# Patient Record
Sex: FEMALE | Race: WHITE | Employment: PART TIME | URBAN - METROPOLITAN AREA
[De-identification: names, ages, dates, MRNs, and addresses within clinical notes are randomized per-mention and may not be internally consistent; named-entity substitution may affect disease eponyms.]

---

## 2019-03-20 ENCOUNTER — OFFICE VISIT (OUTPATIENT)
Dept: FAMILY MEDICINE CLINIC | Age: 69
End: 2019-03-20
Payer: COMMERCIAL

## 2019-03-20 VITALS
DIASTOLIC BLOOD PRESSURE: 74 MMHG | RESPIRATION RATE: 16 BRPM | SYSTOLIC BLOOD PRESSURE: 132 MMHG | TEMPERATURE: 98.3 F | BODY MASS INDEX: 41.85 KG/M2 | OXYGEN SATURATION: 95 % | HEIGHT: 65 IN | HEART RATE: 83 BPM | WEIGHT: 251.2 LBS

## 2019-03-20 DIAGNOSIS — R12 HEARTBURN: ICD-10-CM

## 2019-03-20 DIAGNOSIS — J30.2 SEASONAL ALLERGIC RHINITIS, UNSPECIFIED TRIGGER: ICD-10-CM

## 2019-03-20 DIAGNOSIS — J45.40 MODERATE PERSISTENT ASTHMA WITHOUT COMPLICATION: ICD-10-CM

## 2019-03-20 DIAGNOSIS — Z87.891 FORMER MODERATE CIGARETTE SMOKER (10-19 PER DAY): ICD-10-CM

## 2019-03-20 DIAGNOSIS — E66.01 CLASS 3 SEVERE OBESITY DUE TO EXCESS CALORIES WITHOUT SERIOUS COMORBIDITY WITH BODY MASS INDEX (BMI) OF 40.0 TO 44.9 IN ADULT (HCC): ICD-10-CM

## 2019-03-20 DIAGNOSIS — L30.9 DERMATITIS: ICD-10-CM

## 2019-03-20 DIAGNOSIS — M15.9 PRIMARY OSTEOARTHRITIS INVOLVING MULTIPLE JOINTS: ICD-10-CM

## 2019-03-20 DIAGNOSIS — M81.0 AGE RELATED OSTEOPOROSIS, UNSPECIFIED PATHOLOGICAL FRACTURE PRESENCE: Primary | ICD-10-CM

## 2019-03-20 PROCEDURE — 99203 OFFICE O/P NEW LOW 30 MIN: CPT | Performed by: INTERNAL MEDICINE

## 2019-03-20 RX ORDER — DICLOFENAC SODIUM AND MISOPROSTOL 75; 200 MG/1; UG/1
1 TABLET, DELAYED RELEASE ORAL 2 TIMES DAILY
Qty: 60 TABLET | Refills: 3 | Status: SHIPPED | OUTPATIENT
Start: 2019-03-20 | End: 2019-04-25 | Stop reason: SDUPTHER

## 2019-03-20 RX ORDER — DICLOFENAC SODIUM AND MISOPROSTOL 50; 200 MG/1; UG/1
1 TABLET, DELAYED RELEASE ORAL 2 TIMES DAILY
COMMUNITY
End: 2019-03-20

## 2019-03-20 RX ORDER — NICOTINE POLACRILEX 4 MG/1
20 GUM, CHEWING ORAL DAILY
COMMUNITY
End: 2019-03-20 | Stop reason: ALTCHOICE

## 2019-03-20 RX ORDER — ALBUTEROL SULFATE 90 UG/1
1 AEROSOL, METERED RESPIRATORY (INHALATION) PRN
COMMUNITY
Start: 2019-02-15 | End: 2019-04-25 | Stop reason: SDUPTHER

## 2019-03-20 RX ORDER — MONTELUKAST SODIUM 10 MG/1
10 TABLET ORAL DAILY
COMMUNITY
Start: 2019-02-20 | End: 2019-04-25 | Stop reason: SDUPTHER

## 2019-03-20 RX ORDER — RANITIDINE 150 MG/1
150 TABLET ORAL 2 TIMES DAILY PRN
Qty: 60 TABLET | Refills: 5 | Status: SHIPPED | OUTPATIENT
Start: 2019-03-20 | End: 2019-04-25 | Stop reason: SDUPTHER

## 2019-03-20 RX ORDER — TRIAMCINOLONE ACETONIDE 0.25 MG/G
OINTMENT TOPICAL
Qty: 80 G | Refills: 1 | Status: SHIPPED | OUTPATIENT
Start: 2019-03-20 | End: 2019-03-27

## 2019-03-20 RX ORDER — CETIRIZINE HYDROCHLORIDE 10 MG/1
10 TABLET ORAL DAILY
COMMUNITY
Start: 2019-02-20 | End: 2019-04-25 | Stop reason: SDUPTHER

## 2019-03-20 RX ORDER — ALENDRONATE SODIUM 70 MG/1
1 TABLET ORAL WEEKLY
COMMUNITY
Start: 2019-02-15 | End: 2019-04-25 | Stop reason: SDUPTHER

## 2019-03-20 RX ORDER — BUDESONIDE AND FORMOTEROL FUMARATE DIHYDRATE 160; 4.5 UG/1; UG/1
2 AEROSOL RESPIRATORY (INHALATION) DAILY
COMMUNITY
Start: 2019-02-22 | End: 2019-04-25 | Stop reason: SDUPTHER

## 2019-03-20 RX ORDER — TIOTROPIUM BROMIDE 18 UG/1
1 CAPSULE ORAL; RESPIRATORY (INHALATION) DAILY
COMMUNITY
Start: 2019-02-20 | End: 2019-04-25 | Stop reason: SDUPTHER

## 2019-03-20 RX ORDER — TIZANIDINE 4 MG/1
4 TABLET ORAL 3 TIMES DAILY
COMMUNITY
Start: 2019-02-20 | End: 2019-04-25 | Stop reason: SDUPTHER

## 2019-03-20 RX ORDER — FLUTICASONE PROPIONATE 50 MCG
1 SPRAY, SUSPENSION (ML) NASAL DAILY
Qty: 2 BOTTLE | Refills: 1 | Status: SHIPPED | OUTPATIENT
Start: 2019-03-20 | End: 2019-04-25

## 2019-03-20 SDOH — HEALTH STABILITY: MENTAL HEALTH: HOW OFTEN DO YOU HAVE A DRINK CONTAINING ALCOHOL?: NOT ASKED

## 2019-03-20 ASSESSMENT — PATIENT HEALTH QUESTIONNAIRE - PHQ9
1. LITTLE INTEREST OR PLEASURE IN DOING THINGS: 0
SUM OF ALL RESPONSES TO PHQ QUESTIONS 1-9: 0
SUM OF ALL RESPONSES TO PHQ QUESTIONS 1-9: 0
2. FEELING DOWN, DEPRESSED OR HOPELESS: 0
SUM OF ALL RESPONSES TO PHQ9 QUESTIONS 1 & 2: 0

## 2019-04-25 ENCOUNTER — OFFICE VISIT (OUTPATIENT)
Dept: FAMILY MEDICINE CLINIC | Age: 69
End: 2019-04-25
Payer: COMMERCIAL

## 2019-04-25 VITALS
SYSTOLIC BLOOD PRESSURE: 120 MMHG | TEMPERATURE: 97.6 F | DIASTOLIC BLOOD PRESSURE: 82 MMHG | HEART RATE: 78 BPM | BODY MASS INDEX: 41.74 KG/M2 | RESPIRATION RATE: 16 BRPM | OXYGEN SATURATION: 95 % | WEIGHT: 250.8 LBS

## 2019-04-25 DIAGNOSIS — J44.9 CHRONIC OBSTRUCTIVE PULMONARY DISEASE, UNSPECIFIED COPD TYPE (HCC): ICD-10-CM

## 2019-04-25 DIAGNOSIS — M81.0 OSTEOPOROSIS WITHOUT CURRENT PATHOLOGICAL FRACTURE, UNSPECIFIED OSTEOPOROSIS TYPE: ICD-10-CM

## 2019-04-25 DIAGNOSIS — M15.9 PRIMARY OSTEOARTHRITIS INVOLVING MULTIPLE JOINTS: ICD-10-CM

## 2019-04-25 DIAGNOSIS — R12 HEARTBURN: ICD-10-CM

## 2019-04-25 DIAGNOSIS — R73.03 PREDIABETES: Primary | ICD-10-CM

## 2019-04-25 PROCEDURE — 99214 OFFICE O/P EST MOD 30 MIN: CPT | Performed by: INTERNAL MEDICINE

## 2019-04-25 RX ORDER — BUDESONIDE AND FORMOTEROL FUMARATE DIHYDRATE 160; 4.5 UG/1; UG/1
2 AEROSOL RESPIRATORY (INHALATION) DAILY
Qty: 3 INHALER | Refills: 1 | Status: SHIPPED | OUTPATIENT
Start: 2019-04-25 | End: 2019-08-26 | Stop reason: SDUPTHER

## 2019-04-25 RX ORDER — RANITIDINE 150 MG/1
150 TABLET ORAL 2 TIMES DAILY PRN
Qty: 180 TABLET | Refills: 1 | Status: SHIPPED | OUTPATIENT
Start: 2019-04-25 | End: 2019-08-26 | Stop reason: SDUPTHER

## 2019-04-25 RX ORDER — MONTELUKAST SODIUM 10 MG/1
10 TABLET ORAL DAILY
Qty: 90 TABLET | Refills: 1 | Status: SHIPPED | OUTPATIENT
Start: 2019-04-25 | End: 2019-08-26 | Stop reason: SDUPTHER

## 2019-04-25 RX ORDER — TRAMADOL HYDROCHLORIDE 50 MG/1
1-2 TABLET ORAL EVERY 8 HOURS PRN
Refills: 0 | COMMUNITY
Start: 2019-04-17 | End: 2019-07-23 | Stop reason: ALTCHOICE

## 2019-04-25 RX ORDER — DICLOFENAC SODIUM AND MISOPROSTOL 75; 200 MG/1; UG/1
1 TABLET, DELAYED RELEASE ORAL 2 TIMES DAILY
Qty: 180 TABLET | Refills: 1 | Status: SHIPPED | OUTPATIENT
Start: 2019-04-25 | End: 2019-08-26 | Stop reason: SDUPTHER

## 2019-04-25 RX ORDER — TIZANIDINE 4 MG/1
4 TABLET ORAL 3 TIMES DAILY
Qty: 270 TABLET | Refills: 1 | Status: SHIPPED | OUTPATIENT
Start: 2019-04-25 | End: 2019-08-26 | Stop reason: SDUPTHER

## 2019-04-25 RX ORDER — TIOTROPIUM BROMIDE 18 UG/1
18 CAPSULE ORAL; RESPIRATORY (INHALATION) DAILY
Qty: 90 CAPSULE | Refills: 1 | Status: SHIPPED | OUTPATIENT
Start: 2019-04-25 | End: 2019-08-26 | Stop reason: SDUPTHER

## 2019-04-25 RX ORDER — ALENDRONATE SODIUM 70 MG/1
70 TABLET ORAL WEEKLY
Qty: 12 TABLET | Refills: 1 | Status: SHIPPED | OUTPATIENT
Start: 2019-04-25 | End: 2019-08-26 | Stop reason: SDUPTHER

## 2019-04-25 RX ORDER — CETIRIZINE HYDROCHLORIDE 10 MG/1
10 TABLET ORAL DAILY
Qty: 90 TABLET | Refills: 1 | Status: SHIPPED | OUTPATIENT
Start: 2019-04-25 | End: 2019-08-26 | Stop reason: SDUPTHER

## 2019-04-25 RX ORDER — ALBUTEROL SULFATE 90 UG/1
1 AEROSOL, METERED RESPIRATORY (INHALATION) EVERY 6 HOURS PRN
Qty: 4 INHALER | Refills: 1 | Status: SHIPPED | OUTPATIENT
Start: 2019-04-25 | End: 2019-08-26 | Stop reason: SDUPTHER

## 2019-04-25 NOTE — PROGRESS NOTES
Subjective:       Patient ID: Lynn Trujillo is a 80 y.o. female who presents for   Chief Complaint   Patient presents with    Results       HPI:  Nursing note reviewed and discussed with patient. Seeing pain management at [facility] - had an MRI, NSGY v/s injections recommended based on results. She is taking tramadol TID and BID lyrica. She had a really bad period where they refused to refill her pain meds till she had the MRI done and they could review the results, she couldn't sleep or move without pain. She reports she is back on her meds now. She is sad that she was never believed about her pain in the past despite ample evidence of osteoarthritis on xrays done in 2016. She is also considering getting a second opinion before she does anything for her back, restrained by insurance and financial ability   She reports the rash on her legs is finally starting to heal up with Aveeno lotion   She was never told that she is prediabetic when she had labs done in 2018. Denies polyuria, polydipsia, fatigue, vision changes. Patient's medications, allergies, past medical, surgical, social and family histories were reviewed and updated as appropriate. Social History     Tobacco Use    Smoking status: Former Smoker     Packs/day: 0.75     Years: 34.00     Pack years: 25.50     Types: Cigarettes     Last attempt to quit: 3/20/2017     Years since quittin.0    Smokeless tobacco: Never Used   Substance Use Topics    Alcohol use: Not Currently     Comment: occasionally         Review of Systems  Energy level good overall, and weight is stable. No chest pain or shortness of breath.     Bowels have been normal without constipation or diarrhea         Objective:        Physical Exam:  /82 (Site: Left Upper Arm, Position: Sitting, Cuff Size: Large Adult)   Pulse 78   Temp 97.6 °F (36.4 °C) (Oral)   Resp 16   Wt 250 lb 12.8 oz (113.8 kg)   SpO2 95%   BMI 41.74 kg/m²   Wt Readings from Last 3 Encounters:   04/25/19 250 lb 12.8 oz (113.8 kg)   03/20/19 251 lb 3.2 oz (113.9 kg)       General: Alert and oriented, in no distress. Patient ambulating with walker. Central obesity  Chest: clear with no wheezes or rales. No retractions, or use of accessory muscles noted. Cardiovascular: PMI is not displaced, and no thrill noted. Regular rate and rhythm with no rub, murmur or gallop. There is no peripheral edema. Pedal pulses are normal.   Abdomen: Abdomen is soft and nontender. The bowel sounds are normal.   Musculoskeletal: There are no deformities of the the extremities. Patient has all ten fingers intact. The patient has full range of motion on all 4 extremities without pain. Skin: The skin is warm and dry. There are no rashes noted. Prior to Visit Medications    Medication Sig Taking? Authorizing Provider   traMADol (ULTRAM) 50 MG tablet Take 1-2 tablets by mouth every 8 hours as needed. Yes Historical Provider, MD   LYRICA 50 MG capsule Take 1 tablet by mouth 2 times daily.  Yes Historical Provider, MD Maya Sax 18 MCG inhalation capsule Take 1 puff by mouth daily Yes Historical Provider, MD   SYMBICORT 160-4.5 MCG/ACT AERO Inhale 2 puffs into the lungs daily Yes Historical Provider, MD   albuterol sulfate  (90 Base) MCG/ACT inhaler Inhale 1 puff into the lungs as needed Yes Historical Provider, MD   montelukast (SINGULAIR) 10 MG tablet Take 10 mg by mouth daily Yes Historical Provider, MD   cetirizine (ZYRTEC) 10 MG tablet Take 10 mg by mouth daily Yes Historical Provider, MD   tiZANidine (ZANAFLEX) 4 MG tablet Take 4 mg by mouth 3 times daily Yes Historical Provider, MD   alendronate (FOSAMAX) 70 MG tablet Take 1 tablet by mouth once a week Yes Historical Provider, MD   diclofenac-Misoprostol (ARTHROTEC) 75-0.2 MG per tablet Take 1 tablet by mouth 2 times daily Yes Bethanie House MD   ranitidine (ZANTAC) 150 MG tablet Take 1 tablet by mouth 2 times daily as needed for Heartburn Yes Genaro Willis MD       Data Review all data available in careEverywhere reviewed      Assessment/Plan:      1. Primary osteoarthritis involving multiple joints  Will refer to ortho for knees when she is ready - probably next visit, she needs to pay her portion of her MRI and pain consults   - diclofenac-Misoprostol (ARTHROTEC) 75-0.2 MG per tablet; Take 1 tablet by mouth 2 times daily  Dispense: 180 tablet; Refill: 1    2. Heartburn  - ranitidine (ZANTAC) 150 MG tablet; Take 1 tablet by mouth 2 times daily as needed for Heartburn  Dispense: 180 tablet; Refill: 1    3. Prediabetes  Repeat A1c    4. Chronic obstructive pulmonary disease, unspecified COPD type ([facility]  - SYMBICORT 160-4.5 MCG/ACT AERO; Inhale 2 puffs into the lungs daily  Dispense: 3 Inhaler; Refill: 1  - SPIRIVA HANDIHALER 18 MCG inhalation capsule; Inhale 1 capsule into the lungs daily  Dispense: 90 capsule; Refill: 1  - albuterol sulfate  (90 Base) MCG/ACT inhaler; Inhale 1 puff into the lungs every 6 hours as needed for Wheezing or Shortness of Breath  Dispense: 4 Inhaler; Refill: 1    5. Osteoporosis without current pathological fracture, unspecified osteoporosis type  - alendronate (FOSAMAX) 70 MG tablet; Take 1 tablet by mouth once a week  Dispense: 12 tablet;  Refill: 1           Electronically signed by Anahy Kaur MD on 4/25/2019 at 3:27 PM

## 2019-07-23 ENCOUNTER — OFFICE VISIT (OUTPATIENT)
Dept: FAMILY MEDICINE CLINIC | Age: 69
End: 2019-07-23
Payer: COMMERCIAL

## 2019-07-23 VITALS
RESPIRATION RATE: 20 BRPM | HEIGHT: 65 IN | DIASTOLIC BLOOD PRESSURE: 80 MMHG | SYSTOLIC BLOOD PRESSURE: 135 MMHG | WEIGHT: 248 LBS | HEART RATE: 85 BPM | TEMPERATURE: 98 F | BODY MASS INDEX: 41.32 KG/M2

## 2019-07-23 DIAGNOSIS — M54.41 CHRONIC BILATERAL LOW BACK PAIN WITH BILATERAL SCIATICA: ICD-10-CM

## 2019-07-23 DIAGNOSIS — M54.42 CHRONIC BILATERAL LOW BACK PAIN WITH BILATERAL SCIATICA: ICD-10-CM

## 2019-07-23 DIAGNOSIS — G89.29 CHRONIC LEFT HIP PAIN: Primary | ICD-10-CM

## 2019-07-23 DIAGNOSIS — G89.29 CHRONIC BILATERAL LOW BACK PAIN WITH BILATERAL SCIATICA: ICD-10-CM

## 2019-07-23 DIAGNOSIS — R73.03 PREDIABETES: ICD-10-CM

## 2019-07-23 DIAGNOSIS — M15.9 PRIMARY OSTEOARTHRITIS INVOLVING MULTIPLE JOINTS: ICD-10-CM

## 2019-07-23 DIAGNOSIS — M25.552 CHRONIC LEFT HIP PAIN: Primary | ICD-10-CM

## 2019-07-23 PROBLEM — M15.0 PRIMARY OSTEOARTHRITIS INVOLVING MULTIPLE JOINTS: Status: ACTIVE | Noted: 2019-07-23

## 2019-07-23 LAB — HBA1C MFR BLD: 5.3 %

## 2019-07-23 PROCEDURE — 99214 OFFICE O/P EST MOD 30 MIN: CPT | Performed by: INTERNAL MEDICINE

## 2019-07-23 PROCEDURE — 83036 HEMOGLOBIN GLYCOSYLATED A1C: CPT | Performed by: INTERNAL MEDICINE

## 2019-07-23 RX ORDER — PREDNISONE 10 MG/1
TABLET ORAL
Qty: 30 TABLET | Refills: 0 | Status: SHIPPED | OUTPATIENT
Start: 2019-07-23 | End: 2019-08-26 | Stop reason: ALTCHOICE

## 2019-07-23 RX ORDER — PREDNISONE 1 MG/1
5 TABLET ORAL DAILY
Qty: 30 TABLET | Refills: 1 | Status: SHIPPED | OUTPATIENT
Start: 2019-07-23 | End: 2019-09-24 | Stop reason: SDUPTHER

## 2019-07-23 NOTE — PROGRESS NOTES
Chronic Disease Visit Information    BP Readings from Last 3 Encounters:   04/25/19 120/82   03/20/19 132/74          No results found for: LABA1C, LABMICR, LDLCHOLESTEROL, LDLCALC, HDL, BUN, CREATININE, GLUCOSE         Have you changed or started any medications since your last visit including any over-the-counter medicines, vitamins, or herbal medicines? no   Are you having any side effects from any of your medications? -  no  Have you stopped taking any of your medications? Is so, why? -  no    Have you seen any other physician or provider since your last visit? No  Have you had any other diagnostic tests since your last visit? No  Have you been seen in the emergency room and/or had an admission to a hospital since we last saw you? No  Have you had your annual diabetic retinal (eye) exam? No  Have you had your routine dental cleaning in the past 6 months? no    Have you activated your [employer] [facility] [employer] account? If not, what are your barriers?  Yes     Patient Care Team:  Nura Berkowitz MD as PCP - General (Internal Medicine)  Nura Berkowitz MD as PCP - [facility] Provider         Medical History Review  Past Medical, Family, and Social History reviewed and does contribute to the patient presenting condition    Health Maintenance   Topic Date Due    DTaP/Tdap/Td vaccine (1 - Tdap) 11/12/1970    Lipid screen  11/12/1991    Diabetes screen  11/12/1991    Colon cancer screen colonoscopy  11/12/2001    Pneumococcal 65+ years Vaccine (1 of 2 - PCV13) 11/12/2016    Shingles Vaccine (2 of 2) 03/23/2019    Breast cancer screen  09/20/2019 (Originally 11/12/2001)    DEXA (modify frequency per FRAX score)  03/20/2020 (Originally 11/12/2016)    Hepatitis C screen  03/20/2020 (Originally 1950)    Flu vaccine (1) 09/01/2019

## 2019-07-23 NOTE — PROGRESS NOTES
Subjective:       Patient ID: Danilo Torres is a 80 y.o. female who presents for   Chief Complaint   Patient presents with    Follow-up    Wound Check     Left leg behind calf  2 open wounds        HPI:  Nursing note reviewed and discussed with patient. Seeing pain management at [facility] - had an MRI, NSGY v/s injections recommended based on results. She is taking tramadol TID and BID lyrica. She had a really bad period where they refused to refill her pain meds till she had the MRI done and they could review the results, she couldn't sleep or move without pain. She reports she is back on her meds now. She is sad that she was never believed about her pain in the past despite ample evidence of osteoarthritis on xrays done in 2016. --> switching pain management doctors, very unhappy with the way she has been treated. She continues to remain in pain. She is taking all meds as prescribed   She had blisters on her legs after lyrica increased, getting better now that she stopped the medicaton, but has ulcers there now   She is wanting to try something else for the pain   She reports that she had one depo medrol injection to the left hip and it was the best thing that ever happened, she did not even need the walker for about a week, then it wore off and she is back to where she was prior. Patient's medications, allergies, past medical, surgical, social and family histories were reviewed and updated as appropriate. Social History     Tobacco Use    Smoking status: Former Smoker     Packs/day: 0.75     Years: 34.00     Pack years: 25.50     Types: Cigarettes     Last attempt to quit: 3/20/2017     Years since quittin.3    Smokeless tobacco: Never Used   Substance Use Topics    Alcohol use: Not Currently     Comment: occasionally         Review of Systems  Energy level good overall, and weight is stable. No chest pain or shortness of breath.     Bowels have been normal without constipation or

## 2019-07-24 ENCOUNTER — TELEPHONE (OUTPATIENT)
Dept: FAMILY MEDICINE CLINIC | Age: 69
End: 2019-07-24

## 2019-07-24 NOTE — TELEPHONE ENCOUNTER
Patient called in asking if she could get a cream sent into the office to place on the rash she showed you yesterday at appt.

## 2019-08-19 ENCOUNTER — PATIENT MESSAGE (OUTPATIENT)
Dept: FAMILY MEDICINE CLINIC | Age: 69
End: 2019-08-19

## 2019-08-19 DIAGNOSIS — M25.552 CHRONIC PAIN OF BOTH HIPS: Primary | ICD-10-CM

## 2019-08-19 DIAGNOSIS — M25.551 CHRONIC PAIN OF BOTH HIPS: Primary | ICD-10-CM

## 2019-08-19 DIAGNOSIS — G89.29 CHRONIC PAIN OF BOTH HIPS: Primary | ICD-10-CM

## 2019-08-19 RX ORDER — HYDROCODONE BITARTRATE AND ACETAMINOPHEN 5; 325 MG/1; MG/1
1 TABLET ORAL EVERY 8 HOURS PRN
Qty: 75 TABLET | Refills: 0 | Status: SHIPPED | OUTPATIENT
Start: 2019-08-19 | End: 2019-09-18

## 2019-08-19 NOTE — TELEPHONE ENCOUNTER
From: Sue Ghotra  To: Martha Laughlin MD  Sent: 8/19/2019 3:41 PM EDT  Subject: Prescription Question    I have a new appt with Dr Rona Randall Sept 5th @ 230pm @ [facility]. .Dr Pretty Poag she would fill Norco 5mg /325 rx for me if needed to last long enough until i am seen by Dr Rona Randall. .This will be due for refill 7/23/19. .Or what should I do

## 2019-08-26 ENCOUNTER — OFFICE VISIT (OUTPATIENT)
Dept: FAMILY MEDICINE CLINIC | Age: 69
End: 2019-08-26
Payer: COMMERCIAL

## 2019-08-26 VITALS
DIASTOLIC BLOOD PRESSURE: 78 MMHG | BODY MASS INDEX: 41.15 KG/M2 | SYSTOLIC BLOOD PRESSURE: 122 MMHG | HEART RATE: 79 BPM | OXYGEN SATURATION: 96 % | WEIGHT: 247 LBS | HEIGHT: 65 IN

## 2019-08-26 DIAGNOSIS — J30.2 SEASONAL ALLERGIC RHINITIS, UNSPECIFIED TRIGGER: ICD-10-CM

## 2019-08-26 DIAGNOSIS — J44.9 CHRONIC OBSTRUCTIVE PULMONARY DISEASE, UNSPECIFIED COPD TYPE (HCC): ICD-10-CM

## 2019-08-26 DIAGNOSIS — M81.0 OSTEOPOROSIS WITHOUT CURRENT PATHOLOGICAL FRACTURE, UNSPECIFIED OSTEOPOROSIS TYPE: ICD-10-CM

## 2019-08-26 DIAGNOSIS — M15.9 PRIMARY OSTEOARTHRITIS INVOLVING MULTIPLE JOINTS: ICD-10-CM

## 2019-08-26 DIAGNOSIS — R12 HEARTBURN: ICD-10-CM

## 2019-08-26 DIAGNOSIS — Z91.81 AT HIGH RISK FOR FALLS: Primary | ICD-10-CM

## 2019-08-26 PROCEDURE — 99214 OFFICE O/P EST MOD 30 MIN: CPT | Performed by: INTERNAL MEDICINE

## 2019-08-26 RX ORDER — MONTELUKAST SODIUM 10 MG/1
10 TABLET ORAL DAILY
Qty: 90 TABLET | Refills: 1 | Status: SHIPPED | OUTPATIENT
Start: 2019-08-26 | End: 2019-11-19 | Stop reason: SDUPTHER

## 2019-08-26 RX ORDER — ALENDRONATE SODIUM 70 MG/1
70 TABLET ORAL WEEKLY
Qty: 12 TABLET | Refills: 1 | Status: SHIPPED | OUTPATIENT
Start: 2019-08-26 | End: 2019-11-19 | Stop reason: SDUPTHER

## 2019-08-26 RX ORDER — RANITIDINE 150 MG/1
150 TABLET ORAL 2 TIMES DAILY PRN
Qty: 180 TABLET | Refills: 1 | Status: SHIPPED | OUTPATIENT
Start: 2019-08-26 | End: 2019-11-19 | Stop reason: CLARIF

## 2019-08-26 RX ORDER — BUDESONIDE AND FORMOTEROL FUMARATE DIHYDRATE 160; 4.5 UG/1; UG/1
2 AEROSOL RESPIRATORY (INHALATION) DAILY
Qty: 3 INHALER | Refills: 1 | Status: SHIPPED | OUTPATIENT
Start: 2019-08-26 | End: 2019-11-19 | Stop reason: SDUPTHER

## 2019-08-26 RX ORDER — CETIRIZINE HYDROCHLORIDE 10 MG/1
10 TABLET ORAL DAILY
Qty: 90 TABLET | Refills: 1 | Status: SHIPPED | OUTPATIENT
Start: 2019-08-26 | End: 2019-11-19 | Stop reason: SDUPTHER

## 2019-08-26 RX ORDER — TIZANIDINE 4 MG/1
4 TABLET ORAL 3 TIMES DAILY
Qty: 270 TABLET | Refills: 1 | Status: SHIPPED | OUTPATIENT
Start: 2019-08-26 | End: 2020-11-16 | Stop reason: SDUPTHER

## 2019-08-26 RX ORDER — DICLOFENAC SODIUM AND MISOPROSTOL 75; 200 MG/1; UG/1
1 TABLET, DELAYED RELEASE ORAL 2 TIMES DAILY
Qty: 180 TABLET | Refills: 1 | Status: SHIPPED | OUTPATIENT
Start: 2019-08-26 | End: 2019-11-19 | Stop reason: SDUPTHER

## 2019-08-26 RX ORDER — TIOTROPIUM BROMIDE 18 UG/1
18 CAPSULE ORAL; RESPIRATORY (INHALATION) DAILY
Qty: 90 CAPSULE | Refills: 1 | Status: SHIPPED | OUTPATIENT
Start: 2019-08-26 | End: 2019-11-19 | Stop reason: SDUPTHER

## 2019-08-26 RX ORDER — ALBUTEROL SULFATE 90 UG/1
1 AEROSOL, METERED RESPIRATORY (INHALATION) EVERY 6 HOURS PRN
Qty: 4 INHALER | Refills: 1 | Status: SHIPPED | OUTPATIENT
Start: 2019-08-26 | End: 2019-11-19 | Stop reason: SDUPTHER

## 2019-08-26 NOTE — PROGRESS NOTES
Bowels have been normal without constipation or diarrhea         Objective:        Physical Exam:  /78 (Site: Left Upper Arm, Position: Sitting, Cuff Size: Large Adult)   Pulse 79   Ht 5' 5\" (1.651 m)   Wt 247 lb (112 kg)   SpO2 96%   BMI 41.10 kg/m²   Wt Readings from Last 3 Encounters:   08/26/19 247 lb (112 kg)   07/23/19 248 lb (112.5 kg)   04/25/19 250 lb 12.8 oz (113.8 kg)       General: Alert and oriented, in no distress. Patient ambulating with walker. Central obesity  Chest: clear with no wheezes or rales. No retractions, or use of accessory muscles noted. Cardiovascular: PMI is not displaced, and no thrill noted. Regular rate and rhythm with no rub, murmur or gallop. There is no peripheral edema. Pedal pulses are normal.   Abdomen: Abdomen is soft and nontender. The bowel sounds are normal.   Musculoskeletal: There are no deformities of the the extremities. Patient has all ten fingers intact. The patient has full range of motion on all 4 extremities without pain. Skin: The skin is warm and dry. There are no rashes noted. Prior to Visit Medications    Medication Sig Taking? Authorizing Provider   HYDROcodone-acetaminophen (NORCO) 5-325 MG per tablet Take 1 tablet by mouth every 8 hours as needed for Pain for up to 30 days. Intended supply: 30 days Yes Norma Sims MD   predniSONE (DELTASONE) 10 MG tablet 4 tabs by mouth daily for 3 days, then 3 tabs daily for 3 days, then 2 tabs daily for 3 days, then 1 tab daily till gone.  Yes Tran Mantilla MD   predniSONE (DELTASONE) 5 MG tablet Take 1 tablet by mouth daily Yes Norma Sims MD   alendronate (FOSAMAX) 70 MG tablet Take 1 tablet by mouth once a week Yes Norma Sims MD   cetirizine (ZYRTEC) 10 MG tablet Take 1 tablet by mouth daily Yes Tran Mantilla MD   [facility] 160-4.5 MCG/ACT AERO Inhale 2 puffs into the lungs daily Yes Norma Sims MD   montelukast (SINGULAIR) 10 MG tablet Take 1 tablet by mouth daily Yes Norma Whiting MD   Jameel Ahr 18 MCG inhalation capsule Inhale 1 capsule into the lungs daily Yes Norma Whiting MD   albuterol sulfate  (90 Base) MCG/ACT inhaler Inhale 1 puff into the lungs every 6 hours as needed for Wheezing or Shortness of Breath Yes Norma Whiting MD   tiZANidine (ZANAFLEX) 4 MG tablet Take 1 tablet by mouth 3 times daily Yes Walter Landis MD   diclofenac-Misoprostol (ARTHROTEC) 75-0.2 MG per tablet Take 1 tablet by mouth 2 times daily Yes Walter Landis MD   ranitidine (ZANTAC) 150 MG tablet Take 1 tablet by mouth 2 times daily as needed for Heartburn Yes Norma Whiting MD   hydrocortisone 2.5 % ointment Apply topically 2 times daily. Patient not taking: Reported on 8/26/2019  Norma Whiting MD       Data Review all data available in careEverywhere reviewed      Assessment/Plan:     1. At high risk for falls  On the basis of positive falls risk screening, assessment and plan is as follows: patient declines any further evaluation/treatment for increased falls risk. Using walker all the time     2. Osteoporosis without current pathological fracture, unspecified osteoporosis type  - alendronate (FOSAMAX) 70 MG tablet; Take 1 tablet by mouth once a week  Dispense: 12 tablet; Refill: 1    3. Chronic obstructive pulmonary disease, unspecified COPD type ([facility]  - SYMBICORT 160-4.5 MCG/ACT AERO; Inhale 2 puffs into the lungs daily  Dispense: 3 Inhaler; Refill: 1  - SPIRIVA HANDIHALER 18 MCG inhalation capsule; Inhale 1 capsule into the lungs daily  Dispense: 90 capsule; Refill: 1  - albuterol sulfate  (90 Base) MCG/ACT inhaler; Inhale 1 puff into the lungs every 6 hours as needed for Wheezing or Shortness of Breath  Dispense: 4 Inhaler; Refill: 1    4. Primary osteoarthritis involving multiple joints  - tiZANidine (ZANAFLEX) 4 MG tablet; Take 1 tablet by mouth 3 times daily  Dispense: 270 tablet;  Refill: 1  - diclofenac-Misoprostol (ARTHROTEC) 75-0.2 MG per tablet; Take 1 tablet by mouth 2 times daily  Dispense: 180 tablet; Refill: 1  - reviewed with patient that she will need to start planning for joint replacement surgery eventually because medications cannot replace the cartilage loss in her hips. She should start looking at orthopedic offices and see what arrangements she needs to make for her pets while she will not be able to care for them during convalescence from joint replacement   - f/u pain management as scheduled     5. Heartburn  - ranitidine (ZANTAC) 150 MG tablet; Take 1 tablet by mouth 2 times daily as needed for Heartburn  Dispense: 180 tablet; Refill: 1    6. Seasonal allergic rhinitis, unspecified trigger  - cetirizine (ZYRTEC) 10 MG tablet; Take 1 tablet by mouth daily  Dispense: 90 tablet; Refill: 1  - montelukast (SINGULAIR) 10 MG tablet; Take 1 tablet by mouth daily  Dispense: 90 tablet;  Refill: 1            Electronically signed by Lai Han MD on 8/26/2019 at 8:35 AM

## 2019-09-24 ENCOUNTER — TELEPHONE (OUTPATIENT)
Dept: FAMILY MEDICINE CLINIC | Age: 69
End: 2019-09-24

## 2019-09-24 RX ORDER — PREDNISONE 1 MG/1
5 TABLET ORAL DAILY
Qty: 30 TABLET | Refills: 11 | Status: SHIPPED | OUTPATIENT
Start: 2019-09-24 | End: 2019-11-19 | Stop reason: CLARIF

## 2019-09-24 NOTE — TELEPHONE ENCOUNTER
Patient called stating her pain management told her to call our office for refill on her Norco but she wasn't sure about that because she stated she signed a med agreement with them. After reading their note, it stated they were okay taking over pain medication. Advised her to call the office to double check and if any issues to call our office. Patient called back and states their office has a script ready for her.

## 2019-10-04 ENCOUNTER — TELEPHONE (OUTPATIENT)
Dept: FAMILY MEDICINE CLINIC | Age: 69
End: 2019-10-04

## 2019-10-04 DIAGNOSIS — M81.0 OSTEOPOROSIS WITHOUT CURRENT PATHOLOGICAL FRACTURE, UNSPECIFIED OSTEOPOROSIS TYPE: ICD-10-CM

## 2019-10-04 DIAGNOSIS — Z12.31 BREAST CANCER SCREENING BY MAMMOGRAM: Primary | ICD-10-CM

## 2019-11-19 ENCOUNTER — OFFICE VISIT (OUTPATIENT)
Dept: FAMILY MEDICINE CLINIC | Age: 69
End: 2019-11-19
Payer: COMMERCIAL

## 2019-11-19 VITALS
HEART RATE: 70 BPM | DIASTOLIC BLOOD PRESSURE: 70 MMHG | TEMPERATURE: 96.5 F | WEIGHT: 249.2 LBS | HEIGHT: 65 IN | OXYGEN SATURATION: 99 % | BODY MASS INDEX: 41.52 KG/M2 | SYSTOLIC BLOOD PRESSURE: 102 MMHG

## 2019-11-19 DIAGNOSIS — R12 HEARTBURN: ICD-10-CM

## 2019-11-19 DIAGNOSIS — M54.42 CHRONIC BILATERAL LOW BACK PAIN WITH BILATERAL SCIATICA: ICD-10-CM

## 2019-11-19 DIAGNOSIS — J30.2 SEASONAL ALLERGIC RHINITIS, UNSPECIFIED TRIGGER: ICD-10-CM

## 2019-11-19 DIAGNOSIS — G89.29 CHRONIC BILATERAL LOW BACK PAIN WITH BILATERAL SCIATICA: ICD-10-CM

## 2019-11-19 DIAGNOSIS — M15.9 PRIMARY OSTEOARTHRITIS INVOLVING MULTIPLE JOINTS: ICD-10-CM

## 2019-11-19 DIAGNOSIS — M81.0 OSTEOPOROSIS WITHOUT CURRENT PATHOLOGICAL FRACTURE, UNSPECIFIED OSTEOPOROSIS TYPE: ICD-10-CM

## 2019-11-19 DIAGNOSIS — R73.03 PREDIABETES: ICD-10-CM

## 2019-11-19 DIAGNOSIS — M25.552 CHRONIC LEFT HIP PAIN: ICD-10-CM

## 2019-11-19 DIAGNOSIS — Z23 FLU VACCINE NEED: ICD-10-CM

## 2019-11-19 DIAGNOSIS — M54.41 CHRONIC BILATERAL LOW BACK PAIN WITH BILATERAL SCIATICA: ICD-10-CM

## 2019-11-19 DIAGNOSIS — G89.29 CHRONIC LEFT HIP PAIN: ICD-10-CM

## 2019-11-19 DIAGNOSIS — Z13.220 SCREENING FOR HYPERLIPIDEMIA: ICD-10-CM

## 2019-11-19 DIAGNOSIS — J44.9 CHRONIC OBSTRUCTIVE PULMONARY DISEASE, UNSPECIFIED COPD TYPE (HCC): Primary | ICD-10-CM

## 2019-11-19 PROCEDURE — 90471 IMMUNIZATION ADMIN: CPT | Performed by: INTERNAL MEDICINE

## 2019-11-19 PROCEDURE — 99214 OFFICE O/P EST MOD 30 MIN: CPT | Performed by: INTERNAL MEDICINE

## 2019-11-19 PROCEDURE — 90653 IIV ADJUVANT VACCINE IM: CPT | Performed by: INTERNAL MEDICINE

## 2019-11-19 RX ORDER — ALENDRONATE SODIUM 70 MG/1
70 TABLET ORAL WEEKLY
Qty: 12 TABLET | Refills: 1 | Status: SHIPPED | OUTPATIENT
Start: 2019-11-19 | End: 2020-04-13

## 2019-11-19 RX ORDER — BUDESONIDE AND FORMOTEROL FUMARATE DIHYDRATE 160; 4.5 UG/1; UG/1
2 AEROSOL RESPIRATORY (INHALATION) DAILY
Qty: 3 INHALER | Refills: 1 | Status: SHIPPED | OUTPATIENT
Start: 2019-11-19 | End: 2020-04-13 | Stop reason: SDUPTHER

## 2019-11-19 RX ORDER — CETIRIZINE HYDROCHLORIDE 10 MG/1
10 TABLET ORAL DAILY
Qty: 90 TABLET | Refills: 1 | Status: SHIPPED | OUTPATIENT
Start: 2019-11-19 | End: 2020-04-13 | Stop reason: SDUPTHER

## 2019-11-19 RX ORDER — FAMOTIDINE 20 MG/1
20 TABLET, FILM COATED ORAL 2 TIMES DAILY
Qty: 180 TABLET | Refills: 1 | Status: SHIPPED | OUTPATIENT
Start: 2019-11-19 | End: 2020-04-13 | Stop reason: SDUPTHER

## 2019-11-19 RX ORDER — TIOTROPIUM BROMIDE 18 UG/1
18 CAPSULE ORAL; RESPIRATORY (INHALATION) DAILY
Qty: 90 CAPSULE | Refills: 1 | Status: SHIPPED | OUTPATIENT
Start: 2019-11-19 | End: 2020-04-13 | Stop reason: SDUPTHER

## 2019-11-19 RX ORDER — MONTELUKAST SODIUM 10 MG/1
10 TABLET ORAL DAILY
Qty: 90 TABLET | Refills: 1 | Status: SHIPPED | OUTPATIENT
Start: 2019-11-19 | End: 2020-04-13 | Stop reason: SDUPTHER

## 2019-11-19 RX ORDER — ALBUTEROL SULFATE 90 UG/1
1 AEROSOL, METERED RESPIRATORY (INHALATION) EVERY 6 HOURS PRN
Qty: 3 INHALER | Refills: 1 | Status: SHIPPED | OUTPATIENT
Start: 2019-11-19 | End: 2020-04-13 | Stop reason: SDUPTHER

## 2019-11-19 RX ORDER — DICLOFENAC SODIUM AND MISOPROSTOL 75; 200 MG/1; UG/1
1 TABLET, DELAYED RELEASE ORAL 2 TIMES DAILY
Qty: 180 TABLET | Refills: 1 | Status: SHIPPED | OUTPATIENT
Start: 2019-11-19 | End: 2020-04-13

## 2019-12-16 DIAGNOSIS — M81.0 OSTEOPOROSIS WITHOUT CURRENT PATHOLOGICAL FRACTURE, UNSPECIFIED OSTEOPOROSIS TYPE: ICD-10-CM

## 2019-12-17 DIAGNOSIS — Z12.31 BREAST CANCER SCREENING BY MAMMOGRAM: ICD-10-CM

## 2019-12-31 ENCOUNTER — HOSPITAL ENCOUNTER (OUTPATIENT)
Age: 69
Setting detail: SPECIMEN
Discharge: HOME | End: 2019-12-31
Payer: COMMERCIAL

## 2019-12-31 LAB
ALBUMIN SERPL-MCNC: 4 G/DL (ref 3.5–5.2)
ALBUMIN/GLOBULIN RATIO: 1.2 (ref 1–2.5)
ALP BLD-CCNC: 99 U/L (ref 35–104)
ALT SERPL-CCNC: 19 U/L (ref 5–33)
ANION GAP SERPL CALCULATED.3IONS-SCNC: 12 MMOL/L (ref 9–17)
AST SERPL-CCNC: 18 U/L
BILIRUB SERPL-MCNC: 0.45 MG/DL (ref 0.3–1.2)
BUN BLDV-MCNC: 18 MG/DL (ref 8–23)
BUN/CREAT BLD: NORMAL (ref 9–20)
CALCIUM SERPL-MCNC: 9.6 MG/DL (ref 8.6–10.4)
CHLORIDE BLD-SCNC: 104 MMOL/L (ref 98–107)
CHOLESTEROL, FASTING: 149 MG/DL
CHOLESTEROL/HDL RATIO: 3.5
CO2: 24 MMOL/L (ref 20–31)
CREAT SERPL-MCNC: 0.79 MG/DL (ref 0.5–0.9)
GFR AFRICAN AMERICAN: >60 ML/MIN
GFR NON-AFRICAN AMERICAN: >60 ML/MIN
GFR SERPL CREATININE-BSD FRML MDRD: NORMAL ML/MIN/{1.73_M2}
GFR SERPL CREATININE-BSD FRML MDRD: NORMAL ML/MIN/{1.73_M2}
GLUCOSE BLD-MCNC: 82 MG/DL (ref 70–99)
HDLC SERPL-MCNC: 42 MG/DL
LDL CHOLESTEROL: 86 MG/DL (ref 0–130)
POTASSIUM SERPL-SCNC: 4.3 MMOL/L (ref 3.7–5.3)
SODIUM BLD-SCNC: 140 MMOL/L (ref 135–144)
TOTAL PROTEIN: 7.4 G/DL (ref 6.4–8.3)
TRIGLYCERIDE, FASTING: 105 MG/DL
VLDLC SERPL CALC-MCNC: NORMAL MG/DL (ref 1–30)

## 2019-12-31 PROCEDURE — 80061 LIPID PANEL: CPT

## 2019-12-31 PROCEDURE — 36415 COLL VENOUS BLD VENIPUNCTURE: CPT

## 2019-12-31 PROCEDURE — 80053 COMPREHEN METABOLIC PANEL: CPT

## 2019-12-31 PROCEDURE — 83036 HEMOGLOBIN GLYCOSYLATED A1C: CPT

## 2020-01-01 LAB
ESTIMATED AVERAGE GLUCOSE: 134 MG/DL
HBA1C MFR BLD: 6.3 % (ref 4–6)

## 2020-04-02 ENCOUNTER — TELEPHONE (OUTPATIENT)
Dept: FAMILY MEDICINE CLINIC | Age: 70
End: 2020-04-02

## 2020-04-03 NOTE — TELEPHONE ENCOUNTER
Spoke with patient and she stated she doesn't believe it will be for 12 weeks. She stated she is hoping to be back to work by the beginning of May. Patient states she has to use all her PTO first. Patient also stated she does not have the equipment at home to work from there.

## 2020-04-13 ENCOUNTER — VIRTUAL VISIT (OUTPATIENT)
Dept: FAMILY MEDICINE CLINIC | Age: 70
End: 2020-04-13
Payer: COMMERCIAL

## 2020-04-13 PROCEDURE — 99441 PR PHYS/QHP TELEPHONE EVALUATION 5-10 MIN: CPT | Performed by: NURSE PRACTITIONER

## 2020-04-13 RX ORDER — TIOTROPIUM BROMIDE 18 UG/1
18 CAPSULE ORAL; RESPIRATORY (INHALATION) DAILY
Qty: 90 CAPSULE | Refills: 1 | Status: SHIPPED | OUTPATIENT
Start: 2020-04-13 | End: 2020-11-16 | Stop reason: SDUPTHER

## 2020-04-13 RX ORDER — FAMOTIDINE 20 MG/1
20 TABLET, FILM COATED ORAL 2 TIMES DAILY
Qty: 180 TABLET | Refills: 1 | Status: SHIPPED | OUTPATIENT
Start: 2020-04-13 | End: 2020-11-16 | Stop reason: SDUPTHER

## 2020-04-13 RX ORDER — BUDESONIDE AND FORMOTEROL FUMARATE DIHYDRATE 160; 4.5 UG/1; UG/1
2 AEROSOL RESPIRATORY (INHALATION) 2 TIMES DAILY
Qty: 3 INHALER | Refills: 1 | Status: SHIPPED | OUTPATIENT
Start: 2020-04-13 | End: 2020-11-16 | Stop reason: SDUPTHER

## 2020-04-13 RX ORDER — CETIRIZINE HYDROCHLORIDE 10 MG/1
10 TABLET ORAL DAILY
Qty: 90 TABLET | Refills: 1 | Status: SHIPPED | OUTPATIENT
Start: 2020-04-13 | End: 2020-11-16 | Stop reason: SDUPTHER

## 2020-04-13 RX ORDER — MONTELUKAST SODIUM 10 MG/1
10 TABLET ORAL DAILY
Qty: 90 TABLET | Refills: 1 | Status: SHIPPED | OUTPATIENT
Start: 2020-04-13 | End: 2020-11-16 | Stop reason: SDUPTHER

## 2020-04-13 RX ORDER — ALBUTEROL SULFATE 90 UG/1
2 AEROSOL, METERED RESPIRATORY (INHALATION) EVERY 4 HOURS PRN
Qty: 3 INHALER | Refills: 1 | Status: SHIPPED | OUTPATIENT
Start: 2020-04-13 | End: 2020-11-16 | Stop reason: SDUPTHER

## 2020-04-13 RX ORDER — DICLOFENAC SODIUM AND MISOPROSTOL 50; 200 MG/1; UG/1
1 TABLET, DELAYED RELEASE ORAL 3 TIMES DAILY
Qty: 270 TABLET | Refills: 1 | Status: SHIPPED | OUTPATIENT
Start: 2020-04-13 | End: 2020-11-16 | Stop reason: SDUPTHER

## 2020-04-13 RX ORDER — DESVENLAFAXINE 25 MG/1
25 TABLET, EXTENDED RELEASE ORAL DAILY
Qty: 30 TABLET | Refills: 2 | Status: SHIPPED | OUTPATIENT
Start: 2020-04-13 | End: 2020-04-24 | Stop reason: SDUPTHER

## 2020-04-13 ASSESSMENT — ENCOUNTER SYMPTOMS
COUGH: 0
SHORTNESS OF BREATH: 0

## 2020-04-13 NOTE — PROGRESS NOTES
Wilfredo Crowley is a 77 y.o. female evaluated via telephone on 4/13/2020. Consent:  She and/or health care decision maker is aware that that she may receive a bill for this telephone service, depending on her insurance coverage, and has provided verbal consent to proceed: Yes      Documentation:  I communicated with the patient and/or health care decision maker about   Details of this discussion including any medical advice provided:    Patient is present for 4 month f/u    Patient would like to discuss depression  Patient states she has been dealing with this for about 6 months but feels she needs something for this now      I affirm this is a Patient Initiated Episode with an Established Patient who has not had a related appointment within my department in the past 7 days or scheduled within the next 24 hours.     Total Time: minutes: 5-10 minutes    Note: not billable if this call serves to triage the patient into an appointment for the relevant concern      Rodrigo Calvillo

## 2020-04-24 ENCOUNTER — TELEPHONE (OUTPATIENT)
Dept: FAMILY MEDICINE CLINIC | Age: 70
End: 2020-04-24

## 2020-04-24 RX ORDER — DESVENLAFAXINE 50 MG/1
50 TABLET, EXTENDED RELEASE ORAL DAILY
Qty: 30 TABLET | Refills: 2 | Status: SHIPPED | OUTPATIENT
Start: 2020-04-24 | End: 2020-05-01 | Stop reason: SDUPTHER

## 2020-05-01 RX ORDER — DESVENLAFAXINE 50 MG/1
50 TABLET, EXTENDED RELEASE ORAL DAILY
Qty: 30 TABLET | Refills: 2 | Status: SHIPPED | OUTPATIENT
Start: 2020-05-01 | End: 2020-05-22 | Stop reason: SDUPTHER

## 2020-05-01 NOTE — TELEPHONE ENCOUNTER
Last visit: 04/13/2020  Last Med refill: needs [facility]  Does patient have enough medication for 72 hours: No: mail order did not arrive    Next Visit Date:  Future Appointments   Date Time Provider Rafiq Bullock   5/13/2020  8:00 AM MITESH Gallegos [address] Maintenance   Topic Date Due    Hepatitis C screen  1950    DTaP/Tdap/Td vaccine (1 - Tdap) 11/12/1970    Colon cancer screen colonoscopy  11/12/2001    A1C test (Diabetic or Prediabetic)  12/31/2020    Breast cancer screen  12/13/2021    Lipid screen  12/31/2024    DEXA (modify frequency per FRAX score)  Completed    Flu vaccine  Completed    Shingles Vaccine  Completed    Pneumococcal 65+ years Vaccine  Completed    Hepatitis A vaccine  Aged Out    Hepatitis B vaccine  Aged Out    Hib vaccine  Aged Out    Meningococcal (ACWY) vaccine  Aged Out       Hemoglobin A1C (%)   Date Value   12/31/2019 6.3 (H)   07/23/2019 5.3             ( goal A1C is < 7)   No results found for: LABMICR  LDL Cholesterol (mg/dL)   Date Value   12/31/2019 86       (goal LDL is <100)   AST (U/L)   Date Value   12/31/2019 18     ALT (U/L)   Date Value   12/31/2019 19     BUN (mg/dL)   Date Value   12/31/2019 18     BP Readings from Last 3 Encounters:   11/19/19 102/70   08/26/19 122/78   07/23/19 135/80          (goal 120/80)    All Future Testing planned in [facility]              Patient Active Problem List:     Chronic left hip pain     Chronic bilateral low back pain with bilateral sciatica     Prediabetes     Primary osteoarthritis involving multiple joints     Osteoporosis without current pathological fracture

## 2020-05-22 ENCOUNTER — OFFICE VISIT (OUTPATIENT)
Dept: FAMILY MEDICINE CLINIC | Age: 70
End: 2020-05-22
Payer: COMMERCIAL

## 2020-05-22 VITALS
WEIGHT: 252 LBS | BODY MASS INDEX: 41.93 KG/M2 | DIASTOLIC BLOOD PRESSURE: 82 MMHG | TEMPERATURE: 97.9 F | SYSTOLIC BLOOD PRESSURE: 128 MMHG

## 2020-05-22 PROCEDURE — 99213 OFFICE O/P EST LOW 20 MIN: CPT | Performed by: NURSE PRACTITIONER

## 2020-05-22 RX ORDER — TAPENTADOL HYDROCHLORIDE 50 MG/1
1 TABLET, FILM COATED ORAL 3 TIMES DAILY
COMMUNITY
Start: 2020-05-18 | End: 2020-08-24 | Stop reason: ALTCHOICE

## 2020-05-22 RX ORDER — DESVENLAFAXINE 100 MG/1
100 TABLET, EXTENDED RELEASE ORAL DAILY
Qty: 30 TABLET | Refills: 1 | Status: SHIPPED | OUTPATIENT
Start: 2020-05-22 | End: 2020-07-13 | Stop reason: SDUPTHER

## 2020-05-22 ASSESSMENT — ENCOUNTER SYMPTOMS
SHORTNESS OF BREATH: 0
COUGH: 0

## 2020-05-22 NOTE — PROGRESS NOTES
Past Medical History:   Diagnosis Date    COPD (chronic obstructive pulmonary disease) ([facility]     Osteoarthritis       Past Surgical History:   Procedure Laterality Date    BREAST BIOPSY      two    CHOLECYSTECTOMY      HYSTERECTOMY, VAGINAL       Family History   Problem Relation Age of Onset    Stroke Mother     Alzheimer's Disease Father      Social History     Tobacco Use    Smoking status: Former Smoker     Packs/day: 0.75     Years: 34.00     Pack years: 25.50     Types: Cigarettes     Last attempt to quit: 3/20/2017     Years since quitting: 3.1    Smokeless tobacco: Never Used   Substance Use Topics    Alcohol use: Not Currently     Comment: occasionally       Allergies   Allergen Reactions    Lyrica [Pregabalin] Hives    Demerol Hcl [Meperidine]     Oxycodone-Acetaminophen Hives    Paxil [Paroxetine Hcl]        Subjective:      Review of Systems   Constitutional: Negative for chills and fever. Respiratory: Negative for cough and shortness of breath. Cardiovascular: Negative for chest pain, palpitations and leg swelling. Musculoskeletal: Positive for arthralgias. Psychiatric/Behavioral: Positive for dysphoric mood. Other pertinent ROS in HPI  Objective:     /82 (Site: Left Upper Arm, Position: Sitting, Cuff Size: Medium Adult) Comment (Cuff Size): long  Temp 97.9 °F (36.6 °C) (Temporal)   Wt 252 lb (114.3 kg)   BMI 41.93 kg/m²    Physical Exam  Constitutional:       Appearance: She is well-developed. HENT:      Right Ear: External ear normal.      Left Ear: External ear normal.      Nose: Nose normal.   Neck:      Musculoskeletal: Full passive range of motion without pain and normal range of motion. Cardiovascular:      Rate and Rhythm: Normal rate and regular rhythm. Heart sounds: Normal heart sounds, S1 normal and S2 normal.   Pulmonary:      Effort: Pulmonary effort is normal. No respiratory distress. Breath sounds: Normal breath sounds.

## 2020-07-13 ENCOUNTER — OFFICE VISIT (OUTPATIENT)
Dept: FAMILY MEDICINE CLINIC | Age: 70
End: 2020-07-13
Payer: COMMERCIAL

## 2020-07-13 VITALS
OXYGEN SATURATION: 97 % | DIASTOLIC BLOOD PRESSURE: 70 MMHG | TEMPERATURE: 98 F | SYSTOLIC BLOOD PRESSURE: 124 MMHG | HEART RATE: 74 BPM | BODY MASS INDEX: 39.11 KG/M2 | WEIGHT: 235 LBS

## 2020-07-13 PROCEDURE — 99213 OFFICE O/P EST LOW 20 MIN: CPT | Performed by: NURSE PRACTITIONER

## 2020-07-13 RX ORDER — MORPHINE SULFATE 15 MG/1
TABLET, FILM COATED, EXTENDED RELEASE ORAL 3 TIMES DAILY
COMMUNITY
Start: 2020-07-10

## 2020-07-13 RX ORDER — LIDOCAINE AND PRILOCAINE 25; 25 MG/G; MG/G
CREAM TOPICAL
COMMUNITY
Start: 2020-06-11 | End: 2020-11-16 | Stop reason: ALTCHOICE

## 2020-07-13 RX ORDER — DESVENLAFAXINE 100 MG/1
100 TABLET, EXTENDED RELEASE ORAL DAILY
Qty: 90 TABLET | Refills: 1 | Status: SHIPPED | OUTPATIENT
Start: 2020-07-13 | End: 2020-11-16 | Stop reason: SDUPTHER

## 2020-07-13 ASSESSMENT — ENCOUNTER SYMPTOMS
COUGH: 0
SHORTNESS OF BREATH: 0

## 2020-07-13 NOTE — PROGRESS NOTES
mouth 3 times daily. No current facility-administered medications for this visit. Past Medical History:   Diagnosis Date    [facility] obstructive [facility] disease) [facility]     Osteoarthritis       Past Surgical History:   Procedure Laterality Date    BREAST BIOPSY      two    CHOLECYSTECTOMY      HYSTERECTOMY, VAGINAL       Family History   Problem Relation Age of Onset    Stroke Mother     Alzheimer's Disease Father      Social History     Tobacco Use    Smoking status: Former Smoker     Packs/day: 0.75     Years: 34.00     Pack years: 25.50     Types: Cigarettes     Last attempt to quit: 3/20/2017     Years since quitting: 3.3    Smokeless tobacco: Never Used   Substance Use Topics    Alcohol use: Not Currently     Comment: occasionally       Allergies   Allergen Reactions    Lyrica [Pregabalin] Hives    Demerol Hcl [Meperidine]     Oxycodone-Acetaminophen Hives    Paxil [Paroxetine Hcl]        Subjective:      Review of Systems   Constitutional: Negative for chills and fever. Respiratory: Negative for cough and shortness of breath. Cardiovascular: Negative for chest pain, palpitations and leg swelling. Psychiatric/Behavioral: Negative for dysphoric mood. Other pertinent ROS in HPI  Objective:     /70 (Site: Left Upper Arm, Position: Sitting, Cuff Size: Large Adult)   Pulse 74   Temp 98 °F (36.7 °C)   Wt 235 lb (106.6 kg)   SpO2 97%   BMI 39.11 kg/m²    Physical Exam  Constitutional:       Appearance: She is well-developed. HENT:      Right Ear: External ear normal.      Left Ear: External ear normal.      Nose: Nose normal.   Neck:      Musculoskeletal: Full passive range of motion without pain and normal range of motion. Cardiovascular:      Rate and Rhythm: Normal rate and regular rhythm. Heart sounds: Normal heart sounds, S1 normal and S2 normal.   Pulmonary:      Effort: Pulmonary effort is normal. No respiratory distress.       Breath sounds: Normal breath sounds. Musculoskeletal: Normal range of motion. General: No deformity. Skin:     General: Skin is warm and dry. Neurological:      Mental Status: She is alert and oriented to person, place, and time. Assessment/PLAN   1. Situational depression    - desvenlafaxine succinate (PRISTIQ) 100 MG TB24 extended release tablet; Take 1 tablet by mouth daily  Dispense: 90 tablet; Refill: 1      RTO if symptoms worsen or fail to improve  Pt agreeable with plan      Patient given educational materials - see patientinstructions. Discussed use, benefit, and side effects of prescribed medications. All patient questions answered. Pt voiced understanding. Reviewed health maintenance. Instructed to continue current medications, diet andexercise. 1.  Beka Johnson received counseling on the following healthy behaviors: nutrition, exercise and medication adherence  2. Patient given educationalmaterials when available - see patient instructions when applicable  3. Discussed use, benefit, and side effects of prescribed medications. Barriersto medication compliance addressed. All patient questions answered. Pt voiced understanding. 4.  Reviewed prior labs and health maintenance when applicable. 5.  Continue current medications, diet and exercise. CompletedRefills   Requested Prescriptions     Pending Prescriptions Disp Refills    desvenlafaxine succinate (PRISTIQ) 100 MG TB24 extended release tablet 90 tablet 1     Sig: Take 1 tablet by mouth daily       This note was transcribed using dictation with Dragon services. Efforts were made to correct any errors but some words may be misinterpreted.     Electronically signed by Jackson Jackson CNP on 7/13/2020 at 3:16 PM

## 2020-08-20 ENCOUNTER — TELEPHONE (OUTPATIENT)
Dept: FAMILY MEDICINE CLINIC | Age: 70
End: 2020-08-20

## 2020-08-20 NOTE — TELEPHONE ENCOUNTER
Patient is calling due to having nausea and stomach cramping (like some one turning a knife in her stomach). She states has been going on since 8/15/2020. [facility] and was given Zofran. She states she is drinking water and Gatorade. She has not had any medication or food for 4 days. She states NO COVID symptoms. She is asking for labs to be ordered so she can get done today. She has missed 2 days of work and states can not afford to miss any more days. She states BP and pulse OX are both good. She is taking the Zofran but is not sure if she needs something else. She states she must go to work tomorrow so is unable to come into the office. I did mention virtual visit but she was not happy with that. She will not go to the ER.

## 2020-08-20 NOTE — TELEPHONE ENCOUNTER
She can do that. I can do labs, but it sounds like she needs imaging and IV fluids at this point, so the ER is the best place for her to be.

## 2020-08-24 ENCOUNTER — OFFICE VISIT (OUTPATIENT)
Dept: FAMILY MEDICINE CLINIC | Age: 70
End: 2020-08-24
Payer: COMMERCIAL

## 2020-08-24 VITALS
OXYGEN SATURATION: 98 % | BODY MASS INDEX: 37.71 KG/M2 | WEIGHT: 226.6 LBS | HEART RATE: 90 BPM | DIASTOLIC BLOOD PRESSURE: 72 MMHG | SYSTOLIC BLOOD PRESSURE: 116 MMHG | TEMPERATURE: 97.9 F

## 2020-08-24 PROCEDURE — 1111F DSCHRG MED/CURRENT MED MERGE: CPT | Performed by: INTERNAL MEDICINE

## 2020-08-24 PROCEDURE — 99214 OFFICE O/P EST MOD 30 MIN: CPT | Performed by: INTERNAL MEDICINE

## 2020-08-24 RX ORDER — PROMETHAZINE HYDROCHLORIDE 25 MG/1
1 TABLET ORAL EVERY 6 HOURS PRN
COMMUNITY
Start: 2020-08-20 | End: 2021-12-01 | Stop reason: SDUPTHER

## 2020-08-24 RX ORDER — PSYLLIUM SEED (WITH DEXTROSE)
1 POWDER (GRAM) ORAL 2 TIMES DAILY
Qty: 60 PACKET | Refills: 5 | Status: SHIPPED | OUTPATIENT
Start: 2020-08-24 | End: 2020-09-23

## 2020-08-24 RX ORDER — METRONIDAZOLE 500 MG/1
1 TABLET ORAL 2 TIMES DAILY
COMMUNITY
Start: 2020-08-20 | End: 2020-11-16 | Stop reason: ALTCHOICE

## 2020-08-24 RX ORDER — AMOXICILLIN AND CLAVULANATE POTASSIUM 875; 125 MG/1; MG/1
1 TABLET, FILM COATED ORAL EVERY 12 HOURS
COMMUNITY
Start: 2020-08-20 | End: 2020-08-27

## 2020-08-24 RX ORDER — GREEN TEA/HOODIA GORDONII 315-12.5MG
1 CAPSULE ORAL 2 TIMES DAILY
Qty: 60 TABLET | Refills: 0 | Status: SHIPPED | OUTPATIENT
Start: 2020-08-24 | End: 2020-09-23

## 2020-08-24 RX ORDER — ALENDRONATE SODIUM 70 MG/1
1 TABLET ORAL WEEKLY
COMMUNITY
Start: 2020-08-05 | End: 2020-11-16 | Stop reason: SDUPTHER

## 2020-08-24 NOTE — PROGRESS NOTES
Pt is here today for a F/U from going to [facility] due to nausea and vomiting. She did to to UC and was given Zofran but did not take. She states she is feeling better.

## 2020-08-24 NOTE — PROGRESS NOTES
Post-Discharge Transitional Care Management Services or Hospital Follow Up      Christiano Ruiz   YOB: 1950    Date of Office Visit:  8/24/2020  Date of Hospital Admission:    Date of Hospital Discharge:    Readmission Risk Score(high >=14%.  Medium >=10%):No data recorded    Care management risk score Rising risk (score 2-5) and Complex Care (Scores >=6): 1     Non face to face  following discharge, date last encounter closed (first attempt may have been earlier): *No documented post hospital discharge outreach found in the last 14 days *No documented post hospital discharge outreach found in the last 14 days    Call initiated 2 business days of discharge: *No response recorded in the last 14 days     Patient Active Problem List   Diagnosis    Chronic left hip pain    Chronic bilateral low back pain with bilateral sciatica    Prediabetes    Primary osteoarthritis involving multiple joints    Osteoporosis without current pathological fracture       Allergies   Allergen Reactions    Lyrica [Pregabalin] Hives    Demerol Hcl [Meperidine]     Fluoxetine     Oxycodone-Acetaminophen Hives    Paxil [Paroxetine Hcl]        Medications listed as ordered at the time of discharge from [address] Medication Instructions ULISES:    Printed on:08/24/20 0630   Medication Information                      albuterol sulfate  (90 Base) MCG/ACT inhaler  Inhale 2 puffs into the lungs every 4 hours as needed for Wheezing or Shortness of Breath             alendronate (FOSAMAX) 70 MG tablet  Take 1 tablet by mouth once a week             amoxicillin-clavulanate (AUGMENTIN) 875-125 MG per tablet  Take 1 tablet by mouth every 12 hours             cetirizine (ZYRTEC) 10 MG tablet  Take 1 tablet by mouth daily             desvenlafaxine succinate (PRISTIQ) 100 MG TB24 extended release tablet  Take 1 tablet by mouth daily             diclofenac-Misoprostol (ARTHROTEC) 50-0.2 MG per tablet  Take 1 tablet by mouth 3 times daily             famotidine (PEPCID) 20 MG tablet  Take 1 tablet by mouth 2 times daily             lidocaine-prilocaine (EMLA) 2.5-2.5 % cream               metroNIDAZOLE (FLAGYL) 500 MG tablet  Take 1 tablet by mouth 2 times daily             montelukast (SINGULAIR) 10 MG tablet  Take 1 tablet by mouth daily             morphine (MS CONTIN) 15 MG extended release tablet  3 times daily. promethazine (PHENERGAN) 25 MG tablet  Take 1 tablet by mouth every 6 hours as needed             SPIRIVA HANDIHALER 18 MCG inhalation capsule  Inhale 1 capsule into the lungs daily             SYMBICORT 160-4.5 MCG/ACT AERO  Inhale 2 puffs into the lungs 2 times daily             tiZANidine (ZANAFLEX) 4 MG tablet  Take 1 tablet by mouth 3 times daily                   Medications marked \"taking\" at this time  Outpatient Medications Marked as Taking for the 8/24/20 encounter (Office Visit) with Duglas Holland MD   Medication Sig Dispense Refill    amoxicillin-clavulanate (AUGMENTIN) 875-125 MG per tablet Take 1 tablet by mouth every 12 hours      morphine (MS CONTIN) 15 MG extended release tablet 3 times daily.       lidocaine-prilocaine (EMLA) 2.5-2.5 % cream       desvenlafaxine succinate (PRISTIQ) 100 MG TB24 extended release tablet Take 1 tablet by mouth daily 90 tablet 1    SYMBICORT 160-4.5 MCG/ACT AERO Inhale 2 puffs into the lungs 2 times daily 3 Inhaler 1    albuterol sulfate  (90 Base) MCG/ACT inhaler Inhale 2 puffs into the lungs every 4 hours as needed for Wheezing or Shortness of Breath 3 Inhaler 1    montelukast (SINGULAIR) 10 MG tablet Take 1 tablet by mouth daily 90 tablet 1    SPIRIVA HANDIHALER 18 MCG inhalation capsule Inhale 1 capsule into the lungs daily 90 capsule 1    cetirizine (ZYRTEC) 10 MG tablet Take 1 tablet by mouth daily 90 tablet 1    famotidine (PEPCID) 20 MG tablet Take 1 tablet by mouth 2 times daily 180 tablet 1    diclofenac-Misoprostol (ARTHROTEC) 50-0.2 MG per tablet Take 1 tablet by mouth 3 times daily 270 tablet 1    tiZANidine (ZANAFLEX) 4 MG tablet Take 1 tablet by mouth 3 times daily 270 tablet 1        Medications patient taking as of now reconciled against medications ordered at time of hospital discharge: Yes    Chief Complaint   Patient presents with    Follow-Up from [address] [place] [address]    Nausea & Vomiting     is alot better       Was seen at the emergency room on 8/20/20 with severe left-sided abdominal pain, nausea and vomiting, was unable to keep anything down. Testing revealed sided diverticulosis with possible colitis. She was given IV fluids, antinausea medication, antibiotics and discharged home after she was keeping some clear fluids down. Inpatient course: Discharge summary reviewed- see chart. Interval history/Current status: Is doing much better since coming home. She remains on the antibiotics, has a couple days left. She would like to know what happened and whether she really needs to follow-up with gastroenterology at this time. She is taking pain meds per  Pain management, did not realize that they would cause constipation. She admits that she was constipated for about 2 weeks prior to her presentation to the emergency room. Pain mgmt - Dr Merline Gilmore       Review of Systems   Constitutional: Negative for appetite change, fatigue, fever and unexpected weight change. Respiratory: Negative for cough, choking, chest tightness, shortness of breath and wheezing. Cardiovascular: Negative for chest pain, palpitations and leg swelling. Gastrointestinal: Negative for abdominal pain, anal bleeding, blood in stool, constipation, diarrhea, nausea and vomiting. Endocrine: Negative. Genitourinary: Negative for vaginal bleeding, vaginal discharge and vaginal pain. Musculoskeletal: Negative for joint swelling and myalgias. Skin: Negative. Negative for rash. Allergic/Immunologic: Negative for food allergies. Neurological: Negative for dizziness. Psychiatric/Behavioral: Negative for behavioral problems, confusion, dysphoric mood, hallucinations, self-injury, sleep disturbance and suicidal ideas. The patient is not nervous/anxious and is not hyperactive. All other systems reviewed and are negative. Vitals:    08/24/20 1432   BP: 116/72   Site: Left Upper Arm   Position: Sitting   Cuff Size: Large Adult   Pulse: 90   Temp: 97.9 °F (36.6 °C)   TempSrc: Temporal   SpO2: 98%   Weight: 226 lb 9.6 oz (102.8 kg)     Body mass index is 37.71 kg/m². Wt Readings from Last 3 Encounters:   08/24/20 226 lb 9.6 oz (102.8 kg)   07/13/20 235 lb (106.6 kg)   05/22/20 252 lb (114.3 kg)     BP Readings from Last 3 Encounters:   08/24/20 116/72   07/13/20 124/70   05/22/20 128/82       Physical Exam  Vitals signs and nursing note reviewed. Constitutional:       Appearance: Normal appearance. HENT:      Head: Normocephalic and atraumatic. Right Ear: Tympanic membrane and external ear normal. There is no impacted cerumen. Left Ear: Tympanic membrane and external ear normal. There is no impacted cerumen. Nose: Nose normal.      Mouth/Throat:      Mouth: Mucous membranes are moist.   Eyes:      General:         Right eye: No discharge. Left eye: No discharge. Extraocular Movements: Extraocular movements intact. Conjunctiva/sclera: Conjunctivae normal.      Pupils: Pupils are equal, round, and reactive to light. Neck:      Musculoskeletal: Normal range of motion. Cardiovascular:      Rate and Rhythm: Normal rate. Pulses: Normal pulses. Heart sounds: No murmur. Pulmonary:      Effort: Pulmonary effort is normal. No respiratory distress. Breath sounds: Normal breath sounds. No wheezing. Abdominal:      General: Bowel sounds are normal. There is no distension. Palpations: Abdomen is soft. There is no mass. Tenderness: There is no abdominal tenderness. There is no guarding or rebound. Hernia: No hernia is present. Musculoskeletal: Normal range of motion. Skin:     General: Skin is warm and dry. Findings: No rash. Neurological:      General: No focal deficit present. Mental Status: She is alert and oriented to person, place, and time. Psychiatric:         Mood and Affect: Mood normal.         Behavior: Behavior normal.             Assessment/Plan:  1. Diverticulitis  - External Referral To Gastroenterology  - Probiotic Acidophilus (FLORANEX) TABS; Take 1 tablet by mouth 2 times daily  Dispense: 60 tablet; Refill: 0  - [place] DISCHARGE MEDS RECONCILED W/ CURRENT OUTPATIENT MED LIST    2. Abnormal computed tomography of sigmoid colon  - External Referral To Gastroenterology  - [place] DISCHARGE MEDS RECONCILED W/ CURRENT OUTPATIENT MED LIST    3. Therapeutic opioid-induced constipation (OIC)  - psyllium (METAMUCIL) 28 % packet; Take 1 packet by mouth 2 times daily Take with full glass of h20 or juice  Dispense: 60 packet;  Refill: 5        Medical Decision Making: moderate complexity

## 2020-08-30 ASSESSMENT — ENCOUNTER SYMPTOMS
CONSTIPATION: 0
BLOOD IN STOOL: 0
ANAL BLEEDING: 0
SHORTNESS OF BREATH: 0
COUGH: 0
CHEST TIGHTNESS: 0
DIARRHEA: 0
NAUSEA: 0
ABDOMINAL PAIN: 0
CHOKING: 0
WHEEZING: 0
VOMITING: 0

## 2020-11-03 ENCOUNTER — TELEPHONE (OUTPATIENT)
Dept: FAMILY MEDICINE CLINIC | Age: 70
End: 2020-11-03

## 2020-11-03 NOTE — TELEPHONE ENCOUNTER
Patient called asking for mammogram ordered. Fax to [facility] [phone]    *the previous order in chart was attached to wrong order.

## 2020-11-16 ENCOUNTER — OFFICE VISIT (OUTPATIENT)
Dept: FAMILY MEDICINE CLINIC | Age: 70
End: 2020-11-16
Payer: COMMERCIAL

## 2020-11-16 VITALS
HEART RATE: 78 BPM | BODY MASS INDEX: 36.28 KG/M2 | WEIGHT: 218 LBS | OXYGEN SATURATION: 98 % | DIASTOLIC BLOOD PRESSURE: 82 MMHG | SYSTOLIC BLOOD PRESSURE: 132 MMHG | TEMPERATURE: 96.8 F

## 2020-11-16 PROCEDURE — 99214 OFFICE O/P EST MOD 30 MIN: CPT | Performed by: INTERNAL MEDICINE

## 2020-11-16 RX ORDER — BUDESONIDE AND FORMOTEROL FUMARATE DIHYDRATE 160; 4.5 UG/1; UG/1
2 AEROSOL RESPIRATORY (INHALATION) 2 TIMES DAILY
Qty: 3 INHALER | Refills: 1 | Status: SHIPPED | OUTPATIENT
Start: 2020-11-16 | End: 2021-02-10 | Stop reason: SDUPTHER

## 2020-11-16 RX ORDER — ALBUTEROL SULFATE 90 UG/1
2 AEROSOL, METERED RESPIRATORY (INHALATION) EVERY 4 HOURS PRN
Qty: 3 INHALER | Refills: 1 | Status: SHIPPED | OUTPATIENT
Start: 2020-11-16 | End: 2021-02-10 | Stop reason: SDUPTHER

## 2020-11-16 RX ORDER — DESVENLAFAXINE 100 MG/1
100 TABLET, EXTENDED RELEASE ORAL DAILY
Qty: 90 TABLET | Refills: 1 | Status: SHIPPED | OUTPATIENT
Start: 2020-11-16 | End: 2021-05-17 | Stop reason: SDUPTHER

## 2020-11-16 RX ORDER — TIZANIDINE 4 MG/1
4 TABLET ORAL 3 TIMES DAILY
Qty: 270 TABLET | Refills: 1 | Status: SHIPPED | OUTPATIENT
Start: 2020-11-16 | End: 2021-02-10 | Stop reason: SDUPTHER

## 2020-11-16 RX ORDER — FAMOTIDINE 20 MG/1
20 TABLET, FILM COATED ORAL 2 TIMES DAILY
Qty: 180 TABLET | Refills: 1 | Status: SHIPPED | OUTPATIENT
Start: 2020-11-16 | End: 2021-02-10 | Stop reason: SDUPTHER

## 2020-11-16 RX ORDER — DICLOFENAC SODIUM AND MISOPROSTOL 50; 200 MG/1; UG/1
1 TABLET, DELAYED RELEASE ORAL 3 TIMES DAILY
Qty: 270 TABLET | Refills: 1 | Status: SHIPPED | OUTPATIENT
Start: 2020-11-16 | End: 2021-02-10 | Stop reason: SDUPTHER

## 2020-11-16 RX ORDER — MONTELUKAST SODIUM 10 MG/1
10 TABLET ORAL DAILY
Qty: 90 TABLET | Refills: 1 | Status: SHIPPED | OUTPATIENT
Start: 2020-11-16 | End: 2021-02-10 | Stop reason: SDUPTHER

## 2020-11-16 RX ORDER — ALENDRONATE SODIUM 70 MG/1
70 TABLET ORAL WEEKLY
Qty: 12 TABLET | Refills: 1 | Status: SHIPPED | OUTPATIENT
Start: 2020-11-16 | End: 2021-02-10 | Stop reason: SDUPTHER

## 2020-11-16 RX ORDER — TIOTROPIUM BROMIDE 18 UG/1
18 CAPSULE ORAL; RESPIRATORY (INHALATION) DAILY
Qty: 90 CAPSULE | Refills: 1 | Status: SHIPPED | OUTPATIENT
Start: 2020-11-16 | End: 2021-02-10 | Stop reason: SDUPTHER

## 2020-11-16 RX ORDER — CETIRIZINE HYDROCHLORIDE 10 MG/1
10 TABLET ORAL DAILY
Qty: 90 TABLET | Refills: 1 | Status: SHIPPED | OUTPATIENT
Start: 2020-11-16 | End: 2021-02-10 | Stop reason: SDUPTHER

## 2020-11-16 ASSESSMENT — ENCOUNTER SYMPTOMS
CHEST TIGHTNESS: 0
CHOKING: 0
BLOOD IN STOOL: 0
DIARRHEA: 0
CONSTIPATION: 0
WHEEZING: 0
ABDOMINAL PAIN: 0
VOMITING: 0
SHORTNESS OF BREATH: 0
NAUSEA: 0
ANAL BLEEDING: 0
COUGH: 0

## 2020-11-16 ASSESSMENT — VISUAL ACUITY: OU: 1

## 2020-11-16 NOTE — PROGRESS NOTES
Pt is here today for a follow up and medication refills. She wants to discuss taking promethazine 25 mg.   Pt wants here mammogram faxed to [facility], she has an appt for 12/31/2020    Pt is going to call GI in regards to colonoscopy  Pt refused A1c

## 2020-11-16 NOTE — PROGRESS NOTES
Subjective:       Patient ID:     Ulisses Santo is a 72 y.o. female who presents for   Chief Complaint   Patient presents with    Follow-up    Medication Refill       HPI:  Nursing note reviewed and discussed with patient. Here for follow-up today   Depression - remains stable on the pristiq. No SI, HI, auditory or visual hallucinations, sleep difficulty. Following with pain management for her hips   Continuing to work on her weight - she is continuing to lose weight steadily with watching what she eats. She is continuing to move around as much as she can with her walker  Currently working from home - continuing to work full time. Mammogram scheduled 12/31 at [facility] out to dinner for her birthday. Patient's medications, allergies, past medical, surgical, social and family histories were reviewed and updated as appropriate. Past Medical History:   Diagnosis Date    COPD (chronic obstructive pulmonary disease) ([facility] [address]     Osteoarthritis      Past Surgical History:   Procedure Laterality Date    BREAST BIOPSY      two    CHOLECYSTECTOMY      HYSTERECTOMY, VAGINAL         Social History     Tobacco Use    Smoking status: Former Smoker     Packs/day: 0.75     Years: 34.00     Pack years: 25.50     Types: Cigarettes     Last attempt to quit: 3/20/2017     Years since quitting: 3.6    Smokeless tobacco: Never Used   Substance Use Topics    Alcohol use: Not Currently     Comment: occasionally       Patient Active Problem List   Diagnosis    Chronic left hip pain    Chronic bilateral low back pain with bilateral sciatica    Prediabetes    Primary osteoarthritis involving multiple joints    Osteoporosis without current pathological fracture         Prior to Visit Medications    Medication Sig Taking?  Authorizing Provider   alendronate (FOSAMAX) 70 MG tablet Take 1 tablet by mouth once a week Yes Historical Provider, MD   promethazine (PHENERGAN) 25 MG tablet Take 1 tablet by mouth every 132/82 (Site: Left Upper Arm, Position: Sitting, Cuff Size: Large Adult)   Pulse 78   Temp 96.8 °F (36 °C) (Temporal)   Wt 218 lb (98.9 kg)   SpO2 98%   BMI 36.28 kg/m²    Wt Readings from Last 3 Encounters:   11/16/20 218 lb (98.9 kg)   08/24/20 226 lb 9.6 oz (102.8 kg)   07/13/20 235 lb (106.6 kg)       Physical Exam  Vitals signs and nursing note reviewed. Constitutional:       General: She is not in acute distress. Appearance: She is well-developed. She is not ill-appearing. Eyes:      General: Lids are normal. Vision grossly intact. Cardiovascular:      Rate and Rhythm: Normal rate and regular rhythm. Heart sounds: Normal heart sounds, S1 normal and S2 normal. No murmur. No friction rub. No gallop. Pulmonary:      Effort: Pulmonary effort is normal. No respiratory distress. Breath sounds: Normal breath sounds. No wheezing. Abdominal:      General: Bowel sounds are normal.      Palpations: Abdomen is soft. There is no mass. Tenderness: There is no abdominal tenderness. There is no guarding. Musculoskeletal: Normal range of motion. Skin:     General: Skin is warm and dry. Capillary Refill: Capillary refill takes less than 2 seconds. Neurological:      General: No focal deficit present. Mental Status: She is alert and oriented to person, place, and time. Data Review  No visits with results within 2 Month(s) from this visit.    Latest known visit with results is:   Hospital Outpatient Visit on 12/31/2019   Component Date Value    Glucose 12/31/2019 82     BUN 12/31/2019 18     CREATININE 12/31/2019 0.79     Bun/Cre Ratio 12/31/2019 NOT REPORTED     Calcium 12/31/2019 9.6     Sodium 12/31/2019 140     Potassium 12/31/2019 4.3     Chloride 12/31/2019 104     CO2 12/31/2019 24     Anion Gap 12/31/2019 12     Alkaline Phosphatase 12/31/2019 99     ALT 12/31/2019 19     AST 12/31/2019 18     Total Bilirubin 12/31/2019 0.45     Total Protein 12/31/2019 7.4     Alb 12/31/2019 4.0     Albumin/Globulin Ratio 12/31/2019 1.2     GFR Non- 12/31/2019 >60     GFR  12/31/2019 >60     GFR Comment 12/31/2019          GFR Staging 12/31/2019 NOT REPORTED     Hemoglobin A1C 12/31/2019 6.3*    Estimated Avg Glucose 12/31/2019 134     Cholesterol, Fasting 12/31/2019 149     HDL 12/31/2019 42     LDL Cholesterol 12/31/2019 86     Chol/HDL Ratio 12/31/2019 3.5     Triglyceride, Fasting 12/31/2019 105     VLDL 12/31/2019 NOT REPORTED           Assessment/Plan:      1. Situational depression  - desvenlafaxine succinate (PRISTIQ) 100 MG TB24 extended release tablet; Take 1 tablet by mouth daily  Dispense: 90 tablet; Refill: 1    2. Chronic obstructive pulmonary disease, unspecified [facility] ([facility]  - SYMBICORT 160-4.5 MCG/ACT AERO; Inhale 2 puffs into the lungs 2 times daily  Dispense: 3 Inhaler; Refill: 1  - albuterol sulfate  (90 Base) MCG/ACT inhaler; Inhale 2 puffs into the lungs every 4 hours as needed for Wheezing or Shortness of Breath  Dispense: 3 Inhaler; Refill: 1  - SPIRIVA HANDIHALER 18 MCG inhalation capsule; Inhale 1 capsule into the lungs daily  Dispense: 90 capsule; Refill: 1    3. Seasonal allergic rhinitis, unspecified trigger  - montelukast (SINGULAIR) 10 MG tablet; Take 1 tablet by mouth daily  Dispense: 90 tablet; Refill: 1  - cetirizine (ZYRTEC) 10 MG tablet; Take 1 tablet by mouth daily  Dispense: 90 tablet; Refill: 1    4. Heartburn  - famotidine (PEPCID) 20 MG tablet; Take 1 tablet by mouth 2 times daily  Dispense: 180 tablet; Refill: 1    5. Primary osteoarthritis involving multiple joints  - diclofenac-miSOPROStol (ARTHROTEC) 50-0.2 MG per tablet; Take 1 tablet by mouth 3 times daily  Dispense: 270 tablet; Refill: 1  - tiZANidine (ZANAFLEX) 4 MG tablet; Take 1 tablet by mouth 3 times daily  Dispense: 270 tablet;  Refill: 1  - Handicap Placard MISC; by Does not apply route Expires 11/2025 Dispense: 1 each; Refill: 0    6. Age related osteoporosis, unspecified pathological fracture presence  Continue calcium and vit D supplementation, weekly fosamax     7. Prediabetes  - Hemoglobin A1C; Future    8. Screening for hyperlipidemia  - Lipid, Fasting; Future  - Comprehensive Metabolic Panel; Future    9. Osteoporosis without current pathological fracture, unspecified osteoporosis type  - alendronate (FOSAMAX) 70 MG tablet; Take 1 tablet by mouth once a week  Dispense: 12 tablet; Refill: 1    10. Screening, anemia, deficiency, iron  - CBC With Auto Differential; Future    11. Screening for thyroid disorder  - TSH With Reflex Ft4; Future    12. Vitamin D deficiency  - Vitamin D 25 Hydroxy; Future    13. Encounter for hepatitis C screening test for low risk patient  - Hepatitis C Antibody;  Future          Health Maintenance Due   Topic Date Due    Hepatitis C screen  1950    DTaP/Tdap/Td vaccine (1 - Tdap) 11/12/1970    Colon cancer screen colonoscopy  11/12/2001       Electronically signed by Jewels Jameson MD on 11/16/2020 at 9:07 AM

## 2021-01-07 DIAGNOSIS — Z12.31 ENCOUNTER FOR SCREENING MAMMOGRAM FOR BREAST CANCER: ICD-10-CM

## 2021-02-09 DIAGNOSIS — J30.2 SEASONAL ALLERGIC RHINITIS, UNSPECIFIED TRIGGER: ICD-10-CM

## 2021-02-09 DIAGNOSIS — J44.9 CHRONIC OBSTRUCTIVE PULMONARY DISEASE, UNSPECIFIED COPD TYPE (HCC): ICD-10-CM

## 2021-02-09 DIAGNOSIS — M81.0 OSTEOPOROSIS WITHOUT CURRENT PATHOLOGICAL FRACTURE, UNSPECIFIED OSTEOPOROSIS TYPE: ICD-10-CM

## 2021-02-09 DIAGNOSIS — M15.9 PRIMARY OSTEOARTHRITIS INVOLVING MULTIPLE JOINTS: ICD-10-CM

## 2021-02-09 DIAGNOSIS — R12 HEARTBURN: ICD-10-CM

## 2021-02-09 NOTE — TELEPHONE ENCOUNTER
Last visit: 11/16/2020  Last Med refill:   PATIENT HAS CHANGED MAIL ORDERS  Does patient have enough medication for 72 hours: Yes    Next Visit Date:  Future Appointments   Date Time Provider Rafiq Bullock   5/17/2021  8:45 AM Norma Hamilton  [address] Maintenance   Topic Date Due    Hepatitis C screen  1950    COVID-19 Vaccine (1 of 2) 11/12/1967    DTaP/Tdap/Td vaccine (1 - Tdap) 11/12/1970    Colon cancer screen colonoscopy  11/12/2001    A1C test (Diabetic or Prediabetic)  12/31/2020    Breast cancer screen  01/05/2023    Lipid screen  12/31/2024    DEXA (modify frequency per FRAX score)  Completed    Flu vaccine  Completed    Shingles Vaccine  Completed    Pneumococcal 65+ years Vaccine  Completed    Hepatitis A vaccine  Aged Out    Hepatitis B vaccine  Aged Out    Hib vaccine  Aged Out    Meningococcal (ACWY) vaccine  Aged Out       Hemoglobin A1C (%)   Date Value   12/31/2019 6.3 (H)   07/23/2019 5.3             ( goal A1C is < 7)   No results found for: LABMICR  LDL Cholesterol (mg/dL)   Date Value   12/31/2019 86       (goal LDL is <100)   AST (U/L)   Date Value   12/31/2019 18     ALT (U/L)   Date Value   12/31/2019 19     BUN (mg/dL)   Date Value   12/31/2019 18     BP Readings from Last 3 Encounters:   11/16/20 132/82   08/24/20 116/72   07/13/20 124/70          (goal 120/80)    All Future Testing planned in [facility]  Lab Frequency Next Occurrence   Lipid, Fasting Once 02/08/2021   Comprehensive Metabolic Panel Once 54/52/8248   Hemoglobin A1C Once 02/08/2021   CBC With Auto Differential Once 02/08/2021   TSH With Reflex Ft4 Once 02/08/2021   Vitamin D 25 Hydroxy Once 02/08/2021   Hepatitis C Antibody Once 02/08/2021               Patient Active Problem List:     Chronic left hip pain     Chronic bilateral low back pain with bilateral sciatica     Prediabetes     Primary osteoarthritis involving multiple joints     Osteoporosis without current

## 2021-02-10 RX ORDER — TIOTROPIUM BROMIDE 18 UG/1
18 CAPSULE ORAL; RESPIRATORY (INHALATION) DAILY
Qty: 90 CAPSULE | Refills: 1 | Status: SHIPPED | OUTPATIENT
Start: 2021-02-10 | End: 2021-05-17 | Stop reason: SDUPTHER

## 2021-02-10 RX ORDER — TIZANIDINE 4 MG/1
4 TABLET ORAL 3 TIMES DAILY
Qty: 270 TABLET | Refills: 1 | Status: SHIPPED | OUTPATIENT
Start: 2021-02-10 | End: 2021-05-17 | Stop reason: SDUPTHER

## 2021-02-10 RX ORDER — MONTELUKAST SODIUM 10 MG/1
10 TABLET ORAL DAILY
Qty: 90 TABLET | Refills: 1 | Status: SHIPPED | OUTPATIENT
Start: 2021-02-10 | End: 2021-05-17 | Stop reason: SDUPTHER

## 2021-02-10 RX ORDER — ALENDRONATE SODIUM 70 MG/1
70 TABLET ORAL WEEKLY
Qty: 12 TABLET | Refills: 1 | Status: SHIPPED | OUTPATIENT
Start: 2021-02-10 | End: 2021-05-17 | Stop reason: SDUPTHER

## 2021-02-10 RX ORDER — DICLOFENAC SODIUM AND MISOPROSTOL 50; 200 MG/1; UG/1
1 TABLET, DELAYED RELEASE ORAL 3 TIMES DAILY
Qty: 270 TABLET | Refills: 1 | Status: SHIPPED | OUTPATIENT
Start: 2021-02-10 | End: 2021-05-17 | Stop reason: SDUPTHER

## 2021-02-10 RX ORDER — ALBUTEROL SULFATE 90 UG/1
2 AEROSOL, METERED RESPIRATORY (INHALATION) EVERY 4 HOURS PRN
Qty: 3 INHALER | Refills: 1 | Status: SHIPPED | OUTPATIENT
Start: 2021-02-10 | End: 2021-05-17 | Stop reason: SDUPTHER

## 2021-02-10 RX ORDER — BUDESONIDE AND FORMOTEROL FUMARATE DIHYDRATE 160; 4.5 UG/1; UG/1
2 AEROSOL RESPIRATORY (INHALATION) 2 TIMES DAILY
Qty: 3 INHALER | Refills: 1 | Status: SHIPPED | OUTPATIENT
Start: 2021-02-10 | End: 2021-05-17 | Stop reason: SDUPTHER

## 2021-02-10 RX ORDER — FAMOTIDINE 20 MG/1
20 TABLET, FILM COATED ORAL 2 TIMES DAILY
Qty: 180 TABLET | Refills: 1 | Status: SHIPPED | OUTPATIENT
Start: 2021-02-10 | End: 2021-05-17 | Stop reason: ALTCHOICE

## 2021-02-10 RX ORDER — CETIRIZINE HYDROCHLORIDE 10 MG/1
10 TABLET ORAL DAILY
Qty: 90 TABLET | Refills: 1 | Status: SHIPPED | OUTPATIENT
Start: 2021-02-10 | End: 2021-05-17 | Stop reason: SDUPTHER

## 2021-05-17 ENCOUNTER — OFFICE VISIT (OUTPATIENT)
Dept: FAMILY MEDICINE CLINIC | Age: 71
End: 2021-05-17

## 2021-05-17 ENCOUNTER — HOSPITAL ENCOUNTER (OUTPATIENT)
Age: 71
Setting detail: SPECIMEN
Discharge: HOME | End: 2021-05-17
Payer: COMMERCIAL

## 2021-05-17 ENCOUNTER — TELEPHONE (OUTPATIENT)
Dept: FAMILY MEDICINE CLINIC | Age: 71
End: 2021-05-17

## 2021-05-17 VITALS
BODY MASS INDEX: 35.99 KG/M2 | HEART RATE: 69 BPM | HEIGHT: 65 IN | SYSTOLIC BLOOD PRESSURE: 122 MMHG | TEMPERATURE: 97.3 F | WEIGHT: 216 LBS | DIASTOLIC BLOOD PRESSURE: 80 MMHG | OXYGEN SATURATION: 98 %

## 2021-05-17 DIAGNOSIS — R73.03 PREDIABETES: ICD-10-CM

## 2021-05-17 DIAGNOSIS — E55.9 VITAMIN D DEFICIENCY: ICD-10-CM

## 2021-05-17 DIAGNOSIS — Z13.29 SCREENING FOR THYROID DISORDER: ICD-10-CM

## 2021-05-17 DIAGNOSIS — M15.9 PRIMARY OSTEOARTHRITIS INVOLVING MULTIPLE JOINTS: ICD-10-CM

## 2021-05-17 DIAGNOSIS — R73.01 ELEVATED FASTING BLOOD SUGAR: Primary | ICD-10-CM

## 2021-05-17 DIAGNOSIS — Z13.220 SCREENING, LIPID: ICD-10-CM

## 2021-05-17 DIAGNOSIS — J44.9 CHRONIC OBSTRUCTIVE PULMONARY DISEASE, UNSPECIFIED COPD TYPE (HCC): ICD-10-CM

## 2021-05-17 DIAGNOSIS — F43.21 SITUATIONAL DEPRESSION: ICD-10-CM

## 2021-05-17 DIAGNOSIS — Z13.0 SCREENING, ANEMIA, DEFICIENCY, IRON: ICD-10-CM

## 2021-05-17 DIAGNOSIS — M81.0 OSTEOPOROSIS WITHOUT CURRENT PATHOLOGICAL FRACTURE, UNSPECIFIED OSTEOPOROSIS TYPE: ICD-10-CM

## 2021-05-17 DIAGNOSIS — Z11.59 NEED FOR HEPATITIS C SCREENING TEST: Primary | ICD-10-CM

## 2021-05-17 DIAGNOSIS — Z11.59 NEED FOR HEPATITIS C SCREENING TEST: ICD-10-CM

## 2021-05-17 DIAGNOSIS — J30.2 SEASONAL ALLERGIC RHINITIS, UNSPECIFIED TRIGGER: ICD-10-CM

## 2021-05-17 LAB
ALBUMIN SERPL-MCNC: 4.1 G/DL (ref 3.5–5.2)
ALBUMIN/GLOBULIN RATIO: 1.1 (ref 1–2.5)
ALP BLD-CCNC: 108 U/L (ref 35–104)
ALT SERPL-CCNC: 17 U/L (ref 5–33)
ANION GAP SERPL CALCULATED.3IONS-SCNC: 10 MMOL/L (ref 9–17)
AST SERPL-CCNC: 21 U/L
BILIRUB SERPL-MCNC: 0.44 MG/DL (ref 0.3–1.2)
BUN BLDV-MCNC: 16 MG/DL (ref 8–23)
BUN/CREAT BLD: ABNORMAL (ref 9–20)
CALCIUM SERPL-MCNC: 9.8 MG/DL (ref 8.6–10.4)
CHLORIDE BLD-SCNC: 104 MMOL/L (ref 98–107)
CHOLESTEROL/HDL RATIO: 2.8
CHOLESTEROL: 141 MG/DL
CO2: 26 MMOL/L (ref 20–31)
CREAT SERPL-MCNC: 0.66 MG/DL (ref 0.5–0.9)
GFR AFRICAN AMERICAN: >60 ML/MIN
GFR NON-AFRICAN AMERICAN: >60 ML/MIN
GFR SERPL CREATININE-BSD FRML MDRD: ABNORMAL ML/MIN/{1.73_M2}
GFR SERPL CREATININE-BSD FRML MDRD: ABNORMAL ML/MIN/{1.73_M2}
GLUCOSE BLD-MCNC: 69 MG/DL (ref 70–99)
HBA1C MFR BLD: 5.7 %
HCT VFR BLD CALC: 39.3 % (ref 36.3–47.1)
HDLC SERPL-MCNC: 50 MG/DL
HEMOGLOBIN: 11.8 G/DL (ref 11.9–15.1)
LDL CHOLESTEROL: 81 MG/DL (ref 0–130)
MCH RBC QN AUTO: 26.6 PG (ref 25.2–33.5)
MCHC RBC AUTO-ENTMCNC: 30 G/DL (ref 28.4–34.8)
MCV RBC AUTO: 88.5 FL (ref 82.6–102.9)
NRBC AUTOMATED: 0 PER 100 WBC
PDW BLD-RTO: 16.6 % (ref 11.8–14.4)
PLATELET # BLD: 301 K/UL (ref 138–453)
PMV BLD AUTO: 12.1 FL (ref 8.1–13.5)
POTASSIUM SERPL-SCNC: 4.9 MMOL/L (ref 3.7–5.3)
RBC # BLD: 4.44 M/UL (ref 3.95–5.11)
SODIUM BLD-SCNC: 140 MMOL/L (ref 135–144)
TOTAL PROTEIN: 7.7 G/DL (ref 6.4–8.3)
TRIGL SERPL-MCNC: 49 MG/DL
TSH SERPL DL<=0.05 MIU/L-ACNC: 1.79 MIU/L (ref 0.3–5)
VLDLC SERPL CALC-MCNC: NORMAL MG/DL (ref 1–30)
WBC # BLD: 7.6 K/UL (ref 3.5–11.3)

## 2021-05-17 PROCEDURE — 83036 HEMOGLOBIN GLYCOSYLATED A1C: CPT | Performed by: INTERNAL MEDICINE

## 2021-05-17 PROCEDURE — 3023F SPIROM DOC REV: CPT | Performed by: INTERNAL MEDICINE

## 2021-05-17 PROCEDURE — G8399 PT W/DXA RESULTS DOCUMENT: HCPCS | Performed by: INTERNAL MEDICINE

## 2021-05-17 PROCEDURE — 1123F ACP DISCUSS/DSCN MKR DOCD: CPT | Performed by: INTERNAL MEDICINE

## 2021-05-17 PROCEDURE — 99214 OFFICE O/P EST MOD 30 MIN: CPT | Performed by: INTERNAL MEDICINE

## 2021-05-17 PROCEDURE — 1090F PRES/ABSN URINE INCON ASSESS: CPT | Performed by: INTERNAL MEDICINE

## 2021-05-17 PROCEDURE — G8926 SPIRO NO PERF OR DOC: HCPCS | Performed by: INTERNAL MEDICINE

## 2021-05-17 PROCEDURE — 1036F TOBACCO NON-USER: CPT | Performed by: INTERNAL MEDICINE

## 2021-05-17 PROCEDURE — G8427 DOCREV CUR MEDS BY ELIG CLIN: HCPCS | Performed by: INTERNAL MEDICINE

## 2021-05-17 PROCEDURE — 3017F COLORECTAL CA SCREEN DOC REV: CPT | Performed by: INTERNAL MEDICINE

## 2021-05-17 PROCEDURE — G8417 CALC BMI ABV UP PARAM F/U: HCPCS | Performed by: INTERNAL MEDICINE

## 2021-05-17 PROCEDURE — 4040F PNEUMOC VAC/ADMIN/RCVD: CPT | Performed by: INTERNAL MEDICINE

## 2021-05-17 RX ORDER — ALBUTEROL SULFATE 90 UG/1
2 AEROSOL, METERED RESPIRATORY (INHALATION) EVERY 4 HOURS PRN
Qty: 3 INHALER | Refills: 1 | Status: SHIPPED | OUTPATIENT
Start: 2021-05-17 | End: 2021-11-29

## 2021-05-17 RX ORDER — DESVENLAFAXINE 100 MG/1
100 TABLET, EXTENDED RELEASE ORAL DAILY
Qty: 90 TABLET | Refills: 1 | Status: SHIPPED | OUTPATIENT
Start: 2021-05-17 | End: 2021-11-10 | Stop reason: SDUPTHER

## 2021-05-17 RX ORDER — DICLOFENAC SODIUM AND MISOPROSTOL 50; 200 MG/1; UG/1
1 TABLET, DELAYED RELEASE ORAL 3 TIMES DAILY
Qty: 270 TABLET | Refills: 1 | Status: SHIPPED | OUTPATIENT
Start: 2021-05-17 | End: 2021-12-01 | Stop reason: SINTOL

## 2021-05-17 RX ORDER — ALENDRONATE SODIUM 70 MG/1
70 TABLET ORAL WEEKLY
Qty: 12 TABLET | Refills: 1 | Status: SHIPPED | OUTPATIENT
Start: 2021-05-17 | End: 2021-12-01 | Stop reason: SDUPTHER

## 2021-05-17 RX ORDER — CETIRIZINE HYDROCHLORIDE 10 MG/1
10 TABLET ORAL DAILY
Qty: 90 TABLET | Refills: 1 | Status: SHIPPED | OUTPATIENT
Start: 2021-05-17 | End: 2021-12-01 | Stop reason: SDUPTHER

## 2021-05-17 RX ORDER — TIZANIDINE 4 MG/1
4 TABLET ORAL 3 TIMES DAILY
Qty: 270 TABLET | Refills: 1 | Status: SHIPPED | OUTPATIENT
Start: 2021-05-17 | End: 2021-11-29

## 2021-05-17 RX ORDER — TIOTROPIUM BROMIDE 18 UG/1
18 CAPSULE ORAL; RESPIRATORY (INHALATION) DAILY
Qty: 90 CAPSULE | Refills: 1 | Status: SHIPPED | OUTPATIENT
Start: 2021-05-17 | End: 2021-12-01 | Stop reason: ALTCHOICE

## 2021-05-17 RX ORDER — BUDESONIDE AND FORMOTEROL FUMARATE DIHYDRATE 160; 4.5 UG/1; UG/1
2 AEROSOL RESPIRATORY (INHALATION) 2 TIMES DAILY
Qty: 3 INHALER | Refills: 1 | Status: SHIPPED | OUTPATIENT
Start: 2021-05-17 | End: 2021-11-29

## 2021-05-17 RX ORDER — MONTELUKAST SODIUM 10 MG/1
10 TABLET ORAL DAILY
Qty: 90 TABLET | Refills: 1 | Status: SHIPPED | OUTPATIENT
Start: 2021-05-17 | End: 2021-11-29

## 2021-05-17 SDOH — HEALTH STABILITY: MENTAL HEALTH
STRESS IS WHEN SOMEONE FEELS TENSE, NERVOUS, ANXIOUS, OR CAN'T SLEEP AT NIGHT BECAUSE THEIR MIND IS TROUBLED. HOW STRESSED ARE YOU?: PATIENT DECLINED

## 2021-05-17 SDOH — ECONOMIC STABILITY: TRANSPORTATION INSECURITY
IN THE PAST 12 MONTHS, HAS LACK OF TRANSPORTATION KEPT YOU FROM MEETINGS, WORK, OR FROM GETTING THINGS NEEDED FOR DAILY LIVING?: PATIENT DECLINED

## 2021-05-17 SDOH — ECONOMIC STABILITY: INCOME INSECURITY: HOW HARD IS IT FOR YOU TO PAY FOR THE VERY BASICS LIKE FOOD, HOUSING, MEDICAL CARE, AND HEATING?: PATIENT DECLINED

## 2021-05-17 SDOH — ECONOMIC STABILITY: INCOME INSECURITY: IN THE LAST 12 MONTHS, WAS THERE A TIME WHEN YOU WERE NOT ABLE TO PAY THE MORTGAGE OR RENT ON TIME?: NO

## 2021-05-17 SDOH — SOCIAL STABILITY: SOCIAL NETWORK
DO YOU BELONG TO ANY CLUBS OR ORGANIZATIONS SUCH AS CHURCH GROUPS UNIONS, FRATERNAL OR ATHLETIC GROUPS, OR SCHOOL GROUPS?: PATIENT DECLINED

## 2021-05-17 SDOH — ECONOMIC STABILITY: HOUSING INSECURITY
IN THE LAST 12 MONTHS, WAS THERE A TIME WHEN YOU DID NOT HAVE A STEADY PLACE TO SLEEP OR SLEPT IN A SHELTER (INCLUDING NOW)?: PATIENT REFUSED

## 2021-05-17 SDOH — SOCIAL STABILITY: SOCIAL NETWORK: HOW OFTEN DO YOU GET TOGETHER WITH FRIENDS OR RELATIVES?: PATIENT DECLINED

## 2021-05-17 SDOH — HEALTH STABILITY: PHYSICAL HEALTH

## 2021-05-17 SDOH — SOCIAL STABILITY: SOCIAL NETWORK: HOW OFTEN DO YOU ATTEND CHURCH OR RELIGIOUS SERVICES?: PATIENT DECLINED

## 2021-05-17 SDOH — ECONOMIC STABILITY: FOOD INSECURITY: WITHIN THE PAST 12 MONTHS, YOU WORRIED THAT YOUR FOOD WOULD RUN OUT BEFORE YOU GOT MONEY TO BUY MORE.: PATIENT DECLINED

## 2021-05-17 ASSESSMENT — ENCOUNTER SYMPTOMS
CHOKING: 0
DIARRHEA: 0
COUGH: 0
BLOOD IN STOOL: 0
ABDOMINAL PAIN: 0
SHORTNESS OF BREATH: 0
WHEEZING: 0
NAUSEA: 0
VOMITING: 0
CONSTIPATION: 0
ANAL BLEEDING: 0
CHEST TIGHTNESS: 0

## 2021-05-17 ASSESSMENT — PATIENT HEALTH QUESTIONNAIRE - PHQ9
2. FEELING DOWN, DEPRESSED OR HOPELESS: 1
SUM OF ALL RESPONSES TO PHQ QUESTIONS 1-9: 2
SUM OF ALL RESPONSES TO PHQ QUESTIONS 1-9: 2

## 2021-05-17 ASSESSMENT — VISUAL ACUITY: OU: 1

## 2021-05-17 NOTE — PROGRESS NOTES
Subjective:       Patient ID:     Bernadine Edwards is a 72 y.o. female who presents for   Chief Complaint   Patient presents with    COPD       HPI:  Nursing note reviewed and discussed with patient. Here for follow-up   Following with pain clinic, going in every month. She had an injection in February that helped some. Remains on morphine. Using metamucil and probiotic for bowel regimen  She states this time she has noted that pain is worse and injection didn't last as long. Her work hours were cut down and she is hurting financially, so has to pick and choose what she spends on. COPD:  Current treatment includes short-acting beta agonist inhaler, combined beta agonist/steroid inhaler, anticholinergic inhaler. Residual symptoms: chronic dyspnea. She denies any other symptoms. She requires her rescue inhaler 1 time(s) per week. Depression - stable on pristiq. Sleeping well. occasinally has a bad night when stressed. Denies suicidal or homidical ideation, auditory or visual hallucinations. Patient's medications, allergies, past medical, surgical, social and family histories were reviewed and updated as appropriate.     Past Medical History:   Diagnosis Date    COPD (chronic obstructive pulmonary disease) ([facility] [address]     Osteoarthritis      Past Surgical History:   Procedure Laterality Date    BREAST BIOPSY      two    CHOLECYSTECTOMY      HYSTERECTOMY, VAGINAL         Social History     Tobacco Use    Smoking status: Former Smoker     Packs/day: 0.75     Years: 34.00     Pack years: 25.50     Types: Cigarettes     Quit date: 3/20/2017     Years since quittin.1    Smokeless tobacco: Never Used   Substance Use Topics    Alcohol use: Not Currently     Comment: occasionally       Patient Active Problem List   Diagnosis    Chronic left hip pain    Chronic bilateral low back pain with bilateral sciatica    Prediabetes    Primary osteoarthritis involving multiple joints    Osteoporosis without current pathological fracture         Prior to Visit Medications    Medication Sig Taking? Authorizing Provider   SYMBICORT 160-4.5 MCG/ACT AERO Inhale 2 puffs into the lungs 2 times daily Yes Norma Mead MD   albuterol sulfate  (90 Base) MCG/ACT inhaler Inhale 2 puffs into the lungs every 4 hours as needed for Wheezing or Shortness of Breath Yes Norma Mead MD   montelukast (SINGULAIR) 10 MG tablet Take 1 tablet by mouth daily Yes Delisa Vila MD   [facility] 18 MCG inhalation capsule Inhale 1 capsule into the lungs daily Yes Norma Mead MD   cetirizine (ZYRTEC) 10 MG tablet Take 1 tablet by mouth daily Yes Delisa Vila MD   diclofenac-miSOPROStol (ARTHROTEC) 50-0.2 MG per tablet Take 1 tablet by mouth 3 times daily Yes Norma Mead MD   tiZANidine (ZANAFLEX) 4 MG tablet Take 1 tablet by mouth 3 times daily Yes Norma Mead MD   alendronate (FOSAMAX) 70 MG tablet Take 1 tablet by mouth once a week Yes Norma Mead MD   desvenlafaxine succinate (PRISTIQ) 100 MG TB24 extended release tablet Take 1 tablet by mouth daily Yes Delisa Vila MD   Handicap Placard MISC by Does not apply route Expires 11/2025 Yes Norma Mead MD   promethazine (PHENERGAN) 25 MG tablet Take 1 tablet by mouth every 6 hours as needed Yes Historical Provider, MD   morphine (MS CONTIN) 15 MG extended release tablet 3 times daily. Yes Historical Provider, MD     Review of Systems  Review of Systems   Constitutional: Negative for fatigue, fever and unexpected weight change. Respiratory: Negative for cough, choking, chest tightness, shortness of breath and wheezing. Cardiovascular: Negative for chest pain, palpitations and leg swelling. Gastrointestinal: Negative for abdominal pain, anal bleeding, blood in stool, constipation, diarrhea, nausea and vomiting. Endocrine: Negative. Musculoskeletal: Negative for joint swelling and myalgias. Skin: Negative.     Neurological: Chloride 12/31/2019 104     CO2 12/31/2019 24     Anion Gap 12/31/2019 12     Alkaline Phosphatase 12/31/2019 99     ALT 12/31/2019 19     AST 12/31/2019 18     Total Bilirubin 12/31/2019 0.45     Total Protein 12/31/2019 7.4     Albumin 12/31/2019 4.0     Albumin/Globulin Ratio 12/31/2019 1.2     GFR Non- 12/31/2019 >60     GFR  12/31/2019 >60     GFR Comment 12/31/2019          GFR Staging 12/31/2019 NOT REPORTED     Hemoglobin A1C 12/31/2019 6.3*    Estimated Avg Glucose 12/31/2019 134     Cholesterol, Fasting 12/31/2019 149     HDL 12/31/2019 42     LDL Cholesterol 12/31/2019 86     Chol/HDL Ratio 12/31/2019 3.5     Triglyceride, Fasting 12/31/2019 105     VLDL 12/31/2019 NOT REPORTED      A1c today 5.7%     Assessment/Plan:      1. Seasonal allergic rhinitis, unspecified trigger  - montelukast (SINGULAIR) 10 MG tablet; Take 1 tablet by mouth daily  Dispense: 90 tablet; Refill: 1  - cetirizine (ZYRTEC) 10 MG tablet; Take 1 tablet by mouth daily  Dispense: 90 tablet; Refill: 1    2. Chronic obstructive pulmonary disease, unspecified COPD type ([facility]  - [facility] HAND[facility]AL[facility] 18 MCG inhalation capsule; Inhale 1 capsule into the lungs daily  Dispense: 90 capsule; Refill: 1  - SYMBICORT 160-4.5 MCG/ACT AERO; Inhale 2 puffs into the lungs 2 times daily  Dispense: 3 Inhaler; Refill: 1  - albuterol sulfate  (90 Base) MCG/ACT inhaler; Inhale 2 puffs into the lungs every 4 hours as needed for Wheezing or Shortness of Breath  Dispense: 3 Inhaler; Refill: 1    3. Primary osteoarthritis involving multiple joints  - diclofenac-miSOPROStol (ARTHROTEC) 50-0.2 MG per tablet; Take 1 tablet by mouth 3 times daily  Dispense: 270 tablet; Refill: 1  - tiZANidine (ZANAFLEX) 4 MG tablet; Take 1 tablet by mouth 3 times daily  Dispense: 270 tablet; Refill: 1    4. Situational depression  - desvenlafaxine succinate (PRISTIQ) 100 MG TB24 extended release tablet;  Take 1 tablet by mouth daily  Dispense: 90 tablet; Refill: 1    5. Osteoporosis without current pathological fracture, unspecified osteoporosis type  - alendronate (FOSAMAX) 70 MG tablet; Take 1 tablet by mouth once a week  Dispense: 12 tablet; Refill: 1    6.  Elevated fasting blood sugar  - POCT glycosylated hemoglobin (Hb A1C)           Health Maintenance Due   Topic Date Due    Hepatitis C screen  Never done    DTaP/Tdap/Td vaccine (1 - Tdap) Never done    Colon cancer screen colonoscopy  Never done    A1C test (Diabetic or Prediabetic)  12/31/2020       Electronically signed by Jose Ware MD on 5/17/2021 at 8:54 AM

## 2021-05-18 DIAGNOSIS — E55.9 VITAMIN D DEFICIENCY: Primary | ICD-10-CM

## 2021-05-18 LAB
HEPATITIS C ANTIBODY: NONREACTIVE
VITAMIN D 25-HYDROXY: 17.7 NG/ML (ref 30–100)

## 2021-05-18 RX ORDER — ERGOCALCIFEROL 1.25 MG/1
50000 CAPSULE ORAL WEEKLY
Qty: 12 CAPSULE | Refills: 1 | Status: SHIPPED | OUTPATIENT
Start: 2021-05-18 | End: 2021-11-10 | Stop reason: SDUPTHER

## 2021-11-10 DIAGNOSIS — E55.9 VITAMIN D DEFICIENCY: ICD-10-CM

## 2021-11-10 DIAGNOSIS — F43.21 SITUATIONAL DEPRESSION: ICD-10-CM

## 2021-11-10 RX ORDER — ERGOCALCIFEROL 1.25 MG/1
50000 CAPSULE ORAL WEEKLY
Qty: 12 CAPSULE | Refills: 1 | Status: SHIPPED | OUTPATIENT
Start: 2021-11-10 | End: 2021-12-01 | Stop reason: SDUPTHER

## 2021-11-10 RX ORDER — DESVENLAFAXINE 100 MG/1
100 TABLET, EXTENDED RELEASE ORAL DAILY
Qty: 90 TABLET | Refills: 1 | Status: SHIPPED | OUTPATIENT
Start: 2021-11-10 | End: 2021-12-01 | Stop reason: SDUPTHER

## 2021-11-10 NOTE — TELEPHONE ENCOUNTER
Last visit: 5/17/21  Last Med refill: 5/17/21, 5/18/21  Does patient have enough medication for 72 hours: Yes-patient states she will be out before appt    Next Visit Date:  Future Appointments   Date Time Provider Rafiq Bullock   11/17/2021  8:45 AM Norma Knight  [address] Maintenance   Topic Date Due    DTaP/Tdap/Td vaccine (1 - Tdap) Never done    Colon cancer screen colonoscopy  Never done    Low dose CT lung screening  Never done    COVID-19 Vaccine (3 - Booster for Moderna series) 08/03/2021    Flu vaccine (1) 09/01/2021    A1C test (Diabetic or Prediabetic)  05/17/2022    Breast cancer screen  01/05/2023    Lipid screen  05/17/2026    DEXA (modify frequency per FRAX score)  Completed    Shingles Vaccine  Completed    Pneumococcal 65+ years Vaccine  Completed    Hepatitis C screen  Completed    Hepatitis A vaccine  Aged Out    Hepatitis B vaccine  Aged Out    Hib vaccine  Aged Out    Meningococcal (ACWY) vaccine  Aged Out       Hemoglobin A1C (%)   Date Value   05/17/2021 5.7   12/31/2019 6.3 (H)   07/23/2019 5.3             ( goal A1C is < 7)   No results found for: LABMICR  LDL Cholesterol (mg/dL)   Date Value   05/17/2021 81   12/31/2019 86       (goal LDL is <100)   AST (U/L)   Date Value   05/17/2021 21     ALT (U/L)   Date Value   05/17/2021 17     BUN (mg/dL)   Date Value   05/17/2021 16     BP Readings from Last 3 Encounters:   05/17/21 122/80   11/16/20 132/82   08/24/20 116/72          (goal 120/80)    All Future Testing planned in [facility]              Patient Active Problem List:     Chronic left hip pain     Chronic bilateral low back pain with bilateral sciatica     Prediabetes     Primary osteoarthritis involving multiple joints     Osteoporosis without current pathological fracture

## 2021-11-29 DIAGNOSIS — J30.2 SEASONAL ALLERGIC RHINITIS, UNSPECIFIED TRIGGER: ICD-10-CM

## 2021-11-29 DIAGNOSIS — M15.9 PRIMARY OSTEOARTHRITIS INVOLVING MULTIPLE JOINTS: ICD-10-CM

## 2021-11-29 DIAGNOSIS — J44.9 CHRONIC OBSTRUCTIVE PULMONARY DISEASE, UNSPECIFIED COPD TYPE (HCC): ICD-10-CM

## 2021-11-29 RX ORDER — ALBUTEROL SULFATE 90 UG/1
AEROSOL, METERED RESPIRATORY (INHALATION)
Qty: 3 EACH | Refills: 1 | Status: SHIPPED | OUTPATIENT
Start: 2021-11-29

## 2021-11-29 RX ORDER — BUDESONIDE AND FORMOTEROL FUMARATE DIHYDRATE 160; 4.5 UG/1; UG/1
AEROSOL RESPIRATORY (INHALATION)
Qty: 3 EACH | Refills: 1 | Status: SHIPPED | OUTPATIENT
Start: 2021-11-29 | End: 2021-12-01 | Stop reason: ALTCHOICE

## 2021-11-29 RX ORDER — TIZANIDINE 4 MG/1
TABLET ORAL
Qty: 270 TABLET | Refills: 1 | Status: SHIPPED | OUTPATIENT
Start: 2021-11-29

## 2021-11-29 RX ORDER — MONTELUKAST SODIUM 10 MG/1
TABLET ORAL
Qty: 90 TABLET | Refills: 1 | Status: SHIPPED | OUTPATIENT
Start: 2021-11-29

## 2021-11-29 NOTE — TELEPHONE ENCOUNTER
Last visit: 5/17/21  Last Med refill: 5/17/21  Does patient have enough medication for 72 hours: No:     Next Visit Date:  Future Appointments   Date Time Provider Rafiq Bullock   12/1/2021 12:00 PM Norma Starkey  [address] Maintenance   Topic Date Due    DTaP/Tdap/Td vaccine (1 - Tdap) Never done    Colon cancer screen colonoscopy  Never done    Low dose CT lung screening  Never done    COVID-19 Vaccine (3 - Booster for Moderna series) 08/03/2021    Flu vaccine (1) 09/01/2021    A1C test (Diabetic or Prediabetic)  05/17/2022    Breast cancer screen  01/05/2023    Lipid screen  05/17/2026    DEXA (modify frequency per FRAX score)  Completed    Shingles Vaccine  Completed    Pneumococcal 65+ years Vaccine  Completed    Hepatitis C screen  Completed    Hepatitis A vaccine  Aged Out    Hepatitis B vaccine  Aged Out    Hib vaccine  Aged Out    Meningococcal (ACWY) vaccine  Aged Out       Hemoglobin A1C (%)   Date Value   05/17/2021 5.7   12/31/2019 6.3 (H)   07/23/2019 5.3             ( goal A1C is < 7)   No results found for: LABMICR  LDL Cholesterol (mg/dL)   Date Value   05/17/2021 81   12/31/2019 86       (goal LDL is <100)   AST (U/L)   Date Value   05/17/2021 21     ALT (U/L)   Date Value   05/17/2021 17     BUN (mg/dL)   Date Value   05/17/2021 16     BP Readings from Last 3 Encounters:   05/17/21 122/80   11/16/20 132/82   08/24/20 116/72          (goal 120/80)    All Future Testing planned in [facility]              Patient Active Problem List:     Chronic left hip pain     Chronic bilateral low back pain with bilateral sciatica     Prediabetes     Primary osteoarthritis involving multiple joints     Osteoporosis without current pathological fracture

## 2021-12-01 ENCOUNTER — OFFICE VISIT (OUTPATIENT)
Dept: FAMILY MEDICINE CLINIC | Age: 71
End: 2021-12-01
Payer: COMMERCIAL

## 2021-12-01 VITALS
HEART RATE: 93 BPM | SYSTOLIC BLOOD PRESSURE: 122 MMHG | WEIGHT: 220 LBS | BODY MASS INDEX: 36.61 KG/M2 | OXYGEN SATURATION: 92 % | DIASTOLIC BLOOD PRESSURE: 84 MMHG | TEMPERATURE: 97 F

## 2021-12-01 DIAGNOSIS — E55.9 VITAMIN D DEFICIENCY: ICD-10-CM

## 2021-12-01 DIAGNOSIS — J30.2 SEASONAL ALLERGIC RHINITIS, UNSPECIFIED TRIGGER: ICD-10-CM

## 2021-12-01 DIAGNOSIS — Z12.11 COLON CANCER SCREENING: ICD-10-CM

## 2021-12-01 DIAGNOSIS — J44.1 COPD EXACERBATION (HCC): ICD-10-CM

## 2021-12-01 DIAGNOSIS — R11.0 NAUSEA: Primary | ICD-10-CM

## 2021-12-01 DIAGNOSIS — F43.21 SITUATIONAL DEPRESSION: ICD-10-CM

## 2021-12-01 DIAGNOSIS — M15.9 PRIMARY OSTEOARTHRITIS INVOLVING MULTIPLE JOINTS: ICD-10-CM

## 2021-12-01 DIAGNOSIS — J44.9 CHRONIC OBSTRUCTIVE PULMONARY DISEASE, UNSPECIFIED COPD TYPE (HCC): ICD-10-CM

## 2021-12-01 DIAGNOSIS — M81.0 OSTEOPOROSIS WITHOUT CURRENT PATHOLOGICAL FRACTURE, UNSPECIFIED OSTEOPOROSIS TYPE: ICD-10-CM

## 2021-12-01 PROCEDURE — G8926 SPIRO NO PERF OR DOC: HCPCS | Performed by: INTERNAL MEDICINE

## 2021-12-01 PROCEDURE — 99214 OFFICE O/P EST MOD 30 MIN: CPT | Performed by: INTERNAL MEDICINE

## 2021-12-01 PROCEDURE — 4040F PNEUMOC VAC/ADMIN/RCVD: CPT | Performed by: INTERNAL MEDICINE

## 2021-12-01 PROCEDURE — G8399 PT W/DXA RESULTS DOCUMENT: HCPCS | Performed by: INTERNAL MEDICINE

## 2021-12-01 PROCEDURE — 3023F SPIROM DOC REV: CPT | Performed by: INTERNAL MEDICINE

## 2021-12-01 PROCEDURE — G8427 DOCREV CUR MEDS BY ELIG CLIN: HCPCS | Performed by: INTERNAL MEDICINE

## 2021-12-01 PROCEDURE — 3017F COLORECTAL CA SCREEN DOC REV: CPT | Performed by: INTERNAL MEDICINE

## 2021-12-01 PROCEDURE — 1036F TOBACCO NON-USER: CPT | Performed by: INTERNAL MEDICINE

## 2021-12-01 PROCEDURE — 1090F PRES/ABSN URINE INCON ASSESS: CPT | Performed by: INTERNAL MEDICINE

## 2021-12-01 PROCEDURE — G8482 FLU IMMUNIZE ORDER/ADMIN: HCPCS | Performed by: INTERNAL MEDICINE

## 2021-12-01 PROCEDURE — G8417 CALC BMI ABV UP PARAM F/U: HCPCS | Performed by: INTERNAL MEDICINE

## 2021-12-01 PROCEDURE — 1123F ACP DISCUSS/DSCN MKR DOCD: CPT | Performed by: INTERNAL MEDICINE

## 2021-12-01 RX ORDER — PROMETHAZINE HYDROCHLORIDE 25 MG/1
25 TABLET ORAL EVERY 6 HOURS PRN
Qty: 60 TABLET | Refills: 1 | Status: SHIPPED | OUTPATIENT
Start: 2021-12-01 | End: 2022-06-02 | Stop reason: SDUPTHER

## 2021-12-01 RX ORDER — FLUTICASONE FUROATE, UMECLIDINIUM BROMIDE AND VILANTEROL TRIFENATATE 200; 62.5; 25 UG/1; UG/1; UG/1
1 POWDER RESPIRATORY (INHALATION) DAILY
Qty: 1 EACH | Refills: 5 | Status: SHIPPED | OUTPATIENT
Start: 2021-12-01 | End: 2022-01-12 | Stop reason: SDUPTHER

## 2021-12-01 RX ORDER — PREDNISONE 10 MG/1
TABLET ORAL
Qty: 30 TABLET | Refills: 0 | Status: ON HOLD | OUTPATIENT
Start: 2021-12-01 | End: 2022-10-05 | Stop reason: HOSPADM

## 2021-12-01 RX ORDER — CETIRIZINE HYDROCHLORIDE 10 MG/1
10 TABLET ORAL DAILY
Qty: 90 TABLET | Refills: 1 | Status: ON HOLD | OUTPATIENT
Start: 2021-12-01 | End: 2022-10-05 | Stop reason: HOSPADM

## 2021-12-01 RX ORDER — ALENDRONATE SODIUM 70 MG/1
70 TABLET ORAL WEEKLY
Qty: 12 TABLET | Refills: 1 | Status: SHIPPED | OUTPATIENT
Start: 2021-12-01

## 2021-12-01 RX ORDER — DESVENLAFAXINE 100 MG/1
100 TABLET, EXTENDED RELEASE ORAL DAILY
Qty: 90 TABLET | Refills: 1 | Status: SHIPPED | OUTPATIENT
Start: 2021-12-01 | End: 2022-05-24

## 2021-12-01 RX ORDER — TIOTROPIUM BROMIDE 18 UG/1
18 CAPSULE ORAL; RESPIRATORY (INHALATION) DAILY
Qty: 90 CAPSULE | Refills: 1 | Status: CANCELLED | OUTPATIENT
Start: 2021-12-01

## 2021-12-01 RX ORDER — ERGOCALCIFEROL 1.25 MG/1
50000 CAPSULE ORAL WEEKLY
Qty: 12 CAPSULE | Refills: 1 | Status: SHIPPED | OUTPATIENT
Start: 2021-12-01

## 2021-12-01 NOTE — PROGRESS NOTES
Subjective:       Patient ID:     Osmani Moreland is a 80 y.o. female who presents for   Chief Complaint   Patient presents with    Follow-up    Depression    Medication Refill       HPI:  Nursing note reviewed and discussed with patient. HPI  Following with pain management, had injection in September then one is scheduled in January, seen them this morning. Remains on morphine, has bowel regimen - fiber and probiotic. COPD:  Current treatment includes short-acting beta agonist inhaler, combined beta agonist/steroid inhaler, anticholinergic inhaler. Residual symptoms: acute dyspnea. Denies any other symptoms. Requires rescue inhaler 2 time(s) per day. Patient's medications, allergies, past medical, surgical, social and family histories were reviewed and updated as appropriate. Past Medical History:   Diagnosis Date    COPD (chronic obstructive pulmonary disease) ([facility] [address]     Osteoarthritis      Past Surgical History:   Procedure Laterality Date    BREAST BIOPSY      two    CHOLECYSTECTOMY      HYSTERECTOMY, VAGINAL         Social History     Tobacco Use    Smoking status: Former Smoker     Packs/day: 0.75     Years: 34.00     Pack years: 25.50     Types: Cigarettes     Quit date: 3/20/2017     Years since quittin.7    Smokeless tobacco: Never Used   Substance Use Topics    Alcohol use: Not Currently     Comment: occasionally       Patient Active Problem List   Diagnosis    Chronic left hip pain    Chronic bilateral low back pain with bilateral sciatica    Prediabetes    Primary osteoarthritis involving multiple joints    Osteoporosis without current pathological fracture         Prior to Visit Medications    Medication Sig Taking?  Authorizing Provider   albuterol sulfate  (90 Base) MCG/ACT inhaler USE 2 INHALATIONS EVERY 4 HOURS AS NEEDED FOR WHEEZING OR SHORTNESS OF BREATH Yes Say Love MD   SYMBICORT 160-4.5 MCG/ACT AERO USE 2 INHALATIONS TWICE A DAY Yes Norma Maria Luz Nicole MD   tiZANidine (ZANAFLEX) 4 MG tablet TAKE 1 TABLET THREE TIMES A DAY Yes Norma Nicole MD   montelukast (SINGULAIR) 10 MG tablet TAKE 1 TABLET DAILY Yes Norma Nicole MD   vitamin D (ERGOCALCIFEROL) 1.25 MG (50062 UT) CAPS capsule Take 1 capsule by mouth once a week Yes MITESH Mercedes NP   desvenlafaxine succinate (PRISTIQ) 100 MG TB24 extended release tablet Take 1 tablet by mouth daily Yes Ruy HerbMITESH NP   [facility] 18 MCG inhalation capsule Inhale 1 capsule into the lungs daily Yes Norma Nicole MD   alendronate (FOSAMAX) 70 MG tablet Take 1 tablet by mouth once a week Yes Norma Nicole MD   cetirizine (ZYRTEC) 10 MG tablet Take 1 tablet by mouth daily Yes Yash Iniguez MD   Handicap Placard MISC by Does not apply route Expires 11/2025 Yes Norma Nicole MD   morphine (MS CONTIN) 15 MG extended release tablet 3 times daily. Yes Historical Provider, MD   diclofenac-miSOPROStol (ARTHROTEC) 50-0.2 MG per tablet Take 1 tablet by mouth 3 times daily  Patient not taking: Reported on 12/1/2021  Norma Nicole MD   promethazine (PHENERGAN) 25 MG tablet Take 1 tablet by mouth every 6 hours as needed  Patient not taking: Reported on 12/1/2021  Historical Provider, MD     Review of Systems  Review of Systems   Constitutional: Negative for fatigue, fever and unexpected weight change. Respiratory: Negative for cough, choking, chest tightness, shortness of breath and wheezing. Cardiovascular: Negative for chest pain, palpitations and leg swelling. Gastrointestinal: Negative for abdominal pain, anal bleeding, blood in stool, constipation, diarrhea, nausea and vomiting. Endocrine: Negative. Musculoskeletal: Negative for joint swelling and myalgias. Skin: Negative. Neurological: Negative for dizziness. Psychiatric/Behavioral: Negative for sleep disturbance. All other systems reviewed and are negative.          Objective:       Physical Exam:  /84 (Site: Left Upper Arm, Position: Sitting, Cuff Size: Medium Adult)   Pulse 93   Temp 97 °F (36.1 °C)   Wt 220 lb (99.8 kg)   SpO2 92%   BMI 36.61 kg/m²   Physical Exam  Vitals and nursing note reviewed. Constitutional:       General: She is not in acute distress. Appearance: She is well-developed. She is not ill-appearing. Eyes:      General: Lids are normal. Vision grossly intact. Cardiovascular:      Rate and Rhythm: Normal rate and regular rhythm. Heart sounds: Normal heart sounds, S1 normal and S2 normal. No murmur heard. No friction rub. No gallop. Pulmonary:      Effort: Pulmonary effort is normal. No respiratory distress. Breath sounds: Normal breath sounds. No wheezing. Abdominal:      General: Bowel sounds are normal.      Palpations: Abdomen is soft. There is no mass. Tenderness: There is no abdominal tenderness. There is no guarding. Musculoskeletal:         General: Normal range of motion. Skin:     General: Skin is warm and dry. Capillary Refill: Capillary refill takes less than 2 seconds. Neurological:      General: No focal deficit present. Mental Status: She is alert and oriented to person, place, and time. Data Review         Assessment/Plan:      1. Vitamin D deficiency  - vitamin D (ERGOCALCIFEROL) 1.25 MG (20083 UT) CAPS capsule; Take 1 capsule by mouth once a week  Dispense: 12 capsule; Refill: 1    2. Situational depression  - desvenlafaxine succinate (PRISTIQ) 100 MG TB24 extended release tablet; Take 1 tablet by mouth daily  Dispense: 90 tablet; Refill: 1    3. Chronic obstructive pulmonary disease, unspecified COPD type ([facility] [address]  - Handicap Placard MISC; by Does not apply route Expires 11/2025  Dispense: 1 each; Refill: 0  - Fluticasone-Umeclidin-Vilant (Lonita Bears) 583-43.7-87 MCG/INH AEPB; Inhale 1 puff into the lungs daily  Dispense: 1 each; Refill: 5    4.  Osteoporosis without current pathological fracture, unspecified osteoporosis type  - alendronate (FOSAMAX) 70 MG tablet; Take 1 tablet by mouth once a week  Dispense: 12 tablet; Refill: 1    5. Seasonal allergic rhinitis, unspecified trigger  - cetirizine (ZYRTEC) 10 MG tablet; Take 1 tablet by mouth daily  Dispense: 90 tablet; Refill: 1    6. Primary osteoarthritis involving multiple joints  - Handicap Placard MISC; by Does not apply route Expires 11/2025  Dispense: 1 each; Refill: 0  - diclofenac (VOLTAREN) 50 MG EC tablet; Take 1 tablet by mouth 3 times daily (with meals)  Dispense: 60 tablet; Refill: 3    7. Nausea  - promethazine (PHENERGAN) 25 MG tablet; Take 1 tablet by mouth every 6 hours as needed (as needed for nausea)  Dispense: 60 tablet; Refill: 1    8. COPD exacerbation (HCC)  - predniSONE (DELTASONE) 10 MG tablet; 4 tabs by mouth daily for 3 days, then 3 tabs daily for 3 days, then 2 tabs daily for 3 days, then 1 tab daily till gone. Dispense: 30 tablet; Refill: 0    9. Colon cancer screening  - Cologuard (For External Results Only);  Future           Health Maintenance Due   Topic Date Due    DTaP/Tdap/Td vaccine (1 - Tdap) Never done    Colon cancer screen colonoscopy  Never done    Low dose CT lung screening  Never done       Electronically signed by Renetta Vieira MD on 12/1/2021 at 12:16 PM

## 2021-12-05 ASSESSMENT — ENCOUNTER SYMPTOMS
DIARRHEA: 0
SHORTNESS OF BREATH: 0
VOMITING: 0
CHEST TIGHTNESS: 0
ANAL BLEEDING: 0
COUGH: 0
ABDOMINAL PAIN: 0
CHOKING: 0
WHEEZING: 0
BLOOD IN STOOL: 0
NAUSEA: 0
CONSTIPATION: 0

## 2021-12-05 ASSESSMENT — VISUAL ACUITY: OU: 1

## 2022-01-12 DIAGNOSIS — J44.9 CHRONIC OBSTRUCTIVE PULMONARY DISEASE, UNSPECIFIED COPD TYPE (HCC): ICD-10-CM

## 2022-01-12 RX ORDER — FLUTICASONE FUROATE, UMECLIDINIUM BROMIDE AND VILANTEROL TRIFENATATE 200; 62.5; 25 UG/1; UG/1; UG/1
1 POWDER RESPIRATORY (INHALATION) DAILY
Qty: 3 EACH | Refills: 1 | Status: SHIPPED | OUTPATIENT
Start: 2022-01-12

## 2022-01-12 NOTE — TELEPHONE ENCOUNTER
Last visit: 12/01/2021  Last Med refill: 12/01/2021  Does patient have enough medication for 72 hours: Yes    NEEDS TO GO TO MAIL ORDER    Next Visit Date:  Future Appointments   Date Time Provider Rafiq Bullock   6/2/2022  8:30 AM Norma Ascencio  [address] Maintenance   Topic Date Due    DTaP/Tdap/Td vaccine (1 - Tdap) Never done    Colon cancer screen colonoscopy  Never done    COVID-19 Vaccine (3 - Booster for Moderna series) 08/03/2021    A1C test (Diabetic or Prediabetic)  05/17/2022    Depression Monitoring  05/17/2022    Low dose CT lung screening  06/01/2022    Breast cancer screen  01/05/2023    Lipid screen  05/17/2026    DEXA (modify frequency per FRAX score)  Completed    Flu vaccine  Completed    Shingles Vaccine  Completed    Pneumococcal 65+ years Vaccine  Completed    Hepatitis C screen  Completed    Hepatitis A vaccine  Aged Out    Hepatitis B vaccine  Aged Out    Hib vaccine  Aged Out    Meningococcal (ACWY) vaccine  Aged Out       Hemoglobin A1C (%)   Date Value   05/17/2021 5.7   12/31/2019 6.3 (H)   07/23/2019 5.3             ( goal A1C is < 7)   No results found for: LABMICR  LDL Cholesterol (mg/dL)   Date Value   05/17/2021 81   12/31/2019 86       (goal LDL is <100)   AST (U/L)   Date Value   05/17/2021 21     ALT (U/L)   Date Value   05/17/2021 17     BUN (mg/dL)   Date Value   05/17/2021 16     BP Readings from Last 3 Encounters:   12/01/21 122/84   05/17/21 122/80   11/16/20 132/82          (goal 120/80)    All Future Testing planned in [facility]  Lab Frequency Next Occurrence   Cologuard (For External Results Only) Once 01/09/2022               Patient Active Problem List:     Chronic left hip pain     Chronic bilateral low back pain with bilateral sciatica     Prediabetes     Primary osteoarthritis involving multiple joints     Osteoporosis without current pathological fracture

## 2022-05-24 DIAGNOSIS — F43.21 SITUATIONAL DEPRESSION: ICD-10-CM

## 2022-05-24 RX ORDER — DESVENLAFAXINE 100 MG/1
TABLET, EXTENDED RELEASE ORAL
Qty: 90 TABLET | Refills: 1 | Status: SHIPPED | OUTPATIENT
Start: 2022-05-24

## 2022-05-24 NOTE — TELEPHONE ENCOUNTER
Last visit: 12/1/21  Last Med refill: 12/1/21  Does patient have enough medication for 72 hours: Yes    Next Visit Date:  Future Appointments   Date Time Provider Rafiq Ara   6/2/2022  8:30 AM Norma Goodrich  [address] Maintenance   Topic Date Due    DTaP/Tdap/Td vaccine (1 - Tdap) Never done    Colorectal Cancer Screen  Never done    Pneumococcal 65+ years Vaccine (2 - PCV) 11/12/2019    COVID-19 Vaccine (3 - Booster for Moderna series) 07/03/2021    A1C test (Diabetic or Prediabetic)  05/17/2022    Depression Monitoring  05/17/2022    Low dose CT lung screening  06/01/2022    Breast cancer screen  01/05/2023    Lipids  05/17/2026    DEXA (modify frequency per FRAX score)  Completed    Flu vaccine  Completed    Shingles vaccine  Completed    Hepatitis C screen  Completed    Hepatitis A vaccine  Aged Out    Hepatitis B vaccine  Aged Out    Hib vaccine  Aged Out    Meningococcal (ACWY) vaccine  Aged Out       Hemoglobin A1C (%)   Date Value   05/17/2021 5.7   12/31/2019 6.3 (H)   07/23/2019 5.3             ( goal A1C is < 7)   No results found for: LABMICR  LDL Cholesterol (mg/dL)   Date Value   05/17/2021 81   12/31/2019 86       (goal LDL is <100)   AST (U/L)   Date Value   05/17/2021 21     ALT (U/L)   Date Value   05/17/2021 17     BUN (mg/dL)   Date Value   05/17/2021 16     BP Readings from Last 3 Encounters:   12/01/21 122/84   05/17/21 122/80   11/16/20 132/82          (goal 120/80)    All Future Testing planned in [facility]  Lab Frequency Next Occurrence   Cologuard (For External Results Only) Once 04/25/2022               Patient Active Problem List:     Chronic left hip pain     Chronic bilateral low back pain with bilateral sciatica     Prediabetes     Primary osteoarthritis involving multiple joints     Osteoporosis without current pathological fracture

## 2022-06-02 ENCOUNTER — OFFICE VISIT (OUTPATIENT)
Dept: FAMILY MEDICINE CLINIC | Age: 72
End: 2022-06-02
Payer: COMMERCIAL

## 2022-06-02 VITALS
BODY MASS INDEX: 37.72 KG/M2 | OXYGEN SATURATION: 94 % | DIASTOLIC BLOOD PRESSURE: 76 MMHG | SYSTOLIC BLOOD PRESSURE: 110 MMHG | HEART RATE: 73 BPM | TEMPERATURE: 97.2 F | WEIGHT: 226.7 LBS

## 2022-06-02 DIAGNOSIS — J44.9 CHRONIC OBSTRUCTIVE PULMONARY DISEASE, UNSPECIFIED COPD TYPE (HCC): ICD-10-CM

## 2022-06-02 DIAGNOSIS — R73.03 PREDIABETES: Primary | ICD-10-CM

## 2022-06-02 DIAGNOSIS — F41.1 GENERALIZED ANXIETY DISORDER: ICD-10-CM

## 2022-06-02 DIAGNOSIS — F43.21 SITUATIONAL DEPRESSION: ICD-10-CM

## 2022-06-02 DIAGNOSIS — M15.9 PRIMARY OSTEOARTHRITIS INVOLVING MULTIPLE JOINTS: ICD-10-CM

## 2022-06-02 DIAGNOSIS — R11.0 NAUSEA: ICD-10-CM

## 2022-06-02 LAB — HBA1C MFR BLD: 5.7 %

## 2022-06-02 PROCEDURE — 3017F COLORECTAL CA SCREEN DOC REV: CPT | Performed by: INTERNAL MEDICINE

## 2022-06-02 PROCEDURE — G8417 CALC BMI ABV UP PARAM F/U: HCPCS | Performed by: INTERNAL MEDICINE

## 2022-06-02 PROCEDURE — 99214 OFFICE O/P EST MOD 30 MIN: CPT | Performed by: INTERNAL MEDICINE

## 2022-06-02 PROCEDURE — 3023F SPIROM DOC REV: CPT | Performed by: INTERNAL MEDICINE

## 2022-06-02 PROCEDURE — G8427 DOCREV CUR MEDS BY ELIG CLIN: HCPCS | Performed by: INTERNAL MEDICINE

## 2022-06-02 PROCEDURE — 1090F PRES/ABSN URINE INCON ASSESS: CPT | Performed by: INTERNAL MEDICINE

## 2022-06-02 PROCEDURE — 1123F ACP DISCUSS/DSCN MKR DOCD: CPT | Performed by: INTERNAL MEDICINE

## 2022-06-02 PROCEDURE — 83036 HEMOGLOBIN GLYCOSYLATED A1C: CPT | Performed by: INTERNAL MEDICINE

## 2022-06-02 PROCEDURE — 1036F TOBACCO NON-USER: CPT | Performed by: INTERNAL MEDICINE

## 2022-06-02 PROCEDURE — G8399 PT W/DXA RESULTS DOCUMENT: HCPCS | Performed by: INTERNAL MEDICINE

## 2022-06-02 RX ORDER — HYDROXYZINE HYDROCHLORIDE 25 MG/1
25 TABLET, FILM COATED ORAL NIGHTLY
Qty: 30 TABLET | Refills: 1 | Status: ON HOLD | OUTPATIENT
Start: 2022-06-02 | End: 2022-10-05 | Stop reason: HOSPADM

## 2022-06-02 RX ORDER — BUSPIRONE HYDROCHLORIDE 7.5 MG/1
7.5 TABLET ORAL 2 TIMES DAILY
Qty: 60 TABLET | Refills: 0 | Status: SHIPPED | OUTPATIENT
Start: 2022-06-02 | End: 2022-07-02

## 2022-06-02 RX ORDER — PROMETHAZINE HYDROCHLORIDE 25 MG/1
25 TABLET ORAL EVERY 6 HOURS PRN
Qty: 60 TABLET | Refills: 1 | Status: SHIPPED | OUTPATIENT
Start: 2022-06-02

## 2022-06-02 RX ORDER — BUDESONIDE AND FORMOTEROL FUMARATE DIHYDRATE 160; 4.5 UG/1; UG/1
AEROSOL RESPIRATORY (INHALATION)
COMMUNITY
Start: 2022-05-16

## 2022-06-02 SDOH — ECONOMIC STABILITY: FOOD INSECURITY: WITHIN THE PAST 12 MONTHS, THE FOOD YOU BOUGHT JUST DIDN'T LAST AND YOU DIDN'T HAVE MONEY TO GET MORE.: NEVER TRUE

## 2022-06-02 SDOH — ECONOMIC STABILITY: FOOD INSECURITY: WITHIN THE PAST 12 MONTHS, YOU WORRIED THAT YOUR FOOD WOULD RUN OUT BEFORE YOU GOT MONEY TO BUY MORE.: NEVER TRUE

## 2022-06-02 ASSESSMENT — ENCOUNTER SYMPTOMS
COUGH: 0
CONSTIPATION: 0
DIARRHEA: 0
WHEEZING: 0
CHEST TIGHTNESS: 0
ANAL BLEEDING: 0
VOMITING: 0
CHOKING: 0
SHORTNESS OF BREATH: 0
BLOOD IN STOOL: 0
ABDOMINAL PAIN: 0
NAUSEA: 0

## 2022-06-02 ASSESSMENT — PATIENT HEALTH QUESTIONNAIRE - PHQ9
9. THOUGHTS THAT YOU WOULD BE BETTER OFF DEAD, OR OF HURTING YOURSELF: 0
SUM OF ALL RESPONSES TO PHQ QUESTIONS 1-9: 3
7. TROUBLE CONCENTRATING ON THINGS, SUCH AS READING THE NEWSPAPER OR WATCHING TELEVISION: 0
SUM OF ALL RESPONSES TO PHQ QUESTIONS 1-9: 3
2. FEELING DOWN, DEPRESSED OR HOPELESS: 0
5. POOR APPETITE OR OVEREATING: 0
4. FEELING TIRED OR HAVING LITTLE ENERGY: 1
6. FEELING BAD ABOUT YOURSELF - OR THAT YOU ARE A FAILURE OR HAVE LET YOURSELF OR YOUR FAMILY DOWN: 0
SUM OF ALL RESPONSES TO PHQ QUESTIONS 1-9: 3
10. IF YOU CHECKED OFF ANY PROBLEMS, HOW DIFFICULT HAVE THESE PROBLEMS MADE IT FOR YOU TO DO YOUR WORK, TAKE CARE OF THINGS AT HOME, OR GET ALONG WITH OTHER PEOPLE: 0
1. LITTLE INTEREST OR PLEASURE IN DOING THINGS: 1
8. MOVING OR SPEAKING SO SLOWLY THAT OTHER PEOPLE COULD HAVE NOTICED. OR THE OPPOSITE, BEING SO FIGETY OR RESTLESS THAT YOU HAVE BEEN MOVING AROUND A LOT MORE THAN USUAL: 0
SUM OF ALL RESPONSES TO PHQ QUESTIONS 1-9: 3
SUM OF ALL RESPONSES TO PHQ9 QUESTIONS 1 & 2: 1
3. TROUBLE FALLING OR STAYING ASLEEP: 1

## 2022-06-02 ASSESSMENT — VISUAL ACUITY: OU: 1

## 2022-06-02 ASSESSMENT — SOCIAL DETERMINANTS OF HEALTH (SDOH): HOW HARD IS IT FOR YOU TO PAY FOR THE VERY BASICS LIKE FOOD, HOUSING, MEDICAL CARE, AND HEATING?: NOT HARD AT ALL

## 2022-06-02 NOTE — PROGRESS NOTES
Pt is here today for her regular 6 month f/u    Pt would like to discuss increasing her Pristiq   Pt did have a cough and sore throat about 2 weeks, took 2 test both were negative for COVID, but is feeling fine now

## 2022-06-02 NOTE — PROGRESS NOTES
Subjective:       Patient ID:     Adryan Cordon is a 80 y.o. female who presents for   Chief Complaint   Patient presents with    Chronic Pain     f/u       HPI:  Nursing note reviewed and discussed with patient. Continues to follow-up with pain clinic and remain on morphine. Continues to have nausea, relieved by Phenergan. Requesting Phenergan refill. She has been having breakthrough anxious thoughts and has a hard time calming down for the last month - 4/7 days a week at least. Sleeping but not through the night, has been waking up due to hip pain, she has been on [place] before but it made her very groggy. Denies SI, HI, auditory or visual hallucination   COPD:  Current treatment includes short-acting beta agonist inhaler, Trelegy. Residual symptoms: chronic dyspnea. Denies any other symptoms. Requires rescue inhaler 2-3 time(s) per week. Patient's medications, allergies, past medical, surgical, social and family histories were reviewed and updated as appropriate. Past Medical History:   Diagnosis Date    COPD (chronic obstructive pulmonary disease) ([facility] [address]     Osteoarthritis      Past Surgical History:   Procedure Laterality Date    BREAST BIOPSY      two    CHOLECYSTECTOMY      HYSTERECTOMY, VAGINAL         Social History     Tobacco Use    Smoking status: Former Smoker     Packs/day: 0.75     Years: 34.00     Pack years: 25.50     Types: Cigarettes     Quit date: 3/20/2017     Years since quittin.2    Smokeless tobacco: Never Used   Substance Use Topics    Alcohol use: Not Currently     Comment: occasionally       Patient Active Problem List   Diagnosis    Chronic left hip pain    Chronic bilateral low back pain with bilateral sciatica    Prediabetes    Primary osteoarthritis involving multiple joints    Osteoporosis without current pathological fracture         Prior to Visit Medications    Medication Sig Taking?  Authorizing Provider   SYMBICORT 160-4.5 MCG/ACT AERO  Yes Historical Provider, MD   desvenlafaxine succinate (PRISTIQ) 100 MG TB24 extended release tablet TAKE 1 TABLET BY MOUTH EVERY DAY Yes Norma Mark MD   Fluticasone-Umeclidin-Vilant (TRELEGY ELLIPTA) 200-62.5-25 MCG/INH AEPB Inhale 1 puff into the lungs daily Yes Norma Mark MD   vitamin D (ERGOCALCIFEROL) 1.25 MG (50209 UT) CAPS capsule Take 1 capsule by mouth once a week Yes Norma Mark MD   alendronate (FOSAMAX) 70 MG tablet Take 1 tablet by mouth once a week Yes Norma Mark MD   cetirizine (ZYRTEC) 10 MG tablet Take 1 tablet by mouth daily Yes Norma Mark MD   promethazine (PHENERGAN) 25 MG tablet Take 1 tablet by mouth every 6 hours as needed (as needed for nausea) Yes Norma Mark MD   Handicap Placard MISC by Does not apply route Expires 11/2025 Yes Norma Mark MD   diclofenac (VOLTAREN) 50 MG EC tablet Take 1 tablet by mouth 3 times daily (with meals) Yes Norma Mark MD   predniSONE (DELTASONE) 10 MG tablet 4 tabs by mouth daily for 3 days, then 3 tabs daily for 3 days, then 2 tabs daily for 3 days, then 1 tab daily till gone. Yes Norma Mark MD   albuterol sulfate  (90 Base) MCG/ACT inhaler USE 2 INHALATIONS EVERY 4 HOURS AS NEEDED FOR WHEEZING OR SHORTNESS OF BREATH Yes Norma Mark MD   tiZANidine (ZANAFLEX) 4 MG tablet TAKE 1 TABLET THREE TIMES A DAY Yes Norma Mark MD   montelukast (SINGULAIR) 10 MG tablet TAKE 1 TABLET DAILY Yes Norma Mark MD   morphine (MS CONTIN) 15 MG extended release tablet 3 times daily. Yes Historical Provider, MD     Review of Systems  Review of Systems   Constitutional: Negative for fatigue, fever and unexpected weight change. Respiratory: Negative for cough, choking, chest tightness, shortness of breath and wheezing. Cardiovascular: Negative for chest pain, palpitations and leg swelling.    Gastrointestinal: Negative for abdominal pain, anal bleeding, blood in stool, constipation, diarrhea, nausea and vomiting. Endocrine: Negative. Musculoskeletal: Negative for joint swelling and myalgias. Skin: Negative. Neurological: Negative for dizziness. Psychiatric/Behavioral: Positive for agitation, decreased concentration and dysphoric mood. Negative for behavioral problems, confusion, hallucinations, self-injury, sleep disturbance and suicidal ideas. The patient is nervous/anxious. The patient is not hyperactive. All other systems reviewed and are negative. Objective:       Physical Exam:  /76 (Site: Left Upper Arm, Position: Sitting, Cuff Size: Large Adult)   Pulse 73   Temp 97.2 °F (36.2 °C)   Wt 226 lb 11.2 oz (102.8 kg)   LMP  (LMP Unknown)   SpO2 94%   BMI 37.72 kg/m²   Wt Readings from Last 3 Encounters:   06/02/22 226 lb 11.2 oz (102.8 kg)   12/01/21 220 lb (99.8 kg)   05/17/21 216 lb (98 kg)         Physical Exam  Vitals and nursing note reviewed. Constitutional:       General: She is not in acute distress. Appearance: She is well-developed. She is not ill-appearing. Comments: Ambulating with walker    Eyes:      General: Lids are normal. Vision grossly intact. Cardiovascular:      Rate and Rhythm: Normal rate and regular rhythm. Heart sounds: Normal heart sounds, S1 normal and S2 normal. No murmur heard. No friction rub. No gallop. Pulmonary:      Effort: Pulmonary effort is normal. No respiratory distress. Breath sounds: Normal breath sounds. No wheezing. Abdominal:      General: Bowel sounds are normal.      Palpations: Abdomen is soft. There is no mass. Tenderness: There is no abdominal tenderness. There is no guarding. Musculoskeletal:         General: Normal range of motion. Skin:     General: Skin is warm and dry. Capillary Refill: Capillary refill takes less than 2 seconds. Neurological:      General: No focal deficit present. Mental Status: She is alert and oriented to person, place, and time.          Data Review  No visits with results within 2 Month(s) from this visit. Latest known visit with results is:   Hospital Outpatient Visit on 05/17/2021   Component Date Value    TSH 05/17/2021 1.79     Vit D, 25-Hydroxy 05/17/2021 17.7*    Hepatitis C Ab 05/17/2021 NONREACTIVE     Cholesterol 05/17/2021 141     HDL 05/17/2021 50     LDL Cholesterol 05/17/2021 81     Chol/HDL Ratio 05/17/2021 2.8     Triglycerides 05/17/2021 49     VLDL 05/17/2021 NOT REPORTED     Glucose 05/17/2021 69*    BUN 05/17/2021 16     CREATININE 05/17/2021 0.66     Bun/Cre Ratio 05/17/2021 NOT REPORTED     Calcium 05/17/2021 9.8     Sodium 05/17/2021 140     Potassium 05/17/2021 4.9     Chloride 05/17/2021 104     CO2 05/17/2021 26     Anion Gap 05/17/2021 10     Alkaline Phosphatase 05/17/2021 108*    ALT 05/17/2021 17     AST 05/17/2021 21     Total Bilirubin 05/17/2021 0.44     Total Protein 05/17/2021 7.7     Albumin 05/17/2021 4.1     Albumin/Globulin Ratio 05/17/2021 1.1     GFR Non- 05/17/2021 >60     GFR  05/17/2021 >60     GFR Comment 05/17/2021          GFR Staging 05/17/2021 NOT REPORTED     WBC 05/17/2021 7.6     RBC 05/17/2021 4.44     Hemoglobin 05/17/2021 11.8*    Hematocrit 05/17/2021 39.3     MCV 05/17/2021 88.5     MCH 05/17/2021 26.6     MCHC 05/17/2021 30.0     RDW 05/17/2021 16.6*    Platelets 72/49/3596 301     MPV 05/17/2021 12.1     NRBC Automated 05/17/2021 0.0      Lab Results   Component Value Date    CHOL 141 05/17/2021    TRIG 49 05/17/2021    HDL 50 05/17/2021          Assessment/Plan:      1. Nausea  - promethazine (PHENERGAN) 25 MG tablet; Take 1 tablet by mouth every 6 hours as needed (as needed for nausea)  Dispense: 60 tablet; Refill: 1    2. Prediabetes  - POCT glycosylated hemoglobin (Hb A1C)    3. Situational depression  Continue Pristiq    4.  Chronic obstructive pulmonary disease, unspecified [facility] type ([facility]  Continue Trelegy, as needed albuterol    5. Generalized anxiety disorder  Continue Pristiq. Patient to call in 2 weeks to report progress, if improving then will call in 90-day supplies to pharmacy  - hydrOXYzine (ATARAX) 25 MG tablet; Take 1 tablet by mouth nightly  Dispense: 30 tablet; Refill: 1  - busPIRone (BUSPAR) 7.5 MG tablet; Take 1 tablet by mouth 2 times daily  Dispense: 60 tablet; Refill: 0    6.  Primary osteoarthritis involving multiple joints  Continue diclofenac  Follow-up pain management           Health Maintenance Due   Topic Date Due    DTaP/Tdap/Td vaccine (1 - Tdap) Never done    Colorectal Cancer Screen  Never done    Pneumococcal 65+ years Vaccine (2 - PCV) 11/12/2019    Low dose CT lung screening  Never done    A1C test (Diabetic or Prediabetic)  05/17/2022       Electronically signed by Ashley Parr MD on 6/2/2022 at 8:49 AM

## 2022-06-16 ENCOUNTER — TELEPHONE (OUTPATIENT)
Dept: FAMILY MEDICINE CLINIC | Age: 72
End: 2022-06-16

## 2022-09-24 ENCOUNTER — HOSPITAL ENCOUNTER (INPATIENT)
Age: 72
LOS: 12 days | Discharge: NURSING, REHAB OR LONG-TERM CARE | DRG: 871 | End: 2022-10-06
Attending: EMERGENCY MEDICINE | Admitting: INTERNAL MEDICINE
Payer: COMMERCIAL

## 2022-09-24 ENCOUNTER — APPOINTMENT (OUTPATIENT)
Dept: CT IMAGING | Age: 72
DRG: 871 | End: 2022-09-24
Payer: COMMERCIAL

## 2022-09-24 ENCOUNTER — APPOINTMENT (OUTPATIENT)
Dept: GENERAL RADIOLOGY | Age: 72
DRG: 871 | End: 2022-09-24
Payer: COMMERCIAL

## 2022-09-24 DIAGNOSIS — M25.552 CHRONIC LEFT HIP PAIN: ICD-10-CM

## 2022-09-24 DIAGNOSIS — G89.29 CHRONIC LEFT HIP PAIN: ICD-10-CM

## 2022-09-24 DIAGNOSIS — A41.9 SEPTICEMIA (HCC): Primary | ICD-10-CM

## 2022-09-24 LAB
ABSOLUTE EOS #: 0 K/UL (ref 0–0.4)
ABSOLUTE IMMATURE GRANULOCYTE: 6.7 K/UL (ref 0–0.3)
ABSOLUTE LYMPH #: 1.24 K/UL (ref 1–4.8)
ABSOLUTE MONO #: 0.5 K/UL (ref 0.1–0.8)
ALBUMIN SERPL-MCNC: 3 G/DL (ref 3.5–5.2)
ALBUMIN/GLOBULIN RATIO: 0.7 (ref 1–2.5)
ALLEN TEST: POSITIVE
ALLEN TEST: POSITIVE
ALP BLD-CCNC: 113 U/L (ref 35–104)
ALT SERPL-CCNC: 10 U/L (ref 5–33)
ANION GAP SERPL CALCULATED.3IONS-SCNC: 20 MMOL/L (ref 9–17)
ANION GAP: 18 MMOL/L (ref 7–16)
AST SERPL-CCNC: 21 U/L
BACTERIA: ABNORMAL
BASOPHILS # BLD: 0 % (ref 0–2)
BASOPHILS ABSOLUTE: 0 K/UL (ref 0–0.2)
BILIRUB SERPL-MCNC: 1.6 MG/DL (ref 0.3–1.2)
BILIRUBIN URINE: ABNORMAL
BUN BLDV-MCNC: 22 MG/DL (ref 8–23)
CALCIUM SERPL-MCNC: 9.5 MG/DL (ref 8.6–10.4)
CASTS UA: ABNORMAL /LPF (ref 0–8)
CHLORIDE BLD-SCNC: 104 MMOL/L (ref 98–107)
CO2: 15 MMOL/L (ref 20–31)
COLOR: ABNORMAL
CREAT SERPL-MCNC: 1.05 MG/DL (ref 0.5–0.9)
EOSINOPHILS RELATIVE PERCENT: 0 % (ref 1–4)
EPITHELIAL CELLS UA: ABNORMAL /HPF (ref 0–5)
FIO2: 100
FIO2: 60
GFR AFRICAN AMERICAN: >60 ML/MIN
GFR NON-AFRICAN AMERICAN: 51 ML/MIN
GFR NON-AFRICAN AMERICAN: 52 ML/MIN
GFR SERPL CREATININE-BSD FRML MDRD: >60 ML/MIN
GFR SERPL CREATININE-BSD FRML MDRD: ABNORMAL ML/MIN/{1.73_M2}
GFR SERPL CREATININE-BSD FRML MDRD: ABNORMAL ML/MIN/{1.73_M2}
GLUCOSE BLD-MCNC: 103 MG/DL (ref 70–99)
GLUCOSE BLD-MCNC: 103 MG/DL (ref 74–100)
GLUCOSE BLD-MCNC: 74 MG/DL (ref 74–100)
GLUCOSE BLD-MCNC: 77 MG/DL (ref 74–100)
GLUCOSE URINE: NEGATIVE
HCO3 VENOUS: 20.1 MMOL/L (ref 22–29)
HCT VFR BLD CALC: 40.1 % (ref 36.3–47.1)
HEMOGLOBIN: 12.7 G/DL (ref 11.9–15.1)
IMMATURE GRANULOCYTES: 27 %
INR BLD: 1.4
KETONES, URINE: ABNORMAL
LACTIC ACID, SEPSIS WHOLE BLOOD: 7.3 MMOL/L (ref 0.5–1.9)
LACTIC ACID, SEPSIS WHOLE BLOOD: 9 MMOL/L (ref 0.5–1.9)
LACTIC ACID, WHOLE BLOOD: 4.5 MMOL/L (ref 0.7–2.1)
LEUKOCYTE ESTERASE, URINE: ABNORMAL
LYMPHOCYTES # BLD: 5 % (ref 24–44)
MAGNESIUM: 1.5 MG/DL (ref 1.6–2.6)
MCH RBC QN AUTO: 26.6 PG (ref 25.2–33.5)
MCHC RBC AUTO-ENTMCNC: 31.7 G/DL (ref 28.4–34.8)
MCV RBC AUTO: 83.9 FL (ref 82.6–102.9)
MODE: ABNORMAL
MONOCYTES # BLD: 2 % (ref 1–7)
MORPHOLOGY: ABNORMAL
MORPHOLOGY: ABNORMAL
NEGATIVE BASE EXCESS, ART: 5 (ref 0–2)
NEGATIVE BASE EXCESS, ART: 8 (ref 0–2)
NEGATIVE BASE EXCESS, VEN: 5 (ref 0–2)
NITRITE, URINE: NEGATIVE
NRBC AUTOMATED: 0 PER 100 WBC
O2 DEVICE/FLOW/%: ABNORMAL
O2 DEVICE/FLOW/%: ABNORMAL
O2 SAT, VEN: 22 % (ref 60–85)
PATIENT TEMP: 38.2
PCO2, VEN: 37.1 MM HG (ref 41–51)
PDW BLD-RTO: 17.1 % (ref 11.8–14.4)
PH UA: 5.5 (ref 5–8)
PH VENOUS: 7.34 (ref 7.32–7.43)
PLATELET # BLD: 182 K/UL (ref 138–453)
PMV BLD AUTO: 10.5 FL (ref 8.1–13.5)
PO2, VEN: 16.9 MM HG (ref 30–50)
POC BUN: 21 MG/DL (ref 8–26)
POC CHLORIDE: 106 MMOL/L (ref 98–107)
POC CREATININE: 1.06 MG/DL (ref 0.51–1.19)
POC HCO3: 17.6 MMOL/L (ref 21–28)
POC HCO3: 17.7 MMOL/L (ref 21–28)
POC HEMATOCRIT: 43 % (ref 36–46)
POC HEMOGLOBIN: 14.5 G/DL (ref 12–16)
POC IONIZED CALCIUM: 1.33 MMOL/L (ref 1.15–1.33)
POC LACTIC ACID: 3.74 MMOL/L (ref 0.56–1.39)
POC LACTIC ACID: 7.76 MMOL/L (ref 0.56–1.39)
POC O2 SATURATION: 100 % (ref 94–98)
POC O2 SATURATION: 99 % (ref 94–98)
POC PCO2 TEMP: 38 MM HG
POC PCO2: 26.2 MM HG (ref 35–48)
POC PCO2: 35.9 MM HG (ref 35–48)
POC PH TEMP: 7.28
POC PH: 7.3 (ref 7.35–7.45)
POC PH: 7.44 (ref 7.35–7.45)
POC PO2 TEMP: 214 MM HG
POC PO2: 118.8 MM HG (ref 83–108)
POC PO2: 207.7 MM HG (ref 83–108)
POC POTASSIUM: 3.9 MMOL/L (ref 3.5–4.5)
POC SODIUM: 143 MMOL/L (ref 138–146)
POC TCO2: 20 MMOL/L (ref 22–30)
POTASSIUM SERPL-SCNC: 3.9 MMOL/L (ref 3.7–5.3)
PROCALCITONIN: 9.82 NG/ML
PROTEIN UA: ABNORMAL
PROTHROMBIN TIME: 13.9 SEC (ref 9.1–12.3)
RBC # BLD: 4.78 M/UL (ref 3.95–5.11)
RBC UA: ABNORMAL /HPF (ref 0–4)
SAMPLE SITE: ABNORMAL
SAMPLE SITE: ABNORMAL
SARS-COV-2, RAPID: NOT DETECTED
SEG NEUTROPHILS: 66 % (ref 36–66)
SEGMENTED NEUTROPHILS ABSOLUTE COUNT: 16.36 K/UL (ref 1.8–7.7)
SODIUM BLD-SCNC: 139 MMOL/L (ref 135–144)
SPECIFIC GRAVITY UA: 1.02 (ref 1–1.03)
SPECIMEN DESCRIPTION: NORMAL
TOTAL CK: 493 U/L (ref 26–192)
TOTAL PROTEIN: 7.5 G/DL (ref 6.4–8.3)
TROPONIN, HIGH SENSITIVITY: 24 NG/L (ref 0–14)
TROPONIN, HIGH SENSITIVITY: 25 NG/L (ref 0–14)
TROPONIN, HIGH SENSITIVITY: 31 NG/L (ref 0–14)
TURBIDITY: ABNORMAL
URINE HGB: ABNORMAL
UROBILINOGEN, URINE: NORMAL
WBC # BLD: 24.8 K/UL (ref 3.5–11.3)
WBC UA: ABNORMAL /HPF (ref 0–5)

## 2022-09-24 PROCEDURE — 96376 TX/PRO/DX INJ SAME DRUG ADON: CPT

## 2022-09-24 PROCEDURE — 94002 VENT MGMT INPAT INIT DAY: CPT

## 2022-09-24 PROCEDURE — 85610 PROTHROMBIN TIME: CPT

## 2022-09-24 PROCEDURE — 82330 ASSAY OF CALCIUM: CPT

## 2022-09-24 PROCEDURE — 2500000003 HC RX 250 WO HCPCS

## 2022-09-24 PROCEDURE — 84145 PROCALCITONIN (PCT): CPT

## 2022-09-24 PROCEDURE — 83605 ASSAY OF LACTIC ACID: CPT

## 2022-09-24 PROCEDURE — 87205 SMEAR GRAM STAIN: CPT

## 2022-09-24 PROCEDURE — 82947 ASSAY GLUCOSE BLOOD QUANT: CPT

## 2022-09-24 PROCEDURE — 3209999900 CT LUMBAR SPINE TRAUMA RECONSTRUCTION

## 2022-09-24 PROCEDURE — 99291 CRITICAL CARE FIRST HOUR: CPT

## 2022-09-24 PROCEDURE — 6360000002 HC RX W HCPCS: Performed by: STUDENT IN AN ORGANIZED HEALTH CARE EDUCATION/TRAINING PROGRAM

## 2022-09-24 PROCEDURE — 6360000004 HC RX CONTRAST MEDICATION: Performed by: STUDENT IN AN ORGANIZED HEALTH CARE EDUCATION/TRAINING PROGRAM

## 2022-09-24 PROCEDURE — 74018 RADEX ABDOMEN 1 VIEW: CPT

## 2022-09-24 PROCEDURE — 84484 ASSAY OF TROPONIN QUANT: CPT

## 2022-09-24 PROCEDURE — 99291 CRITICAL CARE FIRST HOUR: CPT | Performed by: INTERNAL MEDICINE

## 2022-09-24 PROCEDURE — 87186 SC STD MICRODIL/AGAR DIL: CPT

## 2022-09-24 PROCEDURE — 96375 TX/PRO/DX INJ NEW DRUG ADDON: CPT

## 2022-09-24 PROCEDURE — 71045 X-RAY EXAM CHEST 1 VIEW: CPT

## 2022-09-24 PROCEDURE — 0BH18EZ INSERTION OF ENDOTRACHEAL AIRWAY INTO TRACHEA, VIA NATURAL OR ARTIFICIAL OPENING ENDOSCOPIC: ICD-10-PCS | Performed by: INTERNAL MEDICINE

## 2022-09-24 PROCEDURE — 93005 ELECTROCARDIOGRAM TRACING: CPT | Performed by: STUDENT IN AN ORGANIZED HEALTH CARE EDUCATION/TRAINING PROGRAM

## 2022-09-24 PROCEDURE — 2580000003 HC RX 258

## 2022-09-24 PROCEDURE — 96365 THER/PROPH/DIAG IV INF INIT: CPT

## 2022-09-24 PROCEDURE — 82803 BLOOD GASES ANY COMBINATION: CPT

## 2022-09-24 PROCEDURE — 72125 CT NECK SPINE W/O DYE: CPT

## 2022-09-24 PROCEDURE — 83735 ASSAY OF MAGNESIUM: CPT

## 2022-09-24 PROCEDURE — 80053 COMPREHEN METABOLIC PANEL: CPT

## 2022-09-24 PROCEDURE — 2700000000 HC OXYGEN THERAPY PER DAY

## 2022-09-24 PROCEDURE — 85025 COMPLETE CBC W/AUTO DIFF WBC: CPT

## 2022-09-24 PROCEDURE — 87040 BLOOD CULTURE FOR BACTERIA: CPT

## 2022-09-24 PROCEDURE — 87635 SARS-COV-2 COVID-19 AMP PRB: CPT

## 2022-09-24 PROCEDURE — 3209999900 CT THORACIC SPINE TRAUMA RECONSTRUCTION

## 2022-09-24 PROCEDURE — 2580000003 HC RX 258: Performed by: STUDENT IN AN ORGANIZED HEALTH CARE EDUCATION/TRAINING PROGRAM

## 2022-09-24 PROCEDURE — 5A1945Z RESPIRATORY VENTILATION, 24-96 CONSECUTIVE HOURS: ICD-10-PCS | Performed by: INTERNAL MEDICINE

## 2022-09-24 PROCEDURE — 87641 MR-STAPH DNA AMP PROBE: CPT

## 2022-09-24 PROCEDURE — 85014 HEMATOCRIT: CPT

## 2022-09-24 PROCEDURE — 73502 X-RAY EXAM HIP UNI 2-3 VIEWS: CPT

## 2022-09-24 PROCEDURE — 2000000000 HC ICU R&B

## 2022-09-24 PROCEDURE — 36600 WITHDRAWAL OF ARTERIAL BLOOD: CPT

## 2022-09-24 PROCEDURE — 87086 URINE CULTURE/COLONY COUNT: CPT

## 2022-09-24 PROCEDURE — 82565 ASSAY OF CREATININE: CPT

## 2022-09-24 PROCEDURE — 84520 ASSAY OF UREA NITROGEN: CPT

## 2022-09-24 PROCEDURE — 86403 PARTICLE AGGLUT ANTBDY SCRN: CPT

## 2022-09-24 PROCEDURE — 94660 CPAP INITIATION&MGMT: CPT

## 2022-09-24 PROCEDURE — 94761 N-INVAS EAR/PLS OXIMETRY MLT: CPT

## 2022-09-24 PROCEDURE — 71260 CT THORAX DX C+: CPT

## 2022-09-24 PROCEDURE — 6360000002 HC RX W HCPCS

## 2022-09-24 PROCEDURE — 87077 CULTURE AEROBIC IDENTIFY: CPT

## 2022-09-24 PROCEDURE — 80051 ELECTROLYTE PANEL: CPT

## 2022-09-24 PROCEDURE — 87154 CUL TYP ID BLD PTHGN 6+ TRGT: CPT

## 2022-09-24 PROCEDURE — 81001 URINALYSIS AUTO W/SCOPE: CPT

## 2022-09-24 PROCEDURE — 82550 ASSAY OF CK (CPK): CPT

## 2022-09-24 PROCEDURE — 70450 CT HEAD/BRAIN W/O DYE: CPT

## 2022-09-24 RX ORDER — ENOXAPARIN SODIUM 100 MG/ML
40 INJECTION SUBCUTANEOUS DAILY
Status: DISCONTINUED | OUTPATIENT
Start: 2022-09-24 | End: 2022-09-25

## 2022-09-24 RX ORDER — ACETAMINOPHEN 325 MG/1
650 TABLET ORAL EVERY 6 HOURS PRN
Status: DISCONTINUED | OUTPATIENT
Start: 2022-09-24 | End: 2022-10-06 | Stop reason: HOSPADM

## 2022-09-24 RX ORDER — PROPOFOL 10 MG/ML
INJECTION, EMULSION INTRAVENOUS
Status: DISCONTINUED
Start: 2022-09-24 | End: 2022-09-25

## 2022-09-24 RX ORDER — SODIUM CHLORIDE, SODIUM LACTATE, POTASSIUM CHLORIDE, AND CALCIUM CHLORIDE .6; .31; .03; .02 G/100ML; G/100ML; G/100ML; G/100ML
1000 INJECTION, SOLUTION INTRAVENOUS ONCE
Status: COMPLETED | OUTPATIENT
Start: 2022-09-24 | End: 2022-09-24

## 2022-09-24 RX ORDER — MAGNESIUM SULFATE IN WATER 40 MG/ML
2000 INJECTION, SOLUTION INTRAVENOUS ONCE
Status: COMPLETED | OUTPATIENT
Start: 2022-09-24 | End: 2022-09-24

## 2022-09-24 RX ORDER — PROPOFOL 10 MG/ML
5-50 INJECTION, EMULSION INTRAVENOUS CONTINUOUS
Status: DISCONTINUED | OUTPATIENT
Start: 2022-09-24 | End: 2022-09-28

## 2022-09-24 RX ORDER — MORPHINE SULFATE 4 MG/ML
4 INJECTION, SOLUTION INTRAMUSCULAR; INTRAVENOUS ONCE
Status: COMPLETED | OUTPATIENT
Start: 2022-09-24 | End: 2022-09-24

## 2022-09-24 RX ORDER — SODIUM CHLORIDE 0.9 % (FLUSH) 0.9 %
5-40 SYRINGE (ML) INJECTION EVERY 12 HOURS SCHEDULED
Status: DISCONTINUED | OUTPATIENT
Start: 2022-09-24 | End: 2022-10-06 | Stop reason: HOSPADM

## 2022-09-24 RX ORDER — SODIUM CHLORIDE, SODIUM LACTATE, POTASSIUM CHLORIDE, AND CALCIUM CHLORIDE .6; .31; .03; .02 G/100ML; G/100ML; G/100ML; G/100ML
1000 INJECTION, SOLUTION INTRAVENOUS ONCE
Status: DISCONTINUED | OUTPATIENT
Start: 2022-09-24 | End: 2022-09-25

## 2022-09-24 RX ORDER — ACETAMINOPHEN 650 MG/1
650 SUPPOSITORY RECTAL EVERY 6 HOURS PRN
Status: DISCONTINUED | OUTPATIENT
Start: 2022-09-24 | End: 2022-10-06 | Stop reason: HOSPADM

## 2022-09-24 RX ORDER — BUDESONIDE AND FORMOTEROL FUMARATE DIHYDRATE 160; 4.5 UG/1; UG/1
2 AEROSOL RESPIRATORY (INHALATION) 2 TIMES DAILY
Status: DISCONTINUED | OUTPATIENT
Start: 2022-09-24 | End: 2022-10-06 | Stop reason: HOSPADM

## 2022-09-24 RX ORDER — POLYETHYLENE GLYCOL 3350 17 G/17G
17 POWDER, FOR SOLUTION ORAL DAILY PRN
Status: DISCONTINUED | OUTPATIENT
Start: 2022-09-24 | End: 2022-10-06 | Stop reason: HOSPADM

## 2022-09-24 RX ORDER — ONDANSETRON 4 MG/1
4 TABLET, ORALLY DISINTEGRATING ORAL EVERY 8 HOURS PRN
Status: DISCONTINUED | OUTPATIENT
Start: 2022-09-24 | End: 2022-10-06 | Stop reason: HOSPADM

## 2022-09-24 RX ORDER — FENTANYL CITRATE 50 UG/ML
25 INJECTION, SOLUTION INTRAMUSCULAR; INTRAVENOUS ONCE
Status: COMPLETED | OUTPATIENT
Start: 2022-09-25 | End: 2022-09-25

## 2022-09-24 RX ORDER — DEXMEDETOMIDINE HYDROCHLORIDE 4 UG/ML
.1-1.5 INJECTION, SOLUTION INTRAVENOUS CONTINUOUS
Status: DISCONTINUED | OUTPATIENT
Start: 2022-09-25 | End: 2022-09-28

## 2022-09-24 RX ORDER — MORPHINE SULFATE 4 MG/ML
4 INJECTION, SOLUTION INTRAMUSCULAR; INTRAVENOUS ONCE
Status: DISCONTINUED | OUTPATIENT
Start: 2022-09-25 | End: 2022-09-26

## 2022-09-24 RX ORDER — SODIUM CHLORIDE, SODIUM LACTATE, POTASSIUM CHLORIDE, CALCIUM CHLORIDE 600; 310; 30; 20 MG/100ML; MG/100ML; MG/100ML; MG/100ML
INJECTION, SOLUTION INTRAVENOUS CONTINUOUS
Status: DISCONTINUED | OUTPATIENT
Start: 2022-09-24 | End: 2022-09-25

## 2022-09-24 RX ORDER — SODIUM CHLORIDE 0.9 % (FLUSH) 0.9 %
5-40 SYRINGE (ML) INJECTION PRN
Status: DISCONTINUED | OUTPATIENT
Start: 2022-09-24 | End: 2022-10-06 | Stop reason: HOSPADM

## 2022-09-24 RX ORDER — FENTANYL CITRATE 50 UG/ML
INJECTION, SOLUTION INTRAMUSCULAR; INTRAVENOUS
Status: DISCONTINUED
Start: 2022-09-24 | End: 2022-09-25

## 2022-09-24 RX ORDER — MORPHINE SULFATE 2 MG/ML
2 INJECTION, SOLUTION INTRAMUSCULAR; INTRAVENOUS ONCE
Status: COMPLETED | OUTPATIENT
Start: 2022-09-24 | End: 2022-09-24

## 2022-09-24 RX ORDER — ONDANSETRON 2 MG/ML
4 INJECTION INTRAMUSCULAR; INTRAVENOUS EVERY 6 HOURS PRN
Status: DISCONTINUED | OUTPATIENT
Start: 2022-09-24 | End: 2022-10-06 | Stop reason: HOSPADM

## 2022-09-24 RX ORDER — SODIUM CHLORIDE 9 MG/ML
INJECTION, SOLUTION INTRAVENOUS PRN
Status: DISCONTINUED | OUTPATIENT
Start: 2022-09-24 | End: 2022-10-06 | Stop reason: HOSPADM

## 2022-09-24 RX ADMIN — MORPHINE SULFATE 4 MG: 4 INJECTION INTRAVENOUS at 15:09

## 2022-09-24 RX ADMIN — MORPHINE SULFATE 2 MG: 2 INJECTION, SOLUTION INTRAMUSCULAR; INTRAVENOUS at 21:57

## 2022-09-24 RX ADMIN — IOPAMIDOL 75 ML: 755 INJECTION, SOLUTION INTRAVENOUS at 15:46

## 2022-09-24 RX ADMIN — MORPHINE SULFATE 4 MG: 4 INJECTION INTRAVENOUS at 14:33

## 2022-09-24 RX ADMIN — SODIUM CHLORIDE, POTASSIUM CHLORIDE, SODIUM LACTATE AND CALCIUM CHLORIDE 1000 ML: 600; 310; 30; 20 INJECTION, SOLUTION INTRAVENOUS at 17:01

## 2022-09-24 RX ADMIN — SODIUM CHLORIDE, POTASSIUM CHLORIDE, SODIUM LACTATE AND CALCIUM CHLORIDE 1000 ML: 600; 310; 30; 20 INJECTION, SOLUTION INTRAVENOUS at 14:28

## 2022-09-24 RX ADMIN — PROPOFOL 10 MCG/KG/MIN: 10 INJECTION, EMULSION INTRAVENOUS at 23:08

## 2022-09-24 RX ADMIN — Medication 1500 MG: at 16:24

## 2022-09-24 RX ADMIN — SODIUM CHLORIDE, PRESERVATIVE FREE 10 ML: 5 INJECTION INTRAVENOUS at 22:17

## 2022-09-24 RX ADMIN — PIPERACILLIN AND TAZOBACTAM 4500 MG: 4; .5 INJECTION, POWDER, LYOPHILIZED, FOR SOLUTION INTRAVENOUS at 23:26

## 2022-09-24 RX ADMIN — PIPERACILLIN AND TAZOBACTAM 4500 MG: 4; .5 INJECTION, POWDER, FOR SOLUTION INTRAVENOUS; PARENTERAL at 15:23

## 2022-09-24 RX ADMIN — SODIUM CHLORIDE: 9 INJECTION, SOLUTION INTRAVENOUS at 22:05

## 2022-09-24 RX ADMIN — ENOXAPARIN SODIUM 40 MG: 100 INJECTION SUBCUTANEOUS at 18:13

## 2022-09-24 RX ADMIN — MAGNESIUM SULFATE HEPTAHYDRATE 2000 MG: 40 INJECTION, SOLUTION INTRAVENOUS at 20:08

## 2022-09-24 RX ADMIN — SODIUM CHLORIDE, POTASSIUM CHLORIDE, SODIUM LACTATE AND CALCIUM CHLORIDE: 600; 310; 30; 20 INJECTION, SOLUTION INTRAVENOUS at 18:48

## 2022-09-24 ASSESSMENT — ENCOUNTER SYMPTOMS
COUGH: 0
VOMITING: 0
RHINORRHEA: 0
BACK PAIN: 0
DIARRHEA: 0
NAUSEA: 0
ABDOMINAL PAIN: 0
SHORTNESS OF BREATH: 1

## 2022-09-24 ASSESSMENT — PULMONARY FUNCTION TESTS: PIF_VALUE: 9

## 2022-09-24 NOTE — H&P
Critical Care - History and Physical Examination    Patient's name:  Serge Jordan  Medical Record Number: [id number]  Patient's account/billing number: [de-identified]  Patient's YOB: 1950  Age: 80 y.o. Date of Admission: 9/24/2022  2:07 PM  Date of History and Physical Examination: 9/24/2022      Primary Care Physician: Yordy Serrato MD  Attending Physician: Dr. Power Listen    Code Status: Full Code    Chief complaint: Unable to ambulate    HISTORY OF PRESENT ILLNESS:   History was obtained from the patient. Serge Jordan is a 80 y.o. patient with a past medical history of chronic hip pain, chronic back pain, prediabetes, OA, osteoporosis who presents to the ED after being found by a neighbor stuck in her chair. Patient notes she was stuck in chair for x2 days, but has felt weak x3 days. patient was unable to get up due to weakness and has had significant difficulty ambulating. 911 was called and patient was brought to [facility] ED for further evaluation. She complains of shortness of breath but denies any fever. Patient is tachypneic and tachycardic requiring BiPAP. Patient received 2 L of LR in the emergency department. Lactate on labs 9.0, urinalysis showing UTI, troponin elevated at 25, , WBC 24.8, hemoglobin 12.7, creatinine 1.05. Patient is suspected to have urosepsis at this time and will be admitted to ICU for further management of septic infection and difficulty breathing. Past Medical History:        Diagnosis Date    COPD (chronic obstructive pulmonary disease) ([facility] [address]     Osteoarthritis        Past Surgical History:        Procedure Laterality Date    BREAST BIOPSY      two    CHOLECYSTECTOMY      HYSTERECTOMY, VAGINAL         Allergies: Allergies   Allergen Reactions    Lyrica [Pregabalin] Hives    Demerol Hcl [Meperidine]     Fluoxetine     Paxil [Paroxetine Hcl]          Home Meds:   Prior to Admission medications    Medication Sig Start Date End Date Taking? Authorizing Provider   SYMBICORT 160-4.5 MCG/ACT AERO  5/16/22   Historical Provider, MD   promethazine (PHENERGAN) 25 MG tablet Take 1 tablet by mouth every 6 hours as needed (as needed for nausea) 6/2/22   Norma Kulkarni MD   hydrOXYzine (ATARAX) 25 MG tablet Take 1 tablet by mouth nightly  Patient not taking: Reported on 9/24/2022 6/2/22   Norma Kulkarni MD   desvenlafaxine succinate (PRISTIQ) 100 MG TB24 extended release tablet TAKE 1 TABLET BY MOUTH EVERY DAY 5/24/22   Norma Kulkarni MD   Fluticasone-Umeclidin-Vilant (TRELEGY ELLIPTA) 200-62.5-25 MCG/INH AEPB Inhale 1 puff into the lungs daily 1/12/22   Norma Kulkarni MD   vitamin D (ERGOCALCIFEROL) 1.25 MG (77251 UT) CAPS capsule Take 1 capsule by mouth once a week 12/1/21   Norma Kulkarni MD   alendronate (FOSAMAX) 70 MG tablet Take 1 tablet by mouth once a week 12/1/21   Norma Kulkarni MD   cetirizine (ZYRTEC) 10 MG tablet Take 1 tablet by mouth daily 12/1/21   Norma Kulkarni MD   Handicap Placard MISC by Does not apply route Expires 11/2025 12/1/21   Norma Kulkarni MD   diclofenac (VOLTAREN) 50 MG EC tablet Take 1 tablet by mouth 3 times daily (with meals) 12/1/21   Norma Kulkarni MD   predniSONE (DELTASONE) 10 MG tablet 4 tabs by mouth daily for 3 days, then 3 tabs daily for 3 days, then 2 tabs daily for 3 days, then 1 tab daily till gone. Patient not taking: Reported on 9/24/2022 12/1/21   Norma Kulkarni MD   albuterol sulfate  (90 Base) MCG/ACT inhaler USE 2 INHALATIONS EVERY 4 HOURS AS NEEDED FOR WHEEZING OR SHORTNESS OF BREATH 11/29/21   Norma Kulkarni MD   tiZANidine (ZANAFLEX) 4 MG tablet TAKE 1 TABLET THREE TIMES A DAY 11/29/21   Norma Kulkarni MD   montelukast (SINGULAIR) 10 MG tablet TAKE 1 TABLET DAILY 11/29/21   Norma Kulkarni MD   morphine (MS CONTIN) 15 MG extended release tablet 3 times daily.  7/10/20   Historical Provider, MD       Social History:   TOBACCO:   reports that she quit smoking about 5 years ago. Her smoking use included cigarettes. She has a 25.50 pack-year smoking history. She has never used smokeless tobacco.  ETOH:   reports that she does not currently use alcohol. DRUGS:  reports no history of drug use. Family History:       Problem Relation Age of Onset    Stroke Mother     Alzheimer's Disease Father        REVIEW OF SYSTEMS (ROS):  Review of Systems   General ROS: Positive weakness  Psychological ROS: negative  Ophthalmic ROS: negative  ENT ROS: negative  Allergy and Immunology ROS: negative  Hematological and Lymphatic ROS: negative  Endocrine ROS: negative  Breast ROS: negative  Respiratory ROS: no cough, shortness of breath, or wheezing  Cardiovascular ROS: no chest pain, positive shortness of breath  Gastrointestinal ROS:negative  Genito-Urinary ROS: negative  Musculoskeletal ROS: negative  Neurological ROS: negative  Dermatological ROS: negative      Physical Exam:    Vitals: BP (!) 106/55   Pulse (!) 144   Temp 98.5 °F (36.9 °C) (Oral)   Resp (!) 40   Ht 5' 5\" (1.651 m)   Wt 210 lb (95.3 kg)   LMP  (LMP Unknown)   SpO2 99%   BMI 34.95 kg/m²     Last Body weight:   Wt Readings from Last 3 Encounters:   09/24/22 210 lb (95.3 kg)   06/02/22 226 lb 11.2 oz (102.8 kg)   12/01/21 220 lb (99.8 kg)       Body Mass Index : Body mass index is 34.95 kg/m². Physical Exam  Constitutional:       General: She is in acute distress. Appearance: She is obese. HENT:      Head: Normocephalic and atraumatic. Eyes:      Extraocular Movements: Extraocular movements intact. Pupils: Pupils are equal, round, and reactive to light. Cardiovascular:      Rate and Rhythm: Regular rhythm. Tachycardia present. Heart sounds: Normal heart sounds. No murmur heard. Pulmonary:      Effort: Respiratory distress present. Breath sounds: Wheezing present. Musculoskeletal:      Right lower leg: No edema. Left lower leg: Edema present.    Neurological:      Mental Status: She is alert and oriented to person, place, and time. Sensory: No sensory deficit. Laboratory findings:-    CBC:   Recent Labs     09/24/22  1421   WBC 24.8*   HGB 12.7        BMP:    Recent Labs     09/24/22  1413 09/24/22  1421   NA  --  139   K  --  3.9   CL  --  104   CO2  --  15*   BUN  --  22   CREATININE 1.06 1.05*   GLUCOSE  --  103*     S. Calcium:  Recent Labs     09/24/22  1421   CALCIUM 9.5     S. Ionized Calcium:No results for input(s): IONCA in the last 72 hours. S. Magnesium:  Recent Labs     09/24/22  1421   MG 1.5*     S. Phosphorus:No results for input(s): PHOS in the last 72 hours. S. Glucose:  Recent Labs     09/24/22  1413   POCGLU 103*     Glycosylated hemoglobin A1C: No results for input(s): LABA1C in the last 72 hours. INR:   Recent Labs     09/24/22  1421   INR 1.4     Hepatic functions:   Recent Labs     09/24/22  1421   ALKPHOS 113*   ALT 10   AST 21   PROT 7.5   BILITOT 1.6*   LABALBU 3.0*     Pancreatic functions:No results for input(s): LACTA, AMYLASE in the last 72 hours. S. Lactic Acid: No results for input(s): LACTA in the last 72 hours. Cardiac enzymes:  Recent Labs     09/24/22  1421   CKTOTAL 493*     BNP:No results for input(s): BNP in the last 72 hours. Lipid profile: No results for input(s): CHOL, TRIG, HDL, LDL, LDLCALC in the last 72 hours.   Blood Gases: No results found for: PH, PCO2, PO2, HCO3, O2SAT  Thyroid functions:   Lab Results   Component Value Date/Time    TSH 1.79 05/17/2021 09:10 AM        Urinalysis: Large leukocyte Estrace, 2+ protein, many bacteria    Microbiology: Cultures during this admission:     Blood cultures:                 [] None drawn      [x] Negative             []  Positive (Details:  )  Urine Culture:                   Pending  Sputum Culture:               [x] None drawn       [] Negative             []  Positive (Details:  )   Endotracheal aspirate:     [x] None drawn       [] Negative             []  Positive (Details: )     -----------------------------------------------------------------  Radiological reports: CXR shows no evidence of acute cardiopulmonary processes. CT head showing no acute intracranial abnormality. CT chest, abdomen, pelvis showing no acute traumatic abnormality. Airspace disease of the left lung base atelectasis versus pneumonia versus aspiration. (See actual reports for details)    Electrocardiogram: Interpreted in emergency department. Sinus tachycardia with a short OH interval and ventricular of 141, normal QRS duration, normal axis, nonspecific ST and T wave abnormality, prolonged QT corrected. No old EKG for comparison    Last Echocardiogram findings: Echo ordered and pending  (See actual reports for details)       Assessment and Plan       Patient Active Problem List   Diagnosis    Chronic left hip pain    Chronic bilateral low back pain with bilateral sciatica    Prediabetes    Primary osteoarthritis involving multiple joints    Osteoporosis without current pathological fracture    Sepsis ([facility] [address]       Plan:    Patient will be admitted to medical ICU for management of urosepsis and increased oxygen requirement.   Continue patient on BiPAP with every 4 hours RT checks  Echocardiogram ordered  Continue IV hydration, LR at 125 cc/hour  Continue appropriate IV antibiotics, patient received Vanco 1.5 g and Zosyn 4.5 g IV in emergency department  Urine culture and sensitivities pending  Hemoglobin 10 normal limits, Lovenox 40mg daily started for DVT prophylaxis      Brando Hill MD  Resident  Pager: [phone] Seth Nagel [address]  9/24/2022, 8:04 PM

## 2022-09-24 NOTE — ED NOTES
Pt arrives to ED by EMS for SOB and left hip pain. EMS states they arrived on a wellness check and found pt on the couch for x2days. Pt states x3 days ago she felt weak when getting out the car and then she was unable to get off the couch and had to call into work. Pt states she last took all meds yesterday. Pt has hx of sciatica in left hip. Pt has unblanchable press sore on the back of left leg. Pt A&Ox4.      Christian Gomez, RN  09/24/22 Ruben Guerra 32, RN  09/24/22 [id number]

## 2022-09-24 NOTE — ED NOTES
Pt rolled and linen changed. Pt yelling out that \"shes done\". Dr. Claudene Mustache at bedside.      Tonio Luna RN  09/24/22 [id number]

## 2022-09-24 NOTE — ED NOTES
Patient yelling at medical staff d/t pain. Patient grabbing at Chavez Nasuti, [address] her hand at Chavez Nasuti, attempting to grab hair and face. Patient notified she is unable to grab medical staff. Hand directed to side rail to assist with patient turning and promote safety and no harm to medical staff.       Dom Tellez RN  09/24/22 [id number]

## 2022-09-24 NOTE — ED NOTES
Pt A&Ox4, NAD. Pt laying on cot with BiPAP on. Pt denies needs at this time.      Ila English RN  09/24/22 [id number]

## 2022-09-24 NOTE — VCC REMOTE MONITORING
Spoke with primary RN [facility] regarding 3 and 6 hour Sepsis bundle.       Vanesa ARZOLA, [facility]  [phone]

## 2022-09-24 NOTE — ED PROVIDER NOTES
[facility]  ED  Emergency Department Encounter  Emergency Medicine Resident     Pt Name: Neale Severe  MRN: [id number]  Erickgfino 1950  Date of evaluation: 9/24/22  PCP:  Cindi Rodas MD    [address] [place] [address]       Chief Complaint   Patient presents with    Shortness of Breath    Hip Pain       HISTORY OFPRESENT ILLNESS  (Location/Symptom, Timing/Onset, Context/Setting, Quality, Duration, Modifying Brianda Yates.)      Neale Severe is a 80 y.o. female who presents with shortness of breath, possible altered mental status and after being unable to get up for 2 days. Patient states she was ambulatory with her walker on Thursday, 2 days ago but had severe pain in her hips and back, similar to pain she has had in the past.  She takes morphine at home for this. She eventually could not get herself up from the floor but did crawl to the couch at 1 point. She has been there for 2 days unable to move. She denies incontinence of urine but has not been able to make to the bathroom and has urinated on herself. She has most of her pain in her left hip. She denies chest pain but is short of breath. She has a history of asthma but denies any wheezing. No headache or vision changes. She denies losing consciousness. No fevers or chills. PAST MEDICAL / SURGICAL / SOCIAL / FAMILY HISTORY      has a past medical history of COPD (chronic obstructive pulmonary disease) (HCC) and Osteoarthritis. has a past surgical history that includes Hysterectomy, vaginal; Cholecystectomy; and Breast biopsy.      Social History     Socioeconomic History    Marital status: Single     Spouse name: Not on file    Number of children: Not on file    Years of education: Not on file    Highest education level: Not on file   Occupational History    Not on file   Tobacco Use    Smoking status: Former     Packs/day: 0.75     Years: 34.00     Pack years: 25.50     Types: Cigarettes     Quit date: 3/20/2017 Years since quittin.5    Smokeless tobacco: Never   Vaping Use    Vaping Use: Never used   Substance and Sexual Activity    Alcohol use: Not Currently     Comment: occasionally     Drug use: Never    Sexual activity: Not on file   Other Topics Concern    Not on file   Social History Narrative    Not on file     Social Determinants of Health     Financial Resource Strain: Low Risk     Difficulty of Paying Living Expenses: Not hard at all   Food Insecurity: No Food Insecurity    Worried About Running Out of Food in the Last Year: Never true    Ran Out of Food in the Last Year: Never true   Transportation Needs: Not on file   Physical Activity: Not on file   Stress: Not on file   Social Connections: Not on file   Intimate Partner Violence: Not on file   Housing Stability: Not on file       Family History   Problem Relation Age of Onset    Stroke Mother     Alzheimer's Disease Father         Allergies:  Lyrica [pregabalin], Demerol hcl [meperidine], Fluoxetine, and Paxil [paroxetine hcl]    Home Medications:  Prior to Admission medications    Medication Sig Start Date End Date Taking?  Authorizing Provider   SYMBICORT 160-4.5 MCG/ACT AERO  22   Historical Provider, MD   promethazine (PHENERGAN) 25 MG tablet Take 1 tablet by mouth every 6 hours as needed (as needed for nausea) 22   Norma Trinidad MD   hydrOXYzine (ATARAX) 25 MG tablet Take 1 tablet by mouth nightly  Patient not taking: Reported on 2022   Norma Trinidad MD   desvenlafaxine succinate (PRISTIQ) 100 MG TB24 extended release tablet TAKE 1 TABLET BY MOUTH EVERY DAY 22   Norma Trinidad MD   Fluticasone-Umeclidin-Vilant (TRELEGY ELLIPTA) 200-62.5-25 MCG/INH AEPB Inhale 1 puff into the lungs daily 22   Norma Trinidad MD   vitamin D (ERGOCALCIFEROL) 1.25 MG (63462 UT) CAPS capsule Take 1 capsule by mouth once a week 21   Norma Trinidad MD   alendronate (FOSAMAX) 70 MG tablet Take 1 tablet by mouth once a week 12/1/21   Norma Tomas MD   cetirizine (ZYRTEC) 10 MG tablet Take 1 tablet by mouth daily 12/1/21   Norma Tomas MD   Hussain Smith MISC by Does not apply route Expires 11/2025 12/1/21   Norma Tomas MD   diclofenac (VOLTAREN) 50 MG EC tablet Take 1 tablet by mouth 3 times daily (with meals) 12/1/21   Norma Tomas MD   predniSONE (DELTASONE) 10 MG tablet 4 tabs by mouth daily for 3 days, then 3 tabs daily for 3 days, then 2 tabs daily for 3 days, then 1 tab daily till gone. Patient not taking: Reported on 9/24/2022 12/1/21   Norma Tomas MD   albuterol sulfate  (90 Base) MCG/ACT inhaler USE 2 INHALATIONS EVERY 4 HOURS AS NEEDED FOR WHEEZING OR SHORTNESS OF BREATH 11/29/21   Norma Tomas MD   tiZANidine (ZANAFLEX) 4 MG tablet TAKE 1 TABLET THREE TIMES A DAY 11/29/21   Norma Tomas MD   montelukast (SINGULAIR) 10 MG tablet TAKE 1 TABLET DAILY 11/29/21   Norma Tomas MD   morphine (MS CONTIN) 15 MG extended release tablet 3 times daily. 7/10/20   Historical Provider, MD       REVIEW OFSYSTEMS    (2-9 systems for level 4, 10 or more for level 5)      Review of Systems   Constitutional:  Negative for chills and fever. HENT:  Negative for congestion and rhinorrhea. Eyes:  Negative for visual disturbance. Respiratory:  Positive for shortness of breath. Negative for cough. Cardiovascular:  Negative for chest pain. Gastrointestinal:  Negative for abdominal pain, diarrhea, nausea and vomiting. Genitourinary:  Negative for dysuria. Musculoskeletal:  Positive for arthralgias and gait problem. Negative for back pain and neck pain. Skin:  Negative for rash. Neurological:  Negative for weakness, numbness and headaches.      PHYSICAL EXAM   (up to 7 for level 4, 8 or more forlevel 5)      INITIAL VITALS:   ED Triage Vitals   BP Temp Temp src Heart Rate Resp SpO2 Height Weight   -- -- -- 09/24/22 1412 09/24/22 1413 09/24/22 1413 -- --      (!) 145 30 94 % Vitals:    09/24/22 1941 09/24/22 2020 09/24/22 2032 09/24/22 2043   BP: (!) 106/55  125/74 137/71   Pulse: (!) 144 (!) 137 (!) 135 (!) 134   Resp: (!) 40 (!) 37 (!) 41 (!) 41   Temp:       TempSrc:       SpO2: 99% 99% 99% 98%   Weight:       Height:           Physical Exam  Constitutional:       General: She is not in acute distress. Appearance: Normal appearance. She is normal weight. She is not ill-appearing, toxic-appearing or diaphoretic. HENT:      Head: Normocephalic and atraumatic. Nose: Nose normal.      Mouth/Throat:      Mouth: Mucous membranes are moist.      Pharynx: Oropharynx is clear. No oropharyngeal exudate or posterior oropharyngeal erythema. Eyes:      Extraocular Movements: Extraocular movements intact. Pupils: Pupils are equal, round, and reactive to light. Cardiovascular:      Rate and Rhythm: Normal rate and regular rhythm. Heart sounds: Normal heart sounds. No murmur heard. Pulmonary:      Effort: Tachypnea and respiratory distress present. Breath sounds: Normal breath sounds. No wheezing, rhonchi or rales. Abdominal:      General: There is no distension. Palpations: Abdomen is soft. Tenderness: There is no abdominal tenderness. There is no guarding or rebound. Musculoskeletal:         General: No tenderness. Normal range of motion. Cervical back: Normal range of motion and neck supple. Right lower leg: No edema. Left lower leg: No edema. Skin:     General: Skin is warm and dry. Neurological:      General: No focal deficit present. Mental Status: She is alert and oriented to person, place, and time. Motor: No weakness.    Psychiatric:         Mood and Affect: Mood normal.       DIFFERENTIAL  DIAGNOSIS     PLAN (LABS / IMAGING / EKG):  Orders Placed This Encounter   Procedures    Culture, Blood 1    Culture, Blood 1    Culture, Urine    COVID-19, Rapid    XR CHEST PORTABLE    XR HIP 2-3 VW W PELVIS LEFT    CT HEAD WO CONTRAST    CT CERVICAL SPINE WO CONTRAST    CT CHEST ABDOMEN PELVIS W CONTRAST    CT THORACIC SPINE TRAUMA RECONSTRUCTION    CT LUMBAR SPINE TRAUMA RECONSTRUCTION    ELECTROLYTES PLUS    Hemoglobin and hematocrit, blood    CALCIUM, IONIC (POC)    CBC with Auto Differential    Comprehensive Metabolic Panel    Lactate, Sepsis    Magnesium    Protime-INR    Troponin    Urinalysis with Microscopic    CK    MYOGLOBIN, SERUM    CBC with Auto Differential    Basic Metabolic Panel w/ Reflex to MG    Procalcitonin    Lactic Acid    Troponin    BLOOD GAS, ARTERIAL    ADULT DIET;  Regular    Vital signs per unit routine    Daily weights    Intake and output    Place intermittent pneumatic compression device    Up as tolerated    Full Code    Inpatient consult to Critical Care    Pharmacy to Dose: Vancomycin    Initiate Oxygen Therapy Protocol    Adult NIV/Positive Airway Pressure    Pulse oximetry, continuous    Initiate Oxygen Therapy Protocol    Respiratory care evaluation only    Venous Blood Gas, POC    Creatinine W/GFR Point of Care    POCT urea (BUN)    Lactic Acid, POC    POCT Glucose    Arterial Blood Gas, POC    Lactic Acid, POC    POCT Glucose    EKG 12 Lead    ECHO Complete 2D W Doppler W Color    Insert/Change [place] Catheter    ADMIT TO INPATIENT       MEDICATIONS ORDERED:  Orders Placed This Encounter   Medications    lactated ringers bolus    morphine injection 4 mg    vancomycin (VANCOCIN) 1500 mg in sodium chloride 0.9 % 250 mL IVPB     Order Specific Question:   Antimicrobial Indications     Answer:   Sepsis of Unknown Etiology    piperacillin-tazobactam (ZOSYN) 4,500 mg in dextrose 5 % 100 mL IVPB (mini-bag)     Order Specific Question:   Antimicrobial Indications     Answer:   Sepsis of Unknown Etiology    morphine injection 4 mg    iopamidol (ISOVUE-370) 76 % injection 75 mL    lactated ringers bolus    lactated ringers infusion    lactated ringers bolus    sodium chloride flush 0.9 % injection 5-40 mL sodium chloride flush 0.9 % injection 5-40 mL    0.9 % sodium chloride infusion    enoxaparin (LOVENOX) injection 40 mg     Order Specific Question:   Indication of Use     Answer:   Prophylaxis-DVT/PE    OR Linked Order Group     ondansetron (ZOFRAN-ODT) disintegrating tablet 4 mg     ondansetron (ZOFRAN) injection 4 mg    polyethylene glycol (GLYCOLAX) packet 17 g    OR Linked Order Group     acetaminophen (TYLENOL) tablet 650 mg     acetaminophen (TYLENOL) suppository 650 mg    piperacillin-tazobactam (ZOSYN) 4,500 mg in dextrose 5 % 100 mL IVPB (mini-bag)     Order Specific Question:   Antimicrobial Indications     Answer:   Sepsis of Unknown Etiology     Order Specific Question:   Antimicrobial Indications     Answer:   Urinary Tract Infection     Order Specific Question:   UTI duration of therapy     Answer:   7 days     Order Specific Question:   Sepsis duration of therapy     Answer:   7 days    DISCONTD: Fluticasone-Umeclidin-Vilant 200-62.5-25 MCG/INH AEPB 1 puff    vancomycin (VANCOCIN) 1500 mg in sodium chloride 0.9 % 250 mL IVPB     Order Specific Question:   Antimicrobial Indications     Answer:   Sepsis of Unknown Etiology     Order Specific Question:   Antimicrobial Indications     Answer:   Urinary Tract Infection     Order Specific Question:   UTI duration of therapy     Answer:   7 days     Order Specific Question:   Sepsis duration of therapy     Answer:   7 days    vancomycin (VANCOCIN) intermittent dosing (placeholder)     Order Specific Question:   Antimicrobial Indications     Answer:   Urinary Tract Infection     Order Specific Question:   Antimicrobial Indications     Answer:   Sepsis of Unknown Etiology     Order Specific Question:   UTI duration of therapy     Answer:   7 days     Order Specific Question:   Sepsis duration of therapy     Answer:   7 days    magnesium sulfate 2000 mg in 50 mL IVPB premix    budesonide-formoterol (SYMBICORT) 160-4.5 MCG/ACT inhaler 2 puff    tiotropium ([facility]) 2.5 MCG/ACT inhaler 2 puff    fentaNYL (SUBLIMAZE) 100 MCG/2ML injection     Rashel Ignacio: cabinet override    morphine (PF) injection 2 mg       DDX: Sepsis, pneumonia, UTI, acute injury to the hip or elsewhere, head injury, rhabdomyolysis    Initial MDM/Plan/ED COURSE:    80 y.o. female who presents with shortness of breath, prolonged downtime, and left hip pain. Patient is not in respiratory distress on arrival, saturating low to mid 80s on room air, only improved to low 90s with nasal cannula but still has respiratory rate of 40. Also tachycardic in the 140s to 150s. Appears to be sinus tach. Blood pressure is stable. She is afebrile. No obvious signs of trauma but she does have tenderness to palpation in the left hip area and skin changes on the posterior thigh. Possible pressure sore versus bruising versus infection. Abdomen soft nontender. Lungs are clear despite patient's history of asthma. She is alert and oriented but appears to be in distress. Will initiate broad work-up including sepsis work-up. Will give broad-spectrum antibiotics and pan scan looking for signs of injury or other cause of patient's symptoms. X-rays negative the chest and left hip to start. Patient was transitioned to nonrebreather shortly after arrival, she still was tachypneic and then transition to BiPAP. She had some improvement in work of breathing but remains tachypneic on BiPAP as well. Maintain saturations. ED Course as of 09/24/22 2205   Sat Sep 24, 2022   1459 Troponin, High Sensitivity(!): 24 [JS]   1459 WBC(!): 24.8 [JS]   1459 Lactic Acid, Sepsis, Whole Blood(!): 7.3 [JS]   1550 SARS-CoV-2, Rapid: Not Detected [JS]   1550 Total CK(!): 493 [JS]   1552 Bacteria, UA(!): MANY [JS]   1554 XR HIP 2-3 VW W PELVIS LEFT  FINDINGS:  Dysplasia and increased sclerosis of the left femoral head and acetabulum. .  There is no evidence of dislocation. Severe narrowing of the left hip  joint. .  The remaining joint spaces appear well maintained. The soft tissues  are unremarkable. 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 The cardiomediastinal silhouette  and pulmonary vessels appear normal.     IMPRESSION:  No radiographic evidence of an acute cardiopulmonary process. [JS]   1636 CT HEAD WO CONTRAST  IMPRESSION:  No acute intracranial abnormality. [JS]   5 CT CERVICAL SPINE WO CONTRAST [JS]   3024 CT CHEST ABDOMEN PELVIS W CONTRAST  IMPRESSION:  No acute traumatic abnormality detected within the chest, abdomen, and pelvis. Airspace disease the left lung base, atelectasis versus pneumonia versus  aspiration. [JS]   5837 Troponin, High Sensitivity(!): 25 [JS]      ED Course User Index  [JS] Griselda Ege Sampair, DO      Troponins flat. No acute injuries on imaging. Patient remains tachycardic and tachypneic. Maintaining on BiPAP at this time. Will admit to MICU given her respiratory status. Only sign of infection at this time is urine with possible skin infection as well. Spoke with critical care who will admit the patient. Sepsis Times and Checklist  Vital Signs: BP: 137/71  Heart Rate: (!) 134  Resp: (!) 41  Temp: 98.5 °F (36.9 °C) SpO2: 98 %  SIRS (>2) Non Pregnant       Temp > 38.3C or < 36C   HR > 90   RR > 20   WBC > 12 or < 4 or >10% bands  SIRS (>2) Pregnant 20 weeks until 3 days postpartum   Temp > 38C or <36C   HR >110   RR > 24   WBC >15 or < 4 or >10% bands   SIRS (>2) and confirmed or suspected source of infection = Sepsis  Is Sepsis due to likely bacterial infection?: Yes    Sepsis Identified:  Date: 9/24/22   Time: 1539  Sepsis Orders:   CBC(required): Yes   CMP: Yes   PT/PTT: Yes   Blood Cultures x2(required): Yes   Urinalysis and Urine Culture: Yes   Lactate(required):  Yes   Antibiotics Given (within 3 hours of sepsis identification, after blood cultures):  Yes    (If unable to obtain IV access for IV antibiotics within 3 hours of identification of sepsis, IM antibiotics is acceptable.)              If lactate >2.0 MUST repeat within 6 hours    If elevated, is elevated lactate from a likely infectious source?: Yes. IV Fluid Bolus:  Is lactate > 4.0:  Yes  If lactate >  4.0 OR hypotension (MAP<65 mmHg,BP<90 or SBP<85 pregnancy 20 weeks to 3 days  postpartum) (with 2 BP readings) 30 ml/kg crystalloid MUST be ordered. 2L LR bolus initially ordered based on patient's ideal bodyweigh of 59kg, with 30cc/kg bolus 1700 based on this. If 30 ml/kg crystalloid not ordered the following must be documented in the medical record:       Fluids must be initiated within 3 hours of sepsis identification. These fluids must have a rate > 125 ml/hr. Is the patient Morbidly Obese (BMI > 30): Morbidly obese, ordering 30ml/kg fluid bolus based on IBW of 59 kg  IV Fluids given prior to arrival can be used in this calculation but the following information must be documented:  Start time: 1715, Type of fluid LR, Volume of fluid 125cc/hr, and Rate (Duration or End time) continuous.         DIAGNOSTIC RESULTS / EMERGENCYDEPARTMENT COURSE / MDM     LABS:  Labs Reviewed   ELECTROLYTES PLUS - Abnormal; Notable for the following components:       Result Value    POC TCO2 20 (*)     Anion Gap 18 (*)     All other components within normal limits   CBC WITH AUTO DIFFERENTIAL - Abnormal; Notable for the following components:    WBC 24.8 (*)     RDW 17.1 (*)     Immature Granulocytes 27 (*)     Lymphocytes 5 (*)     Eosinophils % 0 (*)     Absolute Immature Granulocyte 6.70 (*)     Segs Absolute 16.36 (*)     All other components within normal limits   COMPREHENSIVE METABOLIC PANEL - Abnormal; Notable for the following components:    Glucose 103 (*)     Creatinine 1.05 (*)     CO2 15 (*)     Anion Gap 20 (*) Alkaline Phosphatase 113 (*)     Total Bilirubin 1.6 (*)     Albumin 3.0 (*)     Albumin/Globulin Ratio 0.7 (*)     GFR Non- 52 (*)     All other components within normal limits   LACTATE, SEPSIS - Abnormal; Notable for the following components:    Lactic Acid, Sepsis, Whole Blood 7.3 (*)     All other components within normal limits   LACTATE, SEPSIS - Abnormal; Notable for the following components:    Lactic Acid, Sepsis, Whole Blood 9.0 (*)     All other components within normal limits   MAGNESIUM - Abnormal; Notable for the following components:    Magnesium 1.5 (*)     All other components within normal limits   PROTIME-INR - Abnormal; Notable for the following components:    Protime 13.9 (*)     All other components within normal limits   TROPONIN - Abnormal; Notable for the following components:    Troponin, High Sensitivity 24 (*)     All other components within normal limits   TROPONIN - Abnormal; Notable for the following components:    Troponin, High Sensitivity 25 (*)     All other components within normal limits   URINALYSIS WITH MICROSCOPIC - Abnormal; Notable for the following components:    Color, UA Dark Yellow (*)     Turbidity UA Cloudy (*)     Bilirubin Urine NEGATIVE  Verified by ictotest. (*)     Ketones, Urine TRACE (*)     Urine Hgb LARGE (*)     Protein, UA 2+ (*)     Leukocyte Esterase, Urine LARGE (*)     Bacteria, UA MANY (*)     All other components within normal limits   CK - Abnormal; Notable for the following components:     Total  (*)     All other components within normal limits   PROCALCITONIN - Abnormal; Notable for the following components:    Procalcitonin 9.82 (*)     All other components within normal limits   LACTIC ACID - Abnormal; Notable for the following components:    Lactic Acid, Whole Blood 4.5 (*)     All other components within normal limits   TROPONIN - Abnormal; Notable for the following components:    Troponin, High Sensitivity 31 (*)     All other components within normal limits   VENOUS BLOOD GAS, POINT OF CARE - Abnormal; Notable for the following components:    pCO2, [place] 37.1 (*)     pO2, [place] 16.9 (*)     HCO3, Venous 20.1 (*)     Negative Base Excess, [place] 5 (*)     O2 Sat, [place] 22 (*)     All other components within normal limits   CREATININE W/GFR POINT OF CARE - Abnormal; Notable for the following components:    GFR Non- 51 (*)     All other components within normal limits   LACTIC ACID,POINT OF CARE - Abnormal; Notable for the following components:    POC Lactic Acid 7.76 (*)     All other components within normal limits   POCT GLUCOSE - Abnormal; Notable for the following components:    POC Glucose 103 (*)     All other components within normal limits   ARTERIAL BLOOD GAS, POC - Abnormal; Notable for the following components:    POC pCO2 26.2 (*)     POC PO2 118.8 (*)     POC HCO3 17.7 (*)     Negative Base Excess, Art 5 (*)     POC O2 SAT 99 (*)     All other components within normal limits   LACTIC ACID,POINT OF CARE - Abnormal; Notable for the following components:    POC Lactic Acid 3.74 (*)     All other components within normal limits   CULTURE, BLOOD 1   CULTURE, BLOOD 1   COVID-19, RAPID   CULTURE, URINE   HGB/HCT   CALCIUM, IONIC (POC)   MYOGLOBIN, SERUM   CBC WITH AUTO DIFFERENTIAL   BASIC METABOLIC PANEL W/ REFLEX TO MG FOR LOW K   BLOOD GAS, ARTERIAL   UREA N (POC)   POCT GLUCOSE           CT HEAD WO CONTRAST    Result Date: 9/24/2022  EXAMINATION: CT OF THE HEAD WITHOUT CONTRAST  9/24/2022 3:41 pm TECHNIQUE: CT of the head was performed without the administration of intravenous contrast. Automated exposure control, iterative reconstruction, and/or weight based adjustment of the mA/kV was utilized to reduce the radiation dose to as low as reasonably achievable. COMPARISON: None.  HISTORY: ORDERING SYSTEM PROVIDED HISTORY: fall, down for 2 days TECHNOLOGIST PROVIDED HISTORY: fall, down for 2 days Decision Support Exception - unselect if not a suspected or confirmed emergency medical condition->Emergency Medical Condition (MA) Reason for Exam: fall, down for 2 days FINDINGS: BRAIN/VENTRICLES: There is no acute intracranial hemorrhage, mass effect or midline shift. No abnormal extra-axial fluid collection. The gray-white differentiation is maintained without evidence of an acute infarct. There is no evidence of hydrocephalus. ORBITS: The visualized portion of the orbits demonstrate no acute abnormality. SINUSES: The visualized paranasal sinuses and mastoid air cells demonstrate no acute abnormality. SOFT TISSUES/SKULL:  No acute abnormality of the visualized skull or soft tissues. No acute intracranial abnormality. CT CERVICAL SPINE WO CONTRAST    Result Date: 9/24/2022  EXAMINATION: CT OF THE CERVICAL SPINE WITHOUT CONTRAST 9/24/2022 3:41 pm TECHNIQUE: CT of the cervical spine was performed without the administration of intravenous contrast. Multiplanar reformatted images are provided for review. Automated exposure control, iterative reconstruction, and/or weight based adjustment of the mA/kV was utilized to reduce the radiation dose to as low as reasonably achievable. COMPARISON: None. HISTORY: ORDERING SYSTEM PROVIDED HISTORY: down for 2 days, ?fall TECHNOLOGIST PROVIDED HISTORY: down for 2 days, ?fall Decision Support Exception - unselect if not a suspected or confirmed emergency medical condition->Emergency Medical Condition (MA) Reason for Exam: fall, down for 2 days FINDINGS: BONES/ALIGNMENT: There is no acute fracture or suspect osseous lesion. Vertebral body heights are maintained. Alignment is normal. DEGENERATIVE CHANGES: Mild multilevel degenerative changes. No high-grade bony spinal canal stenosis. SOFT TISSUES: No acute paraspinal soft tissue abnormality. No acute abnormality of the cervical spine.      XR CHEST PORTABLE    Result Date: 9/24/2022  EXAMINATION: ONE XRAY VIEW OF THE CHEST 9/24/2022 12:30 pm COMPARISON: None. HISTORY: ORDERING SYSTEM PROVIDED HISTORY: down for 2 days, short of breath TECHNOLOGIST PROVIDED HISTORY: down for 2 days, short of breath FINDINGS: The lungs and costophrenic angles are clear. The cardiomediastinal silhouette and pulmonary vessels appear normal.     No radiographic evidence of an acute cardiopulmonary process. CT CHEST ABDOMEN PELVIS W CONTRAST    Result Date: 9/24/2022  EXAMINATION: CT OF THE CHEST, ABDOMEN, AND PELVIS WITH CONTRAST; CT OF THE LUMBAR SPINE WITHOUT CONTRAST; CT OF THE THORACIC SPINE WITHOUT CONTRAST 9/24/2022 12:42 pm TECHNIQUE: CT of the chest, abdomen and pelvis was performed with the administration of intravenous contrast. Multiplanar reformatted images are provided for review. Automated exposure control, iterative reconstruction, and/or weight based adjustment of the mA/kV was utilized to reduce the radiation dose to as low as reasonably achievable.; CT of the lumbar spine was performed without the administration of intravenous contrast. Multiplanar reformatted images are provided for review. Adjustment of mA and/or kV according to patient size was utilized. Automated exposure control, iterative reconstruction, and/or weight based adjustment of the mA/kV was utilized to reduce the radiation dose to as low as reasonably achievable.; CT of the thoracic spine was performed without the administration of intravenous contrast. Multiplanar reformatted images are provided for review. Automated exposure control, iterative reconstruction, and/or weight based adjustment of the mA/kV was utilized to reduce the radiation dose to as low as reasonably achievable.  COMPARISON: None HISTORY: ORDERING SYSTEM PROVIDED HISTORY: down for 2 days TECHNOLOGIST PROVIDED HISTORY: down for 2 days Decision Support Exception - unselect if not a suspected or confirmed emergency medical condition->Emergency Medical Condition (MA) Reason for Exam: fall, down for 2 days; ORDERING SYSTEM PROVIDED HISTORY: down for two days TECHNOLOGIST PROVIDED HISTORY: down for two days Reason for Exam: fall, down for 2 days FINDINGS: Chest: Mediastinum: Visualized thyroid unremarkable. No lymphadenopathy. Esophagus unremarkable. Cardiac chambers unremarkable. No pericardial effusion. Main pulmonary artery is prominent, raising the possibility of pulmonary hypertension. Thoracic aorta normal in caliber without evidence of dissection. Lungs/pleura: Evaluation is moderately to severely degraded by respiratory motion. Airspace disease in the left costophrenic angle is noted. No other infiltrate is found. No pneumothorax. No pleural effusion. Soft Tissues/Bones: Again, respiratory motion degrades imaging. With that said, no rib fractures are identified. Visualized extra thoracic soft tissues unremarkable. Abdomen/Pelvis: There is streak artifact generated the patient's arms at her side. Respiratory motion artifact also degrades imaging. All in all, there is moderate degradation of the images. Organs: With that said, the liver appears to enhance normally. Spleen unremarkable. Adrenals unremarkable. Pancreas unremarkable. No acute or suspicious renal abnormality identified. GI/Bowel: Stomach unremarkable. Small bowel unremarkable. Moderate amount of stool is noted within the large bowel. Mild diverticulosis of the large bowel is present without CT evidence of diverticulitis. The appendix is not well visualized. No asymmetric pericecal inflammation is seen to suggest acute appendicitis. Pelvis: Uterus is surgically absent. Dover catheter present within a decompressed urinary bladder. No free pelvic fluid. Peritoneum/Retroperitoneum: Abdominal aorta normal in caliber. Superior mesenteric artery is enhancing. No lymphadenopathy. Bones/Soft Tissues: No acute bony abnormality detected within the abdomen and pelvis. Severe arthrosis of the left hip is found, with a small joint effusion. Thoracic spine: Bones/Alignment: No acute fracture or traumatic malalignment within the thoracic spine. Degenerative Changes: Flowing syndesmophytes seen throughout the thoracic spine. Mild multilevel degenerative disc disease is seen. No obvious high-grade central canal stenosis. Soft Tissues: No paravertebral edema identified. The paraspinal muscles are atrophic but symmetric. Lumbar spine: Bones/Alignment: No acute fracture or traumatic malalignment. Degenerative Changes: Multilevel degenerative disc and especially facet disease noted. No obvious high-grade central canal stenosis. Soft Tissues: No paravertebral edema identified. Paraspinal muscles are symmetric. No acute traumatic abnormality detected within the chest, abdomen, and pelvis. Airspace disease the left lung base, atelectasis versus pneumonia versus aspiration. CT LUMBAR SPINE TRAUMA RECONSTRUCTION    Result Date: 9/24/2022  EXAMINATION: CT OF THE CHEST, ABDOMEN, AND PELVIS WITH CONTRAST; CT OF THE LUMBAR SPINE WITHOUT CONTRAST; CT OF THE THORACIC SPINE WITHOUT CONTRAST 9/24/2022 12:42 pm TECHNIQUE: CT of the chest, abdomen and pelvis was performed with the administration of intravenous contrast. Multiplanar reformatted images are provided for review. Automated exposure control, iterative reconstruction, and/or weight based adjustment of the mA/kV was utilized to reduce the radiation dose to as low as reasonably achievable.; CT of the lumbar spine was performed without the administration of intravenous contrast. Multiplanar reformatted images are provided for review. Adjustment of mA and/or kV according to patient size was utilized.   Automated exposure control, iterative reconstruction, and/or weight based adjustment of the mA/kV was utilized to reduce the radiation dose to as low as reasonably achievable.; CT of the thoracic spine was performed without the administration of intravenous contrast. Multiplanar reformatted images are provided for review. Automated exposure control, iterative reconstruction, and/or weight based adjustment of the mA/kV was utilized to reduce the radiation dose to as low as reasonably achievable. COMPARISON: None HISTORY: ORDERING SYSTEM PROVIDED HISTORY: down for 2 days TECHNOLOGIST PROVIDED HISTORY: down for 2 days Decision Support Exception - unselect if not a suspected or confirmed emergency medical condition->Emergency Medical Condition (MA) Reason for Exam: fall, down for 2 days; ORDERING SYSTEM PROVIDED HISTORY: down for two days TECHNOLOGIST PROVIDED HISTORY: down for two days Reason for Exam: fall, down for 2 days FINDINGS: Chest: Mediastinum: Visualized thyroid unremarkable. No lymphadenopathy. Esophagus unremarkable. Cardiac chambers unremarkable. No pericardial effusion. Main pulmonary artery is prominent, raising the possibility of pulmonary hypertension. Thoracic aorta normal in caliber without evidence of dissection. Lungs/pleura: Evaluation is moderately to severely degraded by respiratory motion. Airspace disease in the left costophrenic angle is noted. No other infiltrate is found. No pneumothorax. No pleural effusion. Soft Tissues/Bones: Again, respiratory motion degrades imaging. With that said, no rib fractures are identified. Visualized extra thoracic soft tissues unremarkable. Abdomen/Pelvis: There is streak artifact generated the patient's arms at her side. Respiratory motion artifact also degrades imaging. All in all, there is moderate degradation of the images. Organs: With that said, the liver appears to enhance normally. Spleen unremarkable. Adrenals unremarkable. Pancreas unremarkable. No acute or suspicious renal abnormality identified. GI/Bowel: Stomach unremarkable. Small bowel unremarkable. Moderate amount of stool is noted within the large bowel. Mild diverticulosis of the large bowel is present without CT evidence of diverticulitis.  The appendix is not well visualized. No asymmetric pericecal inflammation is seen to suggest acute appendicitis. Pelvis: Uterus is surgically absent. Dover catheter present within a decompressed urinary bladder. No free pelvic fluid. Peritoneum/Retroperitoneum: Abdominal aorta normal in caliber. Superior mesenteric artery is enhancing. No lymphadenopathy. Bones/Soft Tissues: No acute bony abnormality detected within the abdomen and pelvis. Severe arthrosis of the left hip is found, with a small joint effusion. Thoracic spine: Bones/Alignment: No acute fracture or traumatic malalignment within the thoracic spine. Degenerative Changes: Flowing syndesmophytes seen throughout the thoracic spine. Mild multilevel degenerative disc disease is seen. No obvious high-grade central canal stenosis. Soft Tissues: No paravertebral edema identified. The paraspinal muscles are atrophic but symmetric. Lumbar spine: Bones/Alignment: No acute fracture or traumatic malalignment. Degenerative Changes: Multilevel degenerative disc and especially facet disease noted. No obvious high-grade central canal stenosis. Soft Tissues: No paravertebral edema identified. Paraspinal muscles are symmetric. No acute traumatic abnormality detected within the chest, abdomen, and pelvis. Airspace disease the left lung base, atelectasis versus pneumonia versus aspiration. CT THORACIC SPINE TRAUMA RECONSTRUCTION    Result Date: 9/24/2022  EXAMINATION: CT OF THE CHEST, ABDOMEN, AND PELVIS WITH CONTRAST; CT OF THE LUMBAR SPINE WITHOUT CONTRAST; CT OF THE THORACIC SPINE WITHOUT CONTRAST 9/24/2022 12:42 pm TECHNIQUE: CT of the chest, abdomen and pelvis was performed with the administration of intravenous contrast. Multiplanar reformatted images are provided for review.  Automated exposure control, iterative reconstruction, and/or weight based adjustment of the mA/kV was utilized to reduce the radiation dose to as low as reasonably achievable.; CT of the lumbar spine was performed without the administration of intravenous contrast. Multiplanar reformatted images are provided for review. Adjustment of mA and/or kV according to patient size was utilized. Automated exposure control, iterative reconstruction, and/or weight based adjustment of the mA/kV was utilized to reduce the radiation dose to as low as reasonably achievable.; CT of the thoracic spine was performed without the administration of intravenous contrast. Multiplanar reformatted images are provided for review. Automated exposure control, iterative reconstruction, and/or weight based adjustment of the mA/kV was utilized to reduce the radiation dose to as low as reasonably achievable. COMPARISON: None HISTORY: ORDERING SYSTEM PROVIDED HISTORY: down for 2 days TECHNOLOGIST PROVIDED HISTORY: down for 2 days Decision Support Exception - unselect if not a suspected or confirmed emergency medical condition->Emergency Medical Condition (MA) Reason for Exam: fall, down for 2 days; ORDERING SYSTEM PROVIDED HISTORY: down for two days TECHNOLOGIST PROVIDED HISTORY: down for two days Reason for Exam: fall, down for 2 days FINDINGS: Chest: Mediastinum: Visualized thyroid unremarkable. No lymphadenopathy. Esophagus unremarkable. Cardiac chambers unremarkable. No pericardial effusion. Main pulmonary artery is prominent, raising the possibility of pulmonary hypertension. Thoracic aorta normal in caliber without evidence of dissection. Lungs/pleura: Evaluation is moderately to severely degraded by respiratory motion. Airspace disease in the left costophrenic angle is noted. No other infiltrate is found. No pneumothorax. No pleural effusion. Soft Tissues/Bones: Again, respiratory motion degrades imaging. With that said, no rib fractures are identified. Visualized extra thoracic soft tissues unremarkable. Abdomen/Pelvis: There is streak artifact generated the patient's arms at her side.  Respiratory motion artifact also degrades imaging. All in all, there is moderate degradation of the images. Organs: With that said, the liver appears to enhance normally. Spleen unremarkable. Adrenals unremarkable. Pancreas unremarkable. No acute or suspicious renal abnormality identified. GI/Bowel: Stomach unremarkable. Small bowel unremarkable. Moderate amount of stool is noted within the large bowel. Mild diverticulosis of the large bowel is present without CT evidence of diverticulitis. The appendix is not well visualized. No asymmetric pericecal inflammation is seen to suggest acute appendicitis. Pelvis: Uterus is surgically absent. Dover catheter present within a decompressed urinary bladder. No free pelvic fluid. Peritoneum/Retroperitoneum: Abdominal aorta normal in caliber. Superior mesenteric artery is enhancing. No lymphadenopathy. Bones/Soft Tissues: No acute bony abnormality detected within the abdomen and pelvis. Severe arthrosis of the left hip is found, with a small joint effusion. Thoracic spine: Bones/Alignment: No acute fracture or traumatic malalignment within the thoracic spine. Degenerative Changes: Flowing syndesmophytes seen throughout the thoracic spine. Mild multilevel degenerative disc disease is seen. No obvious high-grade central canal stenosis. Soft Tissues: No paravertebral edema identified. The paraspinal muscles are atrophic but symmetric. Lumbar spine: Bones/Alignment: No acute fracture or traumatic malalignment. Degenerative Changes: Multilevel degenerative disc and especially facet disease noted. No obvious high-grade central canal stenosis. Soft Tissues: No paravertebral edema identified. Paraspinal muscles are symmetric. No acute traumatic abnormality detected within the chest, abdomen, and pelvis. Airspace disease the left lung base, atelectasis versus pneumonia versus aspiration.      XR HIP 2-3 VW W PELVIS LEFT    Result Date: 9/24/2022  EXAMINATION: ONE XRAY VIEW OF THE PELVIS AND TWO XRAY VIEWS LEFT HIP 9/24/2022 12:31 pm COMPARISON: None. HISTORY: ORDERING SYSTEM PROVIDED HISTORY: pain left hip, down for two days TECHNOLOGIST PROVIDED HISTORY: pain left hip, down for two days FINDINGS: Dysplasia and increased sclerosis of the left femoral head and acetabulum. . There is no evidence of dislocation. Severe narrowing of the left hip joint. .  The remaining joint spaces appear well maintained. The soft tissues are unremarkable. 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 Nonspecific EKG with sinus tachycardia. No old EKGs on file. All EKG's are interpreted by the Emergency Department Physicianwho either signs or Co-signs this chart in the absence of a cardiologist.      PROCEDURES:  None    CONSULTS:  IP CONSULT TO CRITICAL CARE  PHARMACY TO DOSE VANCOMYCIN    CRITICAL CARE:  Please see attending note    FINAL IMPRESSION      1. Septicemia ([facility] [address]          DISPOSITION / PLAN     DISPOSITION Admitted 09/24/2022 06:02:31 PM      PATIENT REFERRED TO:  No follow-up provider specified.     DISCHARGE MEDICATIONS:  Current Discharge Medication List          Eloise Schwab, DO  Emergency Medicine Resident    (Please note that portions of this note were completed with a voice recognition program.Efforts were made to edit the dictations but occasionally words are mis-transcribed.)       Eloise Schwab, DO  Resident  09/24/22 [id number]

## 2022-09-24 NOTE — ED NOTES
Pt in bed resting. No distress noted. IV patent to right forearm, dressing clean dry and intact, no inflammation noted, LR infusing as ordered. Dover intact, draining clear antonio urine. Pt requesting to take bipap mask off, writer encouraged pt to leave mask on and educated o purpose of bipap. Pt refuses to turn to side for skin assessment. Call light in reach.           Rika Kennedy RN  09/24/22 [id number]

## 2022-09-24 NOTE — PROGRESS NOTES
[address] Pharmacokinetic Monitoring Service - Vancomycin     Neale Severe is a 80 y.o. female starting on vancomycin therapy for sepsis/UTI. Pharmacy consulted by Dr. Warren Guillen for monitoring and adjustment. Target Concentration: Goal AUC/JOLANTA 400-600 mg*hr/L    Additional Antimicrobials: zosyn    Pertinent Laboratory Values: Wt Readings from Last 1 Encounters:   09/24/22 210 lb (95.3 kg)     Temp Readings from Last 1 Encounters:   09/24/22 98.5 °F (36.9 °C) (Oral)     Estimated Creatinine Clearance: 57 mL/min (A) (based on SCr of 1.05 mg/dL (H)).   Recent Labs     09/24/22  1413 09/24/22  1421   CREATININE 1.06 1.05*   WBC  --  24.8*       Pertinent Cultures:  Culture Date Source Results   9/24 Blood x2 In process   9/24 Urine Sent     Plan:  Dosing recommendations based on Bayesian software  SCr slightly above baseline  Patient received Vancomycin 1500 mg IV x1 9/24 1630   Based on age, weight, renal function and indication will initiate Vancomycin 1500 mg IV q24h  Anticipated AUC of 473 and trough concentration of 13.9 at steady state  Renal labs as indicated   Pharmacy will continue to monitor patient and adjust therapy as indicated    Thank you for the consult,  Nestor Sagastume, PharmD, 9/24/2022 6:52 PM

## 2022-09-24 NOTE — ED PROVIDER NOTES
[facility]     Emergency Department     Faculty Attestation    I performed a history and physical examination of the patient and discussed management with the resident. I have reviewed and agree with the residents findings including all diagnostic interpretations, and treatment plans as written at the time of my review. Any areas of disagreement are noted on the chart. I was personally present for the key portions of any procedures. I have documented in the chart those procedures where I was not present during the key portions. For Physician Assistant/ Nurse Practitioner cases/documentation I have personally evaluated this patient and have completed at least one if not all key elements of the E/M (history, physical exam, and MDM). Additional findings are as noted. Primary Care Physician: Yordy Serrato MD    History: This is a 80 y.o. female who presents to the Emergency Department with complaint of unable to ambulate. Patient states that she came home from work on Thursday and had a significant difficult time getting out of a car with her walker. She then walked to her sofa and has been anally unable to get up off the sofa. 911 was called. They brought the patient in for further evaluation. She does complain of some shortness of breath. She denies any fever. The patient is on morphine secondary to chronic pain. Physical:   pulse is 145 (abnormal). Her respiration is 30 and oxygen saturation is 99%. Patient is diaphoretic she is tachypneic and tachycardic. Patient feels warm to the touch. Rhonchi auscultated bilaterally, abdomen is soft obese, patient has large nonblanchable wound in the left posterior upper thigh.     Impression: Inability ambulate, shortness of breath    Plan: IV fluid, analgesia, CBC, CMP, lactic acid, x-ray, CT scan, blood culture, urinalysis, urine culture, EKG, troponin, myoglobin, CPK, antibiotics        EKG Interpretation    Interpreted by me  Sinus tachycardia with a short ID interval and a ventricular of 141, normal QRS duration, normal axis, nonspecific ST and T wave abnormality, prolonged QT corrected  No old EKG for comparison    Patient placed on BiPAP. CRITICAL CARE: There was a high probability of clinically significant/life threatening deterioration in this patient's condition which required my urgent intervention. Total critical care time was 35 minutes. This excludes any time for separately reportable procedures. (Please note that portions of this note were completed with a voice recognition program.  Efforts were made to edit the dictations but occasionally words are mis-transcribed.)    Parker Sanford.  Efrain Oppenheim, MD, [address] [facility]  Attending Emergency Medicine Physician        Kalia Mohamud MD  09/24/22 [id number]

## 2022-09-25 ENCOUNTER — APPOINTMENT (OUTPATIENT)
Dept: GENERAL RADIOLOGY | Age: 72
DRG: 871 | End: 2022-09-25
Payer: COMMERCIAL

## 2022-09-25 PROBLEM — B95.61 MSSA BACTEREMIA: Status: ACTIVE | Noted: 2022-09-25

## 2022-09-25 PROBLEM — R65.21 SEPTIC SHOCK (HCC): Status: ACTIVE | Noted: 2022-09-24

## 2022-09-25 PROBLEM — R78.81 MSSA BACTEREMIA: Status: ACTIVE | Noted: 2022-09-25

## 2022-09-25 LAB
ABSOLUTE EOS #: 0 K/UL (ref 0–0.4)
ABSOLUTE IMMATURE GRANULOCYTE: 0 K/UL (ref 0–0.3)
ABSOLUTE LYMPH #: 0.31 K/UL (ref 1–4.8)
ABSOLUTE MONO #: 0.31 K/UL (ref 0.1–0.8)
ANION GAP SERPL CALCULATED.3IONS-SCNC: 18 MMOL/L (ref 9–17)
BASOPHILS # BLD: 1 % (ref 0–2)
BASOPHILS ABSOLUTE: 0.31 K/UL (ref 0–0.2)
BUN BLDV-MCNC: 28 MG/DL (ref 8–23)
CALCIUM SERPL-MCNC: 9.1 MG/DL (ref 8.6–10.4)
CHLORIDE BLD-SCNC: 106 MMOL/L (ref 98–107)
CO2: 14 MMOL/L (ref 20–31)
CREAT SERPL-MCNC: 1.32 MG/DL (ref 0.5–0.9)
EOSINOPHILS RELATIVE PERCENT: 0 % (ref 1–4)
FIO2: 50
GFR AFRICAN AMERICAN: 48 ML/MIN
GFR NON-AFRICAN AMERICAN: 40 ML/MIN
GFR SERPL CREATININE-BSD FRML MDRD: ABNORMAL ML/MIN/{1.73_M2}
GLUCOSE BLD-MCNC: 118 MG/DL (ref 65–105)
GLUCOSE BLD-MCNC: 132 MG/DL (ref 65–105)
GLUCOSE BLD-MCNC: 62 MG/DL (ref 65–105)
GLUCOSE BLD-MCNC: 72 MG/DL (ref 70–99)
GLUCOSE BLD-MCNC: 87 MG/DL (ref 65–105)
GLUCOSE BLD-MCNC: 89 MG/DL (ref 65–105)
GLUCOSE BLD-MCNC: 89 MG/DL (ref 74–100)
HCT VFR BLD CALC: 39.8 % (ref 36.3–47.1)
HEMOGLOBIN: 12.8 G/DL (ref 11.9–15.1)
IMMATURE GRANULOCYTES: 0 %
LACTIC ACID, WHOLE BLOOD: 3.4 MMOL/L (ref 0.7–2.1)
LACTIC ACID, WHOLE BLOOD: 3.9 MMOL/L (ref 0.7–2.1)
LYMPHOCYTES # BLD: 1 % (ref 24–44)
MCH RBC QN AUTO: 26.9 PG (ref 25.2–33.5)
MCHC RBC AUTO-ENTMCNC: 32.2 G/DL (ref 28.4–34.8)
MCV RBC AUTO: 83.6 FL (ref 82.6–102.9)
MONOCYTES # BLD: 1 % (ref 1–7)
MORPHOLOGY: ABNORMAL
MRSA, DNA, NASAL: NEGATIVE
NEGATIVE BASE EXCESS, ART: 7 (ref 0–2)
NRBC AUTOMATED: 0 PER 100 WBC
PATIENT TEMP: 38.1
PDW BLD-RTO: 17.5 % (ref 11.8–14.4)
PLATELET # BLD: ABNORMAL K/UL (ref 138–453)
PLATELET, FLUORESCENCE: NORMAL K/UL (ref 138–453)
POC HCO3: 19.6 MMOL/L (ref 21–28)
POC O2 SATURATION: 93 % (ref 94–98)
POC PCO2 TEMP: 44 MM HG
POC PCO2: 41.6 MM HG (ref 35–48)
POC PH TEMP: 7.27
POC PH: 7.28 (ref 7.35–7.45)
POC PO2 TEMP: 80 MM HG
POC PO2: 74.6 MM HG (ref 83–108)
POTASSIUM SERPL-SCNC: 3.7 MMOL/L (ref 3.7–5.3)
RBC # BLD: 4.76 M/UL (ref 3.95–5.11)
SEG NEUTROPHILS: 97 % (ref 36–66)
SEGMENTED NEUTROPHILS ABSOLUTE COUNT: 29.67 K/UL (ref 1.8–7.7)
SODIUM BLD-SCNC: 138 MMOL/L (ref 135–144)
SPECIMEN DESCRIPTION: NORMAL
WBC # BLD: 30.6 K/UL (ref 3.5–11.3)

## 2022-09-25 PROCEDURE — 85025 COMPLETE CBC W/AUTO DIFF WBC: CPT

## 2022-09-25 PROCEDURE — 82803 BLOOD GASES ANY COMBINATION: CPT

## 2022-09-25 PROCEDURE — 6360000002 HC RX W HCPCS: Performed by: STUDENT IN AN ORGANIZED HEALTH CARE EDUCATION/TRAINING PROGRAM

## 2022-09-25 PROCEDURE — 02HV33Z INSERTION OF INFUSION DEVICE INTO SUPERIOR VENA CAVA, PERCUTANEOUS APPROACH: ICD-10-PCS | Performed by: INTERNAL MEDICINE

## 2022-09-25 PROCEDURE — 2580000003 HC RX 258: Performed by: STUDENT IN AN ORGANIZED HEALTH CARE EDUCATION/TRAINING PROGRAM

## 2022-09-25 PROCEDURE — 2700000000 HC OXYGEN THERAPY PER DAY

## 2022-09-25 PROCEDURE — 94761 N-INVAS EAR/PLS OXIMETRY MLT: CPT

## 2022-09-25 PROCEDURE — 2500000003 HC RX 250 WO HCPCS: Performed by: STUDENT IN AN ORGANIZED HEALTH CARE EDUCATION/TRAINING PROGRAM

## 2022-09-25 PROCEDURE — 2580000003 HC RX 258

## 2022-09-25 PROCEDURE — 94003 VENT MGMT INPAT SUBQ DAY: CPT

## 2022-09-25 PROCEDURE — 36600 WITHDRAWAL OF ARTERIAL BLOOD: CPT

## 2022-09-25 PROCEDURE — 71045 X-RAY EXAM CHEST 1 VIEW: CPT

## 2022-09-25 PROCEDURE — 85055 RETICULATED PLATELET ASSAY: CPT

## 2022-09-25 PROCEDURE — 6370000000 HC RX 637 (ALT 250 FOR IP): Performed by: INTERNAL MEDICINE

## 2022-09-25 PROCEDURE — 93005 ELECTROCARDIOGRAM TRACING: CPT | Performed by: STUDENT IN AN ORGANIZED HEALTH CARE EDUCATION/TRAINING PROGRAM

## 2022-09-25 PROCEDURE — 99291 CRITICAL CARE FIRST HOUR: CPT | Performed by: INTERNAL MEDICINE

## 2022-09-25 PROCEDURE — 2000000000 HC ICU R&B

## 2022-09-25 PROCEDURE — 94664 DEMO&/EVAL PT USE INHALER: CPT

## 2022-09-25 PROCEDURE — 94640 AIRWAY INHALATION TREATMENT: CPT

## 2022-09-25 PROCEDURE — 82947 ASSAY GLUCOSE BLOOD QUANT: CPT

## 2022-09-25 PROCEDURE — 80048 BASIC METABOLIC PNL TOTAL CA: CPT

## 2022-09-25 PROCEDURE — 83605 ASSAY OF LACTIC ACID: CPT

## 2022-09-25 PROCEDURE — 6360000002 HC RX W HCPCS

## 2022-09-25 PROCEDURE — C9113 INJ PANTOPRAZOLE SODIUM, VIA: HCPCS | Performed by: STUDENT IN AN ORGANIZED HEALTH CARE EDUCATION/TRAINING PROGRAM

## 2022-09-25 PROCEDURE — 6370000000 HC RX 637 (ALT 250 FOR IP)

## 2022-09-25 PROCEDURE — 36415 COLL VENOUS BLD VENIPUNCTURE: CPT

## 2022-09-25 RX ORDER — MIDAZOLAM HYDROCHLORIDE 2 MG/2ML
2 INJECTION, SOLUTION INTRAMUSCULAR; INTRAVENOUS ONCE
Status: DISCONTINUED | OUTPATIENT
Start: 2022-09-25 | End: 2022-09-25

## 2022-09-25 RX ORDER — PHENYLEPHRINE HCL IN 0.9% NACL 50MG/250ML
10-300 PLASTIC BAG, INJECTION (ML) INTRAVENOUS CONTINUOUS
Status: DISCONTINUED | OUTPATIENT
Start: 2022-09-25 | End: 2022-09-27

## 2022-09-25 RX ORDER — MAGNESIUM SULFATE IN WATER 40 MG/ML
2000 INJECTION, SOLUTION INTRAVENOUS ONCE
Status: COMPLETED | OUTPATIENT
Start: 2022-09-25 | End: 2022-09-25

## 2022-09-25 RX ORDER — DEXTROSE MONOHYDRATE 100 MG/ML
INJECTION, SOLUTION INTRAVENOUS CONTINUOUS PRN
Status: DISCONTINUED | OUTPATIENT
Start: 2022-09-25 | End: 2022-10-06 | Stop reason: HOSPADM

## 2022-09-25 RX ORDER — MIDAZOLAM HYDROCHLORIDE 2 MG/2ML
2 INJECTION, SOLUTION INTRAMUSCULAR; INTRAVENOUS ONCE
Status: COMPLETED | OUTPATIENT
Start: 2022-09-25 | End: 2022-09-25

## 2022-09-25 RX ORDER — ENOXAPARIN SODIUM 100 MG/ML
40 INJECTION SUBCUTANEOUS 2 TIMES DAILY
Status: DISCONTINUED | OUTPATIENT
Start: 2022-09-25 | End: 2022-09-28

## 2022-09-25 RX ORDER — METHYLPREDNISOLONE SODIUM SUCCINATE 40 MG/ML
40 INJECTION, POWDER, LYOPHILIZED, FOR SOLUTION INTRAMUSCULAR; INTRAVENOUS EVERY 12 HOURS
Status: DISCONTINUED | OUTPATIENT
Start: 2022-09-25 | End: 2022-09-26

## 2022-09-25 RX ORDER — PHENYLEPHRINE HCL IN 0.9% NACL 50MG/250ML
PLASTIC BAG, INJECTION (ML) INTRAVENOUS
Status: DISPENSED
Start: 2022-09-25 | End: 2022-09-25

## 2022-09-25 RX ORDER — SODIUM CHLORIDE, SODIUM LACTATE, POTASSIUM CHLORIDE, AND CALCIUM CHLORIDE .6; .31; .03; .02 G/100ML; G/100ML; G/100ML; G/100ML
1000 INJECTION, SOLUTION INTRAVENOUS ONCE
Status: COMPLETED | OUTPATIENT
Start: 2022-09-25 | End: 2022-09-25

## 2022-09-25 RX ORDER — NOREPINEPHRINE BIT/0.9 % NACL 16MG/250ML
1-100 INFUSION BOTTLE (ML) INTRAVENOUS CONTINUOUS
Status: DISCONTINUED | OUTPATIENT
Start: 2022-09-25 | End: 2022-09-28

## 2022-09-25 RX ADMIN — PIPERACILLIN AND TAZOBACTAM 4500 MG: 4; .5 INJECTION, POWDER, LYOPHILIZED, FOR SOLUTION INTRAVENOUS at 16:10

## 2022-09-25 RX ADMIN — MIDAZOLAM HYDROCHLORIDE 2 MG: 1 INJECTION, SOLUTION INTRAMUSCULAR; INTRAVENOUS at 00:53

## 2022-09-25 RX ADMIN — DEXMEDETOMIDINE HYDROCHLORIDE 1 MCG/KG/HR: 4 INJECTION, SOLUTION INTRAVENOUS at 16:20

## 2022-09-25 RX ADMIN — Medication 15 MCG/MIN: at 21:43

## 2022-09-25 RX ADMIN — VANCOMYCIN HYDROCHLORIDE 1500 MG: 10 INJECTION, POWDER, LYOPHILIZED, FOR SOLUTION INTRAVENOUS at 16:23

## 2022-09-25 RX ADMIN — SODIUM CHLORIDE, PRESERVATIVE FREE 10 ML: 5 INJECTION INTRAVENOUS at 08:20

## 2022-09-25 RX ADMIN — DEXTROSE MONOHYDRATE 125 ML: 100 INJECTION, SOLUTION INTRAVENOUS at 11:26

## 2022-09-25 RX ADMIN — PROPOFOL 10 MCG/KG/MIN: 10 INJECTION, EMULSION INTRAVENOUS at 11:15

## 2022-09-25 RX ADMIN — Medication 50 MCG/HR: at 00:42

## 2022-09-25 RX ADMIN — Medication 5 MCG/MIN: at 04:32

## 2022-09-25 RX ADMIN — IPRATROPIUM BROMIDE 0.5 MG: 0.5 SOLUTION RESPIRATORY (INHALATION) at 12:01

## 2022-09-25 RX ADMIN — SODIUM CHLORIDE, POTASSIUM CHLORIDE, SODIUM LACTATE AND CALCIUM CHLORIDE: 600; 310; 30; 20 INJECTION, SOLUTION INTRAVENOUS at 09:32

## 2022-09-25 RX ADMIN — IPRATROPIUM BROMIDE 0.5 MG: 0.5 SOLUTION RESPIRATORY (INHALATION) at 16:30

## 2022-09-25 RX ADMIN — FENTANYL CITRATE 25 MCG: 50 INJECTION, SOLUTION INTRAMUSCULAR; INTRAVENOUS at 00:05

## 2022-09-25 RX ADMIN — Medication 125 MCG/MIN: at 16:19

## 2022-09-25 RX ADMIN — ACETAMINOPHEN 650 MG: 325 TABLET ORAL at 04:26

## 2022-09-25 RX ADMIN — SODIUM CHLORIDE, POTASSIUM CHLORIDE, SODIUM LACTATE AND CALCIUM CHLORIDE 1000 ML: 600; 310; 30; 20 INJECTION, SOLUTION INTRAVENOUS at 09:33

## 2022-09-25 RX ADMIN — METHYLPREDNISOLONE SODIUM SUCCINATE 40 MG: 40 INJECTION, POWDER, FOR SOLUTION INTRAMUSCULAR; INTRAVENOUS at 20:22

## 2022-09-25 RX ADMIN — SODIUM CHLORIDE, PRESERVATIVE FREE 10 ML: 5 INJECTION INTRAVENOUS at 20:23

## 2022-09-25 RX ADMIN — Medication 200 MCG/MIN: at 11:21

## 2022-09-25 RX ADMIN — SODIUM CHLORIDE, PRESERVATIVE FREE 40 MG: 5 INJECTION INTRAVENOUS at 23:16

## 2022-09-25 RX ADMIN — DEXMEDETOMIDINE HYDROCHLORIDE 1 MCG/KG/HR: 4 INJECTION, SOLUTION INTRAVENOUS at 11:15

## 2022-09-25 RX ADMIN — IPRATROPIUM BROMIDE 0.5 MG: 0.5 SOLUTION RESPIRATORY (INHALATION) at 19:54

## 2022-09-25 RX ADMIN — PIPERACILLIN AND TAZOBACTAM 4500 MG: 4; .5 INJECTION, POWDER, LYOPHILIZED, FOR SOLUTION INTRAVENOUS at 09:08

## 2022-09-25 RX ADMIN — BUDESONIDE AND FORMOTEROL FUMARATE DIHYDRATE 2 PUFF: 160; 4.5 AEROSOL RESPIRATORY (INHALATION) at 08:12

## 2022-09-25 RX ADMIN — DEXMEDETOMIDINE HYDROCHLORIDE 0.2 MCG/KG/HR: 4 INJECTION, SOLUTION INTRAVENOUS at 00:11

## 2022-09-25 RX ADMIN — DEXTROSE MONOHYDRATE AND SODIUM CHLORIDE: 5; .9 INJECTION, SOLUTION INTRAVENOUS at 12:13

## 2022-09-25 RX ADMIN — Medication 10 MCG/MIN: at 00:57

## 2022-09-25 RX ADMIN — MAGNESIUM SULFATE HEPTAHYDRATE 2000 MG: 40 INJECTION, SOLUTION INTRAVENOUS at 18:07

## 2022-09-25 RX ADMIN — PIPERACILLIN AND TAZOBACTAM 4500 MG: 4; .5 INJECTION, POWDER, LYOPHILIZED, FOR SOLUTION INTRAVENOUS at 23:23

## 2022-09-25 RX ADMIN — DEXTROSE MONOHYDRATE 125 ML: 100 INJECTION, SOLUTION INTRAVENOUS at 10:29

## 2022-09-25 RX ADMIN — ENOXAPARIN SODIUM 40 MG: 100 INJECTION SUBCUTANEOUS at 20:38

## 2022-09-25 RX ADMIN — METHYLPREDNISOLONE SODIUM SUCCINATE 40 MG: 40 INJECTION, POWDER, FOR SOLUTION INTRAMUSCULAR; INTRAVENOUS at 10:32

## 2022-09-25 RX ADMIN — ENOXAPARIN SODIUM 40 MG: 100 INJECTION SUBCUTANEOUS at 08:20

## 2022-09-25 RX ADMIN — Medication 50 MCG/HR: at 23:13

## 2022-09-25 RX ADMIN — DEXMEDETOMIDINE HYDROCHLORIDE 0.8 MCG/KG/HR: 4 INJECTION, SOLUTION INTRAVENOUS at 06:04

## 2022-09-25 RX ADMIN — SODIUM CHLORIDE, PRESERVATIVE FREE 40 MG: 5 INJECTION INTRAVENOUS at 10:32

## 2022-09-25 RX ADMIN — BUDESONIDE AND FORMOTEROL FUMARATE DIHYDRATE 2 PUFF: 160; 4.5 AEROSOL RESPIRATORY (INHALATION) at 19:54

## 2022-09-25 RX ADMIN — Medication 200 MCG/MIN: at 06:47

## 2022-09-25 ASSESSMENT — PULMONARY FUNCTION TESTS
PIF_VALUE: 17
PIF_VALUE: 9
PIF_VALUE: 6
PIF_VALUE: 9

## 2022-09-25 NOTE — FLOWSHEET NOTE
Pt's Sister in law called for update. Neena Gallardo was not listed in contacts, but pt able to nod yes to give update. Phone number listed for Meena Meléndez in contacts is disconnected. Writer updated Neena Gallardo on pt status and plan of care. All questions and concerns addressed. Received phone number for pt's brother. Pt is  and with no children. Pt is 1 of 7 siblings.

## 2022-09-25 NOTE — ED NOTES
Report given to RN on [facility]. All questions answered.      Crystal Ashton RN  09/24/22 0055

## 2022-09-25 NOTE — PROGRESS NOTES
Date: 9/25/2022  Time: 11:04  Patient identity confirmed:  Yes  Preoxygenation: yes    Laryngoscope size and type Glidescope  ETT size:a 7.5 cuffed  Number of attempts:1   Cords visualized:  [] Clearly  [] Poorly  Breath sounds present bilaterally: Yes   ETCO2   [x] Positive   ETT secured at  23 @ the teeth     ETT secured with lucia  Chest x-ray ordered: Yes     Difficult airway:    No       If yes, was red tape placed around ETT:       Was this a Code Situation:    No             BP: 124/65        Procedure performed by: Dr. Frakn Hubbard RCP  1:34 AM

## 2022-09-25 NOTE — PLAN OF CARE
Problem: Respiratory - Adult  Goal: Achieves optimal ventilation and oxygenation  Outcome: Progressing  Flowsheets (Taken 9/25/2022 0142)  Achieves optimal ventilation and oxygenation:   Assess for changes in respiratory status   Assess the need for suctioning and aspirate as needed   Respiratory therapy support as indicated   Assess for changes in mentation and behavior   Oxygen supplementation based on oxygen saturation or arterial blood gases

## 2022-09-25 NOTE — PLAN OF CARE
Problem: Discharge Planning  Goal: Discharge to home or other facility with appropriate resources  9/25/2022 1901 by Noble Rojas RN  Outcome: Progressing  9/25/2022 0554 by Osbaldo Damon RN  Outcome: Progressing     Problem: Respiratory - Adult  Goal: Achieves optimal ventilation and oxygenation  9/25/2022 1901 by Noble Rojas RN  Outcome: Progressing  9/25/2022 0950 by Esther Joseph RCP  Outcome: Progressing  9/25/2022 0554 by Osbaldo Damon RN  Outcome: Progressing     Problem: Neurosensory - Adult  Goal: Achieves stable or improved neurological status  Outcome: Progressing  Goal: Absence of seizures  Outcome: Progressing  Goal: Remains free of injury related to seizures activity  Outcome: Progressing  Goal: Achieves maximal functionality and self care  Outcome: Progressing     Problem: Cardiovascular - Adult  Goal: Maintains optimal cardiac output and hemodynamic stability  Outcome: Progressing  Goal: Absence of cardiac dysrhythmias or at baseline  Outcome: Progressing     Problem: Skin/Tissue Integrity - Adult  Goal: Skin integrity remains intact  Outcome: Progressing  Flowsheets (Taken 9/25/2022 0900)  Skin Integrity Remains Intact: Monitor for areas of redness and/or skin breakdown  Goal: Incisions, wounds, or drain sites healing without S/S of infection  Outcome: Progressing  Flowsheets (Taken 9/25/2022 0900)  Incisions, Wounds, or Drain Sites Healing Without Sign and Symptoms of Infection:   TWICE DAILY: Assess and document skin integrity   TWICE DAILY: Assess and document dressing/incision, wound bed, drain sites and surrounding tissue  Goal: Oral mucous membranes remain intact  Outcome: Progressing  Flowsheets (Taken 9/25/2022 0900)  Oral Mucous Membranes Remain Intact: Assess oral mucosa and hygiene practices     Problem: Musculoskeletal - Adult  Goal: Return mobility to safest level of function  Outcome: Progressing  Goal: Maintain proper alignment of affected body part  Outcome: Progressing  Goal: Return ADL status to a safe level of function  Outcome: Progressing     Problem: Gastrointestinal - Adult  Goal: Minimal or absence of nausea and vomiting  Outcome: Progressing  Goal: Maintains or returns to baseline bowel function  Outcome: Progressing  Goal: Maintains adequate nutritional intake  Outcome: Progressing  Goal: Establish and maintain optimal ostomy function  Outcome: Progressing     Problem: Genitourinary - Adult  Goal: Absence of urinary retention  Outcome: Progressing  Goal: Urinary catheter remains patent  Outcome: Progressing     Problem: Infection - Adult  Goal: Absence of infection at discharge  Outcome: Progressing  Goal: Absence of infection during hospitalization  Outcome: Progressing  Goal: Absence of fever/infection during anticipated neutropenic period  Outcome: Progressing     Problem: Metabolic/Fluid and Electrolytes - Adult  Goal: Electrolytes maintained within normal limits  Outcome: Progressing  Goal: Hemodynamic stability and optimal renal function maintained  Outcome: Progressing  Goal: Glucose maintained within prescribed range  Outcome: Progressing     Problem: Hematologic - Adult  Goal: Maintains hematologic stability  Outcome: Progressing     Problem: Safety - Medical Restraint  Goal: Remains free of injury from restraints (Restraint for Interference with Medical Device)  Description: INTERVENTIONS:  1. Determine that other, less restrictive measures have been tried or would not be effective before applying the restraint  2. Evaluate the patient's condition at the time of restraint application  3. Inform patient/family regarding the reason for restraint  4.  Q2H: Monitor safety, psychosocial status, comfort, nutrition and hydration  9/25/2022 1901 by Nikole Uriostegui RN  Outcome: Progressing  Flowsheets  Taken 9/25/2022 1800  Remains free of injury from restraints (restraint for interference with medical device): Every 2 hours: Monitor safety, psychosocial status, comfort, nutrition and hydration  Taken 9/25/2022 1600  Remains free of injury from restraints (restraint for interference with medical device): Every 2 hours: Monitor safety, psychosocial status, comfort, nutrition and hydration  Taken 9/25/2022 1400  Remains free of injury from restraints (restraint for interference with medical device): Every 2 hours: Monitor safety, psychosocial status, comfort, nutrition and hydration  Taken 9/25/2022 1200  Remains free of injury from restraints (restraint for interference with medical device): Every 2 hours: Monitor safety, psychosocial status, comfort, nutrition and hydration  Taken 9/25/2022 1000  Remains free of injury from restraints (restraint for interference with medical device): Every 2 hours: Monitor safety, psychosocial status, comfort, nutrition and hydration  Taken 9/25/2022 0800  Remains free of injury from restraints (restraint for interference with medical device): Every 2 hours: Monitor safety, psychosocial status, comfort, nutrition and hydration  9/25/2022 0554 by José Miguel Conrad RN  Outcome: Progressing     Problem: Anxiety  Goal: Will report anxiety at manageable levels  Description: INTERVENTIONS:  1. Administer medication as ordered  2. Teach and rehearse alternative coping skills  3. Provide emotional support with 1:1 interaction with staff  Outcome: Progressing     Problem: Coping  Goal: Pt/Family able to verbalize concerns and demonstrate effective coping strategies  Description: INTERVENTIONS:  1. Assist patient/family to identify coping skills, available support systems and cultural and spiritual values  2. Provide emotional support, including active listening and acknowledgement of concerns of patient and caregivers  3. Reduce environmental stimuli, as able  4. Instruct patient/family in relaxation techniques, as appropriate  5.  Assess for spiritual pain/suffering and initiate Spiritual Care, Psychosocial Clinical Specialist consults as needed  Outcome: Progressing     Problem: Decision Making  Goal: Pt/Family able to effectively weigh alternatives and participate in decision making related to treatment and care  Description: INTERVENTIONS:  1. Determine when there are differences between patient's view, family's view, and healthcare provider's view of condition  2. Facilitate patient and family articulation of goals for care  3. Help patient and family identify pros/cons of alternative solutions  4. Provide information as requested by patient/family  5. Respect patient/family right to receive or not to receive information  6. Serve as a liaison between patient and family and health care team  7. Initiate Consults from Ethics, Palliative Care or initiate [facility] as is appropriate  Outcome: Progressing     Problem: Confusion  Goal: Confusion, delirium, dementia, or psychosis is improved or at baseline  Description: INTERVENTIONS:  1. Assess for possible contributors to thought disturbance, including medications, impaired vision or hearing, underlying metabolic abnormalities, dehydration, psychiatric diagnoses, and notify attending LIP  2. Only high risk fall precautions, as indicated  3. Provide frequent short contacts to provide reality reorientation, refocusing and direction  4. Decrease environmental stimuli, including noise as appropriate  5. Monitor and intervene to maintain adequate nutrition, hydration, elimination, sleep and activity  6. If unable to ensure safety without constant attention obtain sitter and review sitter guidelines with assigned personnel  7. Initiate Psychosocial CNS and Spiritual Care consult, as indicated  Outcome: Progressing     Problem: Behavior  Goal: Pt/Family maintain appropriate behavior and adhere to behavioral management agreement, if implemented  Description: INTERVENTIONS:  1. Assess patient/family's coping skills and  non-compliant behavior (including use of illegal substances)  2.  Notify security of behavior or suspected illegal substances which indicate the need for search of the family and/or belongings  3. Encourage verbalization of thoughts and concerns in a socially appropriate manner  4. Utilize positive, consistent limit setting strategies supporting safety of patient, staff and others  5. Encourage participation in the decision making process about the behavioral management agreement  6. If a visitor's behavior poses a threat to safety call refer to organization policy. 7. Initiate consult with , Psychosocial CNS, Spiritual Care as appropriate  Outcome: Progressing     Problem: Spiritual Care  Goal: Pt/Family able to move forward in process of forgiving self, others, and/or higher power  Description: INTERVENTIONS:  1. Assist patient/family to overcome blocks to healing by use of spiritual practices (prayer, meditation, guided imagery, reiki, breath work, etc). 2. De-myth guilt and help patient/family identify possible irrational spiritual/cultural beliefs and values. 3. Explore possibilities of making amends & reconciliation with self, others, and/or a greater power. 4. Guide patient/family in identifying painful feelings of guilt. 5. Help patient/famiy explore and identify spiritual beliefs, cultural understandings or values that may help or hinder letting go of issue. 6. Help patient/family explore feelings of anger, bitterness, resentment. 7. Help patient/family identify and examine the situation in which these feelings are experienced. 8. Help patient/family identify destructive displacement of feelings onto other individuals. 9. Invite use of sacraments/rituals/ceremonies as appropriate (e.g. - confession, anointing, smudging). 10. Refer patient/family to formal counseling and/or to [facility] for further support work.   Outcome: Progressing

## 2022-09-25 NOTE — PROGRESS NOTES
Pt intubated by Dr. Rosales Lav  2301 Time out performed  2302 30 of Etomidate given  2302 90 of succinylcholine given  2304 positive color change  2304 positive lungs sounds bilat  7.5 ETT, 23 cm

## 2022-09-25 NOTE — CARE COORDINATION
22 1206   Service Assessment   Patient Orientation Sedated  (intubated)   History Provided By Other (see comment)  (friend and first emergency contact, [address])   Primary Caregiver Self   Support Systems Friends/Neighbors   PCP Verified by CM   (unable to verify at this time)   Prior Functional Level Independent in ADLs/IADLs   Can patient return to prior living arrangement Unknown at present   Ability to make needs known: Unable   Social/Functional History   Lives With Alone   Type of [address] Two level;1/2 bath on main level  (sleeps in family room on main floor)   [address] to enter without rails   Entrance Stairs - Number of Steps [address] [facility], [facility]   ADL Assistance Independent   Homemaking Assistance Independent   Ambulation Assistance Independent   Transfer Assistance Independent   Active  Yes   Mode of Transportation SUV   Occupation Full time employment   Type of Occupation medical records at [employer]   Discharge [facility] Prior To Admission Other (Comment)  (pain clinic once a month)   [address] Discharge   Mode of Transport at Discharge Other (see comment)  (family to provide transportation)       Patient Intubated and sedated. Friend and first emergency contact at bedside, [address]. Initial interview done with her. Patient lives alone. She is not  and does not have any children. Her parents are . She has 4 brothers and 2 sisters. Patient goes 1x month to pain management clinic-[facility] is not sure where. Called second emergency contact and her brother Lorenza Landaverde. Explained CM roll. He relates patients's home is a disaster and patient is a hoarder. He does not think she can return there. Anticipate patient will need a SNF.  Lorenza Landaverde relates his wife is calling patient's  to see if she has Healthcare POA paperwork.

## 2022-09-25 NOTE — FLOWSHEET NOTE
Writer at bedside to turn/reposition. Pt is cold, clammy and diaphoretic. Blood sugar checked. 62. D10 125 ml bolus given. Blood sugar recheck is 89. Jared Pearce

## 2022-09-25 NOTE — PROCEDURES
PROCEDURE NOTE - EMERGENCY INTUBATION    PATIENT NAME: Stephanie Calrson [facility]  MEDICAL RECORD NO. [id number]  DATE: 9/24/2022  ATTENDING PHYSICIAN: Dr. Mary Ann Simmons DIAGNOSIS:  Acute Respiratory Failure  POSTOPERATIVE DIAGNOSIS:  Same  PROCEDURE PERFORMED:  Emergency endotracheal intubation  PERFORMING PHYSICIAN: Elizabeth Sutherland MD    MEDICATIONS: etomidate intravenously and succinycholine 90 mg intravenously    DISCUSSION:  Korin Wen is a 80y.o.-year-old female who requires intubation and ventilatory support due to impending respiratory failure and airway protection. The history and physical examination were reviewed and confirmed. CONSENT: The patient provided verbal consent for this procedure. PROCEDURE:  A timeout was initiated by the bedside nurse and was confirmed by those present. The patient was placed in the appropriate position. Cricoid pressure was not required. Intubation was performed by indirect laryngoscopy using a glidoscope and a 7.5 cuffed endotracheal tube. The cuff was then inflated and the tube was secured appropriately at a distance of 23 cm to the dental ridge. Initial confirmation of placement included bilateral breath sounds, tube fogging, and adequate chest rise. A chest x-ray to verify correct placement of the tube has been ordered but is still pending. The patient tolerated the procedure well.      COMPLICATIONS:  None     Elizabeth Sutherland MD  11:17 PM, 9/24/22

## 2022-09-25 NOTE — PROGRESS NOTES
INTENSIVE CARE UNIT  Resident Physician Progress Note    Patient - Khang Dunlap  Date of Admission -  9/24/2022  2:07 PM  Date of Evaluation -  9/25/2022  Room and Bed Number -  3024/3024-01   Hospital Day - 1    SUBJECTIVE:   HISTORY OF PRESENT ILLNESS:    Khang Dunlap is a 80 y.o. female past medical history of COPD, chronic hip pain, chronic back pain, prediabetes, OA, osteoporosis who presents to the ED after being found by a neighbor stuck in her chair. Patient notes she was stuck in chair for x2 days, but has felt weak x3 days. patient was unable to get up due to weakness and has had significant difficulty ambulating. 911 was called and patient was brought to [facility] ED for further evaluation. Patient was tachypneic, failed BiPAP and ultimately was intubated. Urinalysis suggestive of UTI.     OVERNIGHT EVENTS:      Patient was intubated overnight    This morning, patient is intubated and sedated with propofol, Precedex and fentanyl  She is requiring vasopressors; currently on Levophed and Keanu-Synephrine  40% FiO2 on ventilator  Blood gas earlier this morning pH 7.28, PCO2 41, PO2 74 bicarb 19  Chest imaging with concern of left lung base airspace disease, atelectasis versus pneumonia versus aspiration  WBC 24>30  Blood Cx x2: Gram positive cocci in clusters  On IVF at 125 cc/hr   ml since admission  On vanc and zosyn  Tmax 100.9    TODAY:     AWAKE & FOLLOWING COMMANDS: [x]   No  []   Yes                           SECRETIONS                 Amount:  [x]   Small []   Moderate []   Large []   None        Color: []   White []   Colored []   Bloody                         SEDATION:                 RAAS Score: [x]   +1 to -1 []   -2 []   -3 []   -4        Sedation Agent: [x]   Propofol gtt []   Versed gtt  []   Ativan gtt  [x]   Precedex gtt        Paralytic Agent: []   Norcuron []   Nimbex [x]   None                         VASOPRESSORS:  []   No  [x]   Yes            Vasopressor Agent [x] Levophed [] Dopamine [] Vasopressin [] Dobutamine [x] Phenylephrine [] Epinephrine                      OBJECTIVE:   VITAL SIGNS:  Patient Vitals for the past 8 hrs:   BP Temp Temp src Pulse Resp SpO2   09/25/22 0802 -- -- -- (!) 105 26 94 %   09/25/22 0745 (!) 98/55 -- -- (!) 104 27 94 %   09/25/22 0730 (!) 118/57 -- -- (!) 105 27 94 %   09/25/22 0715 98/62 -- -- (!) 106 26 92 %   09/25/22 0700 (!) 101/57 -- -- (!) 106 25 93 %   09/25/22 0645 (!) 88/49 -- -- (!) 108 26 93 %   09/25/22 0630 88/62 -- -- (!) 110 25 92 %   09/25/22 0615 (!) 121/101 -- -- (!) 111 25 (!) 84 %   09/25/22 0600 105/66 100.2 °F (37.9 °C) CORE (!) 108 24 94 %   09/25/22 0545 (!) 108/58 -- -- (!) 108 25 95 %   09/25/22 0530 108/62 -- -- (!) 107 24 95 %   09/25/22 0515 (!) 87/40 -- -- 100 24 98 %   09/25/22 0500 (!) 91/55 -- -- 98 24 97 %   09/25/22 0445 (!) 84/45 -- -- 97 24 96 %   09/25/22 0430 (!) 79/44 -- -- 95 24 96 %   09/25/22 0415 (!) 80/44 -- -- 98 24 95 %   09/25/22 0400 (!) 84/50 100.2 °F (37.9 °C) CORE (!) 101 24 95 %   09/25/22 0345 101/79 -- -- (!) 106 24 94 %   09/25/22 0335 -- -- -- (!) 105 25 94 %   09/25/22 0330 (!) 78/46 99.9 °F (37.7 °C) CORE (!) 105 23 93 %   09/25/22 0315 (!) 95/51 -- -- 100 23 93 %   09/25/22 0300 (!) 82/52 -- -- (!) 102 24 93 %   09/25/22 0245 (!) 79/45 -- -- (!) 107 24 92 %   09/25/22 0240 -- -- -- (!) 108 25 93 %   09/25/22 0239 -- -- -- (!) 109 25 92 %   09/25/22 0238 -- -- -- (!) 109 24 92 %   09/25/22 0230 (!) 78/44 -- -- (!) 112 25 92 %   09/25/22 0215 (!) 75/44 -- -- (!) 117 27 92 %   09/25/22 0200 (!) 86/45 99.3 °F (37.4 °C) CORE (!) 118 28 92 %   09/25/22 0145 (!) 86/44 -- -- (!) 118 28 93 %   09/25/22 0130 (!) 88/43 -- -- (!) 120 30 94 %   09/25/22 0115 (!) 83/46 -- -- (!) 121 (!) 31 94 %       Last Body weight:   Wt Readings from Last 3 Encounters:   09/24/22 210 lb (95.3 kg)   06/02/22 226 lb 11.2 oz (102.8 kg)   12/01/21 220 lb (99.8 kg)       Body Mass Index :  Body mass index is 34.95 kg/m². Tmax over 24 hours: Temp (24hrs), Av.9 °F (37.7 °C), Min:98.5 °F (36.9 °C), Max:100.9 °F (38.3 °C)      Ins/Outs:    In: 1401.9 [I.V.:1257.9]  Out: 750 [Urine:750]    PHYSICAL EXAM:  Constitutional: Intubated and sedated  EENT: PERRLA, EOMI, sclera clear, anicteric, oropharynx clear, no lesions  Neck: Supple, symmetrical, trachea midline  Respiratory: equal ventilatory breath sounds  Cardiovascular: regular rate and rhythm, normal S1, S2, no murmur noted and 2+ distal pulses throughout  Abdomen: soft, nontender, nondistended, no masses or organomegaly  Extremities:  peripheral pulses normal, no pedal edema, no clubbing or cyanosis    MEDICATIONS:  Scheduled Meds:   midazolam  2 mg IntraVENous Once    Phenylephrine        Phenylephrine        lactated ringers bolus  1,000 mL IntraVENous Once    sodium chloride flush  5-40 mL IntraVENous 2 times per day    enoxaparin  40 mg SubCUTAneous Daily    piperacillin-tazobactam  4,500 mg IntraVENous Q8H    vancomycin  1,500 mg IntraVENous Q24H    vancomycin (VANCOCIN) intermittent dosing (placeholder)   Other RX Placeholder    budesonide-formoterol  2 puff Inhalation BID    tiotropium  2 puff Inhalation Daily    fentaNYL        propofol        morphine  4 mg IntraVENous Once       Continuous Infusions:   fentaNYL 50 mcg/mL 75 mcg/hr (22)    midazolam      phenylephrine 200 mcg/min (22)    dextrose      norepinephrine 15 mcg/min (22)    vasopressin (Septic Shock) infusion      lactated ringers 125 mL/hr at 22 1848    sodium chloride 100 mL/hr at 22    propofol 5 mcg/kg/min (22)    dexmedetomidine 0.8 mcg/kg/hr (22)       PRN Meds:   glucose, 4 tablet, PRN  dextrose bolus, 125 mL, PRN   Or  dextrose bolus, 250 mL, PRN  glucagon (rDNA), 1 mg, PRN  dextrose, , Continuous PRN  sodium chloride flush, 5-40 mL, PRN  sodium chloride, , PRN  ondansetron, 4 mg, Q8H PRN   Or  ondansetron, 4 mg, Q6H PRN  polyethylene glycol, 17 g, Daily PRN  acetaminophen, 650 mg, Q6H PRN   Or  acetaminophen, 650 mg, Q6H PRN        SUPPORT DEVICES: [x] Ventilator [] BIPAP  [] Nasal Cannula [] Room Air    DATA:  Complete Blood Count:   Recent Labs     09/24/22  1421 09/25/22  0658   WBC 24.8* 30.6*   RBC 4.78 4.76   HGB 12.7 12.8   HCT 40.1 39.8   MCV 83.9 83.6   MCH 26.6 26.9   MCHC 31.7 32.2   RDW 17.1* 17.5*    See Reflexed IPF Result   MPV 10.5  --         Last 3 Blood Glucose:   Recent Labs     09/24/22  1421   GLUCOSE 103*        PT/INR:    Lab Results   Component Value Date/Time    PROTIME 13.9 09/24/2022 02:21 PM    INR 1.4 09/24/2022 02:21 PM     PTT:  No results found for: APTT, PTT    Basic Metabolic Profile:   Recent Labs     09/24/22  1413 09/24/22  1421   NA  --  139   K  --  3.9   CL  --  104   CO2  --  15*   BUN  --  22   CREATININE 1.06 1.05*   GLUCOSE  --  103*       Liver Function:  Recent Labs     09/24/22  1421   PROT 7.5   LABALBU 3.0*   ALT 10   AST 21   ALKPHOS 113*   BILITOT 1.6*       Magnesium:   Lab Results   Component Value Date/Time    MG 1.5 09/24/2022 02:21 PM     Phosphorus: No results found for: PHOS  Ionized Calcium: No results found for: CAMILO     Urinalysis:   Lab Results   Component Value Date/Time    NITRU NEGATIVE 09/24/2022 03:39 PM    COLORU Dark Yellow 09/24/2022 03:39 PM    PHUR 5.5 09/24/2022 03:39 PM    WBCUA TOO NUMEROUS TO COUNT 09/24/2022 03:39 PM    RBCUA 2 TO 5 09/24/2022 03:39 PM    BACTERIA MANY 09/24/2022 03:39 PM    SPECGRAV 1.022 09/24/2022 03:39 PM    LEUKOCYTESUR LARGE 09/24/2022 03:39 PM    UROBILINOGEN Normal 09/24/2022 03:39 PM    BILIRUBINUR NEGATIVE  Verified by ictotest. 09/24/2022 03:39 PM    GLUCOSEU NEGATIVE 09/24/2022 03:39 PM    KETUA TRACE 09/24/2022 03:39 PM       HgBA1c:    Lab Results   Component Value Date/Time    LABA1C 5.7 06/02/2022 09:05 AM     TSH:    Lab Results   Component Value Date/Time    TSH 1.79 05/17/2021 09:10 AM     Lactic Acid: No results found for: LACTA   Troponin: No results for input(s): TROPONINI in the last 72 hours. Radiology/Imaging:  XR CHEST (SINGLE VIEW FRONTAL)   Final Result   Left internal jugular line tip projected over the mid right atrium. OG and endotracheal tubes appear unchanged and in satisfactory position. Pulmonary vasculature may be slightly congested. XR CHEST PORTABLE   Final Result   Endotracheal tube in satisfactory position. XR ABDOMEN FOR NG/OG/NE TUBE PLACEMENT   Final Result   OG tube in satisfactory position. CT CHEST ABDOMEN PELVIS W CONTRAST   Final Result   No acute traumatic abnormality detected within the chest, abdomen, and pelvis. Airspace disease the left lung base, atelectasis versus pneumonia versus   aspiration. CT THORACIC SPINE TRAUMA RECONSTRUCTION   Final Result   No acute traumatic abnormality detected within the chest, abdomen, and pelvis. Airspace disease the left lung base, atelectasis versus pneumonia versus   aspiration. CT LUMBAR SPINE TRAUMA RECONSTRUCTION   Final Result   No acute traumatic abnormality detected within the chest, abdomen, and pelvis. Airspace disease the left lung base, atelectasis versus pneumonia versus   aspiration. CT HEAD WO CONTRAST   Final Result   No acute intracranial abnormality. CT CERVICAL SPINE WO CONTRAST   Final Result   No acute abnormality of the cervical spine. XR CHEST PORTABLE   Final Result   No radiographic evidence of an acute cardiopulmonary process.          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             ASSESSMENT:     Patient Active Problem List    Diagnosis Date Noted    Sepsis ([address] 09/24/2022    Osteoporosis without current pathological fracture 11/19/2019    Chronic left hip pain 07/23/2019    Chronic bilateral low back pain with bilateral sciatica 2019    Prediabetes 2019    Primary osteoarthritis involving multiple joints 2019        PLAN:     PLAN/MEDICAL DECISION MAKING:  Neurologic:   Neuro checks per protocol  Sedation: Propofol, precedex  Paralytic: None  Pain management: Fentanyl  Cardiovascular:  HR: 100-110  MAPs: 70-80  MAP goal >65  Pulmonary:  H/o COPD  Intubated and sedated  Questionable exacerbation - would treat  Solumedrol 40 mg BID. Symbicort. Atrovent nebs  Maintain oxygen sats >92%  Pulmonary toilet  CXR: Chest imaging with concern of left lung base airspace disease, atelectasis versus pneumonia versus aspiration  Abx: Vanc and Zosyn  GI/Nutrition  Bowel regimen: Glycolax  Ulcer Prophylaxis: Black OG Output - protonix 40 BID  Last BM: unknown  Renal/Fluid/Electrolyte  JAXON  Lactic acidosis  Give 1L bolus  IV Fluids: RL at 125 cc/hr  I/O: In: 1401.9 [I.V.:1257.9]  Out: 750 [Urine:750]  UOP: 750 ml overnight  BUN/Cr: 22/1.05  Monitor electrolytes, replace PRN   ID  Septic shock  UTI  WBC: 24>30  Tmax: Temp (24hrs), Av.9 °F (37.7 °C), Min:98.5 °F (36.9 °C), Max:100.9 °F (38.3 °C)  Blood Cx x2: Gram positive cocci in clusters  Antimicrobials: Vanc and Zosyn day 2  Hematology:  H/H:   Endocrine:   Glucose - stable  DVT Prophylaxis  EPC Cuffs  Lovenox    CODE STATUS: Full Code    DISPOSITION:  [x] To remain ICU: yes  [] OK for out of ICU from 20 Carlos Beltrán MD  Internal Medicine Resident, PGY-3  [facility];  [place], [place]  2022,9:02 AM

## 2022-09-25 NOTE — PLAN OF CARE
Problem: Discharge Planning  Goal: Discharge to home or other facility with appropriate resources  Outcome: Progressing     Problem: Respiratory - Adult  Goal: Achieves optimal ventilation and oxygenation  9/25/2022 0554 by Pradip Iraheta RN  Outcome: Progressing  9/25/2022 0142 by Teresita Hubbard RCP  Outcome: Progressing  Flowsheets (Taken 9/25/2022 0142)  Achieves optimal ventilation and oxygenation:   Assess for changes in respiratory status   Assess the need for suctioning and aspirate as needed   Respiratory therapy support as indicated   Assess for changes in mentation and behavior   Oxygen supplementation based on oxygen saturation or arterial blood gases     Problem: Safety - Medical Restraint  Goal: Remains free of injury from restraints (Restraint for Interference with Medical Device)  Description: INTERVENTIONS:  1. Determine that other, less restrictive measures have been tried or would not be effective before applying the restraint  2. Evaluate the patient's condition at the time of restraint application  3. Inform patient/family regarding the reason for restraint  4.  Q2H: Monitor safety, psychosocial status, comfort, nutrition and hydration  Outcome: Progressing

## 2022-09-25 NOTE — PROCEDURES
PROCEDURE NOTE - CENTRAL VENOUS LINE PLACEMENT    PATIENT NAME: [facility]  MEDICAL RECORD NO. [id number]  DATE: 9/25/2022  ATTENDING PHYSICIAN: Dr. Willis Linda DIAGNOSIS:  Need for IV access  POSTOPERATIVE DIAGNOSIS:  Same  PROCEDURE PERFORMED:  Left Internal Jugular Vein Central Line Insertion  PERFORMING PHYSICIAN: Betty Staples MD  ANESTHESIA:  Local utilizing 1% lidocaine  ESTIMATED BLOOD LOSS:  Less than 25 ml  COMPLICATIONS:  None immediately appreciated. DISCUSSION:  Elicia Bull is a 80y.o.-year-old female who requires central IV access. The history and physical examination were reviewed and confirmed. Informed consent was not obtained due to the emergent nature of procedure. The patient was then prepared for the procedure. PROCEDURE:  A timeout was initiated by the bedside nurse and was confirmed by those present. The patient was placed in a supine position. The skin overlying the Left Internal Jugular Vein was prepped with chlorhexadine and draped in sterile fashion. The skin was infiltrated with local anesthetic. The vessel and surrounding anatomy was visualized using ultrasound. Through the anesthetized region, the introducer needle was inserted into the internal jugular vein returning dark red non pulsatile blood. A guidewire was placed through the center of the needle with no resistance. Ultrasound confirmed presence of wire in the vein. A small incision made in the skin with a #11 scalpel blade. The dilator was inserted into the skin and vein over guidewire using Seldinger technique. The dilator was then removed and the catheter was placed in the vein over the guidewire using Seldinger technique. The guidewire was then removed and all ports aspirated and flushed appropriately. The catheter then secured using silk suture and a temporary sterile dressing was applied. No immediate complication was evident.   All sponge, instrument and needle counts were correct at the completion of the procedure. Postprocedural chest x-ray is pending. The patient tolerated the procedure well with no immediate complication evident. Sukhwinder Bojorquez MD  Internal Medicine Resident, PGY-1  [facility];  [place]  6/09/8896,0:31 AM       .

## 2022-09-26 LAB
ABSOLUTE EOS #: 0 K/UL (ref 0–0.4)
ABSOLUTE IMMATURE GRANULOCYTE: 0 K/UL (ref 0–0.3)
ABSOLUTE LYMPH #: 0.33 K/UL (ref 1–4.8)
ABSOLUTE MONO #: 1 K/UL (ref 0.1–0.8)
ALLEN TEST: POSITIVE
ANION GAP SERPL CALCULATED.3IONS-SCNC: 11 MMOL/L (ref 9–17)
BASOPHILS # BLD: 0 % (ref 0–2)
BASOPHILS ABSOLUTE: 0 K/UL (ref 0–0.2)
BUN BLDV-MCNC: 30 MG/DL (ref 8–23)
CALCIUM SERPL-MCNC: 8.6 MG/DL (ref 8.6–10.4)
CHLORIDE BLD-SCNC: 112 MMOL/L (ref 98–107)
CO2: 16 MMOL/L (ref 20–31)
CREAT SERPL-MCNC: 1.04 MG/DL (ref 0.5–0.9)
CULTURE: ABNORMAL
EKG ATRIAL RATE: 141 BPM
EKG ATRIAL RATE: 90 BPM
EKG P AXIS: 55 DEGREES
EKG P-R INTERVAL: 104 MS
EKG P-R INTERVAL: 164 MS
EKG Q-T INTERVAL: 368 MS
EKG Q-T INTERVAL: 392 MS
EKG QRS DURATION: 70 MS
EKG QRS DURATION: 88 MS
EKG QTC CALCULATION (BAZETT): 479 MS
EKG QTC CALCULATION (BAZETT): 563 MS
EKG R AXIS: 13 DEGREES
EKG R AXIS: 53 DEGREES
EKG T AXIS: 31 DEGREES
EKG T AXIS: 48 DEGREES
EKG VENTRICULAR RATE: 141 BPM
EKG VENTRICULAR RATE: 90 BPM
EOSINOPHILS RELATIVE PERCENT: 0 % (ref 1–4)
FIO2: 40
GFR AFRICAN AMERICAN: >60 ML/MIN
GFR NON-AFRICAN AMERICAN: 52 ML/MIN
GFR SERPL CREATININE-BSD FRML MDRD: ABNORMAL ML/MIN/{1.73_M2}
GLUCOSE BLD-MCNC: 138 MG/DL (ref 65–105)
GLUCOSE BLD-MCNC: 154 MG/DL (ref 65–105)
GLUCOSE BLD-MCNC: 190 MG/DL (ref 65–105)
GLUCOSE BLD-MCNC: 217 MG/DL (ref 74–100)
GLUCOSE BLD-MCNC: 219 MG/DL (ref 70–99)
GLUCOSE BLD-MCNC: 77 MG/DL (ref 65–105)
HCT VFR BLD CALC: 39.2 % (ref 36.3–47.1)
HEMOGLOBIN: 12.5 G/DL (ref 11.9–15.1)
IMMATURE GRANULOCYTES: 0 %
LACTIC ACID, WHOLE BLOOD: 1.2 MMOL/L (ref 0.7–2.1)
LACTIC ACID, WHOLE BLOOD: 1.8 MMOL/L (ref 0.7–2.1)
LYMPHOCYTES # BLD: 1 % (ref 24–44)
Lab: ABNORMAL
Lab: ABNORMAL
MAGNESIUM: 2.9 MG/DL (ref 1.6–2.6)
MCH RBC QN AUTO: 26.8 PG (ref 25.2–33.5)
MCHC RBC AUTO-ENTMCNC: 31.9 G/DL (ref 28.4–34.8)
MCV RBC AUTO: 84.1 FL (ref 82.6–102.9)
MODE: ABNORMAL
MONOCYTES # BLD: 3 % (ref 1–7)
MORPHOLOGY: ABNORMAL
NEGATIVE BASE EXCESS, ART: 6 (ref 0–2)
NRBC AUTOMATED: 0 PER 100 WBC
O2 DEVICE/FLOW/%: ABNORMAL
PDW BLD-RTO: 17.8 % (ref 11.8–14.4)
PLATELET # BLD: 126 K/UL (ref 138–453)
PMV BLD AUTO: 11.1 FL (ref 8.1–13.5)
POC HCO3: 21.8 MMOL/L (ref 21–28)
POC LACTIC ACID: 1.29 MMOL/L (ref 0.56–1.39)
POC O2 SATURATION: 96 % (ref 94–98)
POC PCO2: 52.7 MM HG (ref 35–48)
POC PH: 7.22 (ref 7.35–7.45)
POC PO2: 94.7 MM HG (ref 83–108)
POTASSIUM SERPL-SCNC: 4 MMOL/L (ref 3.7–5.3)
RBC # BLD: 4.66 M/UL (ref 3.95–5.11)
SAMPLE SITE: ABNORMAL
SEG NEUTROPHILS: 96 % (ref 36–66)
SEGMENTED NEUTROPHILS ABSOLUTE COUNT: 31.97 K/UL (ref 1.8–7.7)
SODIUM BLD-SCNC: 139 MMOL/L (ref 135–144)
SPECIMEN DESCRIPTION: ABNORMAL
SPECIMEN DESCRIPTION: ABNORMAL
WBC # BLD: 33.3 K/UL (ref 3.5–11.3)

## 2022-09-26 PROCEDURE — 93010 ELECTROCARDIOGRAM REPORT: CPT | Performed by: INTERNAL MEDICINE

## 2022-09-26 PROCEDURE — 82947 ASSAY GLUCOSE BLOOD QUANT: CPT

## 2022-09-26 PROCEDURE — 6360000002 HC RX W HCPCS: Performed by: STUDENT IN AN ORGANIZED HEALTH CARE EDUCATION/TRAINING PROGRAM

## 2022-09-26 PROCEDURE — 85025 COMPLETE CBC W/AUTO DIFF WBC: CPT

## 2022-09-26 PROCEDURE — C9113 INJ PANTOPRAZOLE SODIUM, VIA: HCPCS | Performed by: STUDENT IN AN ORGANIZED HEALTH CARE EDUCATION/TRAINING PROGRAM

## 2022-09-26 PROCEDURE — 80048 BASIC METABOLIC PNL TOTAL CA: CPT

## 2022-09-26 PROCEDURE — 6370000000 HC RX 637 (ALT 250 FOR IP)

## 2022-09-26 PROCEDURE — 36600 WITHDRAWAL OF ARTERIAL BLOOD: CPT

## 2022-09-26 PROCEDURE — 2580000003 HC RX 258: Performed by: STUDENT IN AN ORGANIZED HEALTH CARE EDUCATION/TRAINING PROGRAM

## 2022-09-26 PROCEDURE — 94761 N-INVAS EAR/PLS OXIMETRY MLT: CPT

## 2022-09-26 PROCEDURE — 82803 BLOOD GASES ANY COMBINATION: CPT

## 2022-09-26 PROCEDURE — 99291 CRITICAL CARE FIRST HOUR: CPT | Performed by: INTERNAL MEDICINE

## 2022-09-26 PROCEDURE — 83735 ASSAY OF MAGNESIUM: CPT

## 2022-09-26 PROCEDURE — 6370000000 HC RX 637 (ALT 250 FOR IP): Performed by: INTERNAL MEDICINE

## 2022-09-26 PROCEDURE — 2700000000 HC OXYGEN THERAPY PER DAY

## 2022-09-26 PROCEDURE — 36415 COLL VENOUS BLD VENIPUNCTURE: CPT

## 2022-09-26 PROCEDURE — 94640 AIRWAY INHALATION TREATMENT: CPT

## 2022-09-26 PROCEDURE — 2500000003 HC RX 250 WO HCPCS: Performed by: STUDENT IN AN ORGANIZED HEALTH CARE EDUCATION/TRAINING PROGRAM

## 2022-09-26 PROCEDURE — 2580000003 HC RX 258

## 2022-09-26 PROCEDURE — 2000000000 HC ICU R&B

## 2022-09-26 PROCEDURE — 83605 ASSAY OF LACTIC ACID: CPT

## 2022-09-26 PROCEDURE — 94003 VENT MGMT INPAT SUBQ DAY: CPT

## 2022-09-26 PROCEDURE — 6370000000 HC RX 637 (ALT 250 FOR IP): Performed by: STUDENT IN AN ORGANIZED HEALTH CARE EDUCATION/TRAINING PROGRAM

## 2022-09-26 RX ORDER — SODIUM CHLORIDE 9 MG/ML
INJECTION, SOLUTION INTRAVENOUS CONTINUOUS
Status: DISCONTINUED | OUTPATIENT
Start: 2022-09-26 | End: 2022-09-26

## 2022-09-26 RX ORDER — PREDNISONE 20 MG/1
40 TABLET ORAL DAILY
Status: DISCONTINUED | OUTPATIENT
Start: 2022-09-26 | End: 2022-09-27

## 2022-09-26 RX ADMIN — METHYLPREDNISOLONE SODIUM SUCCINATE 40 MG: 40 INJECTION, POWDER, FOR SOLUTION INTRAMUSCULAR; INTRAVENOUS at 08:15

## 2022-09-26 RX ADMIN — DEXTROSE MONOHYDRATE AND SODIUM CHLORIDE: 5; .9 INJECTION, SOLUTION INTRAVENOUS at 01:11

## 2022-09-26 RX ADMIN — BUDESONIDE AND FORMOTEROL FUMARATE DIHYDRATE 2 PUFF: 160; 4.5 AEROSOL RESPIRATORY (INHALATION) at 19:41

## 2022-09-26 RX ADMIN — ACETAMINOPHEN 650 MG: 325 TABLET ORAL at 14:25

## 2022-09-26 RX ADMIN — PIPERACILLIN AND TAZOBACTAM 4500 MG: 4; .5 INJECTION, POWDER, LYOPHILIZED, FOR SOLUTION INTRAVENOUS at 15:18

## 2022-09-26 RX ADMIN — DEXMEDETOMIDINE HYDROCHLORIDE 1 MCG/KG/HR: 4 INJECTION, SOLUTION INTRAVENOUS at 05:09

## 2022-09-26 RX ADMIN — PROPOFOL 15 MCG/KG/MIN: 10 INJECTION, EMULSION INTRAVENOUS at 02:08

## 2022-09-26 RX ADMIN — IPRATROPIUM BROMIDE 0.5 MG: 0.5 SOLUTION RESPIRATORY (INHALATION) at 10:55

## 2022-09-26 RX ADMIN — SODIUM CHLORIDE, PRESERVATIVE FREE 40 MG: 5 INJECTION INTRAVENOUS at 10:00

## 2022-09-26 RX ADMIN — SODIUM CHLORIDE, PRESERVATIVE FREE 10 ML: 5 INJECTION INTRAVENOUS at 08:16

## 2022-09-26 RX ADMIN — SODIUM CHLORIDE, PRESERVATIVE FREE 10 ML: 5 INJECTION INTRAVENOUS at 20:10

## 2022-09-26 RX ADMIN — BUDESONIDE AND FORMOTEROL FUMARATE DIHYDRATE 2 PUFF: 160; 4.5 AEROSOL RESPIRATORY (INHALATION) at 07:33

## 2022-09-26 RX ADMIN — SODIUM CHLORIDE, PRESERVATIVE FREE 40 MG: 5 INJECTION INTRAVENOUS at 22:36

## 2022-09-26 RX ADMIN — Medication 7 MCG/MIN: at 17:28

## 2022-09-26 RX ADMIN — IPRATROPIUM BROMIDE 0.5 MG: 0.5 SOLUTION RESPIRATORY (INHALATION) at 19:41

## 2022-09-26 RX ADMIN — PIPERACILLIN AND TAZOBACTAM 4500 MG: 4; .5 INJECTION, POWDER, LYOPHILIZED, FOR SOLUTION INTRAVENOUS at 08:22

## 2022-09-26 RX ADMIN — ENOXAPARIN SODIUM 40 MG: 100 INJECTION SUBCUTANEOUS at 20:10

## 2022-09-26 RX ADMIN — DEXMEDETOMIDINE HYDROCHLORIDE 1 MCG/KG/HR: 4 INJECTION, SOLUTION INTRAVENOUS at 07:17

## 2022-09-26 RX ADMIN — DEXMEDETOMIDINE HYDROCHLORIDE 0.8 MCG/KG/HR: 4 INJECTION, SOLUTION INTRAVENOUS at 11:48

## 2022-09-26 RX ADMIN — PROPOFOL 10 MCG/KG/MIN: 10 INJECTION, EMULSION INTRAVENOUS at 17:20

## 2022-09-26 RX ADMIN — Medication 125 MCG/HR: at 20:01

## 2022-09-26 RX ADMIN — IPRATROPIUM BROMIDE 0.5 MG: 0.5 SOLUTION RESPIRATORY (INHALATION) at 07:33

## 2022-09-26 RX ADMIN — Medication 45 MCG/MIN: at 03:56

## 2022-09-26 RX ADMIN — ENOXAPARIN SODIUM 40 MG: 100 INJECTION SUBCUTANEOUS at 08:15

## 2022-09-26 RX ADMIN — PREDNISONE 40 MG: 20 TABLET ORAL at 12:39

## 2022-09-26 RX ADMIN — PIPERACILLIN AND TAZOBACTAM 4500 MG: 4; .5 INJECTION, POWDER, LYOPHILIZED, FOR SOLUTION INTRAVENOUS at 22:40

## 2022-09-26 RX ADMIN — SODIUM CHLORIDE: 9 INJECTION, SOLUTION INTRAVENOUS at 08:20

## 2022-09-26 RX ADMIN — IPRATROPIUM BROMIDE 0.5 MG: 0.5 SOLUTION RESPIRATORY (INHALATION) at 15:29

## 2022-09-26 RX ADMIN — DEXMEDETOMIDINE HYDROCHLORIDE 1 MCG/KG/HR: 4 INJECTION, SOLUTION INTRAVENOUS at 01:09

## 2022-09-26 ASSESSMENT — PULMONARY FUNCTION TESTS
PIF_VALUE: 9
PIF_VALUE: 14
PIF_VALUE: 8
PIF_VALUE: 9
PIF_VALUE: 14
PIF_VALUE: 11
PIF_VALUE: 10

## 2022-09-26 NOTE — PLAN OF CARE
Problem: Discharge Planning  Goal: Discharge to home or other facility with appropriate resources  Outcome: Progressing     Problem: Respiratory - Adult  Goal: Achieves optimal ventilation and oxygenation  9/26/2022 1709 by Mortimer Kinds, RN  Outcome: Progressing  9/26/2022 0829 by Liliana Amin RCP  Outcome: Progressing     Problem: Neurosensory - Adult  Goal: Achieves stable or improved neurological status  Outcome: Progressing  Goal: Absence of seizures  Outcome: Progressing  Goal: Remains free of injury related to seizures activity  Outcome: Progressing  Goal: Achieves maximal functionality and self care  Outcome: Progressing     Problem: Cardiovascular - Adult  Goal: Maintains optimal cardiac output and hemodynamic stability  Outcome: Progressing  Goal: Absence of cardiac dysrhythmias or at baseline  Outcome: Progressing     Problem: Skin/Tissue Integrity - Adult  Goal: Skin integrity remains intact  Outcome: Progressing  Flowsheets (Taken 9/26/2022 1108)  Skin Integrity Remains Intact: Monitor for areas of redness and/or skin breakdown  Goal: Incisions, wounds, or drain sites healing without S/S of infection  Outcome: Progressing  Flowsheets (Taken 9/26/2022 1108)  Incisions, Wounds, or Drain Sites Healing Without Sign and Symptoms of Infection:   TWICE DAILY: Assess and document skin integrity   TWICE DAILY: Assess and document dressing/incision, wound bed, drain sites and surrounding tissue  Goal: Oral mucous membranes remain intact  Outcome: Progressing  Flowsheets (Taken 9/26/2022 1108)  Oral Mucous Membranes Remain Intact: Assess oral mucosa and hygiene practices     Problem: Musculoskeletal - Adult  Goal: Return mobility to safest level of function  Outcome: Progressing  Goal: Maintain proper alignment of affected body part  Outcome: Progressing  Goal: Return ADL status to a safe level of function  Outcome: Progressing     Problem: Gastrointestinal - Adult  Goal: Minimal or absence of nausea and vomiting  Outcome: Progressing  Goal: Maintains or returns to baseline bowel function  Outcome: Progressing  Goal: Maintains adequate nutritional intake  Outcome: Progressing  Goal: Establish and maintain optimal ostomy function  Outcome: Progressing     Problem: Genitourinary - Adult  Goal: Absence of urinary retention  Outcome: Progressing  Goal: Urinary catheter remains patent  Outcome: Progressing     Problem: Infection - Adult  Goal: Absence of infection at discharge  Outcome: Progressing  Goal: Absence of infection during hospitalization  Outcome: Progressing  Goal: Absence of fever/infection during anticipated neutropenic period  Outcome: Progressing     Problem: Metabolic/Fluid and Electrolytes - Adult  Goal: Electrolytes maintained within normal limits  Outcome: Progressing  Goal: Hemodynamic stability and optimal renal function maintained  Outcome: Progressing  Goal: Glucose maintained within prescribed range  Outcome: Progressing     Problem: Hematologic - Adult  Goal: Maintains hematologic stability  Outcome: Progressing     Problem: Safety - Medical Restraint  Goal: Remains free of injury from restraints (Restraint for Interference with Medical Device)  Description: INTERVENTIONS:  1. Determine that other, less restrictive measures have been tried or would not be effective before applying the restraint  2. Evaluate the patient's condition at the time of restraint application  3. Inform patient/family regarding the reason for restraint  4.  Q2H: Monitor safety, psychosocial status, comfort, nutrition and hydration  Outcome: Progressing  Flowsheets  Taken 9/26/2022 1600 by Kasia Núñez RN  Remains free of injury from restraints (restraint for interference with medical device): Every 2 hours: Monitor safety, psychosocial status, comfort, nutrition and hydration  Taken 9/26/2022 1400 by Kasia Núñez RN  Remains free of injury from restraints (restraint for interference with medical device): Every 2 hours: Monitor safety, psychosocial status, comfort, nutrition and hydration  Taken 9/26/2022 1200 by Chauncey Meza RN  Remains free of injury from restraints (restraint for interference with medical device): Every 2 hours: Monitor safety, psychosocial status, comfort, nutrition and hydration  Taken 9/26/2022 1000 by Chaucney Meza RN  Remains free of injury from restraints (restraint for interference with medical device): Every 2 hours: Monitor safety, psychosocial status, comfort, nutrition and hydration  Taken 9/26/2022 0800 by Chauncey Meza RN  Remains free of injury from restraints (restraint for interference with medical device): Every 2 hours: Monitor safety, psychosocial status, comfort, nutrition and hydration  Taken 9/26/2022 0600 by Lolis Finch RN  Remains free of injury from restraints (restraint for interference with medical device): Every 2 hours: Monitor safety, psychosocial status, comfort, nutrition and hydration  Taken 9/26/2022 0400 by Lolis Finch RN  Remains free of injury from restraints (restraint for interference with medical device): Every 2 hours: Monitor safety, psychosocial status, comfort, nutrition and hydration     Problem: Anxiety  Goal: Will report anxiety at manageable levels  Description: INTERVENTIONS:  1. Administer medication as ordered  2. Teach and rehearse alternative coping skills  3. Provide emotional support with 1:1 interaction with staff  Outcome: Progressing     Problem: Coping  Goal: Pt/Family able to verbalize concerns and demonstrate effective coping strategies  Description: INTERVENTIONS:  1. Assist patient/family to identify coping skills, available support systems and cultural and spiritual values  2. Provide emotional support, including active listening and acknowledgement of concerns of patient and caregivers  3. Reduce environmental stimuli, as able  4.  Instruct patient/family in relaxation techniques, as appropriate  5. Assess for spiritual pain/suffering and initiate Spiritual Care, Psychosocial Clinical Specialist consults as needed  Outcome: Progressing     Problem: Decision Making  Goal: Pt/Family able to effectively weigh alternatives and participate in decision making related to treatment and care  Description: INTERVENTIONS:  1. Determine when there are differences between patient's view, family's view, and healthcare provider's view of condition  2. Facilitate patient and family articulation of goals for care  3. Help patient and family identify pros/cons of alternative solutions  4. Provide information as requested by patient/family  5. Respect patient/family right to receive or not to receive information  6. Serve as a liaison between patient and family and health care team  7. Initiate Consults from Ethics, Palliative Care or initiate [address] [place] [address] as is appropriate  Outcome: Progressing     Problem: Confusion  Goal: Confusion, delirium, dementia, or psychosis is improved or at baseline  Description: INTERVENTIONS:  1. Assess for possible contributors to thought disturbance, including medications, impaired vision or hearing, underlying metabolic abnormalities, dehydration, psychiatric diagnoses, and notify attending LIP  2. [place] high risk fall precautions, as indicated  3. Provide frequent short contacts to provide reality reorientation, refocusing and direction  4. Decrease environmental stimuli, including noise as appropriate  5. Monitor and intervene to maintain adequate nutrition, hydration, elimination, sleep and activity  6. If unable to ensure safety without constant attention obtain sitter and review sitter guidelines with assigned personnel  7.  Initiate Psychosocial CNS and Spiritual Care consult, as indicated  Outcome: Progressing     Problem: Behavior  Goal: Pt/Family maintain appropriate behavior and adhere to behavioral management agreement, if implemented  Description: INTERVENTIONS:  1. Assess patient/family's coping skills and  non-compliant behavior (including use of illegal substances)  2. Notify security of behavior or suspected illegal substances which indicate the need for search of the family and/or belongings  3. Encourage verbalization of thoughts and concerns in a socially appropriate manner  4. Utilize positive, consistent limit setting strategies supporting safety of patient, staff and others  5. Encourage participation in the decision making process about the behavioral management agreement  6. If a visitor's behavior poses a threat to safety call refer to organization policy. 7. Initiate consult with , Psychosocial CNS, Spiritual Care as appropriate  Outcome: Progressing     Problem: Skin/Tissue Integrity  Goal: Absence of new skin breakdown  Description: 1. Monitor for areas of redness and/or skin breakdown  2. Assess vascular access sites hourly  3. Every 4-6 hours minimum:  Change oxygen saturation probe site  4. Every 4-6 hours:  If on nasal continuous positive airway pressure, respiratory therapy assess nares and determine need for appliance change or resting period. Outcome: Progressing     Problem: Spiritual Care  Goal: Pt/Family able to move forward in process of forgiving self, others, and/or higher power  Description: INTERVENTIONS:  1. Assist patient/family to overcome blocks to healing by use of spiritual practices (prayer, meditation, guided imagery, reiki, breath work, etc). 2. De-myth guilt and help patient/family identify possible irrational spiritual/cultural beliefs and values. 3. Explore possibilities of making amends & reconciliation with self, others, and/or a greater power. 4. Guide patient/family in identifying painful feelings of guilt. 5. Help patient/famiy explore and identify spiritual beliefs, cultural understandings or values that may help or hinder letting go of issue.   6. Help patient/family explore feelings of anger, bitterness, resentment. 7. Help patient/family identify and examine the situation in which these feelings are experienced. 8. Help patient/family identify destructive displacement of feelings onto other individuals. 9. Invite use of sacraments/rituals/ceremonies as appropriate (e.g. - confession, anointing, smudging). 10. Refer patient/family to formal counseling and/or to [facility] for further support work.   Outcome: Progressing     Problem: Safety - Adult  Goal: Free from fall injury  Outcome: Progressing     Problem: ABCDS Injury Assessment  Goal: Absence of physical injury  Outcome: Progressing     Problem: Nutrition Deficit:  Goal: Optimize nutritional status  Outcome: Progressing

## 2022-09-26 NOTE — PROGRESS NOTES
INTENSIVE CARE UNIT  Resident Physician Progress Note    Patient - Vibha Hancock  Date of Admission -  9/24/2022  2:07 PM  Date of Evaluation -  9/26/2022  Room and Bed Number -  3024/3024-01   Hospital Day - 2    SUBJECTIVE:   HISTORY OF PRESENT ILLNESS:    Vibha Hancock is a 80 y.o. female past medical history of COPD, chronic hip pain, chronic back pain, prediabetes, OA, osteoporosis who presents to the ED after being found by a neighbor stuck in her chair. Patient notes she was stuck in chair for x2 days, but has felt weak x3 days. patient was unable to get up due to weakness and has had significant difficulty ambulating. 911 was called and patient was brought to [facility] ED for further evaluation. Patient was tachypneic, failed BiPAP and ultimately was intubated. Urinalysis suggestive of UTI. OVERNIGHT EVENTS:      No acute events overnight    Continues to be intubated and sedated this morning  Low vent settings: FiO2 40%, PEEP 5  Last blood gas: pH 7.24 PCO2 52, PO2 94, bicarb 21  Sedation: Propofol, Precedex, fentanyl  Requiring vasopressors: Levophed 15, Keanu-Synephrine 10  Blood cultures growing MSSA  Urine cultures gram-negative rods   Labs reviewed;  JAXON improving, creatinine 1.04  Increasing leukocyte count 24> 30> 33 -this could be related to steroids  Tmax 100F    TODAY:     AWAKE & FOLLOWING COMMANDS: []   No  [x]   Yes                           SECRETIONS                 Amount:  [x]   Small []   Moderate []   Large []   None        Color: []   White []   Colored []   Bloody                         SEDATION:                 RAAS Score: [x]   +1 to -1 []   -2 []   -3 []   -4        Sedation Agent: [x]   Propofol gtt []   Versed gtt  []   Ativan gtt  [x]   Precedex gtt        Paralytic Agent: []   Norcuron []   Nimbex [x]   None                         VASOPRESSORS:  []   No  [x]   Yes            Vasopressor Agent [x] Levophed [] Dopamine [] Vasopressin [] Dobutamine [x] Phenylephrine [] Epinephrine                      OBJECTIVE:   VITAL SIGNS:  Patient Vitals for the past 8 hrs:   BP Temp Temp src Pulse Resp SpO2 Weight   09/26/22 0749 -- -- -- 82 23 98 % --   09/26/22 0715 113/67 -- -- 83 22 98 % --   09/26/22 0700 123/75 -- -- 87 24 98 % --   09/26/22 0653 -- -- -- -- -- -- 240 lb 9.6 oz (109.1 kg)   09/26/22 0645 (!) 141/77 -- -- 85 25 99 % --   09/26/22 0630 130/74 -- -- 87 25 99 % --   09/26/22 0615 127/71 -- -- 88 25 98 % --   09/26/22 0600 131/71 100 °F (37.8 °C) Bladder 88 24 99 % --   09/26/22 0545 126/71 -- -- 90 25 98 % --   09/26/22 0533 -- -- -- 89 24 98 % --   09/26/22 0530 -- -- -- 89 25 98 % --   09/26/22 0515 119/78 -- -- 97 26 94 % --   09/26/22 0500 126/70 -- -- 89 24 97 % --   09/26/22 0452 -- -- -- 91 24 97 % --   09/26/22 0445 126/74 -- -- 89 24 97 % --   09/26/22 0430 126/75 -- -- 91 24 97 % --   09/26/22 0415 129/74 -- -- 91 23 97 % --   09/26/22 0400 119/76 99.9 °F (37.7 °C) Bladder 91 24 96 % --   09/26/22 0345 93/79 -- -- 92 25 98 % --   09/26/22 0330 114/65 -- -- 93 25 98 % --   09/26/22 0315 118/62 -- -- 95 24 98 % --   09/26/22 0300 115/64 -- -- 94 24 97 % --   09/26/22 0245 113/64 -- -- 95 23 98 % --   09/26/22 0230 117/66 -- -- 94 23 97 % --   09/26/22 0215 113/62 -- -- 93 24 96 % --   09/26/22 0200 112/62 99.3 °F (37.4 °C) Bladder 94 23 96 % --   09/26/22 0145 114/60 -- -- 94 22 96 % --   09/26/22 0130 (!) 111/58 -- -- 94 23 96 % --   09/26/22 0115 112/60 -- -- 94 22 96 % --   09/26/22 0100 (!) 110/55 -- -- 99 22 96 % --   09/26/22 0045 107/61 -- -- 95 22 96 % --   09/26/22 0030 (!) 109/59 -- -- 95 23 96 % --   09/26/22 0015 113/60 -- -- 94 23 96 % --   09/26/22 0000 114/62 98.8 °F (37.1 °C) Bladder 93 22 96 % --         Last Body weight:   Wt Readings from Last 3 Encounters:   09/26/22 240 lb 9.6 oz (109.1 kg)   06/02/22 226 lb 11.2 oz (102.8 kg)   12/01/21 220 lb (99.8 kg)       Body Mass Index : Body mass index is 40.04 kg/m².       Tmax over 24 hours: Temp (24hrs), Av.9 °F (37.2 °C), Min:97 °F (36.1 °C), Max:100 °F (37.8 °C)      Ins/Outs:    In: 7952.3 [I.V.:5971.9; NG/GT:80]  Out: 2235 [Urine:1835]    PHYSICAL EXAM:  Constitutional: Intubated and sedated  EENT: PERRLA, EOMI, sclera clear, anicteric, oropharynx clear, no lesions  Neck: Supple, symmetrical, trachea midline  Respiratory: equal ventilatory breath sounds  Cardiovascular: regular rate and rhythm, normal S1, S2, no murmur noted and 2+ distal pulses throughout  Abdomen: soft, nontender, nondistended, no masses or organomegaly  Extremities: Some skin ulceration on lower extremities    MEDICATIONS:  Scheduled Meds:   ipratropium  0.5 mg Nebulization 4x daily    methylPREDNISolone  40 mg IntraVENous Q12H    enoxaparin  40 mg SubCUTAneous BID    pantoprazole (PROTONIX) 40 mg injection  40 mg IntraVENous Q12H    sodium chloride flush  5-40 mL IntraVENous 2 times per day    piperacillin-tazobactam  4,500 mg IntraVENous Q8H    budesonide-formoterol  2 puff Inhalation BID       Continuous Infusions:   fentaNYL 50 mcg/mL 75 mcg/hr (22)    midazolam      phenylephrine 20 mcg/min (22)    dextrose      norepinephrine 15 mcg/min (22)    IV infusion builder 100 mL/hr at 22 011    sodium chloride 100 mL/hr at 22    propofol 10 mcg/kg/min (22)    dexmedetomidine 1 mcg/kg/hr (22)       PRN Meds:   glucose, 4 tablet, PRN  dextrose bolus, 125 mL, PRN   Or  dextrose bolus, 250 mL, PRN  glucagon (rDNA), 1 mg, PRN  dextrose, , Continuous PRN  sodium chloride flush, 5-40 mL, PRN  sodium chloride, , PRN  ondansetron, 4 mg, Q8H PRN   Or  ondansetron, 4 mg, Q6H PRN  polyethylene glycol, 17 g, Daily PRN  acetaminophen, 650 mg, Q6H PRN   Or  acetaminophen, 650 mg, Q6H PRN      SUPPORT DEVICES: [x] Ventilator [] BIPAP  [] Nasal Cannula [] Room Air    DATA:  Complete Blood Count:   Recent Labs     22  1421 22  0658 22  0451   WBC 24.8* 30.6* 33.3*   RBC 4.78 4.76 4.66   HGB 12.7 12.8 12.5   HCT 40.1 39.8 39.2   MCV 83.9 83.6 84.1   MCH 26.6 26.9 26.8   MCHC 31.7 32.2 31.9   RDW 17.1* 17.5* 17.8*    See Reflexed IPF Result 126*   MPV 10.5  --  11.1          Last 3 Blood Glucose:   Recent Labs     09/24/22  1421 09/25/22  0658 09/26/22  0451   GLUCOSE 103* 72 219*          PT/INR:    Lab Results   Component Value Date/Time    PROTIME 13.9 09/24/2022 02:21 PM    INR 1.4 09/24/2022 02:21 PM     PTT:  No results found for: APTT, PTT    Basic Metabolic Profile:   Recent Labs     09/24/22  1421 09/25/22  0658 09/26/22  0451    138 139   K 3.9 3.7 4.0    106 112*   CO2 15* 14* 16*   BUN 22 28* 30*   CREATININE 1.05* 1.32* 1.04*   GLUCOSE 103* 72 219*         Liver Function:  Recent Labs     09/24/22  1421   PROT 7.5   LABALBU 3.0*   ALT 10   AST 21   ALKPHOS 113*   BILITOT 1.6*         Magnesium:   Lab Results   Component Value Date/Time    MG 1.5 09/24/2022 02:21 PM     Phosphorus: No results found for: PHOS  Ionized Calcium: No results found for: CAION     Urinalysis:   Lab Results   Component Value Date/Time    NITRU NEGATIVE 09/24/2022 03:39 PM    COLORU Dark Yellow 09/24/2022 03:39 PM    PHUR 5.5 09/24/2022 03:39 PM    WBCUA TOO NUMEROUS TO COUNT 09/24/2022 03:39 PM    RBCUA 2 TO 5 09/24/2022 03:39 PM    BACTERIA MANY 09/24/2022 03:39 PM    SPECGRAV 1.022 09/24/2022 03:39 PM    LEUKOCYTESUR LARGE 09/24/2022 03:39 PM    UROBILINOGEN Normal 09/24/2022 03:39 PM    BILIRUBINUR NEGATIVE  Verified by ictotest. 09/24/2022 03:39 PM    GLUCOSEU NEGATIVE 09/24/2022 03:39 PM    KETUA TRACE 09/24/2022 03:39 PM       HgBA1c:    Lab Results   Component Value Date/Time    LABA1C 5.7 06/02/2022 09:05 AM     TSH:    Lab Results   Component Value Date/Time    TSH 1.79 05/17/2021 09:10 AM     Lactic Acid: No results found for: LACTA   Troponin: No results for input(s): TROPONINI in the last 72 hours.     Radiology/Imaging:  XR CHEST (SINGLE VIEW FRONTAL) Final Result   Left internal jugular line tip projected over the mid right atrium. OG and endotracheal tubes appear unchanged and in satisfactory position. Pulmonary vasculature may be slightly congested. XR CHEST PORTABLE   Final Result   Endotracheal tube in satisfactory position. XR ABDOMEN FOR NG/OG/NE TUBE PLACEMENT   Final Result   OG tube in satisfactory position. CT CHEST ABDOMEN PELVIS W CONTRAST   Final Result   No acute traumatic abnormality detected within the chest, abdomen, and pelvis. Airspace disease the left lung base, atelectasis versus pneumonia versus   aspiration. CT THORACIC SPINE TRAUMA RECONSTRUCTION   Final Result   No acute traumatic abnormality detected within the chest, abdomen, and pelvis. Airspace disease the left lung base, atelectasis versus pneumonia versus   aspiration. CT LUMBAR SPINE TRAUMA RECONSTRUCTION   Final Result   No acute traumatic abnormality detected within the chest, abdomen, and pelvis. Airspace disease the left lung base, atelectasis versus pneumonia versus   aspiration. CT HEAD WO CONTRAST   Final Result   No acute intracranial abnormality. CT CERVICAL SPINE WO CONTRAST   Final Result   No acute abnormality of the cervical spine. XR CHEST PORTABLE   Final Result   No radiographic evidence of an acute cardiopulmonary process.          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             ASSESSMENT:     Patient Active Problem List    Diagnosis Date Noted    MSSA bacteremia 09/25/2022    Septicemia ([facility] [address] 09/24/2022    Osteoporosis without current pathological fracture 11/19/2019    Chronic left hip pain 07/23/2019    Chronic bilateral low back pain with bilateral sciatica 07/23/2019    Prediabetes 07/23/2019    Primary osteoarthritis involving multiple joints 07/23/2019        PLAN: PLAN/MEDICAL DECISION MAKING:  Neurologic:   Neuro checks per protocol  Sedation: Propofol, precedex  Paralytic: None  Pain management: Fentanyl  Cardiovascular:  HR: 100-110  MAPs: 70-80  MAP goal >65  Pulmonary:  H/o COPD  Intubated and sedated  Questionable exacerbation - would treat  Change Solumedrol to prednisone. Symbicort. Atrovent nebs  Maintain oxygen sats >92%  Pulmonary toilet  CXR: Chest imaging with concern of left lung base airspace disease, atelectasis versus pneumonia versus aspiration  Daily sedation holiday  Spontaneous breathing trial daily  GI/Nutrition  Bowel regimen: Glycolax  Ulcer Prophylaxis: Protonix  No more black NG output  Start tube feed  Last BM: unknown  Renal/Fluid/Electrolyte  JAXON  Lactic acidosis  IV Fluids: dc IVF  I/O: In: 7952.3 [I.V.:5971.9; NG/GT:80]  Out: 2235 [Urine:1835]  UOP: 1.8 L last 24 hours  BUN/Cr: 30/1.04  Monitor electrolytes, replace PRN   ID  Septic shock  UTI  WBC: 24>30>33  Tmax: Temp (24hrs), Av.9 °F (37.2 °C), Min:97 °F (36.1 °C), Max:100 °F (37.8 °C)  Blood Cx x2: MSSA  Urine Cx: Gram-negative rods  Antimicrobials: Zosyn to cover for both MSSA bacteremia and urine gram-negative rods - day 3  Hematology:  H/H:   Endocrine:   Glucose - hypoglycemia resolved - change fluids to NS  DVT Prophylaxis  EPC Cuffs  Lovenox    CODE STATUS: Full Code    DISPOSITION:  [x] To remain ICU: yes  [] OK for out of ICU from Critical Care standpoint    Amrit Magana MD  Internal Medicine Resident, PGY-3  [facility];  [place], [place]  2022,7:57 AM

## 2022-09-26 NOTE — PLAN OF CARE
Problem: Discharge Planning  Goal: Discharge to home or other facility with appropriate resources  9/26/2022 0126 by Julius Tripathi RN  Outcome: Progressing  9/25/2022 1901 by Dane Tafoya RN  Outcome: Progressing     Problem: Respiratory - Adult  Goal: Achieves optimal ventilation and oxygenation  9/26/2022 0126 by Julius Tripathi RN  Outcome: Progressing  9/25/2022 2300 by Moris Nunez RCP  Outcome: Progressing  Flowsheets (Taken 9/25/2022 2300)  Achieves optimal ventilation and oxygenation:   Assess for changes in respiratory status   Assess for changes in mentation and behavior   Position to facilitate oxygenation and minimize respiratory effort   Oxygen supplementation based on oxygen saturation or arterial blood gases   Initiate smoking cessation protocol as indicated   Encourage broncho-pulmonary hygiene including cough, deep breathe, incentive spirometry   Assess the need for suctioning and aspirate as needed   Respiratory therapy support as indicated   Assess and instruct to report shortness of breath or any respiratory difficulty  9/25/2022 1901 by Dane Tafoya RN  Outcome: Progressing     Problem: Safety - Medical Restraint  Goal: Remains free of injury from restraints (Restraint for Interference with Medical Device)  Description: INTERVENTIONS:  1. Determine that other, less restrictive measures have been tried or would not be effective before applying the restraint  2. Evaluate the patient's condition at the time of restraint application  3. Inform patient/family regarding the reason for restraint  4.  Q2H: Monitor safety, psychosocial status, comfort, nutrition and hydration  9/26/2022 0126 by Julius Tripathi RN  Outcome: Progressing  Flowsheets  Taken 9/26/2022 0000  Remains free of injury from restraints (restraint for interference with medical device): Every 2 hours: Monitor safety, psychosocial status, comfort, nutrition and hydration  Taken 9/25/2022 2200  Remains free of injury from restraints (restraint for interference with medical device): Every 2 hours: Monitor safety, psychosocial status, comfort, nutrition and hydration  Taken 9/25/2022 2000  Remains free of injury from restraints (restraint for interference with medical device): Every 2 hours: Monitor safety, psychosocial status, comfort, nutrition and hydration  9/25/2022 1901 by Jane Restrepo RN  Outcome: Progressing  Flowsheets  Taken 9/25/2022 1800  Remains free of injury from restraints (restraint for interference with medical device): Every 2 hours: Monitor safety, psychosocial status, comfort, nutrition and hydration  Taken 9/25/2022 1600  Remains free of injury from restraints (restraint for interference with medical device): Every 2 hours: Monitor safety, psychosocial status, comfort, nutrition and hydration  Taken 9/25/2022 1400  Remains free of injury from restraints (restraint for interference with medical device): Every 2 hours: Monitor safety, psychosocial status, comfort, nutrition and hydration  Taken 9/25/2022 1200  Remains free of injury from restraints (restraint for interference with medical device): Every 2 hours: Monitor safety, psychosocial status, comfort, nutrition and hydration  Taken 9/25/2022 1000  Remains free of injury from restraints (restraint for interference with medical device): Every 2 hours: Monitor safety, psychosocial status, comfort, nutrition and hydration  Taken 9/25/2022 0800  Remains free of injury from restraints (restraint for interference with medical device): Every 2 hours: Monitor safety, psychosocial status, comfort, nutrition and hydration     Problem: Neurosensory - Adult  Goal: Achieves stable or improved neurological status  9/26/2022 0126 by Holly Dutton RN  Outcome: Progressing  9/25/2022 1901 by Jane Restrepo RN  Outcome: Progressing  Goal: Absence of seizures  9/26/2022 0126 by Holly Dutton RN  Outcome: Progressing  9/25/2022 1901 by Jane Restrepo RN  Outcome: Progressing  Goal: Remains free of injury related to seizures activity  9/26/2022 0126 by Chris Amaya RN  Outcome: Progressing  9/25/2022 1901 by Kasia Núñez RN  Outcome: Progressing  Goal: Achieves maximal functionality and self care  9/26/2022 0126 by Chris Amaya RN  Outcome: Progressing  9/25/2022 1901 by Kasia Núñez RN  Outcome: Progressing     Problem: Cardiovascular - Adult  Goal: Maintains optimal cardiac output and hemodynamic stability  9/26/2022 0126 by Chris Amaya RN  Outcome: Progressing  9/25/2022 1901 by Kasia Núñez RN  Outcome: Progressing  Goal: Absence of cardiac dysrhythmias or at baseline  9/26/2022 0126 by Chris Amaya RN  Outcome: Progressing  9/25/2022 1901 by Kasia Núñez RN  Outcome: Progressing     Problem: Skin/Tissue Integrity - Adult  Goal: Skin integrity remains intact  9/26/2022 0126 by Chris Amaya RN  Outcome: Progressing  9/25/2022 1901 by Kasia Núñez RN  Outcome: Progressing  Flowsheets (Taken 9/25/2022 0900)  Skin Integrity Remains Intact: Monitor for areas of redness and/or skin breakdown  Goal: Incisions, wounds, or drain sites healing without S/S of infection  9/26/2022 0126 by Chris Amaya RN  Outcome: Progressing  9/25/2022 1901 by Kasia Núñez RN  Outcome: Progressing  Flowsheets (Taken 9/25/2022 0900)  Incisions, Wounds, or Drain Sites Healing Without Sign and Symptoms of Infection:   TWICE DAILY: Assess and document skin integrity   TWICE DAILY: Assess and document dressing/incision, wound bed, drain sites and surrounding tissue  Goal: Oral mucous membranes remain intact  9/26/2022 0126 by Chris Amaya RN  Outcome: Progressing  9/25/2022 1901 by Kasia Núñez RN  Outcome: Progressing  Flowsheets (Taken 9/25/2022 0900)  Oral Mucous Membranes Remain Intact: Assess oral mucosa and hygiene practices     Problem: Musculoskeletal - Adult  Goal: Return mobility to safest level of RN  Outcome: Progressing  Goal: Absence of fever/infection during anticipated neutropenic period  9/26/2022 0126 by Elizabeth Borjas RN  Outcome: Progressing  9/25/2022 1901 by James Joy RN  Outcome: Progressing     Problem: Metabolic/Fluid and Electrolytes - Adult  Goal: Electrolytes maintained within normal limits  9/26/2022 0126 by Elizabeth Borjas RN  Outcome: Progressing  9/25/2022 1901 by James Joy RN  Outcome: Progressing  Goal: Hemodynamic stability and optimal renal function maintained  9/26/2022 0126 by Elizabeth Borjas RN  Outcome: Progressing  9/25/2022 1901 by James Joy RN  Outcome: Progressing  Goal: Glucose maintained within prescribed range  9/26/2022 0126 by Elizabeth Borjas RN  Outcome: Progressing  9/25/2022 1901 by James Joy RN  Outcome: Progressing     Problem: Hematologic - Adult  Goal: Maintains hematologic stability  9/26/2022 0126 by Elizabeth Borjas RN  Outcome: Progressing  9/25/2022 1901 by James Joy RN  Outcome: Progressing     Problem: Anxiety  Goal: Will report anxiety at manageable levels  Description: INTERVENTIONS:  1. Administer medication as ordered  2. Teach and rehearse alternative coping skills  3. Provide emotional support with 1:1 interaction with staff  9/26/2022 0126 by Elizabeth Borjas RN  Outcome: Progressing  9/25/2022 1901 by James Joy RN  Outcome: Progressing     Problem: Coping  Goal: Pt/Family able to verbalize concerns and demonstrate effective coping strategies  Description: INTERVENTIONS:  1. Assist patient/family to identify coping skills, available support systems and cultural and spiritual values  2. Provide emotional support, including active listening and acknowledgement of concerns of patient and caregivers  3. Reduce environmental stimuli, as able  4. Instruct patient/family in relaxation techniques, as appropriate  5.  Assess for spiritual pain/suffering and initiate Spiritual Care, Psychosocial Clinical Specialist consults as needed  9/26/2022 0126 by Lakeisha Montalvo RN  Outcome: Progressing  9/25/2022 1901 by Shaw Villalobos RN  Outcome: Progressing     Problem: Decision Making  Goal: Pt/Family able to effectively weigh alternatives and participate in decision making related to treatment and care  Description: INTERVENTIONS:  1. Determine when there are differences between patient's view, family's view, and healthcare provider's view of condition  2. Facilitate patient and family articulation of goals for care  3. Help patient and family identify pros/cons of alternative solutions  4. Provide information as requested by patient/family  5. Respect patient/family right to receive or not to receive information  6. Serve as a liaison between patient and family and health care team  7. Initiate Consults from Ethics, Palliative Care or initiate [address] [place] [address] as is appropriate  9/26/2022 0126 by Lakeisha Montalvo RN  Outcome: Progressing  9/25/2022 1901 by Shaw Villalobos RN  Outcome: Progressing     Problem: Confusion  Goal: Confusion, delirium, dementia, or psychosis is improved or at baseline  Description: INTERVENTIONS:  1. Assess for possible contributors to thought disturbance, including medications, impaired vision or hearing, underlying metabolic abnormalities, dehydration, psychiatric diagnoses, and notify attending LIP  2. Tarzan high risk fall precautions, as indicated  3. Provide frequent short contacts to provide reality reorientation, refocusing and direction  4. Decrease environmental stimuli, including noise as appropriate  5. Monitor and intervene to maintain adequate nutrition, hydration, elimination, sleep and activity  6. If unable to ensure safety without constant attention obtain sitter and review sitter guidelines with assigned personnel  7.  Initiate Psychosocial CNS and Spiritual Care consult, as indicated  9/26/2022 0126 by Lakeisha Montalvo RN  Outcome: Progressing  9/25/2022 1901 by Dwayne Morrison RN  Outcome: Progressing     Problem: Behavior  Goal: Pt/Family maintain appropriate behavior and adhere to behavioral management agreement, if implemented  Description: INTERVENTIONS:  1. Assess patient/family's coping skills and  non-compliant behavior (including use of illegal substances)  2. Notify security of behavior or suspected illegal substances which indicate the need for search of the family and/or belongings  3. Encourage verbalization of thoughts and concerns in a socially appropriate manner  4. Utilize positive, consistent limit setting strategies supporting safety of patient, staff and others  5. Encourage participation in the decision making process about the behavioral management agreement  6. If a visitor's behavior poses a threat to safety call refer to organization policy. 7. Initiate consult with , Psychosocial CNS, Spiritual Care as appropriate  9/26/2022 0126 by Kira Webster RN  Outcome: Progressing  9/25/2022 1901 by Dwayne Morrison RN  Outcome: Progressing     Problem: Spiritual Care  Goal: Pt/Family able to move forward in process of forgiving self, others, and/or higher power  Description: INTERVENTIONS:  1. Assist patient/family to overcome blocks to healing by use of spiritual practices (prayer, meditation, guided imagery, reiki, breath work, etc). 2. De-myth guilt and help patient/family identify possible irrational spiritual/cultural beliefs and values. 3. Explore possibilities of making amends & reconciliation with self, others, and/or a greater power. 4. Guide patient/family in identifying painful feelings of guilt. 5. Help patient/famiy explore and identify spiritual beliefs, cultural understandings or values that may help or hinder letting go of issue. 6. Help patient/family explore feelings of anger, bitterness, resentment.   7. Help patient/family identify and examine the situation in which these feelings are experienced. 8. Help patient/family identify destructive displacement of feelings onto other individuals. 9. Invite use of sacraments/rituals/ceremonies as appropriate (e.g. - confession, anointing, smudging). 10. Refer patient/family to formal counseling and/or to [facility] for further support work.   9/26/2022 0126 by Camryn Hardy RN  Outcome: Progressing  9/25/2022 1901 by Karen Sauer RN  Outcome: Progressing

## 2022-09-26 NOTE — PLAN OF CARE
Problem: Respiratory - Adult  Goal: Achieves optimal ventilation and oxygenation  9/26/2022 1950 by Martina Barber RCP  Outcome: Progressing  Flowsheets (Taken 9/26/2022 1950)  Achieves optimal ventilation and oxygenation:   Assess for changes in respiratory status   Assess for changes in mentation and behavior   Position to facilitate oxygenation and minimize respiratory effort   Oxygen supplementation based on oxygen saturation or arterial blood gases   Initiate smoking cessation protocol as indicated   Encourage broncho-pulmonary hygiene including cough, deep breathe, incentive spirometry   Assess the need for suctioning and aspirate as needed   Assess and instruct to report shortness of breath or any respiratory difficulty   Respiratory therapy support as indicated

## 2022-09-26 NOTE — PLAN OF CARE
Problem: Respiratory - Adult  Goal: Achieves optimal ventilation and oxygenation  9/26/2022 0829 by Melba Peacock RCP  Outcome: Progressing   BRONCHOSPASM/BRONCHOCONSTRICTION     [x]         IMPROVE AERATION/BREATH SOUNDS  [x]   ADMINISTER BRONCHODILATOR THERAPY AS APPROPRIATE  [x]   ASSESS BREATH SOUNDS  []   IMPLEMENT AEROSOL/MDI PROTOCOL  [x]   PATIENT EDUCATION AS NEEDED     Problem: OXYGENATION/RESPIRATORY FUNCTION  Goal: Patient will maintain patent airway  Outcome: Ongoing  Goal: Patient will achieve/maintain normal respiratory rate/effort  Respiratory rate and effort will be within normal limits for the patient  Outcome: Ongoing    Problem: MECHANICAL VENTILATION  Goal: Patient will maintain patent airway  Outcome: Ongoing  Goal: Oral health is maintained or improved  Outcome: Ongoing  Goal: ET tube will be managed safely  Outcome: Ongoing  Goal: Ability to express needs and understand communication  Outcome: Ongoing  Goal: Mobility/activity is maintained at optimum level for patient  Outcome: Ongoing    Problem: ASPIRATION PRECAUTIONS  Goal: Patients risk of aspiration is minimized  Outcome: Ongoing    Problem: SKIN INTEGRITY  Goal: Skin integrity is maintained or improved  Outcome: Ongoing

## 2022-09-26 NOTE — PLAN OF CARE
Problem: Respiratory - Adult  Goal: Achieves optimal ventilation and oxygenation  9/25/2022 2300 by Yesenia Campuzano RCP  Outcome: Progressing  Flowsheets (Taken 9/25/2022 2300)  Achieves optimal ventilation and oxygenation:   Assess for changes in respiratory status   Assess for changes in mentation and behavior   Position to facilitate oxygenation and minimize respiratory effort   Oxygen supplementation based on oxygen saturation or arterial blood gases   Initiate smoking cessation protocol as indicated   Encourage broncho-pulmonary hygiene including cough, deep breathe, incentive spirometry   Assess the need for suctioning and aspirate as needed   Respiratory therapy support as indicated   Assess and instruct to report shortness of breath or any respiratory difficulty

## 2022-09-26 NOTE — PROGRESS NOTES
Comprehensive Nutrition Assessment    Type and Reason for Visit:  Initial, Consult (TF ordering/management)    Nutrition Recommendations/Plan:   Start Tube Feeding; Diabetic Formula goal 40 mL/hr to provide 1440 kcal and 79 g pro/day. Will monitor TF tolerance/adequacy, labs, meds, and care plans -modify feeds as needed. Malnutrition Assessment:  Malnutrition Status:  Insufficient data (09/26/22 1149)      Nutrition Assessment:    Pt admitted with weakness, difficulty ambulating, shortness of breath, UTI. PMH includes: COPD, chronic hip/back pain, OA, osteoporosis, prediabetes. Pt is currently intubated. No nutrition at present, but plan to start TF today. RD consulted for TF ordering/management. Meds/labs reviewed. Nutrition Related Findings:    meds/labs reviewed Wound Type: None       Current Nutrition Intake & Therapies:    No diet orders on file  Additional Calorie Sources:  Propofol at 5.7 mL/hr =150 kcal/day    Anthropometric Measures:  Height: 5' 5\" (165.1 cm)  Ideal Body Weight (IBW): 125 lbs (57 kg)    Admission Body Weight: 240 lb 9.6 oz (109.1 kg)  Current Body Weight: 240 lb 9.6 oz (109.1 kg), 192.5 % IBW.  Weight Source: Bed Scale  Current BMI (kg/m2): 40                          BMI Categories: Obese Class 3 (BMI 40.0 or greater)    Estimated Daily Nutrient Needs:  Energy Requirements Based On: Kcal/kg  Weight Used for Energy Requirements: Admission  Energy (kcal/day): 2041-9628 kcal/day  Weight Used for Protein Requirements: Ideal  Protein (g/day):  g pro/day  Method Used for Fluid Requirements: Other (Comment)  Fluid (ml/day): per MD    Nutrition Diagnosis:   Inadequate oral intake related to impaired respiratory function as evidenced by NPO or clear liquid status due to medical condition,need for enteral nutrition support    Nutrition Interventions:   Food and/or Nutrient Delivery: Start Tube Feeding  Nutrition Education/Counseling: No recommendation at this time  Coordination of Nutrition Care: Continue to monitor while inpatient       Goals:     Goals: Meet at least 75% of estimated needs, within 7 days       Nutrition Monitoring and Evaluation:   Behavioral-Environmental Outcomes: None Identified  Food/Nutrient Intake Outcomes: Enteral Nutrition Intake/Tolerance  Physical Signs/Symptoms Outcomes: Nutrition Focused Physical Findings, Biochemical Data, Weight, Skin, Fluid Status or Edema    Discharge Planning:     Too soon to determine     [address], [place]  Contact: [phone]

## 2022-09-27 ENCOUNTER — APPOINTMENT (OUTPATIENT)
Dept: GENERAL RADIOLOGY | Age: 72
DRG: 871 | End: 2022-09-27
Payer: COMMERCIAL

## 2022-09-27 LAB
ABSOLUTE EOS #: 0 K/UL (ref 0–0.4)
ABSOLUTE IMMATURE GRANULOCYTE: 0 K/UL (ref 0–0.3)
ABSOLUTE LYMPH #: 0.2 K/UL (ref 1–4.8)
ABSOLUTE MONO #: 0.2 K/UL (ref 0.1–0.8)
ALLEN TEST: POSITIVE
ALLEN TEST: POSITIVE
ANION GAP SERPL CALCULATED.3IONS-SCNC: 9 MMOL/L (ref 9–17)
BASOPHILS # BLD: 0 % (ref 0–2)
BASOPHILS ABSOLUTE: 0 K/UL (ref 0–0.2)
BUN BLDV-MCNC: 42 MG/DL (ref 8–23)
CALCIUM SERPL-MCNC: 8.5 MG/DL (ref 8.6–10.4)
CHLORIDE BLD-SCNC: 112 MMOL/L (ref 98–107)
CO2: 22 MMOL/L (ref 20–31)
CREAT SERPL-MCNC: 0.98 MG/DL (ref 0.5–0.9)
CULTURE: ABNORMAL
EOSINOPHILS RELATIVE PERCENT: 0 % (ref 1–4)
FIO2: 30
FIO2: 40
GFR AFRICAN AMERICAN: >60 ML/MIN
GFR NON-AFRICAN AMERICAN: 56 ML/MIN
GFR SERPL CREATININE-BSD FRML MDRD: ABNORMAL ML/MIN/{1.73_M2}
GLUCOSE BLD-MCNC: 175 MG/DL (ref 74–100)
GLUCOSE BLD-MCNC: 180 MG/DL (ref 70–99)
GLUCOSE BLD-MCNC: 182 MG/DL (ref 74–100)
HCT VFR BLD CALC: 32.5 % (ref 36.3–47.1)
HEMOGLOBIN: 10.4 G/DL (ref 11.9–15.1)
IMMATURE GRANULOCYTES: 0 %
LV EF: 55 %
LVEF MODALITY: NORMAL
LYMPHOCYTES # BLD: 1 % (ref 24–44)
MAGNESIUM: 2.6 MG/DL (ref 1.6–2.6)
MCH RBC QN AUTO: 26.9 PG (ref 25.2–33.5)
MCHC RBC AUTO-ENTMCNC: 32 G/DL (ref 28.4–34.8)
MCV RBC AUTO: 84.2 FL (ref 82.6–102.9)
MODE: ABNORMAL
MODE: ABNORMAL
MONOCYTES # BLD: 1 % (ref 1–7)
MORPHOLOGY: ABNORMAL
MORPHOLOGY: ABNORMAL
NEGATIVE BASE EXCESS, ART: 2 (ref 0–2)
NEGATIVE BASE EXCESS, ART: 3 (ref 0–2)
NRBC AUTOMATED: 0 PER 100 WBC
O2 DEVICE/FLOW/%: ABNORMAL
O2 DEVICE/FLOW/%: ABNORMAL
PATIENT TEMP: 37
PDW BLD-RTO: 18 % (ref 11.8–14.4)
PLATELET # BLD: 88 K/UL (ref 138–453)
PMV BLD AUTO: 11.6 FL (ref 8.1–13.5)
POC HCO3: 24.1 MMOL/L (ref 21–28)
POC HCO3: 24.7 MMOL/L (ref 21–28)
POC O2 SATURATION: 95 % (ref 94–98)
POC O2 SATURATION: 95 % (ref 94–98)
POC PCO2: 44.8 MM HG (ref 35–48)
POC PCO2: 53.9 MM HG (ref 35–48)
POC PH: 7.27 (ref 7.35–7.45)
POC PH: 7.34 (ref 7.35–7.45)
POC PO2: 78.8 MM HG (ref 83–108)
POC PO2: 88.9 MM HG (ref 83–108)
POTASSIUM SERPL-SCNC: 3.5 MMOL/L (ref 3.7–5.3)
RBC # BLD: 3.86 M/UL (ref 3.95–5.11)
SAMPLE SITE: ABNORMAL
SAMPLE SITE: ABNORMAL
SEG NEUTROPHILS: 98 % (ref 36–66)
SEGMENTED NEUTROPHILS ABSOLUTE COUNT: 19.3 K/UL (ref 1.8–7.7)
SODIUM BLD-SCNC: 143 MMOL/L (ref 135–144)
SPECIMEN DESCRIPTION: ABNORMAL
WBC # BLD: 19.7 K/UL (ref 3.5–11.3)

## 2022-09-27 PROCEDURE — 2580000003 HC RX 258: Performed by: STUDENT IN AN ORGANIZED HEALTH CARE EDUCATION/TRAINING PROGRAM

## 2022-09-27 PROCEDURE — 36600 WITHDRAWAL OF ARTERIAL BLOOD: CPT

## 2022-09-27 PROCEDURE — 36415 COLL VENOUS BLD VENIPUNCTURE: CPT

## 2022-09-27 PROCEDURE — 82803 BLOOD GASES ANY COMBINATION: CPT

## 2022-09-27 PROCEDURE — 83735 ASSAY OF MAGNESIUM: CPT

## 2022-09-27 PROCEDURE — C9113 INJ PANTOPRAZOLE SODIUM, VIA: HCPCS | Performed by: STUDENT IN AN ORGANIZED HEALTH CARE EDUCATION/TRAINING PROGRAM

## 2022-09-27 PROCEDURE — 71045 X-RAY EXAM CHEST 1 VIEW: CPT

## 2022-09-27 PROCEDURE — 2000000000 HC ICU R&B

## 2022-09-27 PROCEDURE — 82947 ASSAY GLUCOSE BLOOD QUANT: CPT

## 2022-09-27 PROCEDURE — 80048 BASIC METABOLIC PNL TOTAL CA: CPT

## 2022-09-27 PROCEDURE — 6360000002 HC RX W HCPCS: Performed by: STUDENT IN AN ORGANIZED HEALTH CARE EDUCATION/TRAINING PROGRAM

## 2022-09-27 PROCEDURE — 6370000000 HC RX 637 (ALT 250 FOR IP): Performed by: STUDENT IN AN ORGANIZED HEALTH CARE EDUCATION/TRAINING PROGRAM

## 2022-09-27 PROCEDURE — 85025 COMPLETE CBC W/AUTO DIFF WBC: CPT

## 2022-09-27 PROCEDURE — 2700000000 HC OXYGEN THERAPY PER DAY

## 2022-09-27 PROCEDURE — 94003 VENT MGMT INPAT SUBQ DAY: CPT

## 2022-09-27 PROCEDURE — 94640 AIRWAY INHALATION TREATMENT: CPT

## 2022-09-27 PROCEDURE — 2500000003 HC RX 250 WO HCPCS: Performed by: STUDENT IN AN ORGANIZED HEALTH CARE EDUCATION/TRAINING PROGRAM

## 2022-09-27 PROCEDURE — 93306 TTE W/DOPPLER COMPLETE: CPT

## 2022-09-27 PROCEDURE — 6370000000 HC RX 637 (ALT 250 FOR IP): Performed by: INTERNAL MEDICINE

## 2022-09-27 PROCEDURE — 94761 N-INVAS EAR/PLS OXIMETRY MLT: CPT

## 2022-09-27 PROCEDURE — 99291 CRITICAL CARE FIRST HOUR: CPT | Performed by: INTERNAL MEDICINE

## 2022-09-27 PROCEDURE — 2580000003 HC RX 258

## 2022-09-27 RX ORDER — POTASSIUM CHLORIDE 29.8 MG/ML
20 INJECTION INTRAVENOUS
Status: COMPLETED | OUTPATIENT
Start: 2022-09-27 | End: 2022-09-27

## 2022-09-27 RX ORDER — PREDNISONE 10 MG/1
10 TABLET ORAL DAILY
Status: DISCONTINUED | OUTPATIENT
Start: 2022-10-05 | End: 2022-09-29

## 2022-09-27 RX ORDER — PREDNISONE 20 MG/1
40 TABLET ORAL DAILY
Status: COMPLETED | OUTPATIENT
Start: 2022-09-27 | End: 2022-09-28

## 2022-09-27 RX ORDER — OXYCODONE HYDROCHLORIDE 5 MG/1
10 TABLET ORAL EVERY 8 HOURS
Status: CANCELLED | OUTPATIENT
Start: 2022-09-27

## 2022-09-27 RX ORDER — PREDNISONE 20 MG/1
20 TABLET ORAL DAILY
Status: DISCONTINUED | OUTPATIENT
Start: 2022-10-02 | End: 2022-09-29

## 2022-09-27 RX ORDER — OXYCODONE HYDROCHLORIDE 5 MG/1
10 TABLET ORAL EVERY 8 HOURS
Status: DISCONTINUED | OUTPATIENT
Start: 2022-09-27 | End: 2022-09-30

## 2022-09-27 RX ADMIN — PROPOFOL 20 MCG/KG/MIN: 10 INJECTION, EMULSION INTRAVENOUS at 04:32

## 2022-09-27 RX ADMIN — Medication 200 MCG/HR: at 10:01

## 2022-09-27 RX ADMIN — PROPOFOL 30 MCG/KG/MIN: 10 INJECTION, EMULSION INTRAVENOUS at 18:32

## 2022-09-27 RX ADMIN — SODIUM CHLORIDE, PRESERVATIVE FREE 10 ML: 5 INJECTION INTRAVENOUS at 09:23

## 2022-09-27 RX ADMIN — IPRATROPIUM BROMIDE 0.5 MG: 0.5 SOLUTION RESPIRATORY (INHALATION) at 07:31

## 2022-09-27 RX ADMIN — IPRATROPIUM BROMIDE 0.5 MG: 0.5 SOLUTION RESPIRATORY (INHALATION) at 20:11

## 2022-09-27 RX ADMIN — Medication 2000 MG: at 17:03

## 2022-09-27 RX ADMIN — POTASSIUM CHLORIDE 20 MEQ: 400 INJECTION, SOLUTION INTRAVENOUS at 09:22

## 2022-09-27 RX ADMIN — IPRATROPIUM BROMIDE 0.5 MG: 0.5 SOLUTION RESPIRATORY (INHALATION) at 11:07

## 2022-09-27 RX ADMIN — SODIUM CHLORIDE, PRESERVATIVE FREE 40 MG: 5 INJECTION INTRAVENOUS at 09:26

## 2022-09-27 RX ADMIN — SODIUM CHLORIDE, PRESERVATIVE FREE 10 ML: 5 INJECTION INTRAVENOUS at 20:13

## 2022-09-27 RX ADMIN — IPRATROPIUM BROMIDE 0.5 MG: 0.5 SOLUTION RESPIRATORY (INHALATION) at 15:06

## 2022-09-27 RX ADMIN — BUDESONIDE AND FORMOTEROL FUMARATE DIHYDRATE 2 PUFF: 160; 4.5 AEROSOL RESPIRATORY (INHALATION) at 07:33

## 2022-09-27 RX ADMIN — Medication 2000 MG: at 09:30

## 2022-09-27 RX ADMIN — PROPOFOL 20 MCG/KG/MIN: 10 INJECTION, EMULSION INTRAVENOUS at 12:52

## 2022-09-27 RX ADMIN — OXYCODONE 10 MG: 5 TABLET ORAL at 16:55

## 2022-09-27 RX ADMIN — OXYCODONE 10 MG: 5 TABLET ORAL at 11:39

## 2022-09-27 RX ADMIN — ENOXAPARIN SODIUM 40 MG: 100 INJECTION SUBCUTANEOUS at 09:44

## 2022-09-27 RX ADMIN — PROPOFOL 30 MCG/KG/MIN: 10 INJECTION, EMULSION INTRAVENOUS at 23:56

## 2022-09-27 RX ADMIN — POTASSIUM CHLORIDE 20 MEQ: 400 INJECTION, SOLUTION INTRAVENOUS at 10:26

## 2022-09-27 RX ADMIN — PREDNISONE 40 MG: 20 TABLET ORAL at 09:46

## 2022-09-27 RX ADMIN — BUDESONIDE AND FORMOTEROL FUMARATE DIHYDRATE 2 PUFF: 160; 4.5 AEROSOL RESPIRATORY (INHALATION) at 20:11

## 2022-09-27 RX ADMIN — ENOXAPARIN SODIUM 40 MG: 100 INJECTION SUBCUTANEOUS at 20:13

## 2022-09-27 ASSESSMENT — PULMONARY FUNCTION TESTS
PIF_VALUE: 8
PIF_VALUE: 17
PIF_VALUE: 16
PIF_VALUE: 13
PIF_VALUE: 14
PIF_VALUE: 14

## 2022-09-27 NOTE — PROGRESS NOTES
INTENSIVE CARE UNIT  Resident Physician Progress Note    Patient - Jackie Perez  Date of Admission -  9/24/2022  2:07 PM  Date of Evaluation -  9/27/2022  Room and Bed Number -  3024/3024-01   Hospital Day - 3    SUBJECTIVE:   HISTORY OF PRESENT ILLNESS:    Jackie Perez is a 80 y.o. female past medical history of COPD, chronic hip pain, chronic back pain, prediabetes, OA, osteoporosis who presents to the ED after being found by a neighbor stuck in her chair. Patient notes she was stuck in chair for x2 days, but has felt weak x3 days. patient was unable to get up due to weakness and has had significant difficulty ambulating. 911 was called and patient was brought to [facility] ED for further evaluation. Patient was tachypneic, failed BiPAP and ultimately was intubated. Blood cultures grew MSSA. Urine cultures grew Klebsiella.     OVERNIGHT EVENTS:      No acute events overnight  Intubated and sedated this morning  Waking up and following commands  On pressure control, 40% FiO2, PEEP 5, PIP 17  Last blood gas: pH 7.33 PCO2 44, PO2 78, bicarb 21  Sedation: Propofol, fentanyl  Coming down on vasopressors requirement, currently on Levophed at 5  Leukocytosis improving  Urine output 1028 mL in last 24 hours    TODAY:     AWAKE & FOLLOWING COMMANDS: []   No  [x]   Yes                           SECRETIONS                 Amount:  [x]   Small []   Moderate []   Large []   None        Color: []   White []   Colored []   Bloody                         SEDATION:                 RAAS Score: [x]   +1 to -1 []   -2 []   -3 []   -4        Sedation Agent: [x]   Propofol gtt []   Versed gtt  []   Ativan gtt  [x]   Precedex gtt        Paralytic Agent: []   Norcuron []   Nimbex [x]   None                         VASOPRESSORS:  []   No  [x]   Yes            Vasopressor Agent [x] Levophed [] Dopamine [] Vasopressin [] Dobutamine [] Phenylephrine [] Epinephrine                      OBJECTIVE:   VITAL SIGNS:  Patient Vitals for the past 8 hrs:   BP Temp Temp src Pulse Resp SpO2 Weight   22 0743 -- -- -- (!) 101 27 98 % --   22 0742 -- -- -- 92 20 98 % --   22 0741 -- -- -- 90 27 98 % --   22 0700 117/70 -- -- 92 23 98 % --   22 0645 (!) 150/125 -- -- 91 20 99 % --   22 0641 -- -- -- 88 16 96 % --   22 0630 97/61 -- -- 78 24 94 % --   22 0615 (!) 93/59 -- -- 78 24 94 % --   22 0600 98/62 -- -- 81 24 94 % --   22 0545 (!) 92/58 -- -- 79 22 94 % --   22 0532 -- -- -- 82 12 96 % --   22 0530 (!) 96/58 -- -- 81 11 97 % --   22 0526 -- -- -- -- -- -- 242 lb 1.6 oz (109.8 kg)   22 0515 (!) 96/54 -- -- 79 21 96 % --   22 0500 (!) 94/53 -- -- 80 12 96 % --   22 0445 (!) 93/54 -- -- 77 18 96 % --   22 0430 (!) 103/56 -- -- 83 14 96 % --   22 0418 -- -- -- 82 28 96 % --   22 0415 (!) 96/57 -- -- 80 13 96 % --   22 0400 (!) 98/56 98.4 °F (36.9 °C) Bladder 82 12 96 % --   22 0345 (!) 100/58 -- -- 83 16 96 % --   22 0330 (!) 96/55 -- -- 81 20 95 % --   22 0315 (!) 109/59 -- -- 89 13 95 % --   22 0300 115/63 -- -- 87 13 95 % --   22 0245 104/66 -- -- 87 13 96 % --   22 0230 118/63 -- -- 97 18 97 % --   22 0215 (!) 100/57 -- -- 81 15 97 % --   22 0200 (!) 96/57 -- -- 78 16 97 % --   22 0145 (!) 99/59 -- -- 79 15 97 % --   22 0130 (!) 95/58 -- -- 78 16 97 % --   22 0115 (!) 94/57 -- -- 77 16 97 % --   22 0100 (!) 94/58 -- -- 78 16 97 % --   22 0045 (!) 91/57 -- -- 76 17 97 % --         Last Body weight:   Wt Readings from Last 3 Encounters:   22 242 lb 1.6 oz (109.8 kg)   22 226 lb 11.2 oz (102.8 kg)   21 220 lb (99.8 kg)       Body Mass Index : Body mass index is 40.29 kg/m². Tmax over 24 hours: Temp (24hrs), Av.8 °F (37.7 °C), Min:98.4 °F (36.9 °C), Max:100.9 °F (38.3 °C)      Ins/Outs:   In: 2981.6 [I.V.:2242. 3; NG/GT:446]  Out: 1028 [Urine:1028]    PHYSICAL EXAM:  Constitutional: Intubated and sedated  EENT: PERRLA, EOMI, sclera clear, anicteric, oropharynx clear, no lesions  Neck: Supple, symmetrical, trachea midline  Respiratory: equal ventilatory breath sounds  Cardiovascular: regular rate and rhythm, normal S1, S2, no murmur noted and 2+ distal pulses throughout  Abdomen: soft, nontender, nondistended, no masses or organomegaly  Extremities: Some skin ulceration on lower extremities    MEDICATIONS:  Scheduled Meds:   pantoprazole (PROTONIX) 40 mg injection  40 mg IntraVENous Daily    potassium chloride  20 mEq IntraVENous Q1H    predniSONE  40 mg Oral Daily    ipratropium  0.5 mg Nebulization 4x daily    enoxaparin  40 mg SubCUTAneous BID    sodium chloride flush  5-40 mL IntraVENous 2 times per day    piperacillin-tazobactam  4,500 mg IntraVENous Q8H    budesonide-formoterol  2 puff Inhalation BID       Continuous Infusions:   fentaNYL 50 mcg/mL 200 mcg/hr (09/27/22 0714)    dextrose      norepinephrine 5 mcg/min (09/27/22 0714)    sodium chloride 10 mL/hr at 09/27/22 0714    propofol 15 mcg/kg/min (09/27/22 0714)    dexmedetomidine Stopped (09/26/22 1437)       PRN Meds:   glucose, 4 tablet, PRN  dextrose bolus, 125 mL, PRN   Or  dextrose bolus, 250 mL, PRN  glucagon (rDNA), 1 mg, PRN  dextrose, , Continuous PRN  sodium chloride flush, 5-40 mL, PRN  sodium chloride, , PRN  ondansetron, 4 mg, Q8H PRN   Or  ondansetron, 4 mg, Q6H PRN  polyethylene glycol, 17 g, Daily PRN  acetaminophen, 650 mg, Q6H PRN   Or  acetaminophen, 650 mg, Q6H PRN      SUPPORT DEVICES: [x] Ventilator [] BIPAP  [] Nasal Cannula [] Room Air    DATA:  Complete Blood Count:   Recent Labs     09/24/22  1421 09/25/22  0658 09/26/22  0451 09/27/22  0557   WBC 24.8* 30.6* 33.3* 19.7*   RBC 4.78 4.76 4.66 3.86*   HGB 12.7 12.8 12.5 10.4*   HCT 40.1 39.8 39.2 32.5*   MCV 83.9 83.6 84.1 84.2   MCH 26.6 26.9 26.8 26.9   MCHC 31.7 32.2 31.9 32.0   RDW 17.1* 17.5* 17.8* 18.0*    See Reflexed IPF Result 126* 88*   MPV 10.5  --  11.1 11.6          Last 3 Blood Glucose:   Recent Labs     09/24/22  1421 09/25/22  0658 09/26/22  0451 09/27/22  0557   GLUCOSE 103* 72 219* 180*          PT/INR:    Lab Results   Component Value Date/Time    PROTIME 13.9 09/24/2022 02:21 PM    INR 1.4 09/24/2022 02:21 PM     PTT:  No results found for: APTT, PTT    Basic Metabolic Profile:   Recent Labs     09/25/22  0658 09/26/22  0451 09/27/22  0557    139 143   K 3.7 4.0 3.5*    112* 112*   CO2 14* 16* 22   BUN 28* 30* 42*   CREATININE 1.32* 1.04* 0.98*   GLUCOSE 72 219* 180*         Liver Function:  Recent Labs     09/24/22  1421   PROT 7.5   LABALBU 3.0*   ALT 10   AST 21   ALKPHOS 113*   BILITOT 1.6*         Magnesium:   Lab Results   Component Value Date/Time    MG 2.6 09/27/2022 05:57 AM    MG 2.9 09/26/2022 04:51 AM    MG 1.5 09/24/2022 02:21 PM     Phosphorus: No results found for: PHOS  Ionized Calcium: No results found for: CAION     Urinalysis:   Lab Results   Component Value Date/Time    NITRU NEGATIVE 09/24/2022 03:39 PM    COLORU Dark Yellow 09/24/2022 03:39 PM    PHUR 5.5 09/24/2022 03:39 PM    WBCUA TOO NUMEROUS TO COUNT 09/24/2022 03:39 PM    RBCUA 2 TO 5 09/24/2022 03:39 PM    BACTERIA MANY 09/24/2022 03:39 PM    SPECGRAV 1.022 09/24/2022 03:39 PM    LEUKOCYTESUR LARGE 09/24/2022 03:39 PM    UROBILINOGEN Normal 09/24/2022 03:39 PM    BILIRUBINUR NEGATIVE  Verified by ictotest. 09/24/2022 03:39 PM    GLUCOSEU NEGATIVE 09/24/2022 03:39 PM    KETUA TRACE 09/24/2022 03:39 PM       HgBA1c:    Lab Results   Component Value Date/Time    LABA1C 5.7 06/02/2022 09:05 AM     TSH:    Lab Results   Component Value Date/Time    TSH 1.79 05/17/2021 09:10 AM     Lactic Acid: No results found for: LACTA   Troponin: No results for input(s): TROPONINI in the last 72 hours.     Radiology/Imaging:  XR CHEST (SINGLE VIEW FRONTAL)   Final Result   Left internal jugular line tip projected over the mid right atrium. OG and endotracheal tubes appear unchanged and in satisfactory position. Pulmonary vasculature may be slightly congested. XR CHEST PORTABLE   Final Result   Endotracheal tube in satisfactory position. XR ABDOMEN FOR NG/OG/NE TUBE PLACEMENT   Final Result   OG tube in satisfactory position. CT CHEST ABDOMEN PELVIS W CONTRAST   Final Result   No acute traumatic abnormality detected within the chest, abdomen, and pelvis. Airspace disease the left lung base, atelectasis versus pneumonia versus   aspiration. CT THORACIC SPINE TRAUMA RECONSTRUCTION   Final Result   No acute traumatic abnormality detected within the chest, abdomen, and pelvis. Airspace disease the left lung base, atelectasis versus pneumonia versus   aspiration. CT LUMBAR SPINE TRAUMA RECONSTRUCTION   Final Result   No acute traumatic abnormality detected within the chest, abdomen, and pelvis. Airspace disease the left lung base, atelectasis versus pneumonia versus   aspiration. CT HEAD WO CONTRAST   Final Result   No acute intracranial abnormality. CT CERVICAL SPINE WO CONTRAST   Final Result   No acute abnormality of the cervical spine. XR CHEST PORTABLE   Final Result   No radiographic evidence of an acute cardiopulmonary process.          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         XR CHEST PORTABLE    (Results Pending)       ASSESSMENT:     Patient Active Problem List    Diagnosis Date Noted    MSSA bacteremia 09/25/2022    Septic shock ([address] 09/24/2022    Osteoporosis without current pathological fracture 11/19/2019    Chronic left hip pain 07/23/2019    Chronic bilateral low back pain with bilateral sciatica 07/23/2019    Prediabetes 07/23/2019    Primary osteoarthritis involving multiple joints 07/23/2019        PLAN:     PLAN/MEDICAL DECISION MAKING:  Neurologic:   Neuro checks per protocol  Sedation: Propofol  Paralytic: None  Pain management: Fentanyl. Add oxycodon 10 mg TID to help wean off of fentanyl (Takes MS contin at home)  Cardiovascular:  Septic shock-improving  HR: 100-110  MAPs: 70-80  MAP goal >65  Pulmonary:  H/o COPD  Intubated and sedated  COPD exacerbation  Prednisone taper. Symbicort. Atrovent nebs. Maintain oxygen sats >92%  Pulmonary toilet  CXR: Chest imaging with concern of left lung base airspace disease, atelectasis versus pneumonia versus aspiration  Daily sedation holiday  Spontaneous breathing trial daily  Possible extubation today if tolerates breathing trial  GI/Nutrition  Bowel regimen: Glycolax  Ulcer Prophylaxis: Protonix  No more black NG output  Start tube feed  Last BM: unknown  Renal/Fluid/Electrolyte  JAXON  Lactic acidosis  IV Fluids: off IVF  I/O: In: 2981.6 [I.V.:2242. 3; NG/GT:446]  Out: 1028 [Urine:1028]  UOP: 1028 ml in last 24 hours  BUN/Cr: 42/0.98  Monitor electrolytes, replace PRN   40 mEq potassium given  ID  Septic shock  UTI  WBC: 24>30>33>19  Tmax: Temp (24hrs), Av.8 °F (37.7 °C), Min:98.4 °F (36.9 °C), Max:100.9 °F (38.3 °C)  Blood Cx x2: MSSA  Urine Cx: Klebsiella  Antimicrobials: Change Zosyn to Cefazolin - day 5 (received Zosyn for 4 days)  Hematology:  H/H:   Endocrine:   Glucose - stable  DVT Prophylaxis  EPC Cuffs  Lovenox    CODE STATUS: Full Code    DISPOSITION:  [x] To remain ICU: yes  [] OK for out of ICU from [address] [place] [address], MD  Internal Medicine Resident, PGY-3  [address] [facility];  [employer], [place]  ,2:01 AM

## 2022-09-27 NOTE — PLAN OF CARE
Problem: Discharge Planning  Goal: Discharge to home or other facility with appropriate resources  Outcome: Not Progressing  Flowsheets (Taken 9/27/2022 0845)  Discharge to home or other facility with appropriate resources: Arrange for needed discharge resources and transportation as appropriate     Problem: Musculoskeletal - Adult  Goal: Return mobility to safest level of function  Outcome: Not Progressing  Flowsheets (Taken 9/27/2022 0845)  Return Mobility to Safest Level of Function: Ensure adequate protection for wounds/incisions during mobilization     Problem: Respiratory - Adult  Goal: Achieves optimal ventilation and oxygenation  Outcome: Progressing  Flowsheets (Taken 9/27/2022 0845)  Achieves optimal ventilation and oxygenation:   Assess for changes in respiratory status   Assess for changes in mentation and behavior   Assess the need for suctioning and aspirate as needed     Problem: Neurosensory - Adult  Goal: Absence of seizures  Outcome: Progressing  Flowsheets (Taken 9/27/2022 0845)  Absence of seizures: Support airway/breathing, administer oxygen as needed     Problem: Neurosensory - Adult  Goal: Remains free of injury related to seizures activity  Outcome: Progressing  Flowsheets (Taken 9/27/2022 0845)  Remains free of injury related to seizure activity:   Maintain airway, patient safety  and administer oxygen as ordered   Monitor patient for seizure activity, document and report duration and description of seizure to Licensed Independent Practitioner     Problem: Cardiovascular - Adult  Goal: Absence of cardiac dysrhythmias or at baseline  Outcome: Progressing  Flowsheets (Taken 9/27/2022 0845)  Absence of cardiac dysrhythmias or at baseline:   Monitor cardiac rate and rhythm   Assess for signs of decreased cardiac output     Problem: Skin/Tissue Integrity - Adult  Goal: Skin integrity remains intact  Outcome: Progressing  Flowsheets (Taken 9/27/2022 0845)  Skin Integrity Remains Intact: Monitor for areas of redness and/or skin breakdown     Problem: Gastrointestinal - Adult  Goal: Minimal or absence of nausea and vomiting  Outcome: Progressing  Flowsheets (Taken 9/27/2022 0845)  Minimal or absence of nausea and vomiting: Administer IV fluids as ordered to ensure adequate hydration     Problem: Gastrointestinal - Adult  Goal: Maintains adequate nutritional intake  Outcome: Progressing  Flowsheets (Taken 9/27/2022 0845)  Maintains adequate nutritional intake: Monitor intake and output, weight and lab values     Problem: Safety - Medical Restraint  Goal: Remains free of injury from restraints (Restraint for Interference with Medical Device)  Description: INTERVENTIONS:  1. Determine that other, less restrictive measures have been tried or would not be effective before applying the restraint  2. Evaluate the patient's condition at the time of restraint application  3. Inform patient/family regarding the reason for restraint  4.  Q2H: Monitor safety, psychosocial status, comfort, nutrition and hydration  Recent Flowsheet Documentation  Taken 9/27/2022 1000 by Hermann Nair RN  Remains free of injury from restraints (restraint for interference with medical device): Every 2 hours: Monitor safety, psychosocial status, comfort, nutrition and hydration  Taken 9/27/2022 0845 by Hermann Nair RN  Remains free of injury from restraints (restraint for interference with medical device): Every 2 hours: Monitor safety, psychosocial status, comfort, nutrition and hydration     Problem: Neurosensory - Adult  Goal: Achieves stable or improved neurological status  Recent Flowsheet Documentation  Taken 9/27/2022 0845 by Hermann Nair RN  Achieves stable or improved neurological status:   Assess for and report changes in neurological status   Maintain blood pressure and fluid volume within ordered parameters to optimize cerebral perfusion and minimize risk of hemorrhage     Problem: Neurosensory - Adult  Goal: Achieves maximal functionality and self care  Recent Flowsheet Documentation  Taken 9/27/2022 0845 by Hinda Dandy, RN  Achieves maximal functionality and self care: Monitor swallowing and airway patency with patient fatigue and changes in neurological status     Problem: Cardiovascular - Adult  Goal: Maintains optimal cardiac output and hemodynamic stability  Recent Flowsheet Documentation  Taken 9/27/2022 0845 by Hinda Dandy, RN  Maintains optimal cardiac output and hemodynamic stability:   Monitor blood pressure and heart rate   Monitor urine output and notify Licensed Independent Practitioner for values outside of normal range   Administer fluid and/or volume expanders as ordered     Problem: Skin/Tissue Integrity - Adult  Goal: Incisions, wounds, or drain sites healing without S/S of infection  Recent Flowsheet Documentation  Taken 9/27/2022 0845 by Hinda Dandy, RN  Incisions, Wounds, or Drain Sites Healing Without Sign and Symptoms of Infection: TWICE DAILY: Assess and document skin integrity     Problem: Skin/Tissue Integrity - Adult  Goal: Oral mucous membranes remain intact  Recent Flowsheet Documentation  Taken 9/27/2022 0845 by Hinda Dandy, RN  Oral Mucous Membranes Remain Intact:   Implement oral medicated treatments as ordered   Assess oral mucosa and hygiene practices     Problem: Musculoskeletal - Adult  Goal: Return ADL status to a safe level of function  Recent Flowsheet Documentation  Taken 9/27/2022 0845 by Hinda Dandy, RN  Return ADL Status to a Safe Level of Function: Administer medication as ordered     Problem: Gastrointestinal - Adult  Goal: Maintains or returns to baseline bowel function  Recent Flowsheet Documentation  Taken 9/27/2022 0845 by Hinda Dandy, RN  Maintains or returns to baseline bowel function: Administer IV fluids as ordered to ensure adequate hydration     Problem: Gastrointestinal - Adult  Goal: Establish and maintain optimal ostomy function  Recent Flowsheet Documentation  Taken 9/27/2022 0845 by Poli Temple RN  Establish and maintain optimal ostomy function: Introduce and advance enteral feedings as ordered     Problem: Genitourinary - Adult  Goal: Absence of urinary retention  Recent Flowsheet Documentation  Taken 9/27/2022 0845 by Poli Temple RN  Absence of urinary retention: Monitor intake/output and perform bladder scan as needed     Problem: Genitourinary - Adult  Goal: Urinary catheter remains patent  Recent Flowsheet Documentation  Taken 9/27/2022 0845 by Poli Temple RN  Urinary catheter remains patent: Assess patency of urinary catheter     Problem: Infection - Adult  Goal: Absence of infection at discharge  Recent Flowsheet Documentation  Taken 9/27/2022 0845 by Poli Temple RN  Absence of infection at discharge:   Assess and monitor for signs and symptoms of infection   Monitor lab/diagnostic results   Monitor all insertion sites i.e., indwelling lines, tubes and drains   Monitor endotracheal (as able) and nasal secretions for changes in amount and color   [place] appropriate cooling/warming therapies per order   Administer medications as ordered     Problem: Infection - Adult  Goal: Absence of infection during hospitalization  Recent Flowsheet Documentation  Taken 9/27/2022 0845 by Poli Temple RN  Absence of infection during hospitalization:   Assess and monitor for signs and symptoms of infection   Monitor lab/diagnostic results   Monitor all insertion sites i.e., indwelling lines, tubes and drains   Monitor endotracheal (as able) and nasal secretions for changes in amount and color   [place] appropriate cooling/warming therapies per order   Administer medications as ordered     Problem: Infection - Adult  Goal: Absence of fever/infection during anticipated neutropenic period  Recent Flowsheet Documentation  Taken 9/27/2022 0845 by Poli Temple RN  Absence of fever/infection during anticipated neutropenic period: Monitor white blood cell count     Problem: Metabolic/Fluid and Electrolytes - Adult  Goal: Electrolytes maintained within normal limits  Recent Flowsheet Documentation  Taken 9/27/2022 0845 by Brian Bowman RN  Electrolytes maintained within normal limits:   Monitor labs and assess patient for signs and symptoms of electrolyte imbalances   Administer electrolyte replacement as ordered   Monitor response to electrolyte replacements, including repeat lab results as appropriate     Problem: Metabolic/Fluid and Electrolytes - Adult  Goal: Hemodynamic stability and optimal renal function maintained  Recent Flowsheet Documentation  Taken 9/27/2022 0845 by Brian Bowman RN  Hemodynamic stability and optimal renal function maintained:   Monitor labs and assess for signs and symptoms of volume excess or deficit   Monitor intake, output and patient weight     Problem: Metabolic/Fluid and Electrolytes - Adult  Goal: Glucose maintained within prescribed range  Recent Flowsheet Documentation  Taken 9/27/2022 0845 by Brian Bowman RN  Glucose maintained within prescribed range: Assess for signs and symptoms of hyperglycemia and hypoglycemia     Problem: Hematologic - Adult  Goal: Maintains hematologic stability  Recent Flowsheet Documentation  Taken 9/27/2022 0845 by Brian Bowman RN  Maintains hematologic stability: Monitor labs for bleeding or clotting disorders     Problem: Anxiety  Goal: Will report anxiety at manageable levels  Description: INTERVENTIONS:  1. Administer medication as ordered  2. Teach and rehearse alternative coping skills  3. Provide emotional support with 1:1 interaction with staff  Recent Flowsheet Documentation  Taken 9/27/2022 0845 by Brian Bowman RN  Will report anxiety at manageable levels: Administer medication as ordered     Problem: Coping  Goal: Pt/Family able to verbalize concerns and demonstrate effective coping strategies  Description: INTERVENTIONS:  1.  Assist patient/family to diagnoses, and notify attending LIP  2. [place] high risk fall precautions, as indicated  3. Provide frequent short contacts to provide reality reorientation, refocusing and direction  4. Decrease environmental stimuli, including noise as appropriate  5. Monitor and intervene to maintain adequate nutrition, hydration, elimination, sleep and activity  6. If unable to ensure safety without constant attention obtain sitter and review sitter guidelines with assigned personnel  7. Initiate Psychosocial CNS and Spiritual Care consult, as indicated  Recent Flowsheet Documentation  Taken 9/27/2022 0845 by Hermann Nair RN  Effect of thought disturbance (confusion, delirium, dementia, or psychosis) are managed with adequate functional status:   Assess for contributors to thought disturbance, including medications, impaired vision or hearing, underlying metabolic abnormalities, dehydration, psychiatric diagnoses, notify [facility] high risk fall precautions, as indicated   Decrease environmental stimuli, including noise as appropriate   Monitor and intervene to maintain adequate nutrition, hydration, elimination, sleep and activity     Problem: Behavior  Goal: Pt/Family maintain appropriate behavior and adhere to behavioral management agreement, if implemented  Description: INTERVENTIONS:  1. Assess patient/family's coping skills and  non-compliant behavior (including use of illegal substances)  2. Notify security of behavior or suspected illegal substances which indicate the need for search of the family and/or belongings  3. Encourage verbalization of thoughts and concerns in a socially appropriate manner  4. Utilize positive, consistent limit setting strategies supporting safety of patient, staff and others  5. Encourage participation in the decision making process about the behavioral management agreement  6. If a visitor's behavior poses a threat to safety call refer to organization policy.   7. Initiate consult with , Psychosocial CNS, Spiritual Care as appropriate  Recent Flowsheet Documentation  Taken 9/27/2022 0845 by Isaias Jefferson RN  Patient/family maintains appropriate behavior and adheres to behavioral management agreement, if implemented: Assess patient/familys coping skills and  non-compliant behavior (including use of illegal substances)     Problem: Spiritual Care  Goal: Pt/Family able to move forward in process of forgiving self, others, and/or higher power  Description: INTERVENTIONS:  1. Assist patient/family to overcome blocks to healing by use of spiritual practices (prayer, meditation, guided imagery, reiki, breath work, etc). 2. De-myth guilt and help patient/family identify possible irrational spiritual/cultural beliefs and values. 3. Explore possibilities of making amends & reconciliation with self, others, and/or a greater power. 4. Guide patient/family in identifying painful feelings of guilt. 5. Help patient/famiy explore and identify spiritual beliefs, cultural understandings or values that may help or hinder letting go of issue. 6. Help patient/family explore feelings of anger, bitterness, resentment. 7. Help patient/family identify and examine the situation in which these feelings are experienced. 8. Help patient/family identify destructive displacement of feelings onto other individuals. 9. Invite use of sacraments/rituals/ceremonies as appropriate (e.g. - confession, anointing, smudging). 10. Refer patient/family to formal counseling and/or to [place] [facility] for further support work.   Recent Flowsheet Documentation  Taken 9/27/2022 0845 by Isaias Jefferson RN  Patient/family able to move forward in process of forgiving self, others, and/or higher power: Assist patient to overcome blocks to healing by use of spiritual practices (prayer, meditation, guided imagery, reiki, breath work, etc)     Problem: Discharge Planning  Goal: Discharge to home or other facility with appropriate resources  Outcome: Not Progressing  Flowsheets (Taken 9/27/2022 0845)  Discharge to home or other facility with appropriate resources: Arrange for needed discharge resources and transportation as appropriate     Problem: Musculoskeletal - Adult  Goal: Return mobility to safest level of function  Outcome: Not Progressing  Flowsheets (Taken 9/27/2022 0845)  Return Mobility to Safest Level of Function: Ensure adequate protection for wounds/incisions during mobilization

## 2022-09-27 NOTE — PROGRESS NOTES
09/27/22 1309   NICU Vent Information   Vent Mode (S)  AC/PC  (Patient Minute volumeand resp rate low)

## 2022-09-27 NOTE — CARE COORDINATION
Transitional planning. Pt is Intubated/ Sedated FiO2 40%, PEEP of 5- weaning-- may extubate today. ECHO ordered, OG for TF. Need SNF choices: LM for pt's Brother Jessie Van [phone] and Dixie Sheehan [phone] to call CM back. PT/OT ordered    1 Pt's brother Merlin Vences returned call, spoke to him about SNF choices, he and his wife would like referral sent to [facility], he also asked that SNF list be emailed to: [email or web address] "[employer]  SNF list emailed as requested and referral sent to [address] [place] [address] as requested.

## 2022-09-27 NOTE — PLAN OF CARE
Problem: Discharge Planning  Goal: Discharge to home or other facility with appropriate resources  9/26/2022 2037 by Aime Dickens RN  Outcome: Progressing  9/26/2022 1709 by Fawn Castellanos RN  Outcome: Progressing     Problem: Respiratory - Adult  Goal: Achieves optimal ventilation and oxygenation  9/26/2022 2037 by Aime Dickens RN  Outcome: Progressing  9/26/2022 1950 by Cele Marinelli RCP  Outcome: Progressing  Flowsheets (Taken 9/26/2022 1950)  Achieves optimal ventilation and oxygenation:   Assess for changes in respiratory status   Assess for changes in mentation and behavior   Position to facilitate oxygenation and minimize respiratory effort   Oxygen supplementation based on oxygen saturation or arterial blood gases   Initiate smoking cessation protocol as indicated   Encourage broncho-pulmonary hygiene including cough, deep breathe, incentive spirometry   Assess the need for suctioning and aspirate as needed   Assess and instruct to report shortness of breath or any respiratory difficulty   Respiratory therapy support as indicated  9/26/2022 1709 by Fawn Castellanos RN  Outcome: Progressing  9/26/2022 0829 by Raphael Neri RCP  Outcome: Progressing     Problem: Safety - Medical Restraint  Goal: Remains free of injury from restraints (Restraint for Interference with Medical Device)  Description: INTERVENTIONS:  1. Determine that other, less restrictive measures have been tried or would not be effective before applying the restraint  2. Evaluate the patient's condition at the time of restraint application  3. Inform patient/family regarding the reason for restraint  4.  Q2H: Monitor safety, psychosocial status, comfort, nutrition and hydration  9/26/2022 2037 by iAme Dickens RN  Outcome: Progressing  Flowsheets (Taken 9/26/2022 1800 by Fawn Castellanos RN)  Remains free of injury from restraints (restraint for interference with medical device): Every 2 hours: Monitor safety, psychosocial status, comfort, nutrition and hydration  9/26/2022 1709 by Eleno Myers RN  Outcome: Progressing  Flowsheets  Taken 9/26/2022 1600 by Eleno Myers RN  Remains free of injury from restraints (restraint for interference with medical device): Every 2 hours: Monitor safety, psychosocial status, comfort, nutrition and hydration  Taken 9/26/2022 1400 by Eleno Myers RN  Remains free of injury from restraints (restraint for interference with medical device): Every 2 hours: Monitor safety, psychosocial status, comfort, nutrition and hydration  Taken 9/26/2022 1200 by Eleno Myers RN  Remains free of injury from restraints (restraint for interference with medical device): Every 2 hours: Monitor safety, psychosocial status, comfort, nutrition and hydration  Taken 9/26/2022 1000 by Eleno Myers RN  Remains free of injury from restraints (restraint for interference with medical device): Every 2 hours: Monitor safety, psychosocial status, comfort, nutrition and hydration  Taken 9/26/2022 0800 by Eleno Myers RN  Remains free of injury from restraints (restraint for interference with medical device): Every 2 hours: Monitor safety, psychosocial status, comfort, nutrition and hydration  Taken 9/26/2022 0600 by Mariya Scales RN  Remains free of injury from restraints (restraint for interference with medical device): Every 2 hours: Monitor safety, psychosocial status, comfort, nutrition and hydration  Taken 9/26/2022 0400 by Mariya Scales RN  Remains free of injury from restraints (restraint for interference with medical device): Every 2 hours: Monitor safety, psychosocial status, comfort, nutrition and hydration     Problem: Neurosensory - Adult  Goal: Achieves stable or improved neurological status  9/26/2022 2037 by Evaristo Torres RN  Outcome: Progressing  9/26/2022 1709 by Eleno Myers RN  Outcome: Progressing  Goal: Absence of seizures  9/26/2022 2037 by Lolly Tom, RN  Outcome: Progressing  9/26/2022 1709 by Demetrius Huang RN  Outcome: Progressing  Goal: Remains free of injury related to seizures activity  9/26/2022 2037 by Babita Santos RN  Outcome: Progressing  9/26/2022 1709 by Demetrius Huang RN  Outcome: Progressing  Goal: Achieves maximal functionality and self care  9/26/2022 2037 by Babita Santos RN  Outcome: Progressing  9/26/2022 1709 by Demetrius Huang RN  Outcome: Progressing     Problem: Cardiovascular - Adult  Goal: Maintains optimal cardiac output and hemodynamic stability  9/26/2022 2037 by Babita Santos RN  Outcome: Progressing  9/26/2022 1709 by Demetrius Huang RN  Outcome: Progressing  Goal: Absence of cardiac dysrhythmias or at baseline  9/26/2022 2037 by Babita Santos RN  Outcome: Progressing  9/26/2022 1709 by Demetrius Huang RN  Outcome: Progressing     Problem: Skin/Tissue Integrity - Adult  Goal: Skin integrity remains intact  9/26/2022 2037 by Babita Santos RN  Outcome: Progressing  9/26/2022 1709 by Demetrius Huang RN  Outcome: Progressing  Flowsheets (Taken 9/26/2022 1108)  Skin Integrity Remains Intact: Monitor for areas of redness and/or skin breakdown  Goal: Incisions, wounds, or drain sites healing without S/S of infection  9/26/2022 2037 by Babita Santos RN  Outcome: Progressing  9/26/2022 1709 by Demetrius Huang RN  Outcome: Progressing  Flowsheets (Taken 9/26/2022 1108)  Incisions, Wounds, or Drain Sites Healing Without Sign and Symptoms of Infection:   TWICE DAILY: Assess and document skin integrity   TWICE DAILY: Assess and document dressing/incision, wound bed, drain sites and surrounding tissue  Goal: Oral mucous membranes remain intact  9/26/2022 2037 by Babita Santos RN  Outcome: Progressing  9/26/2022 1709 by Demetrius Huang RN  Outcome: Progressing  Flowsheets (Taken 9/26/2022 1108)  Oral Mucous Membranes Remain Intact: Assess oral mucosa and hygiene practices Problem: Musculoskeletal - Adult  Goal: Return mobility to safest level of function  9/26/2022 2037 by Kam Smith RN  Outcome: Progressing  9/26/2022 1709 by Debbie Whitman RN  Outcome: Progressing  Goal: Maintain proper alignment of affected body part  9/26/2022 2037 by Kam Smith RN  Outcome: Progressing  9/26/2022 1709 by Debbie Whitman RN  Outcome: Progressing  Goal: Return ADL status to a safe level of function  9/26/2022 2037 by Kam Smith RN  Outcome: Progressing  9/26/2022 1709 by Debbie Whitman RN  Outcome: Progressing     Problem: Gastrointestinal - Adult  Goal: Minimal or absence of nausea and vomiting  9/26/2022 2037 by Kam Smith RN  Outcome: Progressing  9/26/2022 1709 by Debbie Whitman RN  Outcome: Progressing  Goal: Maintains or returns to baseline bowel function  9/26/2022 2037 by Kam Smith RN  Outcome: Progressing  9/26/2022 1709 by Debbie Whitman RN  Outcome: Progressing  Goal: Maintains adequate nutritional intake  9/26/2022 2037 by Kam Smith RN  Outcome: Progressing  9/26/2022 1709 by Debbie Whitman RN  Outcome: Progressing  Goal: Establish and maintain optimal ostomy function  9/26/2022 2037 by Kam Smith RN  Outcome: Progressing  9/26/2022 1709 by Debbie Whitman RN  Outcome: Progressing     Problem: Genitourinary - Adult  Goal: Absence of urinary retention  9/26/2022 2037 by Kam Smith RN  Outcome: Progressing  9/26/2022 1709 by Debbie Whitman RN  Outcome: Progressing  Goal: Urinary catheter remains patent  9/26/2022 2037 by Kam Smith RN  Outcome: Progressing  9/26/2022 1709 by Debbie Whitman RN  Outcome: Progressing     Problem: Infection - Adult  Goal: Absence of infection at discharge  9/26/2022 2037 by Kam Smith RN  Outcome: Progressing  9/26/2022 1709 by Debbie Whitman RN  Outcome: Progressing  Goal: Absence of infection during hospitalization  9/26/2022 2037 by Floresita Longoria Taylor Powell RN  Outcome: Progressing  9/26/2022 1709 by Adonis Abebe RN  Outcome: Progressing  Goal: Absence of fever/infection during anticipated neutropenic period  9/26/2022 2037 by Maribel Reynoso RN  Outcome: Progressing  9/26/2022 1709 by Adonis Abebe RN  Outcome: Progressing     Problem: Metabolic/Fluid and Electrolytes - Adult  Goal: Electrolytes maintained within normal limits  9/26/2022 2037 by Maribel Reynoso RN  Outcome: Progressing  9/26/2022 1709 by Adonis Abebe RN  Outcome: Progressing  Goal: Hemodynamic stability and optimal renal function maintained  9/26/2022 2037 by Maribel Reynoso RN  Outcome: Progressing  9/26/2022 1709 by Adonis Abebe RN  Outcome: Progressing  Goal: Glucose maintained within prescribed range  9/26/2022 2037 by Maribel Reynoso RN  Outcome: Progressing  9/26/2022 1709 by Adonis Abebe RN  Outcome: Progressing     Problem: Hematologic - Adult  Goal: Maintains hematologic stability  9/26/2022 2037 by Maribel Reynoso RN  Outcome: Progressing  9/26/2022 1709 by Adonis Abebe RN  Outcome: Progressing     Problem: Anxiety  Goal: Will report anxiety at manageable levels  Description: INTERVENTIONS:  1. Administer medication as ordered  2. Teach and rehearse alternative coping skills  3. Provide emotional support with 1:1 interaction with staff  9/26/2022 2037 by Maribel Reynoso RN  Outcome: Progressing  9/26/2022 1709 by Adonis Abebe RN  Outcome: Progressing     Problem: Coping  Goal: Pt/Family able to verbalize concerns and demonstrate effective coping strategies  Description: INTERVENTIONS:  1. Assist patient/family to identify coping skills, available support systems and cultural and spiritual values  2. Provide emotional support, including active listening and acknowledgement of concerns of patient and caregivers  3. Reduce environmental stimuli, as able  4. Instruct patient/family in relaxation techniques, as appropriate  5.  Assess for spiritual pain/suffering and initiate Spiritual Care, Psychosocial Clinical Specialist consults as needed  9/26/2022 2037 by Mauricio Hernandez RN  Outcome: Progressing  9/26/2022 1709 by Rosalinda Hammans, RN  Outcome: Progressing     Problem: Decision Making  Goal: Pt/Family able to effectively weigh alternatives and participate in decision making related to treatment and care  Description: INTERVENTIONS:  1. Determine when there are differences between patient's view, family's view, and healthcare provider's view of condition  2. Facilitate patient and family articulation of goals for care  3. Help patient and family identify pros/cons of alternative solutions  4. Provide information as requested by patient/family  5. Respect patient/family right to receive or not to receive information  6. Serve as a liaison between patient and family and health care team  7. Initiate Consults from Ethics, Palliative Care or initiate [address] [place] [address] as is appropriate  9/26/2022 2037 by Mauricio Hernandez RN  Outcome: Progressing  9/26/2022 1709 by Rosalinda Hammans, RN  Outcome: Progressing     Problem: Confusion  Goal: Confusion, delirium, dementia, or psychosis is improved or at baseline  Description: INTERVENTIONS:  1. Assess for possible contributors to thought disturbance, including medications, impaired vision or hearing, underlying metabolic abnormalities, dehydration, psychiatric diagnoses, and notify attending LIP  2. Foss high risk fall precautions, as indicated  3. Provide frequent short contacts to provide reality reorientation, refocusing and direction  4. Decrease environmental stimuli, including noise as appropriate  5. Monitor and intervene to maintain adequate nutrition, hydration, elimination, sleep and activity  6. If unable to ensure safety without constant attention obtain sitter and review sitter guidelines with assigned personnel  7.  Initiate Psychosocial CNS and Spiritual Care consult, as indicated  9/26/2022 2037 by Lalit Herrera RN  Outcome: Progressing  9/26/2022 1709 by Ousmane Gallo RN  Outcome: Progressing     Problem: Behavior  Goal: Pt/Family maintain appropriate behavior and adhere to behavioral management agreement, if implemented  Description: INTERVENTIONS:  1. Assess patient/family's coping skills and  non-compliant behavior (including use of illegal substances)  2. Notify security of behavior or suspected illegal substances which indicate the need for search of the family and/or belongings  3. Encourage verbalization of thoughts and concerns in a socially appropriate manner  4. Utilize positive, consistent limit setting strategies supporting safety of patient, staff and others  5. Encourage participation in the decision making process about the behavioral management agreement  6. If a visitor's behavior poses a threat to safety call refer to organization policy. 7. Initiate consult with , Psychosocial CNS, Spiritual Care as appropriate  9/26/2022 2037 by Lalit Herrera RN  Outcome: Progressing  9/26/2022 1709 by Ousmane Gallo RN  Outcome: Progressing     Problem: Spiritual Care  Goal: Pt/Family able to move forward in process of forgiving self, others, and/or higher power  Description: INTERVENTIONS:  1. Assist patient/family to overcome blocks to healing by use of spiritual practices (prayer, meditation, guided imagery, reiki, breath work, etc). 2. De-myth guilt and help patient/family identify possible irrational spiritual/cultural beliefs and values. 3. Explore possibilities of making amends & reconciliation with self, others, and/or a greater power. 4. Guide patient/family in identifying painful feelings of guilt. 5. Help patient/famiy explore and identify spiritual beliefs, cultural understandings or values that may help or hinder letting go of issue. 6. Help patient/family explore feelings of anger, bitterness, resentment.   7. Help patient/family identify and examine the situation in which these feelings are experienced. 8. Help patient/family identify destructive displacement of feelings onto other individuals. 9. Invite use of sacraments/rituals/ceremonies as appropriate (e.g. - confession, anointing, smudging). 10. Refer patient/family to formal counseling and/or to [facility] for further support work. 9/26/2022 2037 by Mauricio Hernandez RN  Outcome: Progressing  9/26/2022 1709 by Rosalinda Hammans, RN  Outcome: Progressing     Problem: Skin/Tissue Integrity  Goal: Absence of new skin breakdown  Description: 1. Monitor for areas of redness and/or skin breakdown  2. Assess vascular access sites hourly  3. Every 4-6 hours minimum:  Change oxygen saturation probe site  4. Every 4-6 hours:  If on nasal continuous positive airway pressure, respiratory therapy assess nares and determine need for appliance change or resting period.   9/26/2022 2037 by Mauricio Hernandez RN  Outcome: Progressing  9/26/2022 1709 by Rosalinda Hammans, RN  Outcome: Progressing     Problem: Safety - Adult  Goal: Free from fall injury  9/26/2022 2037 by Mauricio Hernandez RN  Outcome: Progressing  9/26/2022 1709 by Rosalinda Hammans, RN  Outcome: Progressing     Problem: ABCDS Injury Assessment  Goal: Absence of physical injury  9/26/2022 2037 by Mauricio Hernandez RN  Outcome: Progressing  9/26/2022 1709 by Rosalinda Hammans, RN  Outcome: Progressing     Problem: Nutrition Deficit:  Goal: Optimize nutritional status  9/26/2022 2037 by Mauricio Hernandez RN  Outcome: Progressing  9/26/2022 1709 by Rosalinda Hammans, RN  Outcome: Progressing

## 2022-09-28 LAB
ABSOLUTE EOS #: 0.03 K/UL (ref 0–0.44)
ABSOLUTE IMMATURE GRANULOCYTE: 0.16 K/UL (ref 0–0.3)
ABSOLUTE LYMPH #: 1.15 K/UL (ref 1.1–3.7)
ABSOLUTE MONO #: 1.33 K/UL (ref 0.1–1.2)
ALLEN TEST: POSITIVE
ANION GAP SERPL CALCULATED.3IONS-SCNC: 11 MMOL/L (ref 9–17)
BASOPHILS # BLD: 0 % (ref 0–2)
BASOPHILS ABSOLUTE: 0.05 K/UL (ref 0–0.2)
BUN BLDV-MCNC: 41 MG/DL (ref 8–23)
CALCIUM SERPL-MCNC: 8.9 MG/DL (ref 8.6–10.4)
CHLORIDE BLD-SCNC: 116 MMOL/L (ref 98–107)
CO2: 22 MMOL/L (ref 20–31)
CREAT SERPL-MCNC: 0.82 MG/DL (ref 0.5–0.9)
EOSINOPHILS RELATIVE PERCENT: 0 % (ref 1–4)
FIO2: 40
GFR AFRICAN AMERICAN: >60 ML/MIN
GFR NON-AFRICAN AMERICAN: >60 ML/MIN
GFR SERPL CREATININE-BSD FRML MDRD: ABNORMAL ML/MIN/{1.73_M2}
GLUCOSE BLD-MCNC: 130 MG/DL (ref 65–105)
GLUCOSE BLD-MCNC: 130 MG/DL (ref 65–105)
GLUCOSE BLD-MCNC: 141 MG/DL (ref 70–99)
GLUCOSE BLD-MCNC: 143 MG/DL (ref 74–100)
HCT VFR BLD CALC: 33.7 % (ref 36.3–47.1)
HEMOGLOBIN: 10.7 G/DL (ref 11.9–15.1)
IMMATURE GRANULOCYTES: 1 %
LYMPHOCYTES # BLD: 7 % (ref 24–43)
MAGNESIUM: 2.8 MG/DL (ref 1.6–2.6)
MCH RBC QN AUTO: 26.8 PG (ref 25.2–33.5)
MCHC RBC AUTO-ENTMCNC: 31.8 G/DL (ref 28.4–34.8)
MCV RBC AUTO: 84.3 FL (ref 82.6–102.9)
MONOCYTES # BLD: 8 % (ref 3–12)
NRBC AUTOMATED: 0 PER 100 WBC
PATIENT TEMP: 37
PDW BLD-RTO: 18 % (ref 11.8–14.4)
PLATELET # BLD: 87 K/UL (ref 138–453)
PMV BLD AUTO: 11.9 FL (ref 8.1–13.5)
POC HCO3: 26.8 MMOL/L (ref 21–28)
POC O2 SATURATION: 98 % (ref 94–98)
POC PCO2: 46.3 MM HG (ref 35–48)
POC PH: 7.37 (ref 7.35–7.45)
POC PO2: 107 MM HG (ref 83–108)
POSITIVE BASE EXCESS, ART: 1 (ref 0–3)
POTASSIUM SERPL-SCNC: 3.5 MMOL/L (ref 3.7–5.3)
RBC # BLD: 4 M/UL (ref 3.95–5.11)
RBC # BLD: ABNORMAL 10*6/UL
SAMPLE SITE: NORMAL
SEG NEUTROPHILS: 84 % (ref 36–65)
SEGMENTED NEUTROPHILS ABSOLUTE COUNT: 13.94 K/UL (ref 1.5–8.1)
SODIUM BLD-SCNC: 149 MMOL/L (ref 135–144)
TRIGL SERPL-MCNC: 213 MG/DL
WBC # BLD: 16.7 K/UL (ref 3.5–11.3)

## 2022-09-28 PROCEDURE — 6370000000 HC RX 637 (ALT 250 FOR IP): Performed by: STUDENT IN AN ORGANIZED HEALTH CARE EDUCATION/TRAINING PROGRAM

## 2022-09-28 PROCEDURE — 85025 COMPLETE CBC W/AUTO DIFF WBC: CPT

## 2022-09-28 PROCEDURE — 6360000002 HC RX W HCPCS: Performed by: STUDENT IN AN ORGANIZED HEALTH CARE EDUCATION/TRAINING PROGRAM

## 2022-09-28 PROCEDURE — 82803 BLOOD GASES ANY COMBINATION: CPT

## 2022-09-28 PROCEDURE — 36415 COLL VENOUS BLD VENIPUNCTURE: CPT

## 2022-09-28 PROCEDURE — 2500000003 HC RX 250 WO HCPCS: Performed by: STUDENT IN AN ORGANIZED HEALTH CARE EDUCATION/TRAINING PROGRAM

## 2022-09-28 PROCEDURE — 2580000003 HC RX 258

## 2022-09-28 PROCEDURE — 82947 ASSAY GLUCOSE BLOOD QUANT: CPT

## 2022-09-28 PROCEDURE — 2000000000 HC ICU R&B

## 2022-09-28 PROCEDURE — 94003 VENT MGMT INPAT SUBQ DAY: CPT

## 2022-09-28 PROCEDURE — 94640 AIRWAY INHALATION TREATMENT: CPT

## 2022-09-28 PROCEDURE — 2580000003 HC RX 258: Performed by: STUDENT IN AN ORGANIZED HEALTH CARE EDUCATION/TRAINING PROGRAM

## 2022-09-28 PROCEDURE — 83735 ASSAY OF MAGNESIUM: CPT

## 2022-09-28 PROCEDURE — C9113 INJ PANTOPRAZOLE SODIUM, VIA: HCPCS | Performed by: STUDENT IN AN ORGANIZED HEALTH CARE EDUCATION/TRAINING PROGRAM

## 2022-09-28 PROCEDURE — 6370000000 HC RX 637 (ALT 250 FOR IP): Performed by: INTERNAL MEDICINE

## 2022-09-28 PROCEDURE — 94761 N-INVAS EAR/PLS OXIMETRY MLT: CPT

## 2022-09-28 PROCEDURE — 6360000002 HC RX W HCPCS

## 2022-09-28 PROCEDURE — 36600 WITHDRAWAL OF ARTERIAL BLOOD: CPT

## 2022-09-28 PROCEDURE — 99213 OFFICE O/P EST LOW 20 MIN: CPT

## 2022-09-28 PROCEDURE — 99291 CRITICAL CARE FIRST HOUR: CPT | Performed by: INTERNAL MEDICINE

## 2022-09-28 PROCEDURE — 2700000000 HC OXYGEN THERAPY PER DAY

## 2022-09-28 PROCEDURE — 84478 ASSAY OF TRIGLYCERIDES: CPT

## 2022-09-28 PROCEDURE — 80048 BASIC METABOLIC PNL TOTAL CA: CPT

## 2022-09-28 RX ORDER — POTASSIUM CHLORIDE 29.8 MG/ML
20 INJECTION INTRAVENOUS
Status: COMPLETED | OUTPATIENT
Start: 2022-09-28 | End: 2022-09-28

## 2022-09-28 RX ORDER — ENOXAPARIN SODIUM 100 MG/ML
30 INJECTION SUBCUTANEOUS 2 TIMES DAILY
Status: DISCONTINUED | OUTPATIENT
Start: 2022-09-28 | End: 2022-09-30

## 2022-09-28 RX ORDER — PANTOPRAZOLE SODIUM 40 MG/1
40 TABLET, DELAYED RELEASE ORAL
Status: DISCONTINUED | OUTPATIENT
Start: 2022-09-29 | End: 2022-09-30

## 2022-09-28 RX ADMIN — PREDNISONE 40 MG: 20 TABLET ORAL at 08:13

## 2022-09-28 RX ADMIN — PROPOFOL 35 MCG/KG/MIN: 10 INJECTION, EMULSION INTRAVENOUS at 03:33

## 2022-09-28 RX ADMIN — ONDANSETRON 4 MG: 2 INJECTION INTRAMUSCULAR; INTRAVENOUS at 07:41

## 2022-09-28 RX ADMIN — IPRATROPIUM BROMIDE 0.5 MG: 0.5 SOLUTION RESPIRATORY (INHALATION) at 19:39

## 2022-09-28 RX ADMIN — POTASSIUM CHLORIDE 20 MEQ: 29.8 INJECTION, SOLUTION INTRAVENOUS at 08:27

## 2022-09-28 RX ADMIN — IPRATROPIUM BROMIDE 0.5 MG: 0.5 SOLUTION RESPIRATORY (INHALATION) at 11:06

## 2022-09-28 RX ADMIN — BUDESONIDE AND FORMOTEROL FUMARATE DIHYDRATE 2 PUFF: 160; 4.5 AEROSOL RESPIRATORY (INHALATION) at 07:36

## 2022-09-28 RX ADMIN — BUDESONIDE AND FORMOTEROL FUMARATE DIHYDRATE 2 PUFF: 160; 4.5 AEROSOL RESPIRATORY (INHALATION) at 19:39

## 2022-09-28 RX ADMIN — SODIUM CHLORIDE, PRESERVATIVE FREE 10 ML: 5 INJECTION INTRAVENOUS at 19:16

## 2022-09-28 RX ADMIN — OXYCODONE 10 MG: 5 TABLET ORAL at 02:11

## 2022-09-28 RX ADMIN — ENOXAPARIN SODIUM 40 MG: 100 INJECTION SUBCUTANEOUS at 08:06

## 2022-09-28 RX ADMIN — Medication 2000 MG: at 16:24

## 2022-09-28 RX ADMIN — POTASSIUM CHLORIDE 20 MEQ: 29.8 INJECTION, SOLUTION INTRAVENOUS at 09:30

## 2022-09-28 RX ADMIN — SODIUM CHLORIDE, PRESERVATIVE FREE 40 MG: 5 INJECTION INTRAVENOUS at 08:06

## 2022-09-28 RX ADMIN — SODIUM CHLORIDE: 9 INJECTION, SOLUTION INTRAVENOUS at 08:17

## 2022-09-28 RX ADMIN — IPRATROPIUM BROMIDE 0.5 MG: 0.5 SOLUTION RESPIRATORY (INHALATION) at 07:36

## 2022-09-28 RX ADMIN — ENOXAPARIN SODIUM 30 MG: 100 INJECTION SUBCUTANEOUS at 19:16

## 2022-09-28 RX ADMIN — Medication 2000 MG: at 00:58

## 2022-09-28 RX ADMIN — Medication 100 MCG/HR: at 03:36

## 2022-09-28 RX ADMIN — Medication 2000 MG: at 08:08

## 2022-09-28 RX ADMIN — IPRATROPIUM BROMIDE 0.5 MG: 0.5 SOLUTION RESPIRATORY (INHALATION) at 15:55

## 2022-09-28 ASSESSMENT — PULMONARY FUNCTION TESTS
PIF_VALUE: 14
PIF_VALUE: 20
PIF_VALUE: 17
PIF_VALUE: 18

## 2022-09-28 NOTE — PLAN OF CARE
Problem: Discharge Planning  Goal: Discharge to home or other facility with appropriate resources  9/27/2022 2035 by Stacey Mcgowan RN  Outcome: Progressing  9/27/2022 1154 by Raquel Salter RN  Outcome: Not Progressing  Flowsheets (Taken 9/27/2022 0845)  Discharge to home or other facility with appropriate resources: Arrange for needed discharge resources and transportation as appropriate     Problem: Respiratory - Adult  Goal: Achieves optimal ventilation and oxygenation  9/27/2022 2035 by Stacey Mcgowan RN  Outcome: Progressing  Flowsheets (Taken 9/27/2022 1230 by Raquel Salter RN)  Achieves optimal ventilation and oxygenation:   Assess for changes in respiratory status   Assess for changes in mentation and behavior   Position to facilitate oxygenation and minimize respiratory effort   Oxygen supplementation based on oxygen saturation or arterial blood gases   Assess the need for suctioning and aspirate as needed  9/27/2022 1154 by Raquel Salter RN  Outcome: Progressing  Flowsheets (Taken 9/27/2022 0845)  Achieves optimal ventilation and oxygenation:   Assess for changes in respiratory status   Assess for changes in mentation and behavior   Assess the need for suctioning and aspirate as needed     Problem: Safety - Medical Restraint  Goal: Remains free of injury from restraints (Restraint for Interference with Medical Device)  Description: INTERVENTIONS:  1. Determine that other, less restrictive measures have been tried or would not be effective before applying the restraint  2. Evaluate the patient's condition at the time of restraint application  3. Inform patient/family regarding the reason for restraint  4.  Q2H: Monitor safety, psychosocial status, comfort, nutrition and hydration  Outcome: Progressing  Flowsheets  Taken 9/27/2022 1800 by Raquel Salter RN  Remains free of injury from restraints (restraint for interference with medical device): Every 2 hours: Monitor safety, psychosocial status, comfort, nutrition and hydration  Taken 9/27/2022 1600 by Temi Waters RN  Remains free of injury from restraints (restraint for interference with medical device): Every 2 hours: Monitor safety, psychosocial status, comfort, nutrition and hydration  Taken 9/27/2022 1400 by Temi Waters RN  Remains free of injury from restraints (restraint for interference with medical device): Every 2 hours: Monitor safety, psychosocial status, comfort, nutrition and hydration  Taken 9/27/2022 1200 by Temi Waters RN  Remains free of injury from restraints (restraint for interference with medical device): Every 2 hours: Monitor safety, psychosocial status, comfort, nutrition and hydration  Taken 9/27/2022 1000 by Temi Waters RN  Remains free of injury from restraints (restraint for interference with medical device): Every 2 hours: Monitor safety, psychosocial status, comfort, nutrition and hydration  Taken 9/27/2022 0800 by Shauna Lanier RN  Remains free of injury from restraints (restraint for interference with medical device): Every 2 hours: Monitor safety, psychosocial status, comfort, nutrition and hydration     Problem: Neurosensory - Adult  Goal: Achieves stable or improved neurological status  Outcome: Progressing  Flowsheets  Taken 9/27/2022 1230 by Temi Waters RN  Achieves stable or improved neurological status: Assess for and report changes in neurological status  Taken 9/27/2022 0845 by Temi Waters RN  Achieves stable or improved neurological status:   Assess for and report changes in neurological status   Maintain blood pressure and fluid volume within ordered parameters to optimize cerebral perfusion and minimize risk of hemorrhage  Goal: Absence of seizures  9/27/2022 2035 by Dallin Arshad RN  Outcome: Progressing  Flowsheets (Taken 9/27/2022 1230 by Temi Waters RN)  Absence of seizures: Monitor for seizure activity.   If seizure occurs, document type and location of movements and any associated apnea  9/27/2022 1154 by Latonia Andrea RN  Outcome: Progressing  Flowsheets (Taken 9/27/2022 0845)  Absence of seizures: Support airway/breathing, administer oxygen as needed  Goal: Remains free of injury related to seizures activity  9/27/2022 2035 by Evita Loredo RN  Outcome: Progressing  Flowsheets (Taken 9/27/2022 1230 by Latonia Andrea RN)  Remains free of injury related to seizure activity: Maintain airway, patient safety  and administer oxygen as ordered  9/27/2022 1154 by Latonia Andrea RN  Outcome: Progressing  Flowsheets (Taken 9/27/2022 0845)  Remains free of injury related to seizure activity:   Maintain airway, patient safety  and administer oxygen as ordered   Monitor patient for seizure activity, document and report duration and description of seizure to Licensed Independent Practitioner  Goal: Achieves maximal functionality and self care  Outcome: Progressing  Flowsheets (Taken 9/27/2022 0845 by Latonia Andrea RN)  Achieves maximal functionality and self care: Monitor swallowing and airway patency with patient fatigue and changes in neurological status     Problem: Cardiovascular - Adult  Goal: Maintains optimal cardiac output and hemodynamic stability  Outcome: Progressing  Flowsheets  Taken 9/27/2022 1230 by Latonia Andrea RN  Maintains optimal cardiac output and hemodynamic stability:   Monitor blood pressure and heart rate   Monitor urine output and notify Licensed Independent Practitioner for values outside of normal range   Assess for signs of decreased cardiac output   Administer fluid and/or volume expanders as ordered  Taken 9/27/2022 0845 by Latonia Andrea RN  Maintains optimal cardiac output and hemodynamic stability:   Monitor blood pressure and heart rate   Monitor urine output and notify Licensed Independent Practitioner for values outside of normal range   Administer fluid and/or volume expanders as ordered  Goal: Absence of cardiac dysrhythmias or at baseline  9/27/2022 2035 by Verna Montez RN  Outcome: Progressing  Flowsheets (Taken 9/27/2022 1230 by Kayden Dunham RN)  Absence of cardiac dysrhythmias or at baseline:   Monitor cardiac rate and rhythm   Assess for signs of decreased cardiac output  9/27/2022 1154 by Kayden Dunham RN  Outcome: Progressing  Flowsheets (Taken 9/27/2022 0845)  Absence of cardiac dysrhythmias or at baseline:   Monitor cardiac rate and rhythm   Assess for signs of decreased cardiac output     Problem: Skin/Tissue Integrity - Adult  Goal: Skin integrity remains intact  9/27/2022 2035 by Verna Montez RN  Outcome: Progressing  Flowsheets (Taken 9/27/2022 1230 by Kayden Dunham RN)  Skin Integrity Remains Intact: Monitor for areas of redness and/or skin breakdown  9/27/2022 1154 by Kayden Dunham RN  Outcome: Progressing  Flowsheets (Taken 9/27/2022 0845)  Skin Integrity Remains Intact: Monitor for areas of redness and/or skin breakdown  Goal: Incisions, wounds, or drain sites healing without S/S of infection  Outcome: Progressing  Flowsheets (Taken 9/27/2022 0845 by Kayden Dunham RN)  Incisions, Wounds, or Drain Sites Healing Without Sign and Symptoms of Infection: TWICE DAILY: Assess and document skin integrity  Goal: Oral mucous membranes remain intact  Outcome: Progressing  Flowsheets (Taken 9/27/2022 0845 by Kayden Dunham RN)  Oral Mucous Membranes Remain Intact:   Implement oral medicated treatments as ordered   Assess oral mucosa and hygiene practices     Problem: Musculoskeletal - Adult  Goal: Return mobility to safest level of function  9/27/2022 2035 by Verna Montez RN  Outcome: Progressing  9/27/2022 1154 by Kayden Dunham RN  Outcome: Not Progressing  Flowsheets (Taken 9/27/2022 0845)  Return Mobility to Safest Level of Function: Ensure adequate protection for wounds/incisions during mobilization  Goal: Maintain proper alignment of affected body part  Outcome: Progressing  Flowsheets (Taken 9/27/2022 3828 by Estephania Levine RN)  Maintain proper alignment of affected body part: Support and protect limb and body alignment per provider's orders  Goal: Return ADL status to a safe level of function  Outcome: Progressing  Flowsheets (Taken 9/27/2022 0845 by Estephania Levine RN)  Return ADL Status to a Safe Level of Function: Administer medication as ordered     Problem: Gastrointestinal - Adult  Goal: Minimal or absence of nausea and vomiting  9/27/2022 2035 by Tika Gilbert RN  Outcome: Progressing  9/27/2022 1154 by Estephania Levine RN  Outcome: Progressing  Flowsheets (Taken 9/27/2022 0845)  Minimal or absence of nausea and vomiting: Administer IV fluids as ordered to ensure adequate hydration  Goal: Maintains or returns to baseline bowel function  Outcome: Progressing  Flowsheets (Taken 9/27/2022 0845 by Estephania Levine RN)  Maintains or returns to baseline bowel function: Administer IV fluids as ordered to ensure adequate hydration  Goal: Maintains adequate nutritional intake  9/27/2022 2035 by Tika Gilbert RN  Outcome: Progressing  9/27/2022 1154 by Estephania Levine RN  Outcome: Progressing  Flowsheets (Taken 9/27/2022 0845)  Maintains adequate nutritional intake: Monitor intake and output, weight and lab values  Goal: Establish and maintain optimal ostomy function  Outcome: Progressing  Flowsheets (Taken 9/27/2022 0845 by Estephania Levine RN)  Establish and maintain optimal ostomy function: Introduce and advance enteral feedings as ordered     Problem: Genitourinary - Adult  Goal: Absence of urinary retention  Outcome: Progressing  Flowsheets  Taken 9/27/2022 1230 by Estephania Levine RN  Absence of urinary retention: Monitor intake/output and perform bladder scan as needed  Taken 9/27/2022 0845 by Estephania Levine RN  Absence of urinary retention: Monitor intake/output and perform bladder scan as needed  Goal: Urinary catheter remains patent  Outcome: Progressing  Flowsheets  Taken 9/27/2022 1230 by Madina Hodges Antonio Tee RN  Urinary catheter remains patent: Assess patency of urinary catheter  Taken 9/27/2022 0845 by Shelley Morse RN  Urinary catheter remains patent: Assess patency of urinary catheter     Problem: Infection - Adult  Goal: Absence of infection at discharge  Outcome: Progressing  Flowsheets (Taken 9/27/2022 0845 by Shelley Morse RN)  Absence of infection at discharge:   Assess and monitor for signs and symptoms of infection   Monitor lab/diagnostic results   Monitor all insertion sites i.e., indwelling lines, tubes and drains   Monitor endotracheal (as able) and nasal secretions for changes in amount and color   [place] appropriate cooling/warming therapies per order   Administer medications as ordered  Goal: Absence of infection during hospitalization  Outcome: Progressing  Flowsheets (Taken 9/27/2022 0845 by Shelley Morse RN)  Absence of infection during hospitalization:   Assess and monitor for signs and symptoms of infection   Monitor lab/diagnostic results   Monitor all insertion sites i.e., indwelling lines, tubes and drains   Monitor endotracheal (as able) and nasal secretions for changes in amount and color   [place] appropriate cooling/warming therapies per order   Administer medications as ordered  Goal: Absence of fever/infection during anticipated neutropenic period  Outcome: Progressing  Flowsheets (Taken 9/27/2022 0845 by Shelley Morse RN)  Absence of fever/infection during anticipated neutropenic period: Monitor white blood cell count     Problem: Metabolic/Fluid and Electrolytes - Adult  Goal: Electrolytes maintained within normal limits  Outcome: Progressing  Flowsheets (Taken 9/27/2022 0845 by Shelley Morse RN)  Electrolytes maintained within normal limits:   Monitor labs and assess patient for signs and symptoms of electrolyte imbalances   Administer electrolyte replacement as ordered   Monitor response to electrolyte replacements, including repeat lab results as and concerns, and demonstrate effective coping: Reduce environmental stimuli, as able     Problem: Decision Making  Goal: Pt/Family able to effectively weigh alternatives and participate in decision making related to treatment and care  Description: INTERVENTIONS:  1. Determine when there are differences between patient's view, family's view, and healthcare provider's view of condition  2. Facilitate patient and family articulation of goals for care  3. Help patient and family identify pros/cons of alternative solutions  4. Provide information as requested by patient/family  5. Respect patient/family right to receive or not to receive information  6. Serve as a liaison between patient and family and health care team  7. Initiate Consults from Ethics, Palliative Care or initiate [facility] as is appropriate  Outcome: Progressing  Flowsheets (Taken 9/27/2022 1645 by Nura Badillo RN)  Patient/family able to effectively weigh alternatives and participate in decision making related to treatment and care: Provide information as requested by patient/family     Problem: Confusion  Goal: Confusion, delirium, dementia, or psychosis is improved or at baseline  Description: INTERVENTIONS:  1. Assess for possible contributors to thought disturbance, including medications, impaired vision or hearing, underlying metabolic abnormalities, dehydration, psychiatric diagnoses, and notify attending LIP  2. [place] high risk fall precautions, as indicated  3. Provide frequent short contacts to provide reality reorientation, refocusing and direction  4. Decrease environmental stimuli, including noise as appropriate  5. Monitor and intervene to maintain adequate nutrition, hydration, elimination, sleep and activity  6. If unable to ensure safety without constant attention obtain sitter and review sitter guidelines with assigned personnel  7.  Initiate Psychosocial CNS and Spiritual Care consult, as indicated  Outcome: Progressing  Flowsheets (Taken 9/27/2022 0845 by Cindi Reynoso, RN)  Effect of thought disturbance (confusion, delirium, dementia, or psychosis) are managed with adequate functional status:   Assess for contributors to thought disturbance, including medications, impaired vision or hearing, underlying metabolic abnormalities, dehydration, psychiatric diagnoses, notify [facility] high risk fall precautions, as indicated   Decrease environmental stimuli, including noise as appropriate   Monitor and intervene to maintain adequate nutrition, hydration, elimination, sleep and activity     Problem: Behavior  Goal: Pt/Family maintain appropriate behavior and adhere to behavioral management agreement, if implemented  Description: INTERVENTIONS:  1. Assess patient/family's coping skills and  non-compliant behavior (including use of illegal substances)  2. Notify security of behavior or suspected illegal substances which indicate the need for search of the family and/or belongings  3. Encourage verbalization of thoughts and concerns in a socially appropriate manner  4. Utilize positive, consistent limit setting strategies supporting safety of patient, staff and others  5. Encourage participation in the decision making process about the behavioral management agreement  6. If a visitor's behavior poses a threat to safety call refer to organization policy. 7. Initiate consult with , Psychosocial CNS, Spiritual Care as appropriate  Outcome: Progressing  Flowsheets (Taken 9/27/2022 0845 by Cindi Reynoso, RN)  Patient/family maintains appropriate behavior and adheres to behavioral management agreement, if implemented: Assess patient/familys coping skills and  non-compliant behavior (including use of illegal substances)     Problem: Spiritual Care  Goal: Pt/Family able to move forward in process of forgiving self, others, and/or higher power  Description: INTERVENTIONS:  1.  Assist patient/family to overcome blocks to healing by use of spiritual practices (prayer, meditation, guided imagery, reiki, breath work, etc). 2. De-myth guilt and help patient/family identify possible irrational spiritual/cultural beliefs and values. 3. Explore possibilities of making amends & reconciliation with self, others, and/or a greater power. 4. Guide patient/family in identifying painful feelings of guilt. 5. Help patient/famiy explore and identify spiritual beliefs, cultural understandings or values that may help or hinder letting go of issue. 6. Help patient/family explore feelings of anger, bitterness, resentment. 7. Help patient/family identify and examine the situation in which these feelings are experienced. 8. Help patient/family identify destructive displacement of feelings onto other individuals. 9. Invite use of sacraments/rituals/ceremonies as appropriate (e.g. - confession, anointing, smudging). 10. Refer patient/family to formal counseling and/or to [place] [facility] for further support work. Outcome: Progressing  Flowsheets (Taken 9/27/2022 0845 by Pratik Casillsa RN)  Patient/family able to move forward in process of forgiving self, others, and/or higher power: Assist patient to overcome blocks to healing by use of spiritual practices (prayer, meditation, guided imagery, reiki, breath work, etc)     Problem: Skin/Tissue Integrity  Goal: Absence of new skin breakdown  Description: 1. Monitor for areas of redness and/or skin breakdown  2. Assess vascular access sites hourly  3. Every 4-6 hours minimum:  Change oxygen saturation probe site  4. Every 4-6 hours:  If on nasal continuous positive airway pressure, respiratory therapy assess nares and determine need for appliance change or resting period.   Outcome: Progressing     Problem: Safety - Adult  Goal: Free from fall injury  Outcome: Progressing     Problem: ABCDS Injury Assessment  Goal: Absence of physical injury  Outcome: Progressing     Problem: Nutrition Deficit:  Goal: Optimize nutritional status  Outcome: Progressing     Problem: Discharge Planning  Goal: Discharge to home or other facility with appropriate resources  9/27/2022 2035 by Evita Loredo RN  Outcome: Progressing  9/27/2022 1154 by Latonia Andrea RN  Outcome: Not Progressing  Flowsheets (Taken 9/27/2022 0845)  Discharge to home or other facility with appropriate resources: Arrange for needed discharge resources and transportation as appropriate     Problem: Musculoskeletal - Adult  Goal: Return mobility to safest level of function  9/27/2022 2035 by Evita Loredo RN  Outcome: Progressing  9/27/2022 1154 by Latonia Andrea RN  Outcome: Not Progressing  Flowsheets (Taken 9/27/2022 0845)  Return Mobility to Safest Level of Function: Ensure adequate protection for wounds/incisions during mobilization

## 2022-09-28 NOTE — PLAN OF CARE
Problem: Respiratory - Adult  Goal: Achieves optimal ventilation and oxygenation  9/28/2022 0858 by Toney Santoro RCP  Outcome: Progressing   BRONCHOSPASM/BRONCHOCONSTRICTION     [x]         IMPROVE AERATION/BREATH SOUNDS  [x]   ADMINISTER BRONCHODILATOR THERAPY AS APPROPRIATE  [x]   ASSESS BREATH SOUNDS  []   IMPLEMENT AEROSOL/MDI PROTOCOL  [x]   PATIENT EDUCATION AS NEEDED     Problem: OXYGENATION/RESPIRATORY FUNCTION  Goal: Patient will maintain patent airway  Outcome: Ongoing  Goal: Patient will achieve/maintain normal respiratory rate/effort  Respiratory rate and effort will be within normal limits for the patient  Outcome: Ongoing    Problem: MECHANICAL VENTILATION  Goal: Patient will maintain patent airway  Outcome: Ongoing  Goal: Oral health is maintained or improved  Outcome: Ongoing  Goal: ET tube will be managed safely  Outcome: Ongoing  Goal: Ability to express needs and understand communication  Outcome: Ongoing  Goal: Mobility/activity is maintained at optimum level for patient  Outcome: Ongoing    Problem: ASPIRATION PRECAUTIONS  Goal: Patients risk of aspiration is minimized  Outcome: Ongoing    Problem: SKIN INTEGRITY  Goal: Skin integrity is maintained or improved  Outcome: Ongoing

## 2022-09-28 NOTE — PROGRESS NOTES
INTENSIVE CARE UNIT  Resident Physician Progress Note    Patient - Jackie Perez  Date of Admission -  9/24/2022  2:07 PM  Date of Evaluation -  9/28/2022  Room and Bed Number -  3024/3024-01   Hospital Day - 4    SUBJECTIVE:   HISTORY OF PRESENT ILLNESS:    Jackie Perez is a 80 y.o. female past medical history of COPD, chronic hip pain, chronic back pain, prediabetes, OA, osteoporosis who presents to the ED after being found by a neighbor stuck in her chair. Patient notes she was stuck in chair for x2 days, but has felt weak x3 days. patient was unable to get up due to weakness and has had significant difficulty ambulating. 911 was called and patient was brought to [facility] ED for further evaluation. Patient was tachypneic, failed BiPAP and ultimately was intubated. Blood cultures grew MSSA. Urine cultures grew Klebsiella. OVERNIGHT EVENTS:      No acute events overnight  Was seen while intubated and on mechanical ventilation  Sedation was turned off and weaning trial was started.   She did well on weaning trial.  Subsequently patient was extubated  Was doing well after extubation  Labs reviewed; sodium 149, chloride 116, potassium 3.5 which was replaced  Leukocytosis improving 33> 19> 16  Urine output 1900 mL in last 24 hours    OBJECTIVE:   VITAL SIGNS:  Patient Vitals for the past 8 hrs:   BP Temp Pulse Resp SpO2   09/28/22 1300 (!) 143/72 -- (!) 102 16 97 %   09/28/22 1200 131/79 100.2 °F (37.9 °C) (!) 106 19 95 %   09/28/22 1116 -- -- (!) 116 27 95 %   09/28/22 1106 -- -- (!) 110 18 98 %   09/28/22 1100 (!) 103/90 -- (!) 112 18 97 %   09/28/22 1000 (!) 151/85 -- (!) 101 16 98 %   09/28/22 0945 (!) 153/82 -- (!) 104 14 99 %   09/28/22 0930 (!) 151/87 -- 100 16 99 %   09/28/22 0915 (!) 163/84 -- 100 17 99 %   09/28/22 0900 (!) 143/87 -- 100 21 100 %   09/28/22 0851 -- -- (!) 105 18 99 %   09/28/22 0845 (!) 148/95 -- 97 19 98 %   09/28/22 0830 (!) 147/79 -- (!) 102 21 99 %   09/28/22 0815 (!) 136/90 -- 97 23 96 %   22 0800 124/79 99.7 °F (37.6 °C) 96 21 98 %   22 0745 134/71 -- 92 20 97 %   22 0736 -- -- 79 22 94 %   22 0730 123/68 -- 77 20 95 %   22 0715 116/69 -- 83 23 95 %   22 0700 124/72 -- 83 23 95 %         Last Body weight:   Wt Readings from Last 3 Encounters:   22 239 lb 8 oz (108.6 kg)   22 226 lb 11.2 oz (102.8 kg)   21 220 lb (99.8 kg)       Body Mass Index : Body mass index is 39.85 kg/m².       Tmax over 24 hours: Temp (24hrs), Av °F (37.2 °C), Min:98.2 °F (36.8 °C), Max:100.2 °F (37.9 °C)      Ins/Outs:   In: 2005.1 [I.V.:874; NG/GT:931]  Out: 3406 [Urine:2906]    PHYSICAL EXAM:  Constitutional: Intubated and sedated  EENT: PERRLA, EOMI, sclera clear, anicteric, oropharynx clear, no lesions  Neck: Supple, symmetrical, trachea midline  Respiratory: equal ventilatory breath sounds  Cardiovascular: regular rate and rhythm, normal S1, S2, no murmur noted and 2+ distal pulses throughout  Abdomen: soft, nontender, nondistended, no masses or organomegaly  Extremities: Some skin ulceration on lower extremities    MEDICATIONS:  Scheduled Meds:   enoxaparin  30 mg SubCUTAneous BID    [START ON 2022] pantoprazole  40 mg Oral QAM AC    [START ON 2022] predniSONE  30 mg Oral Daily    Followed by    Irma Blanco ON 10/2/2022] predniSONE  20 mg Oral Daily    Followed by    Irma Blanco ON 10/5/2022] predniSONE  10 mg Oral Daily    ceFAZolin  2,000 mg IntraVENous Q8H    oxyCODONE  10 mg Oral q8h    ipratropium  0.5 mg Nebulization 4x daily    sodium chloride flush  5-40 mL IntraVENous 2 times per day    budesonide-formoterol  2 puff Inhalation BID       Continuous Infusions:   dextrose      sodium chloride 10 mL/hr at 22 1126       PRN Meds:   glucose, 4 tablet, PRN  dextrose bolus, 125 mL, PRN   Or  dextrose bolus, 250 mL, PRN  glucagon (rDNA), 1 mg, PRN  dextrose, , Continuous PRN  sodium chloride flush, 5-40 mL, PRN  sodium chloride, , PRN  ondansetron, 4 mg, Q8H PRN   Or  ondansetron, 4 mg, Q6H PRN  polyethylene glycol, 17 g, Daily PRN  acetaminophen, 650 mg, Q6H PRN   Or  acetaminophen, 650 mg, Q6H PRN      SUPPORT DEVICES: [x] Ventilator [] BIPAP  [] Nasal Cannula [] Room Air    DATA:  Complete Blood Count:   Recent Labs     09/26/22  0451 09/27/22  0557 09/28/22  0548   WBC 33.3* 19.7* 16.7*   RBC 4.66 3.86* 4.00   HGB 12.5 10.4* 10.7*   HCT 39.2 32.5* 33.7*   MCV 84.1 84.2 84.3   MCH 26.8 26.9 26.8   MCHC 31.9 32.0 31.8   RDW 17.8* 18.0* 18.0*   * 88* 87*   MPV 11.1 11.6 11.9          Last 3 Blood Glucose:   Recent Labs     09/26/22  0451 09/27/22  0557 09/28/22  0548   GLUCOSE 219* 180* 141*          PT/INR:    Lab Results   Component Value Date/Time    PROTIME 13.9 09/24/2022 02:21 PM    INR 1.4 09/24/2022 02:21 PM     PTT:  No results found for: APTT, PTT    Basic Metabolic Profile:   Recent Labs     09/26/22  0451 09/27/22  0557 09/28/22  0548    143 149*   K 4.0 3.5* 3.5*   * 112* 116*   CO2 16* 22 22   BUN 30* 42* 41*   CREATININE 1.04* 0.98* 0.82   GLUCOSE 219* 180* 141*         Liver Function:  No results for input(s): PROT, LABALBU, ALT, AST, GGT, ALKPHOS, BILITOT in the last 72 hours.       Magnesium:   Lab Results   Component Value Date/Time    MG 2.8 09/28/2022 05:48 AM    MG 2.6 09/27/2022 05:57 AM    MG 2.9 09/26/2022 04:51 AM     Phosphorus: No results found for: PHOS  Ionized Calcium: No results found for: CAION     Urinalysis:   Lab Results   Component Value Date/Time    NITRU NEGATIVE 09/24/2022 03:39 PM    COLORU Dark Yellow 09/24/2022 03:39 PM    PHUR 5.5 09/24/2022 03:39 PM    WBCUA TOO NUMEROUS TO COUNT 09/24/2022 03:39 PM    RBCUA 2 TO 5 09/24/2022 03:39 PM    BACTERIA MANY 09/24/2022 03:39 PM    SPECGRAV 1.022 09/24/2022 03:39 PM    LEUKOCYTESUR LARGE 09/24/2022 03:39 PM    UROBILINOGEN Normal 09/24/2022 03:39 PM    BILIRUBINUR NEGATIVE  Verified by ictotest. 09/24/2022 03:39 PM    GLUCOSEU NEGATIVE 09/24/2022 03:39 PM    KETUA TRACE 09/24/2022 03:39 PM       HgBA1c:    Lab Results   Component Value Date/Time    LABA1C 5.7 06/02/2022 09:05 AM     TSH:    Lab Results   Component Value Date/Time    TSH 1.79 05/17/2021 09:10 AM     Lactic Acid: No results found for: LACTA   Troponin: No results for input(s): TROPONINI in the last 72 hours. Radiology/Imaging:  XR CHEST PORTABLE   Final Result   Central pulmonary congestion with obscuration of the left hemidiaphragm   likely representing effusion as can be seen in CHF. XR CHEST (SINGLE VIEW FRONTAL)   Final Result   Left internal jugular line tip projected over the mid right atrium. OG and endotracheal tubes appear unchanged and in satisfactory position. Pulmonary vasculature may be slightly congested. XR CHEST PORTABLE   Final Result   Endotracheal tube in satisfactory position. XR ABDOMEN FOR NG/OG/NE TUBE PLACEMENT   Final Result   OG tube in satisfactory position. CT CHEST ABDOMEN PELVIS W CONTRAST   Final Result   No acute traumatic abnormality detected within the chest, abdomen, and pelvis. Airspace disease the left lung base, atelectasis versus pneumonia versus   aspiration. CT THORACIC SPINE TRAUMA RECONSTRUCTION   Final Result   No acute traumatic abnormality detected within the chest, abdomen, and pelvis. Airspace disease the left lung base, atelectasis versus pneumonia versus   aspiration. CT LUMBAR SPINE TRAUMA RECONSTRUCTION   Final Result   No acute traumatic abnormality detected within the chest, abdomen, and pelvis. Airspace disease the left lung base, atelectasis versus pneumonia versus   aspiration. CT HEAD WO CONTRAST   Final Result   No acute intracranial abnormality. CT CERVICAL SPINE WO CONTRAST   Final Result   No acute abnormality of the cervical spine.          XR CHEST PORTABLE   Final Result   No radiographic evidence of an acute cardiopulmonary process. 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         VL DUP LOWER EXTREMITY VENOUS BILATERAL    (Results Pending)       ASSESSMENT:     Patient Active Problem List    Diagnosis Date Noted    MSSA bacteremia 2022    Septicemia ([facility] [address] 2022    Osteoporosis without current pathological fracture 2019    Chronic left hip pain 2019    Chronic bilateral low back pain with bilateral sciatica 2019    Prediabetes 2019    Primary osteoarthritis involving multiple joints 2019        PLAN:     PLAN/MEDICAL DECISION MAKING:  Neurologic:   Extubated   Continue neuro checks per protocol  Oxycodon 10 mg TID (Takes MS contin at home)  Cardiovascular:  Septic shock-resolving  HR: 100-120  MAPs: 70-80  MAP goal >65  Wean off of Keanu-Synephrine  Pulmonary:  H/o COPD  CXR: Chest imaging with concern of left lung base airspace disease, atelectasis versus pneumonia versus aspiration  Extubated in the morning during  Continue prednisone taper  Symbicort inhaler. Atrovent nebs  Maintain oxygen sats >92%  Pulmonary toilet  GI/Nutrition  Bowel regimen: Glycolax  Ulcer Prophylaxis: Protonix  Resume clear liquid diet after bedside swallow study and subsequently advance as tolerated  Last BM: unknown  Renal/Fluid/Electrolyte  JAXON and Lactic acidosis resolved  IV Fluids: off IVF  I/O: In: 2005.1 [I.V.:874; NG/GT:931]  Out: 3406 [Urine:2906]  UOP: 1900 ml in last 24 hours  Monitor electrolytes, replace PRN   Keep potassium greater than 4 and magnesium greater than 2  ID  Septic shock - resolving  UTI  WBC: 24>30>33>19>16  Tmax: Temp (24hrs), Av °F (37.2 °C), Min:98.2 °F (36.8 °C), Max:100.2 °F (37.9 °C)  Blood Cx x2: MSSA  Urine Cx: Klebsiella  Antimicrobials: Cefazolin - day 2 (received Zosyn for 4 days) for total of 2 weeks of Abx  Repeat blood cultures in the a.m.   Hematology:  H/H: 10/33  Endocrine:   Glucose - stable  DVT Prophylaxis  EPC Cuffs  Lovenox    CODE STATUS: Full Code    DISPOSITION:  [x] To remain ICU: yes  [] OK for out of ICU from 20 Carlos Beltrán MD  Internal Medicine Resident, PGY-3  [address]   Lib[address] Standing  2/85/6973,0:80 PM

## 2022-09-28 NOTE — PLAN OF CARE
Problem: Respiratory - Adult  Goal: Achieves optimal ventilation and oxygenation  9/27/2022 2249 by Adina Stevens RCP  Outcome: Progressing     Problem: Skin/Tissue Integrity  Goal: Absence of new skin breakdown  9/27/2022 2249 by Adina Stevnes RCP  Outcome: Progressing

## 2022-09-28 NOTE — PLAN OF CARE
Cellulitis of left leg. Has some wounds on leg. Need to  rule out DVT    Plan: wound care. Doppler US venous bilateral ERIN Merlos MD  Internal Medicine Resident, PGY- [facility];  [place], [place]  9/28/2022, 1:42 AM

## 2022-09-28 NOTE — PROCEDURES
Patient unable to tolerate exam and compressions. RN and I tried to convince her to have test done but she kept refusing.

## 2022-09-28 NOTE — PROGRESS NOTES
Order obtained for extubation. SpO2 of 98 on 40% FiO2. Patient extubated and placed on 4 liters/min via nasal cannula. Post extubation SpO2 is 95% with HR  114 bpm and RR 20 breaths/min. Patient had strong cough that was productive of tan sputum. Extubation Well tolerated by patient. .   Breath Sounds: diminished rhonchi    BARBARA AYERS RCP   11:22 AM

## 2022-09-28 NOTE — PLAN OF CARE
Problem: Discharge Planning  Goal: Discharge to home or other facility with appropriate resources  9/28/2022 0937 by Jairo Maxwell RN  Outcome: Progressing     Problem: Respiratory - Adult  Goal: Achieves optimal ventilation and oxygenation  9/28/2022 0937 by Jairo Mxawell RN  Outcome: Progressing     Problem: Neurosensory - Adult  Goal: Achieves stable or improved neurological status  9/28/2022 0937 by Jairo Maxwell RN  Outcome: Progressing     Problem: Neurosensory - Adult  Goal: Absence of seizures  9/28/2022 0937 by Jairo Maxwell RN  Outcome: Progressing     Problem: Neurosensory - Adult  Goal: Remains free of injury related to seizures activity  9/28/2022 0937 by Jairo Maxwell RN  Outcome: Progressing     Problem: Cardiovascular - Adult  Goal: Maintains optimal cardiac output and hemodynamic stability  9/28/2022 0937 by Jairo Maxwell RN  Outcome: Progressing     Problem: Skin/Tissue Integrity - Adult  Goal: Skin integrity remains intact  9/28/2022 0937 by Jairo Maxwell RN  Outcome: Progressing     Problem: Skin/Tissue Integrity - Adult  Goal: Incisions, wounds, or drain sites healing without S/S of infection  9/28/2022 0937 by Jairo Maxwell RN  Outcome: Progressing     Problem: Skin/Tissue Integrity - Adult  Goal: Oral mucous membranes remain intact  9/28/2022 0937 by Jairo Maxwell RN  Outcome: Progressing     Problem: Musculoskeletal - Adult  Goal: Return mobility to safest level of function  9/28/2022 0937 by Jairo Maxwell RN  Outcome: Progressing     Problem: Gastrointestinal - Adult  Goal: Minimal or absence of nausea and vomiting  9/28/2022 0937 by Jairo Maxwell RN  Outcome: Progressing     Problem: Gastrointestinal - Adult  Goal: Maintains adequate nutritional intake  9/28/2022 0937 by Jairo Maxwell RN  Outcome: Progressing     Problem: Genitourinary - Adult  Goal: Absence of urinary retention  9/28/2022 5648 by Ellen Wen RN  Outcome: Progressing     Problem: Genitourinary - Adult  Goal: Urinary catheter remains patent  9/28/2022 6089 by Ellen Wen RN  Outcome: Progressing     Problem: Metabolic/Fluid and Electrolytes - Adult  Goal: Electrolytes maintained within normal limits  9/28/2022 0937 by Ellen Wen RN  Outcome: Progressing     Problem: Safety - Medical Restraint  Goal: Remains free of injury from restraints (Restraint for Interference with Medical Device)  Description: INTERVENTIONS:  1. Determine that other, less restrictive measures have been tried or would not be effective before applying the restraint  2. Evaluate the patient's condition at the time of restraint application  3. Inform patient/family regarding the reason for restraint  4. Q2H: Monitor safety, psychosocial status, comfort, nutrition and hydration  9/28/2022 0937 by Ellen Wen RN  Outcome: Progressing     Problem: Behavior  Goal: Pt/Family maintain appropriate behavior and adhere to behavioral management agreement, if implemented  Description: INTERVENTIONS:  1. Assess patient/family's coping skills and  non-compliant behavior (including use of illegal substances)  2. Notify security of behavior or suspected illegal substances which indicate the need for search of the family and/or belongings  3. Encourage verbalization of thoughts and concerns in a socially appropriate manner  4. Utilize positive, consistent limit setting strategies supporting safety of patient, staff and others  5. Encourage participation in the decision making process about the behavioral management agreement  6. If a visitor's behavior poses a threat to safety call refer to organization policy.   7. Initiate consult with , Psychosocial CNS, Spiritual Care as appropriate  9/28/2022 1027 by Ellen Wen RN  Outcome: Progressing     Problem: Safety - Adult  Goal: Free from fall injury  9/28/2022 0937 by Bren Daily RN  Outcome: Progressing     Problem: Nutrition Deficit:  Goal: Optimize nutritional status  9/28/2022 0937 by Bren Daily RN  Outcome: Progressing

## 2022-09-28 NOTE — PROGRESS NOTES
[facility] Wound Ostomy Continence Nurse  Consult Note       NAME:  Vibha Hancock  MEDICAL RECORD NUMBER:  [id number]  AGE: 80 y.o. GENDER: female  : 1950  TODAY'S DATE:  2022    Subjective:     Reason for WOCN Evaluation and Assessment: \"leg wounds\"      Vibha Hancock is a 80 y.o. female referred by:   [x] Physician  [] Nursing  [] Other:     Wound Identification:  Wound Type: pressure  Contributing Factors: chronic pressure, decreased mobility, shear force, obesity, incontinence of stool, and incontinence of urine        Left posterior trochanteric deep tissue injury present upon admission. Scattered, superficial abrasions noted to the left posterior lower leg. Deep tissue injuries present as purple or maroon localized areas of discolored, intact skin or blood filled blisters due to damage of the capillaries at the bone muscle interface resulting in ischemia of the soft tissue due pressure and/or shear. The area may be preceded by tissue that is painful, firm, mushy, boggy, or warmer or cooler than adjacent tissue. Evolution may include a thin blister over a dark wound bed. The wound may further evolve and become covered by thin eschar. Evolution may be rapid exposing additional layers of tissue even with optimal treatment. These areas may \"suddenly\" appear as the visible skin color change can occur days after the initial insult and reperfusion. Careful skin assessment of bony prominences is critical to the early identification of these injuries.       Objective:      /71   Pulse 80   Temp 100.4 °F (38 °C)   Resp 18   Ht 5' 5\" (1.651 m)   Wt 239 lb 8 oz (108.6 kg)   LMP  (LMP Unknown)   SpO2 95%   BMI 39.85 kg/m²   Melvin Risk Score: Melvin Scale Score: 13    LABS    CBC:   Lab Results   Component Value Date/Time    WBC 16.7 2022 05:48 AM    RBC 4.00 2022 05:48 AM    HGB 10.7 2022 05:48 AM     CMP:  Albumin:    Lab Results   Component Value Date/Time    LABALBU 3.0 09/24/2022 02:21 PM     PT/INR:    Lab Results   Component Value Date/Time    PROTIME 13.9 09/24/2022 02:21 PM    INR 1.4 09/24/2022 02:21 PM     HgBA1c:    Lab Results   Component Value Date/Time    LABA1C 5.7 06/02/2022 09:05 AM     PTT: No components found for: LABPTT      Assessment:       Measurements:     09/28/22 1613   Wound 09/28/22 Thigh Left;Posterior   Date First Assessed: 09/28/22   Present on Hospital Admission: Yes  Primary Wound Type: Pressure Injury  Location: Thigh  Wound Location Orientation: Left;Posterior   Wound Image    Wound Etiology Deep tissue/Injury   Dressing Status New dressing applied   Dressing/Treatment Foam   Dressing Change Due 09/29/22   Wound Length (cm) 12 cm   Wound Width (cm) 6 cm   Wound Depth (cm) 0 cm   Wound Surface Area (cm^2) 72 cm^2   Wound Volume (cm^3) 0 cm^3   Wound Assessment Purple/maroon;Dry  (indurated)   Drainage Amount None   Odor None   Jess-wound Assessment Intact; Blanchable erythema            Response to treatment:  Well tolerated by patient. Plan:     Plan of Care: Turn every 2 hours  Float heels off of bed with pillows under calves    Use lift sling to reposition patient to minimize potential for shear injury. Left posterior thigh: Mepilex sacrum foam. Peel back to inspect, apply SurePrep Rapid Dry, replace the foam each shift. Monitor the wound for evolution of the ischemic tissue. Routine incontinence care with incontinence barrier cloths and zinc oxide cream. Apply zinc oxide cream BID and prn incontinence.    Moisture wicking under pads vs cloth backed briefs       Specialty Bed Required : Yes   [] Low Air Loss   [x] Pressure Redistribution  [] Fluid Immersion  [] Bariatric  [] Total Pressure Relief  [] Other:     Discharge Plan:  TBD    Patient/Caregiver Teaching:    [] Indicates understanding       [] Needs reinforcement  [] Unsuccessful      [] Verbal Understanding  [] Demonstrated understanding       [x] No evidence of learning  [] Refused teaching         [] N/A    Contact the Wound Ostomy RN on-call Monday-Friday [phone] via [employer] by searching \"wound\" under \"groups\" and selecting the on-call clinician.         Electronically signed by Darek Mantilla RN, [address] [facility] on 9/28/2022 at 5:17 PM

## 2022-09-29 ENCOUNTER — APPOINTMENT (OUTPATIENT)
Dept: GENERAL RADIOLOGY | Age: 72
DRG: 871 | End: 2022-09-29
Payer: COMMERCIAL

## 2022-09-29 PROBLEM — N39.0 UTI DUE TO KLEBSIELLA SPECIES: Status: ACTIVE | Noted: 2022-09-29

## 2022-09-29 PROBLEM — B96.89 UTI DUE TO KLEBSIELLA SPECIES: Status: ACTIVE | Noted: 2022-09-29

## 2022-09-29 LAB
ABSOLUTE EOS #: 0.12 K/UL (ref 0–0.44)
ABSOLUTE IMMATURE GRANULOCYTE: 0.28 K/UL (ref 0–0.3)
ABSOLUTE LYMPH #: 1.14 K/UL (ref 1.1–3.7)
ABSOLUTE MONO #: 1.02 K/UL (ref 0.1–1.2)
ANION GAP SERPL CALCULATED.3IONS-SCNC: 8 MMOL/L (ref 9–17)
BASOPHILS # BLD: 0 % (ref 0–2)
BASOPHILS ABSOLUTE: 0.04 K/UL (ref 0–0.2)
BUN BLDV-MCNC: 30 MG/DL (ref 8–23)
CALCIUM SERPL-MCNC: 9.3 MG/DL (ref 8.6–10.4)
CARBOXYHEMOGLOBIN: 1 % (ref 0–5)
CHLORIDE BLD-SCNC: 111 MMOL/L (ref 98–107)
CO2: 27 MMOL/L (ref 20–31)
CREAT SERPL-MCNC: 0.64 MG/DL (ref 0.5–0.9)
EOSINOPHILS RELATIVE PERCENT: 1 % (ref 1–4)
FIO2: ABNORMAL
GFR AFRICAN AMERICAN: >60 ML/MIN
GFR NON-AFRICAN AMERICAN: >60 ML/MIN
GFR SERPL CREATININE-BSD FRML MDRD: ABNORMAL ML/MIN/{1.73_M2}
GLUCOSE BLD-MCNC: 114 MG/DL (ref 65–105)
GLUCOSE BLD-MCNC: 116 MG/DL (ref 65–105)
GLUCOSE BLD-MCNC: 88 MG/DL (ref 65–105)
GLUCOSE BLD-MCNC: 91 MG/DL (ref 70–99)
HCO3 VENOUS: 26.1 MMOL/L (ref 24–30)
HCT VFR BLD CALC: 34.8 % (ref 36.3–47.1)
HEMOGLOBIN: 10.9 G/DL (ref 11.9–15.1)
IMMATURE GRANULOCYTES: 3 %
LACTIC ACID, WHOLE BLOOD: 1.6 MMOL/L (ref 0.7–2.1)
LYMPHOCYTES # BLD: 10 % (ref 24–43)
MAGNESIUM: 1.9 MG/DL (ref 1.6–2.6)
MCH RBC QN AUTO: 25.7 PG (ref 25.2–33.5)
MCHC RBC AUTO-ENTMCNC: 31.3 G/DL (ref 28.4–34.8)
MCV RBC AUTO: 82.1 FL (ref 82.6–102.9)
MONOCYTES # BLD: 9 % (ref 3–12)
NRBC AUTOMATED: 0 PER 100 WBC
O2 SAT, VEN: 99 % (ref 60–85)
PATIENT TEMP: 37
PCO2, VEN: 29.6 MM HG (ref 39–55)
PDW BLD-RTO: 17.3 % (ref 11.8–14.4)
PH VENOUS: 7.55 (ref 7.32–7.42)
PLATELET # BLD: ABNORMAL K/UL (ref 138–453)
PLATELET, FLUORESCENCE: NORMAL K/UL (ref 138–453)
PO2, VEN: 118 MM HG (ref 30–50)
POSITIVE BASE EXCESS, VEN: 4.3 MMOL/L (ref 0–2)
POTASSIUM SERPL-SCNC: 3.5 MMOL/L (ref 3.7–5.3)
RBC # BLD: 4.24 M/UL (ref 3.95–5.11)
RBC # BLD: ABNORMAL 10*6/UL
SEG NEUTROPHILS: 77 % (ref 36–65)
SEGMENTED NEUTROPHILS ABSOLUTE COUNT: 8.65 K/UL (ref 1.5–8.1)
SODIUM BLD-SCNC: 146 MMOL/L (ref 135–144)
WBC # BLD: 11.3 K/UL (ref 3.5–11.3)

## 2022-09-29 PROCEDURE — 85055 RETICULATED PLATELET ASSAY: CPT

## 2022-09-29 PROCEDURE — 80048 BASIC METABOLIC PNL TOTAL CA: CPT

## 2022-09-29 PROCEDURE — 6360000002 HC RX W HCPCS: Performed by: STUDENT IN AN ORGANIZED HEALTH CARE EDUCATION/TRAINING PROGRAM

## 2022-09-29 PROCEDURE — 2580000003 HC RX 258: Performed by: STUDENT IN AN ORGANIZED HEALTH CARE EDUCATION/TRAINING PROGRAM

## 2022-09-29 PROCEDURE — 83605 ASSAY OF LACTIC ACID: CPT

## 2022-09-29 PROCEDURE — 94640 AIRWAY INHALATION TREATMENT: CPT

## 2022-09-29 PROCEDURE — 83735 ASSAY OF MAGNESIUM: CPT

## 2022-09-29 PROCEDURE — 85025 COMPLETE CBC W/AUTO DIFF WBC: CPT

## 2022-09-29 PROCEDURE — 99291 CRITICAL CARE FIRST HOUR: CPT | Performed by: INTERNAL MEDICINE

## 2022-09-29 PROCEDURE — 82947 ASSAY GLUCOSE BLOOD QUANT: CPT

## 2022-09-29 PROCEDURE — 2580000003 HC RX 258

## 2022-09-29 PROCEDURE — 1200000000 HC SEMI PRIVATE

## 2022-09-29 PROCEDURE — 82805 BLOOD GASES W/O2 SATURATION: CPT

## 2022-09-29 PROCEDURE — 93971 EXTREMITY STUDY: CPT

## 2022-09-29 PROCEDURE — 74018 RADEX ABDOMEN 1 VIEW: CPT

## 2022-09-29 PROCEDURE — 71045 X-RAY EXAM CHEST 1 VIEW: CPT

## 2022-09-29 PROCEDURE — 6360000002 HC RX W HCPCS

## 2022-09-29 PROCEDURE — 6370000000 HC RX 637 (ALT 250 FOR IP): Performed by: INTERNAL MEDICINE

## 2022-09-29 PROCEDURE — 36415 COLL VENOUS BLD VENIPUNCTURE: CPT

## 2022-09-29 PROCEDURE — 87040 BLOOD CULTURE FOR BACTERIA: CPT

## 2022-09-29 PROCEDURE — 2700000000 HC OXYGEN THERAPY PER DAY

## 2022-09-29 RX ORDER — MORPHINE SULFATE 4 MG/ML
4 INJECTION, SOLUTION INTRAMUSCULAR; INTRAVENOUS EVERY 8 HOURS
Status: DISCONTINUED | OUTPATIENT
Start: 2022-09-29 | End: 2022-09-30

## 2022-09-29 RX ORDER — POTASSIUM CHLORIDE 29.8 MG/ML
20 INJECTION INTRAVENOUS ONCE
Status: COMPLETED | OUTPATIENT
Start: 2022-09-29 | End: 2022-09-29

## 2022-09-29 RX ORDER — MORPHINE SULFATE 4 MG/ML
4 INJECTION, SOLUTION INTRAMUSCULAR; INTRAVENOUS EVERY 8 HOURS
Status: DISCONTINUED | OUTPATIENT
Start: 2022-09-29 | End: 2022-09-29

## 2022-09-29 RX ORDER — METOCLOPRAMIDE HYDROCHLORIDE 5 MG/ML
10 INJECTION INTRAMUSCULAR; INTRAVENOUS EVERY 6 HOURS PRN
Status: DISCONTINUED | OUTPATIENT
Start: 2022-09-29 | End: 2022-10-02

## 2022-09-29 RX ORDER — MAGNESIUM SULFATE 1 G/100ML
1000 INJECTION INTRAVENOUS ONCE
Status: COMPLETED | OUTPATIENT
Start: 2022-09-29 | End: 2022-09-29

## 2022-09-29 RX ORDER — QUETIAPINE FUMARATE 25 MG/1
25 TABLET, FILM COATED ORAL NIGHTLY
Status: DISCONTINUED | OUTPATIENT
Start: 2022-09-29 | End: 2022-09-29

## 2022-09-29 RX ORDER — DEXTROSE AND SODIUM CHLORIDE 5; .45 G/100ML; G/100ML
INJECTION, SOLUTION INTRAVENOUS CONTINUOUS
Status: DISCONTINUED | OUTPATIENT
Start: 2022-09-29 | End: 2022-09-30

## 2022-09-29 RX ADMIN — Medication 2000 MG: at 00:59

## 2022-09-29 RX ADMIN — BUDESONIDE AND FORMOTEROL FUMARATE DIHYDRATE 2 PUFF: 160; 4.5 AEROSOL RESPIRATORY (INHALATION) at 08:06

## 2022-09-29 RX ADMIN — IPRATROPIUM BROMIDE 0.5 MG: 0.5 SOLUTION RESPIRATORY (INHALATION) at 08:06

## 2022-09-29 RX ADMIN — IPRATROPIUM BROMIDE 0.5 MG: 0.5 SOLUTION RESPIRATORY (INHALATION) at 11:29

## 2022-09-29 RX ADMIN — MAGNESIUM SULFATE HEPTAHYDRATE 1000 MG: 1 INJECTION, SOLUTION INTRAVENOUS at 10:05

## 2022-09-29 RX ADMIN — Medication 2000 MG: at 09:00

## 2022-09-29 RX ADMIN — IPRATROPIUM BROMIDE 0.5 MG: 0.5 SOLUTION RESPIRATORY (INHALATION) at 15:23

## 2022-09-29 RX ADMIN — Medication 2000 MG: at 21:11

## 2022-09-29 RX ADMIN — ONDANSETRON 4 MG: 2 INJECTION INTRAMUSCULAR; INTRAVENOUS at 02:33

## 2022-09-29 RX ADMIN — SODIUM CHLORIDE, PRESERVATIVE FREE 20 ML: 5 INJECTION INTRAVENOUS at 08:55

## 2022-09-29 RX ADMIN — POTASSIUM CHLORIDE 20 MEQ: 29.8 INJECTION, SOLUTION INTRAVENOUS at 06:35

## 2022-09-29 RX ADMIN — DEXTROSE AND SODIUM CHLORIDE: 5; 450 INJECTION, SOLUTION INTRAVENOUS at 20:11

## 2022-09-29 RX ADMIN — POTASSIUM CHLORIDE 20 MEQ: 29.8 INJECTION, SOLUTION INTRAVENOUS at 08:54

## 2022-09-29 RX ADMIN — METOCLOPRAMIDE 10 MG: 5 INJECTION, SOLUTION INTRAMUSCULAR; INTRAVENOUS at 03:45

## 2022-09-29 RX ADMIN — ENOXAPARIN SODIUM 30 MG: 100 INJECTION SUBCUTANEOUS at 08:55

## 2022-09-29 RX ADMIN — DEXTROSE AND SODIUM CHLORIDE: 5; 450 INJECTION, SOLUTION INTRAVENOUS at 13:02

## 2022-09-29 RX ADMIN — MORPHINE SULFATE 4 MG: 4 INJECTION, SOLUTION INTRAMUSCULAR; INTRAVENOUS at 12:30

## 2022-09-29 RX ADMIN — SODIUM CHLORIDE: 9 INJECTION, SOLUTION INTRAVENOUS at 06:19

## 2022-09-29 RX ADMIN — BUDESONIDE AND FORMOTEROL FUMARATE DIHYDRATE 2 PUFF: 160; 4.5 AEROSOL RESPIRATORY (INHALATION) at 23:58

## 2022-09-29 ASSESSMENT — PAIN SCALES - GENERAL
PAINLEVEL_OUTOF10: 0
PAINLEVEL_OUTOF10: 0

## 2022-09-29 NOTE — PROGRESS NOTES
Comprehensive Nutrition Assessment    Type and Reason for Visit:  Reassess    Nutrition Recommendations/Plan:   Recommend starting clear liquid ONS  Advance diet as able  Monitor weight, labs and intake. Malnutrition Assessment:  Malnutrition Status:  Insufficient data (09/26/22 1149)    Context:  Acute Illness     Findings of the 6 clinical characteristics of malnutrition:  Energy Intake:  Unable to assess  Weight Loss:  Unable to assess     Body Fat Loss:  Unable to assess     Muscle Mass Loss:  Unable to assess    Fluid Accumulation:  Unable to assess     Strength:  Not Performed    Nutrition Assessment:    Patient extubated yesterday. Per RN, patient is not taking PO meds well. Clear liquid diet started. Recommend starting clear liquid ONS. Meds/labs reviewed    Nutrition Related Findings:    meds/labs reviewed Wound Type: None       Current Nutrition Intake & Therapies:    Average Meal Intake: Unable to assess (just started)  Average Supplements Intake: None Ordered  ADULT DIET; Clear Liquid  ADULT ORAL NUTRITION SUPPLEMENT; Breakfast, Lunch, Dinner; Clear Liquid Oral Supplement    Anthropometric Measures:  Height: 5' 5\" (165.1 cm)  Ideal Body Weight (IBW): 125 lbs (57 kg)    Admission Body Weight: 240 lb 9.6 oz (109.1 kg)  Current Body Weight: 240 lb 9.6 oz (109.1 kg), 192.5 % IBW.  Weight Source: Bed Scale  Current BMI (kg/m2): 40                          BMI Categories: Obese Class 3 (BMI 40.0 or greater)    Estimated Daily Nutrient Needs:  Energy Requirements Based On: Kcal/kg  Weight Used for Energy Requirements: Admission  Energy (kcal/day): 8933-0145 kcal/day  Weight Used for Protein Requirements: Ideal  Protein (g/day):  g pro/day  Method Used for Fluid Requirements: Other (Comment)  Fluid (ml/day): per MD    Nutrition Diagnosis:   Inadequate oral intake related to impaired respiratory function as evidenced by NPO or clear liquid status due to medical condition    Nutrition Interventions: Food and/or Nutrient Delivery: Continue Current Diet, Start Oral Nutrition Supplement  Nutrition Education/Counseling: No recommendation at this time  Coordination of Nutrition Care: Continue to monitor while inpatient       Goals:     Goals: Meet at least 75% of estimated needs, within 7 days       Nutrition Monitoring and Evaluation:   Behavioral-Environmental Outcomes: None Identified  Food/Nutrient Intake Outcomes: Diet Advancement/Tolerance, Food and Nutrient Intake, Supplement Intake  Physical Signs/Symptoms Outcomes: Nutrition Focused Physical Findings, Biochemical Data, Weight, Skin, Fluid Status or Edema    Discharge Planning:     Too soon to determine     Janny Mcallister, [address] [facility] [address]  Contact: [phone]

## 2022-09-29 NOTE — PLAN OF CARE
Problem: Discharge Planning  Goal: Discharge to home or other facility with appropriate resources  Outcome: Progressing     Problem: Respiratory - Adult  Goal: Achieves optimal ventilation and oxygenation  Outcome: Progressing     Problem: Neurosensory - Adult  Goal: Achieves stable or improved neurological status  Outcome: Progressing  Goal: Absence of seizures  Outcome: Progressing  Goal: Remains free of injury related to seizures activity  Outcome: Progressing  Goal: Achieves maximal functionality and self care  Outcome: Progressing     Problem: Cardiovascular - Adult  Goal: Maintains optimal cardiac output and hemodynamic stability  Outcome: Progressing  Goal: Absence of cardiac dysrhythmias or at baseline  Outcome: Progressing     Problem: Skin/Tissue Integrity - Adult  Goal: Skin integrity remains intact  Outcome: Progressing  Flowsheets  Taken 9/29/2022 1344  Skin Integrity Remains Intact: Monitor for areas of redness and/or skin breakdown  Taken 9/29/2022 0800  Skin Integrity Remains Intact: Monitor for areas of redness and/or skin breakdown  Goal: Incisions, wounds, or drain sites healing without S/S of infection  Outcome: Progressing  Flowsheets (Taken 9/29/2022 0800)  Incisions, Wounds, or Drain Sites Healing Without Sign and Symptoms of Infection:   ADMISSION and DAILY: Assess and document risk factors for pressure ulcer development   TWICE DAILY: Assess and document skin integrity   TWICE DAILY: Assess and document dressing/incision, wound bed, drain sites and surrounding tissue   Implement wound care per orders   Initiate pressure ulcer prevention bundle as indicated  Goal: Oral mucous membranes remain intact  Outcome: Progressing     Problem: Musculoskeletal - Adult  Goal: Return mobility to safest level of function  Outcome: Progressing  Goal: Maintain proper alignment of affected body part  Outcome: Progressing  Goal: Return ADL status to a safe level of function  Outcome: Progressing     Problem: Gastrointestinal - Adult  Goal: Minimal or absence of nausea and vomiting  Outcome: Progressing  Goal: Maintains or returns to baseline bowel function  Outcome: Progressing  Goal: Maintains adequate nutritional intake  Outcome: Progressing  Goal: Establish and maintain optimal ostomy function  Outcome: Progressing     Problem: Genitourinary - Adult  Goal: Absence of urinary retention  Outcome: Progressing  Goal: Urinary catheter remains patent  Outcome: Progressing     Problem: Infection - Adult  Goal: Absence of infection at discharge  Outcome: Progressing  Goal: Absence of infection during hospitalization  Outcome: Progressing  Goal: Absence of fever/infection during anticipated neutropenic period  Outcome: Progressing     Problem: Metabolic/Fluid and Electrolytes - Adult  Goal: Electrolytes maintained within normal limits  Outcome: Progressing  Goal: Hemodynamic stability and optimal renal function maintained  Outcome: Progressing  Goal: Glucose maintained within prescribed range  Outcome: Progressing     Problem: Hematologic - Adult  Goal: Maintains hematologic stability  Outcome: Progressing     Problem: Anxiety  Goal: Will report anxiety at manageable levels  Description: INTERVENTIONS:  1. Administer medication as ordered  2. Teach and rehearse alternative coping skills  3. Provide emotional support with 1:1 interaction with staff  Outcome: Progressing     Problem: Coping  Goal: Pt/Family able to verbalize concerns and demonstrate effective coping strategies  Description: INTERVENTIONS:  1. Assist patient/family to identify coping skills, available support systems and cultural and spiritual values  2. Provide emotional support, including active listening and acknowledgement of concerns of patient and caregivers  3. Reduce environmental stimuli, as able  4. Instruct patient/family in relaxation techniques, as appropriate  5.  Assess for spiritual pain/suffering and initiate Spiritual Care, Psychosocial Clinical Specialist consults as needed  Outcome: Progressing     Problem: Decision Making  Goal: Pt/Family able to effectively weigh alternatives and participate in decision making related to treatment and care  Description: INTERVENTIONS:  1. Determine when there are differences between patient's view, family's view, and healthcare provider's view of condition  2. Facilitate patient and family articulation of goals for care  3. Help patient and family identify pros/cons of alternative solutions  4. Provide information as requested by patient/family  5. Respect patient/family right to receive or not to receive information  6. Serve as a liaison between patient and family and health care team  7. Initiate Consults from Ethics, Palliative Care or initiate [facility] as is appropriate  Outcome: Progressing     Problem: Confusion  Goal: Confusion, delirium, dementia, or psychosis is improved or at baseline  Description: INTERVENTIONS:  1. Assess for possible contributors to thought disturbance, including medications, impaired vision or hearing, underlying metabolic abnormalities, dehydration, psychiatric diagnoses, and notify attending LIP  2. [place] high risk fall precautions, as indicated  3. Provide frequent short contacts to provide reality reorientation, refocusing and direction  4. Decrease environmental stimuli, including noise as appropriate  5. Monitor and intervene to maintain adequate nutrition, hydration, elimination, sleep and activity  6. If unable to ensure safety without constant attention obtain sitter and review sitter guidelines with assigned personnel  7. Initiate Psychosocial CNS and Spiritual Care consult, as indicated  Outcome: Progressing     Problem: Behavior  Goal: Pt/Family maintain appropriate behavior and adhere to behavioral management agreement, if implemented  Description: INTERVENTIONS:  1.  Assess patient/family's coping skills and  non-compliant behavior (including use of illegal substances)  2. Notify security of behavior or suspected illegal substances which indicate the need for search of the family and/or belongings  3. Encourage verbalization of thoughts and concerns in a socially appropriate manner  4. Utilize positive, consistent limit setting strategies supporting safety of patient, staff and others  5. Encourage participation in the decision making process about the behavioral management agreement  6. If a visitor's behavior poses a threat to safety call refer to organization policy. 7. Initiate consult with , Psychosocial CNS, Spiritual Care as appropriate  Outcome: Progressing     Problem: Spiritual Care  Goal: Pt/Family able to move forward in process of forgiving self, others, and/or higher power  Description: INTERVENTIONS:  1. Assist patient/family to overcome blocks to healing by use of spiritual practices (prayer, meditation, guided imagery, reiki, breath work, etc). 2. De-myth guilt and help patient/family identify possible irrational spiritual/cultural beliefs and values. 3. Explore possibilities of making amends & reconciliation with self, others, and/or a greater power. 4. Guide patient/family in identifying painful feelings of guilt. 5. Help patient/famiy explore and identify spiritual beliefs, cultural understandings or values that may help or hinder letting go of issue. 6. Help patient/family explore feelings of anger, bitterness, resentment. 7. Help patient/family identify and examine the situation in which these feelings are experienced. 8. Help patient/family identify destructive displacement of feelings onto other individuals. 9. Invite use of sacraments/rituals/ceremonies as appropriate (e.g. - confession, anointing, smudging). 10. Refer patient/family to formal counseling and/or to [facility] for further support work. Outcome: Progressing     Problem: Skin/Tissue Integrity  Goal: Absence of new skin breakdown  Description: 1. Monitor for areas of redness and/or skin breakdown  2. Assess vascular access sites hourly  3. Every 4-6 hours minimum:  Change oxygen saturation probe site  4. Every 4-6 hours:  If on nasal continuous positive airway pressure, respiratory therapy assess nares and determine need for appliance change or resting period.   Outcome: Progressing     Problem: Safety - Adult  Goal: Free from fall injury  Outcome: Progressing     Problem: ABCDS Injury Assessment  Goal: Absence of physical injury  Outcome: Progressing     Problem: Nutrition Deficit:  Goal: Optimize nutritional status  Outcome: Progressing

## 2022-09-29 NOTE — PROGRESS NOTES
SPIRITUAL CARE DEPARTMENT - [facility] 84  PROGRESS NOTE    Shift date: 09/29/2022  Shift day: Monday   Shift # 1    Room # 3024/3024-01   Name: Wojciech Johnsotn                Temple: unknown   Place of Christianity: non-Restorationist    Referral: Routine Visit    Admit Date & Time: 9/24/2022  2:07 PM    Assessment: Intervention:   Outcome:        Wojciech Johnston is a 80 y.o. female in the hospital because of Septicemia   Upon entering the room writer observes  finds patient in bed with numerous staff outside preparing to attend. Patient seems somewhat unfocused in demeanor, breathing on nose canola with some effort. Writer introduced self and title as spiritual care provider and . Patient turned toward  and gave some attention. Writer offered to pray for patient, who nodded her head in assent.  prayed for patient to receive the Spirit's presence and help.  asked the Spirit to walk with patient in this time. Patient responded with a strong \"Amen. \"       Plan:     Continue to support patient with short visit and with prayer. Chaplains will remain available to offer spiritual and emotional support as needed. Electronically signed by Caryn Lopes on 9/29/2022 at 11:15 AM.  [facility]  [phone]                 09/29/22 1112   Encounter Summary   Service Provided For: Patient   Referral/Consult From: [facility] Family members;Friends/neighbors   Last Encounter  09/29/22   Complexity of Encounter Moderate   Begin Time 1108   End Time  1112   Total Time Calculated 4 min   Encounter    Type Initial Screen/Assessment   Spiritual/Emotional needs   Type Spiritual Support;Spiritual Distress; Emotional Distress   Assessment/Intervention/Outcome   Assessment Compromised coping; Anxious; Impaired resilience   Intervention Empowerment;Nurtured Hope;Prayer (assurance of)/[place];Sustaining Presence/Ministry of presence   Outcome Acceptance;Expressed Gratitude

## 2022-09-29 NOTE — CARE COORDINATION
Transitional planning. Attempted to call Elliot at [facility] to ask about receipt of referral, voice mail not set up. Attempted to call Blaise Del Rio at [employer] she is out of town for a conference. Attempted to call [facility]- no Answer. [address] pt's brother to ask for other choices, informed him that [address] as not  responded to referral or answered phone. He asked that referral be placed to [facility] in [facility]. Referral faxed as requested. He will review list that was emailed to him and provide CM with a couple of other choices. Pt extubated 9/28, Clear liquids. PT/OT ordered.

## 2022-09-29 NOTE — PLAN OF CARE
Problem: Discharge Planning  Goal: Discharge to home or other facility with appropriate resources  9/28/2022 2124 by Coni Kawasaki, RN  Outcome: Progressing  9/28/2022 0937 by Cole Yung RN  Outcome: Progressing     Problem: Respiratory - Adult  Goal: Achieves optimal ventilation and oxygenation  9/28/2022 2124 by Coni Kawasaki, RN  Outcome: Progressing  9/28/2022 0937 by Amairani Oneal RN  Outcome: Progressing  9/28/2022 0858 by Shadia Fox RCP  Outcome: Progressing     Problem: Neurosensory - Adult  Goal: Achieves stable or improved neurological status  9/28/2022 2124 by Coni Kawasaki, RN  Outcome: Progressing  9/28/2022 0937 by Amairani Oneal RN  Outcome: Progressing  Goal: Absence of seizures  9/28/2022 2124 by Coni Kawasaki, RN  Outcome: Progressing  9/28/2022 0937 by Cole Yung RN  Outcome: Progressing  Goal: Remains free of injury related to seizures activity  9/28/2022 2124 by Coni Kawasaki, RN  Outcome: Progressing  9/28/2022 0937 by Cole Yung RN  Outcome: Progressing  Goal: Achieves maximal functionality and self care  9/28/2022 2124 by Coni Kawasaki, RN  Outcome: Progressing  9/28/2022 0937 by Amairani Oneal RN  Outcome: Progressing     Problem: Cardiovascular - Adult  Goal: Maintains optimal cardiac output and hemodynamic stability  9/28/2022 2124 by Coni Kawasaki, RN  Outcome: Progressing  9/28/2022 0937 by Cole Yung RN  Outcome: Progressing  Goal: Absence of cardiac dysrhythmias or at baseline  9/28/2022 2124 by Coni Kawasaki, RN  Outcome: Progressing  9/28/2022 0937 by Amairani Oneal RN  Outcome: Progressing     Problem: Skin/Tissue Integrity - Adult  Goal: Skin integrity remains intact  9/28/2022 2124 by Coni Kawasaki, RN  Outcome: Progressing  9/28/2022 0937 by Cole Yung RN  Outcome: Progressing  Goal: Incisions, wounds, or drain sites healing without S/S of infection  9/28/2022 2124 by Cody Clark RN  Outcome: Progressing  9/28/2022 0937 by Antohny Dimas RN  Outcome: Progressing  Goal: Oral mucous membranes remain intact  9/28/2022 2124 by Cody Clark RN  Outcome: Progressing  9/28/2022 0937 by Hannah Yung RN  Outcome: Progressing     Problem: Musculoskeletal - Adult  Goal: Return mobility to safest level of function  9/28/2022 2124 by Cody Clark RN  Outcome: Progressing  9/28/2022 0937 by Hannah Yung RN  Outcome: Progressing  Goal: Maintain proper alignment of affected body part  9/28/2022 2124 by Cody Clark RN  Outcome: Progressing  9/28/2022 0937 by Hannah Yung RN  Outcome: Progressing  Goal: Return ADL status to a safe level of function  9/28/2022 2124 by Cody Clark RN  Outcome: Progressing  9/28/2022 0937 by Hannah Yung RN  Outcome: Progressing     Problem: Gastrointestinal - Adult  Goal: Minimal or absence of nausea and vomiting  9/28/2022 2124 by Cody Clark RN  Outcome: Progressing  9/28/2022 0937 by Hannah Yung RN  Outcome: Progressing  Goal: Maintains or returns to baseline bowel function  9/28/2022 2124 by Cody Clark RN  Outcome: Progressing  9/28/2022 0937 by Anthony Dimas RN  Outcome: Progressing  Goal: Maintains adequate nutritional intake  9/28/2022 2124 by Cody Clark RN  Outcome: Progressing  9/28/2022 0937 by Anthony Dimas RN  Outcome: Progressing  Goal: Establish and maintain optimal ostomy function  9/28/2022 2124 by Cody Clark RN  Outcome: Progressing  9/28/2022 0937 by Anthony Dimas RN  Outcome: Progressing     Problem: Genitourinary - Adult  Goal: Absence of urinary retention  9/28/2022 2124 by Cody Clark RN  Outcome: Progressing  9/28/2022 0937 by Anthony Dimas RN  Outcome: Progressing  Goal: Urinary catheter remains patent  9/28/2022 2124 by Cody Clark RN  Outcome: Progressing  9/28/2022 0937 by Pedro Sousa Agustin Yung RN  Outcome: Progressing     Problem: Infection - Adult  Goal: Absence of infection at discharge  9/28/2022 2124 by Taye Rooney RN  Outcome: Progressing  9/28/2022 0937 by Liu Yung RN  Outcome: Progressing  Goal: Absence of infection during hospitalization  9/28/2022 2124 by Taye Rooney RN  Outcome: Progressing  9/28/2022 0937 by Liu Yung RN  Outcome: Progressing  Goal: Absence of fever/infection during anticipated neutropenic period  9/28/2022 2124 by Taye Rooney RN  Outcome: Progressing  9/28/2022 0937 by Liu Yung RN  Outcome: Progressing     Problem: Metabolic/Fluid and Electrolytes - Adult  Goal: Electrolytes maintained within normal limits  9/28/2022 2124 by Taye Rooney RN  Outcome: Progressing  9/28/2022 0937 by Garry Stapleton RN  Outcome: Progressing  Goal: Hemodynamic stability and optimal renal function maintained  9/28/2022 2124 by Taye Rooney RN  Outcome: Progressing  9/28/2022 0937 by Garry Stapleton RN  Outcome: Progressing  Goal: Glucose maintained within prescribed range  9/28/2022 2124 by Taye Rooney RN  Outcome: Progressing  9/28/2022 0937 by Garry Stapleton RN  Outcome: Progressing     Problem: Hematologic - Adult  Goal: Maintains hematologic stability  9/28/2022 2124 by Taye Rooney RN  Outcome: Progressing  9/28/2022 0937 by Liu Yung RN  Outcome: Progressing     Problem: Anxiety  Goal: Will report anxiety at manageable levels  Description: INTERVENTIONS:  1. Administer medication as ordered  2. Teach and rehearse alternative coping skills  3. Provide emotional support with 1:1 interaction with staff  9/28/2022 2124 by Taye Rooney RN  Outcome: Progressing  9/28/2022 0937 by Garry Stapleton RN  Outcome: Progressing     Problem: Coping  Goal: Pt/Family able to verbalize concerns and demonstrate effective coping strategies  Description: INTERVENTIONS:  1. Assist patient/family to identify coping skills, available support systems and cultural and spiritual values  2. Provide emotional support, including active listening and acknowledgement of concerns of patient and caregivers  3. Reduce environmental stimuli, as able  4. Instruct patient/family in relaxation techniques, as appropriate  5. Assess for spiritual pain/suffering and initiate Spiritual Care, Psychosocial Clinical Specialist consults as needed  9/28/2022 2124 by Herrera Merlos RN  Outcome: Progressing  9/28/2022 0937 by Iman Morocho RN  Outcome: Progressing     Problem: Decision Making  Goal: Pt/Family able to effectively weigh alternatives and participate in decision making related to treatment and care  Description: INTERVENTIONS:  1. Determine when there are differences between patient's view, family's view, and healthcare provider's view of condition  2. Facilitate patient and family articulation of goals for care  3. Help patient and family identify pros/cons of alternative solutions  4. Provide information as requested by patient/family  5. Respect patient/family right to receive or not to receive information  6. Serve as a liaison between patient and family and health care team  7. Initiate Consults from Ethics, Palliative Care or initiate [address] [place] [address] as is appropriate  9/28/2022 2124 by Herrera Merlos RN  Outcome: Progressing  9/28/2022 0937 by Colin Yung RN  Outcome: Progressing     Problem: Confusion  Goal: Confusion, delirium, dementia, or psychosis is improved or at baseline  Description: INTERVENTIONS:  1. Assess for possible contributors to thought disturbance, including medications, impaired vision or hearing, underlying metabolic abnormalities, dehydration, psychiatric diagnoses, and notify attending LIP  2. [place] high risk fall precautions, as indicated  3. Provide frequent short contacts to provide reality reorientation, refocusing and direction  4. Decrease environmental stimuli, including noise as appropriate  5. Monitor and intervene to maintain adequate nutrition, hydration, elimination, sleep and activity  6. If unable to ensure safety without constant attention obtain sitter and review sitter guidelines with assigned personnel  7. Initiate Psychosocial CNS and Spiritual Care consult, as indicated  9/28/2022 2124 by Kam Smith RN  Outcome: Progressing  9/28/2022 0937 by Yolanda Castillo RN  Outcome: Progressing     Problem: Behavior  Goal: Pt/Family maintain appropriate behavior and adhere to behavioral management agreement, if implemented  Description: INTERVENTIONS:  1. Assess patient/family's coping skills and  non-compliant behavior (including use of illegal substances)  2. Notify security of behavior or suspected illegal substances which indicate the need for search of the family and/or belongings  3. Encourage verbalization of thoughts and concerns in a socially appropriate manner  4. Utilize positive, consistent limit setting strategies supporting safety of patient, staff and others  5. Encourage participation in the decision making process about the behavioral management agreement  6. If a visitor's behavior poses a threat to safety call refer to organization policy. 7. Initiate consult with , Psychosocial CNS, Spiritual Care as appropriate  9/28/2022 2124 by Kam Smith RN  Outcome: Progressing  9/28/2022 0937 by Elba Yung RN  Outcome: Progressing     Problem: Spiritual Care  Goal: Pt/Family able to move forward in process of forgiving self, others, and/or higher power  Description: INTERVENTIONS:  1. Assist patient/family to overcome blocks to healing by use of spiritual practices (prayer, meditation, guided imagery, reiki, breath work, etc). 2. De-myth guilt and help patient/family identify possible irrational spiritual/cultural beliefs and values.   3. Explore possibilities of making amends & reconciliation with self, others, and/or a greater power. 4. Guide patient/family in identifying painful feelings of guilt. 5. Help patient/famiy explore and identify spiritual beliefs, cultural understandings or values that may help or hinder letting go of issue. 6. Help patient/family explore feelings of anger, bitterness, resentment. 7. Help patient/family identify and examine the situation in which these feelings are experienced. 8. Help patient/family identify destructive displacement of feelings onto other individuals. 9. Invite use of sacraments/rituals/ceremonies as appropriate (e.g. - confession, anointing, smudging). 10. Refer patient/family to formal counseling and/or to [facility] for further support work. 9/28/2022 2124 by Taye Rooney RN  Outcome: Progressing  9/28/2022 0937 by Garry Stapleton RN  Outcome: Progressing     Problem: Skin/Tissue Integrity  Goal: Absence of new skin breakdown  Description: 1. Monitor for areas of redness and/or skin breakdown  2. Assess vascular access sites hourly  3. Every 4-6 hours minimum:  Change oxygen saturation probe site  4. Every 4-6 hours:  If on nasal continuous positive airway pressure, respiratory therapy assess nares and determine need for appliance change or resting period.   9/28/2022 2124 by Taye Rooney RN  Outcome: Progressing  9/28/2022 8327 by Garry Stapleton RN  Outcome: Progressing     Problem: Safety - Adult  Goal: Free from fall injury  9/28/2022 2124 by Taye Rooney RN  Outcome: Progressing  9/28/2022 0937 by Garry Stapleton RN  Outcome: Progressing     Problem: ABCDS Injury Assessment  Goal: Absence of physical injury  9/28/2022 2124 by Taye Rooney RN  Outcome: Progressing  9/28/2022 0937 by Garry Stapleton RN  Outcome: Progressing     Problem: Nutrition Deficit:  Goal: Optimize nutritional status  9/28/2022 2124 by Taye Rooney RN  Outcome: Progressing  9/28/2022 0937 by Kaleigh Perez Agustin Yung RN  Outcome: Progressing

## 2022-09-29 NOTE — PROGRESS NOTES
INTENSIVE CARE UNIT  Resident Physician Progress Note    Patient - Jairo Reagan  Date of Admission -  9/24/2022  2:07 PM  Date of Evaluation -  9/29/2022  Room and Bed Number -  3024/3024-01   Hospital Day - 5    SUBJECTIVE:   HISTORY OF PRESENT ILLNESS:    Jairo Reagan is a 80 y.o. female past medical history of COPD, chronic hip pain, chronic back pain, prediabetes, OA, osteoporosis who presents to the ED after being found by a neighbor stuck in her chair. Patient notes she was stuck in chair for x2 days, but has felt weak x3 days. patient was unable to get up due to weakness and has had significant difficulty ambulating. 911 was called and patient was brought to [facility] ED for further evaluation. Patient was tachypneic, failed BiPAP and ultimately was intubated. Blood cultures grew MSSA. Urine cultures grew Klebsiella.     OVERNIGHT EVENTS:      No acute events overnight  Patient was extubated yesterday and tolerated it well    Patient is very quiet at this point, not making any conversation  Seems to be hallucinating as well intermittently  Hemodynamics are stable  On 3 L nasal cannula oxygen  Labs reviewed; sodium 146, chloride 111, potassium 3.5 which was replaced along with magnesium  Leukocytosis improving 33> 19> 16>11  Urine output 1700 mL in last 24 hours    OBJECTIVE:   VITAL SIGNS:  Patient Vitals for the past 8 hrs:   BP Temp Temp src Pulse Resp SpO2   09/29/22 0715 -- -- -- (!) 105 30 94 %   09/29/22 0700 (!) 153/78 -- -- (!) 103 27 93 %   09/29/22 0645 -- -- -- (!) 101 29 93 %   09/29/22 0630 (!) 158/85 -- -- 98 26 94 %   09/29/22 0615 -- -- -- 99 23 94 %   09/29/22 0600 133/83 -- -- 97 24 92 %   09/29/22 0545 -- -- -- 95 25 94 %   09/29/22 0530 (!) 134/91 -- -- 97 21 95 %   09/29/22 0515 -- -- -- 98 26 95 %   09/29/22 0500 139/69 -- -- 97 24 96 %   09/29/22 0445 -- -- -- 94 26 95 %   09/29/22 8920 133/69 -- -- 87 23 96 %   09/29/22 8725 -- -- -- 93 25 96 %   09/29/22 4370 (!) 150/79 98.4 °F (36.9 °C) Oral 91 27 95 %   22 0345 -- -- -- 89 21 96 %   22 0330 134/78 -- -- 81 18 97 %   22 0315 -- -- -- 84 21 96 %   22 0300 (!) 142/78 -- -- 95 21 (!) 87 %   22 0245 -- -- -- 89 21 95 %   22 0230 136/79 -- -- 98 25 (!) 88 %   22 0215 -- -- -- 87 21 95 %   22 0200 139/74 -- -- 88 18 93 %   22 0145 -- -- -- 86 22 94 %   22 0130 134/70 -- -- 87 20 95 %   22 0115 -- -- -- 94 19 96 %   22 0100 (!) 141/77 -- -- 91 25 94 %   22 0045 -- -- -- 93 20 90 %         Last Body weight:   Wt Readings from Last 3 Encounters:   22 239 lb 8 oz (108.6 kg)   22 226 lb 11.2 oz (102.8 kg)   21 220 lb (99.8 kg)       Body Mass Index : Body mass index is 39.85 kg/m². Tmax over 24 hours: Temp (24hrs), Av.8 °F (37.7 °C), Min:98.4 °F (36.9 °C), Max:100.4 °F (38 °C)      Ins/Outs:    In: 407.3 [P.O.:35; I.V.:262.3; NG/GT:10]  Out: 2270 [Urine:1770]    PHYSICAL EXAM:  Constitutional: Quite, Non-conversive  EENT: PERRLA, EOMI, sclera clear, anicteric, oropharynx clear, no lesions  Neck: Supple, symmetrical, trachea midline  Respiratory: shallow breath sounds, some rhonchi  Cardiovascular: regular rate and rhythm, normal S1, S2, no murmur noted and 2+ distal pulses throughout  Abdomen: soft, nontender, nondistended, no masses or organomegaly  Extremities: Some skin ulceration on lower extremities  Psych: Quite, Non-conversive  Neuro: No FND    MEDICATIONS:  Scheduled Meds:   potassium chloride  20 mEq IntraVENous Once    magnesium sulfate  1,000 mg IntraVENous Once    QUEtiapine  25 mg Oral Nightly    enoxaparin  30 mg SubCUTAneous BID    pantoprazole  40 mg Oral QAM AC    predniSONE  30 mg Oral Daily    Followed by    Maty Woodward ON 10/2/2022] predniSONE  20 mg Oral Daily    Followed by    Maty Woodward ON 10/5/2022] predniSONE  10 mg Oral Daily    ceFAZolin  2,000 mg IntraVENous Q8H    oxyCODONE  10 mg Oral q8h    ipratropium  0.5 mg Nebulization 4x daily    sodium chloride flush  5-40 mL IntraVENous 2 times per day    budesonide-formoterol  2 puff Inhalation BID       Continuous Infusions:   dextrose      sodium chloride 10 mL/hr at 09/29/22 0619       PRN Meds:   metoclopramide, 10 mg, Q6H PRN  glucose, 4 tablet, PRN  dextrose bolus, 125 mL, PRN   Or  dextrose bolus, 250 mL, PRN  glucagon (rDNA), 1 mg, PRN  dextrose, , Continuous PRN  sodium chloride flush, 5-40 mL, PRN  sodium chloride, , PRN  ondansetron, 4 mg, Q8H PRN   Or  ondansetron, 4 mg, Q6H PRN  polyethylene glycol, 17 g, Daily PRN  acetaminophen, 650 mg, Q6H PRN   Or  acetaminophen, 650 mg, Q6H PRN      SUPPORT DEVICES: [] Ventilator [] BIPAP  [x] Nasal Cannula [] Room Air    DATA:  Complete Blood Count:   Recent Labs     09/27/22  0557 09/28/22  0548 09/29/22  0402   WBC 19.7* 16.7* 11.3   RBC 3.86* 4.00 4.24   HGB 10.4* 10.7* 10.9*   HCT 32.5* 33.7* 34.8*   MCV 84.2 84.3 82.1*   MCH 26.9 26.8 25.7   MCHC 32.0 31.8 31.3   RDW 18.0* 18.0* 17.3*   PLT 88* 87* See Reflexed IPF Result   MPV 11.6 11.9  --           Last 3 Blood Glucose:   Recent Labs     09/27/22  0557 09/28/22  0548 09/29/22  0402   GLUCOSE 180* 141* 91          PT/INR:    Lab Results   Component Value Date/Time    PROTIME 13.9 09/24/2022 02:21 PM    INR 1.4 09/24/2022 02:21 PM     PTT:  No results found for: APTT, PTT    Basic Metabolic Profile:   Recent Labs     09/27/22  0557 09/28/22  0548 09/29/22  0402    149* 146*   K 3.5* 3.5* 3.5*   * 116* 111*   CO2 22 22 27   BUN 42* 41* 30*   CREATININE 0.98* 0.82 0.64   GLUCOSE 180* 141* 91         Liver Function:  No results for input(s): PROT, LABALBU, ALT, AST, GGT, ALKPHOS, BILITOT in the last 72 hours.       Magnesium:   Lab Results   Component Value Date/Time    MG 1.9 09/29/2022 04:02 AM    MG 2.8 09/28/2022 05:48 AM    MG 2.6 09/27/2022 05:57 AM     Phosphorus: No results found for: PHOS  Ionized Calcium: No results found for: CAION     Urinalysis:   Lab Results   Component Value Date/Time    NITRU NEGATIVE 09/24/2022 03:39 PM    COLORU Dark Yellow 09/24/2022 03:39 PM    PHUR 5.5 09/24/2022 03:39 PM    WBCUA TOO NUMEROUS TO COUNT 09/24/2022 03:39 PM    RBCUA 2 TO 5 09/24/2022 03:39 PM    BACTERIA MANY 09/24/2022 03:39 PM    SPECGRAV 1.022 09/24/2022 03:39 PM    LEUKOCYTESUR LARGE 09/24/2022 03:39 PM    UROBILINOGEN Normal 09/24/2022 03:39 PM    BILIRUBINUR NEGATIVE  Verified by ictotest. 09/24/2022 03:39 PM    GLUCOSEU NEGATIVE 09/24/2022 03:39 PM    KETUA TRACE 09/24/2022 03:39 PM       HgBA1c:    Lab Results   Component Value Date/Time    LABA1C 5.7 06/02/2022 09:05 AM     TSH:    Lab Results   Component Value Date/Time    TSH 1.79 05/17/2021 09:10 AM     Lactic Acid: No results found for: LACTA   Troponin: No results for input(s): TROPONINI in the last 72 hours. Radiology/Imaging:  XR ABDOMEN (KUB) (SINGLE AP VIEW)   Final Result   1. No ileus or obstruction is identified. Mild gaseous distention of the   stomach. 2. Cardiomegaly with vascular congestion. 3. Small bore central venous catheter terminates at the cavoatrial   junction/right atrium. XR CHEST PORTABLE   Final Result   1. No ileus or obstruction is identified. Mild gaseous distention of the   stomach. 2. Cardiomegaly with vascular congestion. 3. Small bore central venous catheter terminates at the cavoatrial   junction/right atrium. XR CHEST PORTABLE   Final Result   Central pulmonary congestion with obscuration of the left hemidiaphragm   likely representing effusion as can be seen in CHF. XR CHEST (SINGLE VIEW FRONTAL)   Final Result   Left internal jugular line tip projected over the mid right atrium. OG and endotracheal tubes appear unchanged and in satisfactory position. Pulmonary vasculature may be slightly congested. XR CHEST PORTABLE   Final Result   Endotracheal tube in satisfactory position.          XR ABDOMEN FOR NG/OG/NE TUBE PLACEMENT Final Result   OG tube in satisfactory position. CT CHEST ABDOMEN PELVIS W CONTRAST   Final Result   No acute traumatic abnormality detected within the chest, abdomen, and pelvis. Airspace disease the left lung base, atelectasis versus pneumonia versus   aspiration. CT THORACIC SPINE TRAUMA RECONSTRUCTION   Final Result   No acute traumatic abnormality detected within the chest, abdomen, and pelvis. Airspace disease the left lung base, atelectasis versus pneumonia versus   aspiration. CT LUMBAR SPINE TRAUMA RECONSTRUCTION   Final Result   No acute traumatic abnormality detected within the chest, abdomen, and pelvis. Airspace disease the left lung base, atelectasis versus pneumonia versus   aspiration. CT HEAD WO CONTRAST   Final Result   No acute intracranial abnormality. CT CERVICAL SPINE WO CONTRAST   Final Result   No acute abnormality of the cervical spine. XR CHEST PORTABLE   Final Result   No radiographic evidence of an acute cardiopulmonary process.          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             ASSESSMENT:     Patient Active Problem List    Diagnosis Date Noted    MSSA bacteremia 09/25/2022    Septicemia ([facility] [address] 09/24/2022    Osteoporosis without current pathological fracture 11/19/2019    Chronic left hip pain 07/23/2019    Chronic bilateral low back pain with bilateral sciatica 07/23/2019    Prediabetes 07/23/2019    Primary osteoarthritis involving multiple joints 07/23/2019        PLAN:     PLAN/MEDICAL DECISION MAKING:  Neurologic:   Extubated 9/28  Continue neuro checks per protocol  Oxycodon 10 mg TID (Takes MS contin at home)  Delirium   Continue non-pharmacological measures  Cardiovascular:  Septic shock - resolved  HR:   MAPs:   MAP goal >65  Pulmonary:  H/o COPD  CXR: Chest imaging with concern of left lung base airspace disease, atelectasis versus pneumonia versus aspiration  Extubated   Stop steroids  Symbicort inhaler. Atrovent nebs  Maintain oxygen sats >92%  Pulmonary toilet  GI/Nutrition  Bowel regimen: Glycolax  Ulcer Prophylaxis: Protonix  Diet: NPO due to vomiting with risk of aspiration  Last BM: unknown  Renal/Fluid/Electrolyte  JAXON and Lactic acidosis resolved  IV Fluids: off IVF  I/O: In: 407.3 [P.O.:35; I.V.:262.3; NG/GT:10]  Out: 2270 [Urine:1770]  Monitor electrolytes, replace PRN   Keep potassium greater than 4 and magnesium greater than 2  ID  Septic shock - resolved  UTI  WBC: 24>30>33>19>16>11  Tmax: Temp (24hrs), Av.8 °F (37.7 °C), Min:98.4 °F (36.9 °C), Max:100.4 °F (38 °C)  Blood Cx x2: MSSA  Urine Cx: Klebsiella  Antimicrobials: Cefazolin - day 3 (received Zosyn for 4 days before that) - continue for total of 2 weeks of Abx  Repeat blood cultures NGTD  Hematology:  H/H: stable  Endocrine:   Glucose - stable  DVT Prophylaxis  EPC Cuffs  Lovenox    CODE STATUS: Full Code    DISPOSITION:  [x] To remain ICU: potential transfer out later today  [] OK for out of ICU from 20 Carlos Beltrán MD  Internal Medicine Resident, PGY-3  Critical care service  [id number]  2022,8:37 AM

## 2022-09-29 NOTE — PROGRESS NOTES
Unknown)   SpO2 94%   BMI 39.85 kg/m²   24 hour intake/output:  Intake/Output Summary (Last 24 hours) at 9/29/2022 1822  Last data filed at 9/29/2022 1300  Gross per 24 hour   Intake 469.89 ml   Output 950 ml   Net -480.11 ml     Last 3 weights: Wt Readings from Last 3 Encounters:   09/28/22 239 lb 8 oz (108.6 kg)   06/02/22 226 lb 11.2 oz (102.8 kg)   12/01/21 220 lb (99.8 kg)     General appearance: awake, alert, cooperative  HEENT: Head: Normocephalic, no lesions, without obvious abnormality. Lungs: clear to auscultation bilaterally  Heart: regular rate and rhythm, S1, S2 normal, no murmur  Abdomen: soft, non-tender; bowel sounds normal; no masses,  no organomegaly  Extremities: extremities normal, atraumatic, no cyanosis or edema  Neurological:  Awake, alert, oriented to name, place and time. Cranial nerves II-XII are grossly intact. Reflexes normal and symmetric.  Sensation grossly normal    Medications:Current Inpatient  Scheduled Meds:   morphine  4 mg IntraVENous Q8H    enoxaparin  30 mg SubCUTAneous BID    pantoprazole  40 mg Oral QAM AC    ceFAZolin  2,000 mg IntraVENous Q8H    [Held by provider] oxyCODONE  10 mg Oral q8h    ipratropium  0.5 mg Nebulization 4x daily    sodium chloride flush  5-40 mL IntraVENous 2 times per day    budesonide-formoterol  2 puff Inhalation BID     Continuous Infusions:   dextrose 5 % and 0.45 % NaCl 75 mL/hr at 09/29/22 1302    dextrose      sodium chloride 10 mL/hr at 09/29/22 1135     PRN Meds:metoclopramide, glucose, dextrose bolus **OR** dextrose bolus, glucagon (rDNA), dextrose, sodium chloride flush, sodium chloride, ondansetron **OR** ondansetron, polyethylene glycol, acetaminophen **OR** acetaminophen    Objective:    CBC:   Recent Labs     09/27/22  0557 09/28/22  0548 09/29/22  0402   WBC 19.7* 16.7* 11.3   HGB 10.4* 10.7* 10.9*   PLT 88* 87* See Reflexed IPF Result     BMP:    Recent Labs     09/27/22  0557 09/28/22  0548 09/29/22  0402    149* 146*   K 3. 5* 3.5* 3.5*   * 116* 111*   CO2 22 22 27   BUN 42* 41* 30*   CREATININE 0.98* 0.82 0.64   GLUCOSE 180* 141* 91     Calcium:  Recent Labs     09/29/22  0402   CALCIUM 9.3     Ionized Calcium:No results for input(s): IONCA in the last 72 hours. Magnesium:  Recent Labs     09/29/22  0402   MG 1.9     Phosphorus:No results for input(s): PHOS in the last 72 hours. BNP:No results for input(s): BNP in the last 72 hours. Glucose:  Recent Labs     09/29/22  0741 09/29/22  1147 09/29/22  1554   POCGLU 88 116* 114*     HgbA1C: No results for input(s): LABA1C in the last 72 hours. INR: No results for input(s): INR in the last 72 hours. Hepatic: No results for input(s): ALKPHOS, ALT, AST, PROT, BILITOT, BILIDIR, LABALBU in the last 72 hours. Amylase and Lipase:No results for input(s): LACTA, AMYLASE in the last 72 hours. Lactic Acid: No results for input(s): LACTA in the last 72 hours. CARDIAC ENZYMES:No results for input(s): CKTOTAL, CKMB, CKMBINDEX, TROPONINI in the last 72 hours. BNP: No results for input(s): BNP in the last 72 hours. Lipids:   Recent Labs     09/28/22  0548   TRIG 213*     ABGs: No results found for: PH, PCO2, PO2, HCO3, O2SAT  Thyroid:   Lab Results   Component Value Date/Time    TSH 1.79 05/17/2021 09:10 AM        Urinalysis: No results for input(s): BACTERIA, BLOODU, CLARITYU, COLORU, PHUR, PROTEINU, RBCUA, SPECGRAV, BILIRUBINUR, NITRU, WBCUA, LEUKOCYTESUR, GLUCOSEU in the last 72 hours. CULTURES:   9/24/2022 blood culture positive for MSSA  9/24/2022 urine culture positive for Klebsiella      Assessment:  Principal Problem:    Septicemia ([address]  Active Problems:    MSSA bacteremia    UTI due to Klebsiella species    COPD (chronic obstructive pulmonary disease) ([facility])    Class 2 obesity due to excess calories with body mass index (BMI) of 39.0 to 39.9 in adult  Resolved Problems:    * No resolved hospital problems.  *          Plan:  Currently on cefazolin (day 3), patient received 4 days of Zosyn prior, will continue cefazolin for 1 more week for a total of 2 weeks of antibiotics  Will continue OxyContin 10 mg 3 times daily, patient on MS Contin at home, patient currently on morphine injection due to n.p.o. status  Will continue Symbicort inhaler and Atrovent nebulization  Patient currently on clear liquid diet with nutritional supplement, will advance as tolerated  Patient on Lovenox for DVT prophylaxis      Amador Hartman MD   Internal medicine resident, PGY-1  [facility], [place], [place]  09/29/22 6:22 PM

## 2022-09-29 NOTE — PROGRESS NOTES
Critical care team - Resident sign-out to medicine service      Date and time: 9/29/2022 3:45 PM  Patient's name:  Samaria Marie Record Number: [id number]  Patient's account/billing number: [de-identified]  Patient's YOB: 1950  Age: 80 y.o. Date of Admission: 9/24/2022  2:07 PM  Length of stay during current admission: 5    Primary Care Physician: Fortino Weems MD    Code Status: Full Code    Mode of physician to physician communication:        [x] Via telephone   [] In person     Date and time of sign-out: 9/29/2022 3:45 PM    Accepting Internal Medicine resident: Dr. James Rios    Accepting Medicine team: IM Team - Med 2    Accepting team's attending: Dr. Ramirez Diaz    Patient's current ICU Bed:  9845    Patient's assigned bed on floor:  318        [x] Med-Surg Monitored [] Step-down       [] Psychiatry ICU       [] Psych floor     Reason for ICU admission:     Required pressor support, intubated sedated    ICU course summary:     Evaristo Morel is a 80 y.o. female past medical history of COPD, chronic hip pain, chronic back pain, prediabetes, OA, osteoporosis who presents to the ED after being found by a neighbor stuck in her chair. Patient notes she was stuck in chair for x2 days, but has felt weak x3 days. patient was unable to get up due to weakness and has had significant difficulty ambulating. 911 was called and patient was brought to [address] ED for further evaluation. Patient was tachypneic, failed BiPAP and ultimately was intubated. Blood cultures grew MSSA. Urine cultures grew Klebsiella. Patient was extubated in 09/28/2022. Patient is still slightly altered, seems to be hallucinating intermittently.'s saturating well on 3 L nasal cannula. Leukocytosis have improved, urine output is better, electrolytes stable. Patient is being stable to transfer out of the ICU.     Procedures during patient's ICU stay:     Intubation  Central line placement    Current Vitals:     BP (!) 159/88   Pulse (!) 102   Temp 98.1 °F (36.7 °C) (Axillary)   Resp 25   Ht 5' 5\" (1.651 m)   Wt 239 lb 8 oz (108.6 kg)   LMP  (LMP Unknown)   SpO2 94%   BMI 39.85 kg/m²       Cultures:     Blood cultures:                 [] None drawn      [] Negative             [x]  Positive (Details:  )  Urine Culture:                   [] None drawn      [] Negative             []  Positive (Details:  )  Sputum Culture:               [] None drawn       [] Negative             [x]  Positive (Details:  )   Endotracheal aspirate:     [] None drawn       [] Negative             []  Positive (Details:  )       Consults:     1. Inpatient consult to wound care    Assessment:     Patient Active Problem List    Diagnosis Date Noted    MSSA bacteremia 09/25/2022    Septicemia ([facility] [address] 09/24/2022    Osteoporosis without current pathological fracture 11/19/2019    Chronic left hip pain 07/23/2019    Chronic bilateral low back pain with bilateral sciatica 07/23/2019    Prediabetes 07/23/2019    Primary osteoarthritis involving multiple joints 07/23/2019       Recommended Follow-up:     Cefazolin - day 3 (received Zosyn for 4 days before that) - continue for total of 2 weeks of Abx  Continue OxyContin 10 mg 3 times daily (Takes MS contin at home)  Continue Symbicort inhaler, Atrovent nebulization  Currently n.p.o. due to vomiting with risk of aspiration. Advance as tolerated  Continue Protonix          Arsenio Wu MD  Internal Medicine Resident, PGY-3  [facility]; [place]  9/29/2022,3:48 PM      Above mentioned assessment and plan was discussed by me with the admitting medicine resident. The medicine team assigned to the patient by medicine admitting resident will be following up the patient from now onwards on the floor.

## 2022-09-30 ENCOUNTER — APPOINTMENT (OUTPATIENT)
Dept: GENERAL RADIOLOGY | Age: 72
DRG: 871 | End: 2022-09-30
Payer: COMMERCIAL

## 2022-09-30 ENCOUNTER — APPOINTMENT (OUTPATIENT)
Dept: NUCLEAR MEDICINE | Age: 72
DRG: 871 | End: 2022-09-30
Payer: COMMERCIAL

## 2022-09-30 PROBLEM — J69.0 ASPIRATION PNEUMONIA (HCC): Status: ACTIVE | Noted: 2022-09-30

## 2022-09-30 PROBLEM — E87.6 HYPOKALEMIA: Status: ACTIVE | Noted: 2022-09-30

## 2022-09-30 PROBLEM — E87.5 HYPERKALEMIA: Status: ACTIVE | Noted: 2022-09-30

## 2022-09-30 PROBLEM — M48.062 SPINAL STENOSIS OF LUMBAR REGION WITH NEUROGENIC CLAUDICATION: Status: ACTIVE | Noted: 2022-09-30

## 2022-09-30 PROBLEM — R65.10 SIRS (SYSTEMIC INFLAMMATORY RESPONSE SYNDROME) (HCC): Status: ACTIVE | Noted: 2022-09-30

## 2022-09-30 PROBLEM — A41.01 MSSA (METHICILLIN SUSCEPTIBLE STAPHYLOCOCCUS AUREUS) SEPTICEMIA (HCC): Status: ACTIVE | Noted: 2022-09-30

## 2022-09-30 PROBLEM — J96.01 ACUTE RESPIRATORY FAILURE WITH HYPOXIA (HCC): Status: ACTIVE | Noted: 2022-09-30

## 2022-09-30 PROBLEM — R29.898 BILATERAL LEG WEAKNESS: Status: ACTIVE | Noted: 2022-09-30

## 2022-09-30 PROBLEM — G93.40 ACUTE ENCEPHALOPATHY: Status: ACTIVE | Noted: 2022-09-30

## 2022-09-30 PROBLEM — D72.825 BANDEMIA: Status: ACTIVE | Noted: 2022-09-30

## 2022-09-30 PROBLEM — E87.3 RESPIRATORY ALKALOSIS: Status: ACTIVE | Noted: 2022-09-30

## 2022-09-30 LAB
ABSOLUTE EOS #: 0.2 K/UL (ref 0–0.4)
ABSOLUTE IMMATURE GRANULOCYTE: 0.61 K/UL (ref 0–0.3)
ABSOLUTE LYMPH #: 1.82 K/UL (ref 1–4.8)
ABSOLUTE MONO #: 1.41 K/UL (ref 0.1–0.8)
ALLEN TEST: POSITIVE
ANION GAP SERPL CALCULATED.3IONS-SCNC: 11 MMOL/L (ref 9–17)
BASOPHILS # BLD: 0 % (ref 0–2)
BASOPHILS ABSOLUTE: 0 K/UL (ref 0–0.2)
BUN BLDV-MCNC: 23 MG/DL (ref 8–23)
CALCIUM SERPL-MCNC: 9.1 MG/DL (ref 8.6–10.4)
CARBOXYHEMOGLOBIN: 1.1 % (ref 0–5)
CHLORIDE BLD-SCNC: 106 MMOL/L (ref 98–107)
CO2: 28 MMOL/L (ref 20–31)
CREAT SERPL-MCNC: 0.6 MG/DL (ref 0.5–0.9)
EKG ATRIAL RATE: 103 BPM
EKG ATRIAL RATE: 141 BPM
EKG P AXIS: 37 DEGREES
EKG P-R INTERVAL: 162 MS
EKG Q-T INTERVAL: 354 MS
EKG Q-T INTERVAL: 386 MS
EKG QRS DURATION: 66 MS
EKG QRS DURATION: 82 MS
EKG QTC CALCULATION (BAZETT): 463 MS
EKG QTC CALCULATION (BAZETT): 597 MS
EKG R AXIS: 17 DEGREES
EKG R AXIS: 20 DEGREES
EKG T AXIS: 155 DEGREES
EKG T AXIS: 30 DEGREES
EKG VENTRICULAR RATE: 103 BPM
EKG VENTRICULAR RATE: 144 BPM
EOSINOPHILS RELATIVE PERCENT: 1 % (ref 1–4)
FIO2: 28
FIO2: ABNORMAL
FOLATE: 2.5 NG/ML
GFR AFRICAN AMERICAN: >60 ML/MIN
GFR NON-AFRICAN AMERICAN: >60 ML/MIN
GFR SERPL CREATININE-BSD FRML MDRD: ABNORMAL ML/MIN/{1.73_M2}
GLUCOSE BLD-MCNC: 116 MG/DL (ref 65–105)
GLUCOSE BLD-MCNC: 126 MG/DL (ref 70–99)
GLUCOSE BLD-MCNC: 158 MG/DL (ref 74–100)
HCO3 VENOUS: 30.2 MMOL/L (ref 24–30)
HCT VFR BLD CALC: 36.2 % (ref 36.3–47.1)
HEMOGLOBIN: 11.7 G/DL (ref 11.9–15.1)
IMMATURE GRANULOCYTES: 3 %
LYMPHOCYTES # BLD: 9 % (ref 24–44)
MAGNESIUM: 2.1 MG/DL (ref 1.6–2.6)
MCH RBC QN AUTO: 26.8 PG (ref 25.2–33.5)
MCHC RBC AUTO-ENTMCNC: 32.3 G/DL (ref 28.4–34.8)
MCV RBC AUTO: 82.8 FL (ref 82.6–102.9)
MONOCYTES # BLD: 7 % (ref 1–7)
MORPHOLOGY: ABNORMAL
NRBC AUTOMATED: 0 PER 100 WBC
O2 SAT, VEN: 70.4 % (ref 60–85)
PATIENT TEMP: 37
PCO2, VEN: 49.1 MM HG (ref 39–55)
PDW BLD-RTO: 17 % (ref 11.8–14.4)
PH VENOUS: 7.41 (ref 7.32–7.42)
PLATELET # BLD: 154 K/UL (ref 138–453)
PMV BLD AUTO: 11.3 FL (ref 8.1–13.5)
PO2, VEN: 37.1 MM HG (ref 30–50)
POC HCO3: 28.8 MMOL/L (ref 21–28)
POC LACTIC ACID: 1.05 MMOL/L (ref 0.56–1.39)
POC O2 SATURATION: 95 % (ref 94–98)
POC PCO2: 30.8 MM HG (ref 35–48)
POC PH: 7.58 (ref 7.35–7.45)
POC PO2: 64.2 MM HG (ref 83–108)
POSITIVE BASE EXCESS, ART: 7 (ref 0–3)
POSITIVE BASE EXCESS, VEN: 5.1 MMOL/L (ref 0–2)
POTASSIUM SERPL-SCNC: 3 MMOL/L (ref 3.7–5.3)
RBC # BLD: 4.37 M/UL (ref 3.95–5.11)
SAMPLE SITE: ABNORMAL
SEG NEUTROPHILS: 80 % (ref 36–66)
SEGMENTED NEUTROPHILS ABSOLUTE COUNT: 16.16 K/UL (ref 1.8–7.7)
SODIUM BLD-SCNC: 137 MMOL/L (ref 135–144)
SODIUM BLD-SCNC: 145 MMOL/L (ref 135–144)
TOTAL CK: 33 U/L (ref 26–192)
TSH SERPL DL<=0.05 MIU/L-ACNC: 2.01 UIU/ML (ref 0.3–5)
VITAMIN B-12: 1066 PG/ML (ref 232–1245)
WBC # BLD: 20.2 K/UL (ref 3.5–11.3)

## 2022-09-30 PROCEDURE — 6360000002 HC RX W HCPCS: Performed by: INTERNAL MEDICINE

## 2022-09-30 PROCEDURE — 99233 SBSQ HOSP IP/OBS HIGH 50: CPT | Performed by: INTERNAL MEDICINE

## 2022-09-30 PROCEDURE — A9503 TC99M MEDRONATE: HCPCS | Performed by: INTERNAL MEDICINE

## 2022-09-30 PROCEDURE — 82746 ASSAY OF FOLIC ACID SERUM: CPT

## 2022-09-30 PROCEDURE — 93005 ELECTROCARDIOGRAM TRACING: CPT

## 2022-09-30 PROCEDURE — 6370000000 HC RX 637 (ALT 250 FOR IP)

## 2022-09-30 PROCEDURE — 6360000002 HC RX W HCPCS: Performed by: STUDENT IN AN ORGANIZED HEALTH CARE EDUCATION/TRAINING PROGRAM

## 2022-09-30 PROCEDURE — 82550 ASSAY OF CK (CPK): CPT

## 2022-09-30 PROCEDURE — 6370000000 HC RX 637 (ALT 250 FOR IP): Performed by: INTERNAL MEDICINE

## 2022-09-30 PROCEDURE — 83735 ASSAY OF MAGNESIUM: CPT

## 2022-09-30 PROCEDURE — 93010 ELECTROCARDIOGRAM REPORT: CPT | Performed by: INTERNAL MEDICINE

## 2022-09-30 PROCEDURE — 94640 AIRWAY INHALATION TREATMENT: CPT

## 2022-09-30 PROCEDURE — 2700000000 HC OXYGEN THERAPY PER DAY

## 2022-09-30 PROCEDURE — 99254 IP/OBS CNSLTJ NEW/EST MOD 60: CPT | Performed by: PSYCHIATRY & NEUROLOGY

## 2022-09-30 PROCEDURE — 99223 1ST HOSP IP/OBS HIGH 75: CPT | Performed by: INTERNAL MEDICINE

## 2022-09-30 PROCEDURE — 2580000003 HC RX 258

## 2022-09-30 PROCEDURE — 83605 ASSAY OF LACTIC ACID: CPT

## 2022-09-30 PROCEDURE — 6370000000 HC RX 637 (ALT 250 FOR IP): Performed by: STUDENT IN AN ORGANIZED HEALTH CARE EDUCATION/TRAINING PROGRAM

## 2022-09-30 PROCEDURE — 36600 WITHDRAWAL OF ARTERIAL BLOOD: CPT

## 2022-09-30 PROCEDURE — 84295 ASSAY OF SERUM SODIUM: CPT

## 2022-09-30 PROCEDURE — 82803 BLOOD GASES ANY COMBINATION: CPT

## 2022-09-30 PROCEDURE — 85025 COMPLETE CBC W/AUTO DIFF WBC: CPT

## 2022-09-30 PROCEDURE — 80048 BASIC METABOLIC PNL TOTAL CA: CPT

## 2022-09-30 PROCEDURE — 78315 BONE IMAGING 3 PHASE: CPT

## 2022-09-30 PROCEDURE — 71045 X-RAY EXAM CHEST 1 VIEW: CPT

## 2022-09-30 PROCEDURE — 94761 N-INVAS EAR/PLS OXIMETRY MLT: CPT

## 2022-09-30 PROCEDURE — 82607 VITAMIN B-12: CPT

## 2022-09-30 PROCEDURE — 82947 ASSAY GLUCOSE BLOOD QUANT: CPT

## 2022-09-30 PROCEDURE — 2580000003 HC RX 258: Performed by: INTERNAL MEDICINE

## 2022-09-30 PROCEDURE — 36415 COLL VENOUS BLD VENIPUNCTURE: CPT

## 2022-09-30 PROCEDURE — 3430000000 HC RX DIAGNOSTIC RADIOPHARMACEUTICAL: Performed by: INTERNAL MEDICINE

## 2022-09-30 PROCEDURE — 84443 ASSAY THYROID STIM HORMONE: CPT

## 2022-09-30 PROCEDURE — 2060000000 HC ICU INTERMEDIATE R&B

## 2022-09-30 PROCEDURE — 82805 BLOOD GASES W/O2 SATURATION: CPT

## 2022-09-30 RX ORDER — LANOLIN ALCOHOL/MO/W.PET/CERES
100 CREAM (GRAM) TOPICAL DAILY
Status: DISCONTINUED | OUTPATIENT
Start: 2022-09-30 | End: 2022-10-06 | Stop reason: HOSPADM

## 2022-09-30 RX ORDER — OXYCODONE HYDROCHLORIDE 5 MG/1
5 TABLET ORAL EVERY 6 HOURS PRN
Status: DISCONTINUED | OUTPATIENT
Start: 2022-09-30 | End: 2022-10-01

## 2022-09-30 RX ORDER — FOLIC ACID 1 MG/1
1 TABLET ORAL DAILY
Status: DISCONTINUED | OUTPATIENT
Start: 2022-09-30 | End: 2022-10-06 | Stop reason: HOSPADM

## 2022-09-30 RX ORDER — POTASSIUM CHLORIDE 20 MEQ/1
40 TABLET, EXTENDED RELEASE ORAL PRN
Status: DISCONTINUED | OUTPATIENT
Start: 2022-09-30 | End: 2022-10-01

## 2022-09-30 RX ORDER — SODIUM CHLORIDE, SODIUM LACTATE, POTASSIUM CHLORIDE, CALCIUM CHLORIDE 600; 310; 30; 20 MG/100ML; MG/100ML; MG/100ML; MG/100ML
INJECTION, SOLUTION INTRAVENOUS CONTINUOUS
Status: DISCONTINUED | OUTPATIENT
Start: 2022-09-30 | End: 2022-10-01

## 2022-09-30 RX ORDER — METOPROLOL TARTRATE 5 MG/5ML
5 INJECTION INTRAVENOUS ONCE
Status: DISCONTINUED | OUTPATIENT
Start: 2022-09-30 | End: 2022-09-30

## 2022-09-30 RX ORDER — SODIUM CHLORIDE 450 MG/100ML
INJECTION, SOLUTION INTRAVENOUS CONTINUOUS
Status: DISCONTINUED | OUTPATIENT
Start: 2022-09-30 | End: 2022-09-30

## 2022-09-30 RX ORDER — OXYCODONE HYDROCHLORIDE 5 MG/1
5 TABLET ORAL EVERY 8 HOURS
Status: DISCONTINUED | OUTPATIENT
Start: 2022-09-30 | End: 2022-09-30

## 2022-09-30 RX ORDER — POTASSIUM CHLORIDE 7.45 MG/ML
10 INJECTION INTRAVENOUS PRN
Status: DISCONTINUED | OUTPATIENT
Start: 2022-09-30 | End: 2022-10-01

## 2022-09-30 RX ORDER — ENOXAPARIN SODIUM 150 MG/ML
1 INJECTION SUBCUTANEOUS 2 TIMES DAILY
Status: DISCONTINUED | OUTPATIENT
Start: 2022-09-30 | End: 2022-10-06

## 2022-09-30 RX ORDER — TC 99M MEDRONATE 20 MG/10ML
25 INJECTION, POWDER, LYOPHILIZED, FOR SOLUTION INTRAVENOUS
Status: COMPLETED | OUTPATIENT
Start: 2022-09-30 | End: 2022-09-30

## 2022-09-30 RX ADMIN — METOPROLOL TARTRATE 25 MG: 25 TABLET ORAL at 11:56

## 2022-09-30 RX ADMIN — IPRATROPIUM BROMIDE 0.5 MG: 0.5 SOLUTION RESPIRATORY (INHALATION) at 09:11

## 2022-09-30 RX ADMIN — ENOXAPARIN SODIUM 30 MG: 100 INJECTION SUBCUTANEOUS at 09:54

## 2022-09-30 RX ADMIN — IPRATROPIUM BROMIDE 0.5 MG: 0.5 SOLUTION RESPIRATORY (INHALATION) at 21:02

## 2022-09-30 RX ADMIN — TC 99M MEDRONATE 25 MILLICURIE: 20 INJECTION, POWDER, LYOPHILIZED, FOR SOLUTION INTRAVENOUS at 12:35

## 2022-09-30 RX ADMIN — POTASSIUM BICARBONATE 40 MEQ: 782 TABLET, EFFERVESCENT ORAL at 09:53

## 2022-09-30 RX ADMIN — SODIUM CHLORIDE, PRESERVATIVE FREE 10 ML: 5 INJECTION INTRAVENOUS at 21:53

## 2022-09-30 RX ADMIN — SODIUM CHLORIDE, POTASSIUM CHLORIDE, SODIUM LACTATE AND CALCIUM CHLORIDE: 600; 310; 30; 20 INJECTION, SOLUTION INTRAVENOUS at 15:29

## 2022-09-30 RX ADMIN — BUDESONIDE AND FORMOTEROL FUMARATE DIHYDRATE 2 PUFF: 160; 4.5 AEROSOL RESPIRATORY (INHALATION) at 09:11

## 2022-09-30 RX ADMIN — ENOXAPARIN SODIUM 105 MG: 150 INJECTION SUBCUTANEOUS at 21:40

## 2022-09-30 RX ADMIN — METOPROLOL TARTRATE 25 MG: 25 TABLET ORAL at 21:36

## 2022-09-30 RX ADMIN — IPRATROPIUM BROMIDE 0.5 MG: 0.5 SOLUTION RESPIRATORY (INHALATION) at 15:49

## 2022-09-30 RX ADMIN — AMPICILLIN SODIUM AND SULBACTAM SODIUM 3000 MG: 2; 1 INJECTION, POWDER, FOR SOLUTION INTRAMUSCULAR; INTRAVENOUS at 13:05

## 2022-09-30 RX ADMIN — SODIUM CHLORIDE: 4.5 INJECTION, SOLUTION INTRAVENOUS at 10:01

## 2022-09-30 RX ADMIN — AMPICILLIN SODIUM AND SULBACTAM SODIUM 3000 MG: 2; 1 INJECTION, POWDER, FOR SOLUTION INTRAMUSCULAR; INTRAVENOUS at 18:34

## 2022-09-30 RX ADMIN — Medication 2000 MG: at 05:36

## 2022-09-30 RX ADMIN — MORPHINE SULFATE 4 MG: 4 INJECTION, SOLUTION INTRAMUSCULAR; INTRAVENOUS at 00:45

## 2022-09-30 RX ADMIN — AMPICILLIN SODIUM AND SULBACTAM SODIUM 3000 MG: 2; 1 INJECTION, POWDER, FOR SOLUTION INTRAMUSCULAR; INTRAVENOUS at 23:45

## 2022-09-30 RX ADMIN — POTASSIUM BICARBONATE 40 MEQ: 782 TABLET, EFFERVESCENT ORAL at 21:36

## 2022-09-30 ASSESSMENT — ENCOUNTER SYMPTOMS
SINUS PRESSURE: 0
SORE THROAT: 0
RHINORRHEA: 0
ABDOMINAL PAIN: 0
COUGH: 0
STRIDOR: 0
SHORTNESS OF BREATH: 1
WHEEZING: 0
ABDOMINAL DISTENTION: 0
BACK PAIN: 0

## 2022-09-30 ASSESSMENT — PAIN SCALES - GENERAL: PAINLEVEL_OUTOF10: 0

## 2022-09-30 ASSESSMENT — PAIN SCALES - WONG BAKER
WONGBAKER_NUMERICALRESPONSE: 0
WONGBAKER_NUMERICALRESPONSE: 0

## 2022-09-30 NOTE — PROGRESS NOTES
[facility]  Internal Medicine Teaching Residency Program  Inpatient Daily Progress Note  ______________________________________________________________________________    Patient: Radha Up  YOB: 1950   JLF:[id number]    Acct: [de-identified]     Room: [address] [place] [address]  Admit date: 2022  Today's date: 22  Number of days in the hospital: 6    SUBJECTIVE   Admitting Diagnosis: Septic shock ([address]  CC: Weakness, \"stuck in chair\"    Pt examined at bedside. Chart & results reviewed. No acute events overnight, patient only answer some questions otherwise does not reply. Does say \"ow\" after her left calf is squeezed. Afebrile, tachycardic this morning otherwise hemodynamically stable, saturating at 93% on 4 L O2    ROS:  Review of Systems   Unable to perform ROS: Other    Patient minimally responsive  BRIEF HISTORY     Patient presented to the ED via EMS after being found by a neighbor stuck in her chair. Patient was complaining of weakness was unable to get up from the chair. Patient was tachypneic failed BiPAP and was ultimately intubated. Blood cultures positive for MSSA and urine cultures were positive for Klebsiella. Patient was extubated on 2022. Patient has remained slightly altered, with comments of possible intermittent hallucinations. She was able to maintain her saturations on 3 L NC. Her leukocytosis and urine output improved and patient was transferred in stable condition to the medical floor.     OBJECTIVE     Vital Signs:  /76   Pulse 88   Temp 98.6 °F (37 °C) (Oral)   Resp 22   Ht 5' 5\" (1.651 m)   Wt 239 lb 8 oz (108.6 kg)   LMP  (LMP Unknown)   SpO2 93%   BMI 39.85 kg/m²     Temp (24hrs), Av.9 °F (37.2 °C), Min:97.6 °F (36.4 °C), Max:100.3 °F (37.9 °C)    In: 1415.3   Out: 600 [Urine:600]    Physical Exam:  Constitutional: This is a well developed, well nourished, 35-39.9 - Obesity Grade II 80y.o. year old female who is alert, oriented, cooperative and in no apparent distress. Head: normocephalic and atraumatic. EENT:  PERRLA. No conjunctival injections. Septum was midline, mucosa was without erythema, exudates or cobblestoning. No thrush was noted. Neck: Supple without thyromegaly. No elevated JVP. Trachea was midline. Respiratory: Chest was symmetrical without dullness to percussion. Breath sounds bilaterally were clear to auscultation. There were no wheezes, rhonchi or rales. There is no intercostal retraction or use of accessory muscles. Cardiovascular: Regular without murmur, clicks, gallops or rubs. Abdomen: Slightly rounded and soft without organomegaly. No rebound, rigidity or guarding was appreciated. Musculoskeletal: Normal curvature of the spine. No gross muscle weakness. Extremities:  No lower extremity edema, ulcerations,  varicosities or erythema. Muscle size, tone and strength are normal.  No involuntary movements are noted. Patient screamed ow on palpitation of her left calf  Skin:  Warm and dry. Good color, turgor and pigmentation. No lesions or scars.   No cyanosis or clubbing      Medications:  Scheduled Medications:    potassium bicarb-citric acid  40 mEq Oral BID    metoprolol tartrate  25 mg Oral BID    enoxaparin  1 mg/kg SubCUTAneous BID    pantoprazole  40 mg Oral QAM AC    ceFAZolin  2,000 mg IntraVENous Q8H    oxyCODONE  10 mg Oral q8h    ipratropium  0.5 mg Nebulization 4x daily    sodium chloride flush  5-40 mL IntraVENous 2 times per day    budesonide-formoterol  2 puff Inhalation BID     Continuous Infusions:    sodium chloride 100 mL/hr at 09/30/22 1001    dextrose      sodium chloride Stopped (09/29/22 1257)     PRN Medicationspotassium chloride, 40 mEq, PRN   Or  potassium alternative oral replacement, 40 mEq, PRN   Or  potassium chloride, 10 mEq, PRN  metoclopramide, 10 mg, Q6H PRN  glucose, 4 tablet, PRN  dextrose bolus, 125 mL, PRN   Or  dextrose bolus, 250 mL, PRN  glucagon (rDNA), 1 mg, PRN  dextrose, , Continuous PRN  sodium chloride flush, 5-40 mL, PRN  sodium chloride, , PRN  ondansetron, 4 mg, Q8H PRN   Or  ondansetron, 4 mg, Q6H PRN  polyethylene glycol, 17 g, Daily PRN  acetaminophen, 650 mg, Q6H PRN   Or  acetaminophen, 650 mg, Q6H PRN      Diagnostic Labs:  CBC:   Recent Labs     09/28/22 0548 09/29/22 0402 09/30/22  0345   WBC 16.7* 11.3 20.2*   RBC 4.00 4.24 4.37   HGB 10.7* 10.9* 11.7*   HCT 33.7* 34.8* 36.2*   MCV 84.3 82.1* 82.8   RDW 18.0* 17.3* 17.0*   PLT 87* See Reflexed IPF Result 154     BMP:   Recent Labs     09/28/22 0548 09/29/22 0402 09/30/22  0345   * 146* 145*   K 3.5* 3.5* 3.0*   * 111* 106   CO2 22 27 28   BUN 41* 30* 23   CREATININE 0.82 0.64 0.60     BNP: No results for input(s): BNP in the last 72 hours. PT/INR: No results for input(s): PROTIME, INR in the last 72 hours. APTT: No results for input(s): APTT in the last 72 hours. CARDIAC ENZYMES: No results for input(s): CKMB, CKMBINDEX, TROPONINI in the last 72 hours. Invalid input(s): CKTOTAL;3  FASTING LIPID PANEL:  Lab Results   Component Value Date    CHOL 141 05/17/2021    HDL 50 05/17/2021    TRIG 213 (H) 09/28/2022     LIVER PROFILE: No results for input(s): AST, ALT, ALB, BILIDIR, BILITOT, ALKPHOS in the last 72 hours. MICROBIOLOGY:   Lab Results   Component Value Date/Time    CULTURE NO GROWTH 12 HOURS 09/29/2022 01:27 PM       Imaging:    XR CHEST (SINGLE VIEW FRONTAL)    Result Date: 9/25/2022  Left internal jugular line tip projected over the mid right atrium. OG and endotracheal tubes appear unchanged and in satisfactory position. Pulmonary vasculature may be slightly congested. XR ABDOMEN (KUB) (SINGLE AP VIEW)    Result Date: 9/29/2022  1. No ileus or obstruction is identified. Mild gaseous distention of the stomach. 2. Cardiomegaly with vascular congestion.  3. Small bore central venous catheter terminates at the cavoatrial junction/right atrium. CT HEAD WO CONTRAST    Result Date: 9/24/2022  No acute intracranial abnormality. CT CERVICAL SPINE WO CONTRAST    Result Date: 9/24/2022  No acute abnormality of the cervical spine. XR CHEST PORTABLE    Result Date: 9/29/2022  1. No ileus or obstruction is identified. Mild gaseous distention of the stomach. 2. Cardiomegaly with vascular congestion. 3. Small bore central venous catheter terminates at the cavoatrial junction/right atrium. XR CHEST PORTABLE    Result Date: 9/27/2022  Central pulmonary congestion with obscuration of the left hemidiaphragm likely representing effusion as can be seen in CHF. XR CHEST PORTABLE    Result Date: 9/25/2022  Endotracheal tube in satisfactory position. XR CHEST PORTABLE    Result Date: 9/24/2022  No radiographic evidence of an acute cardiopulmonary process. XR ABDOMEN FOR NG/OG/NE TUBE PLACEMENT    Result Date: 9/25/2022  OG tube in satisfactory position. CT CHEST ABDOMEN PELVIS W CONTRAST    Result Date: 9/24/2022  No acute traumatic abnormality detected within the chest, abdomen, and pelvis. Airspace disease the left lung base, atelectasis versus pneumonia versus aspiration. CT LUMBAR SPINE TRAUMA RECONSTRUCTION    Result Date: 9/24/2022  No acute traumatic abnormality detected within the chest, abdomen, and pelvis. Airspace disease the left lung base, atelectasis versus pneumonia versus aspiration. CT THORACIC SPINE TRAUMA RECONSTRUCTION    Result Date: 9/24/2022  No acute traumatic abnormality detected within the chest, abdomen, and pelvis. Airspace disease the left lung base, atelectasis versus pneumonia versus aspiration.      XR HIP 2-3 VW W PELVIS LEFT    Result Date: 9/24/2022  No acute bony abnormalities are noted Advanced avascular necrosis of the left hip       ASSESSMENT & PLAN     ASSESSMENT / PLAN:   Principal Problem:    Septic shock (HCC)  Active Problems:    MSSA bacteremia    UTI due to Klebsiella species COPD (chronic obstructive pulmonary disease) (HCC)    Class 2 obesity due to excess calories with body mass index (BMI) of 39.0 to 39.9 in adult    Acute respiratory failure with hypoxia (HCC)    Respiratory alkalosis    Hypokalemia  Resolved Problems:    * No resolved hospital problems. *     MSSA bacteremia  - Currently on cefazolin, day 4, continue for 6 more days for a total of 2 weeks of antibiotics  -Patient received 4 days of Zosyn prior to cefazolin  - ID consulted for antibiotic recommendations    Klebsiella UTI  - On antibiotics as above    New onset A. Fib  -Patient tachycardic this a.m., EKG ordered, concerning for A. fib  - Lopressor 5 mg ordered x1, Lopressor 25 mg twice daily  - Cardiology consulted  -CK, TSH, B12 and folate ordered    Acute on chronic respiratory failure  -On supplemental O2 via nasal cannula, continue, wean as tolerated    DVT ppx : Lovenox  GI ppx: Protonix  PT/OT: Consulted  Discharge Planning / SW:  consulted to assist with discharge planning    January Mckoy MD  Internal Medicine Resident, PGY-1  [facility];  [place], [place]  9/30/2022, 11:07 AM

## 2022-09-30 NOTE — CONSULTS
[facility] Neurology   IN-PATIENT SERVICE      NEUROLOGY CONSULT  NOTE            Date:   9/30/2022  Patient name:  Elicia Bull  Date of admission:  9/24/2022  YOB: 1950      Chief Complaint:     Chief Complaint   Patient presents with    Shortness of Breath    Hip Pain       Reason for Consult:      Bilateral lower extremity weakness    History of Present Illness: The patient is a 80 y.o. female who presents with Shortness of Breath and Hip Pain   and she is admitted to the hospital for the management of shortness of breath. The patient was seen and examined and the chart was reviewed. PMH of paroxysmal atrial fibrillation, COPD, CAREY, prediabetes, chronic hip pain, chronic back pain, prediabetes, osteoarthritis, osteoporosis, obesity. Patient was brought to the ED after being found by the neighbor that patient was unable to get out of the chair for 2 days. Patient reported she was unable to get up due to weakness. On arrival patient complaining of shortness of breath, was tachypneic and tachycardic, requiring BiPAP initially. Patient was admitted to MICU for sepsis due to urinary tract infection. Patient was ultimately intubated. Got extubated on 9/28/2022. ID following for Klebsiella UTI treated with Ancef and MSSA bacteremia. Plan for DANE    Neurology consulted for bilateral lower extremity weakness. Past Medical History:     Past Medical History:   Diagnosis Date    Chronic back pain     Chronic hip pain     COPD (chronic obstructive pulmonary disease) (HCC)     Major depression     Osteoarthritis     Osteoporosis         Past Surgical History:     Past Surgical History:   Procedure Laterality Date    BREAST BIOPSY      two    CHOLECYSTECTOMY      HYSTERECTOMY, VAGINAL          Medications Prior to Admission:     Prior to Admission medications    Medication Sig Start Date End Date Taking?  Authorizing Provider   SYMBICORT 160-4.5 MCG/ACT AERO  5/16/22 Jay Jay Cole MD   promethazine (PHENERGAN) 25 MG tablet Take 1 tablet by mouth every 6 hours as needed (as needed for nausea) 6/2/22   Norma Carmen MD   hydrOXYzine (ATARAX) 25 MG tablet Take 1 tablet by mouth nightly  Patient not taking: Reported on 9/24/2022 6/2/22   Norma Carmen MD   desvenlafaxine succinate (PRISTIQ) 100 MG TB24 extended release tablet TAKE 1 TABLET BY MOUTH EVERY DAY 5/24/22   Norma Carmen MD   Fluticasone-Umeclidin-Vilant (TRELEGY ELLIPTA) 200-62.5-25 MCG/INH AEPB Inhale 1 puff into the lungs daily 1/12/22   Norma Carmen MD   vitamin D (ERGOCALCIFEROL) 1.25 MG (14448 UT) CAPS capsule Take 1 capsule by mouth once a week 12/1/21   Norma Carmen MD   alendronate (FOSAMAX) 70 MG tablet Take 1 tablet by mouth once a week 12/1/21   Norma Carmen MD   cetirizine (ZYRTEC) 10 MG tablet Take 1 tablet by mouth daily 12/1/21   Norma Carmen MD   Handicap Placard MISC by Does not apply route Expires 11/2025 12/1/21   Norma Carmen MD   diclofenac (VOLTAREN) 50 MG EC tablet Take 1 tablet by mouth 3 times daily (with meals) 12/1/21   Norma Carmen MD   predniSONE (DELTASONE) 10 MG tablet 4 tabs by mouth daily for 3 days, then 3 tabs daily for 3 days, then 2 tabs daily for 3 days, then 1 tab daily till gone. Patient not taking: Reported on 9/24/2022 12/1/21   Norma Carmen MD   albuterol sulfate  (90 Base) MCG/ACT inhaler USE 2 INHALATIONS EVERY 4 HOURS AS NEEDED FOR WHEEZING OR SHORTNESS OF BREATH 11/29/21   Norma Carmen MD   tiZANidine (ZANAFLEX) 4 MG tablet TAKE 1 TABLET THREE TIMES A DAY 11/29/21   Norma Carmen MD   montelukast (SINGULAIR) 10 MG tablet TAKE 1 TABLET DAILY 11/29/21   Norma Carmen MD   morphine (MS CONTIN) 15 MG extended release tablet 3 times daily.  7/10/20   Historical Provider, MD        Allergies:     Lyrica [pregabalin], Demerol hcl [meperidine], Fluoxetine, and Paxil [paroxetine hcl]    Social History: Tobacco:    reports that she quit smoking about 5 years ago. Her smoking use included cigarettes. She has a 25.50 pack-year smoking history. She has never used smokeless tobacco.  Alcohol:      reports that she does not currently use alcohol. Drug Use:  reports no history of drug use.     Family History:     Family History   Problem Relation Age of Onset    Stroke Mother     Alzheimer's Disease Father        Review of Systems:       Constitutional Negative for fever and chills   HEENT Negative for ear discharge, ear pain, nosebleed   Eyes Negative for photophobia, pain and discharge   Respiratory Negative for hemoptysis and sputum   Cardiovascular Negative for orthopnea, claudication and PND   Gastrointestinal Negative for abdominal pain, diarrhea, blood in stool   Musculoskeletal Negative for joint pain, negative for myalgia   Skin Negative for rash or itching   hematology Negative for ecchymosis, anemia   Psychiatric Negative for suicidal ideation, anxiety, depression, hallucinations       Physical Exam:   BP (!) 143/82   Pulse 97   Temp 98.2 °F (36.8 °C) (Oral)   Resp 24   Ht 5' 5\" (1.651 m)   Wt 239 lb 8 oz (108.6 kg)   LMP  (LMP Unknown)   SpO2 93%   BMI 39.85 kg/m²   Temp (24hrs), Av.9 °F (37.2 °C), Min:97.6 °F (36.4 °C), Max:100.3 °F (37.9 °C)        General examination:      General Appearance:  alert, well appearing, and in no acute distress  HEENT: Normocephalic, atraumatic, moist mucus membranes  Neck: supple, no carotid bruits, (-) nuchal rigidity  Lungs:  Respirations unlabored, chest wall no deformity, BS normal  Cardiovascular: normal rate, regular rhythm  Abdomen: Soft, nontender, nondistended, normal bowel sounds  Skin: Bilateral lower extremity bruises and ecchymosis  Extremities:  peripheral pulses palpable, clubbing or edema  Psych: normal affect    NEUROLOGIC EXAMINATION    Mental status   Alert and oriented x 2; following all commands;   speech is fluent, no dysarthria, aphasia. Cranial nerves   II - visual fields intact to confrontation; pupils reactive  III, IV, VI - extraocular muscles intact; no IRAIS; no nystagmus; no ptosis   V - normal facial sensation                                                               VII - normal facial symmetry                                                             VIII - intact hearing                                                                             IX, X - symmetrical palate elevation                                               XI - symmetrical shoulder shrug                                                       XII - midline tongue without atrophy or fasciculation     Motor function  Strength:   5/5 RUE, 3/5 RLE  5/5 LUE, 3/5  LLE  Normal bulk and tone. Sensory function Intact to touch, pin, vibration, proprioception throughout     Cerebellar . No dysdiadochokinesia present. No tremors                        Reflex function 2/4 symmetric throughout . Downgoing plantar response bilaterally.       Gait                  Not performed             Diagnostics:      Laboratory Testing:  CBC:   Recent Labs     09/28/22  0548 09/29/22  0402 09/30/22  0345   WBC 16.7* 11.3 20.2*   HGB 10.7* 10.9* 11.7*   PLT 87* See Reflexed IPF Result 154     BMP:    Recent Labs     09/28/22  0548 09/29/22  0402 09/30/22  0345   * 146* 145*   K 3.5* 3.5* 3.0*   * 111* 106   CO2 22 27 28   BUN 41* 30* 23   CREATININE 0.82 0.64 0.60   GLUCOSE 141* 91 126*         Lab Results   Component Value Date    CHOL 141 05/17/2021    LDLCHOLESTEROL 81 05/17/2021    HDL 50 05/17/2021    TRIG 213 (H) 09/28/2022    ALT 10 09/24/2022    AST 21 09/24/2022    TSH 2.01 09/30/2022    INR 1.4 09/24/2022    LABA1C 5.7 06/02/2022    IQISNTMY01 1066 09/30/2022       No results found for: PHENYTOIN, PHENYTOIN, VALPROATE, CBMZ      Imaging/Diagnostics:      CT head without contrast:  No acute abnormality    CT cervical spine without contrast:  No acute abnormality of cervical spine    CT thoracic spine:  No acute traumatic abnormality    CT lumbar spine:  No acute traumatic abnormality      2D ECHO 9/24  Normal LV size and wall thickness. No obvious wall motion abnormality seen. Normal LV systolic function with LVEF >55%. Dilated RV with preserved function. RV systolic pressure 44 mmHg  RA appears dilated. No obvious significant structural valvular abnormality noted. No significant valvular stenosis or regurgitation noted. Normal aortic root dimension. No significant pericardial effusion noted. IVC is dilated, difficult to assess inspiratory variation , Pt on ventilator    Assessment and plan:     67-year-old female found by the neighbor sitting in a chair and unable to get out because of weakness for 2 days. Treated for Klebsiella UTI. MSSA septicemia. Neurology consulted for bilateral lower extremity weakness. CT brain/spine: Unremarkable    Bilateral lower extremity weakness  Klebsiella UTI  Concern for aspiration pneumonia  MSSA septicemia  A. fib with RVR  Metabolic encephalopathy  Chronic left hip pain/lower back pain with bilateral sciatica  Obstructive sleep apnea    Plan:   Will order MRI cervical thoracic and lumbar spine  Continue on medical management per primary team  Continue on antibiotics  Further plan to follow          Nicole Weaver MD.  Resident Internal Medicine,  [facility]  9/30/2022, 1:07 PM

## 2022-09-30 NOTE — CONSULTS
[facility]    Consult / H&P               Today's Date: 9/30/2022  Patient Name: Korin Wen  Date of admission: 9/24/2022  2:07 PM  Patient's age: 80 y. o., 1950  Admission Dx: Septicemia ([facility] [address] [A41.9]  Sepsis ([facility] [address] [A41.9]    Reason for Consult:  Cardiac evaluation    Requesting Physician: Dory Celaya MD    CHIEF COMPLAINT:  weakness    History Obtained From:  electronic medical record    HISTORY OF PRESENT ILLNESS:      The patient is a 80 y.o.  female who is admitted to the hospital for weakness.    -10year-old female past medical history of COPD, chronic Pain in hips and back, prediabetes, OA, osteoporosis with admitted for weakness-found stuck in chair by a neighbor unable to get out of chair x2d-felt weak 3d prior to this-bystanders called 9 mile brought to ED for further evaluation  -In ED was tachypneic feeling noninvasive ventilation and intubated-transferred to ICU for further management  -Work-up positive for blood cultures growing MSSA urine cultures grew Klebsiella   -Patient extubated 9/28/2022 transfer to floor  -Cardiology consult for apparently new A. fib.  -On current evaluation patient remains somewhat drowsy and frequently drifts off and sleep is able to respond to questions appropriately  -Denies history of prior A. fib or seeing cardiologist as outpatient  -On telemetry she is in A. fib with rate of 100-101-blood pressure is normotensive and satting 93% on 4 L nasal cannula  -TTE performed 9/24/22-ef>55%- dilated RA/RV  -denies cp/sob  -k 3.0, na 145, mg 2.1, wbc 20    Past Medical History:   has a past medical history of Chronic back pain, Chronic hip pain, COPD (chronic obstructive pulmonary disease) ([facility] [address] Major depression, Osteoarthritis, and Osteoporosis. Past Surgical History:   has a past surgical history that includes Hysterectomy, vaginal; Cholecystectomy; and Breast biopsy.      Home Medications:    Prior to Admission medications    Medication Sig Start Date End Date Taking? Authorizing Provider   SYMBICORT 160-4.5 MCG/ACT AERO  5/16/22   Historical Provider, MD   promethazine (PHENERGAN) 25 MG tablet Take 1 tablet by mouth every 6 hours as needed (as needed for nausea) 6/2/22   Norma Morocho MD   hydrOXYzine (ATARAX) 25 MG tablet Take 1 tablet by mouth nightly  Patient not taking: Reported on 9/24/2022 6/2/22   Norma Morocho MD   desvenlafaxine succinate (PRISTIQ) 100 MG TB24 extended release tablet TAKE 1 TABLET BY MOUTH EVERY DAY 5/24/22   Norma Morocho MD   Fluticasone-Umeclidin-Vilant (TRELEGY ELLIPTA) 200-62.5-25 MCG/INH AEPB Inhale 1 puff into the lungs daily 1/12/22   Norma Morocho MD   vitamin D (ERGOCALCIFEROL) 1.25 MG (01070 UT) CAPS capsule Take 1 capsule by mouth once a week 12/1/21   Norma Morocho MD   alendronate (FOSAMAX) 70 MG tablet Take 1 tablet by mouth once a week 12/1/21   Norma Morocho MD   cetirizine (ZYRTEC) 10 MG tablet Take 1 tablet by mouth daily 12/1/21   MD Hussain Darnell MISC by Does not apply route Expires 11/2025 12/1/21   Norma Morocho MD   diclofenac (VOLTAREN) 50 MG EC tablet Take 1 tablet by mouth 3 times daily (with meals) 12/1/21   Norma Morocho MD   predniSONE (DELTASONE) 10 MG tablet 4 tabs by mouth daily for 3 days, then 3 tabs daily for 3 days, then 2 tabs daily for 3 days, then 1 tab daily till gone. Patient not taking: Reported on 9/24/2022 12/1/21   Norma Morocho MD   albuterol sulfate  (90 Base) MCG/ACT inhaler USE 2 INHALATIONS EVERY 4 HOURS AS NEEDED FOR WHEEZING OR SHORTNESS OF BREATH 11/29/21   Norma Morocho MD   tiZANidine (ZANAFLEX) 4 MG tablet TAKE 1 TABLET THREE TIMES A DAY 11/29/21   Norma Morocho MD   montelukast (SINGULAIR) 10 MG tablet TAKE 1 TABLET DAILY 11/29/21   Norma Morocho MD   morphine (MS CONTIN) 15 MG extended release tablet 3 times daily.  7/10/20   Historical Provider, MD      Current Facility-Administered Auscultation: Good respiratory effort. No for increased work of breathing. On auscultation: clear to auscultation bilaterally  Cardiovascular: The apical impulse is not displaced  Heart tones are crisp and normal. Irregularly irregular S1 and S2.  Jugular venous pulsation Normal  The carotid upstroke is normal in amplitude and contour without delay or bruit  Peripheral pulses are symmetrical and full   Abdomen:   No masses or tenderness  Bowel sounds present  Extremities:   No Cyanosis or Clubbing   Lower extremity edema: No   Skin: Warm and dry  Neurological:  Alert and oriented. Moves all extremities well  No abnormalities of mood, affect, memory, mentation, or behavior are noted    DATA:    Diagnostics:    EKG: ordered-pending  ECHO: 9/24/22: \"Summary  Normal LV size and wall thickness. No obvious wall motion abnormality seen. Normal LV systolic function with LVEF >55%. Dilated RV with preserved function. RV systolic pressure 44 mmHg  RA appears dilated. No obvious significant structural valvular abnormality noted. No significant valvular stenosis or regurgitation noted. Normal aortic root dimension. No significant pericardial effusion noted. IVC is dilated, difficult to assess inspiratory variation , Pt on ventilator\". Ejection fraction: >55%  Stress Test: not obtained. Cardiac Angiography: not obtained.     Labs:     CBC:   Recent Labs     09/29/22  0402 09/30/22  0345   WBC 11.3 20.2*   HGB 10.9* 11.7*   HCT 34.8* 36.2*   PLT See Reflexed IPF Result 154     BMP:   Recent Labs     09/29/22  0402 09/30/22  0345   * 145*   K 3.5* 3.0*   CO2 27 28   BUN 30* 23   CREATININE 0.64 0.60   LABGLOM >60 >60   GLUCOSE 91 126*     CARDIAC ENZYMES:  Recent Labs     09/30/22  0345   CKTOTAL 33     FASTING LIPID PANEL:  Lab Results   Component Value Date/Time    HDL 50 05/17/2021 09:10 AM    TRIG 213 09/28/2022 05:48 AM       IMPRESSION:    Patient Active Problem List   Diagnosis    Chronic left hip pain Chronic bilateral low back pain with bilateral sciatica    Prediabetes    Primary osteoarthritis involving multiple joints    Osteoporosis without current pathological fracture    Septic shock ([facility])    MSSA bacteremia    UTI due to Klebsiella species    COPD (chronic obstructive pulmonary disease) ([facility])    Class 2 obesity due to excess calories with body mass index (BMI) of 39.0 to 39.9 in adult    Acute respiratory failure with hypoxia ([facility])    Respiratory alkalosis    Hyperkalemia     -New onset A.fib with RVR now rate is controlled. -Preserved LV systolic function on Echo. RECOMMENDATIONS:  Start lopressor 25 bid po as bp tolerates  [id number] score 2 but age 74-69 so recommend AC-will start 1mg/kg lovenox bid  TTE wnl  If still IP Monday will plan DANE/cardioversion then  Will need long term anticoagulation with NOACs on discharge. Electronically signed by Karan Fry MD on 9/30/2022 at 10:06 AM        Attending Physician Statement  I have discussed the case of Krista Bear including pertinent history and exam findings with the resident. I have seen and examined the patient and the key elements of the encounter have been performed by me. I agree with the assessment, plan and orders as documented by the resident With changes made to the note.      Electronically signed by Pura Ace MD on 9/30/2022 at 2:02 PM.    [facility]      [phone]

## 2022-09-30 NOTE — PROGRESS NOTES
PULMONARY & CRITICAL CARE MEDICINE PROGRESS NOTE     Patient:  Grant Givens  MRN: [id number]  Admit date: 9/24/2022  Primary Care Physician: Ara Peabody, MD  Consulting Physician: Truett Burkitt, MD  CODE Status: Full Code  LOS: 6     SUBJECTIVE     Chief Complaint/ Reason for consult:  acute hypoxic respiratory failure due to MSSA septicemia    Hospital Course: The patient is a 80 y.o. female with past medical history of COPD, chronic hip pain, chronic back pain, prediabetes, OA, osteoporosis who presented to the ED with profound weakness, found to be tachypnic, tachycardic, blood cultures grew MSSA. Urine cx grew Klebsiella. During her admission, she required increasing ventilatory support, initially BIPAP and then alter intubation with mechanical ventilation. She was extubated on 9/28/22, was transferred out of the ICU to the floor. In this interim period, patient has had hypoactive delirium, was febrile on 9/29/22 Tmax 101.1, with worsening leukocytosis and increased oxygen requirement from 3 L to 4 L NC. She underwent CXR which showed pulmonary vascular congestion, increased bibasilar opacities, CXR does not appear much changed from the CXR 3 days ago. She has been started on Unasyn for concern of aspiration pneumonia. This am, patient was found to be in A fib with RVR HR upto 160s, Cardiology was consulted, patient started on lopressor 25 mg BID    Interval History:  09/30/22  Pt was seen and examined at bedside. Resting comfortably in the bed. Saturating in low 90s on 4 L NC  Vitals stable. Now in NSR on telemetry  Labs reviewed. Leukocytosis WBC 20  Started on lopressor 25 mg BID for paroxysmal A fib with RVR      Review Of Systems:  Review of Systems   Constitutional:  Negative for activity change, appetite change, chills, fatigue and fever. HENT:  Negative for congestion, postnasal drip, rhinorrhea, sinus pressure, sneezing and sore throat. Respiratory:  Positive for shortness of breath. Negative for cough, wheezing and stridor. Cardiovascular:  Negative for chest pain and leg swelling. Gastrointestinal:  Negative for abdominal distention and abdominal pain. Genitourinary:  Negative for dysuria and hematuria. Musculoskeletal:  Negative for arthralgias and back pain. Neurological:  Negative for weakness, light-headedness and headaches. Psychiatric/Behavioral:  Positive for behavioral problems. OBJECTIVE     PaO2/FiO2 RATIO:  Recent Labs     22  0442   POCPO2 107.0      FiO2 : 40 %     VITAL SIGNS:   LAST:  BP (!) 140/79   Pulse 79   Temp 97.9 °F (36.6 °C) (Oral)   Resp 27   Ht 5' 5\" (1.651 m)   Wt 239 lb 8 oz (108.6 kg)   LMP  (LMP Unknown)   SpO2 96%   BMI 39.85 kg/m²   8-24 HR RANGE:  TEMP Temp  Av.6 °F (37 °C)  Min: 97.6 °F (36.4 °C)  Max: 100.3 °F (66.1 °C)   BP Systolic (16FDQ), OFM:868 , Min:129 , PRW:314      Diastolic (15QHT), JYQ:67, Min:76, Max:86     PULSE Pulse  Av.4  Min: 76  Max: 117   RR Resp  Av.8  Min: 20  Max: 27   O2 SAT SpO2  Av.8 %  Min: 92 %  Max: 96 %   OXYGEN DELIVERY O2 Flow Rate (L/min)  Av L/min  Min: 4 L/min  Max: 4 L/min        Systemic Examination:   Physical Exam -  Constitutional:  Alert, cooperative and no distress. Mental Status:  Oriented to person, place and time and normal affect. Lungs:  Bilateral air entry present, lung fields coarse. On nasal cannula  Heart:  Regular rate and rhythm, no murmur. Abdomen:  Soft, nontender, nondistended, normal bowel sounds. Extremities:  No edema, redness, tenderness in the calves. Skin:  Warm, dry, no gross lesions or rashes.     DATA REVIEW     Medications: Current Inpatient  Scheduled Meds:   potassium bicarb-citric acid  40 mEq Oral BID    metoprolol tartrate  25 mg Oral BID    enoxaparin  1 mg/kg SubCUTAneous BID    ampicillin-sulbactam  3,000 mg IntraVENous Q6H    thiamine  100 mg Oral Daily    folic acid  1 mg Oral Daily    oxyCODONE  5 mg Oral q8h    ipratropium  0.5 mg Nebulization 4x daily    sodium chloride flush  5-40 mL IntraVENous 2 times per day    budesonide-formoterol  2 puff Inhalation BID     Continuous Infusions:   lactated ringers 75 mL/hr at 09/30/22 1529    dextrose      sodium chloride Stopped (09/29/22 1257)       INPUT/OUTPUT:  In: 1415.3 [P.O.:125; I.V.:1190.3]  Out: 1400 [Urine:1400]  Date 09/30/22 0000 - 09/30/22 2359   Shift 4053-3195 8027-9636 3226-2770 24 Hour Total   INTAKE   I.V.(mL/kg) 1067.8(9.8)   1067.8(9.8)   Shift Total(mL/kg) 1067.8(9.8)   1067.8(9.8)   OUTPUT   Urine(mL/kg/hr)  800(0.9)  800   Shift Total(mL/kg)  800(7.4)  800(7.4)   Weight (kg) 108.6 108.6 108.6 108.6        LABS:  ABGs:   Recent Labs     09/28/22 0442   POCPH 7.370   POCPCO2 46.3   POCPO2 107.0   POCHCO3 26.8   YQMN6LQU 98     CBC:   Recent Labs     09/28/22  0548 09/29/22  0402 09/30/22  0345   WBC 16.7* 11.3 20.2*   HGB 10.7* 10.9* 11.7*   HCT 33.7* 34.8* 36.2*   MCV 84.3 82.1* 82.8   PLT 87* See Reflexed IPF Result 154   LYMPHOPCT 7* 10* 9*   RBC 4.00 4.24 4.37   MCH 26.8 25.7 26.8   MCHC 31.8 31.3 32.3   RDW 18.0* 17.3* 17.0*     CRP:   No results for input(s): CRP in the last 72 hours. LDH:   No results for input(s): LDH in the last 72 hours. BMP:   Recent Labs     09/28/22  0548 09/29/22  0402 09/30/22  0345   * 146* 145*   K 3.5* 3.5* 3.0*   * 111* 106   CO2 22 27 28   BUN 41* 30* 23   CREATININE 0.82 0.64 0.60   GLUCOSE 141* 91 126*     Liver Function Test:   No results for input(s): PROT, LABALBU, ALT, AST, GGT, ALKPHOS, BILITOT in the last 72 hours. Coagulation Profile:   No results for input(s): INR, PROTIME, APTT in the last 72 hours. D-Dimer:  No results for input(s): DDIMER in the last 72 hours. Lactic Acid:  No results for input(s): LACTA in the last 72 hours. Cardiac Enzymes:  Recent Labs     09/30/22  0345   CKTOTAL 33     BNP/ProBNP:   No results for input(s): BNP, PROBNP in the last 72 hours.   Triglycerides:  Recent Labs     09/28/22  0548   TRIG 213*        Microbiology:  Urine Culture:  No components found for: CURINE  Blood Culture:  No components found for: CBLOOD, CFUNGUSBL  Sputum Culture:  No components found for: CSPUTUM  Recent Labs     09/29/22  1327   SPECDESC . BLOOD   SPECIAL R WRIST 1.5 ML   CULTURE NO GROWTH 1 DAY     Recent Labs     09/29/22  0655 09/29/22  1327   SPECDESC . BLOOD . BLOOD   SPECIAL L HAND 6ML R WRIST 1.5 ML   CULTURE NO GROWTH 1 DAY NO GROWTH 1 DAY        Pathology:    Radiology Reports:  NM BONE SCAN 3 PHASE   Preliminary Result   Nondiagnostic study. Patient could not cooperate. No asymmetry on flow   images to suggest hyperemia. XR CHEST PORTABLE   Final Result   1. Pulmonary vascular congestion may be increased, and there may be new   perihilar edema. Consider congestive heart failure given at least borderline   cardiomegaly and suspicion for trace bilateral pleural effusions. Pneumonia   could appear similar. 2. Increased bibasilar airspace opacities likely due in part to atelectasis   with potential for superimposed pneumonia or aspiration especially on the   left. XR ABDOMEN (KUB) (SINGLE AP VIEW)   Final Result   1. No ileus or obstruction is identified. Mild gaseous distention of the   stomach. 2. Cardiomegaly with vascular congestion. 3. Small bore central venous catheter terminates at the cavoatrial   junction/right atrium. XR CHEST PORTABLE   Final Result   1. No ileus or obstruction is identified. Mild gaseous distention of the   stomach. 2. Cardiomegaly with vascular congestion. 3. Small bore central venous catheter terminates at the cavoatrial   junction/right atrium. VL DUP LOWER EXTREMITY VENOUS BILATERAL   Final Result      XR CHEST PORTABLE   Final Result   Central pulmonary congestion with obscuration of the left hemidiaphragm   likely representing effusion as can be seen in CHF.          XR CHEST (SINGLE VIEW FRONTAL)   Final Result   Left internal jugular line tip projected over the mid right atrium. OG and endotracheal tubes appear unchanged and in satisfactory position. Pulmonary vasculature may be slightly congested. XR CHEST PORTABLE   Final Result   Endotracheal tube in satisfactory position. XR ABDOMEN FOR NG/OG/NE TUBE PLACEMENT   Final Result   OG tube in satisfactory position. CT CHEST ABDOMEN PELVIS W CONTRAST   Final Result   No acute traumatic abnormality detected within the chest, abdomen, and pelvis. Airspace disease the left lung base, atelectasis versus pneumonia versus   aspiration. CT THORACIC SPINE TRAUMA RECONSTRUCTION   Final Result   No acute traumatic abnormality detected within the chest, abdomen, and pelvis. Airspace disease the left lung base, atelectasis versus pneumonia versus   aspiration. CT LUMBAR SPINE TRAUMA RECONSTRUCTION   Final Result   No acute traumatic abnormality detected within the chest, abdomen, and pelvis. Airspace disease the left lung base, atelectasis versus pneumonia versus   aspiration. CT HEAD WO CONTRAST   Final Result   No acute intracranial abnormality. CT CERVICAL SPINE WO CONTRAST   Final Result   No acute abnormality of the cervical spine. XR CHEST PORTABLE   Final Result   No radiographic evidence of an acute cardiopulmonary process.          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95 kg/m^2  #    MR #         [id number]     Sonographer             Pam NOHELIA Srivastava,  [facility]    Accession #  [id number]  Interpreting Physician  Mendy Gardner    Fellow                   Referring Nurse  Practitioner    Interpreting             Referring Physician  Fellow    Type of Study    TTE procedure:2D Echocardiogram, M-Mode, Doppler, Color Doppler. Procedure Date  Date: 09/27/2022 Start: 04:13 PM    Study Location: [facility]  Technical Quality: Adequate visualization    Indications:Cardiac Evaluation. Comments:Referring Physician: Jimenez Malik MD    History / Tech. Comments:  Echo done at patient bedside. Echo done at patient bedside. Patient supine on ventilator. Patient Status: Inpatient    Height: 65 inches Weight: 210 pounds BSA: 2.02 m^2 BMI: 34.95 kg/m^2    CONCLUSIONS    Summary  Normal LV size and wall thickness. No obvious wall motion abnormality seen. Normal LV systolic function with LVEF >55%. Dilated RV with preserved function. RV systolic pressure 44 mmHg  RA appears dilated. No obvious significant structural valvular abnormality noted. No significant valvular stenosis or regurgitation noted. Normal aortic root dimension. No significant pericardial effusion noted.   IVC is dilated, difficult to assess inspiratory variation , Pt on ventilator    Signature  ----------------------------------------------------------------------------  Electronically signed by Mendy Gardner(Interpreting physician) on  09/28/2022 11:12 AM  ----------------------------------------------------------------------------    ----------------------------------------------------------------------------  Electronically signed by Pam Srivastava RVT, LILLYCS(Sonographer) on  09/27/2022 04:48 PM  ----------------------------------------------------------------------------  FINDINGS  Left Atrium  Left atrium is normal in size. Left Ventricle  Left ventricle is normal in size. Global left ventricular systolic function  is normal. Estimated ejection fraction is 60 %. Right Atrium  Right atrial dilatation. Right Ventricle  Right ventricular dilatation with normal systolic function. Mitral Valve  Normal mitral valve structure. Trivial mitral regurgitation. No mitral stenosis. Aortic Valve  Normal aortic valve structure. Aortic valve is trileaflet. No aortic insufficiency. No aortic stenosis. Tricuspid Valve  Normal tricuspid valve leaflets. Mild tricuspid regurgitation. Estimated right ventricular systolic pressure is 44 mmHg. No tricuspid stenosis. Pulmonic Valve  Normal pulmonic valve structure. No significant pulmonic insufficiency. No evidence of pulmonic stenosis. Pericardial Effusion  No pericardial effusion seen. Miscellaneous  Normal aortic root dimension. IVC dilated but unable to assess respiratory collapse due to patient on  ventilator.   E/E' average = 10.65    M-mode / 2D Measurements & Calculations:    LVIDd:5.2 cm(3.7 - 5.6 cm)       Diastolic GEXINW:55.3 ml  PIPQY:5.8 cm(2.2 - 4.0 cm)       Systolic YWXRXH:95.1 ml  FTHN:4.0 cm(0.6 - 1.1 cm)        Aortic Root:2.9 cm(2.0 - 3.7 cm)  LVPWd:0.6 cm(0.6 - 1.1 cm)       LA Dimension: 4.2 cm(1.9 - 4.0 cm)  Fractional Shortenin.69 %    LA volume/Index: 48.7 ml /24m^2  Calculated LVEF (%): 61.3 %      LVOT:1.8 cm  RVDd:4.7 cm    Mitral:                                 Aortic    Valve Area (P1/2-Time): 2.75 cm^2       Peak Velocity: 1.76 m/s  Peak E-Wave: 0.96 m/s                   Mean Velocity: 1.12 m/s  Peak A-Wave: 0.99 m/s                   Peak Gradient: 12.39 mmHg  E/A Ratio: 0.97                         Mean Gradient: 6 mmHg  Peak Gradient: 3.69 mmHg  Mean Gradient: 3 mmHg  Deceleration Time: 221 msec             Area (continuity): 2.17 cm^2  P1/2t: 80 msec                          AV VTI: 33.3 cm    Area (continuity): 2.58 cm^2  Mean Velocity: 0.82 m/s    Tricuspid:                              Pulmonic:    Estimated RVSP: 44 mmHg                 Peak Velocity: 1.07 m/s  Peak TR Velocity: 2.93 m/s              Peak Gradient: 4.58 mmHg  Peak TR Gradient: 34.3396 mmHg  Estimated RA Pressure: 10 mmHg    Estimated PASP: 44.34 mmHg    Diastology / Tissue Doppler  Septal Wall E' velocity:0.07 m/s  Septal Wall E/E':13  Lateral Wall E' velocity:0.12 m/s  Lateral Wall E/E':8.3       ASSESSMENT AND PLAN     Assessment:    // Acute hypoxic respiratory failure  // Aspiration pneumonia  // Paroxysmal A fib with RVR  // MSSA septicemia  // Klebsiella UTI  // COPD    Plan:  Continue ipratropium and symbicort daily. Avoid albuterol due to risk of A fib with RVR  Continue with non invasive ventilatory support  Agree with treatment for aspiration pneumonia with Unasyn  We will continue to follow    Lupe Salas  PGY-3  Internal Medicine  [address]   9/30/2022 4:14 PM        Please note that this chart was generated using voice recognition Dragon dictation software. Although every effort was made to ensure the accuracy of this automated transcription, some errors in transcription may have occurred.

## 2022-09-30 NOTE — CONSULTS
[facility] -   Infectious diseases evaluation  admission date 9/24/2022    reason for consultation:   Ebbie Hof and sepsis    Impression :   Current:  MSSA septicemia 9/24  Kleb UTI 9/24  Left lower lobe infiltrate p likely atelectasis   Bandemia  Altered mentation, acute metabolic encephalopathy   septic shock resolved   SIRS from infection  Chronic left hip pain  Chronic lower back pain with bilateral sciatica  Prediabetes  COPD  Obstructive sleep apnea  A. fib paroxysmal  9/29 increasing oxygen requirement and increasing leukocytosis-possible aspiration event    Other:    Discussion / summary of stay / plan of care     Recommendations   Kleb UTI from 9/24 - treated w ancef   On Ancef for MSSA septicemia coming from home, and kleb UTI   There is a concern of aspiration and the patient has increased WBC today and increased oxygen requirement  Switch to unasyn 9/30  Get CXR 9/30  Asking for DANE to look for possible vegetation to explain the MSSA bacteremia from home  She will also need an imaging of her spine or hips evaluating for any infection hence and due to her altered mentation, the best will be to get a bone scan looking for any signs of infection in the hips or the spine. Infection Control Recommendations   [place] Precautions    Antimicrobial Stewardship Recommendations   Simplification of therapy  Targeted therapy      History of Present Illness:   Initial history:  Wojciech Johnston is a 80y.o.-year-old female was found by a neighbor sitting on a chair unable to get out because of weakness, she had missed work, it seems she was really feeling weak for about 3 days before that. She was brought by 911 to the hospital.  Was found to be septic with MSSA in the blood UTI Klebsiella septic shock, patient needed intubation at the time. Extubated 9/28 and ultimately transferred out to the floor.   Because of A. fib cardiology has been on the case, planning possibly cardioversion on Monday. A 2D echo was done showing no vegetation. Her CT of the spine was negative for fracture, her CT of the abdomen and pelvis on 9/24 her admission, was negative for pneumonia. She had a white count that was increased upon admission and ultimately improved, 9/30 her white count increased again, there was a suspicion of aspiration over the last 24 hours. She is also requiring increased oxygen. Repeat blood cultures from 9/29 are still pending and negative no repeat blood cultures were done before that. The patient has been on Ancef. Seems she has had a history of chronic back pain with sciatica. On my exam she is alert on 4 L of nasal cannula looks very ill, she is very encephalopathic, not reliable in her answers. For instance she says she has no back pain and no hip pain but when he lifted both legs she screamed from pain. According to the nurse she also screams to little things and is difficult to tell if this is true pain or chest discomfort      Interval changes  9/30/2022   Patient Vitals for the past 8 hrs:   BP Temp Temp src Pulse Resp SpO2   09/30/22 0815 129/76 98.6 °F (37 °C) Oral 88 22 93 %   09/30/22 0515 136/85 99.7 °F (37.6 °C) Oral (!) 117 30 93 %       Summary of relevant labs:  Labs:  WBC 80-59-59-11-20  Platelet count 798-  Hematocrit 40-33-36  Micro:  Urine culture 9/24 Klebsiella pneumoniae, sensitive to Cipro and Bactrim, intermediate to ceftriaxone and resistant to penicillin  MRSA DNA -9/24  Blood culture 9/24 MSSA X2  Repeat blood culture 9/29 pending    Imaging:  Chest x-ray 9/27 no pneumonia    CT head -9/24  CT of the cervical spine 9/24 negative  CT abdomen pelvis lumbar spine and thoracic spine 9/24 airspace disease of the left lung atelectasis versus pneumonia, no traumatic abnormality within the chest abdomen and pelvis      2D echo 9/27  Normal LV size and wall thickness. No obvious wall motion abnormality seen.   Normal LV systolic function with LVEF >55%. Dilated RV with preserved function. RV systolic pressure 44 mmHg  RA appears dilated. No obvious significant structural valvular abnormality noted. No significant valvular stenosis or regurgitation noted. Normal aortic root dimension. No significant pericardial effusion noted. IVC is dilated, difficult to assess inspiratory variation , Pt on ventilator    Right lower extremity negative DVT    I have personally reviewed the past medical history, past surgical history, medications, social history, and family history, and I haveupdated the database accordingly. Allergies:   Lyrica [pregabalin], Demerol hcl [meperidine], Fluoxetine, and Paxil [paroxetine hcl]     Review of Systems:     Review of Systems   Unable to perform ROS: Mental status change     Physical Examination :       Physical Exam  Constitutional:       Appearance: She is obese. She is ill-appearing. HENT:      Head: Normocephalic and atraumatic. Nose: Nose normal.      Mouth/Throat:      Mouth: Mucous membranes are moist.   Eyes:      General: No scleral icterus. Conjunctiva/sclera: Conjunctivae normal.   Cardiovascular:      Rate and Rhythm: Rhythm irregular. Heart sounds: Normal heart sounds. Pulmonary:      Effort: No respiratory distress. Breath sounds: No wheezing. Abdominal:      General: There is no distension. Tenderness: There is no abdominal tenderness. Genitourinary:     Comments: Urine antonio  Musculoskeletal:      Cervical back: Neck supple. No rigidity. Right lower leg: Edema present. Left lower leg: Edema present. Skin:     Coloration: Skin is not jaundiced or pale.       Comments:  skin rash, she does have small ulcers over the lower extremities and they are healing with scabs and no signs of infection   Neurological:      Comments: Seems to be very tired, not moving too much in bed,    Psychiatric:      Comments: Encephalopathic, moaning, not very appropriate, answers not reliable       Past Medical History:     Past Medical History:   Diagnosis Date    Chronic back pain     Chronic hip pain     COPD (chronic obstructive pulmonary disease) ([facility])     Major depression     Osteoarthritis     Osteoporosis        Past Surgical  History:     Past Surgical History:   Procedure Laterality Date    BREAST BIOPSY      two    CHOLECYSTECTOMY      HYSTERECTOMY, VAGINAL         Medications:      potassium bicarb-citric acid  40 mEq Oral BID    metoprolol tartrate  25 mg Oral BID    enoxaparin  1 mg/kg SubCUTAneous BID    pantoprazole  40 mg Oral QAM AC    ceFAZolin  2,000 mg IntraVENous Q8H    oxyCODONE  10 mg Oral q8h    ipratropium  0.5 mg Nebulization 4x daily    sodium chloride flush  5-40 mL IntraVENous 2 times per day    budesonide-formoterol  2 puff Inhalation BID       Social History:     Social History     Socioeconomic History    Marital status: Single     Spouse name: Not on file    Number of children: Not on file    Years of education: Not on file    Highest education level: Not on file   Occupational History    Not on file   Tobacco Use    Smoking status: Former     Packs/day: 0.75     Years: 34.00     Pack years: 25.50     Types: Cigarettes     Quit date: 3/20/2017     Years since quittin.5    Smokeless tobacco: Never   Vaping Use    Vaping Use: Never used   Substance and Sexual Activity    Alcohol use: Not Currently     Comment: occasionally     Drug use: Never    Sexual activity: Not on file   Other Topics Concern    Not on file   Social History Narrative    Not on file     Social Determinants of Health     Financial Resource Strain: Low Risk     Difficulty of Paying Living Expenses: Not hard at all   Food Insecurity: No Food Insecurity    Worried About Running Out of Food in the Last Year: Never true    Ran Out of Food in the Last Year: Never true   Transportation Needs: Not on file   Physical Activity: Not on file   Stress: Not on file   Social Connections: Not on file   Intimate Partner Violence: Not on file   Housing Stability: Not on file       Family History:     Family History   Problem Relation Age of Onset    Stroke Mother     Alzheimer's Disease Father       Medical Decision Making:   I have independently reviewed/ordered the following labs:    CBC with Differential:   Recent Labs     09/29/22  0402 09/30/22  0345   WBC 11.3 20.2*   HGB 10.9* 11.7*   HCT 34.8* 36.2*   PLT See Reflexed IPF Result 154   LYMPHOPCT 10* 9*   MONOPCT 9 7     BMP:  Recent Labs     09/29/22  0402 09/30/22  0345   * 145*   K 3.5* 3.0*   * 106   CO2 27 28   BUN 30* 23   CREATININE 0.64 0.60   MG 1.9 2.1     Hepatic Function Panel: No results for input(s): PROT, LABALBU, BILIDIR, IBILI, BILITOT, ALKPHOS, ALT, AST in the last 72 hours. No results for input(s): RPR in the last 72 hours. No results for input(s): HIV in the last 72 hours. No results for input(s): BC in the last 72 hours. Lab Results   Component Value Date/Time    CREATININE 0.60 09/30/2022 03:45 AM    GLUCOSE 126 09/30/2022 03:45 AM       Detailed results: Thank you for allowing us to participate in the care of this patient. Please call with questions. This note is created with the assistance of a speech recognition program.  While intending to generate adocument that actually reflects the content of the visit, the document can still have some errors including those of syntax and sound a like substitutions which may escape proof reading. It such instances, actual meaningcan be extrapolated by contextual diversion.     Anjali Lucero MD  Office: [phone]  Perfect serve / office [phone]

## 2022-09-30 NOTE — PROGRESS NOTES
6602- Patient put on continuous pulse ox per order; heartrate reading as 160 despite heartrate at 0815 being 88. Telemetry placed, also showing elevated heartrate. IM resident messaged, EKG ordered. EKG done and showing afib with RVR. Dr. Ailyn Owen and residents at bedside, labs and IV fluids ordered. Heartrate fluctuating anywhere from 90 to 160.     Electronically signed by Julianna Borja RN on 9/30/2022 at 9:19 AM

## 2022-09-30 NOTE — PROGRESS NOTES
Occupational [address]  Occupational Therapy Not Seen Note    DATE: 2022    NAME: Tammy Cedillo  MRN: [id number]   : 1950      Patient not seen this date for Occupational Therapy due to:    Imaging / Testing:  Pt was attempted for OT in afternoon, but was off the floor at Nuclear Medicine. Per RN, the Pt will also be transferring to a different floor later today. OT will re-attempt for OT Evaluation at later date, as appropriate.       Electronically signed by KEVIN Valencia OTR/L on 2022 at 4:46 PM

## 2022-09-30 NOTE — PLAN OF CARE
Please fill out the MRI Screening form and fax to dept @ [phone]. Any questions. .please call [facility] MRI @ [phone]. MRI exam will be scheduled after receiving the completed screening form.  Thank you!!

## 2022-09-30 NOTE — CARE COORDINATION
Message left for [facility], requesting call back to verify referral was received.   2:10 Spoke with Arnav Hernandeztruong sister in law of patient, [facility]  [place] [facility] list given, family will discuss and call writer by 5:00

## 2022-10-01 PROBLEM — I48.0 PAROXYSMAL ATRIAL FIBRILLATION (HCC): Status: ACTIVE | Noted: 2022-10-01

## 2022-10-01 PROBLEM — E87.0 HYPERNATREMIA: Status: ACTIVE | Noted: 2022-10-01

## 2022-10-01 PROBLEM — D72.829 LEUKOCYTOSIS: Status: ACTIVE | Noted: 2022-10-01

## 2022-10-01 PROBLEM — E53.8 FOLIC ACID DEFICIENCY: Status: ACTIVE | Noted: 2022-10-01

## 2022-10-01 PROBLEM — J18.9 PNEUMONIA OF LEFT LOWER LOBE DUE TO INFECTIOUS ORGANISM: Status: ACTIVE | Noted: 2022-10-01

## 2022-10-01 LAB
ABSOLUTE EOS #: 0.63 K/UL (ref 0–0.4)
ABSOLUTE IMMATURE GRANULOCYTE: 0 K/UL (ref 0–0.3)
ABSOLUTE LYMPH #: 2.3 K/UL (ref 1–4.8)
ABSOLUTE MONO #: 0.63 K/UL (ref 0.1–0.8)
ANION GAP SERPL CALCULATED.3IONS-SCNC: 11 MMOL/L (ref 9–17)
BASOPHILS # BLD: 0 % (ref 0–2)
BASOPHILS ABSOLUTE: 0 K/UL (ref 0–0.2)
BUN BLDV-MCNC: 17 MG/DL (ref 8–23)
CALCIUM SERPL-MCNC: 8.2 MG/DL (ref 8.6–10.4)
CHLORIDE BLD-SCNC: 99 MMOL/L (ref 98–107)
CO2: 29 MMOL/L (ref 20–31)
CREAT SERPL-MCNC: 0.53 MG/DL (ref 0.5–0.9)
EOSINOPHILS RELATIVE PERCENT: 3 % (ref 1–4)
GFR AFRICAN AMERICAN: >60 ML/MIN
GFR NON-AFRICAN AMERICAN: >60 ML/MIN
GFR SERPL CREATININE-BSD FRML MDRD: ABNORMAL ML/MIN/{1.73_M2}
GLUCOSE BLD-MCNC: 110 MG/DL (ref 65–105)
GLUCOSE BLD-MCNC: 113 MG/DL (ref 70–99)
GLUCOSE BLD-MCNC: 128 MG/DL (ref 65–105)
GLUCOSE BLD-MCNC: 142 MG/DL (ref 65–105)
HCT VFR BLD CALC: 36.4 % (ref 36.3–47.1)
HEMOGLOBIN: 11.7 G/DL (ref 11.9–15.1)
IMMATURE GRANULOCYTES: 0 %
LYMPHOCYTES # BLD: 11 % (ref 24–44)
MAGNESIUM: 1.8 MG/DL (ref 1.6–2.6)
MCH RBC QN AUTO: 26.2 PG (ref 25.2–33.5)
MCHC RBC AUTO-ENTMCNC: 32.1 G/DL (ref 28.4–34.8)
MCV RBC AUTO: 81.4 FL (ref 82.6–102.9)
MONOCYTES # BLD: 3 % (ref 1–7)
MORPHOLOGY: ABNORMAL
MORPHOLOGY: ABNORMAL
NRBC AUTOMATED: 0 PER 100 WBC
PDW BLD-RTO: 17.1 % (ref 11.8–14.4)
PLATELET # BLD: 214 K/UL (ref 138–453)
PMV BLD AUTO: 12.3 FL (ref 8.1–13.5)
POTASSIUM SERPL-SCNC: 3.7 MMOL/L (ref 3.7–5.3)
RBC # BLD: 4.47 M/UL (ref 3.95–5.11)
SEG NEUTROPHILS: 83 % (ref 36–66)
SEGMENTED NEUTROPHILS ABSOLUTE COUNT: 17.34 K/UL (ref 1.8–7.7)
SODIUM BLD-SCNC: 139 MMOL/L (ref 135–144)
WBC # BLD: 20.9 K/UL (ref 3.5–11.3)

## 2022-10-01 PROCEDURE — 6370000000 HC RX 637 (ALT 250 FOR IP): Performed by: INTERNAL MEDICINE

## 2022-10-01 PROCEDURE — 94640 AIRWAY INHALATION TREATMENT: CPT

## 2022-10-01 PROCEDURE — 99233 SBSQ HOSP IP/OBS HIGH 50: CPT | Performed by: INTERNAL MEDICINE

## 2022-10-01 PROCEDURE — 80048 BASIC METABOLIC PNL TOTAL CA: CPT

## 2022-10-01 PROCEDURE — 6370000000 HC RX 637 (ALT 250 FOR IP): Performed by: STUDENT IN AN ORGANIZED HEALTH CARE EDUCATION/TRAINING PROGRAM

## 2022-10-01 PROCEDURE — 2580000003 HC RX 258: Performed by: INTERNAL MEDICINE

## 2022-10-01 PROCEDURE — 6360000002 HC RX W HCPCS: Performed by: INTERNAL MEDICINE

## 2022-10-01 PROCEDURE — 94761 N-INVAS EAR/PLS OXIMETRY MLT: CPT

## 2022-10-01 PROCEDURE — 97162 PT EVAL MOD COMPLEX 30 MIN: CPT

## 2022-10-01 PROCEDURE — 6360000002 HC RX W HCPCS: Performed by: STUDENT IN AN ORGANIZED HEALTH CARE EDUCATION/TRAINING PROGRAM

## 2022-10-01 PROCEDURE — 94660 CPAP INITIATION&MGMT: CPT

## 2022-10-01 PROCEDURE — 83735 ASSAY OF MAGNESIUM: CPT

## 2022-10-01 PROCEDURE — 2580000003 HC RX 258

## 2022-10-01 PROCEDURE — 6370000000 HC RX 637 (ALT 250 FOR IP)

## 2022-10-01 PROCEDURE — 2700000000 HC OXYGEN THERAPY PER DAY

## 2022-10-01 PROCEDURE — 82803 BLOOD GASES ANY COMBINATION: CPT

## 2022-10-01 PROCEDURE — 99232 SBSQ HOSP IP/OBS MODERATE 35: CPT | Performed by: INTERNAL MEDICINE

## 2022-10-01 PROCEDURE — 99232 SBSQ HOSP IP/OBS MODERATE 35: CPT | Performed by: PSYCHIATRY & NEUROLOGY

## 2022-10-01 PROCEDURE — 97530 THERAPEUTIC ACTIVITIES: CPT

## 2022-10-01 PROCEDURE — 6360000002 HC RX W HCPCS

## 2022-10-01 PROCEDURE — 36415 COLL VENOUS BLD VENIPUNCTURE: CPT

## 2022-10-01 PROCEDURE — 82947 ASSAY GLUCOSE BLOOD QUANT: CPT

## 2022-10-01 PROCEDURE — 2060000000 HC ICU INTERMEDIATE R&B

## 2022-10-01 PROCEDURE — 85025 COMPLETE CBC W/AUTO DIFF WBC: CPT

## 2022-10-01 RX ORDER — POTASSIUM CHLORIDE 20 MEQ/1
40 TABLET, EXTENDED RELEASE ORAL ONCE
Status: DISCONTINUED | OUTPATIENT
Start: 2022-10-01 | End: 2022-10-01

## 2022-10-01 RX ORDER — OXYCODONE HYDROCHLORIDE 5 MG/1
5 TABLET ORAL EVERY 8 HOURS PRN
Status: DISCONTINUED | OUTPATIENT
Start: 2022-10-01 | End: 2022-10-06 | Stop reason: HOSPADM

## 2022-10-01 RX ORDER — POTASSIUM CHLORIDE 20 MEQ/1
20 TABLET, EXTENDED RELEASE ORAL 2 TIMES DAILY WITH MEALS
Status: DISCONTINUED | OUTPATIENT
Start: 2022-10-01 | End: 2022-10-05

## 2022-10-01 RX ORDER — MAGNESIUM SULFATE IN WATER 40 MG/ML
2000 INJECTION, SOLUTION INTRAVENOUS ONCE
Status: COMPLETED | OUTPATIENT
Start: 2022-10-01 | End: 2022-10-01

## 2022-10-01 RX ADMIN — IPRATROPIUM BROMIDE 0.5 MG: 0.5 SOLUTION RESPIRATORY (INHALATION) at 20:11

## 2022-10-01 RX ADMIN — ENOXAPARIN SODIUM 105 MG: 150 INJECTION SUBCUTANEOUS at 08:08

## 2022-10-01 RX ADMIN — OXYCODONE HYDROCHLORIDE 5 MG: 5 TABLET ORAL at 03:44

## 2022-10-01 RX ADMIN — AMPICILLIN SODIUM AND SULBACTAM SODIUM 3000 MG: 2; 1 INJECTION, POWDER, FOR SOLUTION INTRAMUSCULAR; INTRAVENOUS at 05:14

## 2022-10-01 RX ADMIN — IPRATROPIUM BROMIDE 0.5 MG: 0.5 SOLUTION RESPIRATORY (INHALATION) at 08:33

## 2022-10-01 RX ADMIN — METOPROLOL TARTRATE 25 MG: 25 TABLET ORAL at 08:06

## 2022-10-01 RX ADMIN — SODIUM CHLORIDE, POTASSIUM CHLORIDE, SODIUM LACTATE AND CALCIUM CHLORIDE: 600; 310; 30; 20 INJECTION, SOLUTION INTRAVENOUS at 04:00

## 2022-10-01 RX ADMIN — BUDESONIDE AND FORMOTEROL FUMARATE DIHYDRATE 2 PUFF: 160; 4.5 AEROSOL RESPIRATORY (INHALATION) at 20:12

## 2022-10-01 RX ADMIN — METOPROLOL TARTRATE 25 MG: 25 TABLET ORAL at 20:20

## 2022-10-01 RX ADMIN — POTASSIUM CHLORIDE 20 MEQ: 1500 TABLET, EXTENDED RELEASE ORAL at 17:10

## 2022-10-01 RX ADMIN — Medication 100 MG: at 08:06

## 2022-10-01 RX ADMIN — BUDESONIDE AND FORMOTEROL FUMARATE DIHYDRATE 2 PUFF: 160; 4.5 AEROSOL RESPIRATORY (INHALATION) at 08:33

## 2022-10-01 RX ADMIN — AMPICILLIN SODIUM AND SULBACTAM SODIUM 3000 MG: 2; 1 INJECTION, POWDER, FOR SOLUTION INTRAMUSCULAR; INTRAVENOUS at 17:53

## 2022-10-01 RX ADMIN — ENOXAPARIN SODIUM 105 MG: 150 INJECTION SUBCUTANEOUS at 20:20

## 2022-10-01 RX ADMIN — FOLIC ACID 1 MG: 1 TABLET ORAL at 08:06

## 2022-10-01 RX ADMIN — AMPICILLIN SODIUM AND SULBACTAM SODIUM 3000 MG: 2; 1 INJECTION, POWDER, FOR SOLUTION INTRAMUSCULAR; INTRAVENOUS at 12:38

## 2022-10-01 RX ADMIN — POTASSIUM CHLORIDE 20 MEQ: 1500 TABLET, EXTENDED RELEASE ORAL at 09:30

## 2022-10-01 RX ADMIN — IPRATROPIUM BROMIDE 0.5 MG: 0.5 SOLUTION RESPIRATORY (INHALATION) at 11:51

## 2022-10-01 RX ADMIN — BUDESONIDE AND FORMOTEROL FUMARATE DIHYDRATE 2 PUFF: 160; 4.5 AEROSOL RESPIRATORY (INHALATION) at 00:11

## 2022-10-01 RX ADMIN — MAGNESIUM SULFATE HEPTAHYDRATE 2000 MG: 40 INJECTION, SOLUTION INTRAVENOUS at 09:32

## 2022-10-01 ASSESSMENT — PAIN DESCRIPTION - LOCATION
LOCATION: BACK

## 2022-10-01 ASSESSMENT — PAIN SCALES - GENERAL
PAINLEVEL_OUTOF10: 9
PAINLEVEL_OUTOF10: 10
PAINLEVEL_OUTOF10: 8

## 2022-10-01 ASSESSMENT — PAIN DESCRIPTION - PAIN TYPE: TYPE: ACUTE PAIN

## 2022-10-01 ASSESSMENT — PAIN - FUNCTIONAL ASSESSMENT: PAIN_FUNCTIONAL_ASSESSMENT: PREVENTS OR INTERFERES SOME ACTIVE ACTIVITIES AND ADLS

## 2022-10-01 ASSESSMENT — ENCOUNTER SYMPTOMS
ABDOMINAL PAIN: 0
SORE THROAT: 0
STRIDOR: 0
SHORTNESS OF BREATH: 1
COUGH: 0
RHINORRHEA: 0
BACK PAIN: 0
ABDOMINAL DISTENTION: 0
SINUS PRESSURE: 0
WHEEZING: 0

## 2022-10-01 ASSESSMENT — PAIN DESCRIPTION - DESCRIPTORS: DESCRIPTORS: ACHING

## 2022-10-01 ASSESSMENT — PAIN DESCRIPTION - ORIENTATION
ORIENTATION: LOWER
ORIENTATION: MID
ORIENTATION: LOWER

## 2022-10-01 NOTE — PROGRESS NOTES
Physical Therapy  Facility/Department: [address] STEPDOWN  Physical Therapy Initial Assessment    Name: Sofie Jose  : 1950  MRN: [id number]  Date of Service: 10/1/2022  Chief Complaint   Patient presents with    Shortness of Breath    Hip Pain      Discharge Recommendations:  Patient would benefit from continued therapy after discharge      Patient Diagnosis(es): The encounter diagnosis was Septicemia [facility]). Past Medical History:  has a past medical history of Chronic back pain, Chronic hip pain, COPD (chronic obstructive pulmonary disease) ([address] Major depression, Osteoarthritis, and Osteoporosis. Past Surgical History:  has a past surgical history that includes Hysterectomy, vaginal; Cholecystectomy; and Breast biopsy. Assessment   Body Structures, Functions, Activity Limitations Requiring Skilled Therapeutic Intervention: Decreased functional mobility ; Decreased strength;Decreased endurance;Decreased balance  Assessment: The pt transferred sit to stand with a RW x max assist. She wsa with significant fatigue following the transfers.  She could benefit from a continuation of PT for gait, transfers, and strengthening following her DC  Therapy Prognosis: Good  Decision Making: Medium Complexity  Requires PT Follow-Up: Yes  Activity Tolerance  Activity Tolerance: Patient limited by fatigue;Patient limited by endurance     Plan   Physcial Therapy Plan  General Plan:  (5-6x wk)  Current Treatment Recommendations: Strengthening, Balance training, Functional mobility training, Transfer training, Endurance training, Gait training, Safety education & training  Safety Devices  Type of Devices: Nurse notified, Left in bed, Call light within reach     Restrictions  Restrictions/Precautions  Restrictions/Precautions: Up as Tolerated     Subjective   General  Patient assessed for rehabilitation services?: Yes  Response To Previous Treatment: Not applicable  Family / Caregiver Present: No  Follows Commands: Within Functional Limits  Subjective  Subjective: Pt reports a 6/10 back pain     Social/Functional History  Social/Functional History  Lives With: Alone  Type of Home: House  Home Layout: Two level, 1/2 bath on main level (sleeps in family room on main floor)  Home Access: Stairs to enter without rails  Entrance Stairs - Number of Steps: 1  Bathroom Shower/Tub: Tub/Shower unit  Bathroom Toilet: Standard  Home Equipment: Honora Sables, rolling  ADL Assistance: Independent  Homemaking Assistance: Independent  Ambulation Assistance: Independent  Transfer Assistance: Independent  Active : Yes  Mode of Transportation: SUV  Occupation: Full time employment  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  Hearing Exceptions: Hard of hearing/hearing concerns    Cognition   Orientation  Overall Orientation Status: Within Functional Limits  Cognition  Overall Cognitive Status: Exceptions  Following Commands:  Follows one step commands with increased time  Attention Span: Attends with cues to redirect  Memory: Appears intact  Safety Judgement: Decreased awareness of need for assistance  Problem Solving: Assistance required to implement solutions  Insights: Decreased awareness of deficits  Initiation: Requires cues for some  Sequencing: Requires cues for some     Objective   Heart Rate: 76  Heart Rate Source: Monitor  BP: 129/62  BP Location: Right lower arm  BP Method: Automatic  Patient Position: High [facility]  MAP (Calculated): 84.33  Resp: 26  SpO2: 94 %  O2 Device: Nasal cannula     AROM RLE (degrees)  RLE AROM: WFL  AROM LLE (degrees)  LLE AROM : WFL  AROM RUE (degrees)  RUE AROM : WFL  AROM LUE (degrees)  LUE AROM : WFL  Strength RLE  Strength RLE: WFL  Strength LLE  Strength LLE: WFL  Strength RUE  Strength RUE: WFL  Strength LUE  Strength LUE: WFL        Bed mobility  Supine to Sit: Maximum assistance  Sit to Supine: Maximum assistance  Scooting: Maximal assistance  Transfers  Sit to Stand: Maximum Assistance  Stand to Sit: Maximum Assistance  Ambulation  Surface: Level tile  Device: Rolling Walker  Assistance: Maximum assistance  Distance: Sit to stand x 2  with a RW x max assist    Pt up at bedside x 18 mins x CGA     Balance  Posture: Good  Sitting - Static: Fair  Sitting - Dynamic: Poor  Standing - Static: Poor     OutComes Score      AM-PAC Score  AM-PAC Inpatient Mobility Raw Score : 9 (10/01/22 1238)  AM-PAC Inpatient T-Scale Score : 30.55 (10/01/22 1238)  Mobility Inpatient CMS 0-100% Score: 81.38 (10/01/22 1238)  Mobility Inpatient CMS G-Code Modifier : CM (10/01/22 1238)     Tinneti Score     Goals  Short Term Goals  Time Frame for Short Term Goals: 10 visits  Short Term Goal 1: transfers with min assist  Short Term Goal 2: amb 50 ft with a W x min assist  Short Term Goal 3: min assist with bed mobility  Short Term Goal 4: 20 min exercise program x min assist       Education  Patient Education  Education Given To: Patient  Education Provided: Role of Therapy;Plan of Care  Education Method: Verbal  Barriers to Learning: None  Education Outcome: Verbalized understanding      Therapy Time   Individual Concurrent Group Co-treatment   Time In 0840         Time Out 4366         Minutes 45             1 of [address], PT

## 2022-10-01 NOTE — PROGRESS NOTES
[facility]  Progress Note                   Date:   10/1/2022  Patient name: Radha Up  Date of admission:  9/24/2022  2:07 PM  MRN:   [id number]  YOB: 1950  PCP: Priscilla Witt MD    Reason for Admission: Septicemia ([facility] [A41.9]  Sepsis ([facility] [A41.9]    Subjective:       Clinical Changes /Abnormalities:Patient seen and examined. Denies chest pain or shortness of breath. Tele/vitals/labs reviewed . Discussed case with RN. Review of Systems    Medications:   Scheduled Meds:   potassium chloride  20 mEq Oral BID WC    metoprolol tartrate  25 mg Oral BID    enoxaparin  1 mg/kg SubCUTAneous BID    ampicillin-sulbactam  3,000 mg IntraVENous Q6H    thiamine  100 mg Oral Daily    folic acid  1 mg Oral Daily    ipratropium  0.5 mg Nebulization 4x daily    sodium chloride flush  5-40 mL IntraVENous 2 times per day    budesonide-formoterol  2 puff Inhalation BID     Continuous Infusions:   dextrose      sodium chloride Stopped (09/29/22 1257)     CBC:   Recent Labs     09/29/22  0402 09/30/22  0345 10/01/22  0259   WBC 11.3 20.2* 20.9*   HGB 10.9* 11.7* 11.7*   PLT See Reflexed IPF Result 154 214     BMP:    Recent Labs     09/29/22  0402 09/30/22  0345 09/30/22  1813 10/01/22  0259   * 145* 137 139   K 3.5* 3.0*  --  3.7   * 106  --  99   CO2 27 28  --  29   BUN 30* 23  --  17   CREATININE 0.64 0.60  --  0.53   GLUCOSE 91 126*  --  113*     Hepatic:No results for input(s): AST, ALT, ALB, BILITOT, ALKPHOS in the last 72 hours. Troponin: No results for input(s): TROPHS in the last 72 hours. BNP: No results for input(s): BNP in the last 72 hours. Lipids: No results for input(s): CHOL, HDL in the last 72 hours. Invalid input(s): LDLCALCU  INR: No results for input(s): INR in the last 72 hours. DATA:    Diagnostics:    EKG: ordered-pending  ECHO: 9/24/22: \"Summary  Normal LV size and wall thickness. No obvious wall motion abnormality seen.   Normal LV systolic stenosis of lumbar region with neurogenic claudication     Bilateral leg weakness     Paroxysmal atrial fibrillation (HCC)     Hypernatremia     Folic acid deficiency     Leukocytosis      Plan of Treatment:   New DX A. dane RVR-currently in sinus rhythm with heart rate controlled-continue beta-blocker and full dose Lovenox and switch to NOAC on discharge  Echo reviewed as above  Cardiology signing off please call us if needed ,  follow-up with cardiology in 2 weeks    Electronically signed by MITESH Pham NP on 10/1/2022 at 2:38 PM  [address]  [phone]

## 2022-10-01 NOTE — PROGRESS NOTES
PULMONARY & CRITICAL CARE MEDICINE PROGRESS NOTE     Patient:  Serge Jordan  MRN: [id number]  Admit date: 9/24/2022  Primary Care Physician: Yordy Serrato MD  Consulting Physician: Rayray Vieira MD  CODE Status: Full Code  LOS: 7     SUBJECTIVE     Chief Complaint/ Reason for consult:    Acute hypoxic respiratory failure due to MSSA septicemia    Hospital Course: The patient is a 80 y.o. female with past medical history of COPD, chronic hip pain, chronic back pain, prediabetes, OA, osteoporosis who presented to the ED with profound weakness, found to be tachypnic, tachycardic, blood cultures grew MSSA. Urine cx grew Klebsiella. During her admission, she required increasing ventilatory support, initially BIPAP and then alter intubation with mechanical ventilation. She was extubated on 9/28/22, was transferred out of the ICU to the floor. In this interim period, patient has had hypoactive delirium, was febrile on 9/29/22 Tmax 101.1, with worsening leukocytosis and increased oxygen requirement from 3 L to 4 L NC. She underwent CXR which showed pulmonary vascular congestion, increased bibasilar opacities, CXR does not appear much changed from the CXR 3 days ago. She has been started on Unasyn for concern of aspiration pneumonia. This am, patient was found to be in A fib with RVR HR upto 160s, Cardiology was consulted, patient started on lopressor 25 mg BID    Interval History:  10/01/22  Pt was seen and examined at bedside. Events noted chart seen last imaging studies seen. Patient is currently on nasal cannula 4 L she is saturating 96%. She is hemodynamically stable afebrile overnight T-max was 98.4 respiratory rate is in low to mid 20s. She is on BiPAP 10/5/1940 percent and she had used BiPAP for 4 hours last night.   According patient she is feeling better she has mild cough denies a sputum production shortness of breath on activity denies shortness of breath at rest.  Labs shows WBC count is stable at 20 hemoglobin is stable 11.7 creatinine 0.53 bicarbonate 29. Patient is currently on Lopressor 25 mg twice daily for paroxysmal atrial fibrillation. Chest x-ray on 2022 shows bilateral areas of his infiltrate low lung volume left basilar atelectasis/effusion not much change in chest x-ray from  and 2022    Review Of Systems:  Review of Systems   Constitutional:  Negative for activity change, appetite change, chills, fatigue and fever. HENT:  Negative for congestion, postnasal drip, rhinorrhea, sinus pressure, sneezing and sore throat. Respiratory:  Positive for shortness of breath. Negative for cough, wheezing and stridor. Cardiovascular:  Negative for chest pain and leg swelling. Gastrointestinal:  Negative for abdominal distention and abdominal pain. Genitourinary:  Negative for dysuria and hematuria. Musculoskeletal:  Negative for arthralgias and back pain. Neurological:  Negative for weakness, light-headedness and headaches. Psychiatric/Behavioral:  Positive for behavioral problems. OBJECTIVE     PaO2/FiO2 RATIO:  Recent Labs     221   POCPO2 64.2*        FiO2 : 40 %     VITAL SIGNS:   LAST:  BP (!) 145/80   Pulse 75   Temp 98.4 °F (36.9 °C) (Oral)   Resp 30   Ht 5' 5\" (1.651 m)   Wt 234 lb 12.6 oz (106.5 kg)   LMP  (LMP Unknown)   SpO2 96%   BMI 39.07 kg/m²   8-24 HR RANGE:  TEMP Temp  Av.1 °F (36.7 °C)  Min: 97.8 °F (36.6 °C)  Max: 98.7 °F (87.0 °C)   BP Systolic (92RMU), IZH:043 , Min:131 , HYX:913      Diastolic (74CHQ), BCL:22, Min:71, Max:95     PULSE Pulse  Av.9  Min: 71  Max: 97   RR Resp  Av.2  Min: 25  Max: 31   O2 SAT SpO2  Av.4 %  Min: 92 %  Max: 98 %   OXYGEN DELIVERY O2 Flow Rate (L/min)  Av L/min  Min: 4 L/min  Max: 4 L/min        Systemic Examination:   Physical Exam -  Constitutional:  Alert, cooperative and no distress. Mental Status:  Oriented to person, place and time and normal affect.   Lungs:  Bilateral air entry present, lung fields coarse. Bilateral breath sounds present with prolonged expiration. Heart:  Regular rate and rhythm, no murmur. Abdomen:  Soft, nontender, nondistended, normal bowel sounds. Extremities: Mild bilateral pedal edema, redness, tenderness in the calves. Skin:  Warm, dry, no gross lesions or rashes. DATA REVIEW     Medications: Current Inpatient  Scheduled Meds:   potassium chloride  20 mEq Oral BID WC    magnesium sulfate  2,000 mg IntraVENous Once    metoprolol tartrate  25 mg Oral BID    enoxaparin  1 mg/kg SubCUTAneous BID    ampicillin-sulbactam  3,000 mg IntraVENous Q6H    thiamine  100 mg Oral Daily    folic acid  1 mg Oral Daily    ipratropium  0.5 mg Nebulization 4x daily    sodium chloride flush  5-40 mL IntraVENous 2 times per day    budesonide-formoterol  2 puff Inhalation BID     Continuous Infusions:   dextrose      sodium chloride Stopped (09/29/22 1257)       INPUT/OUTPUT:  In: 6854.6 [P.O.:1050; I.V.:1514.6]  Out: 1400 [Urine:1400]  Date 10/01/22 0000 - 10/01/22 2359   Shift 0964-0730 3932-6150 4272-7117 24 Hour Total   INTAKE   P.O.(mL/kg/hr) 500(0.6) 300  800   I. V.(mL/kg) 1514. 6(14.2)   1514. 6(14.2)   IV Piggyback(mL/kg) 396.6(3.7)   396.6(3.7)   Shift Total(mL/kg) 0128.2(89.3) 300(2.8)  2711.2(25.5)   OUTPUT   Urine(mL/kg/hr) 400(0.5)   400   Shift Total(mL/kg) 400(3.8)   400(3.8)   Weight (kg) 106.5 106.5 106.5 106.5          LABS:  ABGs:   Recent Labs     09/30/22  2101   POCPH 7.580*   POCPCO2 30.8*   POCPO2 64.2*   POCHCO3 28.8*   EOUR7KDA 95       CBC:   Recent Labs     09/29/22  0402 09/30/22  0345 10/01/22  0259   WBC 11.3 20.2* 20.9*   HGB 10.9* 11.7* 11.7*   HCT 34.8* 36.2* 36.4   MCV 82.1* 82.8 81.4*   PLT See Reflexed IPF Result 154 214   LYMPHOPCT 10* 9* 11*   RBC 4.24 4.37 4.47   MCH 25.7 26.8 26.2   MCHC 31.3 32.3 32.1   RDW 17.3* 17.0* 17.1*       CRP:   No results for input(s): CRP in the last 72 hours.   LDH:   No results for input(s): LDH in the last 72 hours. BMP:   Recent Labs     09/29/22  0402 09/30/22  0345 09/30/22  1813 10/01/22  0259   * 145* 137 139   K 3.5* 3.0*  --  3.7   * 106  --  99   CO2 27 28  --  29   BUN 30* 23  --  17   CREATININE 0.64 0.60  --  0.53   GLUCOSE 91 126*  --  113*       Liver Function Test:   No results for input(s): PROT, LABALBU, ALT, AST, GGT, ALKPHOS, BILITOT in the last 72 hours. Coagulation Profile:   No results for input(s): INR, PROTIME, APTT in the last 72 hours. D-Dimer:  No results for input(s): DDIMER in the last 72 hours. Lactic Acid:  No results for input(s): LACTA in the last 72 hours. Cardiac Enzymes:  Recent Labs     09/30/22  0345   CKTOTAL 33       BNP/ProBNP:   No results for input(s): BNP, PROBNP in the last 72 hours. Triglycerides:  No results for input(s): TRIG in the last 72 hours. Microbiology:  Urine Culture:  No components found for: CURINE  Blood Culture:  No components found for: CBLOOD, CFUNGUSBL  Sputum Culture:  No components found for: CSPUTUM  Recent Labs     09/29/22  1327   [facility] . BLOOD   SPECIAL R WRIST 1.5 ML   CULTURE NO GROWTH 2 DAYS       Recent Labs     09/29/22  0655 09/29/22  1327   [facility] . BLOOD . BLOOD   SPECIAL L HAND 6ML R WRIST 1.5 ML   CULTURE NO GROWTH 2 DAYS NO GROWTH 2 DAYS          Pathology:    Radiology Reports:  NM BONE SCAN 3 PHASE   Final Result   Nondiagnostic study. Patient could not cooperate. No asymmetry on flow   images to suggest hyperemia. XR CHEST PORTABLE   Final Result   1. Pulmonary vascular congestion may be increased, and there may be new   perihilar edema. Consider congestive heart failure given at least borderline   cardiomegaly and suspicion for trace bilateral pleural effusions. Pneumonia   could appear similar. 2. Increased bibasilar airspace opacities likely due in part to atelectasis   with potential for superimposed pneumonia or aspiration especially on the   left.          XR ABDOMEN (KUB) (SINGLE AP VIEW)   Final Result   1. No ileus or obstruction is identified. Mild gaseous distention of the   stomach. 2. Cardiomegaly with vascular congestion. 3. Small bore central venous catheter terminates at the cavoatrial   junction/right atrium. XR CHEST PORTABLE   Final Result   1. No ileus or obstruction is identified. Mild gaseous distention of the   stomach. 2. Cardiomegaly with vascular congestion. 3. Small bore central venous catheter terminates at the cavoatrial   junction/right atrium. VL DUP LOWER EXTREMITY VENOUS BILATERAL   Final Result      XR CHEST PORTABLE   Final Result   Central pulmonary congestion with obscuration of the left hemidiaphragm   likely representing effusion as can be seen in CHF. XR CHEST (SINGLE VIEW FRONTAL)   Final Result   Left internal jugular line tip projected over the mid right atrium. OG and endotracheal tubes appear unchanged and in satisfactory position. Pulmonary vasculature may be slightly congested. XR CHEST PORTABLE   Final Result   Endotracheal tube in satisfactory position. XR ABDOMEN FOR NG/OG/NE TUBE PLACEMENT   Final Result   OG tube in satisfactory position. CT CHEST ABDOMEN PELVIS W CONTRAST   Final Result   No acute traumatic abnormality detected within the chest, abdomen, and pelvis. Airspace disease the left lung base, atelectasis versus pneumonia versus   aspiration. CT THORACIC SPINE TRAUMA RECONSTRUCTION   Final Result   No acute traumatic abnormality detected within the chest, abdomen, and pelvis. Airspace disease the left lung base, atelectasis versus pneumonia versus   aspiration. CT LUMBAR SPINE TRAUMA RECONSTRUCTION   Final Result   No acute traumatic abnormality detected within the chest, abdomen, and pelvis. Airspace disease the left lung base, atelectasis versus pneumonia versus   aspiration.          CT HEAD WO CONTRAST   Final Result   No acute intracranial abnormality. CT CERVICAL SPINE WO CONTRAST   Final Result   No acute abnormality of the cervical spine. XR CHEST PORTABLE   Final Result   No radiographic evidence of an acute cardiopulmonary process. 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         MRI CERVICAL SPINE WO CONTRAST    (Results Pending)   MRI THORACIC SPINE WO CONTRAST    (Results Pending)   MRI LUMBAR SPINE WO CONTRAST    (Results Pending)        Echocardiogram:   Results for orders placed during the hospital encounter of 09/24/22    ECHO Complete 2D W Doppler W Color    Narrative  Transthoracic Echocardiography Report (TTE)    Patient Name Shahrzad Stevens     Date of Study           09/27/2022  JOSIAH MOMIN    Date of      1950  Gender                  Female  Birth    Age          80 year(s)  Race                        Room Number  3024        Height:                 65 inch, 165.1 cm    Corporate ID [id number]    Weight:                 210 pounds, 95.3 kg  #    Patient Acct [de-identified]   BSA:        2.02 m^2    BMI:        34.95 kg/m^2  #    MR #         [id number]     Sonographer             Cherylene Slate, RVT,  [facility]    Accession #  [id number]  Interpreting Physician  Mendy Gardner    Fellow                   Referring Nurse  Practitioner    Interpreting             Referring Physician  Fellow    Type of Study    TTE procedure:2D Echocardiogram, M-Mode, Doppler, Color Doppler. Procedure Date  Date: 09/27/2022 Start: 04:13 PM    Study Location: [facility]  Technical Quality: Adequate visualization    Indications:Cardiac Evaluation. Comments:Referring Physician: Dmoenico Perez MD    History / Tech. Comments:  Echo done at patient bedside. Echo done at patient bedside. Patient supine on ventilator.     Patient Status: Inpatient    Height: 65 inches Weight: 210 pounds BSA: 2.02 m^2 BMI: 34.95 dimension. IVC dilated but unable to assess respiratory collapse due to patient on  ventilator. E/E' average = 10.65    M-mode / 2D Measurements & Calculations:    LVIDd:5.2 cm(3.7 - 5.6 cm)       Diastolic EUHKAX:10.4 ml  FLQXU:4.7 cm(2.2 - 4.0 cm)       Systolic JHCNYF:18.9 ml  CIHC:8.5 cm(0.6 - 1.1 cm)        Aortic Root:2.9 cm(2.0 - 3.7 cm)  LVPWd:0.6 cm(0.6 - 1.1 cm)       LA Dimension: 4.2 cm(1.9 - 4.0 cm)  Fractional Shortenin.69 %    LA volume/Index: 48.7 ml /24m^2  Calculated LVEF (%): 61.3 %      LVOT:1.8 cm  RVDd:4.7 cm    Mitral:                                 Aortic    Valve Area (P1/2-Time): 2.75 cm^2       Peak Velocity: 1.76 m/s  Peak E-Wave: 0.96 m/s                   Mean Velocity: 1.12 m/s  Peak A-Wave: 0.99 m/s                   Peak Gradient: 12.39 mmHg  E/A Ratio: 0.97                         Mean Gradient: 6 mmHg  Peak Gradient: 3.69 mmHg  Mean Gradient: 3 mmHg  Deceleration Time: 221 msec             Area (continuity): 2.17 cm^2  P1/2t: 80 msec                          AV VTI: 33.3 cm    Area (continuity): 2.58 cm^2  Mean Velocity: 0.82 m/s    Tricuspid:                              Pulmonic:    Estimated RVSP: 44 mmHg                 Peak Velocity: 1.07 m/s  Peak TR Velocity: 2.93 m/s              Peak Gradient: 4.58 mmHg  Peak TR Gradient: 34.3396 mmHg  Estimated RA Pressure: 10 mmHg    Estimated PASP: 44.34 mmHg    Diastology / Tissue Doppler  Septal Wall E' velocity:0.07 m/s  Septal Wall E/E':13  Lateral Wall E' velocity:0.12 m/s  Lateral Wall E/E':8.3       ASSESSMENT AND PLAN     Assessment:    // Acute hypoxic respiratory failure  // Aspiration pneumonia/pneumonitis  // Altered mental status/encephalopathy improving  // Paroxysmal A fib with RVR  // MSSA septicemia  // Klebsiella UTI  // COPD by history  // Obesity likely obstructive sleep apnea    Plan:    Patient is currently on 4 L nasal cannula wean O2 to keep saturation above 90%.   Continue to use BiPAP at night at current

## 2022-10-01 NOTE — PROGRESS NOTES
[facility] Neurology   IN-PATIENT SERVICE      NEUROLOGY PROGRESS  NOTE            Date:   10/1/2022  Patient name:  Mark Hung  Date of admission:  9/24/2022  YOB: 1950      Interval History:   Mark Hung is a  80 y.o. female admitted on 9/24/2022 with Septicemia ([facility] [address] [A41.9]  Sepsis ([facility] [address] [A41.9]. This is a follow-up neurology progress note. The patient was seen and examined and the chart was reviewed. No acute events overnight  Afebrile, on O2 4L Spo2 92-96%  BP: (145-155)/(80-95)    AAO x2  Patient refusing MRI cervical, thoracic and lumbar spine     History of Present Illness: The patient is a 80 y.o. female who presents with Shortness of Breath and Hip Pain   and she is admitted to the hospital for the management of shortness of breath. The patient was seen and examined and the chart was reviewed. PMH of paroxysmal atrial fibrillation, COPD, CAREY, prediabetes, chronic hip pain, chronic back pain, prediabetes, osteoarthritis, osteoporosis, obesity. Patient was brought to the ED after being found by the neighbor that patient was unable to get out of the chair for 2 days. Patient reported she was unable to get up due to weakness. On arrival patient complaining of shortness of breath, was tachypneic and tachycardic, requiring BiPAP initially. Patient was admitted to MICU for sepsis due to urinary tract infection. Patient was ultimately intubated. Got extubated on 9/28/2022. ID following for Klebsiella UTI treated with Ancef and MSSA bacteremia. Plan for DANE     Neurology consulted for bilateral lower extremity weakness.         Past Medical History:     Past Medical History:   Diagnosis Date    Chronic back pain     Chronic hip pain     COPD (chronic obstructive pulmonary disease) (HCC)     Major depression     Osteoarthritis     Osteoporosis         Past Surgical History:     Past Surgical History:   Procedure Laterality Date    BREAST BIOPSY two    CHOLECYSTECTOMY      HYSTERECTOMY, VAGINAL          Medications during admission:      metoprolol tartrate  25 mg Oral BID    enoxaparin  1 mg/kg SubCUTAneous BID    ampicillin-sulbactam  3,000 mg IntraVENous Q6H    thiamine  100 mg Oral Daily    folic acid  1 mg Oral Daily    ipratropium  0.5 mg Nebulization 4x daily    sodium chloride flush  5-40 mL IntraVENous 2 times per day    budesonide-formoterol  2 puff Inhalation BID         Physical Exam:   BP (!) 145/80   Pulse 93   Temp 98.4 °F (36.9 °C) (Oral)   Resp 29   Ht 5' 5\" (1.651 m)   Wt 234 lb 12.6 oz (106.5 kg)   LMP  (LMP Unknown)   SpO2 95%   BMI 39.07 kg/m²   Temp (24hrs), Av.2 °F (36.8 °C), Min:97.8 °F (36.6 °C), Max:98.7 °F (37.1 °C)        General examination:      General Appearance:  alert, well appearing, and in no acute distress  HEENT: Normocephalic, atraumatic, moist mucus membranes  Neck: supple, no carotid bruits, (-) nuchal rigidity  Lungs:  Respirations unlabored, chest wall no deformity, BS normal  Cardiovascular: normal rate, regular rhythm  Abdomen: Soft, nontender, nondistended, normal bowel sounds  Skin: No gross lesions, rashes, bruising or bleeding on exposed skin area  Extremities:  peripheral pulses palpable, clubbing or edema  Psych: normal affect      Neurological examination:      Mental status   Alert and oriented x 2; following all commands;   speech is fluent, no dysarthria, aphasia.       Cranial nerves   II - visual fields intact to confrontation; pupils reactive  III, IV, VI - extraocular muscles intact; no IRAIS; no nystagmus; no ptosis   V - normal facial sensation                                                               VII - normal facial symmetry                                                             VIII - intact hearing                                                                             IX, X - symmetrical palate elevation                                               XI - symmetrical shoulder shrug                                                       XII - midline tongue without atrophy or fasciculation     Motor function  Strength:   5/5 RUE, 3/5 RLE  5/5 LUE, 3/5  LLE  Normal bulk and tone. Sensory function Intact to touch, pin, vibration, proprioception throughout     Cerebellar No tremors                        Reflex function 1/4 symmetric throughout . Downgoing plantar response bilaterally. Gait                  Not performed             Diagnostics:      Laboratory Testing:  CBC:   Recent Labs     09/29/22  0402 09/30/22  0345 10/01/22  0259   WBC 11.3 20.2* 20.9*   HGB 10.9* 11.7* 11.7*   PLT See Reflexed IPF Result 154 214       BMP:    Recent Labs     09/29/22  0402 09/30/22  0345 09/30/22  1813 10/01/22  0259   * 145* 137 139   K 3.5* 3.0*  --  3.7   * 106  --  99   CO2 27 28  --  29   BUN 30* 23  --  17   CREATININE 0.64 0.60  --  0.53   GLUCOSE 91 126*  --  113*         Lab Results   Component Value Date    CHOL 141 05/17/2021    LDLCHOLESTEROL 81 05/17/2021    HDL 50 05/17/2021    TRIG 213 (H) 09/28/2022    ALT 10 09/24/2022    AST 21 09/24/2022    TSH 2.01 09/30/2022    INR 1.4 09/24/2022    LABA1C 5.7 06/02/2022    NCUYGVZK22 1066 09/30/2022         No results found for: PHENYTOIN, PHENYTOIN, VALPROATE, CBMZ        Imaging/Diagnostics:     CT head without contrast:  No acute abnormality     CT cervical spine without contrast:  No acute abnormality of cervical spine     CT thoracic spine:  No acute traumatic abnormality     CT lumbar spine:  No acute traumatic abnormality        2D ECHO 9/24  Normal LV size and wall thickness. No obvious wall motion abnormality seen. Normal LV systolic function with LVEF >55%. Dilated RV with preserved function. RV systolic pressure 44 mmHg  RA appears dilated. No obvious significant structural valvular abnormality noted. No significant valvular stenosis or regurgitation noted.   Normal aortic root dimension. No significant pericardial effusion noted. IVC is dilated, difficult to assess inspiratory variation , Pt on ventilator    Assessment and plan      71-year-old female found by the neighbor sitting in a chair and unable to get out because of weakness for 2 days. Treated for Klebsiella UTI. MSSA septicemia. Neurology consulted for bilateral lower extremity weakness.   CT brain/spine: Unremarkable     Bilateral lower extremity weakness  Klebsiella UTI  Concern for aspiration pneumonia  MSSA septicemia  A. fib with RVR  Metabolic encephalopathy  Chronic left hip pain/lower back pain with bilateral sciatica  Obstructive sleep apnea     Plan:  Patient is refusing MRI cervical, thoracic and lumbar spine  CT cervical, thoracic and lumbar spine ordered, will follow up on results  Continue on medical management per primary team  Continue on antibiotics         Xin Ledezma MD.  Resident Internal Medicine,  [address],   [facility]  10/1/2022, 8:12 AM

## 2022-10-01 NOTE — PROGRESS NOTES
[facility]  Internal Medicine Teaching Residency Program  Inpatient Daily Progress Note  ______________________________________________________________________________    Patient: Rebeka Patiño  YOB: 1950   ETM:[id number]    Acct: [de-identified]     [facility]  Admit date: 9/24/2022  Today's date: 10/01/22  Number of days in the hospital: 7    SUBJECTIVE   Admitting Diagnosis: [facility]  CC: Weakness, \"stuck in chair\"    Pt examined at bedside. Chart & results reviewed. No acute events overnight, afebrile, hemodynamically stable  On 4 L nasal cannula for supplemental oxygen  Confused and slow to respond  More responsive than yesterday as per nursing        ROS:  Constitutional:  negative for chills, fevers, sweats  Respiratory:  negative for cough, dyspnea on exertion, hemoptysis, shortness of breath, wheezing  Cardiovascular:  negative for chest pain, chest pressure/discomfort, lower extremity edema, palpitations  Gastrointestinal:  negative for abdominal pain, constipation, diarrhea, nausea, vomiting  Neurological:  confuses, slow to respond  BRIEF HISTORY     Patient presented to the ED via EMS after being found by a neighbor stuck in her chair. Patient was complaining of weakness was unable to get up from the chair. Patient was tachypneic failed BiPAP and was ultimately intubated. Blood cultures positive for MSSA and urine cultures were positive for Klebsiella. Patient was extubated on 9/28/2022. Patient has remained slightly altered, with comments of possible intermittent hallucinations. She was able to maintain her saturations on 3 L NC. Her leukocytosis and urine output improved and patient was transferred in stable condition to the medical floor.     OBJECTIVE     Vital Signs:  BP (!) 155/95 Comment: Notified Physician  Pulse 86   Temp 97.8 °F (36.6 °C) (Oral)   Resp 27   Ht 5' 5\" (1.651 m)   Wt 234 lb 12.6 oz (106.5 kg) LMP  (LMP Unknown)   SpO2 96%   BMI 39.07 kg/m²     Temp (24hrs), Av.2 °F (36.8 °C), Min:97.8 °F (36.6 °C), Max:98.7 °F (37.1 °C)    In: 2661.2   Out: 1400 [Urine:1400]    Physical Exam:  Constitutional: This is a well developed, well nourished, 35-39.9 - Obesity Grade II 80y.o. year old female who is in no apparent distress. Head:normocephalic and atraumatic. Respiratory: Chest was symmetrical without dullness to percussion. Breath sounds bilaterally were clear to auscultation. There were no wheezes, rhonchi or rales. There is no intercostal retraction or use of accessory muscles. Cardiovascular: Regular without murmur, clicks, gallops or rubs. Abdomen: Slightly rounded and soft without organomegaly. No rebound, rigidity or guarding was appreciated. Musculoskeletal: No gross muscle weakness. Extremities:  Wounds present bilaterally on lower extremities. Muscle size, tone and strength are normal.  No involuntary movements are noted. Skin:  Warm and dry. Good color, turgor and pigmentation. No lesions or scars.   No cyanosis or clubbing  Neurological/Psychiatric: Patient is confused  and slow to respond    Medications:  Scheduled Medications:    metoprolol tartrate  25 mg Oral BID    enoxaparin  1 mg/kg SubCUTAneous BID    ampicillin-sulbactam  3,000 mg IntraVENous Q6H    thiamine  100 mg Oral Daily    folic acid  1 mg Oral Daily    ipratropium  0.5 mg Nebulization 4x daily    sodium chloride flush  5-40 mL IntraVENous 2 times per day    budesonide-formoterol  2 puff Inhalation BID     Continuous Infusions:    lactated ringers 75 mL/hr at 10/01/22 0600    dextrose      sodium chloride Stopped (22 1257)     PRN Medicationspotassium chloride, 40 mEq, PRN   Or  potassium alternative oral replacement, 40 mEq, PRN   Or  potassium chloride, 10 mEq, PRN  oxyCODONE, 5 mg, Q6H PRN  metoclopramide, 10 mg, Q6H PRN  glucose, 4 tablet, PRN  dextrose bolus, 125 mL, PRN   Or  dextrose bolus, 250 mL, PRN  glucagon (rDNA), 1 mg, PRN  dextrose, , Continuous PRN  sodium chloride flush, 5-40 mL, PRN  sodium chloride, , PRN  ondansetron, 4 mg, Q8H PRN   Or  ondansetron, 4 mg, Q6H PRN  polyethylene glycol, 17 g, Daily PRN  acetaminophen, 650 mg, Q6H PRN   Or  acetaminophen, 650 mg, Q6H PRN        Diagnostic Labs:  CBC:   Recent Labs     09/29/22  0402 09/30/22  0345 10/01/22  0259   WBC 11.3 20.2* 20.9*   RBC 4.24 4.37 4.47   HGB 10.9* 11.7* 11.7*   HCT 34.8* 36.2* 36.4   MCV 82.1* 82.8 81.4*   RDW 17.3* 17.0* 17.1*   PLT See Reflexed IPF Result 154 214     BMP:   Recent Labs     09/29/22 0402 09/30/22 0345 09/30/22  1813 10/01/22  0259   * 145* 137 139   K 3.5* 3.0*  --  3.7   * 106  --  99   CO2 27 28  --  29   BUN 30* 23  --  17   CREATININE 0.64 0.60  --  0.53     BNP: No results for input(s): BNP in the last 72 hours. PT/INR: No results for input(s): PROTIME, INR in the last 72 hours. APTT: No results for input(s): APTT in the last 72 hours. CARDIAC ENZYMES: No results for input(s): CKMB, CKMBINDEX, TROPONINI in the last 72 hours. Invalid input(s): CKTOTAL;3  FASTING LIPID PANEL:  Lab Results   Component Value Date    CHOL 141 05/17/2021    HDL 50 05/17/2021    TRIG 213 (H) 09/28/2022     LIVER PROFILE: No results for input(s): AST, ALT, ALB, BILIDIR, BILITOT, ALKPHOS in the last 72 hours. MICROBIOLOGY:   Lab Results   Component Value Date/Time    CULTURE NO GROWTH 2 DAYS 09/29/2022 01:27 PM       Imaging:    XR CHEST (SINGLE VIEW FRONTAL)    Result Date: 9/25/2022  Left internal jugular line tip projected over the mid right atrium. OG and endotracheal tubes appear unchanged and in satisfactory position. Pulmonary vasculature may be slightly congested. XR ABDOMEN (KUB) (SINGLE AP VIEW)    Result Date: 9/29/2022  1. No ileus or obstruction is identified. Mild gaseous distention of the stomach. 2. Cardiomegaly with vascular congestion.  3. Small bore central venous catheter terminates at the cavoatrial junction/right atrium. CT HEAD WO CONTRAST    Result Date: 9/24/2022  No acute intracranial abnormality. CT CERVICAL SPINE WO CONTRAST    Result Date: 9/24/2022  No acute abnormality of the cervical spine. NM BONE SCAN 3 PHASE    Result Date: 9/30/2022  Nondiagnostic study. Patient could not cooperate. No asymmetry on flow images to suggest hyperemia. XR CHEST PORTABLE    Result Date: 9/30/2022  1. Pulmonary vascular congestion may be increased, and there may be new perihilar edema. Consider congestive heart failure given at least borderline cardiomegaly and suspicion for trace bilateral pleural effusions. Pneumonia could appear similar. 2. Increased bibasilar airspace opacities likely due in part to atelectasis with potential for superimposed pneumonia or aspiration especially on the left. XR CHEST PORTABLE    Result Date: 9/29/2022  1. No ileus or obstruction is identified. Mild gaseous distention of the stomach. 2. Cardiomegaly with vascular congestion. 3. Small bore central venous catheter terminates at the cavoatrial junction/right atrium. XR CHEST PORTABLE    Result Date: 9/27/2022  Central pulmonary congestion with obscuration of the left hemidiaphragm likely representing effusion as can be seen in CHF. XR CHEST PORTABLE    Result Date: 9/25/2022  Endotracheal tube in satisfactory position. XR CHEST PORTABLE    Result Date: 9/24/2022  No radiographic evidence of an acute cardiopulmonary process. XR ABDOMEN FOR NG/OG/NE TUBE PLACEMENT    Result Date: 9/25/2022  OG tube in satisfactory position. CT CHEST ABDOMEN PELVIS W CONTRAST    Result Date: 9/24/2022  No acute traumatic abnormality detected within the chest, abdomen, and pelvis. Airspace disease the left lung base, atelectasis versus pneumonia versus aspiration.      CT LUMBAR SPINE TRAUMA RECONSTRUCTION    Result Date: 9/24/2022  No acute traumatic abnormality detected within the chest, abdomen, and pelvis. Airspace disease the left lung base, atelectasis versus pneumonia versus aspiration. CT THORACIC SPINE TRAUMA RECONSTRUCTION    Result Date: 9/24/2022  No acute traumatic abnormality detected within the chest, abdomen, and pelvis. Airspace disease the left lung base, atelectasis versus pneumonia versus aspiration. XR HIP 2-3 VW W PELVIS LEFT    Result Date: 9/24/2022  No acute bony abnormalities are noted Advanced avascular necrosis of the left hip       ASSESSMENT & PLAN     ASSESSMENT / PLAN:     Principal Problem:    Septicemia ([address]  Active Problems:    MSSA bacteremia    UTI due to Klebsiella species    COPD (chronic obstructive pulmonary disease) ([facility])    Class 2 obesity due to excess calories with body mass index (BMI) of 39.0 to 39.9 in adult    Acute respiratory failure with hypoxia ([facility])    Respiratory alkalosis    Hypokalemia    MSSA (methicillin susceptible Staphylococcus aureus) septicemia ([facility])    SIRS (systemic inflammatory response syndrome) ([facility])    Bandemia    Acute encephalopathy    Aspiration pneumonia ([address]    Spinal stenosis of lumbar region with neurogenic claudication    Bilateral leg weakness  Resolved Problems:    * No resolved hospital problems. *       MSSA bacteremia  -Switched to Unasyn from Ancef due to concerns for aspiration pneumonia  -Patient received 4 days of Zosyn prior to cefazolin  - ID consulted for antibiotic recommendations  -CXR shows increased bibasilar opacities concerning for superimposed pneumonia or aspiration.        Klebsiella UTI  - On antibiotics as above     New onset A. Fib  - Lopressor 25 mg twice daily  - Cardiology consulted  -CK, TSH, B12 normal, folate 2.5 reduced  -Plan for DANE/cardioversion as per cardio  -Will need long term anticoagulation with NOACs on discharge     Acute on chronic respiratory failure  -On supplemental O2 via nasal cannula, continue, wean as tolerated  -ipratropium and symbicort daily    Bilateral lower extremity

## 2022-10-01 NOTE — PLAN OF CARE
Problem: Respiratory - Adult  Goal: Achieves optimal ventilation and oxygenation  10/1/2022 1346 by Lisa Smith RN  Outcome: Progressing  10/1/2022 0030 by Kateryna Funes RN  Outcome: Progressing     Problem: Musculoskeletal - Adult  Goal: Return mobility to safest level of function  10/1/2022 1346 by Lisa Smith RN  Outcome: Progressing  10/1/2022 0030 by Kateryna Funes RN  Outcome: Progressing     Problem: Musculoskeletal - Adult  Goal: Maintain proper alignment of affected body part  10/1/2022 1346 by Lisa Smtih RN  Outcome: Progressing  10/1/2022 0030 by Kateryna Funes RN  Outcome: Progressing     Problem: Musculoskeletal - Adult  Goal: Return ADL status to a safe level of function  10/1/2022 1346 by Lisa Smith RN  Outcome: Progressing  10/1/2022 0030 by Kateryna Funes RN  Outcome: Progressing

## 2022-10-01 NOTE — PROGRESS NOTES
MRI screening form completed via phone by patients sister in law Radu Roldan at [phone]. Patient not able to answer questions appropriately due to altered mental status. Screening form faxed to MRI at [phone].     Mauricio Conner RN

## 2022-10-02 LAB
ABSOLUTE EOS #: 0.23 K/UL (ref 0–0.44)
ABSOLUTE IMMATURE GRANULOCYTE: 0.26 K/UL (ref 0–0.3)
ABSOLUTE LYMPH #: 1.66 K/UL (ref 1.1–3.7)
ABSOLUTE MONO #: 0.94 K/UL (ref 0.1–1.2)
ANION GAP SERPL CALCULATED.3IONS-SCNC: 12 MMOL/L (ref 9–17)
BASOPHILS # BLD: 0 % (ref 0–2)
BASOPHILS ABSOLUTE: 0.04 K/UL (ref 0–0.2)
BUN BLDV-MCNC: 14 MG/DL (ref 8–23)
CALCIUM SERPL-MCNC: 7.9 MG/DL (ref 8.6–10.4)
CHLORIDE BLD-SCNC: 99 MMOL/L (ref 98–107)
CO2: 23 MMOL/L (ref 20–31)
CREAT SERPL-MCNC: 0.51 MG/DL (ref 0.5–0.9)
EOSINOPHILS RELATIVE PERCENT: 1 % (ref 1–4)
GFR AFRICAN AMERICAN: >60 ML/MIN
GFR NON-AFRICAN AMERICAN: >60 ML/MIN
GFR SERPL CREATININE-BSD FRML MDRD: ABNORMAL ML/MIN/{1.73_M2}
GLUCOSE BLD-MCNC: 92 MG/DL (ref 70–99)
GLUCOSE BLD-MCNC: 93 MG/DL (ref 65–105)
HCT VFR BLD CALC: 30 % (ref 36.3–47.1)
HEMOGLOBIN: 10.2 G/DL (ref 11.9–15.1)
IMMATURE GRANULOCYTES: 2 %
LYMPHOCYTES # BLD: 10 % (ref 24–43)
MAGNESIUM: 2.1 MG/DL (ref 1.6–2.6)
MCH RBC QN AUTO: 27.3 PG (ref 25.2–33.5)
MCHC RBC AUTO-ENTMCNC: 34 G/DL (ref 28.4–34.8)
MCV RBC AUTO: 80.2 FL (ref 82.6–102.9)
MONOCYTES # BLD: 6 % (ref 3–12)
NRBC AUTOMATED: 0 PER 100 WBC
PDW BLD-RTO: 16.9 % (ref 11.8–14.4)
PLATELET # BLD: 239 K/UL (ref 138–453)
PMV BLD AUTO: 11.6 FL (ref 8.1–13.5)
POTASSIUM SERPL-SCNC: 3.2 MMOL/L (ref 3.7–5.3)
RBC # BLD: 3.74 M/UL (ref 3.95–5.11)
RBC # BLD: ABNORMAL 10*6/UL
SEG NEUTROPHILS: 81 % (ref 36–65)
SEGMENTED NEUTROPHILS ABSOLUTE COUNT: 13.65 K/UL (ref 1.5–8.1)
SODIUM BLD-SCNC: 134 MMOL/L (ref 135–144)
WBC # BLD: 16.8 K/UL (ref 3.5–11.3)

## 2022-10-02 PROCEDURE — 36415 COLL VENOUS BLD VENIPUNCTURE: CPT

## 2022-10-02 PROCEDURE — 6360000002 HC RX W HCPCS: Performed by: INTERNAL MEDICINE

## 2022-10-02 PROCEDURE — 2580000003 HC RX 258

## 2022-10-02 PROCEDURE — 6370000000 HC RX 637 (ALT 250 FOR IP): Performed by: STUDENT IN AN ORGANIZED HEALTH CARE EDUCATION/TRAINING PROGRAM

## 2022-10-02 PROCEDURE — 94640 AIRWAY INHALATION TREATMENT: CPT

## 2022-10-02 PROCEDURE — 6360000002 HC RX W HCPCS: Performed by: STUDENT IN AN ORGANIZED HEALTH CARE EDUCATION/TRAINING PROGRAM

## 2022-10-02 PROCEDURE — 2060000000 HC ICU INTERMEDIATE R&B

## 2022-10-02 PROCEDURE — 6370000000 HC RX 637 (ALT 250 FOR IP): Performed by: INTERNAL MEDICINE

## 2022-10-02 PROCEDURE — 6370000000 HC RX 637 (ALT 250 FOR IP)

## 2022-10-02 PROCEDURE — 2700000000 HC OXYGEN THERAPY PER DAY

## 2022-10-02 PROCEDURE — 99232 SBSQ HOSP IP/OBS MODERATE 35: CPT | Performed by: INTERNAL MEDICINE

## 2022-10-02 PROCEDURE — 2580000003 HC RX 258: Performed by: INTERNAL MEDICINE

## 2022-10-02 PROCEDURE — 83735 ASSAY OF MAGNESIUM: CPT

## 2022-10-02 PROCEDURE — 85025 COMPLETE CBC W/AUTO DIFF WBC: CPT

## 2022-10-02 PROCEDURE — 80048 BASIC METABOLIC PNL TOTAL CA: CPT

## 2022-10-02 PROCEDURE — 94761 N-INVAS EAR/PLS OXIMETRY MLT: CPT

## 2022-10-02 PROCEDURE — 82947 ASSAY GLUCOSE BLOOD QUANT: CPT

## 2022-10-02 PROCEDURE — 99232 SBSQ HOSP IP/OBS MODERATE 35: CPT | Performed by: PSYCHIATRY & NEUROLOGY

## 2022-10-02 RX ORDER — METOCLOPRAMIDE HYDROCHLORIDE 5 MG/ML
5 INJECTION INTRAMUSCULAR; INTRAVENOUS EVERY 6 HOURS PRN
Status: DISCONTINUED | OUTPATIENT
Start: 2022-10-02 | End: 2022-10-06 | Stop reason: HOSPADM

## 2022-10-02 RX ADMIN — Medication 100 MG: at 08:06

## 2022-10-02 RX ADMIN — AMPICILLIN SODIUM AND SULBACTAM SODIUM 3000 MG: 2; 1 INJECTION, POWDER, FOR SOLUTION INTRAMUSCULAR; INTRAVENOUS at 12:27

## 2022-10-02 RX ADMIN — IPRATROPIUM BROMIDE 0.5 MG: 0.5 SOLUTION RESPIRATORY (INHALATION) at 11:31

## 2022-10-02 RX ADMIN — POTASSIUM BICARBONATE 40 MEQ: 782 TABLET, EFFERVESCENT ORAL at 08:07

## 2022-10-02 RX ADMIN — ENOXAPARIN SODIUM 105 MG: 150 INJECTION SUBCUTANEOUS at 20:11

## 2022-10-02 RX ADMIN — IPRATROPIUM BROMIDE 0.5 MG: 0.5 SOLUTION RESPIRATORY (INHALATION) at 08:30

## 2022-10-02 RX ADMIN — AMPICILLIN SODIUM AND SULBACTAM SODIUM 3000 MG: 2; 1 INJECTION, POWDER, FOR SOLUTION INTRAMUSCULAR; INTRAVENOUS at 05:56

## 2022-10-02 RX ADMIN — METOPROLOL TARTRATE 25 MG: 25 TABLET ORAL at 20:11

## 2022-10-02 RX ADMIN — POTASSIUM CHLORIDE 20 MEQ: 1500 TABLET, EXTENDED RELEASE ORAL at 17:37

## 2022-10-02 RX ADMIN — ENOXAPARIN SODIUM 105 MG: 150 INJECTION SUBCUTANEOUS at 08:06

## 2022-10-02 RX ADMIN — IPRATROPIUM BROMIDE 0.5 MG: 0.5 SOLUTION RESPIRATORY (INHALATION) at 15:53

## 2022-10-02 RX ADMIN — ACETAMINOPHEN 650 MG: 325 TABLET ORAL at 04:03

## 2022-10-02 RX ADMIN — AMPICILLIN SODIUM AND SULBACTAM SODIUM 3000 MG: 2; 1 INJECTION, POWDER, FOR SOLUTION INTRAMUSCULAR; INTRAVENOUS at 00:21

## 2022-10-02 RX ADMIN — BUDESONIDE AND FORMOTEROL FUMARATE DIHYDRATE 2 PUFF: 160; 4.5 AEROSOL RESPIRATORY (INHALATION) at 20:00

## 2022-10-02 RX ADMIN — FOLIC ACID 1 MG: 1 TABLET ORAL at 08:06

## 2022-10-02 RX ADMIN — SODIUM CHLORIDE, PRESERVATIVE FREE 10 ML: 5 INJECTION INTRAVENOUS at 20:18

## 2022-10-02 RX ADMIN — AMPICILLIN SODIUM AND SULBACTAM SODIUM 3000 MG: 2; 1 INJECTION, POWDER, FOR SOLUTION INTRAMUSCULAR; INTRAVENOUS at 17:46

## 2022-10-02 RX ADMIN — POTASSIUM CHLORIDE 20 MEQ: 1500 TABLET, EXTENDED RELEASE ORAL at 08:06

## 2022-10-02 RX ADMIN — IPRATROPIUM BROMIDE 0.5 MG: 0.5 SOLUTION RESPIRATORY (INHALATION) at 19:58

## 2022-10-02 RX ADMIN — METOPROLOL TARTRATE 25 MG: 25 TABLET ORAL at 08:06

## 2022-10-02 RX ADMIN — OXYCODONE 5 MG: 5 TABLET ORAL at 13:47

## 2022-10-02 ASSESSMENT — ENCOUNTER SYMPTOMS
SHORTNESS OF BREATH: 1
BACK PAIN: 0
ABDOMINAL DISTENTION: 0
RHINORRHEA: 0
SORE THROAT: 0
WHEEZING: 0
SINUS PRESSURE: 0
STRIDOR: 0
COUGH: 1
ABDOMINAL PAIN: 0

## 2022-10-02 ASSESSMENT — PAIN DESCRIPTION - LOCATION
LOCATION: BACK
LOCATION: HIP;BACK
LOCATION: BACK

## 2022-10-02 ASSESSMENT — PAIN SCALES - GENERAL
PAINLEVEL_OUTOF10: 10
PAINLEVEL_OUTOF10: 8
PAINLEVEL_OUTOF10: 8

## 2022-10-02 ASSESSMENT — PAIN DESCRIPTION - DESCRIPTORS: DESCRIPTORS: ACHING

## 2022-10-02 ASSESSMENT — PAIN DESCRIPTION - ORIENTATION
ORIENTATION: LOWER
ORIENTATION: LOWER
ORIENTATION: LEFT;LOWER

## 2022-10-02 ASSESSMENT — PAIN - FUNCTIONAL ASSESSMENT: PAIN_FUNCTIONAL_ASSESSMENT: PREVENTS OR INTERFERES SOME ACTIVE ACTIVITIES AND ADLS

## 2022-10-02 NOTE — PLAN OF CARE
Problem: Respiratory - Adult  Goal: Achieves optimal ventilation and oxygenation  10/2/2022 0956 by Chad Dias RCP  Outcome: Progressing  Flowsheets (Taken 10/2/2022 0956)  Achieves optimal ventilation and oxygenation:   Assess for changes in respiratory status   Position to facilitate oxygenation and minimize respiratory effort   Assess the need for suctioning and aspirate as needed   Assess for changes in mentation and behavior   Oxygen supplementation based on oxygen saturation or arterial blood gases   Assess and instruct to report shortness of breath or any respiratory difficulty

## 2022-10-02 NOTE — PROGRESS NOTES
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 fib paroxysmal  9/29 increasing oxygen requirement and increasing leukocytosis-possible aspiration event    Discussion / summary of stay / plan of care     Recommendations   On Unasyn 3 gm IV q 6 hr. Stop date 10-14-22 for MSSA septicemia and Klebsiella UTI. [facility] Asking for DANE to look for possible vegetation to explain the MSSA bacteremia from home      Infection Control Recommendations   [place] Precautions    Antimicrobial Stewardship Recommendations   Simplification of therapy  Targeted therapy      History of Present Illness:     INITIAL HISTORY:    Shelbi Hoffmann is a 80y.o.-year-old female was found by a neighbor sitting on a chair unable to get out because of weakness, she had missed work, it seems she was really feeling weak for about 3 days before that. She was brought by 911 to the hospital.  Was found to be septic with MSSA in the blood UTI Klebsiella septic shock, patient needed intubation at the time. Extubated 9/28 and ultimately transferred out to the floor. Because of A. dane cardiology has been on the case, planning possibly cardioversion on Monday. A 2D echo was done showing no vegetation. Her CT of the spine was negative for fracture, her CT of the abdomen and pelvis on 9/24 her admission, was negative for pneumonia.     She had a white count that was increased upon admission and ultimately improved, 9/30 her white count increased again, there was a suspicion of aspiration over the last 24 hours. She is also requiring increased oxygen. Repeat blood cultures from 9/29 are still pending and negative no repeat blood cultures were done before that. The patient has been on Ancef. Seems she has had a history of chronic back pain with sciatica. On my exam she is alert on 4 L of nasal cannula looks very ill, she is very encephalopathic, not reliable in her answers. For instance she says she has no back pain and no hip pain but when he lifted both legs she screamed from pain. According to the nurse she also screams to little things and is difficult to tell if this is true pain or chest discomfort      Interval changes  10/2/2022   Patient Vitals for the past 8 hrs:   BP Temp Temp src Pulse Resp SpO2   10/02/22 1347 -- -- -- -- 28 --   10/02/22 1248 -- -- -- 85 (!) 33 --   10/02/22 1230 104/62 -- -- 88 29 --   10/02/22 1200 -- -- -- 89 -- --   10/02/22 1145 (!) 142/114 98.7 °F (37.1 °C) Oral 93 16 94 %   10/02/22 1136 -- -- -- 89 -- --   10/02/22 0834 -- -- -- 85 29 96 %   10/02/22 0800 -- -- -- 83 -- --   10/02/22 0745 118/64 98 °F (36.7 °C) Oral 84 28 92 %       CURRENT EVALUATION : 10/2/2022    Afebrile  VS stable  Paroxysmal A fib, controlled with Lopressor    Patient feels better  Mild cough. No sputum   No complaints  No new issues per RN    CXR   9-30-22: Bilateral infiltrates with basilar atelectasis and effusions.  Similar to CXR of 9-27 and 9-29-22    Summary of relevant labs: 10/2/2022    Labs:  WBC 83-05-73-11-20  Platelet count 640-  Hematocrit 40-33-36  Micro:  Urine culture 9/24 Klebsiella pneumoniae, sensitive to Cipro and Bactrim, intermediate to ceftriaxone and resistant to penicillin  MRSA DNA -9/24  Blood culture 9/24 MSSA X2  Repeat blood culture 9/29 pending    Imaging:  Chest x-ray 9/27 no pneumonia    CT head -9/24: Normal   CT of the cervical spine 9/24 negative  CT abdomen pelvis lumbar spine and thoracic spine 9/24 airspace disease of the left lung atelectasis versus pneumonia, no traumatic abnormality within the chest abdomen and pelvis. Degenerative spine changes      2D echo 9/27  Normal LV size and wall thickness. No obvious wall motion abnormality seen. Normal LV systolic function with LVEF >55%. Dilated RV with preserved function. RV systolic pressure 44 mmHg  RA appears dilated. No obvious significant structural valvular abnormality noted. No significant valvular stenosis or regurgitation noted. Normal aortic root dimension. No significant pericardial effusion noted. IVC is dilated, difficult to assess inspiratory variation , Pt on ventilator    Right lower extremity negative DVT    I have personally reviewed the past medical history, past surgical history, medications, social history, and family history, and I haveupdated the database accordingly. Allergies:   Lyrica [pregabalin], Demerol hcl [meperidine], Fluoxetine, and Paxil [paroxetine hcl]     Review of Systems:     Review of Systems   Unable to perform ROS: Mental status change     Physical Examination :       Physical Exam  Constitutional:       Appearance: She is obese. She is ill-appearing. HENT:      Head: Normocephalic and atraumatic. Nose: Nose normal.      Mouth/Throat:      Mouth: Mucous membranes are moist.   Eyes:      General: No scleral icterus. Conjunctiva/sclera: Conjunctivae normal.   Cardiovascular:      Rate and Rhythm: Rhythm irregular. Heart sounds: Normal heart sounds. Pulmonary:      Effort: No respiratory distress. Breath sounds: No wheezing. Abdominal:      General: There is no distension. Tenderness: There is no abdominal tenderness. Genitourinary:     Comments: Urine antonio  Musculoskeletal:      Cervical back: Neck supple. No rigidity. Right lower leg: Edema present. Left lower leg: Edema present. Skin:     Coloration: Skin is not jaundiced or pale.       Comments:  skin rash, she does have small ulcers over the lower extremities and they are healing with scabs and no signs of infection   Neurological:      Comments: Seems to be very tired, not moving too much in bed,    Psychiatric:      Comments: Encephalopathic, moaning, not very appropriate, answers not reliable       Past Medical History:     Past Medical History:   Diagnosis Date    Chronic back pain     Chronic hip pain     COPD (chronic obstructive pulmonary disease) (HCC)     Major depression     Osteoarthritis     Osteoporosis        Past Surgical  History:     Past Surgical History:   Procedure Laterality Date    BREAST BIOPSY      two    CHOLECYSTECTOMY      HYSTERECTOMY, VAGINAL         Medications:      potassium chloride  20 mEq Oral BID WC    metoprolol tartrate  25 mg Oral BID    enoxaparin  1 mg/kg SubCUTAneous BID    ampicillin-sulbactam  3,000 mg IntraVENous Q6H    thiamine  100 mg Oral Daily    folic acid  1 mg Oral Daily    ipratropium  0.5 mg Nebulization 4x daily    sodium chloride flush  5-40 mL IntraVENous 2 times per day    budesonide-formoterol  2 puff Inhalation BID       Social History:     Social History     Socioeconomic History    Marital status: Single     Spouse name: Not on file    Number of children: Not on file    Years of education: Not on file    Highest education level: Not on file   Occupational History    Not on file   Tobacco Use    Smoking status: Former     Packs/day: 0.75     Years: 34.00     Pack years: 25.50     Types: Cigarettes     Quit date: 3/20/2017     Years since quittin.5    Smokeless tobacco: Never   Vaping Use    Vaping Use: Never used   Substance and Sexual Activity    Alcohol use: Not Currently     Comment: occasionally     Drug use: Never    Sexual activity: Not on file   Other Topics Concern    Not on file   Social History Narrative    Not on file     Social Determinants of Health     Financial Resource Strain: Low Risk     Difficulty of Paying Living Expenses: Not hard at all   Food Insecurity: No Food Insecurity    Worried About Running Out of Food in the Last Year: Never true    Ran Out of Food in the Last Year: Never true   Transportation Needs: Not on file   Physical Activity: Not on file   Stress: Not on file   Social Connections: Not on file   Intimate Partner Violence: Not on file   Housing Stability: Not on file       Family History:     Family History   Problem Relation Age of Onset    Stroke Mother     Alzheimer's Disease Father       Medical Decision Making:   I have independently reviewed/ordered the following labs:    CBC with Differential:   Recent Labs     10/01/22  0259 10/02/22  0401   WBC 20.9* 16.8*   HGB 11.7* 10.2*   HCT 36.4 30.0*    239   LYMPHOPCT 11* 10*   MONOPCT 3 6       BMP:  Recent Labs     10/01/22  0259 10/02/22  0401    134*   K 3.7 3.2*   CL 99 99   CO2 29 23   BUN 17 14   CREATININE 0.53 0.51   MG 1.8 2.1       Hepatic Function Panel: No results for input(s): PROT, LABALBU, BILIDIR, IBILI, BILITOT, ALKPHOS, ALT, AST in the last 72 hours. No results for input(s): RPR in the last 72 hours. No results for input(s): HIV in the last 72 hours. No results for input(s): BC in the last 72 hours. Lab Results   Component Value Date/Time    CREATININE 0.51 10/02/2022 04:01 AM    GLUCOSE 92 10/02/2022 04:01 AM       Detailed results: Thank you for allowing us to participate in the care of this patient. Please call with questions. This note is created with the assistance of a speech recognition program.  While intending to generate adocument that actually reflects the content of the visit, the document can still have some errors including those of syntax and sound a like substitutions which may escape proof reading. It such instances, actual meaningcan be extrapolated by contextual diversion.     Ade Fuentes MD  Office: [phone]  Perfect serve / office [phone]

## 2022-10-02 NOTE — PLAN OF CARE
Problem: Respiratory - Adult  Goal: Achieves optimal ventilation and oxygenation  10/2/2022 1711 by Lennie Koroma RN  Outcome: Progressing  10/2/2022 0956 by Zen Gonzalez RCP  Outcome: Progressing  Flowsheets (Taken 10/2/2022 0956)  Achieves optimal ventilation and oxygenation:   Assess for changes in respiratory status   Position to facilitate oxygenation and minimize respiratory effort   Assess the need for suctioning and aspirate as needed   Assess for changes in mentation and behavior   Oxygen supplementation based on oxygen saturation or arterial blood gases   Assess and instruct to report shortness of breath or any respiratory difficulty     Problem: Confusion  Goal: Confusion, delirium, dementia, or psychosis is improved or at baseline  Description: INTERVENTIONS:  1. Assess for possible contributors to thought disturbance, including medications, impaired vision or hearing, underlying metabolic abnormalities, dehydration, psychiatric diagnoses, and notify attending LIP  2. [place] high risk fall precautions, as indicated  3. Provide frequent short contacts to provide reality reorientation, refocusing and direction  4. Decrease environmental stimuli, including noise as appropriate  5. Monitor and intervene to maintain adequate nutrition, hydration, elimination, sleep and activity  6. If unable to ensure safety without constant attention obtain sitter and review sitter guidelines with assigned personnel  7. Initiate Psychosocial CNS and Spiritual Care consult, as indicated  Outcome: Progressing     Problem: Skin/Tissue Integrity  Goal: Absence of new skin breakdown  Description: 1. Monitor for areas of redness and/or skin breakdown  2. Assess vascular access sites hourly  3. Every 4-6 hours minimum:  Change oxygen saturation probe site  4.   Every 4-6 hours:  If on nasal continuous positive airway pressure, respiratory therapy assess nares and determine need for appliance change or resting period.   Outcome: Progressing

## 2022-10-02 NOTE — PLAN OF CARE
Problem: Discharge Planning  Goal: Discharge to home or other facility with appropriate resources  Outcome: Progressing     Problem: Respiratory - Adult  Goal: Achieves optimal ventilation and oxygenation  10/1/2022 2203 by Dali Philippe RN  Outcome: Progressing  10/1/2022 1346 by Aleksandr Kang RN  Outcome: Progressing     Problem: Neurosensory - Adult  Goal: Achieves stable or improved neurological status  Outcome: Progressing  Goal: Absence of seizures  Outcome: Progressing  Goal: Remains free of injury related to seizures activity  Outcome: Progressing  Goal: Achieves maximal functionality and self care  Outcome: Progressing     Problem: Cardiovascular - Adult  Goal: Maintains optimal cardiac output and hemodynamic stability  Outcome: Progressing  Goal: Absence of cardiac dysrhythmias or at baseline  Outcome: Progressing     Problem: Musculoskeletal - Adult  Goal: Return mobility to safest level of function  10/1/2022 1346 by Aleksandr Kang RN  Outcome: Progressing  Goal: Maintain proper alignment of affected body part  10/1/2022 1346 by Aleksandr Kang RN  Outcome: Progressing  Goal: Return ADL status to a safe level of function  10/1/2022 1346 by Aleksandr Kang RN  Outcome: Progressing     Problem: Nutrition Deficit:  Goal: Optimize nutritional status  10/1/2022 1346 by Aleksandr Kang RN  Outcome: Progressing

## 2022-10-02 NOTE — PROGRESS NOTES
PULMONARY & CRITICAL CARE MEDICINE PROGRESS NOTE     Patient:  Rbeeka Patiño  MRN: [id number]  Admit date: 9/24/2022  Primary Care Physician: Howard Restrepo MD  Consulting Physician: Dunia Lezama MD  CODE Status: Full Code  LOS: 8     SUBJECTIVE     Chief Complaint/ Reason for consult:    Acute hypoxic respiratory failure due to MSSA septicemia    Hospital Course: The patient is a 80 y.o. female with past medical history of COPD, chronic hip pain, chronic back pain, prediabetes, OA, osteoporosis who presented to the ED with profound weakness, found to be tachypnic, tachycardic, blood cultures grew MSSA. Urine cx grew Klebsiella. During her admission, she required increasing ventilatory support, initially BIPAP and then alter intubation with mechanical ventilation. She was extubated on 9/28/22, was transferred out of the ICU to the floor. In this interim period, patient has had hypoactive delirium, was febrile on 9/29/22 Tmax 101.1, with worsening leukocytosis and increased oxygen requirement from 3 L to 4 L NC. She underwent CXR which showed pulmonary vascular congestion, increased bibasilar opacities, CXR does not appear much changed from the CXR 3 days ago. She has been started on Unasyn for concern of aspiration pneumonia. This am, patient was found to be in A fib with RVR HR upto 160s, Cardiology was consulted, patient started on lopressor 25 mg BID    Interval History:  10/02/22  Pt was seen and examined at bedside. Events noted chart seen, medications reviewed. Patient in last 24 had a T-max of 100.8 she is hemodynamically stable heart rate is in 80s she remained mildly tachypneic with respiratory rate in the low 20s sometimes to mid 20s. She is on nasal cannula weaned down from 4L to 2 L and maintaining saturation above 92%. No acute hypoxic respiratory distress was reported overnight. Patient did use BiPAP 10/5/40 percent overnight denies difficulty using BiPAP.   According to patient she is feeling better she does have mild cough unable to expectorate according to patient she does have shortness of breath yesterday better today. She is currently on metoprolol 25 twice daily for atrial fibrillation. Labs reviewed WBC 16.8 down from 21 hemoglobin is stable at 10.2 and platelet count 776. Chest x-ray on 2022 shows bilateral areas of his infiltrate low lung volume left basilar atelectasis/effusion not much change in chest x-ray from  and 2022    Review Of Systems:  Review of Systems   Constitutional:  Negative for activity change, appetite change, chills, fatigue and fever. HENT:  Negative for congestion, postnasal drip, rhinorrhea, sinus pressure, sneezing and sore throat. Respiratory:  Positive for cough and shortness of breath. Negative for wheezing and stridor. Cardiovascular:  Negative for chest pain and leg swelling. Gastrointestinal:  Negative for abdominal distention and abdominal pain. Genitourinary:  Negative for dysuria and hematuria. Musculoskeletal:  Negative for arthralgias and back pain. Neurological:  Negative for weakness, light-headedness and headaches. Psychiatric/Behavioral:  Positive for behavioral problems.         OBJECTIVE     PaO2/FiO2 RATIO:  Recent Labs     22  2101   POCPO2 64.2*        FiO2 : 28 %     VITAL SIGNS:   LAST:  /64   Pulse 85   Temp 98 °F (36.7 °C) (Oral)   Resp 29   Ht 5' 5\" (1.651 m)   Wt 247 lb 9.2 oz (112.3 kg)   LMP  (LMP Unknown)   SpO2 96%   BMI 41.20 kg/m²   8-24 HR RANGE:  TEMP Temp  Av.8 °F (37.1 °C)  Min: 97.7 °F (36.5 °C)  Max: 100.8 °F (51.9 °C)   BP Systolic (28HIS), BPM:226 , Min:112 , EBT:336      Diastolic (98TYR), XXZ:75, Min:62, Max:93     PULSE Pulse  Av.5  Min: 74  Max: 95   RR Resp  Av  Min: 25  Max: 31   O2 SAT SpO2  Av.8 %  Min: 92 %  Max: 96 %   OXYGEN DELIVERY O2 Flow Rate (L/min)  Av L/min  Min: 2 L/min  Max: 2 L/min        Systemic Examination:   Physical Exam -  Constitutional:  Alert, cooperative and no distress. Mental Status:  Oriented to person, place and time and normal affect. Lungs:  Bilateral air entry present, lung fields coarse. Bilateral breath sounds present with prolonged expiration. Heart:  Regular rate and rhythm, no murmur. Abdomen:  Soft, nontender, nondistended, normal bowel sounds. Extremities: Mild bilateral pedal edema, redness, tenderness in the calves. Skin:  Warm, dry, no gross lesions or rashes. DATA REVIEW     Medications: Current Inpatient  Scheduled Meds:   potassium chloride  20 mEq Oral BID WC    metoprolol tartrate  25 mg Oral BID    enoxaparin  1 mg/kg SubCUTAneous BID    ampicillin-sulbactam  3,000 mg IntraVENous Q6H    thiamine  100 mg Oral Daily    folic acid  1 mg Oral Daily    ipratropium  0.5 mg Nebulization 4x daily    sodium chloride flush  5-40 mL IntraVENous 2 times per day    budesonide-formoterol  2 puff Inhalation BID     Continuous Infusions:   dextrose      sodium chloride Stopped (09/29/22 1257)       INPUT/OUTPUT:  In: 1323.7 [P.O.:780; I.V.:306.2]  Out: 750 [Urine:750]  Date 10/02/22 0000 - 10/02/22 2359   Shift 8506-7116 6168-1079 5951-5128 24 Hour Total   INTAKE   Shift Total(mL/kg)       OUTPUT   Urine(mL/kg/hr) 350(0.4)   350   Shift Total(mL/kg) 350(3.1)   350(3.1)   Weight (kg) 112.3 112.3 112.3 112.3          LABS:  ABGs:   Recent Labs     09/30/22  2101   POCPH 7.580*   POCPCO2 30.8*   POCPO2 64.2*   POCHCO3 28.8*   PVYP1AMK 95       CBC:   Recent Labs     09/30/22  0345 10/01/22  0259 10/02/22  0401   WBC 20.2* 20.9* 16.8*   HGB 11.7* 11.7* 10.2*   HCT 36.2* 36.4 30.0*   MCV 82.8 81.4* 80.2*    214 239   LYMPHOPCT 9* 11* 10*   RBC 4.37 4.47 3.74*   MCH 26.8 26.2 27.3   MCHC 32.3 32.1 34.0   RDW 17.0* 17.1* 16.9*       CRP:   No results for input(s): CRP in the last 72 hours. LDH:   No results for input(s): LDH in the last 72 hours.   BMP:   Recent Labs     09/30/22  0345 09/30/22  1816 10/01/22  0259 10/02/22  0401   * 137 139 134*   K 3.0*  --  3.7 3.2*     --  99 99   CO2 28  --  29 23   BUN 23  --  17 14   CREATININE 0.60  --  0.53 0.51   GLUCOSE 126*  --  113* 92       Liver Function Test:   No results for input(s): PROT, LABALBU, ALT, AST, GGT, ALKPHOS, BILITOT in the last 72 hours. Coagulation Profile:   No results for input(s): INR, PROTIME, APTT in the last 72 hours. D-Dimer:  No results for input(s): DDIMER in the last 72 hours. Lactic Acid:  No results for input(s): LACTA in the last 72 hours. Cardiac Enzymes:  Recent Labs     09/30/22  0345   CKTOTAL 33       BNP/ProBNP:   No results for input(s): BNP, PROBNP in the last 72 hours. Triglycerides:  No results for input(s): TRIG in the last 72 hours. Microbiology:  Urine Culture:  No components found for: CURINE  Blood Culture:  No components found for: CBLOOD, CFUNGUSBL  Sputum Culture:  No components found for: CSPUTUM  Recent Labs     09/29/22  1327   [facility] . BLOOD   SPECIAL R WRIST 1.5 ML   CULTURE NO GROWTH 3 DAYS       Recent Labs     09/29/22  1327   [facility] . BLOOD   SPECIAL R WRIST 1.5 ML   CULTURE NO GROWTH 3 DAYS          Pathology:    Radiology Reports:  NM BONE SCAN 3 PHASE   Final Result   Nondiagnostic study. Patient could not cooperate. No asymmetry on flow   images to suggest hyperemia. XR CHEST PORTABLE   Final Result   1. Pulmonary vascular congestion may be increased, and there may be new   perihilar edema. Consider congestive heart failure given at least borderline   cardiomegaly and suspicion for trace bilateral pleural effusions. Pneumonia   could appear similar. 2. Increased bibasilar airspace opacities likely due in part to atelectasis   with potential for superimposed pneumonia or aspiration especially on the   left. XR ABDOMEN (KUB) (SINGLE AP VIEW)   Final Result   1. No ileus or obstruction is identified. Mild gaseous distention of the   stomach.    2. Cardiomegaly with vascular congestion. 3. Small bore central venous catheter terminates at the cavoatrial   junction/right atrium. XR CHEST PORTABLE   Final Result   1. No ileus or obstruction is identified. Mild gaseous distention of the   stomach. 2. Cardiomegaly with vascular congestion. 3. Small bore central venous catheter terminates at the cavoatrial   junction/right atrium. VL DUP LOWER EXTREMITY VENOUS BILATERAL   Final Result      XR CHEST PORTABLE   Final Result   Central pulmonary congestion with obscuration of the left hemidiaphragm   likely representing effusion as can be seen in CHF. XR CHEST (SINGLE VIEW FRONTAL)   Final Result   Left internal jugular line tip projected over the mid right atrium. OG and endotracheal tubes appear unchanged and in satisfactory position. Pulmonary vasculature may be slightly congested. XR CHEST PORTABLE   Final Result   Endotracheal tube in satisfactory position. XR ABDOMEN FOR NG/OG/NE TUBE PLACEMENT   Final Result   OG tube in satisfactory position. CT CHEST ABDOMEN PELVIS W CONTRAST   Final Result   No acute traumatic abnormality detected within the chest, abdomen, and pelvis. Airspace disease the left lung base, atelectasis versus pneumonia versus   aspiration. CT THORACIC SPINE TRAUMA RECONSTRUCTION   Final Result   No acute traumatic abnormality detected within the chest, abdomen, and pelvis. Airspace disease the left lung base, atelectasis versus pneumonia versus   aspiration. CT LUMBAR SPINE TRAUMA RECONSTRUCTION   Final Result   No acute traumatic abnormality detected within the chest, abdomen, and pelvis. Airspace disease the left lung base, atelectasis versus pneumonia versus   aspiration. CT HEAD WO CONTRAST   Final Result   No acute intracranial abnormality. CT CERVICAL SPINE WO CONTRAST   Final Result   No acute abnormality of the cervical spine.          XR CHEST PORTABLE   Final Result   No radiographic evidence of an acute cardiopulmonary process. 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              Echocardiogram:   Results for orders placed during the hospital encounter of 09/24/22    ECHO Complete 2D W Doppler W Color    Narrative  Transthoracic Echocardiography Report (TTE)    Patient Name Kyle Monzon     Date of Study           09/27/2022  JOSIAH MOMIN    Date of      1950  Gender                  Female  Birth    Age          80 year(s)  Race                        Room Number  3024        Height:                 65 inch, 165.1 cm    Corporate ID [id number]    Weight:                 210 pounds, 95.3 kg  #    Patient Acct [de-identified]   BSA:        2.02 m^2    BMI:        34.95 kg/m^2  #    MR #         [id number]     Sonographer             Colletta Browns, RVT,  [facility]    Accession #  [id number]  Interpreting Physician  Mendy Gardner    Fellow                   Referring Nurse  Practitioner    Interpreting             Referring Physician  Fellow    Type of Study    TTE procedure:2D Echocardiogram, M-Mode, Doppler, Color Doppler. Procedure Date  Date: 09/27/2022 Start: 04:13 PM    Study Location: [facility]  Technical Quality: Adequate visualization    Indications:Cardiac Evaluation. Comments:Referring Physician: Lorne Scheuermann, MD    History / Tech. Comments:  Echo done at patient bedside. Echo done at patient bedside. Patient supine on ventilator. Patient Status: Inpatient    Height: 65 inches Weight: 210 pounds BSA: 2.02 m^2 BMI: 34.95 kg/m^2    CONCLUSIONS    Summary  Normal LV size and wall thickness. No obvious wall motion abnormality seen. Normal LV systolic function with LVEF >55%. Dilated RV with preserved function. RV systolic pressure 44 mmHg  RA appears dilated. No obvious significant structural valvular abnormality noted.   No significant valvular stenosis or regurgitation noted. Normal aortic root dimension. No significant pericardial effusion noted. IVC is dilated, difficult to assess inspiratory variation , Pt on ventilator    Signature  ----------------------------------------------------------------------------  Electronically signed by Mendy Gardner(Interpreting physician) on  09/28/2022 11:12 AM  ----------------------------------------------------------------------------    ----------------------------------------------------------------------------  Electronically signed by John Stewart RVT, RDCS(Sonographer) on  09/27/2022 04:48 PM  ----------------------------------------------------------------------------  FINDINGS  Left Atrium  Left atrium is normal in size. Left Ventricle  Left ventricle is normal in size. Global left ventricular systolic function  is normal. Estimated ejection fraction is 60 %. Right Atrium  Right atrial dilatation. Right Ventricle  Right ventricular dilatation with normal systolic function. Mitral Valve  Normal mitral valve structure. Trivial mitral regurgitation. No mitral stenosis. Aortic Valve  Normal aortic valve structure. Aortic valve is trileaflet. No aortic insufficiency. No aortic stenosis. Tricuspid Valve  Normal tricuspid valve leaflets. Mild tricuspid regurgitation. Estimated right ventricular systolic pressure is 44 mmHg. No tricuspid stenosis. Pulmonic Valve  Normal pulmonic valve structure. No significant pulmonic insufficiency. No evidence of pulmonic stenosis. Pericardial Effusion  No pericardial effusion seen. Miscellaneous  Normal aortic root dimension. IVC dilated but unable to assess respiratory collapse due to patient on  ventilator.   E/E' average = 10.65    M-mode / 2D Measurements & Calculations:    LVIDd:5.2 cm(3.7 - 5.6 cm)       Diastolic YFYXTW:09.2 ml  CAZKC:8.7 cm(2.2 - 4.0 cm)       Systolic HMUIKN:01.5 ml  MDIA:7.1 cm(0.6 - 1.1 cm)        Aortic Root:2.9 cm(2.0 - 3.7 cm)  LVPWd:0.6 cm(0.6 - 1.1 cm)       LA Dimension: 4.2 cm(1.9 - 4.0 cm)  Fractional Shortenin.69 %    LA volume/Index: 48.7 ml /24m^2  Calculated LVEF (%): 61.3 %      LVOT:1.8 cm  RVDd:4.7 cm    Mitral:                                 Aortic    Valve Area (P1/2-Time): 2.75 cm^2       Peak Velocity: 1.76 m/s  Peak E-Wave: 0.96 m/s                   Mean Velocity: 1.12 m/s  Peak A-Wave: 0.99 m/s                   Peak Gradient: 12.39 mmHg  E/A Ratio: 0.97                         Mean Gradient: 6 mmHg  Peak Gradient: 3.69 mmHg  Mean Gradient: 3 mmHg  Deceleration Time: 221 msec             Area (continuity): 2.17 cm^2  P1/2t: 80 msec                          AV VTI: 33.3 cm    Area (continuity): 2.58 cm^2  Mean Velocity: 0.82 m/s    Tricuspid:                              Pulmonic:    Estimated RVSP: 44 mmHg                 Peak Velocity: 1.07 m/s  Peak TR Velocity: 2.93 m/s              Peak Gradient: 4.58 mmHg  Peak TR Gradient: 34.3396 mmHg  Estimated RA Pressure: 10 mmHg    Estimated PASP: 44.34 mmHg    Diastology / Tissue Doppler  Septal Wall E' velocity:0.07 m/s  Septal Wall E/E':13  Lateral Wall E' velocity:0.12 m/s  Lateral Wall E/E':8.3       ASSESSMENT AND PLAN     Assessment:    // Acute hypoxic respiratory failure  // Aspiration pneumonia/pneumonitis  // Altered mental status/encephalopathy improving  // Paroxysmal A fib with RVR  // MSSA septicemia  // Klebsiella UTI  // COPD by history  // Obesity likely obstructive sleep apnea    Plan:    Patient is currently on 2 L nasal cannula wean O2 to keep saturation above 90%. Continue to use BiPAP at night at current setting and as needed and during naps during the daytime. Continue with ipratropium and she is also on Symbicort twice daily. Albuterol to be used as needed  She is currently in sinus rhythm all metoprolol.   On beta-blocker for atrial fibrillation currently in sinus rhythm   She is currently on Unasyn follow-up with infectious disease. Encourage incentive spirometry, ambulation PT deep breathing and cough. Strict aspiration precaution. Avoidance of benzodiazepine and narcotics. Patient is currently on full dose of Lovenox for atrial fibrillation. DVT prophylaxis: On therapeutic Lovenox. Discussed with nursing staff, treatment and plan discussed with    Geovanna Delong MD.  Pulmonary critical care medicine  [id number]     10/2/2022 10:30 AM        Please note that this chart was generated using voice recognition Dragon dictation software. Although every effort was made to ensure the accuracy of this automated transcription, some errors in transcription may have occurred.

## 2022-10-02 NOTE — PROGRESS NOTES
[facility] Neurology   IN-PATIENT SERVICE      NEUROLOGY PROGRESS  NOTE            Date:   10/2/2022  Patient name:  Greg Mcdonnell  Date of admission:  9/24/2022  YOB: 1950      Interval History:   Greg Mcdonnell is a  80 y.o. female admitted on 9/24/2022 with Septicemia ([facility] [address] [A41.9]  Sepsis ([facility] [address] [A41.9]. This is a follow-up neurology progress note. The patient was seen and examined and the chart was reviewed. Febrile overnight to 38.2  BP: (126-134)/(66-72)    AAO x3  Patient refusing MRI cervical, thoracic and lumbar spine     History of Present Illness: The patient is a 80 y.o. female who presents with Shortness of Breath and Hip Pain   and she is admitted to the hospital for the management of shortness of breath. The patient was seen and examined and the chart was reviewed. PMH of paroxysmal atrial fibrillation, COPD, CAREY, prediabetes, chronic hip pain, chronic back pain, prediabetes, osteoarthritis, osteoporosis, obesity. Patient was brought to the ED after being found by the neighbor that patient was unable to get out of the chair for 2 days. Patient reported she was unable to get up due to weakness. On arrival patient complaining of shortness of breath, was tachypneic and tachycardic, requiring BiPAP initially. Patient was admitted to MICU for sepsis due to urinary tract infection. Patient was ultimately intubated. Got extubated on 9/28/2022. ID following for Klebsiella UTI treated with Ancef and MSSA bacteremia. Plan for DANE     Neurology consulted for bilateral lower extremity weakness.         Past Medical History:     Past Medical History:   Diagnosis Date    Chronic back pain     Chronic hip pain     COPD (chronic obstructive pulmonary disease) (HCC)     Major depression     Osteoarthritis     Osteoporosis         Past Surgical History:     Past Surgical History:   Procedure Laterality Date    BREAST BIOPSY      two    CHOLECYSTECTOMY      HYSTERECTOMY, VAGINAL          Medications during admission:      potassium bicarb-citric acid  40 mEq Oral Once    potassium chloride  20 mEq Oral BID     metoprolol tartrate  25 mg Oral BID    enoxaparin  1 mg/kg SubCUTAneous BID    ampicillin-sulbactam  3,000 mg IntraVENous Q6H    thiamine  100 mg Oral Daily    folic acid  1 mg Oral Daily    ipratropium  0.5 mg Nebulization 4x daily    sodium chloride flush  5-40 mL IntraVENous 2 times per day    budesonide-formoterol  2 puff Inhalation BID         Physical Exam:   /66   Pulse 83   Temp 98.7 °F (37.1 °C) (Axillary)   Resp 25   Ht 5' 5\" (1.651 m)   Wt 247 lb 9.2 oz (112.3 kg)   LMP  (LMP Unknown)   SpO2 96%   BMI 41.20 kg/m²   Temp (24hrs), Av.8 °F (37.1 °C), Min:97.7 °F (36.5 °C), Max:100.8 °F (38.2 °C)        General examination:      General Appearance:  alert, well appearing, and in no acute distress  HEENT: Normocephalic, atraumatic, moist mucus membranes  Neck: supple, no carotid bruits, (-) nuchal rigidity  Lungs:  Respirations unlabored, chest wall no deformity, BS normal  Cardiovascular: normal rate, regular rhythm  Abdomen: Soft, nontender, nondistended, normal bowel sounds  Skin: No gross lesions, rashes, bruising or bleeding on exposed skin area  Extremities:  peripheral pulses palpable, clubbing or edema  Psych: normal affect      Neurological examination:      Mental status   Alert and oriented x 2; following all commands;   speech is fluent, no dysarthria, aphasia.       Cranial nerves   II - visual fields intact to confrontation; pupils reactive  III, IV, VI - extraocular muscles intact; no IRAIS; no nystagmus; no ptosis   V - normal facial sensation                                                               VII - normal facial symmetry                                                             VIII - intact hearing                                                                             IX, X - symmetrical palate elevation                                               XI - symmetrical shoulder shrug                                                       XII - midline tongue without atrophy or fasciculation     Motor function  Strength:   4/5 RUE, 3/5 RLE  5/5 LUE, 3/5  LLE  Normal bulk and tone. Sensory function Intact to touch, pin, vibration, proprioception throughout     Cerebellar No tremors                        Reflex function 1/4 symmetric throughout . Downgoing plantar response bilaterally. Gait                  Not performed             Diagnostics:      Laboratory Testing:  CBC:   Recent Labs     09/30/22  0345 10/01/22  0259 10/02/22  0401   WBC 20.2* 20.9* 16.8*   HGB 11.7* 11.7* 10.2*    214 239         BMP:    Recent Labs     09/30/22  0345 09/30/22  1813 10/01/22  0259 10/02/22  0401   * 137 139 134*   K 3.0*  --  3.7 3.2*     --  99 99   CO2 28  --  29 23   BUN 23  --  17 14   CREATININE 0.60  --  0.53 0.51   GLUCOSE 126*  --  113* 92           Lab Results   Component Value Date    CHOL 141 05/17/2021    LDLCHOLESTEROL 81 05/17/2021    HDL 50 05/17/2021    TRIG 213 (H) 09/28/2022    ALT 10 09/24/2022    AST 21 09/24/2022    TSH 2.01 09/30/2022    INR 1.4 09/24/2022    LABA1C 5.7 06/02/2022    BUHAQGJJ96 1066 09/30/2022         No results found for: PHENYTOIN, PHENYTOIN, VALPROATE, CBMZ        Imaging/Diagnostics:     CT head without contrast:  No acute abnormality     CT cervical spine without contrast:  No acute abnormality of cervical spine     CT thoracic spine:  No acute traumatic abnormality     CT lumbar spine:  No acute traumatic abnormality        2D ECHO 9/24  Normal LV size and wall thickness. No obvious wall motion abnormality seen. Normal LV systolic function with LVEF >55%. Dilated RV with preserved function. RV systolic pressure 44 mmHg  RA appears dilated. No obvious significant structural valvular abnormality noted.   No significant valvular stenosis or regurgitation noted. Normal aortic root dimension. No significant pericardial effusion noted. IVC is dilated, difficult to assess inspiratory variation , Pt on ventilator    Assessment and plan      16-year-old female found by the neighbor sitting in a chair and unable to get out because of weakness for 2 days. Treated for Klebsiella UTI. MSSA septicemia. Neurology consulted for bilateral lower extremity weakness.   CT brain/spine: Unremarkable     Bilateral lower extremity weakness  Klebsiella UTI  Concern for aspiration pneumonia  MSSA septicemia  A. fib with RVR  Metabolic encephalopathy  Chronic left hip pain/lower back pain with bilateral sciatica  Obstructive sleep apnea     Plan:  Patient is refusing MRI cervical, thoracic and lumbar spine  CT brain, cervical, thoracic and lumber spine completed on arrival are unremarkable for acute changes  Medical management per primary team  Neuro will sign off           Aramis Campbell MD.  Resident Internal Medicine,  [facility]  10/2/2022, 7:34 AM

## 2022-10-02 NOTE — PROGRESS NOTES
[facility] -   Infectious diseases evaluation    Progress Note    admission date 9/24/2022    reason for consultation:   Marcos Early and sepsis    Impression :   Current:  MSSA septicemia 9/24  Kleb UTI 9/24  Left lower lobe infiltrate p likely atelectasis   Bandemia  Altered mentation, acute metabolic encephalopathy   septic shock resolved   SIRS from infection  Chronic left hip pain  Chronic lower back pain with bilateral sciatica  Prediabetes  COPD  Obstructive sleep apnea  A. fib paroxysmal  9/29 increasing oxygen requirement and increasing leukocytosis-possible aspiration event    Discussion / summary of stay / plan of care     Recommendations   Kleb UTI from 9/24 - treated w ancef   On Ancef for MSSA septicemia coming from home, and kleb UTI   There is a concern of aspiration and the patient has increased WBC today and increased oxygen requirement  Switch to unasyn 9/30  Get CXR 9/30  Asking for DANE to look for possible vegetation to explain the MSSA bacteremia from home  She will also need an imaging of her spine or hips to exclude infection because of her altered mentation. The best will be to get a bone scan looking for any signs of infection in the hips or the spine. Infection Control Recommendations   [place] Precautions    Antimicrobial Stewardship Recommendations   Simplification of therapy  Targeted therapy      History of Present Illness:     INITIAL HISTORY:    Greg Mcdonnell is a 80y.o.-year-old female was found by a neighbor sitting on a chair unable to get out because of weakness, she had missed work, it seems she was really feeling weak for about 3 days before that. She was brought by 911 to the hospital.  Was found to be septic with MSSA in the blood UTI Klebsiella septic shock, patient needed intubation at the time. Extubated 9/28 and ultimately transferred out to the floor.   Because of A. fib cardiology has been on the case, planning possibly cardioversion on Monday. A 2D echo was done showing no vegetation. Her CT of the spine was negative for fracture, her CT of the abdomen and pelvis on 9/24 her admission, was negative for pneumonia. She had a white count that was increased upon admission and ultimately improved, 9/30 her white count increased again, there was a suspicion of aspiration over the last 24 hours. She is also requiring increased oxygen. Repeat blood cultures from 9/29 are still pending and negative no repeat blood cultures were done before that. The patient has been on Ancef. Seems she has had a history of chronic back pain with sciatica. On my exam she is alert on 4 L of nasal cannula looks very ill, she is very encephalopathic, not reliable in her answers. For instance she says she has no back pain and no hip pain but when he lifted both legs she screamed from pain. According to the nurse she also screams to little things and is difficult to tell if this is true pain or chest discomfort      Interval changes  10/1/2022   Patient Vitals for the past 8 hrs:   BP Temp Temp src Pulse Resp SpO2   10/01/22 2020 135/66 98 °F (36.7 °C) Temporal 89 (!) 32 95 %   10/01/22 1900 -- -- -- 89 29 93 %   10/01/22 1708 (!) 112/93 97.7 °F (36.5 °C) Oral 88 26 93 %   10/01/22 1600 -- -- -- 95 -- --       Afebrile  VS stable  Paroxysmal A fib, controlled with Lopressor    Patient feels better  Mild cough. No sputum   No complaints  No new issues per RN    CXR   9-30-22: Bilateral infiltrates with basilar atelectasis and effusions.  Similar to CXR of 9-27 and 9-29-22    Summary of relevant labs: 10/1/2022    Labs:  WBC 66-50-20-11-20  Platelet count 125-  Hematocrit 40-33-36  Micro:  Urine culture 9/24 Klebsiella pneumoniae, sensitive to Cipro and Bactrim, intermediate to ceftriaxone and resistant to penicillin  MRSA DNA -9/24  Blood culture 9/24 MSSA X2  Repeat blood culture 9/29 pending    Imaging:  Chest x-ray 9/27 no pneumonia    CT head -9/24  CT of the cervical spine 9/24 negative  CT abdomen pelvis lumbar spine and thoracic spine 9/24 airspace disease of the left lung atelectasis versus pneumonia, no traumatic abnormality within the chest abdomen and pelvis      2D echo 9/27  Normal LV size and wall thickness. No obvious wall motion abnormality seen. Normal LV systolic function with LVEF >55%. Dilated RV with preserved function. RV systolic pressure 44 mmHg  RA appears dilated. No obvious significant structural valvular abnormality noted. No significant valvular stenosis or regurgitation noted. Normal aortic root dimension. No significant pericardial effusion noted. IVC is dilated, difficult to assess inspiratory variation , Pt on ventilator    Right lower extremity negative DVT    I have personally reviewed the past medical history, past surgical history, medications, social history, and family history, and I haveupdated the database accordingly. Allergies:   Lyrica [pregabalin], Demerol hcl [meperidine], Fluoxetine, and Paxil [paroxetine hcl]     Review of Systems:     Review of Systems   Unable to perform ROS: Mental status change     Physical Examination :       Physical Exam  Constitutional:       Appearance: She is obese. She is ill-appearing. HENT:      Head: Normocephalic and atraumatic. Nose: Nose normal.      Mouth/Throat:      Mouth: Mucous membranes are moist.   Eyes:      General: No scleral icterus. Conjunctiva/sclera: Conjunctivae normal.   Cardiovascular:      Rate and Rhythm: Rhythm irregular. Heart sounds: Normal heart sounds. Pulmonary:      Effort: No respiratory distress. Breath sounds: No wheezing. Abdominal:      General: There is no distension. Tenderness: There is no abdominal tenderness. Genitourinary:     Comments: Urine antonio  Musculoskeletal:      Cervical back: Neck supple. No rigidity. Right lower leg: Edema present.       Left lower leg: Edema present. Skin:     Coloration: Skin is not jaundiced or pale.       Comments:  skin rash, she does have small ulcers over the lower extremities and they are healing with scabs and no signs of infection   Neurological:      Comments: Seems to be very tired, not moving too much in bed,    Psychiatric:      Comments: Encephalopathic, moaning, not very appropriate, answers not reliable       Past Medical History:     Past Medical History:   Diagnosis Date    Chronic back pain     Chronic hip pain     COPD (chronic obstructive pulmonary disease) (HCC)     Major depression     Osteoarthritis     Osteoporosis        Past Surgical  History:     Past Surgical History:   Procedure Laterality Date    BREAST BIOPSY      two    CHOLECYSTECTOMY      HYSTERECTOMY, VAGINAL         Medications:      potassium chloride  20 mEq Oral BID WC    metoprolol tartrate  25 mg Oral BID    enoxaparin  1 mg/kg SubCUTAneous BID    ampicillin-sulbactam  3,000 mg IntraVENous Q6H    thiamine  100 mg Oral Daily    folic acid  1 mg Oral Daily    ipratropium  0.5 mg Nebulization 4x daily    sodium chloride flush  5-40 mL IntraVENous 2 times per day    budesonide-formoterol  2 puff Inhalation BID       Social History:     Social History     Socioeconomic History    Marital status: Single     Spouse name: Not on file    Number of children: Not on file    Years of education: Not on file    Highest education level: Not on file   Occupational History    Not on file   Tobacco Use    Smoking status: Former     Packs/day: 0.75     Years: 34.00     Pack years: 25.50     Types: Cigarettes     Quit date: 3/20/2017     Years since quittin.5    Smokeless tobacco: Never   Vaping Use    Vaping Use: Never used   Substance and Sexual Activity    Alcohol use: Not Currently     Comment: occasionally     Drug use: Never    Sexual activity: Not on file   Other Topics Concern    Not on file   Social History Narrative    Not on file     Social Determinants of Health Financial Resource Strain: Low Risk     Difficulty of Paying Living Expenses: Not hard at all   Food Insecurity: No Food Insecurity    Worried About Running Out of Food in the Last Year: Never true    Ran Out of Food in the Last Year: Never true   Transportation Needs: Not on file   Physical Activity: Not on file   Stress: Not on file   Social Connections: Not on file   Intimate Partner Violence: Not on file   Housing Stability: Not on file       Family History:     Family History   Problem Relation Age of Onset    Stroke Mother     Alzheimer's Disease Father       Medical Decision Making:   I have independently reviewed/ordered the following labs:    CBC with Differential:   Recent Labs     09/30/22  0345 10/01/22  0259   WBC 20.2* 20.9*   HGB 11.7* 11.7*   HCT 36.2* 36.4    214   LYMPHOPCT 9* 11*   MONOPCT 7 3       BMP:  Recent Labs     09/30/22 0345 09/30/22  1813 10/01/22  0259   * 137 139   K 3.0*  --  3.7     --  99   CO2 28  --  29   BUN 23  --  17   CREATININE 0.60  --  0.53   MG 2.1  --  1.8       Hepatic Function Panel: No results for input(s): PROT, LABALBU, BILIDIR, IBILI, BILITOT, ALKPHOS, ALT, AST in the last 72 hours. No results for input(s): RPR in the last 72 hours. No results for input(s): HIV in the last 72 hours. No results for input(s): BC in the last 72 hours. Lab Results   Component Value Date/Time    CREATININE 0.53 10/01/2022 02:59 AM    GLUCOSE 113 10/01/2022 02:59 AM       Detailed results: Thank you for allowing us to participate in the care of this patient. Please call with questions. This note is created with the assistance of a speech recognition program.  While intending to generate adocument that actually reflects the content of the visit, the document can still have some errors including those of syntax and sound a like substitutions which may escape proof reading.   It such instances, actual meaningcan be extrapolated by contextual diversion.     Danyell Elizabeth MD  Office: [phone]  Perfect serve / office [phone]

## 2022-10-02 NOTE — PROGRESS NOTES
[facility]  Internal Medicine Teaching Residency Program  Inpatient Daily Progress Note  ______________________________________________________________________________    Patient: Ed Pineda  YOB: 1950   YMO:[id number]    Acct: [de-identified]     Room: [facility]9087-60  Admit date: 9/24/2022  Today's date: 10/02/22  Number of days in the hospital: 8    SUBJECTIVE   Admitting Diagnosis: Septicemia ([facility] [address]  CC: Weakness, \"stuck in chair\"    Pt examined at bedside. Chart & results reviewed. No acute events overnight, afebrile, hemodynamically stable  On 4 L nasal cannula for supplemental oxygen  Confused and slow to respond  Refused MRI spine. CT scan spine shows no acute abnormality        ROS:  Constitutional:  negative for chills, fevers, sweats  Respiratory:  negative for cough, dyspnea on exertion, hemoptysis, shortness of breath, wheezing  Cardiovascular:  negative for chest pain, chest pressure/discomfort, lower extremity edema, palpitations  Gastrointestinal:  negative for abdominal pain, constipation, diarrhea, nausea, vomiting  Neurological:  negative for dizziness, headache  BRIEF HISTORY     Patient presented to the ED via EMS after being found by a neighbor stuck in her chair. Patient was complaining of weakness was unable to get up from the chair. Patient was tachypneic failed BiPAP and was ultimately intubated. Blood cultures positive for MSSA and urine cultures were positive for Klebsiella. Patient was extubated on 9/28/2022. Patient has remained slightly altered, with comments of possible intermittent hallucinations. She was able to maintain her saturations on 3 L NC. Her leukocytosis and urine output improved and patient was transferred in stable condition to the medical floor.     OBJECTIVE     Vital Signs:  /66   Pulse 83   Temp 98.7 °F (37.1 °C) (Axillary)   Resp 25   Ht 5' 5\" (1.651 m)   Wt 247 lb 9.2 oz (112.3 kg)   LMP (LMP Unknown)   SpO2 96%   BMI 41.20 kg/m²     Temp (24hrs), Av.8 °F (37.1 °C), Min:97.7 °F (36.5 °C), Max:100.8 °F (38.2 °C)    In: 1323.7   Out: 750 [Urine:750]    Physical Exam:  Constitutional: This is a well developed, well nourished, Greater than 40 - Morbid Obesity / Extreme Obesity / Grade III 80y.o. year old female who is alert, oriented, cooperative and in no apparent distress. Head:normocephalic and atraumatic. Respiratory: Breath sounds bilaterally were clear to auscultation. There were no wheezes, rhonchi or rales. There is no intercostal retraction or use of accessory muscles. No egophony noted. Cardiovascular: Regular without murmur, clicks, gallops or rubs. Abdomen: Slightly rounded and soft without organomegaly. No rebound, rigidity or guarding was appreciated. Musculoskeletal:  No gross muscle weakness. Extremities:  No lower extremity edema, ulcerations, tenderness, varicosities or erythema. Muscle size, tone and strength are normal.  No involuntary movements are noted. Skin:  Warm and dry. Good color, turgor and pigmentation. Small ulcers over the lower extremities and they are healing, no signs of infection.   No cyanosis or clubbing  Neurological/Psychiatric: The  patient's confused and slow to respond      Medications:  Scheduled Medications:    potassium chloride  20 mEq Oral BID WC    metoprolol tartrate  25 mg Oral BID    enoxaparin  1 mg/kg SubCUTAneous BID    ampicillin-sulbactam  3,000 mg IntraVENous Q6H    thiamine  100 mg Oral Daily    folic acid  1 mg Oral Daily    ipratropium  0.5 mg Nebulization 4x daily    sodium chloride flush  5-40 mL IntraVENous 2 times per day    budesonide-formoterol  2 puff Inhalation BID     Continuous Infusions:    dextrose      sodium chloride Stopped (22 1257)     PRN MedicationsoxyCODONE, 5 mg, Q8H PRN  metoclopramide, 10 mg, Q6H PRN  glucose, 4 tablet, PRN  dextrose bolus, 125 mL, PRN   Or  dextrose bolus, 250 mL, PRN  glucagon (rDNA), 1 mg, PRN  dextrose, , Continuous PRN  sodium chloride flush, 5-40 mL, PRN  sodium chloride, , PRN  ondansetron, 4 mg, Q8H PRN   Or  ondansetron, 4 mg, Q6H PRN  polyethylene glycol, 17 g, Daily PRN  acetaminophen, 650 mg, Q6H PRN   Or  acetaminophen, 650 mg, Q6H PRN        Diagnostic Labs:  CBC:   Recent Labs     09/30/22  0345 10/01/22  0259 10/02/22  0401   WBC 20.2* 20.9* 16.8*   RBC 4.37 4.47 3.74*   HGB 11.7* 11.7* 10.2*   HCT 36.2* 36.4 30.0*   MCV 82.8 81.4* 80.2*   RDW 17.0* 17.1* 16.9*    214 239     BMP:   Recent Labs     09/30/22 0345 09/30/22  1813 10/01/22  0259 10/02/22  0401   * 137 139 134*   K 3.0*  --  3.7 3.2*     --  99 99   CO2 28  --  29 23   BUN 23  --  17 14   CREATININE 0.60  --  0.53 0.51     BNP: No results for input(s): BNP in the last 72 hours. PT/INR: No results for input(s): PROTIME, INR in the last 72 hours. APTT: No results for input(s): APTT in the last 72 hours. CARDIAC ENZYMES: No results for input(s): CKMB, CKMBINDEX, TROPONINI in the last 72 hours. Invalid input(s): CKTOTAL;3  FASTING LIPID PANEL:  Lab Results   Component Value Date    CHOL 141 05/17/2021    HDL 50 05/17/2021    TRIG 213 (H) 09/28/2022     LIVER PROFILE: No results for input(s): AST, ALT, ALB, BILIDIR, BILITOT, ALKPHOS in the last 72 hours. MICROBIOLOGY:   Lab Results   Component Value Date/Time    CULTURE NO GROWTH 2 DAYS 09/29/2022 01:27 PM       Imaging:    XR ABDOMEN (KUB) (SINGLE AP VIEW)    Result Date: 9/29/2022  1. No ileus or obstruction is identified. Mild gaseous distention of the stomach. 2. Cardiomegaly with vascular congestion. 3. Small bore central venous catheter terminates at the cavoatrial junction/right atrium. NM BONE SCAN 3 PHASE    Result Date: 9/30/2022  Nondiagnostic study. Patient could not cooperate. No asymmetry on flow images to suggest hyperemia. XR CHEST PORTABLE    Result Date: 9/30/2022  1.  Pulmonary vascular congestion concerning for superimposed pneumonia or aspiration. -DANE to look for possible vegetation to explain MSSA bacteremia        Klebsiella UTI  - On antibiotics as above     New onset A. Fib  - Lopressor 25 mg twice daily  - Cardiology consulted  -CK, TSH, B12 normal, folate 2.5 reduced  -Plan for DANE/cardioversion as per cardio  -Will need long term anticoagulation with NOACs on discharge     Acute on chronic respiratory failure  -On supplemental O2 via nasal cannula, continue, wean as tolerated  -ipratropium and symbicort daily     Bilateral lower extremity weakness  - refused MRI cervical, thoracic, and lumbar spine   -CT scan spine on 9/24 unremarkable for acute changes  -Neurology following      DVT ppx : Lovenox  GI ppx: Protonix    PT/OT: Consulted   Discharge Planning / SW:  consulted    Magdalene Mcclain MD  Internal Medicine Resident, PGY-1  [facility];  [place], [place]  10/2/2022, 7:16 AM

## 2022-10-03 PROBLEM — R94.31 PROLONGED Q-T INTERVAL ON ECG: Status: ACTIVE | Noted: 2022-10-03

## 2022-10-03 LAB
ABSOLUTE EOS #: 0.19 K/UL (ref 0–0.44)
ABSOLUTE IMMATURE GRANULOCYTE: 0.18 K/UL (ref 0–0.3)
ABSOLUTE LYMPH #: 1.61 K/UL (ref 1.1–3.7)
ABSOLUTE MONO #: 0.99 K/UL (ref 0.1–1.2)
ANION GAP SERPL CALCULATED.3IONS-SCNC: 11 MMOL/L (ref 9–17)
BASOPHILS # BLD: 0 % (ref 0–2)
BASOPHILS ABSOLUTE: 0.06 K/UL (ref 0–0.2)
BUN BLDV-MCNC: 11 MG/DL (ref 8–23)
CALCIUM SERPL-MCNC: 7.7 MG/DL (ref 8.6–10.4)
CHLORIDE BLD-SCNC: 101 MMOL/L (ref 98–107)
CO2: 23 MMOL/L (ref 20–31)
CREAT SERPL-MCNC: 0.53 MG/DL (ref 0.5–0.9)
EOSINOPHILS RELATIVE PERCENT: 1 % (ref 1–4)
GFR AFRICAN AMERICAN: >60 ML/MIN
GFR NON-AFRICAN AMERICAN: >60 ML/MIN
GFR SERPL CREATININE-BSD FRML MDRD: ABNORMAL ML/MIN/{1.73_M2}
GLUCOSE BLD-MCNC: 128 MG/DL (ref 70–99)
HCT VFR BLD CALC: 30.1 % (ref 36.3–47.1)
HEMOGLOBIN: 10 G/DL (ref 11.9–15.1)
IMMATURE GRANULOCYTES: 1 %
LYMPHOCYTES # BLD: 11 % (ref 24–43)
MAGNESIUM: 2.1 MG/DL (ref 1.6–2.6)
MCH RBC QN AUTO: 26.9 PG (ref 25.2–33.5)
MCHC RBC AUTO-ENTMCNC: 33.2 G/DL (ref 28.4–34.8)
MCV RBC AUTO: 80.9 FL (ref 82.6–102.9)
MONOCYTES # BLD: 7 % (ref 3–12)
NRBC AUTOMATED: 0 PER 100 WBC
PDW BLD-RTO: 17 % (ref 11.8–14.4)
PLATELET # BLD: 241 K/UL (ref 138–453)
PMV BLD AUTO: 12.7 FL (ref 8.1–13.5)
POTASSIUM SERPL-SCNC: 3.5 MMOL/L (ref 3.7–5.3)
RBC # BLD: 3.72 M/UL (ref 3.95–5.11)
RBC # BLD: ABNORMAL 10*6/UL
SEG NEUTROPHILS: 80 % (ref 36–65)
SEGMENTED NEUTROPHILS ABSOLUTE COUNT: 11.56 K/UL (ref 1.5–8.1)
SODIUM BLD-SCNC: 135 MMOL/L (ref 135–144)
WBC # BLD: 14.6 K/UL (ref 3.5–11.3)

## 2022-10-03 PROCEDURE — 97166 OT EVAL MOD COMPLEX 45 MIN: CPT

## 2022-10-03 PROCEDURE — 97530 THERAPEUTIC ACTIVITIES: CPT

## 2022-10-03 PROCEDURE — 94640 AIRWAY INHALATION TREATMENT: CPT

## 2022-10-03 PROCEDURE — 6360000002 HC RX W HCPCS: Performed by: STUDENT IN AN ORGANIZED HEALTH CARE EDUCATION/TRAINING PROGRAM

## 2022-10-03 PROCEDURE — 6360000002 HC RX W HCPCS

## 2022-10-03 PROCEDURE — 99232 SBSQ HOSP IP/OBS MODERATE 35: CPT | Performed by: INTERNAL MEDICINE

## 2022-10-03 PROCEDURE — 36415 COLL VENOUS BLD VENIPUNCTURE: CPT

## 2022-10-03 PROCEDURE — 2580000003 HC RX 258: Performed by: INTERNAL MEDICINE

## 2022-10-03 PROCEDURE — 97110 THERAPEUTIC EXERCISES: CPT

## 2022-10-03 PROCEDURE — 97535 SELF CARE MNGMENT TRAINING: CPT

## 2022-10-03 PROCEDURE — 99233 SBSQ HOSP IP/OBS HIGH 50: CPT | Performed by: INTERNAL MEDICINE

## 2022-10-03 PROCEDURE — 94761 N-INVAS EAR/PLS OXIMETRY MLT: CPT

## 2022-10-03 PROCEDURE — 6370000000 HC RX 637 (ALT 250 FOR IP): Performed by: INTERNAL MEDICINE

## 2022-10-03 PROCEDURE — 6370000000 HC RX 637 (ALT 250 FOR IP)

## 2022-10-03 PROCEDURE — 2580000003 HC RX 258

## 2022-10-03 PROCEDURE — 2700000000 HC OXYGEN THERAPY PER DAY

## 2022-10-03 PROCEDURE — 80048 BASIC METABOLIC PNL TOTAL CA: CPT

## 2022-10-03 PROCEDURE — 85025 COMPLETE CBC W/AUTO DIFF WBC: CPT

## 2022-10-03 PROCEDURE — 2060000000 HC ICU INTERMEDIATE R&B

## 2022-10-03 PROCEDURE — 83735 ASSAY OF MAGNESIUM: CPT

## 2022-10-03 PROCEDURE — 6370000000 HC RX 637 (ALT 250 FOR IP): Performed by: STUDENT IN AN ORGANIZED HEALTH CARE EDUCATION/TRAINING PROGRAM

## 2022-10-03 PROCEDURE — 6360000002 HC RX W HCPCS: Performed by: INTERNAL MEDICINE

## 2022-10-03 RX ORDER — POTASSIUM CHLORIDE 20 MEQ/1
40 TABLET, EXTENDED RELEASE ORAL ONCE
Status: COMPLETED | OUTPATIENT
Start: 2022-10-03 | End: 2022-10-03

## 2022-10-03 RX ORDER — CALCIUM CARBONATE 200(500)MG
500 TABLET,CHEWABLE ORAL 3 TIMES DAILY PRN
Status: DISCONTINUED | OUTPATIENT
Start: 2022-10-03 | End: 2022-10-06 | Stop reason: HOSPADM

## 2022-10-03 RX ORDER — MORPHINE SULFATE 4 MG/ML
4 INJECTION, SOLUTION INTRAMUSCULAR; INTRAVENOUS ONCE
Status: COMPLETED | OUTPATIENT
Start: 2022-10-03 | End: 2022-10-03

## 2022-10-03 RX ORDER — POTASSIUM CHLORIDE 20 MEQ/1
20 TABLET, EXTENDED RELEASE ORAL 2 TIMES DAILY WITH MEALS
Status: CANCELLED | OUTPATIENT
Start: 2022-10-03

## 2022-10-03 RX ADMIN — OXYCODONE 5 MG: 5 TABLET ORAL at 09:47

## 2022-10-03 RX ADMIN — AMPICILLIN SODIUM AND SULBACTAM SODIUM 3000 MG: 2; 1 INJECTION, POWDER, FOR SOLUTION INTRAMUSCULAR; INTRAVENOUS at 23:57

## 2022-10-03 RX ADMIN — ANTACID TABLETS 500 MG: 500 TABLET, CHEWABLE ORAL at 20:54

## 2022-10-03 RX ADMIN — ENOXAPARIN SODIUM 105 MG: 150 INJECTION SUBCUTANEOUS at 09:50

## 2022-10-03 RX ADMIN — SODIUM CHLORIDE, PRESERVATIVE FREE 10 ML: 5 INJECTION INTRAVENOUS at 20:28

## 2022-10-03 RX ADMIN — POTASSIUM CHLORIDE 20 MEQ: 1500 TABLET, EXTENDED RELEASE ORAL at 09:48

## 2022-10-03 RX ADMIN — AMPICILLIN SODIUM AND SULBACTAM SODIUM 3000 MG: 2; 1 INJECTION, POWDER, FOR SOLUTION INTRAMUSCULAR; INTRAVENOUS at 05:58

## 2022-10-03 RX ADMIN — METOPROLOL TARTRATE 25 MG: 25 TABLET ORAL at 20:28

## 2022-10-03 RX ADMIN — FOLIC ACID 1 MG: 1 TABLET ORAL at 09:49

## 2022-10-03 RX ADMIN — AMPICILLIN SODIUM AND SULBACTAM SODIUM 3000 MG: 2; 1 INJECTION, POWDER, FOR SOLUTION INTRAMUSCULAR; INTRAVENOUS at 12:45

## 2022-10-03 RX ADMIN — Medication 100 MG: at 09:49

## 2022-10-03 RX ADMIN — IPRATROPIUM BROMIDE 0.5 MG: 0.5 SOLUTION RESPIRATORY (INHALATION) at 20:16

## 2022-10-03 RX ADMIN — SODIUM CHLORIDE, PRESERVATIVE FREE 5 ML: 5 INJECTION INTRAVENOUS at 09:49

## 2022-10-03 RX ADMIN — MORPHINE SULFATE 4 MG: 4 INJECTION INTRAVENOUS at 06:03

## 2022-10-03 RX ADMIN — AMPICILLIN SODIUM AND SULBACTAM SODIUM 3000 MG: 2; 1 INJECTION, POWDER, FOR SOLUTION INTRAMUSCULAR; INTRAVENOUS at 17:21

## 2022-10-03 RX ADMIN — POTASSIUM CHLORIDE 20 MEQ: 1500 TABLET, EXTENDED RELEASE ORAL at 17:22

## 2022-10-03 RX ADMIN — BUDESONIDE AND FORMOTEROL FUMARATE DIHYDRATE 2 PUFF: 160; 4.5 AEROSOL RESPIRATORY (INHALATION) at 20:16

## 2022-10-03 RX ADMIN — AMPICILLIN SODIUM AND SULBACTAM SODIUM 3000 MG: 2; 1 INJECTION, POWDER, FOR SOLUTION INTRAMUSCULAR; INTRAVENOUS at 00:37

## 2022-10-03 RX ADMIN — METOPROLOL TARTRATE 25 MG: 25 TABLET ORAL at 09:48

## 2022-10-03 RX ADMIN — POTASSIUM CHLORIDE 40 MEQ: 1500 TABLET, EXTENDED RELEASE ORAL at 12:44

## 2022-10-03 RX ADMIN — OXYCODONE 5 MG: 5 TABLET ORAL at 01:40

## 2022-10-03 RX ADMIN — ENOXAPARIN SODIUM 105 MG: 150 INJECTION SUBCUTANEOUS at 20:28

## 2022-10-03 ASSESSMENT — ENCOUNTER SYMPTOMS
SORE THROAT: 0
SHORTNESS OF BREATH: 1
TACHYPNEA: 1
ABDOMINAL DISTENTION: 0
STRIDOR: 0
ABDOMINAL PAIN: 0
RHINORRHEA: 0
SINUS PAIN: 0
SINUS PRESSURE: 0
EYE REDNESS: 0
BACK PAIN: 0
COLOR CHANGE: 0
WHEEZING: 0
COUGH: 1

## 2022-10-03 ASSESSMENT — PAIN DESCRIPTION - ORIENTATION
ORIENTATION: MID
ORIENTATION: LEFT

## 2022-10-03 ASSESSMENT — PAIN SCALES - GENERAL
PAINLEVEL_OUTOF10: 7
PAINLEVEL_OUTOF10: 0
PAINLEVEL_OUTOF10: 9

## 2022-10-03 ASSESSMENT — PAIN DESCRIPTION - DESCRIPTORS
DESCRIPTORS: SHARP
DESCRIPTORS: ACHING;BURNING

## 2022-10-03 ASSESSMENT — PAIN DESCRIPTION - LOCATION
LOCATION: BACK
LOCATION: GROIN;HIP

## 2022-10-03 NOTE — PROGRESS NOTES
Occupational Therapy  Facility/Department: [address] STEPDOWN  Occupational Therapy Initial Assessment    Name: Magdalena Lubin  : 1950  MRN: [id number]  Date of Service: 10/3/2022    Discharge Recommendations:  Patient would benefit from continued therapy after discharge  OT Equipment Recommendations  Equipment Needed: Yes  Mobility Devices: ADL Assistive Devices  ADL Assistive Devices: Long-handled Sponge;Reacher       Patient Diagnosis(es): The encounter diagnosis was Septicemia ([facility] [address] Past Medical History:  has a past medical history of Chronic back pain, Chronic hip pain, COPD (chronic obstructive pulmonary disease) ([facility] [address] Major depression, Osteoarthritis, and Osteoporosis. Past Surgical History:  has a past surgical history that includes Hysterectomy, vaginal; Cholecystectomy; and Breast biopsy. This is a 80 y.o. female who presents to the Emergency Department with complaint of unable to ambulate. Patient states that she came home from work on Thursday and had a significant difficult time getting out of a car with her walker. She then walked to her sofa and has been anally unable to get up off the sofa. 911 was called. They brought the patient in for further evaluation. She does complain of some shortness of breath. She denies any fever. The patient is on morphine secondary to chronic pain. Assessment   Performance deficits / Impairments: Decreased functional mobility ; Decreased ADL status; Decreased ROM; Decreased strength;Decreased safe awareness;Decreased cognition;Decreased endurance;Decreased balance;Decreased fine motor control;Decreased high-level IADLs;Decreased coordination  Assessment: Impaired self care status  Prognosis: Good  Decision Making: Medium Complexity  REQUIRES OT FOLLOW-UP: Yes  Activity Tolerance  Activity Tolerance: Patient Tolerated treatment well;Treatment limited secondary to decreased cognition;Patient limited by pain        Plan   Occupational Therapy Plan  Times Per Week: 3-4x/week  Current Treatment Recommendations: Strengthening, ROM, Balance training, Functional mobility training, Endurance training, Cognitive reorientation, Pain management, Safety education & training, Patient/Caregiver education & training, Equipment evaluation, education, & procurement, Self-Care / ADL, Home management training, Cognitive/Perceptual training, Coordination training     Restrictions  Restrictions/Precautions  Restrictions/Precautions: Up as Tolerated, General Precautions  Required Braces or Orthoses?: No    Subjective   General  Patient assessed for rehabilitation services?: Yes  Family / Caregiver Present: No  Subjective  Subjective: \"I have siatica all the time\" Pt reports regarding chronic pain in L hip  General Comment  Comments: RN okayed for therapy. Pt agreeable to therapy. Pt reports 9/10 pain in L hip. Able to progress with treatment. Resident in room at time of pain screening.      Social/Functional History  Social/Functional History  Lives With: Alone  Type of Home: House  Home Layout: Two level, 1/2 bath on main level (sleeps on the couch in living room)  Home Access: Stairs to enter with rails (B handrails)  Entrance Stairs - Number of Steps: 1  Entrance Stairs - Rails: Both  Bathroom Shower/Tub: Tub/Shower unit  Bathroom Toilet: Standard  Bathroom Equipment: Shower chair, Hand-held shower, Grab bars in shower  Home Equipment: Ladan Amor, [place], Ladan Amor, 4 wheeled, Hoang Saundra, quad, Sock aid  ADL Assistance: [address]: Independent  Ambulation Assistance: Independent  Transfer Assistance: Independent  Active : Yes  Mode of Transportation: [facility]  Occupation: Full time employment  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  Additional Comments: Pt reports sister in law or friends Boo Mcmanus or Agatha can assist PRN       Objective                Safety Devices  Type of Devices: Bed alarm in place;Call light within reach;Gait belt;Patient at risk for falls; Left in bed  Restraints  Restraints Initially in Place: No  Bed Mobility Training  Bed Mobility Training: Yes  Overall Level of Assistance: Maximum assistance;Assist X2  Interventions: Tactile cues; Verbal cues; Visual cues  Supine to Sit:  (pt seated in chair initally and returned to bed at end of session)  Sit to Supine: Maximum assistance;Assist X2  Balance  Sitting: Without support (~2 minutes EOB, SBA)  Standing: With support (~1 minute in Rubbie Collar)  Transfer Training  Transfer Training: Yes  Overall Level of Assistance: Total assistance;Assist X2 (max x2 in Rubbie Collar)  Sit to Stand: Maximum assistance;Assist X2 (with Rubbie Collar)  Stand to Sit: Moderate assistance;Assist X2  Bed to Chair: Total assistance Rubbie Collar)     AROM: Grossly decreased, non-functional  Strength: Grossly decreased, non-functional (2+/5 B shoulder; 3+/5 B elbow/hand)  Coordination: Generally decreased, functional  Tone: Normal  Sensation: Intact  ADL  Feeding: Verbal cueing; Increased time to complete;Setup;Minimal assistance (assist for initiation and [facility])  Grooming: Minimal assistance;Verbal cueing;Setup; Increased time to complete (Poor initiation)  UE Bathing: Minimal assistance;Setup; Increased time to complete;Verbal cueing (Max x2 sit<> Rubbie Collar; Max A in Rubbie Collar to remain standing ~30 seconds; Poor initiation)  LE Bathing: Maximum assistance; Increased time to complete (Max x2 sit<> Rubbie Collar; Max A in Rubbie Collar to remain standing ~30 seconds; Poor initiation)  UE Dressing: Moderate assistance; Increased time to complete;Verbal cueing (Limited BUE AROM; Poor initiation)  LE Dressing: Maximum assistance; Increased time to complete (Max x2 sit<> Rubbie Collar; Max A in Rubbie Collar to remain standing ~30 seconds)  Toileting: Dependent/Total (Max x2 sit<> Rubbie Collar;  Max A in Rubbie Collar to remain standing ~30 seconds)  Additional Comments: Pt seated in recliner at start of session. Pt required Max A x2 in [place] sit<>stand and transfer chair<>bed. Pt displayed Poor initiaion during UB bathing/oral hygiene, requiring tactile and visual cues to initiate ADL tasks. Pt displays decreased [address] while attempting to open packaging in session and perseverated on FM testing , couting and bringing each finger to thumb multiple times. Pt BUE shoulder flexion is decreased as is BUE strength, grossly. Activity Tolerance  Activity Tolerance: Patient limited by fatigue;Patient limited by endurance; Patient limited by pain        Vision  Vision: Impaired  Vision Exceptions: Wears glasses for reading  Hearing  Hearing: Exceptions to [facility]  Hearing Exceptions: Hard of hearing/hearing concerns  Cognition  Overall Cognitive Status: Exceptions  Following Commands: Follows one step commands with increased time; Follows one step commands with repetition  Attention Span: Attends with cues to redirect  Memory: Appears intact  Safety Judgement: Decreased awareness of need for assistance  Problem Solving: Assistance required to implement solutions;Assistance required to identify errors made;Assistance required to generate solutions  Insights: Decreased awareness of deficits  Initiation: Requires cues for some (assist to initiate UB bathing/oral hygiene)  Sequencing: Requires cues for some  Orientation  Overall Orientation Status: Within Functional Limits  Perception  Overall Perceptual Status: Impaired  Initiation: Cues to initiate tasks (Tactile/Visual cuing needed during ADL)  Motor Planning: Cues to use objects appropriately  Perseveration: Other (comment) (During [address] testing)               Education Given To: Patient  Education Provided: Role of Therapy;Plan of Care  Education Method: Verbal  Barriers to Learning: Cognition  Education Outcome: Continued education needed;Demonstrated understanding  LUE AROM (degrees)  LUE AROM : Exceptions  LUE General AROM: ~40 degrees L shoulder flexion; WFL otherwise  Left Hand AROM (degrees)  Left Hand AROM: WFL  RUE AROM (degrees)  RUE AROM : Exceptions  RUE General AROM: ~20 degrees R shoulder flexion; WFL otherwise  Right Hand AROM (degrees)  Right Hand AROM: [facility]                                                               AM-PAC Score        AM-PAC Inpatient Daily Activity Raw Score: 14 (10/03/22 1207)  AM-PAC Inpatient ADL T-Scale Score : 33.39 (10/03/22 1207)  ADL Inpatient CMS 0-100% Score: 59.67 (10/03/22 1207)  ADL Inpatient CMS G-Code Modifier : CK (10/03/22 1207)    Tinneti Score       Goals  Short Term Goals  Time Frame for Short Term Goals: By discharge  Short Term Goal 1: Pt will complete LB bathing/dressing/toileting Mod A with AE/DME/Modified techniques as needed  Short Term Goal 2: Pt will complete UB bathing/dressing/oral hygiene SBA with Min cues to initiate task  Short Term Goal 3: Pt will complete functional transfers/mobility Mod A with RW  Short Term Goal 4: Pt will complete functional activity with Min A and Min VC's  Patient Goals   Patient goals :  To get better       Therapy Time   Individual Concurrent Group Co-treatment   Time In 1010         Time Out 1107         Minutes 57         Timed Code Treatment Minutes: 3 CHERYL Whitaker/VY

## 2022-10-03 NOTE — PROGRESS NOTES
[facility]  Internal Medicine Teaching Residency Program  Inpatient Daily Progress Note  ______________________________________________________________________________    Patient: Sofie Jose  YOB: 1950   NMV:[id number]    Acct: [de-identified]     Room: 322/9140-73  Admit date: 9/24/2022  Today's date: 10/03/22  Number of days in the hospital: 9    SUBJECTIVE   Admitting Diagnosis: Septicemia ([facility] [address]  CC: Weakness, \"stuck in chair\"      Pt examined at bedside. Chart & results reviewed. No acute events overnight  Having low grade fevers overnight  Hemodynamically stable, saturating well on room air  Appetite still low, taking mostly fluids  No other acute complaints    ROS:  Constitutional:  negative for chills, sweats  Respiratory:  negative for cough, dyspnea on exertion, hemoptysis, shortness of breath, wheezing  Cardiovascular:  negative for chest pain, chest pressure/discomfort, lower extremity edema, palpitations  Gastrointestinal:  negative for abdominal pain, constipation, diarrhea, nausea, vomiting  Neurological:  negative for dizziness, headache  BRIEF HISTORY     Patient presented to the ED via EMS after being found by a neighbor stuck in her chair. Patient was complaining of weakness was unable to get up from the chair. Patient was tachypneic failed BiPAP and was ultimately intubated. Blood cultures positive for MSSA and urine cultures were positive for Klebsiella. Patient was extubated on 9/28/2022. Patient has remained slightly altered, with comments of possible intermittent hallucinations. She was able to maintain her saturations on 3 L NC. Her leukocytosis and urine output improved and patient was transferred in stable condition to the medical floor.     OBJECTIVE     Vital Signs:  /66   Pulse 88   Temp (!) 100.9 °F (38.3 °C) (Temporal)   Resp 29   Ht 5' 5\" (1.651 m)   Wt 238 lb 1.6 oz (108 kg)   LMP  (LMP Unknown)   SpO2 96%   BMI 39.62 kg/m²     Temp (24hrs), Av °F (37.2 °C), Min:98 °F (36.7 °C), Max:100.9 °F (38.3 °C)    In: 298.7   Out: 800 [Urine:800]    Physical Exam:  Constitutional: This is a well developed, well nourished, 35-39.9 - Obesity Grade II 80y.o. year old female who is alert, oriented, cooperative and in no apparent distress. Head:normocephalic and atraumatic. Respiratory:  Breath sounds bilaterally were clear to auscultation. There were no wheezes, rhonchi or rales. There is no intercostal retraction or use of accessory muscles. Cardiovascular: Regular without murmur, clicks, gallops or rubs. Abdomen: Slightly rounded and soft without organomegaly. No rebound, rigidity or guarding was appreciated. Musculoskeletal: No gross muscle weakness. Extremities:  No lower extremity edema, ulcerations, tenderness, varicosities or erythema. Muscle size, tone and strength are normal.  No involuntary movements are noted. Skin:  Warm and dry. Good color, turgor and pigmentation. No lesions or scars.   No cyanosis or clubbing  Neurological/Psychiatric: The patient's general behavior, level of consciousness, thought content and emotional status is normal.        Medications:  Scheduled Medications:    potassium chloride  20 mEq Oral BID WC    metoprolol tartrate  25 mg Oral BID    enoxaparin  1 mg/kg SubCUTAneous BID    ampicillin-sulbactam  3,000 mg IntraVENous Q6H    thiamine  100 mg Oral Daily    folic acid  1 mg Oral Daily    ipratropium  0.5 mg Nebulization 4x daily    sodium chloride flush  5-40 mL IntraVENous 2 times per day    budesonide-formoterol  2 puff Inhalation BID     Continuous Infusions:    dextrose      sodium chloride Stopped (22 1257)     PRN Medicationsmetoclopramide, 5 mg, Q6H PRN  oxyCODONE, 5 mg, Q8H PRN  glucose, 4 tablet, PRN  dextrose bolus, 125 mL, PRN   Or  dextrose bolus, 250 mL, PRN  glucagon (rDNA), 1 mg, PRN  dextrose, , Continuous PRN  sodium chloride flush, 5-40 mL, PRN  sodium chloride, , PRN  ondansetron, 4 mg, Q8H PRN   Or  ondansetron, 4 mg, Q6H PRN  polyethylene glycol, 17 g, Daily PRN  acetaminophen, 650 mg, Q6H PRN   Or  acetaminophen, 650 mg, Q6H PRN        Diagnostic Labs:  CBC:   Recent Labs     10/01/22  0259 10/02/22  0401 10/03/22  0530   WBC 20.9* 16.8* 14.6*   RBC 4.47 3.74* 3.72*   HGB 11.7* 10.2* 10.0*   HCT 36.4 30.0* 30.1*   MCV 81.4* 80.2* 80.9*   RDW 17.1* 16.9* 17.0*    239 241     BMP:   Recent Labs     10/01/22  0259 10/02/22  0401 10/03/22  0530    134* 135   K 3.7 3.2* 3.5*   CL 99 99 101   CO2 29 23 23   BUN 17 14 11   CREATININE 0.53 0.51 0.53     BNP: No results for input(s): BNP in the last 72 hours. PT/INR: No results for input(s): PROTIME, INR in the last 72 hours. APTT: No results for input(s): APTT in the last 72 hours. CARDIAC ENZYMES: No results for input(s): CKMB, CKMBINDEX, TROPONINI in the last 72 hours. Invalid input(s): CKTOTAL;3  FASTING LIPID PANEL:  Lab Results   Component Value Date    CHOL 141 05/17/2021    HDL 50 05/17/2021    TRIG 213 (H) 09/28/2022     LIVER PROFILE: No results for input(s): AST, ALT, ALB, BILIDIR, BILITOT, ALKPHOS in the last 72 hours. MICROBIOLOGY:   Lab Results   Component Value Date/Time    CULTURE NO GROWTH 3 DAYS 09/29/2022 01:27 PM       Imaging:    XR ABDOMEN (KUB) (SINGLE AP VIEW)    Result Date: 9/29/2022  1. No ileus or obstruction is identified. Mild gaseous distention of the stomach. 2. Cardiomegaly with vascular congestion. 3. Small bore central venous catheter terminates at the cavoatrial junction/right atrium. NM BONE SCAN 3 PHASE    Result Date: 9/30/2022  Nondiagnostic study. Patient could not cooperate. No asymmetry on flow images to suggest hyperemia. XR CHEST PORTABLE    Result Date: 9/30/2022  1. Pulmonary vascular congestion may be increased, and there may be new perihilar edema.   Consider congestive heart failure given at least borderline cardiomegaly and suspicion for trace bilateral pleural effusions. Pneumonia could appear similar. 2. Increased bibasilar airspace opacities likely due in part to atelectasis with potential for superimposed pneumonia or aspiration especially on the left. XR CHEST PORTABLE    Result Date: 9/29/2022  1. No ileus or obstruction is identified. Mild gaseous distention of the stomach. 2. Cardiomegaly with vascular congestion. 3. Small bore central venous catheter terminates at the cavoatrial junction/right atrium. XR CHEST PORTABLE    Result Date: 9/27/2022  Central pulmonary congestion with obscuration of the left hemidiaphragm likely representing effusion as can be seen in CHF. ASSESSMENT & PLAN     ASSESSMENT / PLAN:     Principal Problem:    Septicemia ([address]  Active Problems:    MSSA bacteremia    UTI due to Klebsiella species    COPD (chronic obstructive pulmonary disease) ([facility])    Class 2 obesity due to excess calories with body mass index (BMI) of 39.0 to 39.9 in adult    Acute respiratory failure with hypoxia ([facility])    Respiratory alkalosis    Hypokalemia    MSSA (methicillin susceptible Staphylococcus aureus) septicemia ([facility])    SIRS (systemic inflammatory response syndrome) ([facility])    Bandemia    Acute encephalopathy    Aspiration pneumonia ([facility])    Spinal stenosis of lumbar region with neurogenic claudication    Bilateral leg weakness    Paroxysmal atrial fibrillation ([facility])    Hypernatremia    Folic acid deficiency    Leukocytosis    Pneumonia of left lower lobe due to infectious organism  Resolved Problems:    * No resolved hospital problems. *     MSSA bacteremia  -Switched to Unasyn from Ancef due to concerns for aspiration pneumonia  -Patient received 4 days of Zosyn prior to cefazolin  - ID consulted for antibiotic recommendations  -CXR shows increased bibasilar opacities concerning for superimposed pneumonia or aspiration.   -DANE to look for possible vegetation to explain MSSA bacteremia        Klebsiella UTI  - On antibiotics as above     New onset A. Fib  - Lopressor 25 mg twice daily  - Cardiology consulted  -CK, TSH, B12 normal, folate 2.5 reduced  -Plan for DANE/cardioversion as per cardio  -Will need long term anticoagulation with NOACs on discharge     Acute on chronic respiratory failure  - Supplemental oxygen as needed, Keep SpO2 >92%  -ipratropium and symbicort daily     Bilateral lower extremity weakness  - refused MRI cervical, thoracic, and lumbar spine   -CT scan spine on 9/24 unremarkable for acute changes  -Neurology signed off      DVT ppx : Lovenox      PT/OT: Consulted  Discharge Planning / SW:  consulted    Kvng Jay MD  Internal Medicine Resident, PGY-1  [facility];  [place], [place]  10/3/2022, 7:44 AM

## 2022-10-03 NOTE — PLAN OF CARE
Problem: Discharge Planning  Goal: Discharge to home or other facility with appropriate resources  Outcome: Progressing     Problem: Respiratory - Adult  Goal: Achieves optimal ventilation and oxygenation  10/2/2022 2157 by Jaja Hernandez RN  Outcome: Progressing  10/2/2022 1711 by Warden Cornel RN  Outcome: Progressing  10/2/2022 0956 by Golden Gaytan RCP  Outcome: Progressing  Flowsheets (Taken 10/2/2022 0956)  Achieves optimal ventilation and oxygenation:   Assess for changes in respiratory status   Position to facilitate oxygenation and minimize respiratory effort   Assess the need for suctioning and aspirate as needed   Assess for changes in mentation and behavior   Oxygen supplementation based on oxygen saturation or arterial blood gases   Assess and instruct to report shortness of breath or any respiratory difficulty     Problem: Neurosensory - Adult  Goal: Achieves stable or improved neurological status  Outcome: Progressing     Problem: Cardiovascular - Adult  Goal: Maintains optimal cardiac output and hemodynamic stability  Outcome: Progressing     Problem: Musculoskeletal - Adult  Goal: Return mobility to safest level of function  Outcome: Progressing     Problem: Confusion  Goal: Confusion, delirium, dementia, or psychosis is improved or at baseline  Description: INTERVENTIONS:  1. Assess for possible contributors to thought disturbance, including medications, impaired vision or hearing, underlying metabolic abnormalities, dehydration, psychiatric diagnoses, and notify attending LIP  2. [place] high risk fall precautions, as indicated  3. Provide frequent short contacts to provide reality reorientation, refocusing and direction  4. Decrease environmental stimuli, including noise as appropriate  5. Monitor and intervene to maintain adequate nutrition, hydration, elimination, sleep and activity  6.  If unable to ensure safety without constant attention obtain sitter and review sitter guidelines with assigned personnel  7. Initiate Psychosocial CNS and Spiritual Care consult, as indicated  10/2/2022 1711 by Reed Mantilla RN  Outcome: Progressing     Problem: Skin/Tissue Integrity  Goal: Absence of new skin breakdown  Description: 1. Monitor for areas of redness and/or skin breakdown  2. Assess vascular access sites hourly  3. Every 4-6 hours minimum:  Change oxygen saturation probe site  4. Every 4-6 hours:  If on nasal continuous positive airway pressure, respiratory therapy assess nares and determine need for appliance change or resting period.   10/2/2022 1711 by Reed Mantilla RN  Outcome: Progressing

## 2022-10-03 NOTE — PROGRESS NOTES
Comprehensive Nutrition Assessment    Type and Reason for Visit:  Reassess    Nutrition Recommendations/Plan:   Restart oral diet as able. Start high protein/high calorie supplement   Continue to monitor weight, labs and intake     Malnutrition Assessment:  Malnutrition Status:  Insufficient data (09/26/22 1149)    Context:  Acute Illness     Findings of the 6 clinical characteristics of malnutrition:  Energy Intake:  Unable to assess  Weight Loss:  Unable to assess     Body Fat Loss:  Unable to assess     Muscle Mass Loss:  Unable to assess    Fluid Accumulation:  Unable to assess     Strength:  Not Performed    Nutrition Assessment:    Patient NPO for possible DANE. Previously, patient was upgraded to regular diet. Patient with poor appetite, good intake of fluids. Last BM 10/1    Nutrition Related Findings:    meds/labs reviewed Wound Type: None       Current Nutrition Intake & Therapies:    Average Meal Intake: NPO  Average Supplements Intake: NPO  Diet NPO Exceptions are: Sips of Water with Meds, Ice Chips    Anthropometric Measures:  Height: 5' 5\" (165.1 cm)  Ideal Body Weight (IBW): 125 lbs (57 kg)    Admission Body Weight: 240 lb 9.6 oz (109.1 kg)  Current Body Weight: 240 lb 9.6 oz (109.1 kg), 192.5 % IBW.  Weight Source: Bed Scale  Current BMI (kg/m2): 40                          BMI Categories: Obese Class 3 (BMI 40.0 or greater)    Estimated Daily Nutrient Needs:  Energy Requirements Based On: Kcal/kg  Weight Used for Energy Requirements: Admission  Energy (kcal/day): 3395-2084 kcal/day  Weight Used for Protein Requirements: Ideal  Protein (g/day):  g pro/day  Method Used for Fluid Requirements: Other (Comment)  Fluid (ml/day): per MD    Nutrition Diagnosis:   Inadequate oral intake related to impaired respiratory function as evidenced by NPO or clear liquid status due to medical condition    Nutrition Interventions:   Food and/or Nutrient Delivery: Continue NPO (resume oral diet and supplement)  Nutrition Education/Counseling: No recommendation at this time  Coordination of Nutrition Care: Continue to monitor while inpatient       Goals:     Goals: Meet at least 75% of estimated needs, within 7 days       Nutrition Monitoring and Evaluation:   Behavioral-Environmental Outcomes: None Identified  Food/Nutrient Intake Outcomes: Diet Advancement/Tolerance, Food and Nutrient Intake, Supplement Intake  Physical Signs/Symptoms Outcomes: Nutrition Focused Physical Findings, Biochemical Data, Weight, Skin, Fluid Status or Edema    Discharge Planning:     Too soon to determine, Continue Oral Nutrition Supplement     [place], [address]  Contact: [phone]

## 2022-10-03 NOTE — PROGRESS NOTES
PULMONARY & CRITICAL CARE MEDICINE PROGRESS NOTE     Patient:  Darrian Ellis  MRN: [id number]  Admit date: 9/24/2022  Primary Care Physician: Allison Beltran MD  Consulting Physician: Abner Jackson MD  CODE Status: Full Code  LOS: 9     SUBJECTIVE     Chief Complaint/ Reason for consult:    Acute hypoxic respiratory failure due to MSSA septicemia    Hospital Course: The patient is a 80 y.o. female with past medical history of COPD, chronic hip pain, chronic back pain, prediabetes, OA, osteoporosis who presented to the ED with profound weakness, found to be tachypnic, tachycardic, blood cultures grew MSSA. Urine cx grew Klebsiella. During her admission, she required increasing ventilatory support, initially BIPAP and then alter intubation with mechanical ventilation. She was extubated on 9/28/22, was transferred out of the ICU to the floor. In this interim period, patient has had hypoactive delirium, was febrile on 9/29/22 Tmax 101.1, with worsening leukocytosis and increased oxygen requirement from 3 L to 4 L NC. She underwent CXR which showed pulmonary vascular congestion, increased bibasilar opacities, CXR does not appear much changed from the CXR 3 days ago. She has been started on Unasyn for concern of aspiration pneumonia. This am, patient was found to be in A fib with RVR HR upto 160s, Cardiology was consulted, patient started on lopressor 25 mg BID    Interval History:  10/03/22  Pt was seen and examined at bedside. Events noted chart seen, medications reviewed. In last 24-hour she is afebrile T-max is 99.4. No respiratory distress was reported respiratory rate is usually in mid 20s. She is hemodynamically stable and heart rate is in 70s to 80s. She is maintaining saturation on 2 L and saturation has been between 96% to 98%. According to patient she is feeling better she still have weak cough difficulty expectoration.   According to patient she get coughing spells intermittently without wheezing no hemoptysis or chest pain denies purulent sputum unable to bring the sputum. She did use BiPAP overnight 10/5/40 percent denies difficulty using BiPAP. WBC continues to improve 14.6 hemoglobin stable at 10    Chest x-ray on 2022 shows bilateral areas of his infiltrate low lung volume left basilar atelectasis/effusion not much change in chest x-ray from  and 2022    Review Of Systems:  Review of Systems   Constitutional:  Negative for activity change, appetite change, chills, fatigue and fever. HENT:  Negative for congestion, postnasal drip, rhinorrhea, sinus pressure, sneezing and sore throat. Respiratory:  Positive for cough and shortness of breath. Negative for wheezing and stridor. Cardiovascular:  Positive for leg swelling. Negative for chest pain. Gastrointestinal:  Negative for abdominal distention and abdominal pain. Genitourinary:  Negative for dysuria and hematuria. Musculoskeletal:  Negative for arthralgias and back pain. Neurological:  Negative for weakness, light-headedness and headaches. Psychiatric/Behavioral:  Positive for behavioral problems. OBJECTIVE     PaO2/FiO2 RATIO:  Recent Labs     22   POCPO2 64.2*        FiO2 : 28 %     VITAL SIGNS:   LAST:  /65   Pulse 75   Temp 98.5 °F (36.9 °C) (Temporal)   Resp 20   Ht 5' 5\" (1.651 m)   Wt 238 lb 1.6 oz (108 kg)   LMP  (LMP Unknown)   SpO2 94%   BMI 39.62 kg/m²   8-24 HR RANGE:  TEMP Temp  Av.2 °F (37.3 °C)  Min: 98.3 °F (36.8 °C)  Max: 100.9 °F (27.8 °C)   BP Systolic (37SXZ), AMN:237 , Min:101 , LZU:073      Diastolic (98CBK), ZRD:19, Min:44, Max:72     PULSE Pulse  Av.7  Min: 75  Max: 102   RR Resp  Av.8  Min: 11  Max: 29   O2 SAT SpO2  Av.5 %  Min: 94 %  Max: 98 %   OXYGEN DELIVERY O2 Flow Rate (L/min)  Av.5 L/min  Min: 0 L/min  Max: 2 L/min        Systemic Examination:   Physical Exam -  Constitutional:  Alert, cooperative and no distress.   Mental Status: Oriented to person, place and time and normal affect. Lungs:  Bilateral air entry present, lung fields coarse. Bilateral breath sounds present with prolonged expiration no wheezing. Heart:  Regular rate and rhythm, no murmur. Abdomen:  Soft, nontender, nondistended, normal bowel sounds. Extremities: Mild bilateral pedal edema, redness, tenderness in the calves. Skin:  Warm, dry, no gross lesions or rashes. DATA REVIEW     Medications: Current Inpatient  Scheduled Meds:   potassium chloride  40 mEq Oral Once    potassium chloride  20 mEq Oral BID WC    metoprolol tartrate  25 mg Oral BID    enoxaparin  1 mg/kg SubCUTAneous BID    ampicillin-sulbactam  3,000 mg IntraVENous Q6H    thiamine  100 mg Oral Daily    folic acid  1 mg Oral Daily    ipratropium  0.5 mg Nebulization 4x daily    sodium chloride flush  5-40 mL IntraVENous 2 times per day    budesonide-formoterol  2 puff Inhalation BID     Continuous Infusions:   dextrose      sodium chloride Stopped (09/29/22 1257)       INPUT/OUTPUT:  In: 298.7   Out: 800 [Urine:800]         LABS:  ABGs:   Recent Labs     09/30/22 2101   POCPH 7.580*   POCPCO2 30.8*   POCPO2 64.2*   POCHCO3 28.8*   WATK0NHU 95       CBC:   Recent Labs     10/01/22  0259 10/02/22  0401 10/03/22  0530   WBC 20.9* 16.8* 14.6*   HGB 11.7* 10.2* 10.0*   HCT 36.4 30.0* 30.1*   MCV 81.4* 80.2* 80.9*    239 241   LYMPHOPCT 11* 10* 11*   RBC 4.47 3.74* 3.72*   MCH 26.2 27.3 26.9   MCHC 32.1 34.0 33.2   RDW 17.1* 16.9* 17.0*       CRP:   No results for input(s): CRP in the last 72 hours. LDH:   No results for input(s): LDH in the last 72 hours.   BMP:   Recent Labs     09/30/22  1813 10/01/22  0259 10/02/22  0401 10/03/22  0530    139 134* 135   K  --  3.7 3.2* 3.5*   CL  --  99 99 101   CO2  --  29 23 23   BUN  --  17 14 11   CREATININE  --  0.53 0.51 0.53   GLUCOSE  --  113* 92 128*       Liver Function Test:   No results for input(s): PROT, LABALBU, ALT, AST, GGT, ALKPHOS, BILITOT in the last 72 hours. Coagulation Profile:   No results for input(s): INR, PROTIME, APTT in the last 72 hours. D-Dimer:  No results for input(s): DDIMER in the last 72 hours. Lactic Acid:  No results for input(s): LACTA in the last 72 hours. Cardiac Enzymes:  No results for input(s): CKTOTAL, CKMB, CKMBINDEX, TROPONINI in the last 72 hours. Invalid input(s): TROPONIN, HSTROP    BNP/ProBNP:   No results for input(s): BNP, PROBNP in the last 72 hours. Triglycerides:  No results for input(s): TRIG in the last 72 hours. Microbiology:  Urine Culture:  No components found for: CURINE  Blood Culture:  No components found for: CBLOOD, CFUNGUSBL  Sputum Culture:  No components found for: CSPUTUM  No results for input(s): SPECDESC, SPECIAL, CULTURE, STATUS, ORG, CDIFFTOXPCR, CAMPYLOBPCR, SALMONELLAPC, SHIGAPCR, SHIGELLAPCR, MPNEUG, MPNEUM, LACTOQL in the last 72 hours. No results for input(s): SPUTUM, SPECDESC, SPECIAL, CULTURE, STATUS, ORG, CDIFFTOXPCR, MPNEUM, MPNEUG in the last 72 hours. Invalid input(s): CURINE, CBLOOD, CFUNGUSBL       Pathology:    Radiology Reports:  NM BONE SCAN 3 PHASE   Final Result   Nondiagnostic study. Patient could not cooperate. No asymmetry on flow   images to suggest hyperemia. XR CHEST PORTABLE   Final Result   1. Pulmonary vascular congestion may be increased, and there may be new   perihilar edema. Consider congestive heart failure given at least borderline   cardiomegaly and suspicion for trace bilateral pleural effusions. Pneumonia   could appear similar. 2. Increased bibasilar airspace opacities likely due in part to atelectasis   with potential for superimposed pneumonia or aspiration especially on the   left. XR ABDOMEN (KUB) (SINGLE AP VIEW)   Final Result   1. No ileus or obstruction is identified. Mild gaseous distention of the   stomach. 2. Cardiomegaly with vascular congestion.    3. Small bore central venous catheter terminates at the cavoatrial   junction/right atrium. XR CHEST PORTABLE   Final Result   1. No ileus or obstruction is identified. Mild gaseous distention of the   stomach. 2. Cardiomegaly with vascular congestion. 3. Small bore central venous catheter terminates at the cavoatrial   junction/right atrium. VL DUP LOWER EXTREMITY VENOUS BILATERAL   Final Result      XR CHEST PORTABLE   Final Result   Central pulmonary congestion with obscuration of the left hemidiaphragm   likely representing effusion as can be seen in CHF. XR CHEST (SINGLE VIEW FRONTAL)   Final Result   Left internal jugular line tip projected over the mid right atrium. OG and endotracheal tubes appear unchanged and in satisfactory position. Pulmonary vasculature may be slightly congested. XR CHEST PORTABLE   Final Result   Endotracheal tube in satisfactory position. XR ABDOMEN FOR NG/OG/NE TUBE PLACEMENT   Final Result   OG tube in satisfactory position. CT CHEST ABDOMEN PELVIS W CONTRAST   Final Result   No acute traumatic abnormality detected within the chest, abdomen, and pelvis. Airspace disease the left lung base, atelectasis versus pneumonia versus   aspiration. CT THORACIC SPINE TRAUMA RECONSTRUCTION   Final Result   No acute traumatic abnormality detected within the chest, abdomen, and pelvis. Airspace disease the left lung base, atelectasis versus pneumonia versus   aspiration. CT LUMBAR SPINE TRAUMA RECONSTRUCTION   Final Result   No acute traumatic abnormality detected within the chest, abdomen, and pelvis. Airspace disease the left lung base, atelectasis versus pneumonia versus   aspiration. CT HEAD WO CONTRAST   Final Result   No acute intracranial abnormality. CT CERVICAL SPINE WO CONTRAST   Final Result   No acute abnormality of the cervical spine.          XR CHEST PORTABLE   Final Result   No radiographic evidence of an acute cardiopulmonary process. 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              Echocardiogram:   Results for orders placed during the hospital encounter of 09/24/22    ECHO Complete 2D W Doppler W Color    Narrative  Transthoracic Echocardiography Report (TTE)    Patient Name Ez Carlos     Date of Study           09/27/2022  JOSIAH MOMIN    Date of      1950  Gender                  Female  Birth    Age          80 year(s)  Race                        Room Number  3024        Height:                 65 inch, 165.1 cm    Corporate ID [id number]    Weight:                 210 pounds, 95.3 kg  #    Patient Acct [de-identified]   BSA:        2.02 m^2    BMI:        34.95 kg/m^2  #    MR #         [id number]     Sonographer             Azam Sin RVT,  [facility]    Accession #  [id number]  Interpreting Physician  Mendy Gardner    Fellow                   Referring Nurse  Practitioner    Interpreting             Referring Physician  Fellow    Type of Study    TTE procedure:2D Echocardiogram, M-Mode, Doppler, Color Doppler. Procedure Date  Date: 09/27/2022 Start: 04:13 PM    Study Location: [facility]  Technical Quality: Adequate visualization    Indications:Cardiac Evaluation. Comments:Referring Physician: Marta Alexander MD    History / Tech. Comments:  Echo done at patient bedside. Echo done at patient bedside. Patient supine on ventilator. Patient Status: Inpatient    Height: 65 inches Weight: 210 pounds BSA: 2.02 m^2 BMI: 34.95 kg/m^2    CONCLUSIONS    Summary  Normal LV size and wall thickness. No obvious wall motion abnormality seen. Normal LV systolic function with LVEF >55%. Dilated RV with preserved function. RV systolic pressure 44 mmHg  RA appears dilated. No obvious significant structural valvular abnormality noted. No significant valvular stenosis or regurgitation noted.   Normal aortic root dimension. No significant pericardial effusion noted. IVC is dilated, difficult to assess inspiratory variation , Pt on ventilator    Signature  ----------------------------------------------------------------------------  Electronically signed by Mendy Gardner(Interpreting physician) on  09/28/2022 11:12 AM  ----------------------------------------------------------------------------    ----------------------------------------------------------------------------  Electronically signed by Ponce Welch RVT, RDCS(Sonographer) on  09/27/2022 04:48 PM  ----------------------------------------------------------------------------  FINDINGS  Left Atrium  Left atrium is normal in size. Left Ventricle  Left ventricle is normal in size. Global left ventricular systolic function  is normal. Estimated ejection fraction is 60 %. Right Atrium  Right atrial dilatation. Right Ventricle  Right ventricular dilatation with normal systolic function. Mitral Valve  Normal mitral valve structure. Trivial mitral regurgitation. No mitral stenosis. Aortic Valve  Normal aortic valve structure. Aortic valve is trileaflet. No aortic insufficiency. No aortic stenosis. Tricuspid Valve  Normal tricuspid valve leaflets. Mild tricuspid regurgitation. Estimated right ventricular systolic pressure is 44 mmHg. No tricuspid stenosis. Pulmonic Valve  Normal pulmonic valve structure. No significant pulmonic insufficiency. No evidence of pulmonic stenosis. Pericardial Effusion  No pericardial effusion seen. Miscellaneous  Normal aortic root dimension. IVC dilated but unable to assess respiratory collapse due to patient on  ventilator.   E/E' average = 10.65    M-mode / 2D Measurements & Calculations:    LVIDd:5.2 cm(3.7 - 5.6 cm)       Diastolic GRPVYF:69.0 ml  AEWEI:5.1 cm(2.2 - 4.0 cm)       Systolic WAHZOL:15.5 ml  CARRINGTON:2.1 cm(0.6 - 1.1 cm)        Aortic Root:2.9 cm(2.0 - 3.7 cm)  LVPWd:0.6 cm(0.6 - 1.1 cm)       LA Dimension: 4.2 cm(1.9 - 4.0 cm)  Fractional Shortenin.69 %    LA volume/Index: 48.7 ml /24m^2  Calculated LVEF (%): 61.3 %      LVOT:1.8 cm  RVDd:4.7 cm    Mitral:                                 Aortic    Valve Area (P1/2-Time): 2.75 cm^2       Peak Velocity: 1.76 m/s  Peak E-Wave: 0.96 m/s                   Mean Velocity: 1.12 m/s  Peak A-Wave: 0.99 m/s                   Peak Gradient: 12.39 mmHg  E/A Ratio: 0.97                         Mean Gradient: 6 mmHg  Peak Gradient: 3.69 mmHg  Mean Gradient: 3 mmHg  Deceleration Time: 221 msec             Area (continuity): 2.17 cm^2  P1/2t: 80 msec                          AV VTI: 33.3 cm    Area (continuity): 2.58 cm^2  Mean Velocity: 0.82 m/s    Tricuspid:                              Pulmonic:    Estimated RVSP: 44 mmHg                 Peak Velocity: 1.07 m/s  Peak TR Velocity: 2.93 m/s              Peak Gradient: 4.58 mmHg  Peak TR Gradient: 34.3396 mmHg  Estimated RA Pressure: 10 mmHg    Estimated PASP: 44.34 mmHg    Diastology / Tissue Doppler  Septal Wall E' velocity:0.07 m/s  Septal Wall E/E':13  Lateral Wall E' velocity:0.12 m/s  Lateral Wall E/E':8.3       ASSESSMENT AND PLAN     Assessment:    // Acute hypoxic respiratory failure  // Aspiration pneumonia/pneumonitis  // Altered mental status/encephalopathy improving  // Paroxysmal A fib with RVR  // MSSA septicemia  // Klebsiella UTI  // COPD by history  // Obesity likely obstructive sleep apnea    Plan:    Patient is currently on 2 L nasal cannula wean O2 to keep saturation above 90%. Continue to use BiPAP at night at current setting and as needed and during naps during the daytime. Continue with ipratropium and she is also on Symbicort twice daily. Albuterol to be used as needed  She is currently in sinus rhythm on metoprolol. On beta-blocker for atrial fibrillation currently in sinus rhythm   She is currently on Unasyn follow-up with infectious disease.   Encourage incentive spirometry, ambulation PT deep breathing and cough. Strict aspiration precaution. Avoidance of benzodiazepine and narcotics. Patient is currently on full dose of Lovenox for atrial fibrillation. DVT prophylaxis: On therapeutic Lovenox. Discussed with nursing staff, treatment and plan discussed with    Isela Babin MD.  Pulmonary critical care medicine  Yee Patterson [id number]    10/3/2022 12:37 PM        Please note that this chart was generated using voice recognition Dragon dictation software. Although every effort was made to ensure the accuracy of this automated transcription, some errors in transcription may have occurred.

## 2022-10-03 NOTE — PROGRESS NOTES
Physical Therapy  Facility/Department: [facility] 4B STEPDOWN  Daily Treatment Note    Name: Jackie Perez  : 1950  MRN: [id number]  Date of Service: 10/3/2022    Discharge Recommendations:  Patient would benefit from continued therapy after discharge   PT Equipment Recommendations  Equipment Needed:  (CTA)      Patient Diagnosis(es): The encounter diagnosis was Septicemia [facility]). Past Medical History:  has a past medical history of Chronic back pain, Chronic hip pain, COPD (chronic obstructive pulmonary disease) ([address] Major depression, Osteoarthritis, and Osteoporosis. Past Surgical History:  has a past surgical history that includes Hysterectomy, vaginal; Cholecystectomy; and Breast biopsy. Assessment   Body Structures, Functions, Activity Limitations Requiring Skilled Therapeutic Intervention: Decreased functional mobility ; Decreased strength;Decreased endurance;Decreased balance  Assessment: The pt transferred sit to stand with a RW x maxAx2 with heavy lean on RW. Pt limited by pain in L hip, L groin and L sciatic; RN notified of pain. She was with significant fatigue following the transfers. She could benefit from a continuation of PT for gait, transfers, and strengthening following her DC  Therapy Prognosis: Good  Barriers to Learning: self limiting; particular  Activity Tolerance  Activity Tolerance: Patient limited by fatigue;Patient limited by endurance; Patient limited by pain     Plan   Physcial Therapy Plan  General Plan:  (5-6x/wk)  Current Treatment Recommendations: Strengthening, Balance training, Functional mobility training, Transfer training, Endurance training, Gait training, Safety education & training  Safety Devices  Type of Devices: Nurse notified, Call light within reach, Chair alarm in place, Left in chair, Gait belt, All fall risk precautions in place  Restraints  Restraints Initially in Place: No     Restrictions  Restrictions/Precautions  Restrictions/Precautions: Up as Tolerated Subjective   General  Chart Reviewed: Yes  Response To Previous Treatment: Patient with no complaints from previous session. Family / Caregiver Present: No  General Comment  Comments: Pt retired to recliner with call light and chair alarm. RN present upon exiting  Subjective  Subjective: RN and pt  is agreeable to PT, but pt needed encouragement to participate. Pt sitting in recliner upon arrival.  Pt c/o 10/10 L hip/groin/sciatic pain; RN notified. Cognition   Orientation  Overall Orientation Status: Within Functional Limits  Cognition  Overall Cognitive Status: Exceptions  Following Commands: Follows one step commands with increased time  Attention Span: Attends with cues to redirect  Memory: Appears intact  Safety Judgement: Decreased awareness of need for assistance  Problem Solving: Assistance required to implement solutions  Insights: Decreased awareness of deficits  Initiation: Requires cues for some  Sequencing: Requires cues for some     Objective   Bed mobility  Bed Mobility Comments: MARCELL; pt in recliner t/o  Transfers  Sit to Stand: Maximum Assistance;2 Person Assistance  Stand to Sit: 2 Person Assistance;Maximum Assistance  Comment: assessed to RW; verbal cues given t/o session for hand placement and posture with poor return; STS x 2 trials with rocking motion, pt leaning heavily on RW upon brief stand <20 seconds; unable to correct. Ambulation  Comments: unsafe to attempt     Balance  Posture: Good  Sitting - Static: Fair  Sitting - Dynamic: Fair  Comments: pt sitting on EOC  A/AROM Exercises: AROM to B ankle pumps (writer holding B LEs away from floor), B LAQ with AROM to R LAQ and AAROM to L LAQ and B hip adduction R>L x 10 reps.   Pt limited by pain in L LE        OutComes Score     AM-PAC Score  AM-PAC Inpatient Mobility Raw Score : 9 (10/03/22 0952)  AM-PAC Inpatient T-Scale Score : 30.55 (10/03/22 0952)  Mobility Inpatient CMS 0-100% Score: 81.38 (10/03/22 8147)  Mobility Inpatient CMS G-Code Modifier : CM (10/03/22 9719)    Goals  Short Term Goals  Time Frame for Short Term Goals: 10 visits  Short Term Goal 1: transfers with min assist  Short Term Goal 2: amb 50 ft with a W x min assist  Short Term Goal 3: min assist with bed mobility  Short Term Goal 4: 20 min exercise program x min assist       Education  Patient Education  Education Given To: Patient  Education Provided: Role of Therapy;Plan of Care  Education Provided Comments: educated provided in beginning of session for importance of PT; pt needed extended time to understand importance of PT. Pt given verbal cues during standing for posture and technique with B hand placement.   Education Method: Verbal  Barriers to Learning:  (self limiting; very particular)  Education Outcome: Verbalized understanding;Continued education needed      Therapy Time   Individual Concurrent Group Co-treatment   Time In 0910         Time Out 0949         Minutes 39         Timed Code Treatment Minutes: Rafiq Quiroz, ANGEL

## 2022-10-03 NOTE — CARE COORDINATION
Transitional planning    Writer received call from [facility] at [address] to do onsite today, patient updated.           [address] from [facility] at bedside , will start precert

## 2022-10-04 ENCOUNTER — APPOINTMENT (OUTPATIENT)
Dept: CARDIAC CATH/INVASIVE PROCEDURES | Age: 72
DRG: 871 | End: 2022-10-04
Payer: COMMERCIAL

## 2022-10-04 LAB
ABSOLUTE EOS #: 0.23 K/UL (ref 0–0.4)
ABSOLUTE IMMATURE GRANULOCYTE: 0 K/UL (ref 0–0.3)
ABSOLUTE LYMPH #: 1.48 K/UL (ref 1–4.8)
ABSOLUTE MONO #: 0.8 K/UL (ref 0.1–0.8)
ANION GAP SERPL CALCULATED.3IONS-SCNC: 9 MMOL/L (ref 9–17)
BASOPHILS # BLD: 1 % (ref 0–2)
BASOPHILS ABSOLUTE: 0.11 K/UL (ref 0–0.2)
BUN BLDV-MCNC: 8 MG/DL (ref 8–23)
CALCIUM SERPL-MCNC: 7.8 MG/DL (ref 8.6–10.4)
CHLORIDE BLD-SCNC: 101 MMOL/L (ref 98–107)
CO2: 26 MMOL/L (ref 20–31)
CREAT SERPL-MCNC: 0.55 MG/DL (ref 0.5–0.9)
CULTURE: NORMAL
CULTURE: NORMAL
EOSINOPHILS RELATIVE PERCENT: 2 % (ref 1–4)
GFR SERPL CREATININE-BSD FRML MDRD: >60 ML/MIN/1.73M2
GLUCOSE BLD-MCNC: 107 MG/DL (ref 65–105)
GLUCOSE BLD-MCNC: 91 MG/DL (ref 70–99)
HCT VFR BLD CALC: 29.2 % (ref 36.3–47.1)
HEMOGLOBIN: 9.4 G/DL (ref 11.9–15.1)
IMMATURE GRANULOCYTES: 0 %
LYMPHOCYTES # BLD: 13 % (ref 24–44)
Lab: NORMAL
Lab: NORMAL
MAGNESIUM: 2 MG/DL (ref 1.6–2.6)
MCH RBC QN AUTO: 26.3 PG (ref 25.2–33.5)
MCHC RBC AUTO-ENTMCNC: 32.2 G/DL (ref 28.4–34.8)
MCV RBC AUTO: 81.8 FL (ref 82.6–102.9)
MONOCYTES # BLD: 7 % (ref 1–7)
MORPHOLOGY: ABNORMAL
MORPHOLOGY: ABNORMAL
NRBC AUTOMATED: 0 PER 100 WBC
PDW BLD-RTO: 16.7 % (ref 11.8–14.4)
PLATELET # BLD: 305 K/UL (ref 138–453)
PMV BLD AUTO: 11.5 FL (ref 8.1–13.5)
POTASSIUM SERPL-SCNC: 3.1 MMOL/L (ref 3.7–5.3)
RBC # BLD: 3.57 M/UL (ref 3.95–5.11)
SEG NEUTROPHILS: 77 % (ref 36–66)
SEGMENTED NEUTROPHILS ABSOLUTE COUNT: 8.78 K/UL (ref 1.8–7.7)
SODIUM BLD-SCNC: 136 MMOL/L (ref 135–144)
SPECIMEN DESCRIPTION: NORMAL
SPECIMEN DESCRIPTION: NORMAL
WBC # BLD: 11.4 K/UL (ref 3.5–11.3)

## 2022-10-04 PROCEDURE — 6370000000 HC RX 637 (ALT 250 FOR IP)

## 2022-10-04 PROCEDURE — 6360000002 HC RX W HCPCS: Performed by: STUDENT IN AN ORGANIZED HEALTH CARE EDUCATION/TRAINING PROGRAM

## 2022-10-04 PROCEDURE — 85025 COMPLETE CBC W/AUTO DIFF WBC: CPT

## 2022-10-04 PROCEDURE — 99232 SBSQ HOSP IP/OBS MODERATE 35: CPT | Performed by: INTERNAL MEDICINE

## 2022-10-04 PROCEDURE — 82947 ASSAY GLUCOSE BLOOD QUANT: CPT

## 2022-10-04 PROCEDURE — 6360000002 HC RX W HCPCS: Performed by: INTERNAL MEDICINE

## 2022-10-04 PROCEDURE — 6370000000 HC RX 637 (ALT 250 FOR IP): Performed by: INTERNAL MEDICINE

## 2022-10-04 PROCEDURE — 6370000000 HC RX 637 (ALT 250 FOR IP): Performed by: STUDENT IN AN ORGANIZED HEALTH CARE EDUCATION/TRAINING PROGRAM

## 2022-10-04 PROCEDURE — 94664 DEMO&/EVAL PT USE INHALER: CPT

## 2022-10-04 PROCEDURE — 83735 ASSAY OF MAGNESIUM: CPT

## 2022-10-04 PROCEDURE — 2700000000 HC OXYGEN THERAPY PER DAY

## 2022-10-04 PROCEDURE — 94640 AIRWAY INHALATION TREATMENT: CPT

## 2022-10-04 PROCEDURE — 36415 COLL VENOUS BLD VENIPUNCTURE: CPT

## 2022-10-04 PROCEDURE — 80048 BASIC METABOLIC PNL TOTAL CA: CPT

## 2022-10-04 PROCEDURE — 94669 MECHANICAL CHEST WALL OSCILL: CPT

## 2022-10-04 PROCEDURE — 6360000002 HC RX W HCPCS

## 2022-10-04 PROCEDURE — 99233 SBSQ HOSP IP/OBS HIGH 50: CPT | Performed by: INTERNAL MEDICINE

## 2022-10-04 PROCEDURE — 2580000003 HC RX 258

## 2022-10-04 PROCEDURE — 2060000000 HC ICU INTERMEDIATE R&B

## 2022-10-04 PROCEDURE — 94761 N-INVAS EAR/PLS OXIMETRY MLT: CPT

## 2022-10-04 PROCEDURE — 2580000003 HC RX 258: Performed by: INTERNAL MEDICINE

## 2022-10-04 RX ORDER — SODIUM CHLORIDE 0.9 % (FLUSH) 0.9 %
5-40 SYRINGE (ML) INJECTION EVERY 12 HOURS SCHEDULED
Status: DISCONTINUED | OUTPATIENT
Start: 2022-10-04 | End: 2022-10-06 | Stop reason: HOSPADM

## 2022-10-04 RX ORDER — LIDOCAINE HYDROCHLORIDE 10 MG/ML
5 INJECTION, SOLUTION EPIDURAL; INFILTRATION; INTRACAUDAL; PERINEURAL ONCE
Status: DISCONTINUED | OUTPATIENT
Start: 2022-10-04 | End: 2022-10-06 | Stop reason: HOSPADM

## 2022-10-04 RX ORDER — SODIUM CHLORIDE 0.9 % (FLUSH) 0.9 %
5-40 SYRINGE (ML) INJECTION PRN
Status: DISCONTINUED | OUTPATIENT
Start: 2022-10-04 | End: 2022-10-06 | Stop reason: HOSPADM

## 2022-10-04 RX ORDER — POTASSIUM CHLORIDE 7.45 MG/ML
10 INJECTION INTRAVENOUS
Status: DISPENSED | OUTPATIENT
Start: 2022-10-04 | End: 2022-10-04

## 2022-10-04 RX ORDER — SODIUM CHLORIDE 9 MG/ML
25 INJECTION, SOLUTION INTRAVENOUS PRN
Status: DISCONTINUED | OUTPATIENT
Start: 2022-10-04 | End: 2022-10-06 | Stop reason: HOSPADM

## 2022-10-04 RX ADMIN — SODIUM CHLORIDE, PRESERVATIVE FREE 10 ML: 5 INJECTION INTRAVENOUS at 21:30

## 2022-10-04 RX ADMIN — SODIUM CHLORIDE, PRESERVATIVE FREE 10 ML: 5 INJECTION INTRAVENOUS at 09:44

## 2022-10-04 RX ADMIN — OXYCODONE 5 MG: 5 TABLET ORAL at 21:39

## 2022-10-04 RX ADMIN — IPRATROPIUM BROMIDE 0.5 MG: 0.5 SOLUTION RESPIRATORY (INHALATION) at 08:59

## 2022-10-04 RX ADMIN — AMPICILLIN SODIUM AND SULBACTAM SODIUM 3000 MG: 2; 1 INJECTION, POWDER, FOR SOLUTION INTRAMUSCULAR; INTRAVENOUS at 12:25

## 2022-10-04 RX ADMIN — POTASSIUM CHLORIDE 10 MEQ: 7.46 INJECTION, SOLUTION INTRAVENOUS at 10:09

## 2022-10-04 RX ADMIN — OXYCODONE 5 MG: 5 TABLET ORAL at 10:11

## 2022-10-04 RX ADMIN — BUDESONIDE AND FORMOTEROL FUMARATE DIHYDRATE 2 PUFF: 160; 4.5 AEROSOL RESPIRATORY (INHALATION) at 08:59

## 2022-10-04 RX ADMIN — Medication 100 MG: at 09:44

## 2022-10-04 RX ADMIN — AMPICILLIN SODIUM AND SULBACTAM SODIUM 3000 MG: 2; 1 INJECTION, POWDER, FOR SOLUTION INTRAMUSCULAR; INTRAVENOUS at 18:35

## 2022-10-04 RX ADMIN — FOLIC ACID 1 MG: 1 TABLET ORAL at 09:44

## 2022-10-04 RX ADMIN — METOPROLOL TARTRATE 25 MG: 25 TABLET ORAL at 09:44

## 2022-10-04 RX ADMIN — POTASSIUM CHLORIDE 20 MEQ: 1500 TABLET, EXTENDED RELEASE ORAL at 16:23

## 2022-10-04 RX ADMIN — METOPROLOL TARTRATE 25 MG: 25 TABLET ORAL at 21:29

## 2022-10-04 RX ADMIN — OXYCODONE 5 MG: 5 TABLET ORAL at 01:39

## 2022-10-04 RX ADMIN — IPRATROPIUM BROMIDE 0.5 MG: 0.5 SOLUTION RESPIRATORY (INHALATION) at 12:51

## 2022-10-04 RX ADMIN — AMPICILLIN SODIUM AND SULBACTAM SODIUM 3000 MG: 2; 1 INJECTION, POWDER, FOR SOLUTION INTRAMUSCULAR; INTRAVENOUS at 05:40

## 2022-10-04 RX ADMIN — IPRATROPIUM BROMIDE 0.5 MG: 0.5 SOLUTION RESPIRATORY (INHALATION) at 15:54

## 2022-10-04 RX ADMIN — ANTACID TABLETS 500 MG: 500 TABLET, CHEWABLE ORAL at 21:39

## 2022-10-04 ASSESSMENT — PAIN SCALES - GENERAL
PAINLEVEL_OUTOF10: 0
PAINLEVEL_OUTOF10: 9
PAINLEVEL_OUTOF10: 9
PAINLEVEL_OUTOF10: 0
PAINLEVEL_OUTOF10: 8
PAINLEVEL_OUTOF10: 8

## 2022-10-04 ASSESSMENT — ENCOUNTER SYMPTOMS
CHEST TIGHTNESS: 0
SORE THROAT: 0
COUGH: 1
SINUS PRESSURE: 0
SHORTNESS OF BREATH: 1
STRIDOR: 0
RHINORRHEA: 0
ABDOMINAL DISTENTION: 0
BACK PAIN: 0
ABDOMINAL PAIN: 0
COLOR CHANGE: 0
WHEEZING: 0

## 2022-10-04 ASSESSMENT — PAIN DESCRIPTION - LOCATION
LOCATION: HIP
LOCATION: LEG;HIP

## 2022-10-04 ASSESSMENT — PAIN SCALES - WONG BAKER
WONGBAKER_NUMERICALRESPONSE: 0
WONGBAKER_NUMERICALRESPONSE: 0

## 2022-10-04 ASSESSMENT — PAIN DESCRIPTION - PAIN TYPE: TYPE: ACUTE PAIN

## 2022-10-04 ASSESSMENT — PAIN - FUNCTIONAL ASSESSMENT: PAIN_FUNCTIONAL_ASSESSMENT: PREVENTS OR INTERFERES SOME ACTIVE ACTIVITIES AND ADLS

## 2022-10-04 ASSESSMENT — PAIN DESCRIPTION - ORIENTATION: ORIENTATION: MID;LOWER;LEFT

## 2022-10-04 ASSESSMENT — PAIN DESCRIPTION - DESCRIPTORS: DESCRIPTORS: SHARP

## 2022-10-04 NOTE — PROGRESS NOTES
[facility] -   Infectious diseases evaluation  admission date 9/24/2022    reason for consultation:   Alyce Kumari and sepsis    Impression :   Current:  MSSA septicemia 9/24  Kleb UTI 9/24  Left lower lobe infiltrate likely atelectasis   Bandemia  Altered mentation, acute metabolic encephalopathy   septic shock resolved   SIRS from infection  Chronic left hip pain  Chronic lower back pain with bilateral sciatica  Prediabetes  COPD  Obstructive sleep apnea  A. fib paroxysmal  9/29 increasing oxygen requirement and increasing leukocytosis-possible aspiration event    Other:    Discussion / summary of stay / plan of care     Recommendations   Kleb UTI from 9/24 - treated w ancef   On Ancef for MSSA septicemia coming from home, and kleb UTI   There is a concern of aspiration and the patient has increased WBC and increased oxygen requirement  Switch to unasyn 9/30  till 10/7 then back to ancef till 10/24  CXR shows possibly new infiltrates bilat LL  suggesting aspiration pneumonia   Asking for DANE to look for possible vegetation to explain the MSSA bacteremia from home - could tolerate laying down  ould not tolerate bone scan or MR Is due to back pain-  Looking at her spine or hips evaluating for any infection hence and due to her altered mentation  Patient could not tolerate DANE so we plan to treat with abx for 6 weeks in hopes that it will resolve pain and allow her to be able to tolerate MRI in future. Plan to treat patient with Unasyn until 10/7.    After 10/7, patient will start high dose Ancef 2g Q8H until 11/9  Picc ordered - chart reconsiled      Infection Control Recommendations   [place] Precautions    Antimicrobial Stewardship Recommendations   Simplification of therapy  Targeted therapy      History of Present Illness:   Initial history:  Suleman Ricardo is a 80y.o.-year-old female was found by a neighbor sitting on a chair unable to get out because of weakness, she had missed work, it seems she was really feeling weak for about 3 days before that. She was brought by 911 to the hospital.  Was found to be septic with MSSA in the blood UTI Klebsiella septic shock, patient needed intubation at the time. Extubated 9/28 and ultimately transferred out to the floor. Because of DARIA vela cardiology has been on the case, planning possibly cardioversion on Monday. A 2D echo was done showing no vegetation. Her CT of the spine was negative for fracture, her CT of the abdomen and pelvis on 9/24 her admission, was negative for pneumonia. She had a white count that was increased upon admission and ultimately improved, 9/30 her white count increased again, there was a suspicion of aspiration over the last 24 hours. She is also requiring increased oxygen. Repeat blood cultures from 9/29 are still pending and negative no repeat blood cultures were done before that. The patient has been on Ancef. Seems she has had a history of chronic back pain with sciatica. On my exam she is alert on 4 L of nasal cannula looks very ill, she is very encephalopathic, not reliable in her answers. For instance she says she has no back pain and no hip pain but when he lifted both legs she screamed from pain.   According to the nurse she also screams to little things and is difficult to tell if this is true pain or chest discomfort      Interval changes  10/4/2022   Patient Vitals for the past 8 hrs:   BP Temp Temp src Pulse Resp SpO2   10/04/22 1618 133/61 -- -- -- -- 95 %   10/04/22 1352 106/63 98 °F (36.7 °C) Oral 76 17 --   10/04/22 1221 (!) 124/100 98.1 °F (36.7 °C) Oral 78 24 --       10/3  Patients fever spiked but this AM but has resolved  Leukocytosis is resolving  Patient vocalizes concern that Unasyn is not working but feels better than when she first got here  Patient reports leg and hip pain secondary to sciatica  On nasal cannula    Uri awaited cardiol    10/4   URI not performed - patient too uncomfortable for procedure  Afebrile last 24 hours  +back and hip pain      Summary of relevant labs:  Labs:  WBC 42-27-82-11-20- 16.8- 14.6 - 11.4  Platelet count 001- -[phone]- 305  Hematocrit 40-33-36  Micro:  Urine culture 9/24 Klebsiella pneumoniae, sensitive to Cipro and Bactrim, intermediate to ceftriaxone and resistant to penicillin  MRSA DNA neg-9/24  Blood culture 9/24 MSSA X2  Repeat blood culture 9/29 - negative    Imaging:  NM Bone Scan 9/30 - patient could not cooperate     CXR 9/30   1. Pulmonary vascular congestion may be increased, and there may be new perihilar edema. Consider congestive heart failure given at least borderline cardiomegaly and suspicion for trace bilateral pleural effusions. Pneumonia could appear similar. 2. Increased bibasilar airspace opacities likely due in part to atelectasis with potential for superimposed pneumonia or aspiration especially on the left      Chest x-ray 9/27 no pneumonia    CT head -9/24  CT of the cervical spine 9/24 negative  CT abdomen pelvis lumbar spine and thoracic spine 9/24 airspace disease of the left lung atelectasis versus pneumonia, no traumatic abnormality within the chest abdomen and pelvis      2D echo 9/27  Normal LV size and wall thickness. No obvious wall motion abnormality seen. Normal LV systolic function with LVEF >55%. Dilated RV with preserved function. RV systolic pressure 44 mmHg  RA appears dilated. No obvious significant structural valvular abnormality noted. No significant valvular stenosis or regurgitation noted. Normal aortic root dimension. No significant pericardial effusion noted. IVC is dilated, difficult to assess inspiratory variation , Pt on ventilator    Right lower extremity negative DVT    I have personally reviewed the past medical history, past surgical history, medications, social history, and family history, and I haveupdated the database accordingly.       Allergies: Lyrica [pregabalin], Demerol hcl [meperidine], Fluoxetine, and Paxil [paroxetine hcl]     Review of Systems:     Review of Systems   Constitutional:  Negative for activity change and appetite change. HENT:  Negative for congestion. Eyes:  Negative for visual disturbance. Respiratory:  Negative for chest tightness. Gastrointestinal:  Negative for abdominal distention. Endocrine: Negative for cold intolerance and heat intolerance. Genitourinary:  Negative for difficulty urinating and dysuria. Musculoskeletal:  Positive for arthralgias (hip and back pain). Skin:  Negative for color change. Allergic/Immunologic: Negative for immunocompromised state. Neurological:  Negative for dizziness. Hematological:  Negative for adenopathy. Psychiatric/Behavioral:  Negative for agitation. The patient is nervous/anxious. Physical Examination :       Physical Exam  Constitutional:       General: She is not in acute distress. Appearance: She is obese. She is not toxic-appearing. HENT:      Head: Normocephalic and atraumatic. Right Ear: External ear normal.      Left Ear: External ear normal.      Nose: Nose normal. No congestion. Mouth/Throat:      Mouth: Mucous membranes are moist.      Pharynx: Oropharynx is clear. Eyes:      General: No scleral icterus. Extraocular Movements: Extraocular movements intact. Pupils: Pupils are equal, round, and reactive to light. Cardiovascular:      Rate and Rhythm: Normal rate and regular rhythm. Pulses: Normal pulses. Heart sounds: Normal heart sounds. Pulmonary:      Effort: Pulmonary effort is normal. No respiratory distress. Breath sounds: No wheezing. Abdominal:      General: Abdomen is flat. Bowel sounds are normal. There is no distension. Tenderness: There is no abdominal tenderness. Musculoskeletal:         General: No swelling or deformity. Normal range of motion.       Cervical back: Normal range of motion and neck supple. Lymphadenopathy:      Cervical: No cervical adenopathy. Skin:     General: Skin is warm. Capillary Refill: Capillary refill takes less than 2 seconds. Coloration: Skin is not jaundiced. Findings: No bruising. Neurological:      General: No focal deficit present. Mental Status: She is alert and oriented to person, place, and time.    Psychiatric:         Mood and Affect: Mood normal.         Behavior: Behavior normal.       Past Medical History:     Past Medical History:   Diagnosis Date    Chronic back pain     Chronic hip pain     COPD (chronic obstructive pulmonary disease) ([facility])     Major depression     Osteoarthritis     Osteoporosis        Past Surgical  History:     Past Surgical History:   Procedure Laterality Date    BREAST BIOPSY      two    CHOLECYSTECTOMY      HYSTERECTOMY, VAGINAL         Medications:      lidocaine 1 % injection  5 mL IntraDERmal Once    sodium chloride flush  5-40 mL IntraVENous 2 times per day    potassium chloride  20 mEq Oral BID WC    metoprolol tartrate  25 mg Oral BID    [Held by provider] enoxaparin  1 mg/kg SubCUTAneous BID    ampicillin-sulbactam  3,000 mg IntraVENous Q6H    thiamine  100 mg Oral Daily    folic acid  1 mg Oral Daily    ipratropium  0.5 mg Nebulization 4x daily    sodium chloride flush  5-40 mL IntraVENous 2 times per day    budesonide-formoterol  2 puff Inhalation BID       Social History:     Social History     Socioeconomic History    Marital status: Single     Spouse name: Not on file    Number of children: Not on file    Years of education: Not on file    Highest education level: Not on file   Occupational History    Not on file   Tobacco Use    Smoking status: Former     Packs/day: 0.75     Years: 34.00     Pack years: 25.50     Types: Cigarettes     Quit date: 3/20/2017     Years since quittin.5    Smokeless tobacco: Never   Vaping Use    Vaping Use: Never used   Substance and Sexual Activity Alcohol use: Not Currently     Comment: occasionally     Drug use: Never    Sexual activity: Not on file   Other Topics Concern    Not on file   Social History Narrative    Not on file     Social Determinants of Health     Financial Resource Strain: Low Risk     Difficulty of Paying Living Expenses: Not hard at all   Food Insecurity: No Food Insecurity    Worried About Running Out of Food in the Last Year: Never true    Ran Out of Food in the Last Year: Never true   Transportation Needs: Not on file   Physical Activity: Not on file   Stress: Not on file   Social Connections: Not on file   Intimate Partner Violence: Not on file   Housing Stability: Not on file       Family History:     Family History   Problem Relation Age of Onset    Stroke Mother     Alzheimer's Disease Father       Medical Decision Making:   I have independently reviewed/ordered the following labs:    CBC with Differential:   Recent Labs     10/03/22  0530 10/04/22  0419   WBC 14.6* 11.4*   HGB 10.0* 9.4*   HCT 30.1* 29.2*    305   LYMPHOPCT 11* 13*   MONOPCT 7 7       BMP:  Recent Labs     10/03/22  0530 10/04/22  0419    136   K 3.5* 3.1*    101   CO2 23 26   BUN 11 8   CREATININE 0.53 0.55   MG 2.1 2.0       Hepatic Function Panel: No results for input(s): PROT, LABALBU, BILIDIR, IBILI, BILITOT, ALKPHOS, ALT, AST in the last 72 hours. No results for input(s): RPR in the last 72 hours. No results for input(s): HIV in the last 72 hours. No results for input(s): BC in the last 72 hours. Lab Results   Component Value Date/Time    CREATININE 0.55 10/04/2022 04:19 AM    GLUCOSE 91 10/04/2022 04:19 AM       Detailed results: Thank you for allowing us to participate in the care of this patient. Please call with questions.     This note is created with the assistance of a speech recognition program.  While intending to generate adocument that actually reflects the content of the visit, the document can still have some errors

## 2022-10-04 NOTE — CONSULTS
[facility]   Consult Note                 Date:   10/4/2022  Patient name: Wojciech Johnston  Date of admission:  9/24/2022  2:07 PM  MRN:   [id number]  YOB: 1950    Reason for Consult:      CHIEF COMPLAINT:      History Obtained From:  Patient     HISTORY OF PRESENT ILLNESS:    The patient is a 80 y.o. female with significant medical history of COPD, osteoporosis, osteoarthritis, chronic hip pain and chronic back pain presented to ED via EMS with complaints of shortness of breath, severe hip pain and being unable to get out of the chair for 2 days due to leg weakness. Upon admission patient had respiratory failure and was intubated on 09/24, eventually extubated on 09/28. She was fond to have septicemia and UTI,  urine culture positive for Klebsiella pneumoniae, blood culture positive for Staph aureus. She has no previous cardiological medical history. ECHO (9/24):LVEF >55%. Dilated RA and RV with preserved function. No obvious significant structural valvular abnormality noted. Past Medical History:   has a past medical history of Chronic back pain, Chronic hip pain, COPD (chronic obstructive pulmonary disease) ([address] Major depression, Osteoarthritis, and Osteoporosis. Past Surgical History:   has a past surgical history that includes Hysterectomy, vaginal; Cholecystectomy; and Breast biopsy. Home Medications:    Prior to Admission medications    Medication Sig Start Date End Date Taking?  Authorizing Provider   SYMBICORT 160-4.5 MCG/ACT AERO  5/16/22   Historical Provider, MD   promethazine (PHENERGAN) 25 MG tablet Take 1 tablet by mouth every 6 hours as needed (as needed for nausea) 6/2/22   Norma Jimenez MD   hydrOXYzine (ATARAX) 25 MG tablet Take 1 tablet by mouth nightly  Patient not taking: Reported on 9/24/2022 6/2/22   Norma Jimenez MD   desvenlafaxine succinate (PRISTIQ) 100 MG TB24 extended release tablet TAKE 1 TABLET BY MOUTH EVERY DAY 5/24/22   Norma Babatunde Gomez MD   Fluticasone-Umeclidin-Vilant (TRELEGY ELLIPTA) 200-62.5-25 MCG/INH AEPB Inhale 1 puff into the lungs daily 1/12/22   Norma Gomez MD   vitamin D (ERGOCALCIFEROL) 1.25 MG (74692 UT) CAPS capsule Take 1 capsule by mouth once a week 12/1/21   Norma Gomez MD   alendronate (FOSAMAX) 70 MG tablet Take 1 tablet by mouth once a week 12/1/21   Norma Gomez MD   cetirizine (ZYRTEC) 10 MG tablet Take 1 tablet by mouth daily 12/1/21   Norma Gomez MD   Handicap Placard MISC by Does not apply route Expires 11/2025 12/1/21   Norma Gomez MD   diclofenac (VOLTAREN) 50 MG EC tablet Take 1 tablet by mouth 3 times daily (with meals) 12/1/21   Norma Gomez MD   predniSONE (DELTASONE) 10 MG tablet 4 tabs by mouth daily for 3 days, then 3 tabs daily for 3 days, then 2 tabs daily for 3 days, then 1 tab daily till gone. Patient not taking: Reported on 9/24/2022 12/1/21   Norma Gomez MD   albuterol sulfate  (90 Base) MCG/ACT inhaler USE 2 INHALATIONS EVERY 4 HOURS AS NEEDED FOR WHEEZING OR SHORTNESS OF BREATH 11/29/21   Norma Gomez MD   tiZANidine (ZANAFLEX) 4 MG tablet TAKE 1 TABLET THREE TIMES A DAY 11/29/21   Norma Gomez MD   montelukast (SINGULAIR) 10 MG tablet TAKE 1 TABLET DAILY 11/29/21   Norma Gomez MD   morphine (MS CONTIN) 15 MG extended release tablet 3 times daily. 7/10/20   Historical Provider, MD       Allergies:  Lyrica [pregabalin], Demerol hcl [meperidine], Fluoxetine, and Paxil [paroxetine hcl]    Social History:   reports that she quit smoking about 5 years ago. Her smoking use included cigarettes. She has a 25.50 pack-year smoking history. She has never used smokeless tobacco. She reports that she does not currently use alcohol. She reports that she does not use drugs. Family History: family history includes Alzheimer's Disease in her father; Stroke in her mother. No for premature CAD.  No for h/o sudden cardiac death    REVIEW OF SYSTEMS:    Constitutional: there has been no unanticipated weight loss. There's been No change in activity level. Eyes: No visual changes or diplopia. No scleral icterus. ENT: No Headaches, hearing loss or vertigo. Cardiovascular: Yes chest pain, No dyspnea on exertion, No palpitations or No loss of consciousness. Respiratory: No cough or wheezing, no sputum production. No hematemesis. No pleuritic chest pain   Gastrointestinal: No abdominal pain, blood in stools. Genitourinary: No dysuria, trouble voiding, or hematuria. Musculoskeletal:  No gait disturbance, No weakness or joint complaints. Integumentary: No rash or pruritis. Neurological: No headache, diplopia, change in muscle strength, numbness or tingling. Psychiatric: No anxiety, or depression. Endocrine: No temperature intolerance. No excessive thirst, fluid intake, or urination. Hematologic/Lymphatic: No abnormal bruising or bleeding, blood clots or swollen lymph nodes. Allergic/Immunologic: No nasal congestion or hives. PHYSICAL EXAM:    Physical Examination:    BP (!) 130/58   Pulse 68   Temp 98.8 °F (37.1 °C) (Temporal)   Resp 24   Ht 5' 5\" (1.651 m)   Wt 238 lb 15.7 oz (108.4 kg)   LMP  (LMP Unknown)   SpO2 96%   BMI 39.77 kg/m²    Constitutional and General Appearance: alert, cooperative, in no apparent resp distress HEENT: PERRL, no cervical lymphadenopathy  Respiratory:  Good respiratory effort. No for increased work of breathing. On auscultation: clear to auscultation bilaterally, no basilar rales, no wheezing   Cardiovascular:  regular S1 and S2. No murmur audible   No Jugular venous distention, no hepatojugular reflex  Peripheral pulses are symmetrical and full   Abdomen:  No masses or tenderness  Bowel sounds present  Extremities:   No Cyanosis or Clubbing   Lower extremity edema: No   Skin: Warm and dry  Neurological:  Not done       DATA:    Diagnostics:      ECHO (9/24):LVEF >55%.  Dilated RA and RV with preserved function. No obvious significant structural valvular abnormality noted. Urine culture (09/24): positive for Klebsiella pneumoniae  Blood culture (drawn 9/24/22): Staph aureus  Blood culture (drawn 9/29/22): no growth 4 days    Labs:     CBC:   Recent Labs     10/03/22  0530 10/04/22  0419   WBC 14.6* 11.4*   HGB 10.0* 9.4*   HCT 30.1* 29.2*    305     BMP:   Recent Labs     10/03/22  0530 10/04/22  0419    136   K 3.5* 3.1*   CO2 23 26   BUN 11 8   CREATININE 0.53 0.55   LABGLOM >60 >60   GLUCOSE 128* 91     BNP: No results for input(s): BNP in the last 72 hours. PT/INR: No results for input(s): PROTIME, INR in the last 72 hours. APTT:No results for input(s): APTT in the last 72 hours. CARDIAC ENZYMES:No results for input(s): CKTOTAL, CKMB, CKMBINDEX, TROPONINI in the last 72 hours. FASTING LIPID PANEL:  Lab Results   Component Value Date/Time    HDL 50 05/17/2021 09:10 AM    TRIG 213 09/28/2022 05:48 AM     LIVER PROFILE:No results for input(s): AST, ALT, LABALBU in the last 72 hours. IMPRESSION:    New onset paroxysmal A.fib with RVR - currently in sinus rhythm  Concern for infective endocarditis, septicemia with Staph aureus    RECOMMENDATIONS:  Continue rate control woth metoprolol  Long turn anticoagulation therapy, currently on Lovenox, transition to NOAC on discharge   DANE to rule out endocarditis and vegetation  Antibiotic therapy per ID and medical management per primary team       Discussed with patient and nursing.     Lynwood Primrose, MD     [facility]   Pager - [phone]

## 2022-10-04 NOTE — PROGRESS NOTES
Occupational [address]  Occupational Therapy Not Seen Note    DATE: 10/4/2022    NAME: Shelbi Hoffmann  MRN: [id number]   : 1950      Patient not seen this date for Occupational Therapy due to:     Other: Pt off floor at cardiac cath for DANE    Next Scheduled Treatment: Attempt in PM or 10/5    Electronically signed by ANTONY James on 10/4/2022 at 10:39 AM

## 2022-10-04 NOTE — PROGRESS NOTES
PICC team consulted by Infectious Disease for PICC placement. Pt anxious and tearful when writer spoke with patient about PICC. Pt educated on PICC placement and what to expect. Pt did sign consent for the PICC, however, she declined to have it placed today. Pt explained that she had to talk to her family about it and think about it. Writer told pt that PICC team would return 10/5 for placement. Patient agreed with plan. Primary RN notified.   Cece Lindsay RN

## 2022-10-04 NOTE — PROGRESS NOTES
[facility]  Internal Medicine Teaching Residency Program  Inpatient Daily Progress Note  ______________________________________________________________________________    Patient: Elicia Bull  YOB: 1950   QRP:[id number]    Acct: [de-identified]     Room: [address] [place] [address]  Admit date: 9/24/2022  Today's date: 10/04/22  Number of days in the hospital: 10    SUBJECTIVE   Admitting Diagnosis: Septicemia ([facility] [address]  CC: Weakness, \"stuck in chair\"    Pt examined at bedside. Chart & results reviewed. No acute events overnight. Hemodynamically stable, afebrile and maintaining saturation on 2 L of oxygen via nasal cannula. Urine output is 800 over the last 24 hours. Potassium persistently low 3.1 this morning continues to be on oral supplementation daily. Continues to be on IV antibiotics for MSSA bacteremia. ID recommends continuing Unasyn till 10/07 then switching to Ancef till 10/24. WBC trending down 14.6 > 11.4. Follow-up echo ordered for vegetations. ROS:  Constitutional:  negative for chills, sweats  Respiratory:  negative for cough, dyspnea on exertion, hemoptysis, shortness of breath, wheezing  Cardiovascular:  negative for chest pain, chest pressure/discomfort, lower extremity edema, palpitations  Gastrointestinal:  negative for abdominal pain, constipation, diarrhea, nausea, vomiting  Neurological:  negative for dizziness, headache  BRIEF HISTORY     Patient presented to the ED via EMS after being found by a neighbor stuck in her chair. Patient was complaining of weakness was unable to get up from the chair. Patient was tachypneic failed BiPAP and was ultimately intubated. Blood cultures positive for MSSA and urine cultures were positive for Klebsiella. Patient was extubated on 9/28/2022. Patient has remained slightly altered, with comments of possible intermittent hallucinations. She was able to maintain her saturations on 3 L NC.   Her leukocytosis and urine output improved and patient was transferred in stable condition to the medical floor. OBJECTIVE     Vital Signs:  BP (!) 145/82   Pulse 83   Temp 98.1 °F (36.7 °C) (Oral)   Resp 20   Ht 5' 5\" (1.651 m)   Wt 238 lb 15.7 oz (108.4 kg)   LMP  (LMP Unknown)   SpO2 94%   BMI 39.77 kg/m²     Temp (24hrs), Av.8 °F (37.1 °C), Min:98.1 °F (36.7 °C), Max:99.9 °F (37.7 °C)    In: -   Out: 700 [Urine:700]    Physical Exam:  Constitutional: This is a well developed, well nourished, 35-39.9 - Obesity Grade II 80y.o. year old female who is alert, oriented, cooperative and in no apparent distress. Head:normocephalic and atraumatic. Respiratory:  Breath sounds bilaterally were clear to auscultation. There were no wheezes, rhonchi or rales. There is no intercostal retraction or use of accessory muscles. Cardiovascular: Regular without murmur, clicks, gallops or rubs. Abdomen: Slightly rounded and soft without organomegaly. No rebound, rigidity or guarding was appreciated. Musculoskeletal: No gross muscle weakness. Extremities:  No lower extremity edema, ulcerations, tenderness, varicosities or erythema. Muscle size, tone and strength are normal.  No involuntary movements are noted. Skin:  Warm and dry. Good color, turgor and pigmentation. No lesions or scars.   No cyanosis or clubbing  Neurological/Psychiatric: The patient's general behavior, level of consciousness, thought content and emotional status is normal.        Medications:  Scheduled Medications:    potassium chloride  10 mEq IntraVENous Q1H    potassium chloride  20 mEq Oral BID WC    metoprolol tartrate  25 mg Oral BID    [Held by provider] enoxaparin  1 mg/kg SubCUTAneous BID    ampicillin-sulbactam  3,000 mg IntraVENous Q6H    thiamine  100 mg Oral Daily    folic acid  1 mg Oral Daily    ipratropium  0.5 mg Nebulization 4x daily    sodium chloride flush  5-40 mL IntraVENous 2 times per day    budesonide-formoterol 2 puff Inhalation BID     Continuous Infusions:    dextrose      sodium chloride Stopped (09/29/22 1257)     PRN Medicationscalcium carbonate, 500 mg, TID PRN  metoclopramide, 5 mg, Q6H PRN  oxyCODONE, 5 mg, Q8H PRN  glucose, 4 tablet, PRN  dextrose bolus, 125 mL, PRN   Or  dextrose bolus, 250 mL, PRN  glucagon (rDNA), 1 mg, PRN  dextrose, , Continuous PRN  sodium chloride flush, 5-40 mL, PRN  sodium chloride, , PRN  ondansetron, 4 mg, Q8H PRN   Or  ondansetron, 4 mg, Q6H PRN  polyethylene glycol, 17 g, Daily PRN  acetaminophen, 650 mg, Q6H PRN   Or  acetaminophen, 650 mg, Q6H PRN      Diagnostic Labs:  CBC:   Recent Labs     10/02/22  0401 10/03/22  0530 10/04/22  0419   WBC 16.8* 14.6* 11.4*   RBC 3.74* 3.72* 3.57*   HGB 10.2* 10.0* 9.4*   HCT 30.0* 30.1* 29.2*   MCV 80.2* 80.9* 81.8*   RDW 16.9* 17.0* 16.7*    241 305       BMP:   Recent Labs     10/02/22  0401 10/03/22  0530 10/04/22  0419   * 135 136   K 3.2* 3.5* 3.1*   CL 99 101 101   CO2 23 23 26   BUN 14 11 8   CREATININE 0.51 0.53 0.55       BNP: No results for input(s): BNP in the last 72 hours. PT/INR: No results for input(s): PROTIME, INR in the last 72 hours. APTT: No results for input(s): APTT in the last 72 hours. CARDIAC ENZYMES: No results for input(s): CKMB, CKMBINDEX, TROPONINI in the last 72 hours. Invalid input(s): CKTOTAL;3  FASTING LIPID PANEL:  Lab Results   Component Value Date    CHOL 141 05/17/2021    HDL 50 05/17/2021    TRIG 213 (H) 09/28/2022     LIVER PROFILE: No results for input(s): AST, ALT, ALB, BILIDIR, BILITOT, ALKPHOS in the last 72 hours. MICROBIOLOGY:   Lab Results   Component Value Date/Time    CULTURE NO GROWTH 4 DAYS 09/29/2022 01:27 PM       Imaging:    XR ABDOMEN (KUB) (SINGLE AP VIEW)    Result Date: 9/29/2022  1. No ileus or obstruction is identified. Mild gaseous distention of the stomach. 2. Cardiomegaly with vascular congestion.  3. Small bore central venous catheter terminates at the cavoatrial junction/right atrium. NM BONE SCAN 3 PHASE    Result Date: 9/30/2022  Nondiagnostic study. Patient could not cooperate. No asymmetry on flow images to suggest hyperemia. XR CHEST PORTABLE    Result Date: 9/30/2022  1. Pulmonary vascular congestion may be increased, and there may be new perihilar edema. Consider congestive heart failure given at least borderline cardiomegaly and suspicion for trace bilateral pleural effusions. Pneumonia could appear similar. 2. Increased bibasilar airspace opacities likely due in part to atelectasis with potential for superimposed pneumonia or aspiration especially on the left. XR CHEST PORTABLE    Result Date: 9/29/2022  1. No ileus or obstruction is identified. Mild gaseous distention of the stomach. 2. Cardiomegaly with vascular congestion. 3. Small bore central venous catheter terminates at the cavoatrial junction/right atrium. XR CHEST PORTABLE    Result Date: 9/27/2022  Central pulmonary congestion with obscuration of the left hemidiaphragm likely representing effusion as can be seen in CHF.        ASSESSMENT & PLAN     ASSESSMENT / PLAN:     Principal Problem:    Septicemia ([address]  Active Problems:    MSSA bacteremia    UTI due to Klebsiella species    COPD (chronic obstructive pulmonary disease) ([facility])    Class 2 obesity due to excess calories with body mass index (BMI) of 39.0 to 39.9 in adult    Acute respiratory failure with hypoxia ([facility])    Respiratory alkalosis    Hypokalemia    MSSA (methicillin susceptible Staphylococcus aureus) septicemia ([facility])    SIRS (systemic inflammatory response syndrome) ([facility])    Bandemia    Acute encephalopathy    Aspiration pneumonia ([facility])    Spinal stenosis of lumbar region with neurogenic claudication    Bilateral leg weakness    Paroxysmal atrial fibrillation ([facility])    Hypernatremia    Folic acid deficiency    Leukocytosis    Pneumonia of left lower lobe due to infectious organism    Prolonged Q-T interval on ECG  Resolved Problems:    * No resolved hospital problems. *     MSSA bacteremia  - Switched to Unasyn from Ancef due to concerns for aspiration pneumonia  - ID following, recommends continuing Unasyn till 10/207 then switched to Ancef till 10/24. -DANE ordered to look for possible vegetation , follow-up on echo        Klebsiella UTI  - On antibiotics as above     New onset A. Fib  - Lopressor 25 mg twice daily  - Cardiology consulted, recommends continuing rate control with metoprolol and long-term anticoagulation therapy with Lovenox while inpatient and transitioning to NOAC on discharge. Acute on chronic respiratory failure  - Supplemental oxygen as needed, Keep SpO2 >92%  -ipratropium and symbicort daily     Bilateral lower extremity weakness  - refused MRI cervical, thoracic, and lumbar spine   -CT scan spine on 9/24 unremarkable for acute changes  -Neurology signed off      DVT ppx : Lovenox      PT/OT: Consulted  Discharge Planning / SW:  consulted    Bradford Gómez MD  Internal Medicine Resident, PGY-1  [address], [place]  10/4/2022, 10:14 AM

## 2022-10-04 NOTE — PLAN OF CARE
Problem: Respiratory - Adult  Goal: Achieves optimal ventilation and oxygenation  Outcome: Progressing     Problem: Skin/Tissue Integrity - Adult  Goal: Skin integrity remains intact  Outcome: Progressing  Goal: Incisions, wounds, or drain sites healing without S/S of infection  Outcome: Progressing  Goal: Oral mucous membranes remain intact  Outcome: Progressing     Problem: Skin/Tissue Integrity  Goal: Absence of new skin breakdown  Description: 1. Monitor for areas of redness and/or skin breakdown  2. Assess vascular access sites hourly  3. Every 4-6 hours minimum:  Change oxygen saturation probe site  4. Every 4-6 hours:  If on nasal continuous positive airway pressure, respiratory therapy assess nares and determine need for appliance change or resting period.   Outcome: Progressing

## 2022-10-04 NOTE — PROGRESS NOTES
Patient is seen in [facility] area. Patient was here for DANE. Discussed with patient that DANE will be done in around 30 minutes. She is complaining of severe pain in lower extremity due to her sciatica. She wants to cancel the procedure. Discussed with patient that we can give her pain medication for her sciatica. She is still refusing procedure. Will cancel DANE.      Lucero Guerrero   CV fellow

## 2022-10-04 NOTE — PROGRESS NOTES
PULMONARY & CRITICAL CARE MEDICINE PROGRESS NOTE     Patient:  Jackie Perez  MRN: [id number]  Admit date: 9/24/2022  Primary Care Physician: Helena Pritchett MD  Consulting Physician: Poppy Vogel MD  CODE Status: Full Code  LOS: 10     SUBJECTIVE     Chief Complaint/ Reason for consult:    Acute hypoxic respiratory failure due to MSSA septicemia    Hospital Course: The patient is a 80 y.o. female with past medical history of COPD, chronic hip pain, chronic back pain, prediabetes, OA, osteoporosis who presented to the ED with profound weakness, found to be tachypnic, tachycardic, blood cultures grew MSSA. Urine cx grew Klebsiella. During her admission, she required increasing ventilatory support, initially BIPAP and then alter intubation with mechanical ventilation. She was extubated on 9/28/22, was transferred out of the ICU to the floor. In this interim period, patient has had hypoactive delirium, was febrile on 9/29/22 Tmax 101.1, with worsening leukocytosis and increased oxygen requirement from 3 L to 4 L NC. She underwent CXR which showed pulmonary vascular congestion, increased bibasilar opacities, CXR does not appear much changed from the CXR 3 days ago. She has been started on Unasyn for concern of aspiration pneumonia. This am, patient was found to be in A fib with RVR HR upto 160s, Cardiology was consulted, patient started on lopressor 25 mg BID    Interval History:  10/04/22  Pt was seen and examined at bedside. Events noted chart seen, medications reviewed. She is afebrile currently but did not 24 hours she had a low-grade temperature of T-max of 1.9. She is hemodynamically stable no hypotension was reported respiratory rate is usually low 20s. No acute hypoxic events or respiratory distress was reported she is on 2 L nasal cannula maintaining saturation 94% to 96%. She has been doing better her coughing spells is better she denies much cough now denies a sputum production.   Her breathing is better she gets short of breath on moving out of bed to chair but at rest no shortness of breath. She does not complain of chest pain denies purulent sputum or hemoptysis. She did use BiPAP 10/5/40 percent last night. Chest x-ray on 2022 shows bilateral areas of his infiltrate low lung volume left basilar atelectasis/effusion not much change in chest x-ray from  and 2022    Review Of Systems:  Review of Systems   Constitutional:  Negative for activity change, appetite change, chills, fatigue and fever. HENT:  Negative for congestion, postnasal drip, rhinorrhea, sinus pressure, sneezing and sore throat. Respiratory:  Positive for cough and shortness of breath. Negative for wheezing and stridor. Cardiovascular:  Positive for leg swelling. Negative for chest pain. Gastrointestinal:  Negative for abdominal distention and abdominal pain. Genitourinary:  Negative for dysuria and hematuria. Musculoskeletal:  Negative for arthralgias and back pain. Neurological:  Negative for weakness, light-headedness and headaches. Psychiatric/Behavioral:  Positive for behavioral problems. OBJECTIVE     PaO2/FiO2 RATIO:  No results for input(s): POCPO2 in the last 72 hours.      FiO2 : 28 %     VITAL SIGNS:   LAST:  BP (!) 124/100   Pulse 78   Temp 98.1 °F (36.7 °C) (Oral)   Resp 24   Ht 5' 5\" (1.651 m)   Wt 238 lb 15.7 oz (108.4 kg)   LMP  (LMP Unknown)   SpO2 94%   BMI 39.77 kg/m²   8-24 HR RANGE:  TEMP Temp  Av.7 °F (37.1 °C)  Min: 98.1 °F (36.7 °C)  Max: 99.9 °F (08.5 °C)   BP Systolic (89MYC), TMP:719 , Min:124 , GVS:792      Diastolic (19CNT), NELIDA:30, Min:57, Max:100     PULSE Pulse  Av.5  Min: 68  Max: 87   RR Resp  Av.3  Min: 20  Max: 25   O2 SAT SpO2  Av.7 %  Min: 94 %  Max: 96 %   OXYGEN DELIVERY O2 Flow Rate (L/min)  Av L/min  Min: 2 L/min  Max: 2 L/min        Systemic Examination:   Physical Exam -  Constitutional:  Alert, cooperative and no distress. Mental Status:  Oriented to person, place and time and normal affect. Lungs:  Bilateral air entry present, lung fields coarse. Bilateral breath sounds present with prolonged expiration no wheezing. Heart:  Regular rate and rhythm, no murmur. Abdomen:  Soft, nontender, nondistended, normal bowel sounds. Extremities: Positive bilateral pedal edema, redness, tenderness in the calves. Skin:  Warm, dry, no gross lesions or rashes. DATA REVIEW     Medications: Current Inpatient  Scheduled Meds:   potassium chloride  20 mEq Oral BID WC    metoprolol tartrate  25 mg Oral BID    [Held by provider] enoxaparin  1 mg/kg SubCUTAneous BID    ampicillin-sulbactam  3,000 mg IntraVENous Q6H    thiamine  100 mg Oral Daily    folic acid  1 mg Oral Daily    ipratropium  0.5 mg Nebulization 4x daily    sodium chloride flush  5-40 mL IntraVENous 2 times per day    budesonide-formoterol  2 puff Inhalation BID     Continuous Infusions:   dextrose      sodium chloride Stopped (09/29/22 1257)       INPUT/OUTPUT:  In: -   Out: 700 [Urine:700]         LABS:  ABGs:   No results for input(s): POCPH, POCPCO2, POCPO2, POCHCO3, ZPRH2RSC in the last 72 hours. CBC:   Recent Labs     10/02/22  0401 10/03/22  0530 10/04/22  0419   WBC 16.8* 14.6* 11.4*   HGB 10.2* 10.0* 9.4*   HCT 30.0* 30.1* 29.2*   MCV 80.2* 80.9* 81.8*    241 305   LYMPHOPCT 10* 11* 13*   RBC 3.74* 3.72* 3.57*   MCH 27.3 26.9 26.3   MCHC 34.0 33.2 32.2   RDW 16.9* 17.0* 16.7*       CRP:   No results for input(s): CRP in the last 72 hours. LDH:   No results for input(s): LDH in the last 72 hours. BMP:   Recent Labs     10/02/22  0401 10/03/22  0530 10/04/22  0419   * 135 136   K 3.2* 3.5* 3.1*   CL 99 101 101   CO2 23 23 26   BUN 14 11 8   CREATININE 0.51 0.53 0.55   GLUCOSE 92 128* 91       Liver Function Test:   No results for input(s): PROT, LABALBU, ALT, AST, GGT, ALKPHOS, BILITOT in the last 72 hours.   Coagulation Profile:   No results for input(s): INR, PROTIME, APTT in the last 72 hours. D-Dimer:  No results for input(s): DDIMER in the last 72 hours. Lactic Acid:  No results for input(s): LACTA in the last 72 hours. Cardiac Enzymes:  No results for input(s): CKTOTAL, CKMB, CKMBINDEX, TROPONINI in the last 72 hours. Invalid input(s): TROPONIN, HSTROP    BNP/ProBNP:   No results for input(s): BNP, PROBNP in the last 72 hours. Triglycerides:  No results for input(s): TRIG in the last 72 hours. Microbiology:  Urine Culture:  No components found for: CURINE  Blood Culture:  No components found for: CBLOOD, CFUNGUSBL  Sputum Culture:  No components found for: CSPUTUM  No results for input(s): SPECDESC, SPECIAL, CULTURE, STATUS, ORG, CDIFFTOXPCR, CAMPYLOBPCR, SALMONELLAPC, SHIGAPCR, SHIGELLAPCR, MPNEUG, MPNEUM, LACTOQL in the last 72 hours. No results for input(s): SPUTUM, SPECDESC, SPECIAL, CULTURE, STATUS, ORG, CDIFFTOXPCR, MPNEUM, MPNEUG in the last 72 hours. Invalid input(s): CURINE, CBLOOD, CFUNGUSBL       Pathology:    Radiology Reports:  NM BONE SCAN 3 PHASE   Final Result   Nondiagnostic study. Patient could not cooperate. No asymmetry on flow   images to suggest hyperemia. XR CHEST PORTABLE   Final Result   1. Pulmonary vascular congestion may be increased, and there may be new   perihilar edema. Consider congestive heart failure given at least borderline   cardiomegaly and suspicion for trace bilateral pleural effusions. Pneumonia   could appear similar. 2. Increased bibasilar airspace opacities likely due in part to atelectasis   with potential for superimposed pneumonia or aspiration especially on the   left. XR ABDOMEN (KUB) (SINGLE AP VIEW)   Final Result   1. No ileus or obstruction is identified. Mild gaseous distention of the   stomach. 2. Cardiomegaly with vascular congestion. 3. Small bore central venous catheter terminates at the cavoatrial   junction/right atrium.          XR CHEST PORTABLE   Final Result   1. No ileus or obstruction is identified. Mild gaseous distention of the   stomach. 2. Cardiomegaly with vascular congestion. 3. Small bore central venous catheter terminates at the cavoatrial   junction/right atrium. VL DUP LOWER EXTREMITY VENOUS BILATERAL   Final Result      XR CHEST PORTABLE   Final Result   Central pulmonary congestion with obscuration of the left hemidiaphragm   likely representing effusion as can be seen in CHF. XR CHEST (SINGLE VIEW FRONTAL)   Final Result   Left internal jugular line tip projected over the mid right atrium. OG and endotracheal tubes appear unchanged and in satisfactory position. Pulmonary vasculature may be slightly congested. XR CHEST PORTABLE   Final Result   Endotracheal tube in satisfactory position. XR ABDOMEN FOR NG/OG/NE TUBE PLACEMENT   Final Result   OG tube in satisfactory position. CT CHEST ABDOMEN PELVIS W CONTRAST   Final Result   No acute traumatic abnormality detected within the chest, abdomen, and pelvis. Airspace disease the left lung base, atelectasis versus pneumonia versus   aspiration. CT THORACIC SPINE TRAUMA RECONSTRUCTION   Final Result   No acute traumatic abnormality detected within the chest, abdomen, and pelvis. Airspace disease the left lung base, atelectasis versus pneumonia versus   aspiration. CT LUMBAR SPINE TRAUMA RECONSTRUCTION   Final Result   No acute traumatic abnormality detected within the chest, abdomen, and pelvis. Airspace disease the left lung base, atelectasis versus pneumonia versus   aspiration. CT HEAD WO CONTRAST   Final Result   No acute intracranial abnormality. CT CERVICAL SPINE WO CONTRAST   Final Result   No acute abnormality of the cervical spine. XR CHEST PORTABLE   Final Result   No radiographic evidence of an acute cardiopulmonary process.          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              Echocardiogram:   Results for orders placed during the hospital encounter of 09/24/22    ECHO Complete 2D W Doppler W Color    Narrative  Transthoracic Echocardiography Report (TTE)    Patient Name Sandi Chinchilla     Date of Study           09/27/2022  JOSIAH MOMIN    Date of      1950  Gender                  Female  Birth    Age          80 year(s)  Race                        Room Number  3024        Height:                 65 inch, 165.1 cm    Corporate ID [id number]    Weight:                 210 pounds, 95.3 kg  #    Patient Acct [de-identified]   BSA:        2.02 m^2    BMI:        34.95 kg/m^2  #    MR #         [id number]     Sonographer             Silas Omer RVT,  [facility]    Accession #  [id number]  Interpreting Physician  Mendy Gardner    Fellow                   Referring Nurse  Practitioner    Interpreting             Referring Physician  Fellow    Type of Study    TTE procedure:2D Echocardiogram, M-Mode, Doppler, Color Doppler. Procedure Date  Date: 09/27/2022 Start: 04:13 PM    Study Location: [facility]  Technical Quality: Adequate visualization    Indications:Cardiac Evaluation. Comments:Referring Physician: Marjorie Dickens MD    History / Tech. Comments:  Echo done at patient bedside. Echo done at patient bedside. Patient supine on ventilator. Patient Status: Inpatient    Height: 65 inches Weight: 210 pounds BSA: 2.02 m^2 BMI: 34.95 kg/m^2    CONCLUSIONS    Summary  Normal LV size and wall thickness. No obvious wall motion abnormality seen. Normal LV systolic function with LVEF >55%. Dilated RV with preserved function. RV systolic pressure 44 mmHg  RA appears dilated. No obvious significant structural valvular abnormality noted. No significant valvular stenosis or regurgitation noted. Normal aortic root dimension.   No significant pericardial effusion noted.  IVC is dilated, difficult to assess inspiratory variation , Pt on ventilator    Signature  ----------------------------------------------------------------------------  Electronically signed by Mendy Gardner(Interpreting physician) on  2022 11:12 AM  ----------------------------------------------------------------------------    ----------------------------------------------------------------------------  Electronically signed by Caryn Gallagher RVT, RDCS(Sonographer) on  2022 04:48 PM  ----------------------------------------------------------------------------  FINDINGS  Left Atrium  Left atrium is normal in size. Left Ventricle  Left ventricle is normal in size. Global left ventricular systolic function  is normal. Estimated ejection fraction is 60 %. Right Atrium  Right atrial dilatation. Right Ventricle  Right ventricular dilatation with normal systolic function. Mitral Valve  Normal mitral valve structure. Trivial mitral regurgitation. No mitral stenosis. Aortic Valve  Normal aortic valve structure. Aortic valve is trileaflet. No aortic insufficiency. No aortic stenosis. Tricuspid Valve  Normal tricuspid valve leaflets. Mild tricuspid regurgitation. Estimated right ventricular systolic pressure is 44 mmHg. No tricuspid stenosis. Pulmonic Valve  Normal pulmonic valve structure. No significant pulmonic insufficiency. No evidence of pulmonic stenosis. Pericardial Effusion  No pericardial effusion seen. Miscellaneous  Normal aortic root dimension. IVC dilated but unable to assess respiratory collapse due to patient on  ventilator.   E/E' average = 10.65    M-mode / 2D Measurements & Calculations:    LVIDd:5.2 cm(3.7 - 5.6 cm)       Diastolic QRHQQD:07.5 ml  ONLZE:3.1 cm(2.2 - 4.0 cm)       Systolic JURLKU:08.8 ml  KIOB:1.0 cm(0.6 - 1.1 cm)        Aortic Root:2.9 cm(2.0 - 3.7 cm)  LVPWd:0.6 cm(0.6 - 1.1 cm)       LA Dimension: 4.2 cm(1.9 - 4.0 cm)  Fractional Shortenin.69 %    LA volume/Index: 48.7 ml /24m^2  Calculated LVEF (%): 61.3 %      LVOT:1.8 cm  RVDd:4.7 cm    Mitral:                                 Aortic    Valve Area (P1/2-Time): 2.75 cm^2       Peak Velocity: 1.76 m/s  Peak E-Wave: 0.96 m/s                   Mean Velocity: 1.12 m/s  Peak A-Wave: 0.99 m/s                   Peak Gradient: 12.39 mmHg  E/A Ratio: 0.97                         Mean Gradient: 6 mmHg  Peak Gradient: 3.69 mmHg  Mean Gradient: 3 mmHg  Deceleration Time: 221 msec             Area (continuity): 2.17 cm^2  P1/2t: 80 msec                          AV VTI: 33.3 cm    Area (continuity): 2.58 cm^2  Mean Velocity: 0.82 m/s    Tricuspid:                              Pulmonic:    Estimated RVSP: 44 mmHg                 Peak Velocity: 1.07 m/s  Peak TR Velocity: 2.93 m/s              Peak Gradient: 4.58 mmHg  Peak TR Gradient: 34.3396 mmHg  Estimated RA Pressure: 10 mmHg    Estimated PASP: 44.34 mmHg    Diastology / Tissue Doppler  Septal Wall E' velocity:0.07 m/s  Septal Wall E/E':13  Lateral Wall E' velocity:0.12 m/s  Lateral Wall E/E':8.3       ASSESSMENT AND PLAN     Assessment:    // Acute hypoxic respiratory failure  // Aspiration pneumonia/pneumonitis  // Altered mental status/encephalopathy improving  // Paroxysmal A fib with RVR  // MSSA septicemia  // Klebsiella UTI  // COPD by history  // Obesity likely obstructive sleep apnea    Plan:    Patient is currently on 2 L nasal cannula wean O2 to keep saturation above 90%. Continue to use BiPAP at night at current setting and as needed and during naps during the daytime. Continue with ipratropium and she is also on Symbicort twice daily. She use Trelegy at home  Albuterol to be used as needed  She is currently in sinus rhythm on metoprolol. On beta-blocker for atrial fibrillation currently in sinus rhythm   She is currently on Unasyn follow-up with infectious disease. Encourage incentive spirometry, ambulation PT deep breathing and cough.   Strict aspiration precaution. Avoidance of benzodiazepine. She is on oxycodone. Patient is currently on full dose of Lovenox for atrial fibrillation. DVT prophylaxis: On therapeutic Lovenox. Discussed with nursing staff, treatment and plan discussed with    Kelly Thornton MD.  Pulmonary critical care medicine  [facility]    10/4/2022 1:45 PM        Please note that this chart was generated using voice recognition Dragon dictation software. Although every effort was made to ensure the accuracy of this automated transcription, some errors in transcription may have occurred.

## 2022-10-05 LAB
ABSOLUTE EOS #: 0 K/UL (ref 0–0.4)
ABSOLUTE IMMATURE GRANULOCYTE: 0 K/UL (ref 0–0.3)
ABSOLUTE LYMPH #: 2.01 K/UL (ref 1–4.8)
ABSOLUTE MONO #: 0.74 K/UL (ref 0.1–0.8)
ANION GAP SERPL CALCULATED.3IONS-SCNC: 13 MMOL/L (ref 9–17)
ATYPICAL LYMPHOCYTE ABSOLUTE COUNT: 0.21 K/UL
ATYPICAL LYMPHOCYTES: 2 %
BASOPHILS # BLD: 0 % (ref 0–2)
BASOPHILS ABSOLUTE: 0 K/UL (ref 0–0.2)
BUN BLDV-MCNC: 7 MG/DL (ref 8–23)
CALCIUM SERPL-MCNC: 8.1 MG/DL (ref 8.6–10.4)
CHLORIDE BLD-SCNC: 102 MMOL/L (ref 98–107)
CO2: 23 MMOL/L (ref 20–31)
CREAT SERPL-MCNC: 0.56 MG/DL (ref 0.5–0.9)
EOSINOPHILS RELATIVE PERCENT: 0 % (ref 1–4)
GFR SERPL CREATININE-BSD FRML MDRD: >60 ML/MIN/1.73M2
GLUCOSE BLD-MCNC: 103 MG/DL (ref 70–99)
HCT VFR BLD CALC: 31.7 % (ref 36.3–47.1)
HEMOGLOBIN: 10.2 G/DL (ref 11.9–15.1)
IMMATURE GRANULOCYTES: 0 %
LYMPHOCYTES # BLD: 19 % (ref 24–44)
MAGNESIUM: 2.1 MG/DL (ref 1.6–2.6)
MCH RBC QN AUTO: 26.8 PG (ref 25.2–33.5)
MCHC RBC AUTO-ENTMCNC: 32.2 G/DL (ref 28.4–34.8)
MCV RBC AUTO: 83.2 FL (ref 82.6–102.9)
MONOCYTES # BLD: 7 % (ref 1–7)
NRBC AUTOMATED: 0 PER 100 WBC
PDW BLD-RTO: 16.9 % (ref 11.8–14.4)
PLATELET # BLD: 355 K/UL (ref 138–453)
PMV BLD AUTO: 11.9 FL (ref 8.1–13.5)
POTASSIUM SERPL-SCNC: 3.2 MMOL/L (ref 3.7–5.3)
RBC # BLD: 3.81 M/UL (ref 3.95–5.11)
SARS-COV-2, RAPID: NOT DETECTED
SEG NEUTROPHILS: 72 % (ref 36–66)
SEGMENTED NEUTROPHILS ABSOLUTE COUNT: 7.64 K/UL (ref 1.8–7.7)
SODIUM BLD-SCNC: 138 MMOL/L (ref 135–144)
SPECIMEN DESCRIPTION: NORMAL
WBC # BLD: 10.6 K/UL (ref 3.5–11.3)

## 2022-10-05 PROCEDURE — 99232 SBSQ HOSP IP/OBS MODERATE 35: CPT | Performed by: INTERNAL MEDICINE

## 2022-10-05 PROCEDURE — 36415 COLL VENOUS BLD VENIPUNCTURE: CPT

## 2022-10-05 PROCEDURE — 87635 SARS-COV-2 COVID-19 AMP PRB: CPT

## 2022-10-05 PROCEDURE — 2580000003 HC RX 258: Performed by: INTERNAL MEDICINE

## 2022-10-05 PROCEDURE — 80048 BASIC METABOLIC PNL TOTAL CA: CPT

## 2022-10-05 PROCEDURE — 6370000000 HC RX 637 (ALT 250 FOR IP)

## 2022-10-05 PROCEDURE — 83735 ASSAY OF MAGNESIUM: CPT

## 2022-10-05 PROCEDURE — 85025 COMPLETE CBC W/AUTO DIFF WBC: CPT

## 2022-10-05 PROCEDURE — 6360000002 HC RX W HCPCS: Performed by: INTERNAL MEDICINE

## 2022-10-05 PROCEDURE — 2060000000 HC ICU INTERMEDIATE R&B

## 2022-10-05 PROCEDURE — 2580000003 HC RX 258

## 2022-10-05 PROCEDURE — 6370000000 HC RX 637 (ALT 250 FOR IP): Performed by: STUDENT IN AN ORGANIZED HEALTH CARE EDUCATION/TRAINING PROGRAM

## 2022-10-05 RX ORDER — LANOLIN ALCOHOL/MO/W.PET/CERES
100 CREAM (GRAM) TOPICAL DAILY
Qty: 30 TABLET | Refills: 3 | Status: SHIPPED | OUTPATIENT
Start: 2022-10-06

## 2022-10-05 RX ORDER — FOLIC ACID 1 MG/1
1 TABLET ORAL DAILY
Qty: 30 TABLET | Refills: 3 | Status: SHIPPED | OUTPATIENT
Start: 2022-10-06

## 2022-10-05 RX ADMIN — SODIUM CHLORIDE, PRESERVATIVE FREE 10 ML: 5 INJECTION INTRAVENOUS at 20:18

## 2022-10-05 RX ADMIN — AMPICILLIN SODIUM AND SULBACTAM SODIUM 3000 MG: 2; 1 INJECTION, POWDER, FOR SOLUTION INTRAMUSCULAR; INTRAVENOUS at 06:06

## 2022-10-05 RX ADMIN — METOPROLOL TARTRATE 25 MG: 25 TABLET ORAL at 20:10

## 2022-10-05 RX ADMIN — OXYCODONE 5 MG: 5 TABLET ORAL at 17:15

## 2022-10-05 RX ADMIN — ANTACID TABLETS 500 MG: 500 TABLET, CHEWABLE ORAL at 17:16

## 2022-10-05 RX ADMIN — Medication 100 MG: at 09:21

## 2022-10-05 RX ADMIN — METOPROLOL TARTRATE 25 MG: 25 TABLET ORAL at 09:21

## 2022-10-05 RX ADMIN — POTASSIUM BICARBONATE 20 MEQ: 782 TABLET, EFFERVESCENT ORAL at 17:16

## 2022-10-05 RX ADMIN — AMPICILLIN SODIUM AND SULBACTAM SODIUM 3000 MG: 2; 1 INJECTION, POWDER, FOR SOLUTION INTRAMUSCULAR; INTRAVENOUS at 17:19

## 2022-10-05 RX ADMIN — SODIUM CHLORIDE, PRESERVATIVE FREE 10 ML: 5 INJECTION INTRAVENOUS at 09:25

## 2022-10-05 RX ADMIN — SODIUM CHLORIDE, PRESERVATIVE FREE 10 ML: 5 INJECTION INTRAVENOUS at 20:10

## 2022-10-05 RX ADMIN — AMPICILLIN SODIUM AND SULBACTAM SODIUM 3000 MG: 2; 1 INJECTION, POWDER, FOR SOLUTION INTRAMUSCULAR; INTRAVENOUS at 00:17

## 2022-10-05 RX ADMIN — FOLIC ACID 1 MG: 1 TABLET ORAL at 09:24

## 2022-10-05 RX ADMIN — OXYCODONE 5 MG: 5 TABLET ORAL at 09:26

## 2022-10-05 RX ADMIN — AMPICILLIN SODIUM AND SULBACTAM SODIUM 3000 MG: 2; 1 INJECTION, POWDER, FOR SOLUTION INTRAMUSCULAR; INTRAVENOUS at 12:55

## 2022-10-05 RX ADMIN — AMPICILLIN SODIUM AND SULBACTAM SODIUM 3000 MG: 2; 1 INJECTION, POWDER, FOR SOLUTION INTRAMUSCULAR; INTRAVENOUS at 23:46

## 2022-10-05 RX ADMIN — SODIUM CHLORIDE, PRESERVATIVE FREE 10 ML: 5 INJECTION INTRAVENOUS at 09:20

## 2022-10-05 ASSESSMENT — PAIN DESCRIPTION - ORIENTATION: ORIENTATION: LEFT

## 2022-10-05 ASSESSMENT — PAIN SCALES - WONG BAKER: WONGBAKER_NUMERICALRESPONSE: 0

## 2022-10-05 ASSESSMENT — ENCOUNTER SYMPTOMS
ABDOMINAL DISTENTION: 0
COUGH: 1
SHORTNESS OF BREATH: 1
RHINORRHEA: 0
SORE THROAT: 0
CHEST TIGHTNESS: 0
SINUS PRESSURE: 0
BACK PAIN: 0
CHOKING: 0
STRIDOR: 0
COLOR CHANGE: 0
WHEEZING: 0
BACK PAIN: 1
ABDOMINAL PAIN: 0

## 2022-10-05 ASSESSMENT — PAIN SCALES - GENERAL
PAINLEVEL_OUTOF10: 8
PAINLEVEL_OUTOF10: 2
PAINLEVEL_OUTOF10: 8
PAINLEVEL_OUTOF10: 7
PAINLEVEL_OUTOF10: 4

## 2022-10-05 ASSESSMENT — PAIN DESCRIPTION - LOCATION
LOCATION: HIP;GROIN;LEG
LOCATION: GROIN;HIP;LEG

## 2022-10-05 ASSESSMENT — PAIN DESCRIPTION - DESCRIPTORS: DESCRIPTORS: ACHING

## 2022-10-05 NOTE — DISCHARGE INSTRUCTIONS
Start taking eliquis 5 twice daily. Keep supplemental oxygen saturation between 88 to 92%. Follow-up with PCP outpatient  Follow-up with cardiology outpatient. You were started on IV antibiotic Ancef for 6 weeks. Please complete course.

## 2022-10-05 NOTE — DISCHARGE INSTR - COC
Continuity of Care Form    Patient Name: Mark Hung   :  1950  MRN:  [id number]    Admit date:  2022  Discharge date:  10/6/2022      Code Status Order: Full Code   Advance Directives:     Admitting Physician:  Codey Romano MD  PCP: Chen Lucas MD    Discharging Nurse: Fnata Cuevas/  [address] Unit/Room#: 9001/3207-52  Discharging Unit Phone Number: [phone]    Emergency Contact:   Extended Emergency Contact Information  Primary Emergency Contact: Jackie Carpenter [address] Phone: [phone]  Mobile Phone: [phone]  Relation: Other  Secondary Emergency Contact: Sina Clemente  Mobile Phone: [phone]  Relation: Brother/Sister    Past Surgical History:  Past Surgical History:   Procedure Laterality Date    BREAST BIOPSY      two    CHOLECYSTECTOMY      HYSTERECTOMY, VAGINAL         Immunization History:   Immunization History   Administered Date(s) Administered    COVID-19, MODERNA BLUE border, Primary or Immunocompromised, (age 12y+), IM, 100 mcg/0.5mL 2021, 2021    Influenza Vaccine, unspecified formulation 2013, 10/24/2014, 2015    Influenza Virus Vaccine 2016    Influenza, FLUARIX, FLULAVAL, [place] (age 10 mo+) AND AFLURIA, (age 1 y+), PF, 0.5mL 10/19/2021    Influenza, High Dose (Fluzone 65 yrs and older) 2020    Influenza, Triv, inactivated, subunit, adjuvanted, IM (Fluad 65 yrs and older) 2019    Pneumococcal Polysaccharide (Cvcaluihf72) 2018    Zoster Recombinant (Shingrix) 2019, 2019       Active Problems:  Patient Active Problem List   Diagnosis Code    Chronic left hip pain M25.552, G89.29    Chronic bilateral low back pain with bilateral sciatica M54.42, M54.41, G89.29    Prediabetes R73.03    Primary osteoarthritis involving multiple joints M15.9    Osteoporosis without current pathological fracture M81.0    Septicemia ([facility] [address] A41.9    MSSA bacteremia R78.81, B95.61    UTI due to Klebsiella species N39.0, B96.89    COPD (chronic obstructive pulmonary disease) ([facility]) J44.9    Class 2 obesity due to excess calories with body mass index (BMI) of 39.0 to 39.9 in adult E66.09, Z68.39    Acute respiratory failure with hypoxia ([facility]) J96.01    Respiratory alkalosis E87.3    Hypokalemia E87.6    MSSA (methicillin susceptible Staphylococcus aureus) septicemia ([facility]) A41.01    SIRS (systemic inflammatory response syndrome) ([facility]) R65.10    Bandemia D72.825    Acute encephalopathy G93.40    Aspiration pneumonia ([facility]) J69.0    Spinal stenosis of lumbar region with neurogenic claudication M48.062    Bilateral leg weakness R29.898    Paroxysmal atrial fibrillation ([facility]) I48.0    Hypernatremia Q90.1    Folic acid deficiency Y46.1    Leukocytosis D72.829    Pneumonia of left lower lobe due to infectious organism J18.9    Prolonged Q-T interval on ECG R94.31       Isolation/Infection:   Isolation            No Isolation          Patient Infection Status       Infection Onset Added Last Indicated Last Indicated By Review Planned Expiration Resolved Resolved By    None active    Resolved    COVID-19 (Rule Out) 09/24/22 09/24/22 09/24/22 COVID-19, Rapid (Ordered)   09/24/22 Rule-Out Test Resulted            Nurse Assessment:  Last Vital Signs: /63   Pulse 70   Temp 97 °F (36.1 °C) (Temporal)   Resp 29   Ht 5' 5\" (1.651 m)   Wt 238 lb 15.7 oz (108.4 kg)   LMP  (LMP Unknown)   SpO2 97%   BMI 39.77 kg/m²     Last documented pain score (0-10 scale): Pain Level:  (yes)  Last Weight:   Wt Readings from Last 1 Encounters:   10/04/22 238 lb 15.7 oz (108.4 kg)     Mental Status:  oriented, alert, and coherent    IV Access:  - PICC - site  R Larissa, insertion date: 10/6/2022    Nursing Mobility/ADLs:  Walking   Assisted  Transfer  Assisted  Bathing  Assisted  Dressing  Assisted  Toileting  Dependent  Feeding  Assisted  Med Admin  Assisted  Med Delivery   whole    Wound Care Documentation and Therapy:  Wound 09/28/22 Thigh Left;Posterior (Active)   Wound Image   09/28/22 1613   Wound Etiology Deep tissue/Injury 10/05/22 0400   Dressing Status Clean;Dry; Intact 10/05/22 0400   Wound Cleansed Soap and water 10/05/22 0400   Dressing/Treatment Foam 10/05/22 0400   Dressing Change Due 10/04/22 10/05/22 0400   Wound Length (cm) 12 cm 09/28/22 1613   Wound Width (cm) 6 cm 09/28/22 1613   Wound Depth (cm) 0 cm 09/28/22 1613   Wound Surface Area (cm^2) 72 cm^2 09/28/22 1613   Wound Volume (cm^3) 0 cm^3 09/28/22 1613   Wound Assessment Purple/maroon 10/05/22 0400   Drainage Amount None 10/05/22 0400   Odor None 10/05/22 0400   Jess-wound Assessment Blanchable erythema 10/05/22 0400   Number of days: 7        Elimination:  Continence: Bowel: Yes  Bladder: Yes  Urinary Catheter: None   Colostomy/Ileostomy/Ileal Conduit: No       Date of Last BM: 10/6/2022      No intake or output data in the 24 hours ending 10/05/22 0836  I/O last 3 completed shifts:  In: -   Out: 700 [Urine:700]    Safety Concerns:     History of Falls (last 30 days)    Impairments/Disabilities:      None    Nutrition Therapy:  Current Nutrition Therapy:   - Oral Diet:  General    Routes of Feeding: Oral  Liquids: Thin Liquids  Daily Fluid Restriction: no  Last Modified Barium Swallow with Video (Video Swallowing Test): not done    Treatments at the Time of Hospital Discharge:   Respiratory Treatments: ***  Oxygen Therapy:  is not on home oxygen therapy.   Ventilator:    - No ventilator support    Rehab Therapies: Physical Therapy and Occupational Therapy  Weight Bearing Status/Restrictions: No weight bearing restrictions  Other Medical Equipment (for information only, NOT a DME order):  wheelchair, walker, bath bench, and bedside commode  Other Treatments: ***    Patient's personal belongings (please select all that are sent with patient):  None    RN SIGNATURE:  Electronically signed by Michelle Sharma RN on 10/6/22 at 11:09 AM EDT    CASE MANAGEMENT/SOCIAL WORK SECTION    Inpatient Status Date: 9/24/22    Readmission Risk Assessment Score:  Readmission Risk              Risk of Unplanned Readmission:  16           Discharging to Facility/ Agency   Name: [facility]  Address:  Phone:  Fax:    Dialysis Facility (if applicable)   Name:  Address:  Dialysis Schedule:  Phone:  Fax:    / signature: Electronically signed by Ramón Rodriguez RN on 10/5/22 at 8:37 AM EDT    PHYSICIAN SECTION    Prognosis: Fair    Condition at Discharge: Stable    Rehab Potential (if transferring to Rehab): Fair    Recommended Labs or Other Treatments After Discharge:   Start taking eliquis 5 twice daily. Keep supplemental oxygen saturation between 88 to 92%. Follow-up with PCP outpatient  Follow-up with cardiology outpatient. You were started on IV antibiotic Ancef for 6 weeks. Please complete course. Physician Certification: I certify the above information and transfer of Anaebla Mares  is necessary for the continuing treatment of the diagnosis listed and that she requires [place] for greater 30 days.        Update Admission H&P: No change in H&P    PHYSICIAN SIGNATURE:  Electronically signed by Clare Vásquez MD on 10/5/22 at 8:58 AM EDT

## 2022-10-05 NOTE — PROGRESS NOTES
Occupational [address]  Occupational Therapy Not Seen Note    DATE: 10/5/2022    NAME: Vibha Hancock  MRN: [id number]   : 1950      Patient not seen this date for Occupational Therapy due to:    Patient Declined: despite GONZALEZ/PT encouragement pt stating, \"I am leaving latter\"    Next Scheduled Treatment: Attempt 10/6    Electronically signed by ANTONY Figueroa on 10/5/2022 at 2:28 PM

## 2022-10-05 NOTE — PLAN OF CARE
Problem: Discharge Planning  Goal: Discharge to home or other facility with appropriate resources  Outcome: Progressing     Problem: Respiratory - Adult  Goal: Achieves optimal ventilation and oxygenation  Outcome: Progressing     Problem: Neurosensory - Adult  Goal: Achieves stable or improved neurological status  Outcome: Progressing  Goal: Absence of seizures  Outcome: Progressing  Goal: Remains free of injury related to seizures activity  Outcome: Progressing  Goal: Achieves maximal functionality and self care  Outcome: Progressing     Problem: Cardiovascular - Adult  Goal: Maintains optimal cardiac output and hemodynamic stability  Outcome: Progressing  Goal: Absence of cardiac dysrhythmias or at baseline  Outcome: Progressing     Problem: Skin/Tissue Integrity - Adult  Goal: Skin integrity remains intact  Outcome: Progressing  Goal: Incisions, wounds, or drain sites healing without S/S of infection  Outcome: Progressing  Goal: Oral mucous membranes remain intact  Outcome: Progressing     Problem: Musculoskeletal - Adult  Goal: Return mobility to safest level of function  Outcome: Progressing  Goal: Maintain proper alignment of affected body part  Outcome: Progressing  Goal: Return ADL status to a safe level of function  Outcome: Progressing     Problem: Gastrointestinal - Adult  Goal: Minimal or absence of nausea and vomiting  Outcome: Progressing  Goal: Maintains or returns to baseline bowel function  Outcome: Progressing  Goal: Maintains adequate nutritional intake  Outcome: Progressing  Goal: Establish and maintain optimal ostomy function  Outcome: Progressing     Problem: Genitourinary - Adult  Goal: Absence of urinary retention  Outcome: Progressing  Goal: Urinary catheter remains patent  Outcome: Progressing     Problem: Infection - Adult  Goal: Absence of infection at discharge  Outcome: Progressing  Goal: Absence of infection during hospitalization  Outcome: Progressing  Goal: Absence of fever/infection during anticipated neutropenic period  Outcome: Progressing     Problem: Metabolic/Fluid and Electrolytes - Adult  Goal: Electrolytes maintained within normal limits  Outcome: Progressing  Goal: Hemodynamic stability and optimal renal function maintained  Outcome: Progressing  Goal: Glucose maintained within prescribed range  Outcome: Progressing     Problem: Hematologic - Adult  Goal: Maintains hematologic stability  Outcome: Progressing     Problem: Anxiety  Goal: Will report anxiety at manageable levels  Outcome: Progressing     Problem: Coping  Goal: Pt/Family able to verbalize concerns and demonstrate effective coping strategies  Outcome: Progressing     Problem: Decision Making  Goal: Pt/Family able to effectively weigh alternatives and participate in decision making related to treatment and care  Outcome: Progressing     Problem: Confusion  Goal: Confusion, delirium, dementia, or psychosis is improved or at baseline  Outcome: Progressing     Problem: Behavior  Goal: Pt/Family maintain appropriate behavior and adhere to behavioral management agreement, if implemented  Outcome: Progressing     Problem: Spiritual Care  Goal: Pt/Family able to move forward in process of forgiving self, others, and/or higher power  Outcome: Progressing     Problem: Skin/Tissue Integrity  Goal: Absence of new skin breakdown  Outcome: Progressing     Problem: Safety - Adult  Goal: Free from fall injury  Outcome: Progressing     Problem: ABCDS Injury Assessment  Goal: Absence of physical injury  Outcome: Progressing     Problem: Nutrition Deficit:  Goal: Optimize nutritional status  Outcome: Progressing

## 2022-10-05 NOTE — CARE COORDINATION
Transitional planning    Writer placed all to Maxx Quiñonez reviewing, waiting precert, anticipating today, ps sent to MD for order and to complete OPAL. 0945 faxed script for IV ATB to [facility], patient updated on POC, denied need for CM to contact family, she states she was not informed on plan and is frustrated, reminded patient we spoke on Monday and discussed and that [facility] from [facility] was at bedside, pt agreeable to plan of care, updated may be going today. 1045  call from [facility], precert will be completed today, transport request faxed to [address] [place] [address] for time 1700, primary RN  updated to complete OPAL, needs rapid covid and report number provided [phone], call to [facility]  with update on transfer, request for call back. 1328  call to [address] [place] [address], transport set for 1900, spoke with Salma Do. 1340 ps sent to MD for d/c order, all from [facility] at [facility] requesting update and updated on transport time, waiting on d/c order and line placed. 1515  call to [facility] , still no auth, will cancel transport if I dont hear back by 1600, [facility]              1551 primary RN updated that auth not approved , call to brother James Min left vm, call to Salma Do, cancelled trip and placed on will call for tomorrow, patient updated.         1650 Nya NAGY completed, copy placed in chart

## 2022-10-05 NOTE — PROGRESS NOTES
PULMONARY & CRITICAL CARE MEDICINE PROGRESS NOTE     Patient:  Krista Bear  MRN: [id number]  Admit date: 9/24/2022  Primary Care Physician: Usha Pacheco MD  Consulting Physician: Jessika Paul MD  CODE Status: Full Code  LOS: 6     SUBJECTIVE     Chief Complaint/ Reason for consult:    Acute hypoxic respiratory failure due to MSSA septicemia    Hospital Course: The patient is a 80 y.o. female with past medical history of COPD, chronic hip pain, chronic back pain, prediabetes, OA, osteoporosis who presented to the ED with profound weakness, found to be tachypnic, tachycardic, blood cultures grew MSSA. Urine cx grew Klebsiella. During her admission, she required increasing ventilatory support, initially BIPAP and then alter intubation with mechanical ventilation. She was extubated on 9/28/22, was transferred out of the ICU to the floor. In this interim period, patient has had hypoactive delirium, was febrile on 9/29/22 Tmax 101.1, with worsening leukocytosis and increased oxygen requirement from 3 L to 4 L NC. She underwent CXR which showed pulmonary vascular congestion, increased bibasilar opacities, CXR does not appear much changed from the CXR 3 days ago. She has been started on Unasyn for concern of aspiration pneumonia. This am, patient was found to be in A fib with RVR HR upto 160s, Cardiology was consulted, patient started on lopressor 25 mg BID    Interval History:  10/05/22  Pt was seen and examined at bedside. Events noted chart seen, medications reviewed. She is afebrile last 24-hour T-max is 98.8 heart rate is in 70s no acute hypoxic events were reported and she is on nasal cannula 2 L saturating between 94 to 97% currently she is off O2 and saturating 95%. She is hemodynamically stable. According patient her breathing is better she gets shortness of breath on activity her cough is better she denies sputum production.   She is able to move out of bed to chair by help of the physical therapy/nursing staff. She did not use BiPAP last night according to patient she was having sore in her mouth. WBC improved to 10.6 hemoglobin 10.2 BUN is 7 creatinine 0.56. She did use BiPAP 10/5/40 percent last night. Chest x-ray on 2022 shows bilateral areas of his infiltrate low lung volume left basilar atelectasis/effusion not much change in chest x-ray from  and 2022    Review Of Systems:  Review of Systems   Constitutional:  Negative for activity change, appetite change, chills, fatigue and fever. HENT:  Positive for mouth sores. Negative for congestion, postnasal drip, rhinorrhea, sinus pressure, sneezing and sore throat. Respiratory:  Positive for cough and shortness of breath. Negative for wheezing and stridor. Cardiovascular:  Positive for leg swelling. Negative for chest pain. Gastrointestinal:  Negative for abdominal distention and abdominal pain. Genitourinary:  Negative for dysuria and hematuria. Musculoskeletal:  Negative for arthralgias and back pain. Neurological:  Negative for weakness, light-headedness and headaches. Psychiatric/Behavioral:  Positive for behavioral problems. OBJECTIVE     PaO2/FiO2 RATIO:  No results for input(s): POCPO2 in the last 72 hours.      FiO2 : 28 %     VITAL SIGNS:   LAST:  /68   Pulse 76   Temp 97 °F (36.1 °C) (Temporal)   Resp 16   Ht 5' 5\" (1.651 m)   Wt 238 lb 15.7 oz (108.4 kg)   LMP  (LMP Unknown)   SpO2 97%   BMI 39.77 kg/m²   8-24 HR RANGE:  TEMP Temp  Av.9 °F (36.6 °C)  Min: 97 °F (36.1 °C)  Max: 98.4 °F (75.3 °C)   BP Systolic (23LYO), PWU:087 , Min:106 , STZ:116      Diastolic (14QOS), ZJI:05, Min:61, Max:100     PULSE Pulse  Av.3  Min: 70  Max: 84   RR Resp  Av  Min: 16  Max: 29   O2 SAT SpO2  Av.5 %  Min: 96 %  Max: 97 %   OXYGEN DELIVERY O2 Flow Rate (L/min)  Av L/min  Min: 0 L/min  Max: 0 L/min        Systemic Examination:   Physical Exam -  Constitutional:  Alert, cooperative and no distress. Mental Status:  Oriented to person, place and time and normal affect. Lungs:  Bilateral air entry present, lung fields coarse. Bilateral breath sounds present with prolonged expiration no wheezing. Heart:  Regular rate and rhythm, no murmur. Abdomen:  Soft, nontender, nondistended, normal bowel sounds. Extremities: Positive bilateral pedal edema, redness, tenderness in the calves. Skin:  Warm, dry, no gross lesions or rashes. DATA REVIEW     Medications: Current Inpatient  Scheduled Meds:   lidocaine 1 % injection  5 mL IntraDERmal Once    sodium chloride flush  5-40 mL IntraVENous 2 times per day    potassium chloride  20 mEq Oral BID WC    metoprolol tartrate  25 mg Oral BID    [Held by provider] enoxaparin  1 mg/kg SubCUTAneous BID    ampicillin-sulbactam  3,000 mg IntraVENous Q6H    thiamine  100 mg Oral Daily    folic acid  1 mg Oral Daily    ipratropium  0.5 mg Nebulization 4x daily    sodium chloride flush  5-40 mL IntraVENous 2 times per day    budesonide-formoterol  2 puff Inhalation BID     Continuous Infusions:   sodium chloride      dextrose      sodium chloride Stopped (09/29/22 1257)       INPUT/OUTPUT:  In: 20 [I.V.:20]  Out: -   Date 10/05/22 0000 - 10/05/22 2359   Shift 5214-8823 4590-5063 7021-5126 24 Hour Total   INTAKE   I.V.(mL/kg)  20(0.2)  20(0.2)   Shift Total(mL/kg)  20(0.2)  20(0.2)   OUTPUT   Shift Total(mL/kg)       Weight (kg) 108.4 108.4 108.4 108.4          LABS:  ABGs:   No results for input(s): POCPH, POCPCO2, POCPO2, POCHCO3, [id number] in the last 72 hours. CBC:   Recent Labs     10/03/22  0530 10/04/22  0419 10/05/22  0652   WBC 14.6* 11.4* 10.6   HGB 10.0* 9.4* 10.2*   HCT 30.1* 29.2* 31.7*   MCV 80.9* 81.8* 83.2    305 355   LYMPHOPCT 11* 13* 19*   RBC 3.72* 3.57* 3.81*   MCH 26.9 26.3 26.8   MCHC 33.2 32.2 32.2   RDW 17.0* 16.7* 16.9*       CRP:   No results for input(s): CRP in the last 72 hours.   LDH:   No results for input(s): LDH in the last 72 hours. BMP:   Recent Labs     10/03/22  0530 10/04/22  0419 10/05/22  0652    136 138   K 3.5* 3.1* 3.2*    101 102   CO2 23 26 23   BUN 11 8 7*   CREATININE 0.53 0.55 0.56   GLUCOSE 128* 91 103*       Liver Function Test:   No results for input(s): PROT, LABALBU, ALT, AST, GGT, ALKPHOS, BILITOT in the last 72 hours. Coagulation Profile:   No results for input(s): INR, PROTIME, APTT in the last 72 hours. D-Dimer:  No results for input(s): DDIMER in the last 72 hours. Lactic Acid:  No results for input(s): LACTA in the last 72 hours. Cardiac Enzymes:  No results for input(s): CKTOTAL, CKMB, CKMBINDEX, TROPONINI in the last 72 hours. Invalid input(s): TROPONIN, HSTROP    BNP/ProBNP:   No results for input(s): BNP, PROBNP in the last 72 hours. Triglycerides:  No results for input(s): TRIG in the last 72 hours. Microbiology:  Urine Culture:  No components found for: CURINE  Blood Culture:  No components found for: CBLOOD, CFUNGUSBL  Sputum Culture:  No components found for: CSPUTUM  No results for input(s): SPECDESC, SPECIAL, CULTURE, STATUS, ORG, CDIFFTOXPCR, CAMPYLOBPCR, SALMONELLAPC, SHIGAPCR, SHIGELLAPCR, MPNEUG, MPNEUM, LACTOQL in the last 72 hours. No results for input(s): SPUTUM, SPECDESC, SPECIAL, CULTURE, STATUS, ORG, CDIFFTOXPCR, MPNEUM, MPNEUG in the last 72 hours. Invalid input(s): CURINE, CBLOOD, CFUNGUSBL       Pathology:    Radiology Reports:  NM BONE SCAN 3 PHASE   Final Result   Nondiagnostic study. Patient could not cooperate. No asymmetry on flow   images to suggest hyperemia. XR CHEST PORTABLE   Final Result   1. Pulmonary vascular congestion may be increased, and there may be new   perihilar edema. Consider congestive heart failure given at least borderline   cardiomegaly and suspicion for trace bilateral pleural effusions. Pneumonia   could appear similar.    2. Increased bibasilar airspace opacities likely due in part to atelectasis   with potential for superimposed pneumonia or aspiration especially on the   left. XR ABDOMEN (KUB) (SINGLE AP VIEW)   Final Result   1. No ileus or obstruction is identified. Mild gaseous distention of the   stomach. 2. Cardiomegaly with vascular congestion. 3. Small bore central venous catheter terminates at the cavoatrial   junction/right atrium. XR CHEST PORTABLE   Final Result   1. No ileus or obstruction is identified. Mild gaseous distention of the   stomach. 2. Cardiomegaly with vascular congestion. 3. Small bore central venous catheter terminates at the cavoatrial   junction/right atrium. VL DUP LOWER EXTREMITY VENOUS BILATERAL   Final Result      XR CHEST PORTABLE   Final Result   Central pulmonary congestion with obscuration of the left hemidiaphragm   likely representing effusion as can be seen in CHF. XR CHEST (SINGLE VIEW FRONTAL)   Final Result   Left internal jugular line tip projected over the mid right atrium. OG and endotracheal tubes appear unchanged and in satisfactory position. Pulmonary vasculature may be slightly congested. XR CHEST PORTABLE   Final Result   Endotracheal tube in satisfactory position. XR ABDOMEN FOR NG/OG/NE TUBE PLACEMENT   Final Result   OG tube in satisfactory position. CT CHEST ABDOMEN PELVIS W CONTRAST   Final Result   No acute traumatic abnormality detected within the chest, abdomen, and pelvis. Airspace disease the left lung base, atelectasis versus pneumonia versus   aspiration. CT THORACIC SPINE TRAUMA RECONSTRUCTION   Final Result   No acute traumatic abnormality detected within the chest, abdomen, and pelvis. Airspace disease the left lung base, atelectasis versus pneumonia versus   aspiration. CT LUMBAR SPINE TRAUMA RECONSTRUCTION   Final Result   No acute traumatic abnormality detected within the chest, abdomen, and pelvis.       Airspace disease the left lung base, atelectasis versus pneumonia versus   aspiration. CT HEAD WO CONTRAST   Final Result   No acute intracranial abnormality. CT CERVICAL SPINE WO CONTRAST   Final Result   No acute abnormality of the cervical spine. XR CHEST PORTABLE   Final Result   No radiographic evidence of an acute cardiopulmonary process. 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              Echocardiogram:   Results for orders placed during the hospital encounter of 09/24/22    ECHO Complete 2D W Doppler W Color    Narrative  Transthoracic Echocardiography Report (TTE)    Patient Name Yuliana Pineda     Date of Study           09/27/2022  JOSIAH MOMIN    Date of      1950  Gender                  Female  Birth    Age          80 year(s)  Race                        Room Number  3024        Height:                 65 inch, 165.1 cm    Corporate ID [id number]    Weight:                 210 pounds, 95.3 kg  #    Patient Acct [de-identified]   BSA:        2.02 m^2    BMI:        34.95 kg/m^2  #    MR #         [id number]     Sonographer             Cesia Marvin RVT,  [facility]    Accession #  [id number]  Interpreting Physician  Mendy Gardner    Fellow                   Referring Nurse  Practitioner    Interpreting             Referring Physician  Fellow    Type of Study    TTE procedure:2D Echocardiogram, M-Mode, Doppler, Color Doppler. Procedure Date  Date: 09/27/2022 Start: 04:13 PM    Study Location: [facility]  Technical Quality: Adequate visualization    Indications:Cardiac Evaluation. Comments:Referring Physician: Emily Ferguson MD    History / Tech. Comments:  Echo done at patient bedside. Echo done at patient bedside. Patient supine on ventilator.     Patient Status: Inpatient    Height: 65 inches Weight: 210 pounds BSA: 2.02 m^2 BMI: 34.95 kg/m^2    CONCLUSIONS    Summary  Normal LV size and wall thickness. No obvious wall motion abnormality seen. Normal LV systolic function with LVEF >55%. Dilated RV with preserved function. RV systolic pressure 44 mmHg  RA appears dilated. No obvious significant structural valvular abnormality noted. No significant valvular stenosis or regurgitation noted. Normal aortic root dimension. No significant pericardial effusion noted. IVC is dilated, difficult to assess inspiratory variation , Pt on ventilator    Signature  ----------------------------------------------------------------------------  Electronically signed by Mendy Gardner(Interpreting physician) on  09/28/2022 11:12 AM  ----------------------------------------------------------------------------    ----------------------------------------------------------------------------  Electronically signed by Suzan Ceron RVT, RDCS(Sonographer) on  09/27/2022 04:48 PM  ----------------------------------------------------------------------------  FINDINGS  Left Atrium  Left atrium is normal in size. Left Ventricle  Left ventricle is normal in size. Global left ventricular systolic function  is normal. Estimated ejection fraction is 60 %. Right Atrium  Right atrial dilatation. Right Ventricle  Right ventricular dilatation with normal systolic function. Mitral Valve  Normal mitral valve structure. Trivial mitral regurgitation. No mitral stenosis. Aortic Valve  Normal aortic valve structure. Aortic valve is trileaflet. No aortic insufficiency. No aortic stenosis. Tricuspid Valve  Normal tricuspid valve leaflets. Mild tricuspid regurgitation. Estimated right ventricular systolic pressure is 44 mmHg. No tricuspid stenosis. Pulmonic Valve  Normal pulmonic valve structure. No significant pulmonic insufficiency. No evidence of pulmonic stenosis. Pericardial Effusion  No pericardial effusion seen. Miscellaneous  Normal aortic root dimension.   IVC dilated but unable to assess respiratory collapse due to patient on  ventilator. E/E' average = 10.65    M-mode / 2D Measurements & Calculations:    LVIDd:5.2 cm(3.7 - 5.6 cm)       Diastolic LHRYJM:34.7 ml  CGINA:7.5 cm(2.2 - 4.0 cm)       Systolic VJLDWA:92.6 ml  TAWZ:3.1 cm(0.6 - 1.1 cm)        Aortic Root:2.9 cm(2.0 - 3.7 cm)  LVPWd:0.6 cm(0.6 - 1.1 cm)       LA Dimension: 4.2 cm(1.9 - 4.0 cm)  Fractional Shortenin.69 %    LA volume/Index: 48.7 ml /24m^2  Calculated LVEF (%): 61.3 %      LVOT:1.8 cm  RVDd:4.7 cm    Mitral:                                 Aortic    Valve Area (P1/2-Time): 2.75 cm^2       Peak Velocity: 1.76 m/s  Peak E-Wave: 0.96 m/s                   Mean Velocity: 1.12 m/s  Peak A-Wave: 0.99 m/s                   Peak Gradient: 12.39 mmHg  E/A Ratio: 0.97                         Mean Gradient: 6 mmHg  Peak Gradient: 3.69 mmHg  Mean Gradient: 3 mmHg  Deceleration Time: 221 msec             Area (continuity): 2.17 cm^2  P1/2t: 80 msec                          AV VTI: 33.3 cm    Area (continuity): 2.58 cm^2  Mean Velocity: 0.82 m/s    Tricuspid:                              Pulmonic:    Estimated RVSP: 44 mmHg                 Peak Velocity: 1.07 m/s  Peak TR Velocity: 2.93 m/s              Peak Gradient: 4.58 mmHg  Peak TR Gradient: 34.3396 mmHg  Estimated RA Pressure: 10 mmHg    Estimated PASP: 44.34 mmHg    Diastology / Tissue Doppler  Septal Wall E' velocity:0.07 m/s  Septal Wall E/E':13  Lateral Wall E' velocity:0.12 m/s  Lateral Wall E/E':8.3       ASSESSMENT AND PLAN     Assessment:    // Acute hypoxic respiratory failure  // Aspiration pneumonia/pneumonitis  // Altered mental status/encephalopathy improving  // Paroxysmal A fib with RVR  // MSSA septicemia  // Klebsiella UTI  // COPD by history  // Obesity likely obstructive sleep apnea    Plan:    Patient is been on 2 L nasal cannula currently off O2 and maintaining saturation above 90%.   Encourage to use BiPAP at night at current setting and as needed and during naps during the daytime. Change Atrovent aerosol to Spiriva once daily and continue with Symbicort. (Patient is on Trelegy at home)  On beta-blocker for atrial fibrillation currently in sinus rhythm   She is currently on Unasyn follow-up with infectious disease. Encourage incentive spirometry, ambulation PT deep breathing and cough. Strict aspiration precaution. Avoidance of benzodiazepine. She is on oxycodone. Patient is currently on full dose of Lovenox for atrial fibrillation. DVT prophylaxis: On therapeutic Lovenox. Discussed with nursing staff, treatment and plan discussed with    Ceferino Corrales MD.  Pulmonary critical care medicine  [facility]    10/5/2022 9:41 AM        Please note that this chart was generated using voice recognition Dragon dictation software. Although every effort was made to ensure the accuracy of this automated transcription, some errors in transcription may have occurred.

## 2022-10-05 NOTE — PROGRESS NOTES
[facility]  Internal Medicine Teaching Residency Program  Inpatient Daily Progress Note  ______________________________________________________________________________    Patient: Jackie Perez  YOB: 1950   JWL:[id number]    Acct: [de-identified]     Room: [facility]189-05  Admit date: 9/24/2022  Today's date: 10/05/22  Number of days in the hospital: 11    SUBJECTIVE   Admitting Diagnosis: Septicemia ([facility] [address]  CC: Weakness, \" stuck in chair\"    Pt examined at bedside. Chart & results reviewed. No acute events overnight  Hemodynamically stable, afebrile, saturating well on room air  Refused DANE yesterday so ID recommended to continue antibiotic therapy for 6 weeks  Needs PICC line before discharge      ROS:  Constitutional:  negative for chills, fevers, sweats  Respiratory:  negative for cough, dyspnea on exertion, hemoptysis, shortness of breath, wheezing  Cardiovascular:  negative for chest pain, chest pressure/discomfort, lower extremity edema, palpitations  Gastrointestinal:  negative for abdominal pain, constipation, diarrhea, nausea, vomiting  Neurological:  negative for dizziness, headache  BRIEF HISTORY     Patient presented to the ED via EMS after being found by a neighbor stuck in her chair. Patient was complaining of weakness was unable to get up from the chair. Patient was tachypneic failed BiPAP and was ultimately intubated. Blood cultures positive for MSSA and urine cultures were positive for Klebsiella. Patient was extubated on 9/28/2022. Patient has remained slightly altered, with comments of possible intermittent hallucinations. She was able to maintain her saturations on 3 L NC.   Her leukocytosis and urine output improved and patient was transferred in stable condition to the medical floor    OBJECTIVE     Vital Signs:  /68   Pulse 76   Temp 97 °F (36.1 °C) (Temporal)   Resp 16   Ht 5' 5\" (1.651 m)   Wt 238 lb 15.7 oz (108.4 kg) LMP  (LMP Unknown)   SpO2 97%   BMI 39.77 kg/m²     Temp (24hrs), Av.9 °F (36.6 °C), Min:97 °F (36.1 °C), Max:98.4 °F (36.9 °C)    In: 20   Out: -     Physical Exam:  Constitutional: This is a well developed, well nourished, 35-39.9 - Obesity Grade II 80y.o. year old female who is alert, oriented, cooperative and in no apparent distress. Head:normocephalic and atraumatic. Respiratory:  Breath sounds bilaterally were clear to auscultation. There were no wheezes, rhonchi or rales. There is no intercostal retraction or use of accessory muscles. Cardiovascular: Regular without murmur, clicks, gallops or rubs. Abdomen: Slightly rounded and soft without organomegaly. No rebound, rigidity or guarding was appreciated. Musculoskeletal: No gross muscle weakness. Extremities:  No lower extremity edema, ulcerations, tenderness, varicosities or erythema. Muscle size, tone and strength are normal.  No involuntary movements are noted. Skin:  Warm and dry. Good color, turgor and pigmentation. No lesions or scars.   No cyanosis or clubbing  Neurological/Psychiatric: The patient's general behavior, level of consciousness, thought content and emotional status is normal.        Medications:  Scheduled Medications:    tiotropium  2 puff Inhalation Daily    lidocaine 1 % injection  5 mL IntraDERmal Once    sodium chloride flush  5-40 mL IntraVENous 2 times per day    potassium chloride  20 mEq Oral BID WC    metoprolol tartrate  25 mg Oral BID    [Held by provider] enoxaparin  1 mg/kg SubCUTAneous BID    ampicillin-sulbactam  3,000 mg IntraVENous Q6H    thiamine  100 mg Oral Daily    folic acid  1 mg Oral Daily    sodium chloride flush  5-40 mL IntraVENous 2 times per day    budesonide-formoterol  2 puff Inhalation BID     Continuous Infusions:    sodium chloride      dextrose      sodium chloride Stopped (22 1257)     PRN Medicationssodium chloride flush, 5-40 mL, PRN  sodium chloride, 25 mL, PRN  calcium carbonate, 500 mg, TID PRN  metoclopramide, 5 mg, Q6H PRN  oxyCODONE, 5 mg, Q8H PRN  glucose, 4 tablet, PRN  dextrose bolus, 125 mL, PRN   Or  dextrose bolus, 250 mL, PRN  glucagon (rDNA), 1 mg, PRN  dextrose, , Continuous PRN  sodium chloride flush, 5-40 mL, PRN  sodium chloride, , PRN  ondansetron, 4 mg, Q8H PRN   Or  ondansetron, 4 mg, Q6H PRN  polyethylene glycol, 17 g, Daily PRN  acetaminophen, 650 mg, Q6H PRN   Or  acetaminophen, 650 mg, Q6H PRN        Diagnostic Labs:  CBC:   Recent Labs     10/03/22  0530 10/04/22  0419 10/05/22  0652   WBC 14.6* 11.4* 10.6   RBC 3.72* 3.57* 3.81*   HGB 10.0* 9.4* 10.2*   HCT 30.1* 29.2* 31.7*   MCV 80.9* 81.8* 83.2   RDW 17.0* 16.7* 16.9*    305 355     BMP:   Recent Labs     10/03/22  0530 10/04/22  0419 10/05/22  0652    136 138   K 3.5* 3.1* 3.2*    101 102   CO2 23 26 23   BUN 11 8 7*   CREATININE 0.53 0.55 0.56     BNP: No results for input(s): BNP in the last 72 hours. PT/INR: No results for input(s): PROTIME, INR in the last 72 hours. APTT: No results for input(s): APTT in the last 72 hours. CARDIAC ENZYMES: No results for input(s): CKMB, CKMBINDEX, TROPONINI in the last 72 hours. Invalid input(s): CKTOTAL;3  FASTING LIPID PANEL:  Lab Results   Component Value Date    CHOL 141 05/17/2021    HDL 50 05/17/2021    TRIG 213 (H) 09/28/2022     LIVER PROFILE: No results for input(s): AST, ALT, ALB, BILIDIR, BILITOT, ALKPHOS in the last 72 hours. MICROBIOLOGY:   Lab Results   Component Value Date/Time    CULTURE NO GROWTH 5 DAYS 09/29/2022 01:27 PM       Imaging:    XR ABDOMEN (KUB) (SINGLE AP VIEW)    Result Date: 9/29/2022  1. No ileus or obstruction is identified. Mild gaseous distention of the stomach. 2. Cardiomegaly with vascular congestion. 3. Small bore central venous catheter terminates at the cavoatrial junction/right atrium. NM BONE SCAN 3 PHASE    Result Date: 9/30/2022  Nondiagnostic study.   Patient could not cooperate. No asymmetry on flow images to suggest hyperemia. XR CHEST PORTABLE    Result Date: 9/30/2022  1. Pulmonary vascular congestion may be increased, and there may be new perihilar edema. Consider congestive heart failure given at least borderline cardiomegaly and suspicion for trace bilateral pleural effusions. Pneumonia could appear similar. 2. Increased bibasilar airspace opacities likely due in part to atelectasis with potential for superimposed pneumonia or aspiration especially on the left. XR CHEST PORTABLE    Result Date: 9/29/2022  1. No ileus or obstruction is identified. Mild gaseous distention of the stomach. 2. Cardiomegaly with vascular congestion. 3. Small bore central venous catheter terminates at the cavoatrial junction/right atrium. ASSESSMENT & PLAN     ASSESSMENT / PLAN:     Principal Problem:    Septicemia ([address]  Active Problems:    MSSA bacteremia    UTI due to Klebsiella species    COPD (chronic obstructive pulmonary disease) ([facility])    Class 2 obesity due to excess calories with body mass index (BMI) of 39.0 to 39.9 in adult    Acute respiratory failure with hypoxia ([facility])    Respiratory alkalosis    Hypokalemia    MSSA (methicillin susceptible Staphylococcus aureus) septicemia ([facility])    SIRS (systemic inflammatory response syndrome) ([facility])    Bandemia    Acute encephalopathy    Aspiration pneumonia ([facility])    Spinal stenosis of lumbar region with neurogenic claudication    Bilateral leg weakness    Paroxysmal atrial fibrillation ([facility])    Hypernatremia    Folic acid deficiency    Leukocytosis    Pneumonia of left lower lobe due to infectious organism    Prolonged Q-T interval on ECG  Resolved Problems:    * No resolved hospital problems.  *     MSSA bacteremia  - Switched to Unasyn from Ancef due to concerns for aspiration pneumonia  - ID following, initially recommended continuing Unasyn till 10/07 then switch to Ancef till 10/24.  -Patient refused DANE, ID recommended to continue Unasyn till 10/7, and start Ancef 2 g Q8H until 11/9.  -PICC ordered        Klebsiella UTI  - On antibiotics as above     New onset A. Fib  - Lopressor 25 mg twice daily  - Cardiology consulted, recommends continuing rate control with metoprolol and long-term anticoagulation therapy with Lovenox while inpatient and transitioning to NOAC on discharge. Acute on chronic respiratory failure  - Supplemental oxygen as needed, Keep SpO2 >92%  -ipratropium and symbicort daily     Bilateral lower extremity weakness  - refused MRI cervical, thoracic, and lumbar spine   -CT scan spine on 9/24 unremarkable for acute changes  -Neurology signed off      DVT ppx : Lovenox      PT/OT: Consulted  Discharge Planning / SW:  consulted    Sung Ramirez MD  Internal Medicine Resident, PGY-1  [address], [place]  10/5/2022, 11:57 AM

## 2022-10-05 NOTE — PROGRESS NOTES
Physical Therapy        Physical Therapy Cancel Note      DATE: 10/5/2022    NAME: Dian Soriano  MRN: [id number]   : 1950      Patient not seen this date for Physical Therapy due to:    Patient Declined: pt states she is leaving today for facility and declines to complete therapy prior to d/c.        Electronically signed by Yolanda Steele PT on 10/5/2022 at 4:00 PM

## 2022-10-05 NOTE — PROGRESS NOTES
[facility] -   Infectious diseases evaluation  admission date 9/24/2022    reason for consultation:   Katerine Fly and sepsis    Impression :   Current:  MSSA septicemia 9/24  Kleb UTI 9/24  Left lower lobe infiltrate likely atelectasis   Bandemia  Altered mentation, acute metabolic encephalopathy   septic shock resolved   SIRS from infection  Chronic left hip pain  Chronic lower back pain with bilateral sciatica  Prediabetes  COPD  Obstructive sleep apnea  A. fib paroxysmal  9/29 increasing oxygen requirement and increasing leukocytosis-possible aspiration event    Other:    Discussion / summary of stay / plan of care     Recommendations   Kleb UTI from 9/24 - treated w ancef   On Ancef for MSSA septicemia coming from home, and kleb UTI   There is a concern of aspiration and the patient has increased WBC and increased oxygen requirement  Switch to unasyn 9/30  till 10/7 then back to ancef till 10/24  CXR shows possibly new infiltrates bilat LL  suggesting aspiration pneumonia   Asking for DANE to look for possible vegetation to explain the MSSA bacteremia from home - could tolerate laying down  ould not tolerate bone scan or MR Is due to back pain-  Looking at her spine or hips evaluating for any infection hence and due to her altered mentation    Patient to be treated wit abx for 6 weeks to allow pain to resolve before MRI  Picc placement today. Plan to treat patient with Unasyn until 10/7.    After 10/7, patient will start high dose Ancef 2g Q8H until 11/9  Chart reconsiled  FU in office in 3 weeks       Infection Control Recommendations   [place] Precautions    Antimicrobial Stewardship Recommendations   Simplification of therapy  Targeted therapy      History of Present Illness:   Initial history:  Anabela Mares is a 80y.o.-year-old female was found by a neighbor sitting on a chair unable to get out because of weakness, she had missed work, it seems she was really feeling weak for about 3 days before that. She was brought by 911 to the hospital.  Was found to be septic with MSSA in the blood UTI Klebsiella septic shock, patient needed intubation at the time. Extubated 9/28 and ultimately transferred out to the floor. Because of DARIA vela cardiology has been on the case, planning possibly cardioversion on Monday. A 2D echo was done showing no vegetation. Her CT of the spine was negative for fracture, her CT of the abdomen and pelvis on 9/24 her admission, was negative for pneumonia. She had a white count that was increased upon admission and ultimately improved, 9/30 her white count increased again, there was a suspicion of aspiration over the last 24 hours. She is also requiring increased oxygen. Repeat blood cultures from 9/29 are still pending and negative no repeat blood cultures were done before that. The patient has been on Ancef. Seems she has had a history of chronic back pain with sciatica. On my exam she is alert on 4 L of nasal cannula looks very ill, she is very encephalopathic, not reliable in her answers. For instance she says she has no back pain and no hip pain but when he lifted both legs she screamed from pain.   According to the nurse she also screams to little things and is difficult to tell if this is true pain or chest discomfort      Interval changes  10/5/2022   Patient Vitals for the past 8 hrs:   BP Temp Temp src Pulse Resp SpO2   10/05/22 1302 125/78 97.5 °F (36.4 °C) -- 71 22 95 %   10/05/22 0956 -- -- -- -- 18 --   10/05/22 0926 -- -- -- -- 16 --   10/05/22 0921 130/68 -- -- 76 -- --   10/05/22 0645 130/63 97 °F (36.1 °C) Temporal 70 29 97 %       10/3  Patients fever spiked but this AM but has resolved  Leukocytosis is resolving  Patient vocalizes concern that Unasyn is not working but feels better than when she first got here  Patient reports leg and hip pain secondary to sciatica  On nasal cannula  Uri awaited cardiol    10/4   DANE not performed - patient too uncomfortable for procedure  Afebrile last 24 hours  +back and hip pain    10/5  Planning for discharge  Pt agreeable to Picc placement today  Afebrile, NAD  +back pain   +mouth sores, primary to give patient swish-and-spit      Summary of relevant labs:  Labs:  WBC 30-22-37-11-20- 16.8- 14.6 - 11.4  Platelet count 133- -[phone]- 305  Hematocrit 40-33-36  Micro:  Urine culture 9/24 Klebsiella pneumoniae, sensitive to Cipro and Bactrim, intermediate to ceftriaxone and resistant to penicillin  MRSA DNA neg-9/24  Blood culture 9/24 MSSA X2  Repeat blood culture 9/29 - negative    Imaging:  NM Bone Scan 9/30 - patient could not cooperate     CXR 9/30   1. Pulmonary vascular congestion may be increased, and there may be new perihilar edema. Consider congestive heart failure given at least borderline cardiomegaly and suspicion for trace bilateral pleural effusions. Pneumonia could appear similar. 2. Increased bibasilar airspace opacities likely due in part to atelectasis with potential for superimposed pneumonia or aspiration especially on the left      Chest x-ray 9/27 no pneumonia    CT head -9/24  CT of the cervical spine 9/24 negative  CT abdomen pelvis lumbar spine and thoracic spine 9/24 airspace disease of the left lung atelectasis versus pneumonia, no traumatic abnormality within the chest abdomen and pelvis      2D echo 9/27  Normal LV size and wall thickness. No obvious wall motion abnormality seen. Normal LV systolic function with LVEF >55%. Dilated RV with preserved function. RV systolic pressure 44 mmHg  RA appears dilated. No obvious significant structural valvular abnormality noted. No significant valvular stenosis or regurgitation noted. Normal aortic root dimension. No significant pericardial effusion noted.   IVC is dilated, difficult to assess inspiratory variation , Pt on ventilator    Right lower extremity negative DVT    I have personally reviewed the past medical history, past surgical history, medications, social history, and family history, and I haveupdated the database accordingly. Allergies:   Lyrica [pregabalin], Demerol hcl [meperidine], Fluoxetine, and Paxil [paroxetine hcl]     Review of Systems:     Review of Systems   Constitutional:  Positive for appetite change (sores burn when she eats certain foods). Negative for chills, diaphoresis and fatigue. HENT:  Positive for mouth sores (present on lateral aspect of tongue). Eyes:  Negative for visual disturbance. Respiratory:  Negative for choking and chest tightness. Cardiovascular:  Negative for chest pain. Gastrointestinal:  Negative for abdominal distention and abdominal pain. Endocrine: Negative for cold intolerance and heat intolerance. Genitourinary:  Negative for difficulty urinating and dysuria. Musculoskeletal:  Positive for arthralgias and back pain. Skin:  Negative for color change. Allergic/Immunologic: Negative for immunocompromised state. Neurological:  Negative for dizziness and facial asymmetry. Hematological:  Negative for adenopathy. Psychiatric/Behavioral:  Negative for agitation. Physical Examination :       Physical Exam  Constitutional:       General: She is not in acute distress. Appearance: Normal appearance. She is obese. HENT:      Head: Normocephalic and atraumatic. Right Ear: External ear normal.      Left Ear: External ear normal.      Nose: Nose normal. No congestion. Mouth/Throat:      Mouth: Mucous membranes are moist.      Pharynx: Oropharynx is clear. No oropharyngeal exudate. Eyes:      General: No scleral icterus. Extraocular Movements: Extraocular movements intact. Pupils: Pupils are equal, round, and reactive to light. Cardiovascular:      Rate and Rhythm: Normal rate and regular rhythm. Pulses: Normal pulses. Heart sounds: No murmur heard. No gallop.    Pulmonary: Effort: Pulmonary effort is normal. No respiratory distress. Breath sounds: No wheezing. Abdominal:      General: Bowel sounds are normal. There is no distension. Tenderness: There is no abdominal tenderness. Musculoskeletal:         General: No swelling or deformity. Normal range of motion. Cervical back: Normal range of motion. No rigidity. Lymphadenopathy:      Cervical: No cervical adenopathy. Skin:     General: Skin is warm. Capillary Refill: Capillary refill takes less than 2 seconds. Coloration: Skin is not jaundiced. Findings: No bruising. Neurological:      General: No focal deficit present. Mental Status: Mental status is at baseline. Cranial Nerves: No cranial nerve deficit. Motor: No weakness.    Psychiatric:         Mood and Affect: Mood normal.         Behavior: Behavior normal.       Past Medical History:     Past Medical History:   Diagnosis Date    Chronic back pain     Chronic hip pain     COPD (chronic obstructive pulmonary disease) (HCC)     Major depression     Osteoarthritis     Osteoporosis        Past Surgical  History:     Past Surgical History:   Procedure Laterality Date    BREAST BIOPSY      two    CHOLECYSTECTOMY      HYSTERECTOMY, VAGINAL         Medications:      tiotropium  2 puff Inhalation Daily    potassium bicarb-citric acid  20 mEq Oral BID WC    lidocaine 1 % injection  5 mL IntraDERmal Once    sodium chloride flush  5-40 mL IntraVENous 2 times per day    metoprolol tartrate  25 mg Oral BID    [Held by provider] enoxaparin  1 mg/kg SubCUTAneous BID    ampicillin-sulbactam  3,000 mg IntraVENous Q6H    thiamine  100 mg Oral Daily    folic acid  1 mg Oral Daily    sodium chloride flush  5-40 mL IntraVENous 2 times per day    budesonide-formoterol  2 puff Inhalation BID       Social History:     Social History     Socioeconomic History    Marital status: Single     Spouse name: Not on file    Number of children: Not on file Years of education: Not on file    Highest education level: Not on file   Occupational History    Not on file   Tobacco Use    Smoking status: Former     Packs/day: 0.75     Years: 34.00     Pack years: 25.50     Types: Cigarettes     Quit date: 3/20/2017     Years since quittin.5    Smokeless tobacco: Never   Vaping Use    Vaping Use: Never used   Substance and Sexual Activity    Alcohol use: Not Currently     Comment: occasionally     Drug use: Never    Sexual activity: Not on file   Other Topics Concern    Not on file   Social History Narrative    Not on file     Social Determinants of Health     Financial Resource Strain: Low Risk     Difficulty of Paying Living Expenses: Not hard at all   Food Insecurity: No Food Insecurity    Worried About Running Out of Food in the Last Year: Never true    Ran Out of Food in the Last Year: Never true   Transportation Needs: Not on file   Physical Activity: Not on file   Stress: Not on file   Social Connections: Not on file   Intimate Partner Violence: Not on file   Housing Stability: Not on file       Family History:     Family History   Problem Relation Age of Onset    Stroke Mother     Alzheimer's Disease Father       Medical Decision Making:   I have independently reviewed/ordered the following labs:    CBC with Differential:   Recent Labs     10/04/22  0419 10/05/22  0652   WBC 11.4* 10.6   HGB 9.4* 10.2*   HCT 29.2* 31.7*    355   LYMPHOPCT 13* 19*   MONOPCT 7 7       BMP:  Recent Labs     10/04/22  0419 10/05/22  0652    138   K 3.1* 3.2*    102   CO2 26 23   BUN 8 7*   CREATININE 0.55 0.56   MG 2.0 2.1       Hepatic Function Panel: No results for input(s): PROT, LABALBU, BILIDIR, IBILI, BILITOT, ALKPHOS, ALT, AST in the last 72 hours. No results for input(s): RPR in the last 72 hours. No results for input(s): HIV in the last 72 hours. No results for input(s): BC in the last 72 hours.   Lab Results   Component Value Date/Time    CREATININE 0.56 10/05/2022 06:52 AM    GLUCOSE 103 10/05/2022 06:52 AM       Detailed results: Thank you for allowing us to participate in the care of this patient. Please call with questions. This note is created with the assistance of a speech recognition program.  While intending to generate adocument that actually reflects the content of the visit, the document can still have some errors including those of syntax and sound a like substitutions which may escape proof reading. It such instances, actual meaningcan be extrapolated by contextual diversion. Office: [phone]  Perfect serve / office [phone]      Sudha Jhonson, OMS3      I have discussed the care of the patient, including pertinent history and exam findings,  with the resident. I have seen and examined the patient and the key elements of all parts of the encounter have been performed by me. I agree with the assessment, plan and orders as documented by the resident.     Raine Espinoza, Infectious Diseases

## 2022-10-06 VITALS
DIASTOLIC BLOOD PRESSURE: 75 MMHG | WEIGHT: 238.98 LBS | TEMPERATURE: 98.7 F | RESPIRATION RATE: 18 BRPM | HEIGHT: 65 IN | HEART RATE: 78 BPM | OXYGEN SATURATION: 96 % | BODY MASS INDEX: 39.82 KG/M2 | SYSTOLIC BLOOD PRESSURE: 137 MMHG

## 2022-10-06 PROCEDURE — 6370000000 HC RX 637 (ALT 250 FOR IP)

## 2022-10-06 PROCEDURE — 94640 AIRWAY INHALATION TREATMENT: CPT

## 2022-10-06 PROCEDURE — 6370000000 HC RX 637 (ALT 250 FOR IP): Performed by: INTERNAL MEDICINE

## 2022-10-06 PROCEDURE — 2580000003 HC RX 258

## 2022-10-06 PROCEDURE — 6370000000 HC RX 637 (ALT 250 FOR IP): Performed by: STUDENT IN AN ORGANIZED HEALTH CARE EDUCATION/TRAINING PROGRAM

## 2022-10-06 PROCEDURE — 2580000003 HC RX 258: Performed by: INTERNAL MEDICINE

## 2022-10-06 PROCEDURE — 6360000002 HC RX W HCPCS: Performed by: INTERNAL MEDICINE

## 2022-10-06 PROCEDURE — 99232 SBSQ HOSP IP/OBS MODERATE 35: CPT | Performed by: INTERNAL MEDICINE

## 2022-10-06 PROCEDURE — 99239 HOSP IP/OBS DSCHRG MGMT >30: CPT | Performed by: INTERNAL MEDICINE

## 2022-10-06 RX ORDER — OXYCODONE HYDROCHLORIDE 5 MG/1
5 TABLET ORAL EVERY 8 HOURS PRN
Qty: 9 TABLET | Refills: 0 | Status: SHIPPED | OUTPATIENT
Start: 2022-10-06 | End: 2022-10-09

## 2022-10-06 RX ADMIN — SODIUM CHLORIDE, PRESERVATIVE FREE 10 ML: 5 INJECTION INTRAVENOUS at 09:41

## 2022-10-06 RX ADMIN — TIOTROPIUM BROMIDE INHALATION SPRAY 2 PUFF: 3.12 SPRAY, METERED RESPIRATORY (INHALATION) at 07:52

## 2022-10-06 RX ADMIN — OXYCODONE 5 MG: 5 TABLET ORAL at 00:46

## 2022-10-06 RX ADMIN — SODIUM CHLORIDE, PRESERVATIVE FREE 10 ML: 5 INJECTION INTRAVENOUS at 09:40

## 2022-10-06 RX ADMIN — APIXABAN 5 MG: 5 TABLET, FILM COATED ORAL at 09:40

## 2022-10-06 RX ADMIN — FOLIC ACID 1 MG: 1 TABLET ORAL at 09:39

## 2022-10-06 RX ADMIN — AMPICILLIN SODIUM AND SULBACTAM SODIUM 3000 MG: 2; 1 INJECTION, POWDER, FOR SOLUTION INTRAMUSCULAR; INTRAVENOUS at 06:06

## 2022-10-06 RX ADMIN — Medication 100 MG: at 09:39

## 2022-10-06 RX ADMIN — METOPROLOL TARTRATE 25 MG: 25 TABLET ORAL at 09:39

## 2022-10-06 RX ADMIN — OXYCODONE 5 MG: 5 TABLET ORAL at 09:59

## 2022-10-06 RX ADMIN — POTASSIUM BICARBONATE 20 MEQ: 782 TABLET, EFFERVESCENT ORAL at 09:39

## 2022-10-06 ASSESSMENT — ENCOUNTER SYMPTOMS
EYE REDNESS: 0
ABDOMINAL DISTENTION: 0
CHOKING: 0
BACK PAIN: 1
GASTROINTESTINAL NEGATIVE: 1
COLOR CHANGE: 0
EYE PAIN: 0
ABDOMINAL PAIN: 0
CHEST TIGHTNESS: 0
COUGH: 1

## 2022-10-06 ASSESSMENT — PAIN DESCRIPTION - DESCRIPTORS
DESCRIPTORS: ACHING
DESCRIPTORS: ACHING

## 2022-10-06 ASSESSMENT — PAIN DESCRIPTION - ORIENTATION
ORIENTATION: LEFT
ORIENTATION: LEFT

## 2022-10-06 ASSESSMENT — PAIN SCALES - GENERAL
PAINLEVEL_OUTOF10: 8
PAINLEVEL_OUTOF10: 10

## 2022-10-06 ASSESSMENT — PAIN DESCRIPTION - LOCATION
LOCATION: GROIN;HIP
LOCATION: HIP;BACK

## 2022-10-06 ASSESSMENT — PAIN - FUNCTIONAL ASSESSMENT: PAIN_FUNCTIONAL_ASSESSMENT: PREVENTS OR INTERFERES SOME ACTIVE ACTIVITIES AND ADLS

## 2022-10-06 NOTE — PLAN OF CARE
Problem: Discharge Planning  Goal: Discharge to home or other facility with appropriate resources  10/6/2022 1114 by Roger Marshall RN  Outcome: Completed  10/6/2022 0508 by Lui Leyva RN  Outcome: Progressing     Problem: Respiratory - Adult  Goal: Achieves optimal ventilation and oxygenation  10/6/2022 1114 by Roger Marshall RN  Outcome: Completed  10/6/2022 0508 by Lui Leyva RN  Outcome: Progressing     Problem: Neurosensory - Adult  Goal: Achieves stable or improved neurological status  10/6/2022 1114 by Roger Marshall RN  Outcome: Completed  10/6/2022 0508 by Lui Leyva RN  Outcome: Progressing  Goal: Absence of seizures  10/6/2022 1114 by Roger Marshall RN  Outcome: Completed  10/6/2022 0508 by Lui Leyva RN  Outcome: Progressing  Goal: Remains free of injury related to seizures activity  10/6/2022 1114 by Roger Marshall RN  Outcome: Completed  10/6/2022 0508 by Lui Leyva RN  Outcome: Progressing  Goal: Achieves maximal functionality and self care  10/6/2022 1114 by Roger Marshall RN  Outcome: Completed  10/6/2022 0508 by Lui Leyva RN  Outcome: Progressing     Problem: Cardiovascular - Adult  Goal: Maintains optimal cardiac output and hemodynamic stability  10/6/2022 1114 by Roger Marshall RN  Outcome: Completed  10/6/2022 0508 by Lui Leyva RN  Outcome: Progressing  Goal: Absence of cardiac dysrhythmias or at baseline  10/6/2022 1114 by Roger Marshall RN  Outcome: Completed  10/6/2022 0508 by Lui Leyva RN  Outcome: Progressing     Problem: Skin/Tissue Integrity - Adult  Goal: Skin integrity remains intact  10/6/2022 1114 by Roger Marshall RN  Outcome: Completed  10/6/2022 0508 by Lui Leyva RN  Outcome: Progressing  Goal: Incisions, wounds, or drain sites healing without S/S of infection  10/6/2022 1114 by Roger Marshall RN  Outcome: Completed  10/6/2022 0508 by Lui Leyva RN  Outcome: Progressing  Goal: Oral mucous membranes remain intact  10/6/2022 1114 by Daryle Altes, RN  Outcome: Completed  10/6/2022 0508 by Wendie Arriaza RN  Outcome: Progressing     Problem: Musculoskeletal - Adult  Goal: Return mobility to safest level of function  10/6/2022 1114 by Daryle Altes, RN  Outcome: Completed  10/6/2022 0508 by Wendie Arriaza RN  Outcome: Progressing  Goal: Maintain proper alignment of affected body part  10/6/2022 1114 by Daryle Altes, RN  Outcome: Completed  10/6/2022 0508 by Wendie Arriaza RN  Outcome: Progressing  Goal: Return ADL status to a safe level of function  10/6/2022 1114 by Daryle Altes, RN  Outcome: Completed  10/6/2022 0508 by Wendie Arriaza RN  Outcome: Progressing     Problem: Musculoskeletal - Adult  Goal: Return mobility to safest level of function  10/6/2022 1114 by Daryle Altes, RN  Outcome: Completed  10/6/2022 0508 by Wendie Arriaza RN  Outcome: Progressing  Goal: Maintain proper alignment of affected body part  10/6/2022 1114 by Daryle Altes, RN  Outcome: Completed  10/6/2022 0508 by Wendie Arriaza RN  Outcome: Progressing  Goal: Return ADL status to a safe level of function  10/6/2022 1114 by Daryle Altes, RN  Outcome: Completed  10/6/2022 0508 by Wendie Arriaza RN  Outcome: Progressing     Problem: Gastrointestinal - Adult  Goal: Minimal or absence of nausea and vomiting  10/6/2022 1114 by Daryle Altes, JESUS  Outcome: Completed  10/6/2022 0508 by Wendie Arriaza RN  Outcome: Progressing  Goal: Maintains or returns to baseline bowel function  10/6/2022 1114 by Daryle Altes, RN  Outcome: Completed  10/6/2022 0508 by Wendie Arriaza RN  Outcome: Progressing  Goal: Maintains adequate nutritional intake  10/6/2022 1114 by Daryle Altes, RN  Outcome: Completed  10/6/2022 0508 by Wendie Arriaza RN  Outcome: Progressing  Goal: Establish and maintain optimal ostomy function  10/6/2022 1114 by Fanta MCCLELLAN Charly Clark RN  Outcome: Completed  10/6/2022 0508 by Elba Kaufman RN  Outcome: Progressing     Problem: Genitourinary - Adult  Goal: Absence of urinary retention  10/6/2022 1114 by Winston Pimentel RN  Outcome: Completed  10/6/2022 0508 by Elba Kaufman RN  Outcome: Progressing  Goal: Urinary catheter remains patent  10/6/2022 1114 by Winston Pimentel RN  Outcome: Completed  10/6/2022 0508 by Elba Kaufman RN  Outcome: Progressing     Problem: Infection - Adult  Goal: Absence of infection at discharge  10/6/2022 1114 by Winston Pimentel RN  Outcome: Completed  10/6/2022 0508 by Elba Kaufman RN  Outcome: Progressing  Goal: Absence of infection during hospitalization  10/6/2022 1114 by Winston Pimentel RN  Outcome: Completed  10/6/2022 0508 by Elba Kaufman RN  Outcome: Progressing  Goal: Absence of fever/infection during anticipated neutropenic period  10/6/2022 1114 by Winston Pimentel RN  Outcome: Completed  10/6/2022 0508 by Elba Kaufman RN  Outcome: Progressing     Problem: Metabolic/Fluid and Electrolytes - Adult  Goal: Electrolytes maintained within normal limits  10/6/2022 1114 by Winston Pimentel RN  Outcome: Completed  10/6/2022 0508 by Elba Kaufman RN  Outcome: Progressing  Goal: Hemodynamic stability and optimal renal function maintained  10/6/2022 1114 by Winston Pimentel RN  Outcome: Completed  10/6/2022 0508 by Elba Kaufman RN  Outcome: Progressing  Goal: Glucose maintained within prescribed range  10/6/2022 1114 by Winston Pimentel RN  Outcome: Completed  10/6/2022 0508 by Elba Kaufman RN  Outcome: Progressing     Problem: Hematologic - Adult  Goal: Maintains hematologic stability  10/6/2022 1114 by Winston Pimentel RN  Outcome: Completed  10/6/2022 0508 by Elba Kaufman RN  Outcome: Progressing     Problem: Coping  Goal: Pt/Family able to verbalize concerns and demonstrate effective coping strategies  10/6/2022 1114 by Winston Pimentel RN  Outcome: Completed  10/6/2022 0508 by Ashley Robledo RN  Outcome: Progressing     Problem: Decision Making  Goal: Pt/Family able to effectively weigh alternatives and participate in decision making related to treatment and care  10/6/2022 1114 by Eduardo Krishnan RN  Outcome: Completed  10/6/2022 0508 by Ashley Robledo RN  Outcome: Progressing     Problem: Confusion  Goal: Confusion, delirium, dementia, or psychosis is improved or at baseline  10/6/2022 1114 by Eduardo Krishnan RN  Outcome: Completed  10/6/2022 0508 by Ashley Robledo RN  Outcome: Progressing     Problem: Behavior  Goal: Pt/Family maintain appropriate behavior and adhere to behavioral management agreement, if implemented  10/6/2022 1114 by Eduardo Krishnan RN  Outcome: Completed  10/6/2022 0508 by Ashley Robledo RN  Outcome: Progressing     Problem: Spiritual Care  Goal: Pt/Family able to move forward in process of forgiving self, others, and/or higher power  10/6/2022 1114 by Eduardo Krishnan RN  Outcome: Completed  10/6/2022 0508 by Ashley Robledo RN  Outcome: Progressing     Problem: Skin/Tissue Integrity  Goal: Absence of new skin breakdown  10/6/2022 1114 by Eduardo Krishnan RN  Outcome: Completed  10/6/2022 0508 by Ashley Robledo RN  Outcome: Progressing     Problem: Safety - Adult  Goal: Free from fall injury  10/6/2022 1114 by Eduardo Krishnan RN  Outcome: Completed  10/6/2022 0508 by Ashley Robledo RN  Outcome: Progressing     Problem: ABCDS Injury Assessment  Goal: Absence of physical injury  10/6/2022 1114 by Eduardo Krishnan RN  Outcome: Completed  10/6/2022 0508 by Ashley Robledo RN  Outcome: Progressing     Problem: Nutrition Deficit:  Goal: Optimize nutritional status  10/6/2022 1114 by Eduardo Krishnan RN  Outcome: Completed  10/6/2022 0508 by Ashley Robledo RN  Outcome: Progressing

## 2022-10-06 NOTE — PROGRESS NOTES
PULMONARY & CRITICAL CARE MEDICINE PROGRESS NOTE     Patient:  Wojciech Johnston  MRN: [id number]  Admit date: 9/24/2022  Primary Care Physician: Lanette Vazquez MD  Consulting Physician: Estefany Vogel MD  CODE Status: Full Code  LOS: 15     SUBJECTIVE     Chief Complaint/ Reason for consult:    Acute hypoxic respiratory failure due to MSSA septicemia    Hospital Course: The patient is a 80 y.o. female with past medical history of COPD, chronic hip pain, chronic back pain, prediabetes, OA, osteoporosis who presented to the ED with profound weakness, found to be tachypnic, tachycardic, blood cultures grew MSSA. Urine cx grew Klebsiella. During her admission, she required increasing ventilatory support, initially BIPAP and then alter intubation with mechanical ventilation. She was extubated on 9/28/22, was transferred out of the ICU to the floor. In this interim period, patient has had hypoactive delirium, was febrile on 9/29/22 Tmax 101.1, with worsening leukocytosis and increased oxygen requirement from 3 L to 4 L NC. She underwent CXR which showed pulmonary vascular congestion, increased bibasilar opacities, CXR does not appear much changed from the CXR 3 days ago. She has been started on Unasyn for concern of aspiration pneumonia. This am, patient was found to be in A fib with RVR HR upto 160s, Cardiology was consulted, patient started on lopressor 25 mg BID    Interval History:  10/06/22  No acute complaints    Review Of Systems:  Review of Systems   Constitutional: Negative. HENT: Negative. Respiratory:  Positive for cough. Cardiovascular: Negative. Gastrointestinal: Negative. OBJECTIVE     PaO2/FiO2 RATIO:  No results for input(s): POCPO2 in the last 72 hours.      FiO2 : 28 %     VITAL SIGNS:   LAST:  /75   Pulse 78   Temp 98.7 °F (37.1 °C) (Temporal)   Resp 18   Ht 5' 5\" (1.651 m)   Wt 238 lb 15.7 oz (108.4 kg)   LMP  (LMP Unknown)   SpO2 96%   BMI 39.77 kg/m²   8-24 HR RANGE:  TEMP Temp  Av.1 °F (36.7 °C)  Min: 97.5 °F (36.4 °C)  Max: 98.7 °F (22.2 °C)   BP Systolic (47UNR), VB , Min:125 , YHQ:397      Diastolic (54YBK), MZK:97, Min:63, Max:78     PULSE Pulse  Av.2  Min: 71  Max: 81   RR Resp  Av.5  Min: 18  Max: 30   O2 SAT SpO2  Av.7 %  Min: 96 %  Max: 97 %   OXYGEN DELIVERY O2 Flow Rate (L/min)  Av L/min  Min: 0 L/min  Max: 0 L/min        Systemic Examination:   Physical Exam -Head and neck atraumatic, normocephalic    Lymph nodes-no cervical, supraclavicular lymphadenopathy    Neck-no JVP elevation    Lungs - Ventilating all lobes without any rales, rhonchi, wheezes or dullness. No bronchial breath sounds. Chest expansion equal bilaterally    CVS- S1, S2 regular. No S3 no S4, no murmurs    Abdomen-nontender, nondistended. Bowel sounds are present. No organomegaly    Lower extremity-no edema    Upper extremity-no edema    Neurological-grossly normal cranial nerves. No overt motor deficit     DATA REVIEW     Medications: Current Inpatient  Scheduled Meds:   apixaban  5 mg Oral BID    tiotropium  2 puff Inhalation Daily    potassium bicarb-citric acid  20 mEq Oral BID WC    lidocaine 1 % injection  5 mL IntraDERmal Once    sodium chloride flush  5-40 mL IntraVENous 2 times per day    metoprolol tartrate  25 mg Oral BID    ampicillin-sulbactam  3,000 mg IntraVENous Q6H    thiamine  100 mg Oral Daily    folic acid  1 mg Oral Daily    sodium chloride flush  5-40 mL IntraVENous 2 times per day    budesonide-formoterol  2 puff Inhalation BID     Continuous Infusions:   sodium chloride      dextrose      sodium chloride Stopped (22 1257)       INPUT/OUTPUT:  In: 1400 [P.O.:1360; I.V.:40]  Out: 1300 [Urine:1300]  Date 10/06/22 0000 - 10/06/22 9483   Shift 3915-7254 4890-9450 4376-9241 24 Hour Total   INTAKE   P.O.(mL/kg/hr) 400(0.5)   400   I. V.(mL/kg)  20(0.2)  20(0.2)   Shift Total(mL/kg) 400(3.7) 20(0.2)  420(3.9)   OUTPUT Urine(mL/kg/hr) 1300(1.5)   1300   Shift Total(mL/kg) 1300(12)   1300(12)   Weight (kg) 108.4 108.4 108.4 108.4          LABS:  ABGs:   No results for input(s): POCPH, POCPCO2, POCPO2, POCHCO3, IFDS1RKS in the last 72 hours. CBC:   Recent Labs     10/04/22  0419 10/05/22  0652   WBC 11.4* 10.6   HGB 9.4* 10.2*   HCT 29.2* 31.7*   MCV 81.8* 83.2    355   LYMPHOPCT 13* 19*   RBC 3.57* 3.81*   MCH 26.3 26.8   MCHC 32.2 32.2   RDW 16.7* 16.9*       CRP:   No results for input(s): CRP in the last 72 hours. LDH:   No results for input(s): LDH in the last 72 hours. BMP:   Recent Labs     10/04/22  0419 10/05/22  0652    138   K 3.1* 3.2*    102   CO2 26 23   BUN 8 7*   CREATININE 0.55 0.56   GLUCOSE 91 103*       Liver Function Test:   No results for input(s): PROT, LABALBU, ALT, AST, GGT, ALKPHOS, BILITOT in the last 72 hours. Coagulation Profile:   No results for input(s): INR, PROTIME, APTT in the last 72 hours. D-Dimer:  No results for input(s): DDIMER in the last 72 hours. Lactic Acid:  No results for input(s): LACTA in the last 72 hours. Cardiac Enzymes:  No results for input(s): CKTOTAL, CKMB, CKMBINDEX, TROPONINI in the last 72 hours. Invalid input(s): TROPONIN, HSTROP    BNP/ProBNP:   No results for input(s): BNP, PROBNP in the last 72 hours. Triglycerides:  No results for input(s): TRIG in the last 72 hours. Microbiology:  Urine Culture:  No components found for: CURINE  Blood Culture:  No components found for: CBLOOD, CFUNGUSBL  Sputum Culture:  No components found for: CSPUTUM  Recent Labs     10/05/22  1245   [facility] . NASOPHARYNGEAL SWAB       Recent Labs     10/05/22  1245   [facility] . NASOPHARYNGEAL SWAB          Pathology:    Radiology Reports:  NM BONE SCAN 3 PHASE   Final Result   Nondiagnostic study. Patient could not cooperate. No asymmetry on flow   images to suggest hyperemia. XR CHEST PORTABLE   Final Result   1.  Pulmonary vascular congestion may be increased, and there may be new   perihilar edema. Consider congestive heart failure given at least borderline   cardiomegaly and suspicion for trace bilateral pleural effusions. Pneumonia   could appear similar. 2. Increased bibasilar airspace opacities likely due in part to atelectasis   with potential for superimposed pneumonia or aspiration especially on the   left. XR ABDOMEN (KUB) (SINGLE AP VIEW)   Final Result   1. No ileus or obstruction is identified. Mild gaseous distention of the   stomach. 2. Cardiomegaly with vascular congestion. 3. Small bore central venous catheter terminates at the cavoatrial   junction/right atrium. XR CHEST PORTABLE   Final Result   1. No ileus or obstruction is identified. Mild gaseous distention of the   stomach. 2. Cardiomegaly with vascular congestion. 3. Small bore central venous catheter terminates at the cavoatrial   junction/right atrium. VL DUP LOWER EXTREMITY VENOUS BILATERAL   Final Result      XR CHEST PORTABLE   Final Result   Central pulmonary congestion with obscuration of the left hemidiaphragm   likely representing effusion as can be seen in CHF. XR CHEST (SINGLE VIEW FRONTAL)   Final Result   Left internal jugular line tip projected over the mid right atrium. OG and endotracheal tubes appear unchanged and in satisfactory position. Pulmonary vasculature may be slightly congested. XR CHEST PORTABLE   Final Result   Endotracheal tube in satisfactory position. XR ABDOMEN FOR NG/OG/NE TUBE PLACEMENT   Final Result   OG tube in satisfactory position. CT CHEST ABDOMEN PELVIS W CONTRAST   Final Result   No acute traumatic abnormality detected within the chest, abdomen, and pelvis. Airspace disease the left lung base, atelectasis versus pneumonia versus   aspiration.          CT THORACIC SPINE TRAUMA RECONSTRUCTION   Final Result   No acute traumatic abnormality detected within the chest, abdomen, and pelvis. Airspace disease the left lung base, atelectasis versus pneumonia versus   aspiration. CT LUMBAR SPINE TRAUMA RECONSTRUCTION   Final Result   No acute traumatic abnormality detected within the chest, abdomen, and pelvis. Airspace disease the left lung base, atelectasis versus pneumonia versus   aspiration. CT HEAD WO CONTRAST   Final Result   No acute intracranial abnormality. CT CERVICAL SPINE WO CONTRAST   Final Result   No acute abnormality of the cervical spine. XR CHEST PORTABLE   Final Result   No radiographic evidence of an acute cardiopulmonary process. 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              Echocardiogram:   Results for orders placed during the hospital encounter of 09/24/22    ECHO Complete 2D W Doppler W Color    Narrative  Transthoracic Echocardiography Report (TTE)    Patient Name Yuliana Pineda     Date of Study           09/27/2022  JOSIAH MOMIN    Date of      1950  Gender                  Female  Birth    Age          80 year(s)  Race                        Room Number  3024        Height:                 65 inch, 165.1 cm    Corporate ID [id number]    Weight:                 210 pounds, 95.3 kg  #    Patient Acct [de-identified]   BSA:        2.02 m^2    BMI:        34.95 kg/m^2  #    MR #         [id number]     Sonographer             Cesia Marvin RVT,  [facility]    Accession #  [id number]  Interpreting Physician  Mendy Gardner    Fellow                   Referring Nurse  Practitioner    Interpreting             Referring Physician  Fellow    Type of Study    TTE procedure:2D Echocardiogram, M-Mode, Doppler, Color Doppler. Procedure Date  Date: 09/27/2022 Start: 04:13 PM    Study Location: [facility]  Technical Quality: Adequate visualization    Indications:Cardiac Evaluation.     Comments:Referring Physician: Gillian Hubbard Emiliano Palma MD    History / Tech. Comments:  Echo done at patient bedside. Echo done at patient bedside. Patient supine on ventilator. Patient Status: Inpatient    Height: 65 inches Weight: 210 pounds BSA: 2.02 m^2 BMI: 34.95 kg/m^2    CONCLUSIONS    Summary  Normal LV size and wall thickness. No obvious wall motion abnormality seen. Normal LV systolic function with LVEF >55%. Dilated RV with preserved function. RV systolic pressure 44 mmHg  RA appears dilated. No obvious significant structural valvular abnormality noted. No significant valvular stenosis or regurgitation noted. Normal aortic root dimension. No significant pericardial effusion noted. IVC is dilated, difficult to assess inspiratory variation , Pt on ventilator    Signature  ----------------------------------------------------------------------------  Electronically signed by Mendy Gardner(Interpreting physician) on  09/28/2022 11:12 AM  ----------------------------------------------------------------------------    ----------------------------------------------------------------------------  Electronically signed by Anibal Chang RVT, RDCS(Sonographer) on  09/27/2022 04:48 PM  ----------------------------------------------------------------------------  FINDINGS  Left Atrium  Left atrium is normal in size. Left Ventricle  Left ventricle is normal in size. Global left ventricular systolic function  is normal. Estimated ejection fraction is 60 %. Right Atrium  Right atrial dilatation. Right Ventricle  Right ventricular dilatation with normal systolic function. Mitral Valve  Normal mitral valve structure. Trivial mitral regurgitation. No mitral stenosis. Aortic Valve  Normal aortic valve structure. Aortic valve is trileaflet. No aortic insufficiency. No aortic stenosis. Tricuspid Valve  Normal tricuspid valve leaflets. Mild tricuspid regurgitation. Estimated right ventricular systolic pressure is 44 mmHg.   No tricuspid stenosis. Pulmonic Valve  Normal pulmonic valve structure. No significant pulmonic insufficiency. No evidence of pulmonic stenosis. Pericardial Effusion  No pericardial effusion seen. Miscellaneous  Normal aortic root dimension. IVC dilated but unable to assess respiratory collapse due to patient on  ventilator.   E/E' average = 10.65    M-mode / 2D Measurements & Calculations:    LVIDd:5.2 cm(3.7 - 5.6 cm)       Diastolic SXDEDE:94.6 ml  AMPWO:2.8 cm(2.2 - 4.0 cm)       Systolic TTPZUW:72.4 ml  HJRC:7.0 cm(0.6 - 1.1 cm)        Aortic Root:2.9 cm(2.0 - 3.7 cm)  LVPWd:0.6 cm(0.6 - 1.1 cm)       LA Dimension: 4.2 cm(1.9 - 4.0 cm)  Fractional Shortenin.69 %    LA volume/Index: 48.7 ml /24m^2  Calculated LVEF (%): 61.3 %      LVOT:1.8 cm  RVDd:4.7 cm    Mitral:                                 Aortic    Valve Area (P1/2-Time): 2.75 cm^2       Peak Velocity: 1.76 m/s  Peak E-Wave: 0.96 m/s                   Mean Velocity: 1.12 m/s  Peak A-Wave: 0.99 m/s                   Peak Gradient: 12.39 mmHg  E/A Ratio: 0.97                         Mean Gradient: 6 mmHg  Peak Gradient: 3.69 mmHg  Mean Gradient: 3 mmHg  Deceleration Time: 221 msec             Area (continuity): 2.17 cm^2  P1/2t: 80 msec                          AV VTI: 33.3 cm    Area (continuity): 2.58 cm^2  Mean Velocity: 0.82 m/s    Tricuspid:                              Pulmonic:    Estimated RVSP: 44 mmHg                 Peak Velocity: 1.07 m/s  Peak TR Velocity: 2.93 m/s              Peak Gradient: 4.58 mmHg  Peak TR Gradient: 34.3396 mmHg  Estimated RA Pressure: 10 mmHg    Estimated PASP: 44.34 mmHg    Diastology / Tissue Doppler  Septal Wall E' velocity:0.07 m/s  Septal Wall E/E':13  Lateral Wall E' velocity:0.12 m/s  Lateral Wall E/E':8.3       ASSESSMENT AND PLAN     Assessment:    // Acute hypoxic respiratory failure-on room air  // Aspiration pneumonia/pneumonitis  // Altered mental status/encephalopathy improving  // Paroxysmal A fib with RVR  // MSSA septicemia  // Klebsiella UTI  // COPD by history  // Obesity likely obstructive sleep apnea    Plan:    Patient is on room air  Outpatient sleep study and PFT  Will sign off  Electronically signed by Nickolas Beckwith MD on 10/6/2022 at 10:33 AM         Please note that this chart was generated using voice recognition Dragon dictation software. Although every effort was made to ensure the accuracy of this automated transcription, some errors in transcription may have occurred.

## 2022-10-06 NOTE — PROGRESS NOTES
[facility]  Internal Medicine Teaching Residency Program  Inpatient Daily Progress Note  ______________________________________________________________________________    Patient: Kyle Augustin  YOB: 1950   RLI:[id number]    Acct: [de-identified]     Room: [facility]/8547-16  Admit date: 9/24/2022  Today's date: 10/06/22  Number of days in the hospital: 12    SUBJECTIVE   Admitting Diagnosis: Septicemia ([facility] [address]  CC: Weakness, \" stuck in chair\"    Pt examined at bedside. Chart & results reviewed. No acute events overnight  Hemodynamically stable, afebrile, saturating well on room air  Refused DANE,  so ID recommended to continue antibiotic therapy for 6 weeks  Needs PICC line before discharge  Medically stable to be discharged today        ROS:  Constitutional:  negative for chills, fevers, sweats  Respiratory:  negative for cough, dyspnea on exertion, hemoptysis, shortness of breath, wheezing  Cardiovascular:  negative for chest pain, chest pressure/discomfort, lower extremity edema, palpitations  Gastrointestinal:  negative for abdominal pain, constipation, diarrhea, nausea, vomiting  Neurological:  negative for dizziness, headache  BRIEF HISTORY     Patient presented to the ED via EMS after being found by a neighbor stuck in her chair. Patient was complaining of weakness was unable to get up from the chair. Patient was tachypneic failed BiPAP and was ultimately intubated. Blood cultures positive for MSSA and urine cultures were positive for Klebsiella. Patient was extubated on 9/28/2022. Patient has remained slightly altered, with comments of possible intermittent hallucinations. She was able to maintain her saturations on 3 L NC.   Her leukocytosis and urine output improved and patient was transferred in stable condition to the medical floor    OBJECTIVE     Vital Signs:  /75   Pulse 78   Temp 98.7 °F (37.1 °C) (Temporal)   Resp 18   Ht 5' 5\" (1.651 m)   Wt 238 lb 15.7 oz (108.4 kg)   LMP  (LMP Unknown)   SpO2 96%   BMI 39.77 kg/m²     Temp (24hrs), Av.1 °F (36.7 °C), Min:97.5 °F (36.4 °C), Max:98.7 °F (37.1 °C)    In: 1400   Out: 1300 [Urine:1300]    Physical Exam:  Constitutional: This is a well developed, well nourished, 35-39.9 - Obesity Grade II 80y.o. year old female who is alert, oriented, cooperative and in no apparent distress. Head:normocephalic and atraumatic. Marcellus Sabra Respiratory: Breath sounds bilaterally were clear to auscultation. There were no wheezes, rhonchi or rales. There is no intercostal retraction or use of accessory muscles. Cardiovascular: Regular without murmur, clicks, gallops or rubs. Abdomen: Slightly rounded and soft without organomegaly. No rebound, rigidity or guarding was appreciated. Musculoskeletal: No gross muscle weakness. Extremities:  No lower extremity edema, ulcerations, tenderness, varicosities or erythema. Muscle size, tone and strength are normal.  No involuntary movements are noted. Skin:  Warm and dry. Good color, turgor and pigmentation. No lesions or scars.   No cyanosis or clubbing  Neurological/Psychiatric: The patient's general behavior, level of consciousness, thought content and emotional status is normal.        Medications:  Scheduled Medications:    apixaban  5 mg Oral BID    tiotropium  2 puff Inhalation Daily    potassium bicarb-citric acid  20 mEq Oral BID WC    lidocaine 1 % injection  5 mL IntraDERmal Once    sodium chloride flush  5-40 mL IntraVENous 2 times per day    metoprolol tartrate  25 mg Oral BID    ampicillin-sulbactam  3,000 mg IntraVENous Q6H    thiamine  100 mg Oral Daily    folic acid  1 mg Oral Daily    sodium chloride flush  5-40 mL IntraVENous 2 times per day    budesonide-formoterol  2 puff Inhalation BID     Continuous Infusions:    sodium chloride      dextrose      sodium chloride Stopped (22 1257)     PRN Medicationssodium chloride flush, 5-40 mL, PRN  sodium chloride, 25 mL, PRN  calcium carbonate, 500 mg, TID PRN  metoclopramide, 5 mg, Q6H PRN  oxyCODONE, 5 mg, Q8H PRN  glucose, 4 tablet, PRN  dextrose bolus, 125 mL, PRN   Or  dextrose bolus, 250 mL, PRN  glucagon (rDNA), 1 mg, PRN  dextrose, , Continuous PRN  sodium chloride flush, 5-40 mL, PRN  sodium chloride, , PRN  ondansetron, 4 mg, Q8H PRN   Or  ondansetron, 4 mg, Q6H PRN  polyethylene glycol, 17 g, Daily PRN  acetaminophen, 650 mg, Q6H PRN   Or  acetaminophen, 650 mg, Q6H PRN        Diagnostic Labs:  CBC:   Recent Labs     10/04/22  0419 10/05/22  0652   WBC 11.4* 10.6   RBC 3.57* 3.81*   HGB 9.4* 10.2*   HCT 29.2* 31.7*   MCV 81.8* 83.2   RDW 16.7* 16.9*    355     BMP:   Recent Labs     10/04/22  0419 10/05/22  0652    138   K 3.1* 3.2*    102   CO2 26 23   BUN 8 7*   CREATININE 0.55 0.56     BNP: No results for input(s): BNP in the last 72 hours. PT/INR: No results for input(s): PROTIME, INR in the last 72 hours. APTT: No results for input(s): APTT in the last 72 hours. CARDIAC ENZYMES: No results for input(s): CKMB, CKMBINDEX, TROPONINI in the last 72 hours. Invalid input(s): CKTOTAL;3  FASTING LIPID PANEL:  Lab Results   Component Value Date    CHOL 141 05/17/2021    HDL 50 05/17/2021    TRIG 213 (H) 09/28/2022     LIVER PROFILE: No results for input(s): AST, ALT, ALB, BILIDIR, BILITOT, ALKPHOS in the last 72 hours. MICROBIOLOGY:   Lab Results   Component Value Date/Time    CULTURE NO GROWTH 5 DAYS 09/29/2022 01:27 PM       Imaging:    NM BONE SCAN 3 PHASE    Result Date: 9/30/2022  Nondiagnostic study. Patient could not cooperate. No asymmetry on flow images to suggest hyperemia. XR CHEST PORTABLE    Result Date: 9/30/2022  1. Pulmonary vascular congestion may be increased, and there may be new perihilar edema. Consider congestive heart failure given at least borderline cardiomegaly and suspicion for trace bilateral pleural effusions.   Pneumonia could appear similar. 2. Increased bibasilar airspace opacities likely due in part to atelectasis with potential for superimposed pneumonia or aspiration especially on the left. ASSESSMENT & PLAN     ASSESSMENT / PLAN:     Principal Problem:    Septicemia ([address]  Active Problems:    MSSA bacteremia    UTI due to Klebsiella species    COPD (chronic obstructive pulmonary disease) ([facility])    Class 2 obesity due to excess calories with body mass index (BMI) of 39.0 to 39.9 in adult    Acute respiratory failure with hypoxia ([facility])    Respiratory alkalosis    Hypokalemia    MSSA (methicillin susceptible Staphylococcus aureus) septicemia ([facility])    SIRS (systemic inflammatory response syndrome) ([facility])    Bandemia    Acute encephalopathy    Aspiration pneumonia ([facility])    Spinal stenosis of lumbar region with neurogenic claudication    Bilateral leg weakness    Paroxysmal atrial fibrillation ([facility])    Hypernatremia    Folic acid deficiency    Leukocytosis    Pneumonia of left lower lobe due to infectious organism    Prolonged Q-T interval on ECG  Resolved Problems:    * No resolved hospital problems.  *     MSSA bacteremia  - Switched to Unasyn from Ancef due to concerns for aspiration pneumonia  - ID following, initially recommended continuing Unasyn till 10/07 then switch to Ancef till 10/24.  -Patient refused DANE, ID recommended to continue Unasyn till 10/7, and start Ancef 2 g Q8H until 11/9.  -PICC line in place     Klebsiella UTI  - On antibiotics as above     New onset A. Fib  - Lopressor 25 mg twice daily  - Cardiology consulted, recommends continuing rate control with metoprolol  -Switched to Eliquis for anticoagulation    Acute on chronic respiratory failure  - Supplemental oxygen as needed, Keep SpO2 >92%  -ipratropium and symbicort daily     Bilateral lower extremity weakness  - refused MRI cervical, thoracic, and lumbar spine   -CT scan spine on 9/24 unremarkable for acute changes  -Neurology signed off    DVT ppx : Eliquis  GI ppx: Not indicated    PT/OT: Consulted  Discharge Planning / SW:  consulted    Magdalene Mcclain MD  Internal Medicine Resident, PGY-1  [facility];  [place], [place]  10/6/2022, 11:04 AM

## 2022-10-06 NOTE — CARE COORDINATION
Discharge [facility] Case Management Department  Written by: Zacarias Acuña RN    Patient Name: Magdalena Lubin  Attending Provider: Urbano Kimbrough MD  Admit Date: 2022  2:07 PM  MRN: [id number]  Account: [de-identified]                     : 1950  Discharge Date: 10/6/22      Disposition: SNF, Gwendolyn Juarez RN

## 2022-10-06 NOTE — CARE COORDINATION
Transitional planning    Writer placed call to Deena Patino with [facility], ok to come today, auth approved[facility], spoke with Yudy Giron, primary RN updated , provided number to call report, and asked to complete [address] back and confirmed time.

## 2022-10-06 NOTE — PLAN OF CARE
Problem: ABCDS Injury Assessment  Goal: Absence of physical injury  10/6/2022 0508 by Karissa Nelson RN  Outcome: Progressing  10/5/2022 1822 by Will Dudley RN  Outcome: Progressing     Problem: Safety - Adult  Goal: Free from fall injury  10/6/2022 0508 by Karissa Nelson RN  Outcome: Progressing  10/5/2022 1822 by Will Dudley RN  Outcome: Progressing     Problem: Skin/Tissue Integrity  Goal: Absence of new skin breakdown  Description: 1. Monitor for areas of redness and/or skin breakdown  2. Assess vascular access sites hourly  3. Every 4-6 hours minimum:  Change oxygen saturation probe site  4. Every 4-6 hours:  If on nasal continuous positive airway pressure, respiratory therapy assess nares and determine need for appliance change or resting period. 10/6/2022 0508 by Karissa Nelson RN  Outcome: Progressing  10/5/2022 1822 by Will Dudley RN  Outcome: Progressing     Problem: Confusion  Goal: Confusion, delirium, dementia, or psychosis is improved or at baseline  Description: INTERVENTIONS:  1. Assess for possible contributors to thought disturbance, including medications, impaired vision or hearing, underlying metabolic abnormalities, dehydration, psychiatric diagnoses, and notify attending LIP  2. [place] high risk fall precautions, as indicated  3. Provide frequent short contacts to provide reality reorientation, refocusing and direction  4. Decrease environmental stimuli, including noise as appropriate  5. Monitor and intervene to maintain adequate nutrition, hydration, elimination, sleep and activity  6. If unable to ensure safety without constant attention obtain sitter and review sitter guidelines with assigned personnel  7.  Initiate Psychosocial CNS and Spiritual Care consult, as indicated  10/6/2022 0508 by Karissa Nelson RN  Outcome: Progressing  10/5/2022 1822 by Will Dudley RN  Outcome: Progressing     Problem: Anxiety  Goal: Will report anxiety at manageable levels  Description: INTERVENTIONS:  1. Administer medication as ordered  2. Teach and rehearse alternative coping skills  3.  Provide emotional support with 1:1 interaction with staff  10/6/2022 0508 by Aretha Castillo RN  Outcome: Progressing  10/5/2022 1822 by Dustin Hloliday RN  Outcome: Progressing     Problem: Skin/Tissue Integrity - Adult  Goal: Skin integrity remains intact  10/6/2022 0508 by Aretha Castillo RN  Outcome: Progressing  10/5/2022 1822 by Dustin Holliday RN  Outcome: Progressing

## 2022-10-07 ENCOUNTER — HOSPITAL ENCOUNTER (OUTPATIENT)
Age: 72
Setting detail: SPECIMEN
Discharge: HOME | End: 2022-10-07

## 2022-10-07 LAB
ABSOLUTE EOS #: 0.19 K/UL (ref 0–0.44)
ABSOLUTE IMMATURE GRANULOCYTE: 0.05 K/UL (ref 0–0.3)
ABSOLUTE LYMPH #: 1.75 K/UL (ref 1.1–3.7)
ABSOLUTE MONO #: 0.57 K/UL (ref 0.1–1.2)
ALBUMIN SERPL-MCNC: 2.4 G/DL (ref 3.5–5.2)
ALBUMIN/GLOBULIN RATIO: 0.5 (ref 1–2.5)
ALP BLD-CCNC: 69 U/L (ref 35–104)
ALT SERPL-CCNC: 6 U/L (ref 5–33)
ANION GAP SERPL CALCULATED.3IONS-SCNC: 13 MMOL/L (ref 9–17)
AST SERPL-CCNC: 13 U/L
BASOPHILS # BLD: 1 % (ref 0–2)
BASOPHILS ABSOLUTE: 0.04 K/UL (ref 0–0.2)
BILIRUB SERPL-MCNC: 0.6 MG/DL (ref 0.3–1.2)
BUN BLDV-MCNC: 5 MG/DL (ref 8–23)
C-REACTIVE PROTEIN: 69.3 MG/L (ref 0–5)
CALCIUM SERPL-MCNC: 8.5 MG/DL (ref 8.6–10.4)
CHLORIDE BLD-SCNC: 103 MMOL/L (ref 98–107)
CO2: 21 MMOL/L (ref 20–31)
CREAT SERPL-MCNC: 0.56 MG/DL (ref 0.5–0.9)
EOSINOPHILS RELATIVE PERCENT: 2 % (ref 1–4)
GFR SERPL CREATININE-BSD FRML MDRD: >60 ML/MIN/1.73M2
GLUCOSE BLD-MCNC: 80 MG/DL (ref 70–99)
HCT VFR BLD CALC: 30.5 % (ref 36.3–47.1)
HEMOGLOBIN: 9.4 G/DL (ref 11.9–15.1)
IMMATURE GRANULOCYTES: 1 %
LYMPHOCYTES # BLD: 21 % (ref 24–43)
MCH RBC QN AUTO: 26.7 PG (ref 25.2–33.5)
MCHC RBC AUTO-ENTMCNC: 30.8 G/DL (ref 28.4–34.8)
MCV RBC AUTO: 86.6 FL (ref 82.6–102.9)
MONOCYTES # BLD: 7 % (ref 3–12)
NRBC AUTOMATED: 0 PER 100 WBC
PDW BLD-RTO: 16.6 % (ref 11.8–14.4)
PLATELET # BLD: 371 K/UL (ref 138–453)
PMV BLD AUTO: 11.9 FL (ref 8.1–13.5)
POTASSIUM SERPL-SCNC: 3 MMOL/L (ref 3.7–5.3)
RBC # BLD: 3.52 M/UL (ref 3.95–5.11)
RBC # BLD: ABNORMAL 10*6/UL
SEG NEUTROPHILS: 68 % (ref 36–65)
SEGMENTED NEUTROPHILS ABSOLUTE COUNT: 5.72 K/UL (ref 1.5–8.1)
SODIUM BLD-SCNC: 137 MMOL/L (ref 135–144)
TOTAL PROTEIN: 7.2 G/DL (ref 6.4–8.3)
WBC # BLD: 8.3 K/UL (ref 3.5–11.3)

## 2022-10-07 PROCEDURE — P9603 ONE-WAY ALLOW PRORATED MILES: HCPCS

## 2022-10-07 PROCEDURE — 85025 COMPLETE CBC W/AUTO DIFF WBC: CPT

## 2022-10-07 PROCEDURE — 86140 C-REACTIVE PROTEIN: CPT

## 2022-10-07 PROCEDURE — 36415 COLL VENOUS BLD VENIPUNCTURE: CPT

## 2022-10-07 PROCEDURE — 80053 COMPREHEN METABOLIC PANEL: CPT

## 2022-10-07 NOTE — PROGRESS NOTES
[facility] -   Infectious diseases evaluation  admission date 9/24/2022    reason for consultation:   Tracey Vazquez and sepsis    Impression :   Current:  MSSA septicemia 9/24  Kleb UTI 9/24  Left lower lobe infiltrate likely atelectasis   Bandemia  Altered mentation, acute metabolic encephalopathy   septic shock resolved   SIRS from infection  Chronic left hip pain  Chronic lower back pain with bilateral sciatica  Prediabetes  COPD  Obstructive sleep apnea  A. fib paroxysmal  9/29 increasing oxygen requirement and increasing leukocytosis-possible aspiration event    Other:    Discussion / summary of stay / plan of care     Recommendations   Kleb UTI from 9/24 - treated w ancef   On Ancef for MSSA septicemia coming from home, and kleb UTI   There is a concern of aspiration and the patient has increased WBC and increased oxygen requirement  Switch to unasyn 9/30  till 10/7 then back to ancef till 10/24  CXR shows possibly new infiltrates bilat LL  suggesting aspiration pneumonia   Asking for DANE to look for possible vegetation to explain the MSSA bacteremia from home - could tolerate laying down  ould not tolerate bone scan or MR Is due to back pain-  Looking at her spine or hips evaluating for any infection hence and due to her altered mentation    Patient to be treated wit abx for 6 weeks to allow pain to resolve before MRI  Picc placement   Unasyn stopped  Ancef 2g Q8H until 11/9  Chart reconsiled  FU in office in 3 weeks       Infection Control Recommendations   [place] Precautions    Antimicrobial Stewardship Recommendations   Simplification of therapy  Targeted therapy      History of Present Illness:   Initial history:  Ed Pineda is a 80y.o.-year-old female was found by a neighbor sitting on a chair unable to get out because of weakness, she had missed work, it seems she was really feeling weak for about 3 days before that.   She was brought by 911 to the hospital.  Was found to be septic with MSSA in the blood UTI Klebsiella septic shock, patient needed intubation at the time. Extubated 9/28 and ultimately transferred out to the floor. Because of DARIA vela cardiology has been on the case, planning possibly cardioversion on Monday. A 2D echo was done showing no vegetation. Her CT of the spine was negative for fracture, her CT of the abdomen and pelvis on 9/24 her admission, was negative for pneumonia. She had a white count that was increased upon admission and ultimately improved, 9/30 her white count increased again, there was a suspicion of aspiration over the last 24 hours. She is also requiring increased oxygen. Repeat blood cultures from 9/29 are still pending and negative no repeat blood cultures were done before that. The patient has been on Ancef. Seems she has had a history of chronic back pain with sciatica. On my exam she is alert on 4 L of nasal cannula looks very ill, she is very encephalopathic, not reliable in her answers. For instance she says she has no back pain and no hip pain but when he lifted both legs she screamed from pain.   According to the nurse she also screams to little things and is difficult to tell if this is true pain or chest discomfort      Interval changes  10/6/2022   /75   Pulse 78   Temp 98.7 °F (37.1 °C) (Temporal)   Resp 18   Ht 5' 5\" (1.651 m)   Wt 238 lb 15.7 oz (108.4 kg)   LMP  (LMP Unknown)   SpO2 96%   BMI 39.77 kg/m²      10/3  Patients fever spiked but this AM but has resolved  Leukocytosis is resolving  Patient vocalizes concern that Unasyn is not working but feels better than when she first got here  Patient reports leg and hip pain secondary to sciatica  On nasal cannula  Uri awaited cardiol    10/4   URI not performed - patient too uncomfortable for procedure  Afebrile last 24 hours  +back and hip pain    10/5  Planning for discharge  Pt agreeable to Picc placement today  Afebrile, family history, and I haveupdated the database accordingly. Allergies:   Lyrica [pregabalin], Demerol hcl [meperidine], Fluoxetine, and Paxil [paroxetine hcl]     Review of Systems:     Review of Systems   Constitutional:  Positive for appetite change (sores burn when she eats certain foods). Negative for chills, diaphoresis and fatigue. HENT:  Positive for mouth sores (present on lateral aspect of tongue). Eyes:  Negative for pain, redness and visual disturbance. Respiratory:  Negative for choking and chest tightness. Cardiovascular:  Negative for chest pain. Gastrointestinal:  Negative for abdominal distention and abdominal pain. Endocrine: Negative for cold intolerance and heat intolerance. Genitourinary:  Negative for difficulty urinating and dysuria. Musculoskeletal:  Positive for arthralgias and back pain. Skin:  Negative for color change. Allergic/Immunologic: Negative for immunocompromised state. Neurological:  Negative for dizziness and facial asymmetry. Hematological:  Negative for adenopathy. Psychiatric/Behavioral:  Negative for agitation. Physical Examination :       Physical Exam  Constitutional:       General: She is not in acute distress. Appearance: Normal appearance. She is obese. She is not ill-appearing or diaphoretic. HENT:      Head: Normocephalic and atraumatic. Right Ear: External ear normal.      Left Ear: External ear normal.      Nose: Nose normal. No congestion. Mouth/Throat:      Mouth: Mucous membranes are moist.      Pharynx: Oropharynx is clear. No oropharyngeal exudate. Eyes:      General: No scleral icterus. Extraocular Movements: Extraocular movements intact. Pupils: Pupils are equal, round, and reactive to light. Cardiovascular:      Rate and Rhythm: Normal rate and regular rhythm. Pulses: Normal pulses. Heart sounds: No murmur heard. No gallop.    Pulmonary:      Effort: Pulmonary effort is normal. No respiratory distress. Breath sounds: No wheezing. Abdominal:      General: Bowel sounds are normal. There is no distension. Tenderness: There is no abdominal tenderness. Musculoskeletal:         General: No swelling or deformity. Normal range of motion. Cervical back: Normal range of motion. No rigidity. Lymphadenopathy:      Cervical: No cervical adenopathy. Skin:     General: Skin is warm. Capillary Refill: Capillary refill takes less than 2 seconds. Coloration: Skin is not jaundiced. Findings: No bruising. Neurological:      General: No focal deficit present. Mental Status: Mental status is at baseline. Cranial Nerves: No cranial nerve deficit. Motor: No weakness.    Psychiatric:         Mood and Affect: Mood normal.         Behavior: Behavior normal.       Past Medical History:     Past Medical History:   Diagnosis Date    Chronic back pain     Chronic hip pain     COPD (chronic obstructive pulmonary disease) (HCC)     Major depression     Osteoarthritis     Osteoporosis        Past Surgical  History:     Past Surgical History:   Procedure Laterality Date    BREAST BIOPSY      two    CHOLECYSTECTOMY      HYSTERECTOMY, VAGINAL         Medications:     REM      Social History:     Social History     Socioeconomic History    Marital status: Single     Spouse name: Not on file    Number of children: Not on file    Years of education: Not on file    Highest education level: Not on file   Occupational History    Not on file   Tobacco Use    Smoking status: Former     Packs/day: 0.75     Years: 34.00     Pack years: 25.50     Types: Cigarettes     Quit date: 3/20/2017     Years since quittin.5    Smokeless tobacco: Never   Vaping Use    Vaping Use: Never used   Substance and Sexual Activity    Alcohol use: Not Currently     Comment: occasionally     Drug use: Never    Sexual activity: Not on file   Other Topics Concern    Not on file   Social History Narrative Not on file     Social Determinants of Health     Financial Resource Strain: Low Risk     Difficulty of Paying Living Expenses: Not hard at all   Food Insecurity: No Food Insecurity    Worried About Running Out of Food in the Last Year: Never true    Ran Out of Food in the Last Year: Never true   Transportation Needs: Not on file   Physical Activity: Not on file   Stress: Not on file   Social Connections: Not on file   Intimate Partner Violence: Not on file   Housing Stability: Not on file       Family History:     Family History   Problem Relation Age of Onset    Stroke Mother     Alzheimer's Disease Father       Medical Decision Making:   I have independently reviewed/ordered the following labs:    CBC with Differential:   Recent Labs     10/04/22  0419 10/05/22  0652   WBC 11.4* 10.6   HGB 9.4* 10.2*   HCT 29.2* 31.7*    355   LYMPHOPCT 13* 19*   MONOPCT 7 7       BMP:  Recent Labs     10/04/22  0419 10/05/22  0652    138   K 3.1* 3.2*    102   CO2 26 23   BUN 8 7*   CREATININE 0.55 0.56   MG 2.0 2.1       Hepatic Function Panel: No results for input(s): PROT, LABALBU, BILIDIR, IBILI, BILITOT, ALKPHOS, ALT, AST in the last 72 hours. No results for input(s): RPR in the last 72 hours. No results for input(s): HIV in the last 72 hours. No results for input(s): BC in the last 72 hours. Lab Results   Component Value Date/Time    CREATININE 0.56 10/05/2022 06:52 AM    GLUCOSE 103 10/05/2022 06:52 AM       Detailed results: Thank you for allowing us to participate in the care of this patient. Please call with questions. This note is created with the assistance of a speech recognition program.  While intending to generate adocument that actually reflects the content of the visit, the document can still have some errors including those of syntax and sound a like substitutions which may escape proof reading.   It such instances, actual meaningcan be extrapolated by contextual diversion.     Office: [phone]  Perfect serve / office [phone]      Georgia Fofana, Infectious Diseases

## 2022-10-11 ENCOUNTER — HOSPITAL ENCOUNTER (OUTPATIENT)
Age: 72
Setting detail: SPECIMEN
Discharge: HOME | End: 2022-10-11

## 2022-10-11 LAB
ABSOLUTE EOS #: 0.13 K/UL (ref 0–0.44)
ABSOLUTE IMMATURE GRANULOCYTE: 0.04 K/UL (ref 0–0.3)
ABSOLUTE LYMPH #: 1.44 K/UL (ref 1.1–3.7)
ABSOLUTE MONO #: 0.55 K/UL (ref 0.1–1.2)
ALBUMIN SERPL-MCNC: 2.5 G/DL (ref 3.5–5.2)
ALBUMIN/GLOBULIN RATIO: 0.5 (ref 1–2.5)
ALP BLD-CCNC: 76 U/L (ref 35–104)
ALT SERPL-CCNC: <5 U/L (ref 5–33)
ANION GAP SERPL CALCULATED.3IONS-SCNC: 13 MMOL/L (ref 9–17)
AST SERPL-CCNC: 10 U/L
BASOPHILS # BLD: 1 % (ref 0–2)
BASOPHILS ABSOLUTE: 0.05 K/UL (ref 0–0.2)
BILIRUB SERPL-MCNC: 0.4 MG/DL (ref 0.3–1.2)
BUN BLDV-MCNC: 7 MG/DL (ref 8–23)
C-REACTIVE PROTEIN: 47.4 MG/L (ref 0–5)
CALCIUM SERPL-MCNC: 8.5 MG/DL (ref 8.6–10.4)
CHLORIDE BLD-SCNC: 102 MMOL/L (ref 98–107)
CO2: 22 MMOL/L (ref 20–31)
CREAT SERPL-MCNC: 0.56 MG/DL (ref 0.5–0.9)
EOSINOPHILS RELATIVE PERCENT: 2 % (ref 1–4)
GFR SERPL CREATININE-BSD FRML MDRD: >60 ML/MIN/1.73M2
GLUCOSE BLD-MCNC: 79 MG/DL (ref 70–99)
HCT VFR BLD CALC: 30.8 % (ref 36.3–47.1)
HEMOGLOBIN: 9.6 G/DL (ref 11.9–15.1)
IMMATURE GRANULOCYTES: 1 %
LYMPHOCYTES # BLD: 19 % (ref 24–43)
MCH RBC QN AUTO: 26.8 PG (ref 25.2–33.5)
MCHC RBC AUTO-ENTMCNC: 31.2 G/DL (ref 28.4–34.8)
MCV RBC AUTO: 86 FL (ref 82.6–102.9)
MONOCYTES # BLD: 7 % (ref 3–12)
NRBC AUTOMATED: 0 PER 100 WBC
PDW BLD-RTO: 15.9 % (ref 11.8–14.4)
PLATELET # BLD: 400 K/UL (ref 138–453)
PMV BLD AUTO: 11 FL (ref 8.1–13.5)
POTASSIUM SERPL-SCNC: 2.8 MMOL/L (ref 3.7–5.3)
RBC # BLD: 3.58 M/UL (ref 3.95–5.11)
RBC # BLD: ABNORMAL 10*6/UL
SEG NEUTROPHILS: 70 % (ref 36–65)
SEGMENTED NEUTROPHILS ABSOLUTE COUNT: 5.46 K/UL (ref 1.5–8.1)
SODIUM BLD-SCNC: 137 MMOL/L (ref 135–144)
TOTAL PROTEIN: 7.5 G/DL (ref 6.4–8.3)
VITAMIN D 25-HYDROXY: 14.8 NG/ML
WBC # BLD: 7.7 K/UL (ref 3.5–11.3)

## 2022-10-11 PROCEDURE — P9603 ONE-WAY ALLOW PRORATED MILES: HCPCS

## 2022-10-11 PROCEDURE — 80053 COMPREHEN METABOLIC PANEL: CPT

## 2022-10-11 PROCEDURE — 85025 COMPLETE CBC W/AUTO DIFF WBC: CPT

## 2022-10-11 PROCEDURE — 82306 VITAMIN D 25 HYDROXY: CPT

## 2022-10-11 PROCEDURE — 86140 C-REACTIVE PROTEIN: CPT

## 2022-10-12 ENCOUNTER — HOSPITAL ENCOUNTER (OUTPATIENT)
Age: 72
Setting detail: SPECIMEN
Discharge: HOME | End: 2022-10-12

## 2022-10-12 LAB — POTASSIUM SERPL-SCNC: 3.2 MMOL/L (ref 3.7–5.3)

## 2022-10-12 PROCEDURE — P9603 ONE-WAY ALLOW PRORATED MILES: HCPCS

## 2022-10-12 PROCEDURE — 36415 COLL VENOUS BLD VENIPUNCTURE: CPT

## 2022-10-12 PROCEDURE — 84132 ASSAY OF SERUM POTASSIUM: CPT

## 2022-10-13 ENCOUNTER — OUTSIDE SERVICES (OUTPATIENT)
Dept: INFECTIOUS DISEASES | Age: 72
End: 2022-10-13
Payer: COMMERCIAL

## 2022-10-13 VITALS
HEART RATE: 77 BPM | DIASTOLIC BLOOD PRESSURE: 70 MMHG | OXYGEN SATURATION: 98 % | SYSTOLIC BLOOD PRESSURE: 131 MMHG | TEMPERATURE: 97.9 F | RESPIRATION RATE: 18 BRPM

## 2022-10-13 DIAGNOSIS — R78.81 MSSA BACTEREMIA: Primary | ICD-10-CM

## 2022-10-13 DIAGNOSIS — B95.61 MSSA BACTEREMIA: Primary | ICD-10-CM

## 2022-10-13 DIAGNOSIS — R79.82 ELEVATED C-REACTIVE PROTEIN (CRP): ICD-10-CM

## 2022-10-13 PROCEDURE — 99309 SBSQ NF CARE MODERATE MDM 30: CPT | Performed by: NURSE PRACTITIONER

## 2022-10-13 NOTE — PROGRESS NOTES
[facility] -   Infectious diseases evaluation  admission date 10/6/2022    reason for consultation:   MSSA Bacteremia    Impression :   Current:  MSSA Bacteremia. 9/24/22. Elevated CRP  Recent Klebsiella UTI. Treated with 7-day course of Unasyn. COPD  Chronic lower back pain, with bilateral sciatica. Chronic left hip pain. Other:    Discussion / summary of stay / plan of care     Recommendations   Continue Cefazolin 2 gm IV every 8 hours until 11/9/22. Follow CBC, renal function, CRP. Supportive care. Discussed with patient. Infection Control Recommendations   [place] Precautions    Antimicrobial Stewardship Recommendations   Simplification of therapy  Targeted therapy    History of Present Illness:   Initial history:  Mark Hung is a 80y.o.-year-old female who was found by a neighbor sitting on a chair unable to get out because of weakness, she had missed work, it seems she was really feeling weak for about 3 days before that. She was brought by 911 to the hospital.  Was found to be septic with MSSA in the blood UTI Klebsiella septic shock, patient needed intubation at the time. Extubated 9/28 and ultimately transferred out to the floor. Because of A. dane cardiology has been on the case, planning possibly cardioversion on Monday. A 2D echo was done showing no vegetation. Her CT of the spine was negative for fracture, her CT of the abdomen and pelvis on 9/24 her admission, was negative for pneumonia. She had a white count that was increased upon admission and ultimately improved, 9/30 her white count increased again, there was a suspicion of aspiration over the last 24 hours. She is also requiring increased oxygen. Repeat blood cultures from 9/29 are still pending and negative no repeat blood cultures were done before that. The patient has been on Ancef. Seems she has had a history of chronic back pain with sciatica.   Patient was followed by Emmanuel Haney while at [facility]. [place]. Discharged to [facility] 10/6/2022 on a 4-week course of Ancef until 11/9/22. Patient received a 7-day course of Unasyn 9/30-10-6, then switched back to Ancef to complete the course. Interval changes  10/13/2022   Feeling better. States her pain is much better. She took a step today with her walker. C/o nausea. Antiemetics started per primary service which she states is helping. Denies any fever, chills, v,diarrhea. Denies any dysuria, states her urine still smells at times. RUE PICC site clean. Summary of relevant labs:  Labs:  CRP: 69.3-47.4  Cre: 0.56  WBC: 8.3-7.7  Micro:    Imaging:      I have personally reviewed the past medical history, past surgical history, medications, social history, and family history, and I haveupdated the database accordingly. Allergies:   Lyrica [pregabalin], Demerol hcl [meperidine], Fluoxetine, and Paxil [paroxetine hcl]     Review of Systems:     Review of Systems   All other systems reviewed and are negative. Physical Examination :       Physical Exam  Vitals reviewed. Constitutional:       Appearance: Normal appearance. She is obese. She is not ill-appearing. HENT:      Head: Normocephalic and atraumatic. Right Ear: External ear normal.      Left Ear: External ear normal.      Nose: Nose normal.      Mouth/Throat:      Mouth: Mucous membranes are moist.      Pharynx: Oropharynx is clear. Eyes:      Extraocular Movements: Extraocular movements intact. Conjunctiva/sclera: Conjunctivae normal.      Pupils: Pupils are equal, round, and reactive to light. Cardiovascular:      Rate and Rhythm: Normal rate and regular rhythm. Heart sounds: Normal heart sounds. No murmur heard. Pulmonary:      Effort: Pulmonary effort is normal.      Breath sounds: Normal breath sounds. No wheezing or rhonchi. Comments: RA  Abdominal:      General: Bowel sounds are normal. There is no distension. Palpations: Abdomen is soft. Tenderness: There is no abdominal tenderness. There is no right CVA tenderness or left CVA tenderness. Genitourinary:     Comments: No cobb. Musculoskeletal:      Cervical back: Normal range of motion. No rigidity. Right lower leg: Edema present. Left lower leg: Edema present. Comments: +1 BLE edema. Skin:     General: Skin is warm and dry. Capillary Refill: Capillary refill takes less than 2 seconds. Findings: No erythema. Neurological:      Mental Status: She is alert and oriented to person, place, and time. Psychiatric:         Mood and Affect: Mood normal.         Behavior: Behavior normal.         Thought Content:  Thought content normal.         Judgment: Judgment normal.       Past Medical History:     Past Medical History:   Diagnosis Date    Chronic back pain     Chronic hip pain     COPD (chronic obstructive pulmonary disease) ([facility])     Major depression     Osteoarthritis     Osteoporosis        Past Surgical  History:     Past Surgical History:   Procedure Laterality Date    BREAST BIOPSY      two    CHOLECYSTECTOMY      HYSTERECTOMY, VAGINAL         Medications:       Social History:     Social History     Socioeconomic History    Marital status: Single     Spouse name: Not on file    Number of children: Not on file    Years of education: Not on file    Highest education level: Not on file   Occupational History    Not on file   Tobacco Use    Smoking status: Former     Packs/day: 0.75     Years: 34.00     Pack years: 25.50     Types: Cigarettes     Quit date: 3/20/2017     Years since quittin.5    Smokeless tobacco: Never   Vaping Use    Vaping Use: Never used   Substance and Sexual Activity    Alcohol use: Not Currently     Comment: occasionally     Drug use: Never    Sexual activity: Not on file   Other Topics Concern    Not on file   Social History Narrative    Not on file     Social Determinants of Health     Financial Resource Strain: Low Risk     Difficulty of Paying Living Expenses: Not hard at all   Food Insecurity: No Food Insecurity    Worried About Running Out of Food in the Last Year: Never true    Ran Out of Food in the Last Year: Never true   Transportation Needs: Not on file   Physical Activity: Not on file   Stress: Not on file   Social Connections: Not on file   Intimate Partner Violence: Not on file   Housing Stability: Not on file       Family History:     Family History   Problem Relation Age of Onset    Stroke Mother     Alzheimer's Disease Father       Medical Decision Making:   I have independently reviewed/ordered the following labs:    CBC with Differential:   Recent Labs     10/11/22  0737   WBC 7.7   HGB 9.6*   HCT 30.8*      LYMPHOPCT 19*   MONOPCT 7     BMP:  Recent Labs     10/11/22  0737 10/12/22  0715     --    K 2.8* 3.2*     --    CO2 22  --    BUN 7*  --    CREATININE 0.56  --      Hepatic Function Panel:   Recent Labs     10/11/22  0737   PROT 7.5   LABALBU 2.5*   BILITOT 0.4   ALKPHOS 76   ALT <5*   AST 10     No results for input(s): RPR in the last 72 hours. No results for input(s): HIV in the last 72 hours. No results for input(s): BC in the last 72 hours. Lab Results   Component Value Date/Time    CREATININE 0.56 10/11/2022 07:37 AM    GLUCOSE 79 10/11/2022 07:37 AM       Detailed results: Thank you for allowing us to participate in the care of this patient. Please call with questions. This note is created with the assistance of a speech recognition program.  While intending to generate adocument that actually reflects the content of the visit, the document can still have some errors including those of syntax and sound a like substitutions which may escape proof reading. It such instances, actual meaningcan be extrapolated by contextual diversion.     MITESH Chin - JESSICA  Office: [phone]  Perfect serve / office [phone]

## 2022-10-13 NOTE — DISCHARGE SUMMARY
[address]     Department of Internal Medicine - Staff Internal Medicine Teaching Service    INPATIENT DISCHARGE SUMMARY      Patient Identification:  Tammy Cedillo is a 80 y.o. female. :  1950  MRN: [id number]     Acct: [de-identified]   PCP: Sara Toussaint MD  Admit Date:  2022  Discharge date and time: 10/6/2022 12:19 PM   Attending Provider: No att. providers found                                     [address] Problem Lists:  Principal Problem:    Septicemia ([facility] [address]  Active Problems:    MSSA bacteremia    UTI due to Klebsiella species    COPD (chronic obstructive pulmonary disease) ([facility] [address]    Class 2 obesity due to excess calories with body mass index (BMI) of 39.0 to 39.9 in adult    Acute respiratory failure with hypoxia (HCC)    Respiratory alkalosis    Hypokalemia    MSSA (methicillin susceptible Staphylococcus aureus) septicemia (HCC)    SIRS (systemic inflammatory response syndrome) ([facility])    Bandemia    Acute encephalopathy    Aspiration pneumonia ([facility] [address]    Spinal stenosis of lumbar region with neurogenic claudication    Bilateral leg weakness    Paroxysmal atrial fibrillation ([facility])    Hypernatremia    Folic acid deficiency    Leukocytosis    Pneumonia of left lower lobe due to infectious organism    Prolonged Q-T interval on ECG  Resolved Problems:    * No resolved hospital problems. [facility] STAY     Brief Inpatient course:   Tammy Cedillo is a 80 y.o. female who was admitted for the management of Septicemia [facility]), presented to the emergency department with weakness and unable to ambulate. past medical history of chronic hip pain, chronic back pain, prediabetes, OA, osteoporosis. On presentation to the ED patient was tachypneic, failed BiPAP and was intubated, blood cultures grew MSSA and urine culture was positive for Kleibsiella. She was extubated on 2022 and was transferred from the ICU to the floor on 2022. She was started on Ancef for MSSA septicemia and Kleibsiella UTI, she was switched to Unasyn on 9/30 as there was also concern for aspiration pneumonia    She also developed new onset A fib with RVR and she was started on beta-blocker and anticoagulation as per Cardiology, TTE was WNL. Patient was scheduled for DANE to look for possible vegetation to explain MSSA bacteremia from home, patient refused as she was unable to lie down likely due to back pain. It was decided to treat patient with Ancef until 11/9, PICC line was placed    She remained medically stable, all her medications were optimized, she was discharged once medically cleared as per instructions given below. Consults:     Consults:     Final Specialist Recommendations/Findings:   IP CONSULT TO CRITICAL CARE  IP CONSULT TO DIETITIAN  IP CONSULT TO INFECTIOUS DISEASES  IP CONSULT TO CARDIOLOGY  IP CONSULT TO CARDIOLOGY  IP CONSULT TO NEUROLOGY  IP CONSULT TO CARDIOLOGY      Any Hospital Acquired Infections: none    Discharge Functional Status:  stable    DISCHARGE PLAN     Disposition: SNF    Patient Instructions:   Discharge Medication List as of 10/6/2022 11:15 AM        START taking these medications    Details   ceFAZolin (ANCEF) infusion Infuse 2,000 mg intravenously in the morning and 2,000 mg at noon and 2,000 mg in the evening.  No line draws - cbc diff creat crp weekly -Compound per protocol., Disp-180 g, [id number]      folic acid (FOLVITE) 1 MG tablet Take 1 tablet by mouth daily, Disp-30 tablet, R-3Normal      metoprolol tartrate (LOPRESSOR) 25 MG tablet Take 1 tablet by mouth 2 times daily, Disp-60 tablet, R-3Normal      thiamine 100 MG tablet Take 1 tablet by mouth daily, Disp-30 tablet, R-3Normal      apixaban (ELIQUIS) 5 MG TABS tablet Take 1 tablet by mouth 2 times daily, Disp-60 tablet, R-5Normal           CONTINUE these medications which have NOT CHANGED    Details   SYMBICORT 160-4.5 MCG/ACT AERO DAWHistorical Med      promethazine (PHENERGAN) 25 MG tablet Take 1 tablet by mouth every 6 hours as needed (as needed for nausea), Disp-60 tablet, R-1Normal      desvenlafaxine succinate (PRISTIQ) 100 MG TB24 extended release tablet TAKE 1 TABLET BY MOUTH EVERY DAY, Disp-90 tablet, R-1Normal      Fluticasone-Umeclidin-Vilant (TRELEGY ELLIPTA) 200-62.5-25 MCG/INH AEPB Inhale 1 puff into the lungs daily, Disp-3 each, R-1Normal      vitamin D (ERGOCALCIFEROL) 1.25 MG (97813 UT) CAPS capsule Take 1 capsule by mouth once a week, Disp-12 capsule, R-1Normal      alendronate (FOSAMAX) 70 MG tablet Take 1 tablet by mouth once a week, Disp-12 tablet, R-1Normal      Handicap Placard MISC Starting Wed 12/1/2021, Disp-1 each, R-0, PrintExpires 11/2025      diclofenac (VOLTAREN) 50 MG EC tablet Take 1 tablet by mouth 3 times daily (with meals), Disp-60 tablet, R-3Normal      albuterol sulfate  (90 Base) MCG/ACT inhaler USE 2 INHALATIONS EVERY 4 HOURS AS NEEDED FOR WHEEZING OR SHORTNESS OF BREATH, Disp-3 each, R-1Normal      tiZANidine (ZANAFLEX) 4 MG tablet TAKE 1 TABLET THREE TIMES A DAY, Disp-270 tablet, R-1Normal      montelukast (SINGULAIR) 10 MG tablet TAKE 1 TABLET DAILY, Disp-90 tablet, R-1Normal      morphine (MS CONTIN) 15 MG extended release tablet 3 times daily. Historical Med           STOP taking these medications       hydrOXYzine (ATARAX) 25 MG tablet Comments:   Reason for Stopping:         cetirizine (ZYRTEC) 10 MG tablet Comments:   Reason for Stopping:         predniSONE (DELTASONE) 10 MG tablet Comments:   Reason for Stopping:               Activity: activity as tolerated    Diet: regular diet    Follow-up:    [facility]  [address] [place] [id number]  [phone]  Schedule an appointment as soon as possible for a visit in 2 week(s)      Sandra Graham MD  [address] [place] [address] [address] [place] [address]  [phone]    Schedule an appointment as soon as possible for a visit in 3 week(s)        Patient Instructions:     Start taking eliquis 5 twice daily. Keep supplemental oxygen saturation between 88 to 92%. Follow-up with PCP outpatient  Follow-up with cardiology outpatient. You were started on IV antibiotic Ancef for 6 weeks. Please complete course. Aakash Carrion MD,  Internal Medicine Resident, PGY-1  [facility];  [place], [place]  10/13/2022, 1:05 PM

## 2022-10-18 ENCOUNTER — HOSPITAL ENCOUNTER (OUTPATIENT)
Age: 72
Setting detail: SPECIMEN
Discharge: HOME | End: 2022-10-18

## 2022-10-18 ENCOUNTER — OUTSIDE SERVICES (OUTPATIENT)
Dept: INFECTIOUS DISEASES | Age: 72
End: 2022-10-18
Payer: COMMERCIAL

## 2022-10-18 VITALS
RESPIRATION RATE: 17 BRPM | HEART RATE: 76 BPM | DIASTOLIC BLOOD PRESSURE: 75 MMHG | OXYGEN SATURATION: 91 % | TEMPERATURE: 97.3 F | SYSTOLIC BLOOD PRESSURE: 134 MMHG

## 2022-10-18 DIAGNOSIS — D72.10 EOSINOPHILIA, UNSPECIFIED TYPE: ICD-10-CM

## 2022-10-18 DIAGNOSIS — R79.82 ELEVATED C-REACTIVE PROTEIN (CRP): ICD-10-CM

## 2022-10-18 DIAGNOSIS — B95.61 MSSA BACTEREMIA: Primary | ICD-10-CM

## 2022-10-18 DIAGNOSIS — R78.81 MSSA BACTEREMIA: Primary | ICD-10-CM

## 2022-10-18 LAB
ABSOLUTE EOS #: 0.37 K/UL (ref 0–0.44)
ABSOLUTE IMMATURE GRANULOCYTE: 0.03 K/UL (ref 0–0.3)
ABSOLUTE LYMPH #: 1.35 K/UL (ref 1.1–3.7)
ABSOLUTE MONO #: 0.44 K/UL (ref 0.1–1.2)
ALBUMIN SERPL-MCNC: 2.4 G/DL (ref 3.5–5.2)
ALBUMIN/GLOBULIN RATIO: 0.5 (ref 1–2.5)
ALP BLD-CCNC: 81 U/L (ref 35–104)
ALT SERPL-CCNC: <5 U/L (ref 5–33)
ANION GAP SERPL CALCULATED.3IONS-SCNC: 11 MMOL/L (ref 9–17)
AST SERPL-CCNC: 15 U/L
BASOPHILS # BLD: 1 % (ref 0–2)
BASOPHILS ABSOLUTE: 0.06 K/UL (ref 0–0.2)
BILIRUB SERPL-MCNC: 0.3 MG/DL (ref 0.3–1.2)
BUN BLDV-MCNC: 7 MG/DL (ref 8–23)
C-REACTIVE PROTEIN: 24.2 MG/L (ref 0–5)
CALCIUM SERPL-MCNC: 8.2 MG/DL (ref 8.6–10.4)
CHLORIDE BLD-SCNC: 103 MMOL/L (ref 98–107)
CO2: 22 MMOL/L (ref 20–31)
CREAT SERPL-MCNC: 0.41 MG/DL (ref 0.5–0.9)
EOSINOPHILS RELATIVE PERCENT: 6 % (ref 1–4)
GFR SERPL CREATININE-BSD FRML MDRD: >60 ML/MIN/1.73M2
GLUCOSE BLD-MCNC: 74 MG/DL (ref 70–99)
HCT VFR BLD CALC: 35.1 % (ref 36.3–47.1)
HEMOGLOBIN: 10.7 G/DL (ref 11.9–15.1)
IMMATURE GRANULOCYTES: 1 %
LYMPHOCYTES # BLD: 22 % (ref 24–43)
MCH RBC QN AUTO: 26.2 PG (ref 25.2–33.5)
MCHC RBC AUTO-ENTMCNC: 30.5 G/DL (ref 28.4–34.8)
MCV RBC AUTO: 85.8 FL (ref 82.6–102.9)
MONOCYTES # BLD: 7 % (ref 3–12)
NRBC AUTOMATED: 0 PER 100 WBC
PDW BLD-RTO: 15.4 % (ref 11.8–14.4)
PLATELET # BLD: 247 K/UL (ref 138–453)
PMV BLD AUTO: 11.3 FL (ref 8.1–13.5)
POTASSIUM SERPL-SCNC: 3.3 MMOL/L (ref 3.7–5.3)
RBC # BLD: 4.09 M/UL (ref 3.95–5.11)
RBC # BLD: ABNORMAL 10*6/UL
SEG NEUTROPHILS: 63 % (ref 36–65)
SEGMENTED NEUTROPHILS ABSOLUTE COUNT: 3.95 K/UL (ref 1.5–8.1)
SODIUM BLD-SCNC: 136 MMOL/L (ref 135–144)
TOTAL PROTEIN: 7.3 G/DL (ref 6.4–8.3)
WBC # BLD: 6.2 K/UL (ref 3.5–11.3)

## 2022-10-18 PROCEDURE — 99308 SBSQ NF CARE LOW MDM 20: CPT | Performed by: NURSE PRACTITIONER

## 2022-10-18 PROCEDURE — 86140 C-REACTIVE PROTEIN: CPT

## 2022-10-18 PROCEDURE — 80053 COMPREHEN METABOLIC PANEL: CPT

## 2022-10-18 PROCEDURE — P9603 ONE-WAY ALLOW PRORATED MILES: HCPCS

## 2022-10-18 PROCEDURE — 36415 COLL VENOUS BLD VENIPUNCTURE: CPT

## 2022-10-18 PROCEDURE — 85025 COMPLETE CBC W/AUTO DIFF WBC: CPT

## 2022-10-18 ASSESSMENT — ENCOUNTER SYMPTOMS: ABDOMINAL PAIN: 1

## 2022-10-18 NOTE — PROGRESS NOTES
[facility] -   Infectious diseases evaluation  admission date 10/6/2022    reason for consultation:   MSSA Bacteremia    Impression :   Current:  MSSA Bacteremia. 9/24/22. Elevated CRP  Eosinophilia  Recent Klebsiella UTI. Treated with 7-day course of Unasyn. COPD  Chronic lower back pain, with bilateral sciatica. Chronic left hip pain. Other:    Discussion / summary of stay / plan of care     Recommendations   Continue Cefazolin 2 gm IV every 8 hours until 11/9/22. Follow CBC, renal function, CRP. Supportive care. Discussed with patient. Infection Control Recommendations   [place] Precautions    Antimicrobial Stewardship Recommendations   Simplification of therapy  Targeted therapy    History of Present Illness:   Initial history:  Shelbi Hoffmann is a 80y.o.-year-old female who was found by a neighbor sitting on a chair unable to get out because of weakness, she had missed work, it seems she was really feeling weak for about 3 days before that. She was brought by 911 to the hospital.  Was found to be septic with MSSA in the blood UTI Klebsiella septic shock, patient needed intubation at the time. Extubated 9/28 and ultimately transferred out to the floor. Because of A. dane cardiology has been on the case, planning possibly cardioversion on Monday. A 2D echo was done showing no vegetation. Her CT of the spine was negative for fracture, her CT of the abdomen and pelvis on 9/24 her admission, was negative for pneumonia. She had a white count that was increased upon admission and ultimately improved, 9/30 her white count increased again, there was a suspicion of aspiration over the last 24 hours. She is also requiring increased oxygen. Repeat blood cultures from 9/29 are still pending and negative no repeat blood cultures were done before that. The patient has been on Ancef.      Seems she has had a history of chronic back pain with sciatica. Patient was followed by Emmanuel Arellano while at [facility]. Mary Discharged to [facility] 10/6/2022 on a 4-week course of Ancef until 11/9/22. Patient received a 7-day course of Unasyn 9/30-10-6, then switched back to Ancef to complete the course. Interval changes  10/18/2022   Feeling good, tired today. Denies any fever, chills, nausea,vomiting,diarrhea. Denies any dysuria, states her urine still smells at times. C/o intermittent epigastric pain. RUE PICC site clean. Labs reviewed, CRP pending. Summary of relevant labs:  Labs:  CRP: 69.3-47.4  Cre: 0.56-0.41  WBC: 8.3-7.7-6.2  Eosinophils: 6.0    Micro:    Imaging:      I have personally reviewed the past medical history, past surgical history, medications, social history, and family history, and I haveupdated the database accordingly. Allergies:   Lyrica [pregabalin], Demerol hcl [meperidine], Fluoxetine, and Paxil [paroxetine hcl]     Review of Systems:     Review of Systems   Gastrointestinal:  Positive for abdominal pain. Epigastric pain   All other systems reviewed and are negative. Physical Examination :       Physical Exam  Vitals reviewed. Constitutional:       Appearance: Normal appearance. She is obese. She is not ill-appearing. HENT:      Head: Normocephalic and atraumatic. Right Ear: External ear normal.      Left Ear: External ear normal.      Nose: Nose normal.      Mouth/Throat:      Mouth: Mucous membranes are moist.      Pharynx: Oropharynx is clear. Eyes:      Extraocular Movements: Extraocular movements intact. Conjunctiva/sclera: Conjunctivae normal.      Pupils: Pupils are equal, round, and reactive to light. Cardiovascular:      Rate and Rhythm: Normal rate and regular rhythm. Heart sounds: Normal heart sounds. No murmur heard. Pulmonary:      Effort: Pulmonary effort is normal.      Breath sounds: Normal breath sounds. No wheezing or rhonchi.       Comments: RA  Abdominal: Financial Resource Strain: Low Risk     Difficulty of Paying Living Expenses: Not hard at all   Food Insecurity: No Food Insecurity    Worried About Running Out of Food in the Last Year: Never true    Ran Out of Food in the Last Year: Never true   Transportation Needs: Not on file   Physical Activity: Not on file   Stress: Not on file   Social Connections: Not on file   Intimate Partner Violence: Not on file   Housing Stability: Not on file       Family History:     Family History   Problem Relation Age of Onset    Stroke Mother     Alzheimer's Disease Father       Medical Decision Making:   I have independently reviewed/ordered the following labs:    CBC with Differential:   Recent Labs     10/18/22  0750   WBC 6.2   HGB 10.7*   HCT 35.1*      LYMPHOPCT 22*   MONOPCT 7     BMP:  Recent Labs     10/18/22  0750      K 3.3*      CO2 22   BUN 7*   CREATININE 0.41*     Hepatic Function Panel:   Recent Labs     10/18/22  0750   PROT 7.3   LABALBU 2.4*   BILITOT 0.3   ALKPHOS 81   ALT <5*   AST 15     No results for input(s): RPR in the last 72 hours. No results for input(s): HIV in the last 72 hours. No results for input(s): BC in the last 72 hours. Lab Results   Component Value Date/Time    CREATININE 0.41 10/18/2022 07:50 AM    GLUCOSE 74 10/18/2022 07:50 AM       Detailed results: Thank you for allowing us to participate in the care of this patient. Please call with questions. This note is created with the assistance of a speech recognition program.  While intending to generate adocument that actually reflects the content of the visit, the document can still have some errors including those of syntax and sound a like substitutions which may escape proof reading. It such instances, actual meaningcan be extrapolated by contextual diversion.     Mirtha Dunn, MITESH - CNP  Office: [phone]  Perfect serve / office [phone]

## 2022-10-19 ENCOUNTER — HOSPITAL ENCOUNTER (OUTPATIENT)
Age: 72
Setting detail: SPECIMEN
Discharge: HOME | End: 2022-10-19

## 2022-10-19 LAB — POTASSIUM SERPL-SCNC: 3.1 MMOL/L (ref 3.7–5.3)

## 2022-10-19 PROCEDURE — 84132 ASSAY OF SERUM POTASSIUM: CPT

## 2022-10-19 PROCEDURE — 36415 COLL VENOUS BLD VENIPUNCTURE: CPT

## 2022-10-19 PROCEDURE — P9603 ONE-WAY ALLOW PRORATED MILES: HCPCS

## 2022-10-20 ENCOUNTER — HOSPITAL ENCOUNTER (OUTPATIENT)
Age: 72
Setting detail: SPECIMEN
Discharge: HOME | End: 2022-10-20

## 2022-10-20 LAB — POTASSIUM SERPL-SCNC: 3.2 MMOL/L (ref 3.7–5.3)

## 2022-10-20 PROCEDURE — 84132 ASSAY OF SERUM POTASSIUM: CPT

## 2022-10-20 PROCEDURE — P9603 ONE-WAY ALLOW PRORATED MILES: HCPCS

## 2022-10-20 PROCEDURE — 36415 COLL VENOUS BLD VENIPUNCTURE: CPT

## 2022-10-21 ENCOUNTER — HOSPITAL ENCOUNTER (OUTPATIENT)
Age: 72
Setting detail: SPECIMEN
Discharge: HOME | End: 2022-10-21

## 2022-10-21 LAB — POTASSIUM SERPL-SCNC: 3.8 MMOL/L (ref 3.7–5.3)

## 2022-10-21 PROCEDURE — 36415 COLL VENOUS BLD VENIPUNCTURE: CPT

## 2022-10-21 PROCEDURE — 84132 ASSAY OF SERUM POTASSIUM: CPT

## 2022-10-21 PROCEDURE — P9603 ONE-WAY ALLOW PRORATED MILES: HCPCS

## 2022-10-25 ENCOUNTER — OUTSIDE SERVICES (OUTPATIENT)
Dept: INFECTIOUS DISEASES | Age: 72
End: 2022-10-25
Payer: COMMERCIAL

## 2022-10-25 ENCOUNTER — HOSPITAL ENCOUNTER (OUTPATIENT)
Age: 72
Setting detail: SPECIMEN
Discharge: HOME | End: 2022-10-25

## 2022-10-25 VITALS
HEART RATE: 78 BPM | SYSTOLIC BLOOD PRESSURE: 139 MMHG | TEMPERATURE: 98.2 F | RESPIRATION RATE: 18 BRPM | DIASTOLIC BLOOD PRESSURE: 72 MMHG

## 2022-10-25 DIAGNOSIS — D72.10 EOSINOPHILIA, UNSPECIFIED TYPE: ICD-10-CM

## 2022-10-25 DIAGNOSIS — R79.82 ELEVATED C-REACTIVE PROTEIN (CRP): ICD-10-CM

## 2022-10-25 DIAGNOSIS — B95.61 MSSA BACTEREMIA: Primary | ICD-10-CM

## 2022-10-25 DIAGNOSIS — R78.81 MSSA BACTEREMIA: Primary | ICD-10-CM

## 2022-10-25 LAB
ABSOLUTE EOS #: 0.18 K/UL (ref 0–0.44)
ABSOLUTE IMMATURE GRANULOCYTE: 0 K/UL (ref 0–0.3)
ABSOLUTE LYMPH #: 1.46 K/UL (ref 1.1–3.7)
ABSOLUTE MONO #: 0.43 K/UL (ref 0.1–1.2)
BASOPHILS # BLD: 1 % (ref 0–2)
BASOPHILS ABSOLUTE: 0.06 K/UL (ref 0–0.2)
EOSINOPHILS RELATIVE PERCENT: 3 % (ref 1–4)
HCT VFR BLD CALC: 39.7 % (ref 36.3–47.1)
HEMOGLOBIN: 11.1 G/DL (ref 11.9–15.1)
IMMATURE GRANULOCYTES: 0 %
LYMPHOCYTES # BLD: 24 % (ref 24–43)
MCH RBC QN AUTO: 26.1 PG (ref 25.2–33.5)
MCHC RBC AUTO-ENTMCNC: 28 G/DL (ref 28.4–34.8)
MCV RBC AUTO: 93.2 FL (ref 82.6–102.9)
MONOCYTES # BLD: 7 % (ref 3–12)
MORPHOLOGY: ABNORMAL
NRBC AUTOMATED: 0 PER 100 WBC
PDW BLD-RTO: 15.5 % (ref 11.8–14.4)
PLATELET # BLD: 223 K/UL (ref 138–453)
PMV BLD AUTO: 11.2 FL (ref 8.1–13.5)
RBC # BLD: 4.26 M/UL (ref 3.95–5.11)
REASON FOR REJECTION: NORMAL
SEG NEUTROPHILS: 65 % (ref 36–65)
SEGMENTED NEUTROPHILS ABSOLUTE COUNT: 3.97 K/UL (ref 1.5–8.1)
WBC # BLD: 6.1 K/UL (ref 3.5–11.3)
ZZ NTE CLEAN UP: ORDERED TEST: NORMAL
ZZ NTE WITH NAME CLEAN UP: SPECIMEN SOURCE: NORMAL

## 2022-10-25 PROCEDURE — 36415 COLL VENOUS BLD VENIPUNCTURE: CPT

## 2022-10-25 PROCEDURE — P9603 ONE-WAY ALLOW PRORATED MILES: HCPCS

## 2022-10-25 PROCEDURE — 99308 SBSQ NF CARE LOW MDM 20: CPT | Performed by: NURSE PRACTITIONER

## 2022-10-25 PROCEDURE — 85025 COMPLETE CBC W/AUTO DIFF WBC: CPT

## 2022-10-25 ASSESSMENT — ENCOUNTER SYMPTOMS
VOMITING: 1
DIARRHEA: 0
ABDOMINAL PAIN: 0
NAUSEA: 1

## 2022-10-25 NOTE — PROGRESS NOTES
[facility] -   Infectious diseases evaluation  admission date 10/6/2022    reason for consultation:   MSSA Bacteremia    Impression :   Current:  MSSA Bacteremia. 9/24/22. Elevated CRP  Eosinophilia  Recent Klebsiella UTI. Treated with 7-day course of Unasyn. COPD  Chronic lower back pain, with bilateral sciatica. Chronic left hip pain. Other:    Discussion / summary of stay / plan of care     Recommendations   Continue Cefazolin 2 gm IV every 8 hours until 11/9/22. Follow CBC, renal function, CRP. Supportive care. Discussed with patient. Infection Control Recommendations   [place] Precautions    Antimicrobial Stewardship Recommendations   Simplification of therapy  Targeted therapy    History of Present Illness:   Initial history:  Darrian Ellis is a 80y.o.-year-old female who was found by a neighbor sitting on a chair unable to get out because of weakness, she had missed work, it seems she was really feeling weak for about 3 days before that. She was brought by 911 to the hospital.  Was found to be septic with MSSA in the blood UTI Klebsiella septic shock, patient needed intubation at the time. Extubated 9/28 and ultimately transferred out to the floor. Because of A. dane cardiology has been on the case, planning possibly cardioversion on Monday. A 2D echo was done showing no vegetation. Her CT of the spine was negative for fracture, her CT of the abdomen and pelvis on 9/24 her admission, was negative for pneumonia. She had a white count that was increased upon admission and ultimately improved, 9/30 her white count increased again, there was a suspicion of aspiration over the last 24 hours. She is also requiring increased oxygen. Repeat blood cultures from 9/29 are still pending and negative no repeat blood cultures were done before that. The patient has been on Ancef.      Seems she has had a history of chronic back pain with sciatica. Patient was followed by Dr. Darryle Beecham, [place] while at [facility]. Mary Discharged to [facility] 10/6/2022 on a 4-week course of Ancef until 11/9/22. Patient received a 7-day course of Unasyn 9/30-10-6, then switched back to Ancef to complete the course. Interval changes  10/25/2022   Had 2 episodes of vomiting today; each after trying to eat. Denies any fever, chills, diarrhea. Cont. To c/o pain to her left hip. Labs reviewed. BMP, CRP from this morning were hemolyzed. To be redrawn tomorrow. Eosinophils trending down. Summary of relevant labs:  Labs:  CRP: 69.3-47.4-24.2  Cre: 0.56-0.41  WBC: 8.3-7.7-6.2-6.1  Eosinophils: 6.0-3.0    Micro:    Imaging:      I have personally reviewed the past medical history, past surgical history, medications, social history, and family history, and I haveupdated the database accordingly. Allergies:   Lyrica [pregabalin], Demerol hcl [meperidine], Fluoxetine, and Paxil [paroxetine hcl]     Review of Systems:     Review of Systems   Gastrointestinal:  Positive for nausea and vomiting. Negative for abdominal pain and diarrhea. All other systems reviewed and are negative. Physical Examination :       Physical Exam  Vitals reviewed. Constitutional:       Appearance: Normal appearance. She is obese. She is not ill-appearing. HENT:      Head: Normocephalic and atraumatic. Right Ear: External ear normal.      Left Ear: External ear normal.      Nose: Nose normal.      Mouth/Throat:      Mouth: Mucous membranes are moist.      Pharynx: Oropharynx is clear. Eyes:      Extraocular Movements: Extraocular movements intact. Conjunctiva/sclera: Conjunctivae normal.      Pupils: Pupils are equal, round, and reactive to light. Cardiovascular:      Rate and Rhythm: Normal rate and regular rhythm. Heart sounds: Normal heart sounds. No murmur heard. Pulmonary:      Effort: Pulmonary effort is normal.      Breath sounds: Normal breath sounds.  No wheezing or rhonchi. Comments: RA  Abdominal:      General: Bowel sounds are normal. There is no distension. Palpations: Abdomen is soft. Tenderness: There is no abdominal tenderness. There is no right CVA tenderness or left CVA tenderness. Genitourinary:     Comments: No cobb. Musculoskeletal:      Cervical back: Normal range of motion. No rigidity. Right lower leg: No edema. Left lower leg: No edema. Skin:     General: Skin is warm and dry. Capillary Refill: Capillary refill takes less than 2 seconds. Findings: No erythema. Neurological:      Mental Status: She is alert and oriented to person, place, and time. Psychiatric:         Mood and Affect: Mood normal.         Behavior: Behavior normal.         Thought Content:  Thought content normal.         Judgment: Judgment normal.       Past Medical History:     Past Medical History:   Diagnosis Date    Chronic back pain     Chronic hip pain     COPD (chronic obstructive pulmonary disease) (HCC)     Major depression     Osteoarthritis     Osteoporosis        Past Surgical  History:     Past Surgical History:   Procedure Laterality Date    BREAST BIOPSY      two    CHOLECYSTECTOMY      HYSTERECTOMY, VAGINAL         Medications:       Social History:     Social History     Socioeconomic History    Marital status: Single     Spouse name: Not on file    Number of children: Not on file    Years of education: Not on file    Highest education level: Not on file   Occupational History    Not on file   Tobacco Use    Smoking status: Former     Packs/day: 0.75     Years: 34.00     Pack years: 25.50     Types: Cigarettes     Quit date: 3/20/2017     Years since quittin.6    Smokeless tobacco: Never   Vaping Use    Vaping Use: Never used   Substance and Sexual Activity    Alcohol use: Not Currently     Comment: occasionally     Drug use: Never    Sexual activity: Not on file   Other Topics Concern    Not on file   Social History Narrative    Not on file     Social Determinants of Health     Financial Resource Strain: Low Risk     Difficulty of Paying Living Expenses: Not hard at all   Food Insecurity: No Food Insecurity    Worried About Running Out of Food in the Last Year: Never true    Ran Out of Food in the Last Year: Never true   Transportation Needs: Not on file   Physical Activity: Not on file   Stress: Not on file   Social Connections: Not on file   Intimate Partner Violence: Not on file   Housing Stability: Not on file       Family History:     Family History   Problem Relation Age of Onset    Stroke Mother     Alzheimer's Disease Father       Medical Decision Making:   I have independently reviewed/ordered the following labs:    CBC with Differential:   Recent Labs     10/25/22  0719   WBC 6.1   HGB 11.1*   HCT 39.7      LYMPHOPCT 24   MONOPCT 7     BMP:  No results for input(s): NA, K, CL, CO2, BUN, CREATININE, CA, MG in the last 72 hours. Hepatic Function Panel:   No results for input(s): PROT, LABALBU, BILIDIR, IBILI, BILITOT, ALKPHOS, ALT, AST in the last 72 hours. No results for input(s): RPR in the last 72 hours. No results for input(s): HIV in the last 72 hours. No results for input(s): BC in the last 72 hours. Lab Results   Component Value Date/Time    CREATININE 0.41 10/18/2022 07:50 AM    GLUCOSE 74 10/18/2022 07:50 AM       Detailed results: Thank you for allowing us to participate in the care of this patient. Please call with questions. This note is created with the assistance of a speech recognition program.  While intending to generate adocument that actually reflects the content of the visit, the document can still have some errors including those of syntax and sound a like substitutions which may escape proof reading. It such instances, actual meaningcan be extrapolated by contextual diversion.     MITESH Barr - CNP  Office: [phone]  Perfect serve / office [phone]

## 2022-10-26 ENCOUNTER — HOSPITAL ENCOUNTER (OUTPATIENT)
Age: 72
Setting detail: SPECIMEN
Discharge: HOME | End: 2022-10-26

## 2022-10-26 LAB
ALBUMIN SERPL-MCNC: 2.7 G/DL (ref 3.5–5.2)
ALBUMIN/GLOBULIN RATIO: 0.5 (ref 1–2.5)
ALP BLD-CCNC: 99 U/L (ref 35–104)
ALT SERPL-CCNC: <5 U/L (ref 5–33)
ANION GAP SERPL CALCULATED.3IONS-SCNC: 9 MMOL/L (ref 9–17)
AST SERPL-CCNC: 15 U/L
BILIRUB SERPL-MCNC: 0.3 MG/DL (ref 0.3–1.2)
BUN BLDV-MCNC: 9 MG/DL (ref 8–23)
CALCIUM SERPL-MCNC: 8.7 MG/DL (ref 8.6–10.4)
CHLORIDE BLD-SCNC: 100 MMOL/L (ref 98–107)
CO2: 26 MMOL/L (ref 20–31)
CREAT SERPL-MCNC: 0.46 MG/DL (ref 0.5–0.9)
GFR SERPL CREATININE-BSD FRML MDRD: >60 ML/MIN/1.73M2
GLUCOSE BLD-MCNC: 88 MG/DL (ref 70–99)
POTASSIUM SERPL-SCNC: 4.6 MMOL/L (ref 3.7–5.3)
SODIUM BLD-SCNC: 135 MMOL/L (ref 135–144)
TOTAL PROTEIN: 7.7 G/DL (ref 6.4–8.3)

## 2022-10-26 PROCEDURE — 86140 C-REACTIVE PROTEIN: CPT

## 2022-10-26 PROCEDURE — P9603 ONE-WAY ALLOW PRORATED MILES: HCPCS

## 2022-10-26 PROCEDURE — 36415 COLL VENOUS BLD VENIPUNCTURE: CPT

## 2022-10-26 PROCEDURE — 80053 COMPREHEN METABOLIC PANEL: CPT

## 2022-10-27 ENCOUNTER — OUTSIDE SERVICES (OUTPATIENT)
Dept: INFECTIOUS DISEASES | Age: 72
End: 2022-10-27
Payer: COMMERCIAL

## 2022-10-27 VITALS
TEMPERATURE: 98.2 F | RESPIRATION RATE: 18 BRPM | SYSTOLIC BLOOD PRESSURE: 131 MMHG | HEART RATE: 77 BPM | DIASTOLIC BLOOD PRESSURE: 70 MMHG | OXYGEN SATURATION: 98 %

## 2022-10-27 DIAGNOSIS — L89.220 PRESSURE INJURY OF LEFT HIP, UNSTAGEABLE (HCC): ICD-10-CM

## 2022-10-27 DIAGNOSIS — R79.82 ELEVATED C-REACTIVE PROTEIN (CRP): Primary | ICD-10-CM

## 2022-10-27 DIAGNOSIS — D72.10 EOSINOPHILIA, UNSPECIFIED TYPE: ICD-10-CM

## 2022-10-27 LAB — C-REACTIVE PROTEIN: 16.2 MG/L (ref 0–5)

## 2022-10-27 PROCEDURE — 99307 SBSQ NF CARE SF MDM 10: CPT | Performed by: NURSE PRACTITIONER

## 2022-10-27 ASSESSMENT — ENCOUNTER SYMPTOMS
DIARRHEA: 0
NAUSEA: 0
ABDOMINAL PAIN: 0
VOMITING: 0

## 2022-10-27 NOTE — PROGRESS NOTES
[facility] -   Infectious diseases evaluation  admission date 10/6/2022    reason for consultation:   MSSA Bacteremia    Impression :   Current:  MSSA Bacteremia. 9/24/22. Elevated CRP  Eosinophilia  Recent Klebsiella UTI. Treated with 7-day course of Unasyn. COPD  Chronic lower back pain, with bilateral sciatica. Chronic left hip pain. Unstageable wound left hip. Other:    Discussion / summary of stay / plan of care     Recommendations   Continue Cefazolin 2 gm IV every 8 hours until 11/11/22. Follow CBC, renal function, CRP. Supportive care. Discussed with patient, RN. Infection Control Recommendations   [place] Precautions    Antimicrobial Stewardship Recommendations   Simplification of therapy  Targeted therapy    History of Present Illness:   Initial history:  Sofie Jose is a 80y.o.-year-old female who was found by a neighbor sitting on a chair unable to get out because of weakness, she had missed work, it seems she was really feeling weak for about 3 days before that. She was brought by 911 to the hospital.  Was found to be septic with MSSA in the blood UTI Klebsiella septic shock, patient needed intubation at the time. Extubated 9/28 and ultimately transferred out to the floor. Because of A. dane cardiology has been on the case, planning possibly cardioversion on Monday. A 2D echo was done showing no vegetation. Her CT of the spine was negative for fracture, her CT of the abdomen and pelvis on 9/24 her admission, was negative for pneumonia. She had a white count that was increased upon admission and ultimately improved, 9/30 her white count increased again, there was a suspicion of aspiration over the last 24 hours. She is also requiring increased oxygen. Repeat blood cultures from 9/29 are still pending and negative no repeat blood cultures were done before that. The patient has been on Ancef.      Seems she has had a history of chronic back pain with sciatica. Patient was followed by Emmanuel Martinez while at [facility]. [place]. Discharged to [facility] 10/6/2022 on a 4-week course of Ancef until 11/9/22. Patient received a 7-day course of Unasyn 9/30-10-6, then switched back to Ancef to complete the course. Interval changes  10/27/2022   Feeling much better today. No vomiting. Denies any fever, chills, diarrhea. Cont To c/o pain to her left hip. CRP improving. Cre stable.hemolyzed. Left hip wound dry, edges rolled. No erythema induration. Tan slough. Switching dressing to duoderm. Summary of relevant labs:  Labs:  CRP: 69.3-47.4-24.2-16.2  Cre: 0.56-0.41-0.46  WBC: 8.3-7.7-6.2-6.1  Eosinophils: 6.0-3.0    Micro:    Imaging:      I have personally reviewed the past medical history, past surgical history, medications, social history, and family history, and I haveupdated the database accordingly. Allergies:   Lyrica [pregabalin], Demerol hcl [meperidine], Fluoxetine, and Paxil [paroxetine hcl]     Review of Systems:     Review of Systems   Gastrointestinal:  Negative for abdominal pain, diarrhea, nausea and vomiting. All other systems reviewed and are negative. Physical Examination :       Physical Exam  Vitals reviewed. Constitutional:       Appearance: Normal appearance. She is obese. She is not ill-appearing. HENT:      Head: Normocephalic and atraumatic. Right Ear: External ear normal.      Left Ear: External ear normal.      Nose: Nose normal.      Mouth/Throat:      Mouth: Mucous membranes are moist.      Pharynx: Oropharynx is clear. Eyes:      Extraocular Movements: Extraocular movements intact. Conjunctiva/sclera: Conjunctivae normal.      Pupils: Pupils are equal, round, and reactive to light. Cardiovascular:      Rate and Rhythm: Normal rate and regular rhythm. Heart sounds: Normal heart sounds. No murmur heard.   Pulmonary:      Effort: Pulmonary effort is normal.      Breath sounds: Normal breath sounds. No wheezing or rhonchi. Comments: RA  Abdominal:      General: Bowel sounds are normal. There is no distension. Palpations: Abdomen is soft. Tenderness: There is no abdominal tenderness. There is no right CVA tenderness or left CVA tenderness. Genitourinary:     Comments: No cobb. Musculoskeletal:      Cervical back: Normal range of motion. No rigidity. Right lower leg: No edema. Left lower leg: No edema. Skin:     General: Skin is warm and dry. Capillary Refill: Capillary refill takes less than 2 seconds. Findings: No erythema. Comments: Unstageable wound to left hip. Wound base 100% tan slough. Wound edges currled in. Wound dry. No erythema, induration, drainage, odor. Neurological:      Mental Status: She is alert and oriented to person, place, and time. Psychiatric:         Mood and Affect: Mood normal.         Behavior: Behavior normal.         Thought Content:  Thought content normal.         Judgment: Judgment normal.       Past Medical History:     Past Medical History:   Diagnosis Date    Chronic back pain     Chronic hip pain     COPD (chronic obstructive pulmonary disease) ([facility])     Major depression     Osteoarthritis     Osteoporosis        Past Surgical  History:     Past Surgical History:   Procedure Laterality Date    BREAST BIOPSY      two    CHOLECYSTECTOMY      HYSTERECTOMY, VAGINAL         Medications:       Social History:     Social History     Socioeconomic History    Marital status: Single     Spouse name: Not on file    Number of children: Not on file    Years of education: Not on file    Highest education level: Not on file   Occupational History    Not on file   Tobacco Use    Smoking status: Former     Packs/day: 0.75     Years: 34.00     Pack years: 25.50     Types: Cigarettes     Quit date: 3/20/2017     Years since quittin.6    Smokeless tobacco: Never   Vaping Use    Vaping Use: Never used   Substance and Sexual Activity    Alcohol use: Not Currently     Comment: occasionally     Drug use: Never    Sexual activity: Not on file   Other Topics Concern    Not on file   Social History Narrative    Not on file     Social Determinants of Health     Financial Resource Strain: Low Risk     Difficulty of Paying Living Expenses: Not hard at all   Food Insecurity: No Food Insecurity    Worried About Running Out of Food in the Last Year: Never true    Ran Out of Food in the Last Year: Never true   Transportation Needs: Not on file   Physical Activity: Not on file   Stress: Not on file   Social Connections: Not on file   Intimate Partner Violence: Not on file   Housing Stability: Not on file       Family History:     Family History   Problem Relation Age of Onset    Stroke Mother     Alzheimer's Disease Father       Medical Decision Making:   I have independently reviewed/ordered the following labs:    CBC with Differential:   Recent Labs     10/25/22  0719   WBC 6.1   HGB 11.1*   HCT 39.7      LYMPHOPCT 24   MONOPCT 7     BMP:  Recent Labs     10/26/22  0725      K 4.6      CO2 26   BUN 9   CREATININE 0.46*       Hepatic Function Panel:   Recent Labs     10/26/22  0725   PROT 7.7   LABALBU 2.7*   BILITOT 0.3   ALKPHOS 99   ALT <5*   AST 15       No results for input(s): RPR in the last 72 hours. No results for input(s): HIV in the last 72 hours. No results for input(s): BC in the last 72 hours. Lab Results   Component Value Date/Time    CREATININE 0.46 10/26/2022 07:25 AM    GLUCOSE 88 10/26/2022 07:25 AM       Detailed results: Thank you for allowing us to participate in the care of this patient. Please call with questions.     This note is created with the assistance of a speech recognition program.  While intending to generate adocument that actually reflects the content of the visit, the document can still have some errors including those of syntax and sound a like substitutions which may escape proof reading. It such instances, actual meaningcan be extrapolated by contextual diversion.     Marilynn Schulz, APRN - CNP  Office: [phone]  Perfect serve / office [phone]

## 2022-11-01 ENCOUNTER — OUTSIDE SERVICES (OUTPATIENT)
Dept: INFECTIOUS DISEASES | Age: 72
End: 2022-11-01
Payer: COMMERCIAL

## 2022-11-01 ENCOUNTER — HOSPITAL ENCOUNTER (OUTPATIENT)
Age: 72
Setting detail: SPECIMEN
Discharge: HOME | End: 2022-11-01

## 2022-11-01 VITALS
RESPIRATION RATE: 18 BRPM | HEART RATE: 82 BPM | SYSTOLIC BLOOD PRESSURE: 138 MMHG | OXYGEN SATURATION: 98 % | DIASTOLIC BLOOD PRESSURE: 84 MMHG | TEMPERATURE: 98 F

## 2022-11-01 DIAGNOSIS — B95.61 MSSA BACTEREMIA: ICD-10-CM

## 2022-11-01 DIAGNOSIS — R79.82 ELEVATED C-REACTIVE PROTEIN (CRP): Primary | ICD-10-CM

## 2022-11-01 DIAGNOSIS — D72.829 LEUKOCYTOSIS, UNSPECIFIED TYPE: ICD-10-CM

## 2022-11-01 DIAGNOSIS — L08.9 WOUND INFECTION: ICD-10-CM

## 2022-11-01 DIAGNOSIS — R78.81 MSSA BACTEREMIA: ICD-10-CM

## 2022-11-01 DIAGNOSIS — L89.220 PRESSURE INJURY OF LEFT HIP, UNSTAGEABLE (HCC): ICD-10-CM

## 2022-11-01 DIAGNOSIS — D72.10 EOSINOPHILIA, UNSPECIFIED TYPE: ICD-10-CM

## 2022-11-01 DIAGNOSIS — T14.8XXA WOUND INFECTION: ICD-10-CM

## 2022-11-01 LAB
ABSOLUTE EOS #: 0.06 K/UL (ref 0–0.44)
ABSOLUTE IMMATURE GRANULOCYTE: 0.06 K/UL (ref 0–0.3)
ABSOLUTE LYMPH #: 1.44 K/UL (ref 1.1–3.7)
ABSOLUTE MONO #: 0.83 K/UL (ref 0.1–1.2)
ALBUMIN SERPL-MCNC: 2.8 G/DL (ref 3.5–5.2)
ALBUMIN/GLOBULIN RATIO: 0.6 (ref 1–2.5)
ALP BLD-CCNC: 103 U/L (ref 35–104)
ALT SERPL-CCNC: <5 U/L (ref 5–33)
ANION GAP SERPL CALCULATED.3IONS-SCNC: 15 MMOL/L (ref 9–17)
AST SERPL-CCNC: 12 U/L
BASOPHILS # BLD: 0 % (ref 0–2)
BASOPHILS ABSOLUTE: 0.05 K/UL (ref 0–0.2)
BILIRUB SERPL-MCNC: 0.5 MG/DL (ref 0.3–1.2)
BUN BLDV-MCNC: 9 MG/DL (ref 8–23)
C-REACTIVE PROTEIN: 133.5 MG/L (ref 0–5)
CALCIUM SERPL-MCNC: 8.7 MG/DL (ref 8.6–10.4)
CHLORIDE BLD-SCNC: 95 MMOL/L (ref 98–107)
CO2: 20 MMOL/L (ref 20–31)
CREAT SERPL-MCNC: 0.53 MG/DL (ref 0.5–0.9)
EOSINOPHILS RELATIVE PERCENT: 1 % (ref 1–4)
GFR SERPL CREATININE-BSD FRML MDRD: >60 ML/MIN/1.73M2
GLUCOSE BLD-MCNC: 80 MG/DL (ref 70–99)
HCT VFR BLD CALC: 32.7 % (ref 36.3–47.1)
HEMOGLOBIN: 10.1 G/DL (ref 11.9–15.1)
IMMATURE GRANULOCYTES: 1 %
LYMPHOCYTES # BLD: 12 % (ref 24–43)
MCH RBC QN AUTO: 26.5 PG (ref 25.2–33.5)
MCHC RBC AUTO-ENTMCNC: 30.9 G/DL (ref 28.4–34.8)
MCV RBC AUTO: 85.8 FL (ref 82.6–102.9)
MONOCYTES # BLD: 7 % (ref 3–12)
NRBC AUTOMATED: 0 PER 100 WBC
PDW BLD-RTO: 15 % (ref 11.8–14.4)
PLATELET # BLD: 364 K/UL (ref 138–453)
PMV BLD AUTO: 10.9 FL (ref 8.1–13.5)
POTASSIUM SERPL-SCNC: 4.4 MMOL/L (ref 3.7–5.3)
RBC # BLD: 3.81 M/UL (ref 3.95–5.11)
RBC # BLD: ABNORMAL 10*6/UL
SEG NEUTROPHILS: 80 % (ref 36–65)
SEGMENTED NEUTROPHILS ABSOLUTE COUNT: 9.99 K/UL (ref 1.5–8.1)
SODIUM BLD-SCNC: 130 MMOL/L (ref 135–144)
TOTAL PROTEIN: 7.4 G/DL (ref 6.4–8.3)
WBC # BLD: 12.4 K/UL (ref 3.5–11.3)

## 2022-11-01 PROCEDURE — P9603 ONE-WAY ALLOW PRORATED MILES: HCPCS

## 2022-11-01 PROCEDURE — 80053 COMPREHEN METABOLIC PANEL: CPT

## 2022-11-01 PROCEDURE — 86140 C-REACTIVE PROTEIN: CPT

## 2022-11-01 PROCEDURE — 36415 COLL VENOUS BLD VENIPUNCTURE: CPT

## 2022-11-01 PROCEDURE — 85025 COMPLETE CBC W/AUTO DIFF WBC: CPT

## 2022-11-01 PROCEDURE — 99309 SBSQ NF CARE MODERATE MDM 30: CPT | Performed by: NURSE PRACTITIONER

## 2022-11-01 ASSESSMENT — ENCOUNTER SYMPTOMS
NAUSEA: 0
DIARRHEA: 0
VOMITING: 0
ABDOMINAL PAIN: 0

## 2022-11-01 NOTE — PROGRESS NOTES
[facility] -   Infectious diseases evaluation  admission date 10/6/2022    reason for consultation:   MSSA Bacteremia    Impression :   Current:  MSSA Bacteremia. 9/24/22. Elevated CRP  Eosinophilia  Leukocytosis  Recent Klebsiella UTI. Treated with 7-day course of Unasyn. COPD  Chronic lower back pain, with bilateral sciatica. Chronic left hip pain. Unstageable wound left hip with infection. Other:    Discussion / summary of stay / plan of care   Left trochanter wound with induration noted to the superior edge. Thick layer of tan slough, foul odor. WBC doubled from 6.1-12.4 in 1-week. CRP increased from 12.4-133.5. Patient continues to c/o left hip pain. Denies any lumbar, sacral pain. MRI imaging recommended to look for possible psoas abscess, hip abscess. Patient states she can not tolerate MRI. Will escalate antibiotics to Zosyn IV, until induration resolves. Will need at least 6-months suppression with Keflex po , imaging at a later date. Recommendations   Stop Cefazolin. Switch to Zosyn 3.375 gm IV every 8 hours until induration resolves, stop date TBD. Blood cultures x 2, Left trochanter wound culture aerobic and anaerobic in the morning. CBC with diff, CRP every Tuesday and Friday. Follow CBC, renal function, CRP. Recommend half acetic acid/half Dakin's solution dressing changes until odor resolves. Followed by genet. Discussed with Herve Arora, MITESH-CNP, CWON. Supportive care. Discussed with patient, RN, Dr. Cresencio Chirinos.     Infection Control Recommendations   [place] Precautions    Antimicrobial Stewardship Recommendations   Simplification of therapy  Targeted therapy    History of Present Illness:   Initial history:  Jackie Perez is a 80y.o.-year-old female who was found by a neighbor sitting on a chair unable to get out because of weakness, she had missed work, it seems she was really feeling weak for about 3 days before that.  She was brought by 911 to the hospital.  Was found to be septic with MSSA in the blood UTI Klebsiella septic shock, patient needed intubation at the time. Extubated 9/28 and ultimately transferred out to the floor. Because of DARIA vela cardiology has been on the case, planning possibly cardioversion on Monday. A 2D echo was done showing no vegetation. Her CT of the spine was negative for fracture, her CT of the abdomen and pelvis on 9/24 her admission, was negative for pneumonia. She had a white count that was increased upon admission and ultimately improved, 9/30 her white count increased again, there was a suspicion of aspiration over the last 24 hours. She is also requiring increased oxygen. Repeat blood cultures from 9/29 are still pending and negative no repeat blood cultures were done before that. The patient has been on Ancef. Seems she has had a history of chronic back pain with sciatica. Patient was followed by Emmanuel Love while at [facility]. Rajeev's. Discharged to [facility] 10/6/2022 on a 4-week course of Ancef until 11/9/22. Patient received a 7-day course of Unasyn 9/30-10-6, then switched back to Ancef to complete the course. Interval changes  11/1/2022   Feeling okay. Denies any fever, chills,nausea, vomiting, diarrhea, dysuria. Cont To c/o pain to her left hip. CRP and WBC elevated. Suspect wound infection. Induration noted to the superior wound edge. Foul odor noted. Summary of relevant labs:  Labs:  CRP: 69.3-47.4-24.2-16.2-133.5  Cre: 0.56-0.41-0.46-0.53  WBC: 8.3-7.7-6.2-6.1-12.4  Eosinophils: 6.0-3.0-1.0    Micro:    Imaging:      I have personally reviewed the past medical history, past surgical history, medications, social history, and family history, and I haveupdated the database accordingly.       Allergies:   Lyrica [pregabalin], Demerol hcl [meperidine], Fluoxetine, and Paxil [paroxetine hcl]     Review of Systems:     Review of Systems Gastrointestinal:  Negative for abdominal pain, diarrhea, nausea and vomiting. Musculoskeletal:  Positive for myalgias. Left hip pain. Skin:  Positive for wound. Wound to left trochanter. All other systems reviewed and are negative. Physical Examination :       Physical Exam  Vitals reviewed. Constitutional:       Appearance: Normal appearance. She is obese. She is not ill-appearing. HENT:      Head: Normocephalic and atraumatic. Right Ear: External ear normal.      Left Ear: External ear normal.      Nose: Nose normal.      Mouth/Throat:      Mouth: Mucous membranes are moist.      Pharynx: Oropharynx is clear. Eyes:      Extraocular Movements: Extraocular movements intact. Conjunctiva/sclera: Conjunctivae normal.      Pupils: Pupils are equal, round, and reactive to light. Cardiovascular:      Rate and Rhythm: Normal rate and regular rhythm. Heart sounds: Normal heart sounds. No murmur heard. Pulmonary:      Effort: Pulmonary effort is normal.      Breath sounds: Normal breath sounds. No wheezing or rhonchi. Comments: RA  Abdominal:      General: Bowel sounds are normal. There is no distension. Palpations: Abdomen is soft. Tenderness: There is no abdominal tenderness. There is no right CVA tenderness or left CVA tenderness. Genitourinary:     Comments: No cobb. Musculoskeletal:      Cervical back: Normal range of motion. No rigidity. Right lower leg: No edema. Left lower leg: No edema. Skin:     General: Skin is warm and dry. Capillary Refill: Capillary refill takes less than 2 seconds. Findings: No erythema. Comments: Unstageable wound to left hip. Foul odor noted. Layer of thick tan slough. Approx. 1/2 inch induration to superior wound edge. Drainage tan, green. Neurological:      Mental Status: She is alert and oriented to person, place, and time.    Psychiatric:         Mood and Affect: Mood normal. Behavior: Behavior normal.         Thought Content:  Thought content normal.         Judgment: Judgment normal.       Past Medical History:     Past Medical History:   Diagnosis Date    Chronic back pain     Chronic hip pain     COPD (chronic obstructive pulmonary disease) (HCC)     Major depression     Osteoarthritis     Osteoporosis        Past Surgical  History:     Past Surgical History:   Procedure Laterality Date    BREAST BIOPSY      two    CHOLECYSTECTOMY      HYSTERECTOMY, VAGINAL         Medications:       Social History:     Social History     Socioeconomic History    Marital status: Single     Spouse name: Not on file    Number of children: Not on file    Years of education: Not on file    Highest education level: Not on file   Occupational History    Not on file   Tobacco Use    Smoking status: Former     Packs/day: 0.75     Years: 34.00     Pack years: 25.50     Types: Cigarettes     Quit date: 3/20/2017     Years since quittin.6    Smokeless tobacco: Never   Vaping Use    Vaping Use: Never used   Substance and Sexual Activity    Alcohol use: Not Currently     Comment: occasionally     Drug use: Never    Sexual activity: Not on file   Other Topics Concern    Not on file   Social History Narrative    Not on file     Social Determinants of Health     Financial Resource Strain: Low Risk     Difficulty of Paying Living Expenses: Not hard at all   Food Insecurity: No Food Insecurity    Worried About Running Out of Food in the Last Year: Never true    Ran Out of Food in the Last Year: Never true   Transportation Needs: Not on file   Physical Activity: Not on file   Stress: Not on file   Social Connections: Not on file   Intimate Partner Violence: Not on file   Housing Stability: Not on file       Family History:     Family History   Problem Relation Age of Onset    Stroke Mother     Alzheimer's Disease Father       Medical Decision Making:   I have independently reviewed/ordered the following labs:    CBC with Differential:   Recent Labs     11/01/22 0639   WBC 12.4*   HGB 10.1*   HCT 32.7*      LYMPHOPCT 12*   MONOPCT 7     BMP:  Recent Labs     11/01/22 0639   *   K 4.4   CL 95*   CO2 20   BUN 9   CREATININE 0.53       Hepatic Function Panel:   Recent Labs     11/01/22 0639   PROT 7.4   LABALBU 2.8*   BILITOT 0.5   ALKPHOS 103   ALT <5*   AST 12       No results for input(s): RPR in the last 72 hours. No results for input(s): HIV in the last 72 hours. No results for input(s): BC in the last 72 hours. Lab Results   Component Value Date/Time    CREATININE 0.53 11/01/2022 06:39 AM    GLUCOSE 80 11/01/2022 06:39 AM       Detailed results: Thank you for allowing us to participate in the care of this patient. Please call with questions. This note is created with the assistance of a speech recognition program.  While intending to generate adocument that actually reflects the content of the visit, the document can still have some errors including those of syntax and sound a like substitutions which may escape proof reading. It such instances, actual meaningcan be extrapolated by contextual diversion.     Prudence MITESH Dhaliwal - JESSICA  Office: [phone]  Perfect serve / office [phone]

## 2022-11-02 ENCOUNTER — HOSPITAL ENCOUNTER (OUTPATIENT)
Age: 72
Setting detail: SPECIMEN
Discharge: HOME | End: 2022-11-02

## 2022-11-02 LAB
ABSOLUTE EOS #: 0.2 K/UL (ref 0–0.44)
ABSOLUTE IMMATURE GRANULOCYTE: 0.07 K/UL (ref 0–0.3)
ABSOLUTE LYMPH #: 1.01 K/UL (ref 1.1–3.7)
ABSOLUTE MONO #: 0.93 K/UL (ref 0.1–1.2)
BASOPHILS # BLD: 0 % (ref 0–2)
BASOPHILS ABSOLUTE: 0.04 K/UL (ref 0–0.2)
EOSINOPHILS RELATIVE PERCENT: 2 % (ref 1–4)
HCT VFR BLD CALC: 34.7 % (ref 36.3–47.1)
HEMOGLOBIN: 10.4 G/DL (ref 11.9–15.1)
IMMATURE GRANULOCYTES: 1 %
LYMPHOCYTES # BLD: 8 % (ref 24–43)
MCH RBC QN AUTO: 26.3 PG (ref 25.2–33.5)
MCHC RBC AUTO-ENTMCNC: 30 G/DL (ref 28.4–34.8)
MCV RBC AUTO: 87.8 FL (ref 82.6–102.9)
MONOCYTES # BLD: 7 % (ref 3–12)
NRBC AUTOMATED: 0 PER 100 WBC
PDW BLD-RTO: 15 % (ref 11.8–14.4)
PLATELET # BLD: 396 K/UL (ref 138–453)
PMV BLD AUTO: 10.6 FL (ref 8.1–13.5)
RBC # BLD: 3.95 M/UL (ref 3.95–5.11)
RBC # BLD: ABNORMAL 10*6/UL
SEG NEUTROPHILS: 82 % (ref 36–65)
SEGMENTED NEUTROPHILS ABSOLUTE COUNT: 11.02 K/UL (ref 1.5–8.1)
WBC # BLD: 13.3 K/UL (ref 3.5–11.3)

## 2022-11-02 PROCEDURE — 87186 SC STD MICRODIL/AGAR DIL: CPT

## 2022-11-02 PROCEDURE — 87077 CULTURE AEROBIC IDENTIFY: CPT

## 2022-11-02 PROCEDURE — 87070 CULTURE OTHR SPECIMN AEROBIC: CPT

## 2022-11-02 PROCEDURE — 85025 COMPLETE CBC W/AUTO DIFF WBC: CPT

## 2022-11-02 PROCEDURE — 87040 BLOOD CULTURE FOR BACTERIA: CPT

## 2022-11-02 PROCEDURE — 86403 PARTICLE AGGLUT ANTBDY SCRN: CPT

## 2022-11-02 PROCEDURE — 87205 SMEAR GRAM STAIN: CPT

## 2022-11-02 PROCEDURE — 36415 COLL VENOUS BLD VENIPUNCTURE: CPT

## 2022-11-03 ENCOUNTER — HOSPITAL ENCOUNTER (OUTPATIENT)
Age: 72
Setting detail: SPECIMEN
Discharge: HOME | End: 2022-11-03

## 2022-11-03 ENCOUNTER — OUTSIDE SERVICES (OUTPATIENT)
Dept: INFECTIOUS DISEASES | Age: 72
End: 2022-11-03
Payer: COMMERCIAL

## 2022-11-03 VITALS
HEART RATE: 85 BPM | TEMPERATURE: 97.6 F | SYSTOLIC BLOOD PRESSURE: 140 MMHG | RESPIRATION RATE: 18 BRPM | OXYGEN SATURATION: 99 % | DIASTOLIC BLOOD PRESSURE: 85 MMHG

## 2022-11-03 DIAGNOSIS — L08.9 WOUND INFECTION: ICD-10-CM

## 2022-11-03 DIAGNOSIS — R78.81 POSITIVE BLOOD CULTURE: ICD-10-CM

## 2022-11-03 DIAGNOSIS — R79.82 ELEVATED C-REACTIVE PROTEIN (CRP): Primary | ICD-10-CM

## 2022-11-03 DIAGNOSIS — D72.829 LEUKOCYTOSIS, UNSPECIFIED TYPE: ICD-10-CM

## 2022-11-03 DIAGNOSIS — T14.8XXA WOUND INFECTION: ICD-10-CM

## 2022-11-03 DIAGNOSIS — D72.10 EOSINOPHILIA, UNSPECIFIED TYPE: ICD-10-CM

## 2022-11-03 DIAGNOSIS — B95.61 MSSA BACTEREMIA: ICD-10-CM

## 2022-11-03 DIAGNOSIS — L89.220 PRESSURE INJURY OF LEFT HIP, UNSTAGEABLE (HCC): ICD-10-CM

## 2022-11-03 DIAGNOSIS — R78.81 MSSA BACTEREMIA: ICD-10-CM

## 2022-11-03 LAB
ABSOLUTE EOS #: 0.23 K/UL (ref 0–0.44)
ABSOLUTE IMMATURE GRANULOCYTE: 0.03 K/UL (ref 0–0.3)
ABSOLUTE LYMPH #: 1.37 K/UL (ref 1.1–3.7)
ABSOLUTE MONO #: 0.84 K/UL (ref 0.1–1.2)
BASOPHILS # BLD: 1 % (ref 0–2)
BASOPHILS ABSOLUTE: 0.06 K/UL (ref 0–0.2)
C-REACTIVE PROTEIN: 183.5 MG/L (ref 0–5)
CULTURE: ABNORMAL
EOSINOPHILS RELATIVE PERCENT: 2 % (ref 1–4)
HCT VFR BLD CALC: 31.7 % (ref 36.3–47.1)
HCT VFR BLD CALC: 33 % (ref 36.3–47.1)
HEMOGLOBIN: 9.6 G/DL (ref 11.9–15.1)
HEMOGLOBIN: 9.8 G/DL (ref 11.9–15.1)
IMMATURE GRANULOCYTES: 0 %
LYMPHOCYTES # BLD: 12 % (ref 24–43)
Lab: ABNORMAL
MCH RBC QN AUTO: 25.7 PG (ref 25.2–33.5)
MCH RBC QN AUTO: 26.2 PG (ref 25.2–33.5)
MCHC RBC AUTO-ENTMCNC: 29.7 G/DL (ref 28.4–34.8)
MCHC RBC AUTO-ENTMCNC: 30.3 G/DL (ref 28.4–34.8)
MCV RBC AUTO: 86.4 FL (ref 82.6–102.9)
MCV RBC AUTO: 86.4 FL (ref 82.6–102.9)
MONOCYTES # BLD: 8 % (ref 3–12)
NRBC AUTOMATED: 0 PER 100 WBC
NRBC AUTOMATED: 0 PER 100 WBC
PDW BLD-RTO: 14.9 % (ref 11.8–14.4)
PDW BLD-RTO: 15 % (ref 11.8–14.4)
PLATELET # BLD: 363 K/UL (ref 138–453)
PLATELET # BLD: 433 K/UL (ref 138–453)
PMV BLD AUTO: 10.6 FL (ref 8.1–13.5)
PMV BLD AUTO: 11 FL (ref 8.1–13.5)
RBC # BLD: 3.67 M/UL (ref 3.95–5.11)
RBC # BLD: 3.82 M/UL (ref 3.95–5.11)
RBC # BLD: ABNORMAL 10*6/UL
SEG NEUTROPHILS: 77 % (ref 36–65)
SEGMENTED NEUTROPHILS ABSOLUTE COUNT: 8.48 K/UL (ref 1.5–8.1)
SPECIMEN DESCRIPTION: ABNORMAL
WBC # BLD: 10.5 K/UL (ref 3.5–11.3)
WBC # BLD: 11 K/UL (ref 3.5–11.3)

## 2022-11-03 PROCEDURE — 85027 COMPLETE CBC AUTOMATED: CPT

## 2022-11-03 PROCEDURE — 85025 COMPLETE CBC W/AUTO DIFF WBC: CPT

## 2022-11-03 PROCEDURE — 86140 C-REACTIVE PROTEIN: CPT

## 2022-11-03 PROCEDURE — 87040 BLOOD CULTURE FOR BACTERIA: CPT

## 2022-11-03 PROCEDURE — 99309 SBSQ NF CARE MODERATE MDM 30: CPT | Performed by: NURSE PRACTITIONER

## 2022-11-03 PROCEDURE — 36415 COLL VENOUS BLD VENIPUNCTURE: CPT

## 2022-11-03 PROCEDURE — P9603 ONE-WAY ALLOW PRORATED MILES: HCPCS

## 2022-11-03 ASSESSMENT — ENCOUNTER SYMPTOMS
ABDOMINAL PAIN: 0
DIARRHEA: 0
VOMITING: 0
NAUSEA: 0

## 2022-11-03 NOTE — PROGRESS NOTES
[facility] -   Infectious diseases evaluation  admission date 10/6/2022    reason for consultation:   MSSA Bacteremia    Impression :   Current:  MSSA Bacteremia. 9/24/22. Elevated CRP  Eosinophilia  Leukocytosis  Recent Klebsiella UTI. Treated with 7-day course of Unasyn. Positive Blood cx 1/2-GPC in clusters. Possible contamination. COPD  Chronic lower back pain, with bilateral sciatica. Chronic left hip pain. Unstageable wound left hip with infection. Other:    Discussion / summary of stay / plan of care   Left trochanter wound with induration noted to the superior edge. Thick layer of tan slough, foul odor. WBC doubled from 6.1-12.4 in 1-week. CRP increased from 12.4-133.5. Patient continues to c/o left hip pain. Denies any lumbar, sacral pain. MRI imaging recommended to look for possible psoas abscess, hip abscess. Patient states she can not tolerate MRI. Will escalate antibiotics to Zosyn IV, until induration resolves. Will need at least 6-months suppression with Keflex po , imaging at a later date. CRP continues to escalate, 183.5 today:133 .5 Tuesday. Blood cultures 1/2 showing GPC in clusters, Wound cx showing GPC in pairs, GNR. Continues to c/o L hip pain. Concern for Psoas abscess, abscess to left hip. Patient has agreed to CT of the left hip with IV contrast.    Induration to wound has decreased since starting Zosyn. Odor to wound has decreased . Wound bed continues with 95% tan slough, 5% necrotic tissue. Blood cultures 1/2 showing GPC in clusters. Possible contamination. BC repeated today. Pull PICC place midline. Cefazolin stopped 11/1/22 switched to Zosyn. Recommendations   Continue Zosyn 3.375 gm IV every 8 hours until induration resolves, stop date TBD. Repeat BC x 2, CBC with diff, CRP today. Follow final sensitivities from blood, wound cx. Adjust antibiotics. Stat CT of the left hip at any [facility] facility.   Dx: Psoas abscess. CBC with diff, CRP every Tuesday and Friday. Follow CBC, renal function, CRP. Pull PICC. Place midline. Recommend half acetic acid/half Dakin's solution dressing changes until odor resolves. Followed by genet. Discussed with VICTORIA Cutler CWON. Supportive care. Discussed with patient, RN, Dr. Bj Verdin. Infection Control Recommendations   [place] Precautions    Antimicrobial Stewardship Recommendations   Simplification of therapy  Targeted therapy    History of Present Illness:   Initial history:  Krista Bear is a 80y.o.-year-old female who was found by a neighbor sitting on a chair unable to get out because of weakness, she had missed work, it seems she was really feeling weak for about 3 days before that. She was brought by 911 to the hospital.  Was found to be septic with MSSA in the blood UTI Klebsiella septic shock, patient needed intubation at the time. Extubated 9/28 and ultimately transferred out to the floor. Because of A. dane cardiology has been on the case, planning possibly cardioversion on Monday. A 2D echo was done showing no vegetation. Her CT of the spine was negative for fracture, her CT of the abdomen and pelvis on 9/24 her admission, was negative for pneumonia. She had a white count that was increased upon admission and ultimately improved, 9/30 her white count increased again, there was a suspicion of aspiration over the last 24 hours. She is also requiring increased oxygen. Repeat blood cultures from 9/29 are still pending and negative no repeat blood cultures were done before that. The patient has been on Ancef. Seems she has had a history of chronic back pain with sciatica. Patient was followed by Emmanuel Hussein while at [facility]. Rajeev's. Discharged to [facility] 10/6/2022 on a 4-week course of Ancef until 11/9/22. Patient received a 7-day course of Unasyn 9/30-10-6, then switched back to Ancef to complete the course.     Interval changes  11/3/2022   Feeling okay. Denies any fever, chills,nausea, vomiting, diarrhea, dysuria. Cont To c/o pain to her left hip. WBC decreased, CRP continues to escalate. BC 1/2 showing GPC in clusters. L hip wound cx growing GPC in pairs, GNR. Induration to superior edge of wound improved, odor decreased. Summary of relevant labs:  Labs:  CRP: 69.3-47.4-24.2-16.2-133.5-183.5  Cre: 0.56-0.41-0.46-0.53  WBC: 8.3-7.7-6.2-6.1-12.4-13.3-11.0  Eosinophils: 6.0-3.0-1.0-2.0    Micro:  11/2 Left hip-Gram + cocci in pairs, GNR.  11/2 BC 1/2-GPC in clusters    Imaging:      I have personally reviewed the past medical history, past surgical history, medications, social history, and family history, and I haveupdated the database accordingly. Allergies:   Lyrica [pregabalin], Demerol hcl [meperidine], Fluoxetine, and Paxil [paroxetine hcl]     Review of Systems:     Review of Systems   Gastrointestinal:  Negative for abdominal pain, diarrhea, nausea and vomiting. Musculoskeletal:  Positive for myalgias. Left hip pain. Skin:  Positive for wound. Wound to left trochanter. All other systems reviewed and are negative. Physical Examination :       Physical Exam  Vitals and nursing note reviewed. Constitutional:       Appearance: Normal appearance. She is obese. She is not ill-appearing. HENT:      Head: Normocephalic and atraumatic. Right Ear: External ear normal.      Left Ear: External ear normal.      Nose: Nose normal.      Mouth/Throat:      Mouth: Mucous membranes are moist.      Pharynx: Oropharynx is clear. Eyes:      Extraocular Movements: Extraocular movements intact. Conjunctiva/sclera: Conjunctivae normal.      Pupils: Pupils are equal, round, and reactive to light. Cardiovascular:      Rate and Rhythm: Normal rate and regular rhythm. Heart sounds: Normal heart sounds. No murmur heard.   Pulmonary:      Effort: Pulmonary effort is normal.      Breath sounds: Normal breath sounds. No wheezing or rhonchi. Comments: RA  Abdominal:      General: Bowel sounds are normal. There is no distension. Palpations: Abdomen is soft. Tenderness: There is no abdominal tenderness. There is no right CVA tenderness or left CVA tenderness. Genitourinary:     Comments: No cobb. Musculoskeletal:      Cervical back: Normal range of motion. No rigidity. Right lower leg: No edema. Left lower leg: No edema. Skin:     General: Skin is warm and dry. Capillary Refill: Capillary refill takes less than 2 seconds. Findings: No erythema. Comments: Unstageable wound to left hip. Still has a slight odor, not as pungent. Induration to superior edge has improved. Drainage tan, decreased. Continues to have tan slough. Neurological:      Mental Status: She is alert and oriented to person, place, and time. Psychiatric:         Mood and Affect: Mood normal.         Behavior: Behavior normal.         Thought Content:  Thought content normal.         Judgment: Judgment normal.       Past Medical History:     Past Medical History:   Diagnosis Date    Chronic back pain     Chronic hip pain     COPD (chronic obstructive pulmonary disease) (HCC)     Major depression     Osteoarthritis     Osteoporosis        Past Surgical  History:     Past Surgical History:   Procedure Laterality Date    BREAST BIOPSY      two    CHOLECYSTECTOMY      HYSTERECTOMY, VAGINAL         Medications:       Social History:     Social History     Socioeconomic History    Marital status: Single     Spouse name: Not on file    Number of children: Not on file    Years of education: Not on file    Highest education level: Not on file   Occupational History    Not on file   Tobacco Use    Smoking status: Former     Packs/day: 0.75     Years: 34.00     Pack years: 25.50     Types: Cigarettes     Quit date: 3/20/2017     Years since quittin.6    Smokeless tobacco: Never   Vaping Use    Vaping Use: Never used   Substance and Sexual Activity    Alcohol use: Not Currently     Comment: occasionally     Drug use: Never    Sexual activity: Not on file   Other Topics Concern    Not on file   Social History Narrative    Not on file     Social Determinants of Health     Financial Resource Strain: Low Risk     Difficulty of Paying Living Expenses: Not hard at all   Food Insecurity: No Food Insecurity    Worried About Running Out of Food in the Last Year: Never true    Ran Out of Food in the Last Year: Never true   Transportation Needs: Not on file   Physical Activity: Not on file   Stress: Not on file   Social Connections: Not on file   Intimate Partner Violence: Not on file   Housing Stability: Not on file       Family History:     Family History   Problem Relation Age of Onset    Stroke Mother     Alzheimer's Disease Father       Medical Decision Making:   I have independently reviewed/ordered the following labs:    CBC with Differential:   Recent Labs     11/02/22  0858 11/03/22  0635 11/03/22  1110   WBC 13.3* 10.5 11.0   HGB 10.4* 9.6* 9.8*   HCT 34.7* 31.7* 33.0*    363 433   LYMPHOPCT 8*  --  12*   MONOPCT 7  --  8     BMP:  Recent Labs     11/01/22  0639   *   K 4.4   CL 95*   CO2 20   BUN 9   CREATININE 0.53       Hepatic Function Panel:   Recent Labs     11/01/22  0639   PROT 7.4   LABALBU 2.8*   BILITOT 0.5   ALKPHOS 103   ALT <5*   AST 12       No results for input(s): RPR in the last 72 hours. No results for input(s): HIV in the last 72 hours. No results for input(s): BC in the last 72 hours. Lab Results   Component Value Date/Time    CREATININE 0.53 11/01/2022 06:39 AM    GLUCOSE 80 11/01/2022 06:39 AM       Detailed results: Thank you for allowing us to participate in the care of this patient. Please call with questions.     This note is created with the assistance of a speech recognition program.  While intending to generate adocument that actually reflects the content of the visit, the document can still have some errors including those of syntax and sound a like substitutions which may escape proof reading. It such instances, actual meaningcan be extrapolated by contextual diversion.     MITESH Mcmahon - CNP  Office: [phone]  Perfect serve / office [phone]

## 2022-11-04 ENCOUNTER — HOSPITAL ENCOUNTER (OUTPATIENT)
Age: 72
Setting detail: SPECIMEN
Discharge: HOME | End: 2022-11-04

## 2022-11-04 ENCOUNTER — HOSPITAL ENCOUNTER (OUTPATIENT)
Dept: CT IMAGING | Facility: CLINIC | Age: 72
Discharge: HOME | End: 2022-11-06
Payer: COMMERCIAL

## 2022-11-04 ENCOUNTER — HOSPITAL ENCOUNTER (OUTPATIENT)
Dept: CT IMAGING | Age: 72
Discharge: HOME | End: 2022-11-06
Payer: COMMERCIAL

## 2022-11-04 DIAGNOSIS — K68.12 PSOAS ABSCESS (HCC): ICD-10-CM

## 2022-11-04 LAB
C-REACTIVE PROTEIN: 166.6 MG/L (ref 0–5)
HCT VFR BLD CALC: 35.8 % (ref 36.3–47.1)
HEMOGLOBIN: 10.3 G/DL (ref 11.9–15.1)
MCH RBC QN AUTO: 25.9 PG (ref 25.2–33.5)
MCHC RBC AUTO-ENTMCNC: 28.8 G/DL (ref 28.4–34.8)
MCV RBC AUTO: 90.2 FL (ref 82.6–102.9)
NRBC AUTOMATED: 0 PER 100 WBC
PDW BLD-RTO: 14.8 % (ref 11.8–14.4)
PLATELET # BLD: 440 K/UL (ref 138–453)
PMV BLD AUTO: 10.2 FL (ref 8.1–13.5)
RBC # BLD: 3.97 M/UL (ref 3.95–5.11)
WBC # BLD: 11.8 K/UL (ref 3.5–11.3)

## 2022-11-04 PROCEDURE — 73701 CT LOWER EXTREMITY W/DYE: CPT

## 2022-11-04 PROCEDURE — P9603 ONE-WAY ALLOW PRORATED MILES: HCPCS

## 2022-11-04 PROCEDURE — 85027 COMPLETE CBC AUTOMATED: CPT

## 2022-11-04 PROCEDURE — 86140 C-REACTIVE PROTEIN: CPT

## 2022-11-04 PROCEDURE — 2580000003 HC RX 258: Performed by: FAMILY MEDICINE

## 2022-11-04 PROCEDURE — 36415 COLL VENOUS BLD VENIPUNCTURE: CPT

## 2022-11-04 PROCEDURE — 6360000004 HC RX CONTRAST MEDICATION: Performed by: FAMILY MEDICINE

## 2022-11-04 RX ORDER — SODIUM CHLORIDE 0.9 % (FLUSH) 0.9 %
10 SYRINGE (ML) INJECTION PRN
Status: DISCONTINUED | OUTPATIENT
Start: 2022-11-04 | End: 2022-11-07 | Stop reason: HOSPADM

## 2022-11-04 RX ORDER — 0.9 % SODIUM CHLORIDE 0.9 %
100 INTRAVENOUS SOLUTION INTRAVENOUS ONCE
Status: COMPLETED | OUTPATIENT
Start: 2022-11-04 | End: 2022-11-04

## 2022-11-04 RX ADMIN — SODIUM CHLORIDE 100 ML: 9 INJECTION, SOLUTION INTRAVENOUS at 12:22

## 2022-11-04 RX ADMIN — IOPAMIDOL 75 ML: 755 INJECTION, SOLUTION INTRAVENOUS at 12:22

## 2022-11-04 RX ADMIN — SODIUM CHLORIDE, PRESERVATIVE FREE 10 ML: 5 INJECTION INTRAVENOUS at 12:22

## 2022-11-06 LAB
CULTURE: ABNORMAL
DIRECT EXAM: ABNORMAL
SPECIMEN DESCRIPTION: ABNORMAL

## 2022-11-07 LAB
CULTURE: NORMAL
Lab: NORMAL
SPECIMEN DESCRIPTION: NORMAL

## 2022-11-08 ENCOUNTER — OFFICE VISIT (OUTPATIENT)
Dept: ORTHOPEDIC SURGERY | Age: 72
End: 2022-11-08
Payer: COMMERCIAL

## 2022-11-08 ENCOUNTER — HOSPITAL ENCOUNTER (OUTPATIENT)
Age: 72
Setting detail: SPECIMEN
Discharge: HOME | End: 2022-11-08

## 2022-11-08 ENCOUNTER — OUTSIDE SERVICES (OUTPATIENT)
Dept: INFECTIOUS DISEASES | Age: 72
End: 2022-11-08
Payer: COMMERCIAL

## 2022-11-08 VITALS
DIASTOLIC BLOOD PRESSURE: 76 MMHG | HEART RATE: 78 BPM | SYSTOLIC BLOOD PRESSURE: 128 MMHG | TEMPERATURE: 97.9 F | RESPIRATION RATE: 18 BRPM

## 2022-11-08 VITALS — WEIGHT: 238 LBS | BODY MASS INDEX: 39.65 KG/M2 | HEIGHT: 65 IN

## 2022-11-08 DIAGNOSIS — L89.223 PRESSURE INJURY OF LEFT THIGH, STAGE 3 (HCC): Primary | ICD-10-CM

## 2022-11-08 DIAGNOSIS — R52 PAIN: Primary | ICD-10-CM

## 2022-11-08 DIAGNOSIS — A49.8 PROTEUS MIRABILIS INFECTION: ICD-10-CM

## 2022-11-08 DIAGNOSIS — R78.81 MSSA BACTEREMIA: ICD-10-CM

## 2022-11-08 DIAGNOSIS — T14.8XXA WOUND INFECTION: ICD-10-CM

## 2022-11-08 DIAGNOSIS — L08.9 WOUND INFECTION: ICD-10-CM

## 2022-11-08 DIAGNOSIS — A49.8 KLEBSIELLA PNEUMONIAE INFECTION: ICD-10-CM

## 2022-11-08 DIAGNOSIS — D72.829 LEUKOCYTOSIS, UNSPECIFIED TYPE: ICD-10-CM

## 2022-11-08 DIAGNOSIS — L89.220 PRESSURE INJURY OF LEFT HIP, UNSTAGEABLE (HCC): ICD-10-CM

## 2022-11-08 DIAGNOSIS — R79.82 ELEVATED C-REACTIVE PROTEIN (CRP): Primary | ICD-10-CM

## 2022-11-08 DIAGNOSIS — B95.61 MSSA BACTEREMIA: ICD-10-CM

## 2022-11-08 DIAGNOSIS — D72.10 EOSINOPHILIA, UNSPECIFIED TYPE: ICD-10-CM

## 2022-11-08 LAB
ABSOLUTE EOS #: 0.37 K/UL (ref 0–0.44)
ABSOLUTE IMMATURE GRANULOCYTE: 0.05 K/UL (ref 0–0.3)
ABSOLUTE LYMPH #: 1.52 K/UL (ref 1.1–3.7)
ABSOLUTE MONO #: 0.67 K/UL (ref 0.1–1.2)
ALBUMIN SERPL-MCNC: 2.5 G/DL (ref 3.5–5.2)
ALBUMIN/GLOBULIN RATIO: 0.5 (ref 1–2.5)
ALP BLD-CCNC: 99 U/L (ref 35–104)
ALT SERPL-CCNC: <5 U/L (ref 5–33)
ANION GAP SERPL CALCULATED.3IONS-SCNC: 15 MMOL/L (ref 9–17)
AST SERPL-CCNC: 13 U/L
BASOPHILS # BLD: 1 % (ref 0–2)
BASOPHILS ABSOLUTE: 0.07 K/UL (ref 0–0.2)
BILIRUB SERPL-MCNC: 0.4 MG/DL (ref 0.3–1.2)
BUN BLDV-MCNC: 8 MG/DL (ref 8–23)
C-REACTIVE PROTEIN: 87.7 MG/L (ref 0–5)
CALCIUM SERPL-MCNC: 9 MG/DL (ref 8.6–10.4)
CHLORIDE BLD-SCNC: 101 MMOL/L (ref 98–107)
CO2: 18 MMOL/L (ref 20–31)
CREAT SERPL-MCNC: 0.47 MG/DL (ref 0.5–0.9)
CULTURE: NORMAL
CULTURE: NORMAL
EOSINOPHILS RELATIVE PERCENT: 5 % (ref 1–4)
GFR SERPL CREATININE-BSD FRML MDRD: >60 ML/MIN/1.73M2
GLUCOSE BLD-MCNC: 72 MG/DL (ref 70–99)
HCT VFR BLD CALC: 31.9 % (ref 36.3–47.1)
HEMOGLOBIN: 9.5 G/DL (ref 11.9–15.1)
IMMATURE GRANULOCYTES: 1 %
LYMPHOCYTES # BLD: 20 % (ref 24–43)
Lab: NORMAL
Lab: NORMAL
MCH RBC QN AUTO: 25.9 PG (ref 25.2–33.5)
MCHC RBC AUTO-ENTMCNC: 29.8 G/DL (ref 28.4–34.8)
MCV RBC AUTO: 86.9 FL (ref 82.6–102.9)
MONOCYTES # BLD: 9 % (ref 3–12)
NRBC AUTOMATED: 0 PER 100 WBC
PDW BLD-RTO: 14.9 % (ref 11.8–14.4)
PLATELET # BLD: 453 K/UL (ref 138–453)
PMV BLD AUTO: 10.4 FL (ref 8.1–13.5)
POTASSIUM SERPL-SCNC: 4.6 MMOL/L (ref 3.7–5.3)
RBC # BLD: 3.67 M/UL (ref 3.95–5.11)
RBC # BLD: ABNORMAL 10*6/UL
SEG NEUTROPHILS: 65 % (ref 36–65)
SEGMENTED NEUTROPHILS ABSOLUTE COUNT: 4.87 K/UL (ref 1.5–8.1)
SODIUM BLD-SCNC: 134 MMOL/L (ref 135–144)
SPECIMEN DESCRIPTION: NORMAL
SPECIMEN DESCRIPTION: NORMAL
TOTAL PROTEIN: 7.4 G/DL (ref 6.4–8.3)
WBC # BLD: 7.6 K/UL (ref 3.5–11.3)

## 2022-11-08 PROCEDURE — 86140 C-REACTIVE PROTEIN: CPT

## 2022-11-08 PROCEDURE — G8484 FLU IMMUNIZE NO ADMIN: HCPCS | Performed by: ORTHOPAEDIC SURGERY

## 2022-11-08 PROCEDURE — 36415 COLL VENOUS BLD VENIPUNCTURE: CPT

## 2022-11-08 PROCEDURE — P9603 ONE-WAY ALLOW PRORATED MILES: HCPCS

## 2022-11-08 PROCEDURE — 1036F TOBACCO NON-USER: CPT | Performed by: ORTHOPAEDIC SURGERY

## 2022-11-08 PROCEDURE — 3017F COLORECTAL CA SCREEN DOC REV: CPT | Performed by: ORTHOPAEDIC SURGERY

## 2022-11-08 PROCEDURE — 99204 OFFICE O/P NEW MOD 45 MIN: CPT | Performed by: ORTHOPAEDIC SURGERY

## 2022-11-08 PROCEDURE — 1123F ACP DISCUSS/DSCN MKR DOCD: CPT | Performed by: ORTHOPAEDIC SURGERY

## 2022-11-08 PROCEDURE — 1090F PRES/ABSN URINE INCON ASSESS: CPT | Performed by: ORTHOPAEDIC SURGERY

## 2022-11-08 PROCEDURE — 85025 COMPLETE CBC W/AUTO DIFF WBC: CPT

## 2022-11-08 PROCEDURE — 99308 SBSQ NF CARE LOW MDM 20: CPT | Performed by: NURSE PRACTITIONER

## 2022-11-08 PROCEDURE — G8427 DOCREV CUR MEDS BY ELIG CLIN: HCPCS | Performed by: ORTHOPAEDIC SURGERY

## 2022-11-08 PROCEDURE — G8417 CALC BMI ABV UP PARAM F/U: HCPCS | Performed by: ORTHOPAEDIC SURGERY

## 2022-11-08 PROCEDURE — 80053 COMPREHEN METABOLIC PANEL: CPT

## 2022-11-08 PROCEDURE — G8399 PT W/DXA RESULTS DOCUMENT: HCPCS | Performed by: ORTHOPAEDIC SURGERY

## 2022-11-08 ASSESSMENT — ENCOUNTER SYMPTOMS
DIARRHEA: 0
NAUSEA: 0
VOMITING: 0
ABDOMINAL PAIN: 0

## 2022-11-08 NOTE — LETTER
Suleman Ricardo was evaluated today in the Orthopaedic Trauma clinic for evaluation of chronic left posterior thigh decubitus ulcer. Recommended aggressive surgical debridement and wound VAC application. Patient is resistant to surgical intervention at this time. Please consult wound care service for wet to dry dressing changes BID or any further/different modalities you feel appropriate. Superficial debridement as needed if possible. Infectious disease service managing antibiotic regimen.     Follow up: 4 weeks with Dr Carlos Hitchcock    Thank you in working with us to provide the best care for this patient     Jeremías Garcia DO  11/8/2022 3:10 PM   [address] [facility] [address]  [phone]

## 2022-11-08 NOTE — PROGRESS NOTES
[facility]  [id number] [facility]  Dept Phone: [phone]  Dept Fax: [phone]      Orthopaedic Trauma New Patient      CHIEF COMPLAINT:    Chief Complaint   Patient presents with    Pain     Left hip pain       HISTORY OF PRESENT ILLNESS:    The patient is a 80 y.o. female who is being seen as a new patient for evaluation of left hip pain. The patient reports she has had chronic left hip pain and has been treated by the pain management service with a hip injection in the past.  In late September 2022 she was found at home by a neighbor and was extremely weak and was taken to the emergency department. She was found to be in septic shock with positive blood cultures and urine cultures and was intubated for several days. She was treated by the infectious disease service with IV antibiotics. She believes around the same timeframe she developed a left thigh decubitus ulcer which has been open and draining since that time. She has not received any debridement or surgical intervention. She is here for evaluation of the left hip as well as the left leg decubitus ulcer. Currently in SNF due to these conditions. Past Medical History:    Past Medical History:   Diagnosis Date    Chronic back pain     Chronic hip pain     COPD (chronic obstructive pulmonary disease) (HCC)     Major depression     Osteoarthritis     Osteoporosis        Past Surgical History:    Past Surgical History:   Procedure Laterality Date    BREAST BIOPSY      two    CHOLECYSTECTOMY      HYSTERECTOMY, VAGINAL         Current Medications:   Current Outpatient Medications   Medication Sig Dispense Refill    ceFAZolin (ANCEF) infusion Infuse 2,000 mg intravenously in the morning and 2,000 mg at noon and 2,000 mg in the evening. No line draws - cbc diff creat crp weekly -Compound per protocol.  028 g 1    folic acid (FOLVITE) 1 MG tablet Take 1 tablet by mouth daily 30 tablet 3    metoprolol tartrate (LOPRESSOR) 25 MG tablet Take 1 tablet by mouth 2 times daily 60 tablet 3    thiamine 100 MG tablet Take 1 tablet by mouth daily 30 tablet 3    apixaban (ELIQUIS) 5 MG TABS tablet Take 1 tablet by mouth 2 times daily 60 tablet 5    SYMBICORT 160-4.5 MCG/ACT AERO       promethazine (PHENERGAN) 25 MG tablet Take 1 tablet by mouth every 6 hours as needed (as needed for nausea) 60 tablet 1    desvenlafaxine succinate (PRISTIQ) 100 MG TB24 extended release tablet TAKE 1 TABLET BY MOUTH EVERY DAY 90 tablet 1    Fluticasone-Umeclidin-Vilant (TRELEGY ELLIPTA) 200-62.5-25 MCG/INH AEPB Inhale 1 puff into the lungs daily 3 each 1    vitamin D (ERGOCALCIFEROL) 1.25 MG (39153 UT) CAPS capsule Take 1 capsule by mouth once a week 12 capsule 1    alendronate (FOSAMAX) 70 MG tablet Take 1 tablet by mouth once a week 12 tablet 1    Handicap Placard MISC by Does not apply route Expires 11/2025 1 each 0    diclofenac (VOLTAREN) 50 MG EC tablet Take 1 tablet by mouth 3 times daily (with meals) 60 tablet 3    albuterol sulfate  (90 Base) MCG/ACT inhaler USE 2 INHALATIONS EVERY 4 HOURS AS NEEDED FOR WHEEZING OR SHORTNESS OF BREATH 3 each 1    tiZANidine (ZANAFLEX) 4 MG tablet TAKE 1 TABLET THREE TIMES A  tablet 1    montelukast (SINGULAIR) 10 MG tablet TAKE 1 TABLET DAILY 90 tablet 1    morphine (MS CONTIN) 15 MG extended release tablet 3 times daily. No current facility-administered medications for this visit.        Allergies:    Lyrica [pregabalin], Demerol hcl [meperidine], Fluoxetine, and Paxil [paroxetine hcl]    Social History:   Social History     Socioeconomic History    Marital status: Single     Spouse name: Not on file    Number of children: Not on file    Years of education: Not on file    Highest education level: Not on file   Occupational History    Not on file   Tobacco Use    Smoking status: Former     Packs/day: 0.75     Years: 34.00     Pack years: 25.50     Types: Cigarettes     Quit date: 3/20/2017     Years since quittin.6    Smokeless tobacco: Never   Vaping Use    Vaping Use: Never used   Substance and Sexual Activity    Alcohol use: Not Currently     Comment: occasionally     Drug use: Never    Sexual activity: Not on file   Other Topics Concern    Not on file   Social History Narrative    Not on file     Social Determinants of Health     Financial Resource Strain: Low Risk     Difficulty of Paying Living Expenses: Not hard at all   Food Insecurity: No Food Insecurity    Worried About Running Out of Food in the Last Year: Never true    Ran Out of Food in the Last Year: Never true   Transportation Needs: Not on file   Physical Activity: Not on file   Stress: Not on file   Social Connections: Not on file   Intimate Partner Violence: Not on file   Housing Stability: Not on file       Family History:  Family History   Problem Relation Age of Onset    Stroke Mother     Alzheimer's Disease Father          REVIEW OF SYSTEMS:  Review of Systems    Gen: no fever, chills, malaise  CV: no chest pain or palpitations  Resp: no cough or shortness of breath  GI: no nausea, vomiting, diarrhea, or constipation  Neuro: no seizures, vertigo, or headaches  Msk: Left hip pain and ulceration to the leg  10 remaining systems reviewed and negative      PHYSICAL EXAM:  Ht 5' 5\" (1.651 m)   Wt 238 lb (108 kg)   LMP  (LMP Unknown)   BMI 39.61 kg/m² Body mass index is 39.61 kg/m². Physical Exam  Gen: alert and oriented, no acute distress  Psych: Appropriate affect; Appropriate knowledge base; Appropriate mood; No hallucinations  Head: normocephalic atraumatic   Chest: symmetric chest excursion  Ortho Exam  MSK: LLE: In the posterior lateral aspect of the left thigh at approximately the proximal to middle junction there is a sizable decubitus ulcer. There is foul-smelling drainage and a small ring of erythema around its borders. Mild discomfort with passive range of motion of the hip.   Patient is fairly weak and requires a wheelchair. The edge of the chair appears to be in the approximate location of the ulcer. compartments soft. 2+ DP/PT pulses with BCR. TA/EHL/FHL/GS motor intact. Saph/Hugo/DP/SP nerves SILT    Radiology:   Imaging studies from today were independently reviewed and read as listed below. Any relevant images obtained prior to today's visit were also independently interpreted. History: Left hip pain    Comparison: September 24, 2022    Findings: 2 views of the left hip (AP and Lat) in a skeletally mature patient showing complete erosion of the femoral head and protrusion into the acetabulum as result of severe end-stage arthritis of the left hip. This advanced to the process is likely correlated with avascular necrosis or possible septic arthritis. No acute fracture or other pathology appreciated    Impression: Severe degenerative arthritis of the left hip       ASSESSMENT:  80 y.o. female with end-stage degenerative arthritis of the left hip, left lower extremity ulceration, recent septic shock    PLAN:  Thoroughly reviewed the pathology related to the left hip as well as the ulcer. Recommended aggressive surgical debridement to help clear infection as well as heal the wound. Patient stated understanding but declined stating she would like to see if local wound care is able to improve the condition. Again, strongly recommended surgical intervention but she declined. Orders placed for wound care evaluation at her facility. Regarding the arthritis, explained that the ulcer has to be completely resolved and all the infection cleared before any consideration for arthroplasty. She was understanding. Plan to reevaluate in 4 weeks however she was instructed to come to the emergency department or contact the office if the signs of infection in the ulcer progress. Continue to follow-up with ID for antibiotic management. Return in about 4 weeks (around 12/6/2022) for evaluation without X-rays.     No orders of the defined types were placed in this encounter. Orders Placed This Encounter   Procedures    Wound ostomy eval and treat     Large decubitus ulcer to posterior aspect of left thigh. Perform wet to dry dressing changes BID or any further/different modalities you feel appropriate. Superficial debridement as needed if possible. Electronically signed by Bianka Rankin DO on 11/8/2022 at 3:16 PM    This note is created with the assistance of a speech recognition program.  While intending to generate a document that actually reflects the content of the visit, the document can still have some errors including those of syntax and sound a like substitutions which may escape proof reading.   In such instances, actual meaning can be extrapolated by contextual diversion

## 2022-11-08 NOTE — PROGRESS NOTES
[facility] -   Infectious diseases evaluation  admission date 10/6/2022    reason for consultation:   MSSA Bacteremia    Impression :   Current:  MSSA Bacteremia. 9/24/22. Elevated CRP  Eosinophilia  Leukocytosis  Recent Klebsiella UTI. Treated with 7-day course of Unasyn. Positive Blood cx 1/2-GPC in clusters. Possible contamination. COPD  Chronic lower back pain, with bilateral sciatica. Chronic left hip pain. Unstageable wound left hip with infection. .. Cx grew Proteus Mirabilis, Klebsiella Pneumoniae. Other:    Discussion / summary of stay / plan of care   Left trochanter wound with induration noted to the superior edge. Thick layer of tan slough, foul odor. WBC doubled from 6.1-12.4 in 1-week. CRP increased from 12.4-133.5. Patient continues to c/o left hip pain. Denies any lumbar, sacral pain. MRI imaging recommended to look for possible psoas abscess, hip abscess. Patient states she can not tolerate MRI. Will escalate antibiotics to Zosyn IV, until induration resolves. Will need at least 6-months suppression with Keflex po , imaging at a later date. CRP continues to escalate, 183.5 today:133 .5 Tuesday. Blood cultures 1/2 showing GPC in clusters, Wound cx showing GPC in pairs, GNR. Continues to c/o L hip pain. Concern for Psoas abscess, abscess to left hip. Patient has agreed to CT of the left hip with IV contrast.    Induration to wound has decreased since starting Zosyn. Odor to wound has decreased . Wound bed continues with 95% tan slough, 5% necrotic tissue. Blood cultures 1/2 showing GPC in clusters. Possible contamination. BC repeated today. Pull PICC place midline. Cefazolin stopped 11/1/22 switched to Zosyn. Zosyn switched to Meropenem 1 gm IV every 8 hours 11/7/22. Wound cx growing Proteus Mirabilis. Klebsiella Pneumoniae resistant to Zosyn.   Klebsiella likely ESBL per sensitivity pattern although , tested negative per lab. Urgent appointment made with Dr. Anette Contreras Surgeon 2/2 CT results from L hip. Patient saw Dr. Carlos Hitchcock in the office today. Options were given to aggressively surgically debride the left trochanter wound or continue antibiotics and wound care, return in 4-weeks. Patient would like to continue antibiotics and wound care. [facility] wound care onboard. Recommendations   Meropenem 1 gm IV IV every 8 hours until induration resolves, stop date TBD. CBC with diff, CRP every Tuesday and Friday. Follow CBC, renal function, CRP. Recommend half acetic acid/half Dakin's solution dressing changes until odor resolves. Followed by genet. Discussed with VICTORIA Marques, MAGALI. Supportive care. Discussed with patient, RN, Dr. Abdulaziz Arora. Infection Control Recommendations   [place] Precautions    Antimicrobial Stewardship Recommendations   Simplification of therapy  Targeted therapy    History of Present Illness:   Initial history:  Suleman Ricardo is a 80y.o.-year-old female who was found by a neighbor sitting on a chair unable to get out because of weakness, she had missed work, it seems she was really feeling weak for about 3 days before that. She was brought by 911 to the hospital.  Was found to be septic with MSSA in the blood UTI Klebsiella septic shock, patient needed intubation at the time. Extubated 9/28 and ultimately transferred out to the floor. Because of A. dane cardiology has been on the case, planning possibly cardioversion on Monday. A 2D echo was done showing no vegetation. Her CT of the spine was negative for fracture, her CT of the abdomen and pelvis on 9/24 her admission, was negative for pneumonia. She had a white count that was increased upon admission and ultimately improved, 9/30 her white count increased again, there was a suspicion of aspiration over the last 24 hours. She is also requiring increased oxygen.      Repeat blood cultures from 9/29 are still pending and negative no repeat blood cultures were done before that. The patient has been on Ancef. Seems she has had a history of chronic back pain with sciatica. Patient was followed by Emmanuel Simpson while at [facility]. [place]. Discharged to [facility] 10/6/2022 on a 4-week course of Ancef until 22. Patient received a 7-day course of Unasyn 9/30-10-6, then switched back to Ancef to complete the course. Interval changes  2022   Feeling okay. Denies any fever, chills,nausea, vomiting, diarrhea, dysuria. Cont To c/o pain to her left hip. WBC normalized. CRP trending down. L hip wound with thick layer tan /black eschar. Minimal induration. Odor decreased. Summary of relevant labs:  Labs:  CRP: 69.3-47.4-24.2-16.2-133.5-183. 5-166.6-87.7  Cre: 0.56-0.41-0.46-0.53-0.47  WBC: 8.3-7.7-6.2-6.1-12.4-13.3-11.0-11.8-7.6  Eosinophils: 6.0-3.0-1.0-2.0-5.0    Micro:   Left hip-Proteus Mirabilis, Klebsiella Pneumoniae. R-Zosyn.  BC 1/2-SCN. Contamination. 11/3 BC x 2-Negative. Imagin22 CT Left Hip    FINDINGS:   Bones: There is no evidence of acute fracture or dislocation. There is   severe erosive change and bony loss throughout the left femoral head and   acetabulum. There is proximal migration of the femur related to the bony   loss. There is associated sclerosis. A joint effusion versus synovitis is   noted. There are periarticular calcifications in the region of the joint   capsule. Soft Tissue: A soft tissue wound is noted about the posterolateral aspect of   the left hip and proximal thigh. There is multifocal soft tissue gas at this   location. A defined fluid collection is not identified. There is adjacent   subcutaneous stranding. The region containing the soft tissue gas measures   approximately 5.2 x 3.3 x 2.8 cm. Bandaging material overlies the lesion. Joint: Please see above for discussion of the left hip. Impression   1. Posterolateral proximal thigh soft tissue wound with multifocal underlying   soft tissue gas, most consistent with gas-forming bacterial infection. An   abscess is not present. 2. Severe erosive arthropathy at the left hip. Septic arthritis should be   excluded. I have personally reviewed the past medical history, past surgical history, medications, social history, and family history, and I haveupdated the database accordingly. Allergies:   Lyrica [pregabalin], Demerol hcl [meperidine], Fluoxetine, and Paxil [paroxetine hcl]     Review of Systems:     Review of Systems   Gastrointestinal:  Negative for abdominal pain, diarrhea, nausea and vomiting. Musculoskeletal:  Positive for myalgias. Left hip pain. Skin:  Positive for wound. Wound to left trochanter. All other systems reviewed and are negative. Physical Examination :       Physical Exam  Vitals and nursing note reviewed. Constitutional:       Appearance: Normal appearance. She is obese. She is not ill-appearing. HENT:      Head: Normocephalic and atraumatic. Right Ear: External ear normal.      Left Ear: External ear normal.      Nose: Nose normal.      Mouth/Throat:      Mouth: Mucous membranes are moist.      Pharynx: Oropharynx is clear. Eyes:      Extraocular Movements: Extraocular movements intact. Conjunctiva/sclera: Conjunctivae normal.      Pupils: Pupils are equal, round, and reactive to light. Cardiovascular:      Rate and Rhythm: Normal rate and regular rhythm. Heart sounds: Normal heart sounds. No murmur heard. Pulmonary:      Effort: Pulmonary effort is normal.      Breath sounds: Normal breath sounds. No wheezing or rhonchi. Comments: RA  Abdominal:      General: Bowel sounds are normal. There is no distension. Palpations: Abdomen is soft. Tenderness: There is no abdominal tenderness. There is no right CVA tenderness or left CVA tenderness.    Genitourinary:     Comments: No cobb. Musculoskeletal:      Cervical back: Normal range of motion. No rigidity. Right lower leg: No edema. Left lower leg: No edema. Skin:     General: Skin is warm and dry. Capillary Refill: Capillary refill takes less than 2 seconds. Findings: No erythema. Comments: Unstageable wound to left hip. Still has a slight odor, not as pungent. Induration to superior edge has improved. Thick layer of tan/necrotic eschar. Neurological:      Mental Status: She is alert and oriented to person, place, and time. Psychiatric:         Mood and Affect: Mood normal.         Behavior: Behavior normal.         Thought Content:  Thought content normal.         Judgment: Judgment normal.       Past Medical History:     Past Medical History:   Diagnosis Date    Chronic back pain     Chronic hip pain     COPD (chronic obstructive pulmonary disease) ([facility])     Major depression     Osteoarthritis     Osteoporosis        Past Surgical  History:     Past Surgical History:   Procedure Laterality Date    BREAST BIOPSY      two    CHOLECYSTECTOMY      HYSTERECTOMY, VAGINAL         Medications:       Social History:     Social History     Socioeconomic History    Marital status: Single     Spouse name: Not on file    Number of children: Not on file    Years of education: Not on file    Highest education level: Not on file   Occupational History    Not on file   Tobacco Use    Smoking status: Former     Packs/day: 0.75     Years: 34.00     Pack years: 25.50     Types: Cigarettes     Quit date: 3/20/2017     Years since quittin.6    Smokeless tobacco: Never   Vaping Use    Vaping Use: Never used   Substance and Sexual Activity    Alcohol use: Not Currently     Comment: occasionally     Drug use: Never    Sexual activity: Not on file   Other Topics Concern    Not on file   Social History Narrative    Not on file     Social Determinants of Health     Financial Resource Strain: Low Risk     Difficulty of Paying Living Expenses: Not hard at all   Food Insecurity: No Food Insecurity    Worried About Running Out of Food in the Last Year: Never true    Ran Out of Food in the Last Year: Never true   Transportation Needs: Not on file   Physical Activity: Not on file   Stress: Not on file   Social Connections: Not on file   Intimate Partner Violence: Not on file   Housing Stability: Not on file       Family History:     Family History   Problem Relation Age of Onset    Stroke Mother     Alzheimer's Disease Father       Medical Decision Making:   I have independently reviewed/ordered the following labs:    CBC with Differential:   Recent Labs     11/08/22 0648   WBC 7.6   HGB 9.5*   HCT 31.9*      LYMPHOPCT 20*   MONOPCT 9       BMP:  Recent Labs     11/08/22 0648   *   K 4.6      CO2 18*   BUN 8   CREATININE 0.47*         Hepatic Function Panel:   Recent Labs     11/08/22 0648   PROT 7.4   LABALBU 2.5*   BILITOT 0.4   ALKPHOS 99   ALT <5*   AST 13         No results for input(s): RPR in the last 72 hours. No results for input(s): HIV in the last 72 hours. No results for input(s): BC in the last 72 hours. Lab Results   Component Value Date/Time    CREATININE 0.47 11/08/2022 06:48 AM    GLUCOSE 72 11/08/2022 06:48 AM       Detailed results: Thank you for allowing us to participate in the care of this patient. Please call with questions. This note is created with the assistance of a speech recognition program.  While intending to generate adocument that actually reflects the content of the visit, the document can still have some errors including those of syntax and sound a like substitutions which may escape proof reading. It such instances, actual meaningcan be extrapolated by contextual diversion.     MITESH Mcmahon - CNP  Office: [phone]  Perfect serve / office [phone]

## 2022-11-10 ENCOUNTER — OUTSIDE SERVICES (OUTPATIENT)
Dept: INFECTIOUS DISEASES | Age: 72
End: 2022-11-10
Payer: COMMERCIAL

## 2022-11-10 DIAGNOSIS — R79.82 ELEVATED C-REACTIVE PROTEIN (CRP): Primary | ICD-10-CM

## 2022-11-10 DIAGNOSIS — R78.81 MSSA BACTEREMIA: ICD-10-CM

## 2022-11-10 DIAGNOSIS — A49.8 PROTEUS MIRABILIS INFECTION: ICD-10-CM

## 2022-11-10 DIAGNOSIS — L89.220 PRESSURE INJURY OF LEFT HIP, UNSTAGEABLE (HCC): ICD-10-CM

## 2022-11-10 DIAGNOSIS — B95.61 MSSA BACTEREMIA: ICD-10-CM

## 2022-11-10 DIAGNOSIS — D72.829 LEUKOCYTOSIS, UNSPECIFIED TYPE: ICD-10-CM

## 2022-11-10 DIAGNOSIS — T14.8XXA WOUND INFECTION: ICD-10-CM

## 2022-11-10 DIAGNOSIS — R78.81 POSITIVE BLOOD CULTURE: ICD-10-CM

## 2022-11-10 DIAGNOSIS — L08.9 WOUND INFECTION: ICD-10-CM

## 2022-11-10 DIAGNOSIS — A49.8 KLEBSIELLA PNEUMONIAE INFECTION: ICD-10-CM

## 2022-11-10 DIAGNOSIS — D72.10 EOSINOPHILIA, UNSPECIFIED TYPE: ICD-10-CM

## 2022-11-10 PROCEDURE — 99308 SBSQ NF CARE LOW MDM 20: CPT | Performed by: NURSE PRACTITIONER

## 2022-11-11 ENCOUNTER — HOSPITAL ENCOUNTER (OUTPATIENT)
Age: 72
Setting detail: SPECIMEN
Discharge: HOME | End: 2022-11-11

## 2022-11-11 VITALS
DIASTOLIC BLOOD PRESSURE: 56 MMHG | HEART RATE: 74 BPM | RESPIRATION RATE: 18 BRPM | TEMPERATURE: 97.5 F | SYSTOLIC BLOOD PRESSURE: 104 MMHG | OXYGEN SATURATION: 97 %

## 2022-11-11 LAB
C-REACTIVE PROTEIN: 47.3 MG/L (ref 0–5)
HCT VFR BLD CALC: 35.1 % (ref 36.3–47.1)
HEMOGLOBIN: 10.5 G/DL (ref 11.9–15.1)
MCH RBC QN AUTO: 25.4 PG (ref 25.2–33.5)
MCHC RBC AUTO-ENTMCNC: 29.9 G/DL (ref 28.4–34.8)
MCV RBC AUTO: 85 FL (ref 82.6–102.9)
NRBC AUTOMATED: 0 PER 100 WBC
PDW BLD-RTO: 15 % (ref 11.8–14.4)
PLATELET # BLD: 457 K/UL (ref 138–453)
PMV BLD AUTO: 10.2 FL (ref 8.1–13.5)
RBC # BLD: 4.13 M/UL (ref 3.95–5.11)
WBC # BLD: 9.1 K/UL (ref 3.5–11.3)

## 2022-11-11 PROCEDURE — 86140 C-REACTIVE PROTEIN: CPT

## 2022-11-11 PROCEDURE — P9603 ONE-WAY ALLOW PRORATED MILES: HCPCS

## 2022-11-11 PROCEDURE — 36415 COLL VENOUS BLD VENIPUNCTURE: CPT

## 2022-11-11 PROCEDURE — 85027 COMPLETE CBC AUTOMATED: CPT

## 2022-11-11 ASSESSMENT — ENCOUNTER SYMPTOMS
NAUSEA: 0
DIARRHEA: 0
ABDOMINAL PAIN: 0
VOMITING: 0

## 2022-11-12 NOTE — PROGRESS NOTES
[facility] -   Infectious diseases evaluation  admission date 10/6/2022    reason for consultation:   MSSA Bacteremia    Impression :   Current:  MSSA Bacteremia. 9/24/22. Elevated CRP  Eosinophilia  Leukocytosis  Recent Klebsiella UTI. Treated with 7-day course of Unasyn. Positive Blood cx 1/2-GPC in clusters. Possible contamination. COPD  Chronic lower back pain, with bilateral sciatica. Chronic left hip pain. Unstageable wound left hip with infection. .. Cx grew Proteus Mirabilis, Klebsiella Pneumoniae. Other:    Discussion / summary of stay / plan of care   Left trochanter wound with induration noted to the superior edge. Thick layer of tan slough, foul odor. WBC doubled from 6.1-12.4 in 1-week. CRP increased from 12.4-133.5. Patient continues to c/o left hip pain. Denies any lumbar, sacral pain. MRI imaging recommended to look for possible psoas abscess, hip abscess. Patient states she can not tolerate MRI. Will escalate antibiotics to Zosyn IV, until induration resolves. Will need at least 6-months suppression with Keflex po , imaging at a later date. CRP continues to escalate, 183.5 today:133 .5 Tuesday. Blood cultures 1/2 showing GPC in clusters, Wound cx showing GPC in pairs, GNR. Continues to c/o L hip pain. Concern for Psoas abscess, abscess to left hip. Patient has agreed to CT of the left hip with IV contrast.    Induration to wound has decreased since starting Zosyn. Odor to wound has decreased . Wound bed continues with 95% tan slough, 5% necrotic tissue. Blood cultures 1/2 showing GPC in clusters. Possible contamination. BC repeated today. Pull PICC place midline. Cefazolin stopped 11/1/22 switched to Zosyn. Zosyn switched to Meropenem 1 gm IV every 8 hours 11/7/22. Wound cx growing Proteus Mirabilis. Klebsiella Pneumoniae resistant to Zosyn.   Klebsiella likely ESBL per sensitivity pattern although , tested negative per lab. Urgent appointment made with Dr. [facility] Surgeon 2/2 CT results from L hip. Patient saw Dr. Wyatt De La Cruz in the office today. Options were given to aggressively surgically debride the left trochanter wound or continue antibiotics and wound care, return in 4-weeks. Patient would like to continue antibiotics and wound care. [facility] wound care onboard. Recommendations   Meropenem 1 gm IV IV every 8 hours until induration resolves, stop date TBD. CBC with diff, CRP every Tuesday and Friday. Follow CBC, renal function, CRP. Recommend half acetic acid/half Dakin's solution dressing changes until odor resolves. Followed by genet. Discussed with Diana Singer, MITESH-CNP, CWON. Supportive care. Discussed with patient, RN. Infection Control Recommendations   [place] Precautions    Antimicrobial Stewardship Recommendations   Simplification of therapy  Targeted therapy    History of Present Illness:   Initial history:  Scott Nye is a 80y.o.-year-old female who was found by a neighbor sitting on a chair unable to get out because of weakness, she had missed work, it seems she was really feeling weak for about 3 days before that. She was brought by 911 to the hospital.  Was found to be septic with MSSA in the blood UTI Klebsiella septic shock, patient needed intubation at the time. Extubated 9/28 and ultimately transferred out to the floor. Because of A. dane cardiology has been on the case, planning possibly cardioversion on Monday. A 2D echo was done showing no vegetation. Her CT of the spine was negative for fracture, her CT of the abdomen and pelvis on 9/24 her admission, was negative for pneumonia. She had a white count that was increased upon admission and ultimately improved, 9/30 her white count increased again, there was a suspicion of aspiration over the last 24 hours. She is also requiring increased oxygen.      Repeat blood cultures from 9/29 are still pending and negative no repeat blood cultures were done before that. The patient has been on Ancef. Seems she has had a history of chronic back pain with sciatica. Patient was followed by Emmanuel Love while at [facility]. [place]. Discharged to [facility] 10/6/2022 on a 4-week course of Ancef until 22. Patient received a 7-day course of Unasyn -10-6, then switched back to Ancef to complete the course. Interval changes  2022   Feeling good. In good spirits. Denies any fever, chills,nausea, vomiting, diarrhea, dysuria. Cont To c/o pain to her left hip. CRP continues to trend down. Summary of relevant labs:  Labs:  CRP: 69.3-47.4-24.2-16.2-133.5-183. 5-166.6-87.7-47.3  Cre: 0.56-0.41-0.46-0.53-0.47  WBC: 8.3-7.7-6.2-6.1-12.4-13.3-11.0-11.8-7.6-9.1  Eosinophils: 6.0-3.0-1.0-2.0-5.0    Micro:   Left hip-Proteus Mirabilis, Klebsiella Pneumoniae. R-Zosyn.  BC 1/2-SCN. Contamination. 11/3 BC x 2-Negative. Imagin22 CT Left Hip    FINDINGS:   Bones: There is no evidence of acute fracture or dislocation. There is   severe erosive change and bony loss throughout the left femoral head and   acetabulum. There is proximal migration of the femur related to the bony   loss. There is associated sclerosis. A joint effusion versus synovitis is   noted. There are periarticular calcifications in the region of the joint   capsule. Soft Tissue: A soft tissue wound is noted about the posterolateral aspect of   the left hip and proximal thigh. There is multifocal soft tissue gas at this   location. A defined fluid collection is not identified. There is adjacent   subcutaneous stranding. The region containing the soft tissue gas measures   approximately 5.2 x 3.3 x 2.8 cm. Bandaging material overlies the lesion. Joint: Please see above for discussion of the left hip. Impression   1.  Posterolateral proximal thigh soft tissue wound with multifocal underlying   soft tissue gas, most consistent with gas-forming bacterial infection. An   abscess is not present. 2. Severe erosive arthropathy at the left hip. Septic arthritis should be   excluded. I have personally reviewed the past medical history, past surgical history, medications, social history, and family history, and I haveupdated the database accordingly. Allergies:   Lyrica [pregabalin], Demerol hcl [meperidine], Fluoxetine, and Paxil [paroxetine hcl]     Review of Systems:     Review of Systems   Gastrointestinal:  Negative for abdominal pain, diarrhea, nausea and vomiting. Musculoskeletal:  Positive for myalgias. Left hip pain. Skin:  Positive for wound. Wound to left trochanter. All other systems reviewed and are negative. Physical Examination :       Physical Exam  Vitals and nursing note reviewed. Constitutional:       Appearance: Normal appearance. She is obese. She is not ill-appearing. HENT:      Head: Normocephalic and atraumatic. Right Ear: External ear normal.      Left Ear: External ear normal.      Nose: Nose normal.      Mouth/Throat:      Mouth: Mucous membranes are moist.      Pharynx: Oropharynx is clear. Eyes:      Extraocular Movements: Extraocular movements intact. Conjunctiva/sclera: Conjunctivae normal.      Pupils: Pupils are equal, round, and reactive to light. Cardiovascular:      Rate and Rhythm: Normal rate and regular rhythm. Heart sounds: Normal heart sounds. No murmur heard. Pulmonary:      Effort: Pulmonary effort is normal.      Breath sounds: Normal breath sounds. No wheezing or rhonchi. Comments: RA  Abdominal:      General: Bowel sounds are normal. There is no distension. Palpations: Abdomen is soft. Tenderness: There is no abdominal tenderness. There is no right CVA tenderness or left CVA tenderness. Genitourinary:     Comments: No cobb. Musculoskeletal:      Cervical back: Normal range of motion.  No rigidity. Right lower leg: No edema. Left lower leg: No edema. Skin:     General: Skin is warm and dry. Capillary Refill: Capillary refill takes less than 2 seconds. Findings: No erythema. Comments: Unstageable wound to left hip. Still has a slight odor, not as pungent. Induration to superior edge has improved. Thick layer of tan/necrotic eschar. Neurological:      Mental Status: She is alert and oriented to person, place, and time. Psychiatric:         Mood and Affect: Mood normal.         Behavior: Behavior normal.         Thought Content:  Thought content normal.         Judgment: Judgment normal.       Past Medical History:     Past Medical History:   Diagnosis Date    Chronic back pain     Chronic hip pain     COPD (chronic obstructive pulmonary disease) ([facility])     Major depression     Osteoarthritis     Osteoporosis        Past Surgical  History:     Past Surgical History:   Procedure Laterality Date    BREAST BIOPSY      two    CHOLECYSTECTOMY      HYSTERECTOMY, VAGINAL         Medications:       Social History:     Social History     Socioeconomic History    Marital status: Single     Spouse name: Not on file    Number of children: Not on file    Years of education: Not on file    Highest education level: Not on file   Occupational History    Not on file   Tobacco Use    Smoking status: Former     Packs/day: 0.75     Years: 34.00     Pack years: 25.50     Types: Cigarettes     Quit date: 3/20/2017     Years since quittin.6    Smokeless tobacco: Never   Vaping Use    Vaping Use: Never used   Substance and Sexual Activity    Alcohol use: Not Currently     Comment: occasionally     Drug use: Never    Sexual activity: Not on file   Other Topics Concern    Not on file   Social History Narrative    Not on file     Social Determinants of Health     Financial Resource Strain: Low Risk     Difficulty of Paying Living Expenses: Not hard at all   Food Insecurity: No Food Insecurity Worried About Running Out of Food in the Last Year: Never true    Ran Out of Food in the Last Year: Never true   Transportation Needs: Not on file   Physical Activity: Not on file   Stress: Not on file   Social Connections: Not on file   Intimate Partner Violence: Not on file   Housing Stability: Not on file       Family History:     Family History   Problem Relation Age of Onset    Stroke Mother     Alzheimer's Disease Father       Medical Decision Making:   I have independently reviewed/ordered the following labs:    CBC with Differential:   Recent Labs     11/11/22  0737   WBC 9.1   HGB 10.5*   HCT 35.1*   *     BMP:  No results for input(s): NA, K, CL, CO2, BUN, CREATININE, CA, MG in the last 72 hours. Hepatic Function Panel:   No results for input(s): PROT, LABALBU, BILIDIR, IBILI, BILITOT, ALKPHOS, ALT, AST in the last 72 hours. No results for input(s): RPR in the last 72 hours. No results for input(s): HIV in the last 72 hours. No results for input(s): BC in the last 72 hours. Lab Results   Component Value Date/Time    CREATININE 0.47 11/08/2022 06:48 AM    GLUCOSE 72 11/08/2022 06:48 AM       Detailed results: Thank you for allowing us to participate in the care of this patient. Please call with questions. This note is created with the assistance of a speech recognition program.  While intending to generate adocument that actually reflects the content of the visit, the document can still have some errors including those of syntax and sound a like substitutions which may escape proof reading. It such instances, actual meaningcan be extrapolated by contextual diversion.     MITESH Barr - CNP  Office: [phone]  Cell: [phone]

## 2022-11-15 ENCOUNTER — OUTSIDE SERVICES (OUTPATIENT)
Dept: INFECTIOUS DISEASES | Age: 72
End: 2022-11-15
Payer: COMMERCIAL

## 2022-11-15 ENCOUNTER — HOSPITAL ENCOUNTER (OUTPATIENT)
Age: 72
Setting detail: SPECIMEN
Discharge: HOME | End: 2022-11-15

## 2022-11-15 VITALS
TEMPERATURE: 98 F | HEART RATE: 82 BPM | DIASTOLIC BLOOD PRESSURE: 76 MMHG | RESPIRATION RATE: 18 BRPM | SYSTOLIC BLOOD PRESSURE: 122 MMHG

## 2022-11-15 DIAGNOSIS — D72.10 EOSINOPHILIA, UNSPECIFIED TYPE: ICD-10-CM

## 2022-11-15 DIAGNOSIS — A49.8 PROTEUS MIRABILIS INFECTION: ICD-10-CM

## 2022-11-15 DIAGNOSIS — L08.9 WOUND INFECTION: ICD-10-CM

## 2022-11-15 DIAGNOSIS — D72.829 LEUKOCYTOSIS, UNSPECIFIED TYPE: ICD-10-CM

## 2022-11-15 DIAGNOSIS — R78.81 MSSA BACTEREMIA: ICD-10-CM

## 2022-11-15 DIAGNOSIS — A49.8 KLEBSIELLA PNEUMONIAE INFECTION: ICD-10-CM

## 2022-11-15 DIAGNOSIS — T14.8XXA WOUND INFECTION: ICD-10-CM

## 2022-11-15 DIAGNOSIS — R78.81 POSITIVE BLOOD CULTURE: ICD-10-CM

## 2022-11-15 DIAGNOSIS — R79.82 ELEVATED C-REACTIVE PROTEIN (CRP): Primary | ICD-10-CM

## 2022-11-15 DIAGNOSIS — B95.61 MSSA BACTEREMIA: ICD-10-CM

## 2022-11-15 DIAGNOSIS — L89.220 PRESSURE INJURY OF LEFT HIP, UNSTAGEABLE (HCC): ICD-10-CM

## 2022-11-15 LAB
ABSOLUTE EOS #: 0.31 K/UL (ref 0–0.44)
ABSOLUTE IMMATURE GRANULOCYTE: 0.04 K/UL (ref 0–0.3)
ABSOLUTE LYMPH #: 2 K/UL (ref 1.1–3.7)
ABSOLUTE MONO #: 0.45 K/UL (ref 0.1–1.2)
ALBUMIN SERPL-MCNC: 3 G/DL (ref 3.5–5.2)
ALBUMIN/GLOBULIN RATIO: 0.7 (ref 1–2.5)
ALP BLD-CCNC: 100 U/L (ref 35–104)
ALT SERPL-CCNC: 13 U/L (ref 5–33)
ANION GAP SERPL CALCULATED.3IONS-SCNC: 9 MMOL/L (ref 9–17)
AST SERPL-CCNC: 23 U/L
BASOPHILS # BLD: 1 % (ref 0–2)
BASOPHILS ABSOLUTE: 0.06 K/UL (ref 0–0.2)
BILIRUB SERPL-MCNC: 0.2 MG/DL (ref 0.3–1.2)
BUN BLDV-MCNC: 12 MG/DL (ref 8–23)
C-REACTIVE PROTEIN: 14.6 MG/L (ref 0–5)
CALCIUM SERPL-MCNC: 9.4 MG/DL (ref 8.6–10.4)
CHLORIDE BLD-SCNC: 100 MMOL/L (ref 98–107)
CO2: 24 MMOL/L (ref 20–31)
CREAT SERPL-MCNC: 0.36 MG/DL (ref 0.5–0.9)
EOSINOPHILS RELATIVE PERCENT: 4 % (ref 1–4)
GFR SERPL CREATININE-BSD FRML MDRD: >60 ML/MIN/1.73M2
GLUCOSE BLD-MCNC: 92 MG/DL (ref 70–99)
HCT VFR BLD CALC: 34.1 % (ref 36.3–47.1)
HEMOGLOBIN: 10 G/DL (ref 11.9–15.1)
IMMATURE GRANULOCYTES: 1 %
LYMPHOCYTES # BLD: 24 % (ref 24–43)
MCH RBC QN AUTO: 25.1 PG (ref 25.2–33.5)
MCHC RBC AUTO-ENTMCNC: 29.3 G/DL (ref 28.4–34.8)
MCV RBC AUTO: 85.7 FL (ref 82.6–102.9)
MONOCYTES # BLD: 5 % (ref 3–12)
NRBC AUTOMATED: 0 PER 100 WBC
PDW BLD-RTO: 15.2 % (ref 11.8–14.4)
PLATELET # BLD: 431 K/UL (ref 138–453)
PMV BLD AUTO: 10.3 FL (ref 8.1–13.5)
POTASSIUM SERPL-SCNC: 4.5 MMOL/L (ref 3.7–5.3)
RBC # BLD: 3.98 M/UL (ref 3.95–5.11)
RBC # BLD: ABNORMAL 10*6/UL
SEG NEUTROPHILS: 65 % (ref 36–65)
SEGMENTED NEUTROPHILS ABSOLUTE COUNT: 5.45 K/UL (ref 1.5–8.1)
SODIUM BLD-SCNC: 133 MMOL/L (ref 135–144)
TOTAL PROTEIN: 7.5 G/DL (ref 6.4–8.3)
WBC # BLD: 8.3 K/UL (ref 3.5–11.3)

## 2022-11-15 PROCEDURE — 99308 SBSQ NF CARE LOW MDM 20: CPT | Performed by: NURSE PRACTITIONER

## 2022-11-15 PROCEDURE — 85025 COMPLETE CBC W/AUTO DIFF WBC: CPT

## 2022-11-15 PROCEDURE — 36415 COLL VENOUS BLD VENIPUNCTURE: CPT

## 2022-11-15 PROCEDURE — 86140 C-REACTIVE PROTEIN: CPT

## 2022-11-15 PROCEDURE — 80053 COMPREHEN METABOLIC PANEL: CPT

## 2022-11-15 PROCEDURE — P9603 ONE-WAY ALLOW PRORATED MILES: HCPCS

## 2022-11-15 ASSESSMENT — ENCOUNTER SYMPTOMS
DIARRHEA: 0
NAUSEA: 0
VOMITING: 0
ABDOMINAL PAIN: 0

## 2022-11-16 NOTE — PROGRESS NOTES
[facility] -   Infectious diseases evaluation  admission date 10/10/2022    reason for consultation:   MSSA Bacteremia    Impression :   Current:  MSSA Bacteremia. 9/24/22. Elevated CRP  Eosinophilia  Leukocytosis  Recent Klebsiella UTI. Treated with 7-day course of Unasyn. Positive Blood cx 1/2-GPC in clusters. Possible contamination. COPD  Chronic lower back pain, with bilateral sciatica. Chronic left hip pain. Unstageable wound left hip with infection. .. Cx grew Proteus Mirabilis, Klebsiella Pneumoniae. Other:    Discussion / summary of stay / plan of care   Left trochanter wound with induration noted to the superior edge. Thick layer of tan slough, foul odor. WBC doubled from 6.1-12.4 in 1-week. CRP increased from 12.4-133.5. Patient continues to c/o left hip pain. Denies any lumbar, sacral pain. MRI imaging recommended to look for possible psoas abscess, hip abscess. Patient states she can not tolerate MRI. Will escalate antibiotics to Zosyn IV, until induration resolves. Will need at least 6-months suppression with Keflex po , imaging at a later date. CRP continues to escalate, 183.5 today:133 .5 Tuesday. Blood cultures 1/2 showing GPC in clusters, Wound cx showing GPC in pairs, GNR. Continues to c/o L hip pain. Concern for Psoas abscess, abscess to left hip. Patient has agreed to CT of the left hip with IV contrast.    Induration to wound has decreased since starting Zosyn. Odor to wound has decreased . Wound bed continues with 95% tan slough, 5% necrotic tissue. Blood cultures 1/2 showing GPC in clusters. Possible contamination. BC repeated today. Pull PICC place midline. Cefazolin stopped 11/1/22 switched to Zosyn. Zosyn switched to Meropenem 1 gm IV every 8 hours 11/7/22. Wound cx growing Proteus Mirabilis. Klebsiella Pneumoniae resistant to Zosyn.   Klebsiella likely ESBL per sensitivity pattern although , tested negative per lab. Urgent appointment made with Dr. Anette Contreras Surgeon 2/2 CT results from L hip. Patient saw Dr. Carlos Hitchcock in the office today. Options were given to aggressively surgically debride the left trochanter wound or continue antibiotics and wound care, return in 4-weeks. Patient would like to continue antibiotics and wound care. [facility] wound care onboard. Recommendations   Meropenem 1 gm IV IV every 8 hours until 11/21/22.(14 days treatment)  CBC with diff, CRP every Tuesday and Friday. Follow CBC, renal function, CRP. Follow-up with Dr. Abdulaziz Arora in the office 1-week after discharge. Supportive care. Discussed with patient, RN, LSW. Infection Control Recommendations   [place] Precautions    Antimicrobial Stewardship Recommendations   Simplification of therapy  Targeted therapy    History of Present Illness:   Initial history:  Suleman Ricardo is a 71y.o.-year-old female who was found by a neighbor sitting on a chair unable to get out because of weakness, she had missed work, it seems she was really feeling weak for about 3 days before that. She was brought by 911 to the hospital.  Was found to be septic with MSSA in the blood UTI Klebsiella septic shock, patient needed intubation at the time. Extubated 9/28 and ultimately transferred out to the floor. Because of A. dane cardiology has been on the case, planning possibly cardioversion on Monday. A 2D echo was done showing no vegetation. Her CT of the spine was negative for fracture, her CT of the abdomen and pelvis on 9/24 her admission, was negative for pneumonia. She had a white count that was increased upon admission and ultimately improved, 9/30 her white count increased again, there was a suspicion of aspiration over the last 24 hours. She is also requiring increased oxygen. Repeat blood cultures from 9/29 are still pending and negative no repeat blood cultures were done before that. The patient has been on Ancef. Seems she has had a history of chronic back pain with sciatica. Patient was followed by Emmanuel Haney while at [facility]. [place]. Discharged to [facility] 10/6/2022 on a 4-week course of Ancef until 22. Patient received a 7-day course of Unasyn 9/30-10-6, then switched back to Ancef to complete the course. Interval changes  11/15/2022   Feeling good. In good spirits. Denies any fever, chills,nausea, vomiting, diarrhea, dysuria. Cont To c/o pain to her left hip. CRP continues to trend down. Left trochanter wound seen and examined. No odor. Dry eschar lifting, tan slough . Wound edges clean. Induration resolved. LUE midline site clean. Summary of relevant labs:  Labs:  CRP: 69.3-47.4-24.2-16.2-133.5-183. 5-166.6-87.7-47.3-14.6  Cre: 0.56-0.41-0.46-0.53-0.47  WBC: 8.3-7.7-6.2-6.1-12.4-13.3-11.0-11.8-7.6-9.1-8.3  Eosinophils: 6.0-3.0-1.0-2.0-5.0-4.0    Micro:   Left hip-Proteus Mirabilis, Klebsiella Pneumoniae. R-Zosyn.  BC /-SCN. Contamination. 11/3 BC x 2-Negative. Imagin22 CT Left Hip    FINDINGS:   Bones: There is no evidence of acute fracture or dislocation. There is   severe erosive change and bony loss throughout the left femoral head and   acetabulum. There is proximal migration of the femur related to the bony   loss. There is associated sclerosis. A joint effusion versus synovitis is   noted. There are periarticular calcifications in the region of the joint   capsule. Soft Tissue: A soft tissue wound is noted about the posterolateral aspect of   the left hip and proximal thigh. There is multifocal soft tissue gas at this   location. A defined fluid collection is not identified. There is adjacent   subcutaneous stranding. The region containing the soft tissue gas measures   approximately 5.2 x 3.3 x 2.8 cm. Bandaging material overlies the lesion. Joint: Please see above for discussion of the left hip. Impression   1. Posterolateral proximal thigh soft tissue wound with multifocal underlying   soft tissue gas, most consistent with gas-forming bacterial infection. An   abscess is not present. 2. Severe erosive arthropathy at the left hip. Septic arthritis should be   excluded. I have personally reviewed the past medical history, past surgical history, medications, social history, and family history, and I haveupdated the database accordingly. Allergies:   Lyrica [pregabalin], Demerol hcl [meperidine], Fluoxetine, and Paxil [paroxetine hcl]     Review of Systems:     Review of Systems   Gastrointestinal:  Negative for abdominal pain, diarrhea, nausea and vomiting. Musculoskeletal:  Positive for myalgias. Left hip pain. Skin:  Positive for wound. Wound to left trochanter. All other systems reviewed and are negative. Physical Examination :       Physical Exam  Vitals and nursing note reviewed. Constitutional:       Appearance: Normal appearance. She is obese. She is not ill-appearing. HENT:      Head: Normocephalic and atraumatic. Right Ear: External ear normal.      Left Ear: External ear normal.      Nose: Nose normal.      Mouth/Throat:      Mouth: Mucous membranes are moist.      Pharynx: Oropharynx is clear. Eyes:      Extraocular Movements: Extraocular movements intact. Conjunctiva/sclera: Conjunctivae normal.      Pupils: Pupils are equal, round, and reactive to light. Cardiovascular:      Rate and Rhythm: Normal rate and regular rhythm. Heart sounds: Normal heart sounds. No murmur heard. Pulmonary:      Effort: Pulmonary effort is normal.      Breath sounds: Normal breath sounds. No wheezing or rhonchi. Comments: RA  Abdominal:      General: Bowel sounds are normal. There is no distension. Palpations: Abdomen is soft. Tenderness: There is no abdominal tenderness. There is no right CVA tenderness or left CVA tenderness.    Genitourinary:     Comments: at all   Food Insecurity: No Food Insecurity    Worried About Running Out of Food in the Last Year: Never true    Ran Out of Food in the Last Year: Never true   Transportation Needs: Not on file   Physical Activity: Not on file   Stress: Not on file   Social Connections: Not on file   Intimate Partner Violence: Not on file   Housing Stability: Not on file       Family History:     Family History   Problem Relation Age of Onset    Stroke Mother     Alzheimer's Disease Father       Medical Decision Making:   I have independently reviewed/ordered the following labs:    CBC with Differential:   Recent Labs     11/15/22  0624   WBC 8.3   HGB 10.0*   HCT 34.1*      LYMPHOPCT 24   MONOPCT 5     BMP:  Recent Labs     11/15/22  0624   *   K 4.5      CO2 24   BUN 12   CREATININE 0.36*         Hepatic Function Panel:   Recent Labs     11/15/22  0624   PROT 7.5   LABALBU 3.0*   BILITOT 0.2*   ALKPHOS 100   ALT 13   AST 23         No results for input(s): RPR in the last 72 hours. No results for input(s): HIV in the last 72 hours. No results for input(s): BC in the last 72 hours. Lab Results   Component Value Date/Time    CREATININE 0.36 11/15/2022 06:24 AM    GLUCOSE 92 11/15/2022 06:24 AM       Detailed results: Thank you for allowing us to participate in the care of this patient. Please call with questions. This note is created with the assistance of a speech recognition program.  While intending to generate adocument that actually reflects the content of the visit, the document can still have some errors including those of syntax and sound a like substitutions which may escape proof reading. It such instances, actual meaningcan be extrapolated by contextual diversion.     MITESH Dickinson - CNP  Office: [phone]  Cell: [phone]

## 2022-11-17 ENCOUNTER — OUTSIDE SERVICES (OUTPATIENT)
Dept: INFECTIOUS DISEASES | Age: 72
End: 2022-11-17
Payer: COMMERCIAL

## 2022-11-17 VITALS
DIASTOLIC BLOOD PRESSURE: 76 MMHG | RESPIRATION RATE: 18 BRPM | HEART RATE: 82 BPM | OXYGEN SATURATION: 98 % | TEMPERATURE: 98 F | SYSTOLIC BLOOD PRESSURE: 122 MMHG

## 2022-11-17 DIAGNOSIS — T14.8XXA WOUND INFECTION: ICD-10-CM

## 2022-11-17 DIAGNOSIS — R78.81 POSITIVE BLOOD CULTURE: ICD-10-CM

## 2022-11-17 DIAGNOSIS — D72.829 LEUKOCYTOSIS, UNSPECIFIED TYPE: ICD-10-CM

## 2022-11-17 DIAGNOSIS — L89.220 PRESSURE INJURY OF LEFT HIP, UNSTAGEABLE (HCC): ICD-10-CM

## 2022-11-17 DIAGNOSIS — B95.61 MSSA BACTEREMIA: ICD-10-CM

## 2022-11-17 DIAGNOSIS — A49.8 PROTEUS MIRABILIS INFECTION: ICD-10-CM

## 2022-11-17 DIAGNOSIS — A49.8 KLEBSIELLA PNEUMONIAE INFECTION: ICD-10-CM

## 2022-11-17 DIAGNOSIS — L08.9 WOUND INFECTION: ICD-10-CM

## 2022-11-17 DIAGNOSIS — D72.10 EOSINOPHILIA, UNSPECIFIED TYPE: ICD-10-CM

## 2022-11-17 DIAGNOSIS — R78.81 MSSA BACTEREMIA: ICD-10-CM

## 2022-11-17 DIAGNOSIS — R79.82 ELEVATED C-REACTIVE PROTEIN (CRP): Primary | ICD-10-CM

## 2022-11-17 PROCEDURE — 99308 SBSQ NF CARE LOW MDM 20: CPT | Performed by: NURSE PRACTITIONER

## 2022-11-17 ASSESSMENT — ENCOUNTER SYMPTOMS
ABDOMINAL PAIN: 0
DIARRHEA: 0
NAUSEA: 0
VOMITING: 0

## 2022-11-17 NOTE — PROGRESS NOTES
[facility] -   Infectious diseases evaluation  admission date 10/10/2022    reason for consultation:   MSSA Bacteremia    Impression :   Current:  MSSA Bacteremia. 9/24/22. Elevated CRP  Eosinophilia  Leukocytosis  Recent Klebsiella UTI. Treated with 7-day course of Unasyn. Positive Blood cx 1/2-GPC in clusters. Possible contamination. COPD  Chronic lower back pain, with bilateral sciatica. Chronic left hip pain. Unstageable wound left hip with infection. .. Cx grew Proteus Mirabilis, Klebsiella Pneumoniae. Other:    Discussion / summary of stay / plan of care   Left trochanter wound with induration noted to the superior edge. Thick layer of tan slough, foul odor. WBC doubled from 6.1-12.4 in 1-week. CRP increased from 12.4-133.5. Patient continues to c/o left hip pain. Denies any lumbar, sacral pain. MRI imaging recommended to look for possible psoas abscess, hip abscess. Patient states she can not tolerate MRI. Will escalate antibiotics to Zosyn IV, until induration resolves. Will need at least 6-months suppression with Keflex po , imaging at a later date. CRP continues to escalate, 183.5 today:133 .5 Tuesday. Blood cultures 1/2 showing GPC in clusters, Wound cx showing GPC in pairs, GNR. Continues to c/o L hip pain. Concern for Psoas abscess, abscess to left hip. Patient has agreed to CT of the left hip with IV contrast.    Induration to wound has decreased since starting Zosyn. Odor to wound has decreased . Wound bed continues with 95% tan slough, 5% necrotic tissue. Blood cultures 1/2 showing GPC in clusters. Possible contamination. BC repeated today. Pull PICC place midline. Cefazolin stopped 11/1/22 switched to Zosyn. Zosyn switched to Meropenem 1 gm IV every 8 hours 11/7/22. Wound cx growing Proteus Mirabilis. Klebsiella Pneumoniae resistant to Zosyn.   Klebsiella likely ESBL per sensitivity pattern although , tested negative per lab. Urgent appointment made with Dr. Smith Orn Surgeon 2/2 CT results from L hip. Patient saw Dr. Julee Adamson in the office today. Options were given to aggressively surgically debride the left trochanter wound or continue antibiotics and wound care, return in 4-weeks. Patient would like to continue antibiotics and wound care. [facility] wound care onboard. Recommendations   Meropenem 1 gm IV IV every 8 hours until 11/21/22.(14 days treatment)  CBC with diff, CRP every Tuesday and Friday. Follow CBC, renal function, CRP. Follow-up with Dr. Demetrice Mendez in the office 1-week after discharge. Please pull PICC prior to discharge. Supportive care. Discussed with patient, RN. Infection Control Recommendations   [place] Precautions    Antimicrobial Stewardship Recommendations   Simplification of therapy  Targeted therapy    History of Present Illness:   Initial history:  Rebeka Patiño is a 71y.o.-year-old female who was found by a neighbor sitting on a chair unable to get out because of weakness, she had missed work, it seems she was really feeling weak for about 3 days before that. She was brought by 911 to the hospital.  Was found to be septic with MSSA in the blood UTI Klebsiella septic shock, patient needed intubation at the time. Extubated 9/28 and ultimately transferred out to the floor. Because of [facility] cardiology has been on the case, planning possibly cardioversion on Monday. A 2D echo was done showing no vegetation. Her CT of the spine was negative for fracture, her CT of the abdomen and pelvis on 9/24 her admission, was negative for pneumonia. She had a white count that was increased upon admission and ultimately improved, 9/30 her white count increased again, there was a suspicion of aspiration over the last 24 hours. She is also requiring increased oxygen. Repeat blood cultures from 9/29 are still pending and negative no repeat blood cultures were done before that. The patient has been on Ancef. Seems she has had a history of chronic back pain with sciatica. Patient was followed by Emmanuel Fitzgerald while at [facility]. [place]. Discharged to [facility] 10/6/2022 on a 4-week course of Ancef until 22. Patient received a 7-day course of Unasyn 9/30-10-, then switched back to Ancef to complete the course. Interval changes  2022   Feeling good. In good spirits. Denies any fever, chills,nausea, vomiting, diarrhea, dysuria. Left trochanter wound seen and examined. No odor. Tan slough. No induration, erythema to wound edges. LUE midline site clean. Discharging home 22. Summary of relevant labs:  Labs:  CRP: 69.3-47.4-24.2-16.2-133.5-183. 5-166.6-87.7-47.3-14.6  Cre: 0.56-0.41-0.46-0.53-0.47  WBC: 8.3-7.7-6.2-6.1-12.4-13.3-11.0-11.8-7.6-9.1-8.3  Eosinophils: 6.0-3.0-1.0-2.0-5.0-4.0    Micro:   Left hip-Proteus Mirabilis, Klebsiella Pneumoniae. R-Zosyn.  BC /-SCN. Contamination. 11/3 BC x 2-Negative. Imagin22 CT Left Hip    FINDINGS:   Bones: There is no evidence of acute fracture or dislocation. There is   severe erosive change and bony loss throughout the left femoral head and   acetabulum. There is proximal migration of the femur related to the bony   loss. There is associated sclerosis. A joint effusion versus synovitis is   noted. There are periarticular calcifications in the region of the joint   capsule. Soft Tissue: A soft tissue wound is noted about the posterolateral aspect of   the left hip and proximal thigh. There is multifocal soft tissue gas at this   location. A defined fluid collection is not identified. There is adjacent   subcutaneous stranding. The region containing the soft tissue gas measures   approximately 5.2 x 3.3 x 2.8 cm. Bandaging material overlies the lesion. Joint: Please see above for discussion of the left hip. Impression   1.  Posterolateral proximal thigh soft tissue wound with multifocal underlying   soft tissue gas, most consistent with gas-forming bacterial infection. An   abscess is not present. 2. Severe erosive arthropathy at the left hip. Septic arthritis should be   excluded. I have personally reviewed the past medical history, past surgical history, medications, social history, and family history, and I haveupdated the database accordingly. Allergies:   Lyrica [pregabalin], Demerol hcl [meperidine], Fluoxetine, and Paxil [paroxetine hcl]     Review of Systems:     Review of Systems   Gastrointestinal:  Negative for abdominal pain, diarrhea, nausea and vomiting. Musculoskeletal:  Negative for myalgias. Left hip pain. Skin:  Positive for wound. Wound to left trochanter. All other systems reviewed and are negative. Physical Examination :       Physical Exam  Vitals and nursing note reviewed. Constitutional:       Appearance: Normal appearance. She is obese. She is not ill-appearing. HENT:      Head: Normocephalic and atraumatic. Right Ear: External ear normal.      Left Ear: External ear normal.      Nose: Nose normal.      Mouth/Throat:      Mouth: Mucous membranes are moist.      Pharynx: Oropharynx is clear. Eyes:      Extraocular Movements: Extraocular movements intact. Conjunctiva/sclera: Conjunctivae normal.      Pupils: Pupils are equal, round, and reactive to light. Cardiovascular:      Rate and Rhythm: Normal rate and regular rhythm. Heart sounds: Normal heart sounds. No murmur heard. Pulmonary:      Effort: Pulmonary effort is normal.      Breath sounds: Normal breath sounds. No wheezing or rhonchi. Comments: RA  Abdominal:      General: Bowel sounds are normal. There is no distension. Palpations: Abdomen is soft. Tenderness: There is no abdominal tenderness. There is no right CVA tenderness or left CVA tenderness. Genitourinary:     Comments: No cobb.   Musculoskeletal: Cervical back: Normal range of motion. No rigidity. Right lower leg: No edema. Left lower leg: No edema. Skin:     General: Skin is warm and dry. Capillary Refill: Capillary refill takes less than 2 seconds. Findings: No erythema. Comments: Unstageable wound to left hip. Tan slough noted. Wound edges clean. No erythema, induration. Neurological:      Mental Status: She is alert and oriented to person, place, and time. Psychiatric:         Mood and Affect: Mood normal.         Behavior: Behavior normal.         Thought Content:  Thought content normal.         Judgment: Judgment normal.       Past Medical History:     Past Medical History:   Diagnosis Date    Chronic back pain     Chronic hip pain     COPD (chronic obstructive pulmonary disease) ([facility])     Major depression     Osteoarthritis     Osteoporosis        Past Surgical  History:     Past Surgical History:   Procedure Laterality Date    BREAST BIOPSY      two    CHOLECYSTECTOMY      HYSTERECTOMY, VAGINAL         Medications:       Social History:     Social History     Socioeconomic History    Marital status: Single     Spouse name: Not on file    Number of children: Not on file    Years of education: Not on file    Highest education level: Not on file   Occupational History    Not on file   Tobacco Use    Smoking status: Former     Packs/day: 0.75     Years: 34.00     Pack years: 25.50     Types: Cigarettes     Quit date: 3/20/2017     Years since quittin.6    Smokeless tobacco: Never   Vaping Use    Vaping Use: Never used   Substance and Sexual Activity    Alcohol use: Not Currently     Comment: occasionally     Drug use: Never    Sexual activity: Not on file   Other Topics Concern    Not on file   Social History Narrative    Not on file     Social Determinants of Health     Financial Resource Strain: Low Risk     Difficulty of Paying Living Expenses: Not hard at all   Food Insecurity: No Food Insecurity    Worried About Running Out of Food in the Last Year: Never true    Ran Out of Food in the Last Year: Never true   Transportation Needs: Not on file   Physical Activity: Not on file   Stress: Not on file   Social Connections: Not on file   Intimate Partner Violence: Not on file   Housing Stability: Not on file       Family History:     Family History   Problem Relation Age of Onset    Stroke Mother     Alzheimer's Disease Father       Medical Decision Making:   I have independently reviewed/ordered the following labs:    CBC with Differential:   Recent Labs     11/15/22  0624   WBC 8.3   HGB 10.0*   HCT 34.1*      LYMPHOPCT 24   MONOPCT 5     BMP:  Recent Labs     11/15/22  0624   *   K 4.5      CO2 24   BUN 12   CREATININE 0.36*         Hepatic Function Panel:   Recent Labs     11/15/22  0624   PROT 7.5   LABALBU 3.0*   BILITOT 0.2*   ALKPHOS 100   ALT 13   AST 23         No results for input(s): RPR in the last 72 hours. No results for input(s): HIV in the last 72 hours. No results for input(s): BC in the last 72 hours. Lab Results   Component Value Date/Time    CREATININE 0.36 11/15/2022 06:24 AM    GLUCOSE 92 11/15/2022 06:24 AM       Detailed results: Thank you for allowing us to participate in the care of this patient. Please call with questions. This note is created with the assistance of a speech recognition program.  While intending to generate adocument that actually reflects the content of the visit, the document can still have some errors including those of syntax and sound a like substitutions which may escape proof reading. It such instances, actual meaningcan be extrapolated by contextual diversion.     MITESH Al - CNP  Office: [phone]  Cell: [phone]

## 2022-11-18 ENCOUNTER — HOSPITAL ENCOUNTER (OUTPATIENT)
Age: 72
Setting detail: SPECIMEN
Discharge: HOME | End: 2022-11-18

## 2022-11-18 LAB
C-REACTIVE PROTEIN: 15.2 MG/L (ref 0–5)
HCT VFR BLD CALC: 33 % (ref 36.3–47.1)
HEMOGLOBIN: 10 G/DL (ref 11.9–15.1)
MCH RBC QN AUTO: 25.8 PG (ref 25.2–33.5)
MCHC RBC AUTO-ENTMCNC: 30.3 G/DL (ref 28.4–34.8)
MCV RBC AUTO: 85.3 FL (ref 82.6–102.9)
NRBC AUTOMATED: 0 PER 100 WBC
PDW BLD-RTO: 15.7 % (ref 11.8–14.4)
PLATELET # BLD: 402 K/UL (ref 138–453)
PMV BLD AUTO: 10.7 FL (ref 8.1–13.5)
RBC # BLD: 3.87 M/UL (ref 3.95–5.11)
WBC # BLD: 8.7 K/UL (ref 3.5–11.3)

## 2022-11-18 PROCEDURE — P9603 ONE-WAY ALLOW PRORATED MILES: HCPCS

## 2022-11-18 PROCEDURE — 86140 C-REACTIVE PROTEIN: CPT

## 2022-11-18 PROCEDURE — 36415 COLL VENOUS BLD VENIPUNCTURE: CPT

## 2022-11-18 PROCEDURE — 85027 COMPLETE CBC AUTOMATED: CPT

## 2022-11-22 ENCOUNTER — TELEPHONE (OUTPATIENT)
Dept: FAMILY MEDICINE CLINIC | Age: 72
End: 2022-11-22

## 2022-11-22 ENCOUNTER — TELEPHONE (OUTPATIENT)
Dept: INFECTIOUS DISEASES | Age: 72
End: 2022-11-22

## 2022-11-22 NOTE — TELEPHONE ENCOUNTER
FYI -Pt calling office to schedule her f/u appt - pt D/C from [address] and [facility] - due to [place] and your schedule - your fist avail is 1/30/23 -

## 2022-11-22 NOTE — TELEPHONE ENCOUNTER
Received call from [address] [place] [address] with [facility]. She contacted office to inform they are starting services with patient. She stated patient was very tearful during the visit today.

## 2022-11-23 ENCOUNTER — TELEPHONE (OUTPATIENT)
Dept: FAMILY MEDICINE CLINIC | Age: 72
End: 2022-11-23

## 2022-11-23 NOTE — TELEPHONE ENCOUNTER
You already have patients added to the end of the day on the 28th & 30th- can you come in to see her on an off day?

## 2022-11-25 RX ORDER — ONDANSETRON 4 MG/1
4 TABLET, ORALLY DISINTEGRATING ORAL EVERY 8 HOURS PRN
Qty: 20 TABLET | Refills: 0 | Status: SHIPPED | OUTPATIENT
Start: 2022-11-25

## 2022-11-30 ENCOUNTER — TELEPHONE (OUTPATIENT)
Dept: FAMILY MEDICINE CLINIC | Age: 72
End: 2022-11-30

## 2022-11-30 NOTE — TELEPHONE ENCOUNTER
Called pt, offered 11/30 PM - She relies on transportation and she has PT/OT - she declined this appt - she will f/u with you 12/5/22

## 2022-11-30 NOTE — TELEPHONE ENCOUNTER
[address] home care called asking if the number of visits can be increased to 2 times weekly for wound care, pt is having a hard time having somebody help with changing the dressing, as of now it is one time a week     Rubi Marc [phone]
Kenisha Adame informed.
Yes- ok to increase to twice weekly
I’m going to ask you a few questions that I ask all my patients.

## 2022-12-01 ENCOUNTER — OFFICE VISIT (OUTPATIENT)
Dept: FAMILY MEDICINE CLINIC | Age: 72
End: 2022-12-01

## 2022-12-01 ENCOUNTER — HOSPITAL ENCOUNTER (OUTPATIENT)
Age: 72
Setting detail: SPECIMEN
Discharge: HOME | End: 2022-12-01

## 2022-12-01 VITALS
TEMPERATURE: 97.4 F | DIASTOLIC BLOOD PRESSURE: 62 MMHG | SYSTOLIC BLOOD PRESSURE: 102 MMHG | HEART RATE: 74 BPM | OXYGEN SATURATION: 100 %

## 2022-12-01 DIAGNOSIS — R73.03 PREDIABETES: ICD-10-CM

## 2022-12-01 DIAGNOSIS — E87.8 ELECTROLYTE ABNORMALITY: ICD-10-CM

## 2022-12-01 DIAGNOSIS — Z13.220 SCREENING, LIPID: ICD-10-CM

## 2022-12-01 DIAGNOSIS — D64.9 ANEMIA, UNSPECIFIED TYPE: ICD-10-CM

## 2022-12-01 DIAGNOSIS — L97.129: ICD-10-CM

## 2022-12-01 DIAGNOSIS — G89.29 CHRONIC BILATERAL LOW BACK PAIN WITH BILATERAL SCIATICA: ICD-10-CM

## 2022-12-01 DIAGNOSIS — Z09 HOSPITAL DISCHARGE FOLLOW-UP: ICD-10-CM

## 2022-12-01 DIAGNOSIS — I48.0 PAROXYSMAL ATRIAL FIBRILLATION (HCC): ICD-10-CM

## 2022-12-01 DIAGNOSIS — M81.0 OSTEOPOROSIS WITHOUT CURRENT PATHOLOGICAL FRACTURE, UNSPECIFIED OSTEOPOROSIS TYPE: ICD-10-CM

## 2022-12-01 DIAGNOSIS — A41.01 MSSA (METHICILLIN SUSCEPTIBLE STAPHYLOCOCCUS AUREUS) SEPTICEMIA (HCC): Primary | ICD-10-CM

## 2022-12-01 DIAGNOSIS — J30.2 SEASONAL ALLERGIC RHINITIS, UNSPECIFIED TRIGGER: ICD-10-CM

## 2022-12-01 DIAGNOSIS — M54.42 CHRONIC BILATERAL LOW BACK PAIN WITH BILATERAL SCIATICA: ICD-10-CM

## 2022-12-01 DIAGNOSIS — F43.21 SITUATIONAL DEPRESSION: ICD-10-CM

## 2022-12-01 DIAGNOSIS — R11.0 NAUSEA: ICD-10-CM

## 2022-12-01 DIAGNOSIS — M15.9 PRIMARY OSTEOARTHRITIS INVOLVING MULTIPLE JOINTS: ICD-10-CM

## 2022-12-01 DIAGNOSIS — E66.01 CLASS 2 SEVERE OBESITY DUE TO EXCESS CALORIES WITH SERIOUS COMORBIDITY AND BODY MASS INDEX (BMI) OF 39.0 TO 39.9 IN ADULT (HCC): ICD-10-CM

## 2022-12-01 DIAGNOSIS — M54.41 CHRONIC BILATERAL LOW BACK PAIN WITH BILATERAL SCIATICA: ICD-10-CM

## 2022-12-01 DIAGNOSIS — J44.9 CHRONIC OBSTRUCTIVE PULMONARY DISEASE, UNSPECIFIED COPD TYPE (HCC): ICD-10-CM

## 2022-12-01 PROBLEM — G93.40 ACUTE ENCEPHALOPATHY: Status: RESOLVED | Noted: 2022-09-30 | Resolved: 2022-12-01

## 2022-12-01 PROBLEM — J96.01 ACUTE RESPIRATORY FAILURE WITH HYPOXIA (HCC): Status: RESOLVED | Noted: 2022-09-30 | Resolved: 2022-12-01

## 2022-12-01 PROBLEM — E87.6 HYPOKALEMIA: Status: RESOLVED | Noted: 2022-09-30 | Resolved: 2022-12-01

## 2022-12-01 PROBLEM — J69.0 ASPIRATION PNEUMONIA (HCC): Status: RESOLVED | Noted: 2022-09-30 | Resolved: 2022-12-01

## 2022-12-01 PROBLEM — E87.0 HYPERNATREMIA: Status: RESOLVED | Noted: 2022-10-01 | Resolved: 2022-12-01

## 2022-12-01 LAB
ALBUMIN SERPL-MCNC: 3.5 G/DL (ref 3.5–5.2)
ALBUMIN/GLOBULIN RATIO: 0.8 (ref 1–2.5)
ALP BLD-CCNC: 93 U/L (ref 35–104)
ALT SERPL-CCNC: 12 U/L (ref 5–33)
ANION GAP SERPL CALCULATED.3IONS-SCNC: 15 MMOL/L (ref 9–17)
AST SERPL-CCNC: 19 U/L
BILIRUB SERPL-MCNC: 0.4 MG/DL (ref 0.3–1.2)
BUN BLDV-MCNC: 15 MG/DL (ref 8–23)
CALCIUM SERPL-MCNC: 9.1 MG/DL (ref 8.6–10.4)
CHLORIDE BLD-SCNC: 101 MMOL/L (ref 98–107)
CHOLESTEROL, FASTING: 119 MG/DL
CHOLESTEROL/HDL RATIO: 3.3
CO2: 22 MMOL/L (ref 20–31)
CREAT SERPL-MCNC: 0.76 MG/DL (ref 0.5–0.9)
ESTIMATED AVERAGE GLUCOSE: 108 MG/DL
FERRITIN: 156 NG/ML (ref 13–150)
FOLATE: 11.6 NG/ML
GFR SERPL CREATININE-BSD FRML MDRD: >60 ML/MIN/1.73M2
GLUCOSE BLD-MCNC: 60 MG/DL (ref 70–99)
HBA1C MFR BLD: 5.4 % (ref 4–6)
HDLC SERPL-MCNC: 36 MG/DL
IRON SATURATION: 10 % (ref 20–55)
IRON: 27 UG/DL (ref 37–145)
LDL CHOLESTEROL: 66 MG/DL (ref 0–130)
POTASSIUM SERPL-SCNC: 3.9 MMOL/L (ref 3.7–5.3)
SODIUM BLD-SCNC: 138 MMOL/L (ref 135–144)
TOTAL IRON BINDING CAPACITY: 271 UG/DL (ref 250–450)
TOTAL PROTEIN: 7.7 G/DL (ref 6.4–8.3)
TRIGLYCERIDE, FASTING: 85 MG/DL
UNSATURATED IRON BINDING CAPACITY: 244 UG/DL (ref 112–347)
VITAMIN B-12: 327 PG/ML (ref 232–1245)

## 2022-12-01 RX ORDER — DESVENLAFAXINE 100 MG/1
100 TABLET, EXTENDED RELEASE ORAL DAILY
Qty: 90 TABLET | Refills: 1 | Status: SHIPPED | OUTPATIENT
Start: 2022-12-01

## 2022-12-01 RX ORDER — ALBUTEROL SULFATE 90 UG/1
2 AEROSOL, METERED RESPIRATORY (INHALATION) EVERY 6 HOURS PRN
Qty: 3 EACH | Refills: 1 | Status: SHIPPED | OUTPATIENT
Start: 2022-12-01

## 2022-12-01 RX ORDER — FOLIC ACID 1 MG/1
1 TABLET ORAL DAILY
Qty: 30 TABLET | Refills: 3 | Status: SHIPPED | OUTPATIENT
Start: 2022-12-01

## 2022-12-01 RX ORDER — MONTELUKAST SODIUM 10 MG/1
10 TABLET ORAL NIGHTLY
Qty: 90 TABLET | Refills: 1 | Status: SHIPPED | OUTPATIENT
Start: 2022-12-01

## 2022-12-01 RX ORDER — TIOTROPIUM BROMIDE 18 UG/1
18 CAPSULE ORAL; RESPIRATORY (INHALATION) DAILY
Qty: 90 CAPSULE | Refills: 1 | Status: SHIPPED | OUTPATIENT
Start: 2022-12-01

## 2022-12-01 RX ORDER — BUDESONIDE AND FORMOTEROL FUMARATE DIHYDRATE 160; 4.5 UG/1; UG/1
2 AEROSOL RESPIRATORY (INHALATION) 2 TIMES DAILY
Qty: 10.2 G | Refills: 5 | Status: SHIPPED | OUTPATIENT
Start: 2022-12-01

## 2022-12-01 RX ORDER — POTASSIUM CHLORIDE 20 MEQ/1
TABLET, EXTENDED RELEASE ORAL
COMMUNITY
Start: 2022-11-26

## 2022-12-01 RX ORDER — TIZANIDINE 4 MG/1
4 TABLET ORAL EVERY 8 HOURS PRN
Qty: 270 TABLET | Refills: 1 | Status: SHIPPED | OUTPATIENT
Start: 2022-12-01

## 2022-12-01 RX ORDER — PROMETHAZINE HYDROCHLORIDE 25 MG/1
25 TABLET ORAL EVERY 6 HOURS PRN
Qty: 60 TABLET | Refills: 1 | Status: SHIPPED | OUTPATIENT
Start: 2022-12-01

## 2022-12-01 RX ORDER — LANOLIN ALCOHOL/MO/W.PET/CERES
100 CREAM (GRAM) TOPICAL DAILY
Qty: 30 TABLET | Refills: 3 | Status: SHIPPED | OUTPATIENT
Start: 2022-12-01

## 2022-12-01 NOTE — PROGRESS NOTES
Post-Discharge Transitional Care Follow Up      Grant Givens   YOB: 1950    Date of Office Visit:  12/1/2022  Date of Hospital Admission: 9/24/22  Date of Hospital Discharge: 10/6/22  Discharged to SNF, came home 11/21/22  Readmission Risk Score (high >=14%. Medium >=10%):Readmission Risk Score: 15.5      Care management risk score Rising risk (score 2-5) and Complex Care (Scores >=6): No Risk Score On File     Non face to face  following discharge, date last encounter closed (first attempt may have been earlier): *No documented post hospital discharge outreach found in the last 14 days     Call initiated 2 business days of discharge: *No response recorded in the last 14 days     MSSA (methicillin susceptible Staphylococcus aureus) septicemia (HCC)  Situational depression  -     desvenlafaxine succinate (PRISTIQ) 100 MG TB24 extended release tablet; Take 1 tablet by mouth daily, Disp-90 tablet, R-1Normal  Chronic bilateral low back pain with bilateral sciatica  Class 2 severe obesity due to excess calories with serious comorbidity and body mass index (BMI) of 39.0 to 39.9 in adult [facility])  Chronic obstructive pulmonary disease, unspecified COPD type ([facility]  -     SYMBICORT 160-4.5 MCG/ACT AERO; Inhale 2 puffs into the lungs 2 times daily, Disp-10.2 g, R-5, DAWNormal  -     tiotropium (SPIRIVA HANDIHALER) 18 MCG inhalation capsule; Inhale 1 capsule into the lungs daily, Disp-90 capsule, R-1Normal  -     albuterol sulfate HFA (PROVENTIL;VENTOLIN;PROAIR) 108 (90 Base) MCG/ACT inhaler; Inhale 2 puffs into the lungs every 6 hours as needed for Wheezing, Disp-3 each, R-1Normal  Paroxysmal atrial fibrillation (HCC)  -     metoprolol tartrate (LOPRESSOR) 25 MG tablet; Take 1 tablet by mouth 2 times daily, Disp-60 tablet, R-3Normal  -     apixaban (ELIQUIS) 5 MG TABS tablet;  Take 1 tablet by mouth 2 times daily, Disp-60 tablet, R-5Normal  Osteoporosis without current pathological fracture, unspecified osteoporosis type  Chronic ulcer of left thigh, unspecified ulcer stage (HCC)  Seasonal allergic rhinitis, unspecified trigger  -     montelukast (SINGULAIR) 10 MG tablet; Take 1 tablet by mouth nightly, Disp-90 tablet, R-1Normal  Nausea  -     promethazine (PHENERGAN) 25 MG tablet; Take 1 tablet by mouth every 6 hours as needed (as needed for nausea), Disp-60 tablet, R-1Normal  Primary osteoarthritis involving multiple joints  -     tiZANidine (ZANAFLEX) 4 MG tablet; Take 1 tablet by mouth every 8 hours as needed (muscle spasm), Disp-270 tablet, R-1Normal    Medical Decision Making: high complexity  Return in 3 months (on 3/1/2023). On this date 12/1/2022 I have spent 20 minutes reviewing previous notes, test results and face to face with the patient discussing the diagnosis and importance of compliance with the treatment plan as well as documenting on the day of the visit. Subjective:   Per d/c summary, Howard Heredia is a 80 y.o. female who was admitted for the management of Septicemia [facility]), presented to the emergency department with weakness and unable to ambulate. past medical history of chronic hip pain, chronic back pain, prediabetes, OA, osteoporosis. On presentation to the ED patient was tachypneic, failed BiPAP and was intubated, blood cultures grew MSSA and urine culture was positive for Kleibsiella. She was extubated on 9/28/2022 and was transferred from the ICU to the floor on 9/29/2022. She was started on Ancef for MSSA septicemia and Kleibsiella UTI, she was switched to Unasyn on 9/30 as there was also concern for aspiration pneumonia     She also developed new onset A fib with RVR and she was started on beta-blocker and anticoagulation as per Cardiology, TTE was WNL. Patient was scheduled for DANE to look for possible vegetation to explain MSSA bacteremia from home, patient refused as she was unable to lie down likely due to back pain.  It was decided to treat patient with Ancef until 11/9, PICC line was placed     She remained medically stable, all her medications were optimized, she was discharged once medically cleared as per instructions given below. \"      Inpatient course: Discharge summary reviewed- see chart. Interval history/Current status: has been out of Pristiq and Eliquis for a week   Has been on paxil, prozac, zoloft in the past - none of them have worked for her   Struggling with ADLs - getting dressed, got dressed for the first time today by herself. Has been living on the first floor of the house, moving slower. Renting a hospital bed, has a bedside commode and seat that goes over her toilet   Has ID appointment on 12/5, pain mgmt tomorrow at 1PM at [facility], cardiology at end of January.    L thigh wound care through home health     Patient Active Problem List   Diagnosis    Chronic left hip pain    Chronic bilateral low back pain with bilateral sciatica    Prediabetes    Primary osteoarthritis involving multiple joints    Osteoporosis without current pathological fracture    Septicemia ([address]    MSSA bacteremia    UTI due to Klebsiella species    COPD (chronic obstructive pulmonary disease) ([facility])    Class 2 obesity due to excess calories with body mass index (BMI) of 39.0 to 39.9 in adult    Acute respiratory failure with hypoxia ([facility])    Respiratory alkalosis    Hypokalemia    MSSA (methicillin susceptible Staphylococcus aureus) septicemia ([facility])    SIRS (systemic inflammatory response syndrome) ([facility])    Bandemia    Acute encephalopathy    Aspiration pneumonia ([address]    Spinal stenosis of lumbar region with neurogenic claudication    Bilateral leg weakness    Paroxysmal atrial fibrillation ([facility])    Hypernatremia    Folic acid deficiency    Leukocytosis    Pneumonia of left lower lobe due to infectious organism    Prolonged Q-T interval on ECG       Medications listed as ordered at the time of discharge from hospital     Medication List            Accurate as of December 1, 2022  8:47 AM. If you have any questions, ask your nurse or doctor.                 CONTINUE taking these medications      albuterol sulfate  (90 Base) MCG/ACT inhaler  Commonly known as: PROVENTIL;VENTOLIN;PROAIR  USE 2 INHALATIONS EVERY 4 HOURS AS NEEDED FOR WHEEZING OR SHORTNESS OF BREATH     alendronate 70 MG tablet  Commonly known as: FOSAMAX  Take 1 tablet by mouth once a week     apixaban 5 MG Tabs tablet  Commonly known as: Eliquis  Take 1 tablet by mouth 2 times daily     desvenlafaxine succinate 100 MG Tb24 extended release tablet  Commonly known as: PRISTIQ  TAKE 1 TABLET BY MOUTH EVERY DAY     diclofenac 50 MG EC tablet  Commonly known as: VOLTAREN  Take 1 tablet by mouth 3 times daily (with meals)     folic acid 1 MG tablet  Commonly known as: FOLVITE  Take 1 tablet by mouth daily     Handicap Placard Misc  by Does not apply route Expires 11/2025     metoprolol tartrate 25 MG tablet  Commonly known as: LOPRESSOR  Take 1 tablet by mouth 2 times daily     montelukast 10 MG tablet  Commonly known as: SINGULAIR  TAKE 1 TABLET DAILY     morphine 15 MG extended release tablet  Commonly known as: MS CONTIN     ondansetron 4 MG disintegrating tablet  Commonly known as: Zofran ODT  Take 1 tablet by mouth every 8 hours as needed for Nausea or Vomiting     potassium chloride 20 MEQ extended release tablet  Commonly known as: KLOR-CON M     promethazine 25 MG tablet  Commonly known as: PHENERGAN  Take 1 tablet by mouth every 6 hours as needed (as needed for nausea)     Symbicort 160-4.5 MCG/ACT Aero  Generic drug: budesonide-formoterol     thiamine 100 MG tablet  Take 1 tablet by mouth daily     tiZANidine 4 MG tablet  Commonly known as: ZANAFLEX  TAKE 1 TABLET THREE TIMES A DAY     Trelegy Ellipta 200-62.5-25 MCG/ACT Aepb inhaler  Generic drug: fluticasone-umeclidin-vilant  Inhale 1 puff into the lungs daily     vitamin D 1.25 MG (28562 UT) Caps capsule  Commonly known as: ERGOCALCIFEROL  Take 1 capsule by mouth once a week               Medications marked \"taking\" at this time  Outpatient Medications Marked as Taking for the 12/1/22 encounter (Office Visit) with Luisa Sultana MD   Medication Sig Dispense Refill    ondansetron (ZOFRAN ODT) 4 MG disintegrating tablet Take 1 tablet by mouth every 8 hours as needed for Nausea or Vomiting 20 tablet 0    folic acid (FOLVITE) 1 MG tablet Take 1 tablet by mouth daily 30 tablet 3    metoprolol tartrate (LOPRESSOR) 25 MG tablet Take 1 tablet by mouth 2 times daily 60 tablet 3    thiamine 100 MG tablet Take 1 tablet by mouth daily 30 tablet 3    apixaban (ELIQUIS) 5 MG TABS tablet Take 1 tablet by mouth 2 times daily 60 tablet 5    SYMBICORT 160-4.5 MCG/ACT AERO       promethazine (PHENERGAN) 25 MG tablet Take 1 tablet by mouth every 6 hours as needed (as needed for nausea) 60 tablet 1    Fluticasone-Umeclidin-Vilant (TRELEGY ELLIPTA) 200-62.5-25 MCG/INH AEPB Inhale 1 puff into the lungs daily 3 each 1    vitamin D (ERGOCALCIFEROL) 1.25 MG (26950 UT) CAPS capsule Take 1 capsule by mouth once a week 12 capsule 1    alendronate (FOSAMAX) 70 MG tablet Take 1 tablet by mouth once a week 12 tablet 1    Handicap Placard MISC by Does not apply route Expires 11/2025 1 each 0    diclofenac (VOLTAREN) 50 MG EC tablet Take 1 tablet by mouth 3 times daily (with meals) 60 tablet 3    albuterol sulfate  (90 Base) MCG/ACT inhaler USE 2 INHALATIONS EVERY 4 HOURS AS NEEDED FOR WHEEZING OR SHORTNESS OF BREATH 3 each 1    tiZANidine (ZANAFLEX) 4 MG tablet TAKE 1 TABLET THREE TIMES A  tablet 1    montelukast (SINGULAIR) 10 MG tablet TAKE 1 TABLET DAILY 90 tablet 1    morphine (MS CONTIN) 15 MG extended release tablet 3 times daily. Medications patient taking as of now reconciled against medications ordered at time of hospital discharge: Yes    Review of Systems   Constitutional:  Positive for appetite change and fatigue.  Negative for fever and unexpected weight change. Respiratory:  Negative for cough, choking, chest tightness, shortness of breath and wheezing. Cardiovascular:  Negative for chest pain, palpitations and leg swelling. Gastrointestinal:  Negative for abdominal pain, anal bleeding, blood in stool, constipation, diarrhea, nausea and vomiting. Endocrine: Negative. Musculoskeletal:  Positive for arthralgias and gait problem. Negative for joint swelling and myalgias. Skin:  Positive for wound. Neurological:  Negative for dizziness. Psychiatric/Behavioral:  Negative for sleep disturbance. All other systems reviewed and are negative. Objective:    /62 (Site: Left Upper Arm, Position: Sitting, Cuff Size: Large Adult)   Pulse 74   Temp 97.4 °F (36.3 °C) (Temporal)   LMP  (LMP Unknown)   SpO2 100%   Physical Exam  Vitals and nursing note (seated in wheelchair, difficulty getting up to stand, has to hold on to counter to stand) reviewed. Constitutional:       General: She is not in acute distress. Appearance: She is well-developed. She is not ill-appearing. Eyes:      General: Lids are normal. Vision grossly intact. Cardiovascular:      Rate and Rhythm: Normal rate and regular rhythm. Heart sounds: Normal heart sounds, S1 normal and S2 normal. No murmur heard. No friction rub. No gallop. Pulmonary:      Effort: Pulmonary effort is normal. No respiratory distress. Breath sounds: Normal breath sounds. No wheezing. Abdominal:      General: Bowel sounds are normal.      Palpations: Abdomen is soft. There is no mass. Tenderness: There is no abdominal tenderness. There is no guarding. Musculoskeletal:         General: Normal range of motion. Skin:     General: Skin is warm and dry. Capillary Refill: Capillary refill takes less than 2 seconds. Comments: Wound noted L upper thigh distal to L buttock   Neurological:      General: No focal deficit present.       Mental Status: She is alert and oriented to person, place, and time. An electronic signature was used to authenticate this note.   --Helena Pritchett MD

## 2022-12-01 NOTE — PROGRESS NOTES
Patient is here for a 6 month follow up  She has been in [facility] and [facility] for rehab  She is finally home  She is currently not taking anything for depression. The Pristiq is not covered by insurance. She states notices the difference with out it.

## 2022-12-02 NOTE — DISCHARGE INSTRUCTIONS
[address] [facility],[address] -Phone: [phone] Fax: [phone]   Visit  Discharge Instructions / Physician Orders    DATE: 12/8/2022     Home Care: [address] ORDERED THRU: [employer]      Wound Location: Left Posterior Thigh     Cleanse with: Liquid antibacterial soap and water, rinse well      Dressing Orders: Curly Becerra and Polo Vac: Skin prep and drape around wound, black foam into wound, continous suction at 120mmHg                Until wound vac arrives: Silver alginate into wound, dry dressing-change daily     Frequency: Change on Tuesday, Thursday, Saturday ([facility] will do on appointment days) (Bring new sponge and canister to appointments at wound care center)              Additional Orders: Increase protein to diet (meat, cheese, eggs, fish, peanut butter, nuts and beans)  Multivitamin daily    Your next appointment with [facility] is in 1 week     (Please note your next appointment above and if you are unable to keep, kindly give a 24 hour notice. Thank you.)  If more than 15 min late we cannot guarantee you will be seen due to clinician schedule  Per Policy, Excessive cancellation will call for dismissal from program.     If you experience any of the following, please call the [facility] during business hours:  [phone]  Your Phone call may be forwarded to [employer] during business hours that [facility] is closed. * Increase in Pain  * Temperature over 101  * Increase in drainage from your wound  * Drainage with a foul odor  * Bleeding  * Increase in swelling  * Need for compression bandage changes due to slippage, breakthrough drainage. If you need medical attention outside of the business hours of the [facility] please contact your PCP or go to the nearest emergency room.      The information contained in the After Visit Summary has been reviewed with me, the patient and/or responsible adult, by my health care provider(s). I had the opportunity to ask questions regarding this information.  I have elected to receive;      []After Visit Summary  [x]Comprehensive Discharge Instruction      Patient signature______________________________________Date:________  Electronically signed by Maryann Leiva RN on 12/8/2022 at 2:18 PM  Electronically signed by MITESH Carr CNP on 12/8/2022 at 1:52 PM

## 2022-12-05 ENCOUNTER — OFFICE VISIT (OUTPATIENT)
Dept: INFECTIOUS DISEASES | Age: 72
End: 2022-12-05
Payer: COMMERCIAL

## 2022-12-05 VITALS
SYSTOLIC BLOOD PRESSURE: 97 MMHG | HEART RATE: 97 BPM | WEIGHT: 210 LBS | DIASTOLIC BLOOD PRESSURE: 57 MMHG | HEIGHT: 65 IN | BODY MASS INDEX: 34.99 KG/M2

## 2022-12-05 DIAGNOSIS — D50.9 IRON DEFICIENCY ANEMIA, UNSPECIFIED IRON DEFICIENCY ANEMIA TYPE: Primary | ICD-10-CM

## 2022-12-05 DIAGNOSIS — L02.416 ABSCESS OF LEFT THIGH: ICD-10-CM

## 2022-12-05 DIAGNOSIS — M00.051 STAPHYLOCOCCAL ARTHRITIS OF RIGHT HIP (HCC): Primary | ICD-10-CM

## 2022-12-05 PROCEDURE — 99214 OFFICE O/P EST MOD 30 MIN: CPT | Performed by: INTERNAL MEDICINE

## 2022-12-05 PROCEDURE — G8399 PT W/DXA RESULTS DOCUMENT: HCPCS | Performed by: INTERNAL MEDICINE

## 2022-12-05 PROCEDURE — G8427 DOCREV CUR MEDS BY ELIG CLIN: HCPCS | Performed by: INTERNAL MEDICINE

## 2022-12-05 PROCEDURE — 3017F COLORECTAL CA SCREEN DOC REV: CPT | Performed by: INTERNAL MEDICINE

## 2022-12-05 PROCEDURE — 1036F TOBACCO NON-USER: CPT | Performed by: INTERNAL MEDICINE

## 2022-12-05 PROCEDURE — G8417 CALC BMI ABV UP PARAM F/U: HCPCS | Performed by: INTERNAL MEDICINE

## 2022-12-05 PROCEDURE — 1123F ACP DISCUSS/DSCN MKR DOCD: CPT | Performed by: INTERNAL MEDICINE

## 2022-12-05 PROCEDURE — 1090F PRES/ABSN URINE INCON ASSESS: CPT | Performed by: INTERNAL MEDICINE

## 2022-12-05 PROCEDURE — G8484 FLU IMMUNIZE NO ADMIN: HCPCS | Performed by: INTERNAL MEDICINE

## 2022-12-05 RX ORDER — CEPHALEXIN 500 MG/1
500 CAPSULE ORAL 4 TIMES DAILY
Qty: 120 CAPSULE | Refills: 2 | Status: SHIPPED | OUTPATIENT
Start: 2022-12-05 | End: 2023-01-04

## 2022-12-05 RX ORDER — FERROUS SULFATE 325(65) MG
325 TABLET ORAL
Qty: 90 TABLET | Refills: 1 | Status: SHIPPED | OUTPATIENT
Start: 2022-12-05

## 2022-12-05 ASSESSMENT — ENCOUNTER SYMPTOMS
COLOR CHANGE: 0
CONSTIPATION: 0
VOMITING: 0
NAUSEA: 0
COUGH: 0
CHOKING: 0
ABDOMINAL DISTENTION: 0
APNEA: 0
CHEST TIGHTNESS: 0
DIARRHEA: 0
BACK PAIN: 1
ABDOMINAL PAIN: 0
BLOOD IN STOOL: 0
SHORTNESS OF BREATH: 0
EYE DISCHARGE: 0
WHEEZING: 0
ANAL BLEEDING: 0

## 2022-12-05 ASSESSMENT — VISUAL ACUITY: OU: 1

## 2022-12-05 NOTE — PROGRESS NOTES
[facility] - Initial Consult Note  Today's Date: 12/5/2022    Impression :   Yee Patel [age] visit from October 2022 followed by discharge to [facility]  MSSA septicemia 9/24/22 with a left severe hip pain  Kleb UTI 9/24 - treated  Altered mentation, acute metabolic encephalopathy resolved  septic shock resolved   SIRS from infection  has low back pain for the sciatica that are old, 3to 11years old, since 2017-they have not changed and hence most likely not infected  Chronic left hip pain - Chronic lower back pain with bilateral sciatica  10/2022 orchard susi -left trochanter pain and developed a left posterior thigh laceration located on became large and infected, and then on CT 11/4, showed a large abscess  11/4/22 CT hip - infected left hip that was most likely present in September while the patient had the MSSA septicemia.  - -and new Left post thigh laceration that develppoed an abscess / left trochanteric abscess   12/5/22 abscess resolved, and still has a deep wound that is addressed at the wound care - still deep and w some slough   keep wound care  12/5/22-Dr. Suárez Reap planning to operate on the left infected hip only after the left posterior thigh wound completely heals to avoid cross infections  AB unasyn then amcef then zosyn them meropenem till 11/21/22 stopped and left NH - crp was 14, WBc NL  12/5/2022, left very painful -start the patient on a long chronic suppressive therapy with keflex 500 mg po 3  x per day and until the left hip surgery occurred  FU CRP and CBC every 4 weeks  12/5 lower back pain and sciatica are stable still and hence likely not infected area      Prediabetes  COPD  Obstructive sleep apnea  A. fib paroxysmal  9/29 increasing oxygen requirement and increasing leukocytosis-possible aspiration event - treated      Recommendations   Plan:  I suspect at this point we have a infected left hip, that has been long treated, persistent pain, but I cannot rule out 100% of the infection has been eradicated from it since it has not been yet addressed surgically. For this reason I would rather give her Keflex 3 times a day, ongoing for at least a month, and onto the surgery to the left hip of course, to make sure by then it is fully cleaned  I am assuming that the left hip was infected by the same MSSA that grew in her blood in September 2022-at that time her pain was too much to be able to tolerate any imaging procedure and hence nothing was done to that hip. Actually we retrospectively treated her with antibiotic until she could tolerate the procedure and the CTwas hence done in November 2022    We will get also a CRP monthly, CBC monthly, I will see her in about 4 weeks     Diagnosis Orders   1. Staphylococcal arthritis of right hip (HCC)  C-Reactive Protein    CBC with Auto Differential    cephALEXin (KEFLEX) 500 MG capsule      2. Abscess of left thigh  C-Reactive Protein    CBC with Auto Differential    cephALEXin (KEFLEX) 500 MG capsule          Return in about 4 weeks (around 1/2/2023).       History of Present Illness:   Carolyn Smith is a 71y.o.-year-old  female who presents with   Chief Complaint   Patient presents with    Frequent Infections     Post STV   Post [facility] visit from October 2022 followed by discharge to [facility]  MSSA septicemia 9/24/22  Kleb UTI 9/24  Left lower lobe infiltrate likely atelectasis   Bandemia  Altered mentation, acute metabolic encephalopathy   septic shock resolved   SIRS from infection  Chronic left hip pain  Chronic lower back pain with bilateral sciatica  Prediabetes  COPD  Obstructive sleep apnea  A. fib paroxysmal  9/29 increasing oxygen requirement and increasing leukocytosis-possible aspiration event    Kleb UTI from 9/24 - treated w ancef   On Ancef for MSSA septicemia coming from home, and kleb UTI   There is a concern of aspiration and the patient has increased WBC and increased oxygen requirement  Switch to unasyn  9/30  till 10/7 then back to ancef till 10/24  CXR shows possibly new infiltrates bilat LL  suggesting aspiration pneumonia   DANE to look for possible vegetation to explain the MSSA bacteremia from home - could not tolerate laying down  could not tolerate bone scan or MR Is due to back pain-  Looking at her spine or hips evaluating for any infection hence and due to her altered mentation     Plan:  Patient to be treated with abx for 6 weeks to allow pain to resolve before MRI    [address] 11/17/22  Left trochanter wound with induration noted to the superior edge. tan slough, foul odor. WBC  6.1-12.4 in 1-week. CRP increased from 12.4-133.5. Patient continues to c/o left hip pain. Denies any lumbar, sacral pain. MRI imaging recommended to look for possible psoas abscess, hip abscess. Patient states she can not tolerate MRI. Will escalate antibiotics to Zosyn IV, until induration resolves. Will need at least 6-months suppression with Keflex po , imaging at a later date. CRP continues to escalate, 183.5 today:133 .5 Tuesday. Blood cultures 1/2 showing GPC in clusters, Wound cx showing GPC in pairs, GNR. Continues to c/o L hip pain. Concern for Psoas abscess, abscess to left hip. Patient has agreed to CT of the left hip with IV contrast.    Induration to wound has decreased since starting Zosyn. Odor to wound has decreased . Wound bed continues with 95% tan slough, 5% necrotic tissue. Blood cultures 1/2 showing GPC in clusters. Possible contamination. BC repeated today. Pull PICC place midline. Cefazolin stopped 11/1/22 switched to Zosyn. Zosyn switched to Meropenem 1 gm IV every 8 hours 11/7/22. Wound cx growing Proteus Mirabilis. Klebsiella Pneumoniae resistant to Zosyn. Klebsiella likely ESBL per sensitivity pattern although , tested negative per lab.   CT hip 11/4/22 -posterior lateral proximal thigh soft tissue wound with multifocal soft tissue gas consistent with abscess, severe erosive arthropathy left hip, septic arthritis suspected  Urgent appointment made with Dr. Cristel Hogue Surgeon 11/2 CT results from L hip.  saw Dr. Samantha Low in the office -  Options were given to aggressively surgically debride the left trochanter wound or continue antibiotics and wound care, return in 4-weeks. Patient would like to continue antibiotics and wound care. [facility] wound care onboard. Meropenem 1 gm IV IV every 8 hours until 11/21/22.(14 days treatment)  - CRP and WBC responded well - CRP 11/18/22 15 -WBC  8.7-  Left orchard villae off AB      12/5/22 comes for a FU   Left hip very painful she is not able to stand up without supporting herself with her hands, standing and sitting seem to be problematic. The left thigh posterior wound used to be a laceration then became an abscess, currently is a deep hole that has been packed and it looks with slough but no signs of persistent abscess. She sees wound care for that wound. And friends have been packing her wound at home otherwise    Otherwise exam is benign, she seems to be having left lower extremity small wound with infected scab, that I debrided on the bedside cleaning it and peeling it, the Carron Taurus is very clean and red beefy, bleeding slightly, and I dressed it. She sees Dr. Samantha Low in about 4 weeks, and he had wanted to wait until the posterior wound heals completely before he could consider addressing the left infected hip. Plan:  I suspect at this point we have a infected left hip, that has been long treated, persistent pain, but I cannot rule out 100% of the infection has been eradicated from it since it has not been yet addressed surgically.   For this reason I would rather give her Keflex 3 times a day, ongoing for at least a month, and onto the surgery to the left hip of course, to make sure by then it is fully cleaned    We will get also a CRP monthly, CBC monthly, I will see her in about 4 weeks              I have personally reviewed the past medical history, past surgical history, medications, social history, and family history, and I haveupdated the database accordingly.   Past Medical History:     Past Medical History:   Diagnosis Date    Acute respiratory failure with hypoxia ([facility] [address] 9/30/2022    Aspiration pneumonia ([facility] [address] 9/30/2022    Chronic back pain     Chronic hip pain     COPD (chronic obstructive pulmonary disease) (HCC)     Major depression     Osteoarthritis     Osteoporosis        Past Surgical  History:     Past Surgical History:   Procedure Laterality Date    BREAST BIOPSY      two    CHOLECYSTECTOMY      HYSTERECTOMY, VAGINAL         Medications:     Current Outpatient Medications:     apixaban (ELIQUIS) 5 MG TABS tablet, Take 1 tablet by mouth 2 times daily, Disp: 42 tablet, Rfl: 0    ferrous sulfate (IRON 325) 325 (65 Fe) MG tablet, Take 1 tablet by mouth daily (with breakfast), Disp: 90 tablet, Rfl: 1    cephALEXin (KEFLEX) 500 MG capsule, Take 1 capsule by mouth 4 times daily, Disp: 120 capsule, Rfl: 2    potassium chloride (KLOR-CON M) 20 MEQ extended release tablet, TAKE 1 TABLET BY MOUTH THREE TIMES A DAY, Disp: , Rfl:     desvenlafaxine succinate (PRISTIQ) 100 MG TB24 extended release tablet, Take 1 tablet by mouth daily, Disp: 90 tablet, Rfl: 1    SYMBICORT 160-4.5 MCG/ACT AERO, Inhale 2 puffs into the lungs 2 times daily, Disp: 10.2 g, Rfl: 5    tiotropium (SPIRIVA HANDIHALER) 18 MCG inhalation capsule, Inhale 1 capsule into the lungs daily, Disp: 90 capsule, Rfl: 1    montelukast (SINGULAIR) 10 MG tablet, Take 1 tablet by mouth nightly, Disp: 90 tablet, Rfl: 1    promethazine (PHENERGAN) 25 MG tablet, Take 1 tablet by mouth every 6 hours as needed (as needed for nausea), Disp: 60 tablet, Rfl: 1    metoprolol tartrate (LOPRESSOR) 25 MG tablet, Take 1 tablet by mouth 2 times daily, Disp: 60 tablet, Rfl: 3    albuterol sulfate HFA (PROVENTIL;VENTOLIN;PROAIR) 108 (90 Base) MCG/ACT inhaler, Inhale 2 puffs into the lungs every 6 hours as needed for Wheezing, Disp: 3 each, Rfl: 1    tiZANidine (ZANAFLEX) 4 MG tablet, Take 1 tablet by mouth every 8 hours as needed (muscle spasm), Disp: 270 tablet, Rfl: 1    thiamine 100 MG tablet, Take 1 tablet by mouth daily, Disp: 30 tablet, Rfl: 3    folic acid (FOLVITE) 1 MG tablet, Take 1 tablet by mouth daily, Disp: 30 tablet, Rfl: 3    vitamin D (ERGOCALCIFEROL) 1.25 MG (17248 UT) CAPS capsule, Take 1 capsule by mouth once a week, Disp: 12 capsule, Rfl: 1    alendronate (FOSAMAX) 70 MG tablet, Take 1 tablet by mouth once a week, Disp: 12 tablet, Rfl: 1    morphine (MS CONTIN) 15 MG extended release tablet, 3 times daily. , Disp: , Rfl:     ondansetron (ZOFRAN ODT) 4 MG disintegrating tablet, Take 1 tablet by mouth every 8 hours as needed for Nausea or Vomiting (Patient not taking: Reported on 2022), Disp: 20 tablet, Rfl: 0    Handicap Placard MISC, by Does not apply route Expires 2025, Disp: 1 each, Rfl: 0      Social History:     Social History     Socioeconomic History    Marital status: Single     Spouse name: Not on file    Number of children: Not on file    Years of education: Not on file    Highest education level: Not on file   Occupational History    Not on file   Tobacco Use    Smoking status: Former     Packs/day: 0.75     Years: 34.00     Pack years: 25.50     Types: Cigarettes     Quit date: 3/20/2017     Years since quittin.7    Smokeless tobacco: Never   Vaping Use    Vaping Use: Never used   Substance and Sexual Activity    Alcohol use: Not Currently     Comment: occasionally     Drug use: Never    Sexual activity: Not on file   Other Topics Concern    Not on file   Social History Narrative    Not on file     Social Determinants of Health     Financial Resource Strain: Low Risk     Difficulty of Paying Living Expenses: Not hard at all   Food Insecurity: No Food Insecurity    Worried About Running Out of Food in the Last Year: Never true    Ran Out of Food in the Last Year: Never true   Transportation Needs: Not on file   Physical Activity: Not on file   Stress: Not on file   Social Connections: Not on file   Intimate Partner Violence: Not on file   Housing Stability: Not on file       Family History:     Family History   Problem Relation Age of Onset    Stroke Mother     Alzheimer's Disease Father         Allergies:   Lyrica [pregabalin], Demerol hcl [meperidine], Fluoxetine, and Paxil [paroxetine hcl]     Review of Systems:   Review of Systems   Constitutional:  Positive for activity change. HENT:  Negative for congestion. Eyes:  Negative for discharge. Respiratory:  Negative for apnea. Cardiovascular:  Negative for chest pain. Gastrointestinal:  Negative for abdominal distention. Endocrine: Negative for cold intolerance. Genitourinary:  Negative for dysuria. Musculoskeletal:  Positive for arthralgias and back pain. Skin:  Positive for wound. Negative for color change. Allergic/Immunologic: Negative for immunocompromised state. Neurological:  Negative for dizziness. Hematological:  Negative for adenopathy. Psychiatric/Behavioral:  Negative for agitation. Physical Examination :   Blood pressure (!) 97/57, pulse 97, height 5' 5\" (1.651 m), weight 210 lb (95.3 kg). Physical Exam  Constitutional:       Appearance: Normal appearance. She is not ill-appearing. HENT:      Head: Normocephalic and atraumatic. Nose: Nose normal.      Mouth/Throat:      Mouth: Mucous membranes are moist.   Eyes:      General: No scleral icterus. Pupils: Pupils are equal, round, and reactive to light. Cardiovascular:      Rate and Rhythm: Normal rate and regular rhythm. Heart sounds: Normal heart sounds. No murmur heard. Pulmonary:      Effort: No respiratory distress. Breath sounds: Normal breath sounds. Abdominal:      General: There is no distension. Tenderness:  There is no abdominal tenderness. Genitourinary:     Comments: No cobb  Musculoskeletal:         General: No swelling or deformity. Cervical back: Neck supple. No rigidity. Skin:     Coloration: Skin is not jaundiced. Findings: No bruising. Neurological:      Mental Status: She is alert and oriented to person, place, and time. Cranial Nerves: No cranial nerve deficit. Psychiatric:         Mood and Affect: Mood normal.         Thought Content:  Thought content normal.         Medical Decision Making:   I have independently reviewed/ordered the following labs:    CBCwith Differential:   Lab Results   Component Value Date/Time    WBC 8.7 11/18/2022 07:26 AM    WBC 8.3 11/15/2022 06:24 AM    HGB 10.0 11/18/2022 07:26 AM    HGB 10.0 11/15/2022 06:24 AM    HCT 33.0 11/18/2022 07:26 AM    HCT 34.1 11/15/2022 06:24 AM     11/18/2022 07:26 AM     11/15/2022 06:24 AM    LYMPHOPCT 24 11/15/2022 06:24 AM    LYMPHOPCT 20 11/08/2022 06:48 AM    MONOPCT 5 11/15/2022 06:24 AM    MONOPCT 9 11/08/2022 06:48 AM     BMP:  Lab Results   Component Value Date/Time     12/01/2022 09:40 AM     11/15/2022 06:24 AM    K 3.9 12/01/2022 09:40 AM    K 4.5 11/15/2022 06:24 AM     12/01/2022 09:40 AM     11/15/2022 06:24 AM    CO2 22 12/01/2022 09:40 AM    CO2 24 11/15/2022 06:24 AM    BUN 15 12/01/2022 09:40 AM    BUN 12 11/15/2022 06:24 AM    CREATININE 0.76 12/01/2022 09:40 AM    CREATININE 0.36 11/15/2022 06:24 AM    MG 2.1 10/05/2022 06:52 AM    MG 2.0 10/04/2022 04:19 AM     Hepatic Function Panel:   Lab Results   Component Value Date/Time    PROT 7.7 12/01/2022 09:40 AM    PROT 7.5 11/15/2022 06:24 AM    LABALBU 3.5 12/01/2022 09:40 AM    LABALBU 3.0 11/15/2022 06:24 AM    BILITOT 0.4 12/01/2022 09:40 AM    BILITOT 0.2 11/15/2022 06:24 AM    ALKPHOS 93 12/01/2022 09:40 AM    ALKPHOS 100 11/15/2022 06:24 AM    ALT 12 12/01/2022 09:40 AM    ALT 13 11/15/2022 06:24 AM    AST 19 12/01/2022 09:40 AM    AST 23 11/15/2022 06:24 AM     No results found for: RPR  No results found for: HIV  No results found for: [facility]  Lab Results   Component Value Date/Time    RBC 3.87 11/18/2022 07:26 AM    WBC 8.7 11/18/2022 07:26 AM    TURBIDITY Cloudy 09/24/2022 03:39 PM     Lab Results   Component Value Date/Time    CREATININE 0.76 12/01/2022 09:40 AM    GLUCOSE 60 12/01/2022 09:40 AM   you for allowing us to participate in the care of this patient. Please call with questions. Ella Mondragon MD  - Office: [phone]    Please note that this chart was generated using voice recognition Dragon dictation software. Although every effort was made to ensure the accuracy of this automated transcription, some errors in transcription mayhave occurred.

## 2022-12-06 ENCOUNTER — TELEPHONE (OUTPATIENT)
Dept: FAMILY MEDICINE CLINIC | Age: 72
End: 2022-12-06

## 2022-12-06 ENCOUNTER — OFFICE VISIT (OUTPATIENT)
Dept: ORTHOPEDIC SURGERY | Age: 72
End: 2022-12-06
Payer: COMMERCIAL

## 2022-12-06 ENCOUNTER — HOSPITAL ENCOUNTER (OUTPATIENT)
Age: 72
Discharge: HOME | End: 2022-12-06
Payer: COMMERCIAL

## 2022-12-06 VITALS — BODY MASS INDEX: 34.99 KG/M2 | WEIGHT: 210 LBS | HEIGHT: 65 IN

## 2022-12-06 DIAGNOSIS — M00.051 STAPHYLOCOCCAL ARTHRITIS OF RIGHT HIP (HCC): ICD-10-CM

## 2022-12-06 DIAGNOSIS — L89.223 PRESSURE INJURY OF LEFT THIGH, STAGE 3 (HCC): Primary | ICD-10-CM

## 2022-12-06 DIAGNOSIS — L02.416 ABSCESS OF LEFT THIGH: ICD-10-CM

## 2022-12-06 LAB
ABSOLUTE EOS #: 0.34 K/UL (ref 0–0.44)
ABSOLUTE IMMATURE GRANULOCYTE: 0.03 K/UL (ref 0–0.3)
ABSOLUTE LYMPH #: 2.01 K/UL (ref 1.1–3.7)
ABSOLUTE MONO #: 0.58 K/UL (ref 0.1–1.2)
BASOPHILS # BLD: 1 % (ref 0–2)
BASOPHILS ABSOLUTE: 0.06 K/UL (ref 0–0.2)
C-REACTIVE PROTEIN: 36.5 MG/L (ref 0–5)
EOSINOPHILS RELATIVE PERCENT: 4 % (ref 1–4)
HCT VFR BLD CALC: 32.3 % (ref 36.3–47.1)
HEMOGLOBIN: 10 G/DL (ref 11.9–15.1)
IMMATURE GRANULOCYTES: 0 %
LYMPHOCYTES # BLD: 21 % (ref 24–43)
MCH RBC QN AUTO: 26 PG (ref 25.2–33.5)
MCHC RBC AUTO-ENTMCNC: 31 G/DL (ref 28.4–34.8)
MCV RBC AUTO: 83.9 FL (ref 82.6–102.9)
MONOCYTES # BLD: 6 % (ref 3–12)
NRBC AUTOMATED: 0 PER 100 WBC
PDW BLD-RTO: 15.8 % (ref 11.8–14.4)
PLATELET # BLD: 333 K/UL (ref 138–453)
PMV BLD AUTO: 11.3 FL (ref 8.1–13.5)
RBC # BLD: 3.85 M/UL (ref 3.95–5.11)
RBC # BLD: ABNORMAL 10*6/UL
SEG NEUTROPHILS: 68 % (ref 36–65)
SEGMENTED NEUTROPHILS ABSOLUTE COUNT: 6.52 K/UL (ref 1.5–8.1)
WBC # BLD: 9.5 K/UL (ref 3.5–11.3)

## 2022-12-06 PROCEDURE — 1090F PRES/ABSN URINE INCON ASSESS: CPT | Performed by: ORTHOPAEDIC SURGERY

## 2022-12-06 PROCEDURE — G8417 CALC BMI ABV UP PARAM F/U: HCPCS | Performed by: ORTHOPAEDIC SURGERY

## 2022-12-06 PROCEDURE — G8399 PT W/DXA RESULTS DOCUMENT: HCPCS | Performed by: ORTHOPAEDIC SURGERY

## 2022-12-06 PROCEDURE — 36415 COLL VENOUS BLD VENIPUNCTURE: CPT

## 2022-12-06 PROCEDURE — G8484 FLU IMMUNIZE NO ADMIN: HCPCS | Performed by: ORTHOPAEDIC SURGERY

## 2022-12-06 PROCEDURE — 99213 OFFICE O/P EST LOW 20 MIN: CPT | Performed by: ORTHOPAEDIC SURGERY

## 2022-12-06 PROCEDURE — G8427 DOCREV CUR MEDS BY ELIG CLIN: HCPCS | Performed by: ORTHOPAEDIC SURGERY

## 2022-12-06 PROCEDURE — 3017F COLORECTAL CA SCREEN DOC REV: CPT | Performed by: ORTHOPAEDIC SURGERY

## 2022-12-06 PROCEDURE — 85025 COMPLETE CBC W/AUTO DIFF WBC: CPT

## 2022-12-06 PROCEDURE — 86140 C-REACTIVE PROTEIN: CPT

## 2022-12-06 PROCEDURE — 1036F TOBACCO NON-USER: CPT | Performed by: ORTHOPAEDIC SURGERY

## 2022-12-06 PROCEDURE — 1123F ACP DISCUSS/DSCN MKR DOCD: CPT | Performed by: ORTHOPAEDIC SURGERY

## 2022-12-06 NOTE — TELEPHONE ENCOUNTER
Jayleen Cisneros from [address]  called to report that Amairani Breaux had a fall going up her front steps on 12/4/2022.  He was told this 12/06/2022  She states no injury just fell on her knees

## 2022-12-06 NOTE — PROGRESS NOTES
[address]  [address] [facility] [address]  Dept: [phone]  Dept Fax: [phone]        [facility] Follow Up      Subjective:   Left hip decubitus ulcer     Radha Up is a 71y.o. year old female who presents to the clinic today for routine follow up regarding her left hip chronic decubitus ulcer. The patient also has significant left hip pain secondary to end-stage arthropathy. The patient has been dealing with chronic left-sided hip pain for a very long time. The patient developed a left decubitus ulcer in the posterior aspect of her hip and has been following with infectious disease since as well as woundcare. She was previously diagnosed with MSSA septicemia on 9/24/2022. The patient was seen by Dr. Meggan Harding on 12/5/2022, notes revealed that there is a previous left trochanteric abscess which resolved, however the deep wound is still present. The patient is concerned with her left hip pain and reports it does affect her activities of daily living. She reports that she has been getting wound care to the left hip and has been receiving packings. She presents today for repeat follow-up regarding her left hip pain, and to discuss possible arthroplasty surgery to her left hip    Review of Systems  Gen: no fever, chills, malaise  CV: no chest pain or palpitations  Resp: no cough or shortness of breath  GI: no nausea, vomiting, diarrhea, or constipation  Neuro: no seizures, vertigo, or headache  Msk: left hip pain  10 remaining systems reviewed and negative    Objective : There were no vitals filed for this visit. Body mass index is 34.95 kg/m². General: No acute distress, resting comfortably in the clinic  Neuro: alert. oriented  Eyes: Extra-ocular muscles intact  Pulm: Respirations unlabored and regular.   Skin: warm, well perfused  Psych:   Patient has good fund of knowledge and displays understanding of exam, diagnosis, and plan. LLE: Decubitus ulcer on the posterior aspect of the hip does appear to have improved compared to last visit. No significant erythema surrounding the wound. There is packing at the decubitus ulcer. Pain with ROM of the hip which is chronic. Compartments soft. 2+ DP pulse. TA/EHL/FHL/GS motor intact. Deep and Superficial Peroneal/Saphenous/Sural SILT. Radiology:  No new images      Assessment:   71y.o. year old female with being seen for the following issues:    - Left hip decubitus ulcer  - Left hip end-stage arthropathy     Plan:     -We had a conversation with the patient regarding her left hip arthropathy. We discussed possible arthroplasty surgery. However given the patient's decubitus ulcer to her left hip at this time, we are concerned any form of surgery to her left hip will spread the infection. At this time, we will recommend continuation of wound care and continuation of antibiotics per infectious disease recommendation. Once her decubitus ulcer has healed, and her inflammatory markers have dropped to normal limits we will discuss arthroplasty surgery to her left hip. The patient is agreeable to this plan. She will continue with wound care and following infectious disease recommendations. Once her wound has healed she may follow-up in our clinic for further discussion regarding left hip arthroplasty surgery. Follow up: follow up once left hip wound has healed to discuss arthroplasty    Electronically signed by Laura Menezes DO on 12/6/2022 at 4:09 PM    This note is created with the assistance of a speech recognition program.  While intending to generate a document that actually reflects the content of the visit, the document can still have some errors including those of syntax and sound a like substitutions which may escape proof reading.   In such instances, actual meaning can be extrapolated by contextual diversion

## 2022-12-08 ENCOUNTER — HOSPITAL ENCOUNTER (OUTPATIENT)
Dept: WOUND CARE | Age: 72
Discharge: HOME | End: 2022-12-08
Payer: COMMERCIAL

## 2022-12-08 VITALS
SYSTOLIC BLOOD PRESSURE: 137 MMHG | HEART RATE: 102 BPM | BODY MASS INDEX: 33.75 KG/M2 | TEMPERATURE: 98.8 F | WEIGHT: 210 LBS | RESPIRATION RATE: 18 BRPM | HEIGHT: 66 IN | DIASTOLIC BLOOD PRESSURE: 82 MMHG

## 2022-12-08 DIAGNOSIS — L97.122 CHRONIC ULCER OF LEFT THIGH WITH FAT LAYER EXPOSED (HCC): Primary | ICD-10-CM

## 2022-12-08 DIAGNOSIS — A41.01 MSSA (METHICILLIN SUSCEPTIBLE STAPHYLOCOCCUS AUREUS) SEPTICEMIA (HCC): ICD-10-CM

## 2022-12-08 PROCEDURE — 99214 OFFICE O/P EST MOD 30 MIN: CPT

## 2022-12-08 PROCEDURE — 11043 DBRDMT MUSC&/FSCA 1ST 20/<: CPT

## 2022-12-08 PROCEDURE — 11046 DBRDMT MUSC&/FSCA EA ADDL: CPT

## 2022-12-08 RX ORDER — LIDOCAINE HYDROCHLORIDE 20 MG/ML
JELLY TOPICAL ONCE
OUTPATIENT
Start: 2022-12-08 | End: 2022-12-08

## 2022-12-08 RX ORDER — BACITRACIN ZINC AND POLYMYXIN B SULFATE 500; 1000 [USP'U]/G; [USP'U]/G
OINTMENT TOPICAL ONCE
OUTPATIENT
Start: 2022-12-08 | End: 2022-12-08

## 2022-12-08 RX ORDER — LIDOCAINE 50 MG/G
OINTMENT TOPICAL ONCE
OUTPATIENT
Start: 2022-12-08 | End: 2022-12-08

## 2022-12-08 RX ORDER — GENTAMICIN SULFATE 1 MG/G
OINTMENT TOPICAL ONCE
OUTPATIENT
Start: 2022-12-08 | End: 2022-12-08

## 2022-12-08 RX ORDER — LIDOCAINE HYDROCHLORIDE 40 MG/ML
SOLUTION TOPICAL ONCE
OUTPATIENT
Start: 2022-12-08 | End: 2022-12-08

## 2022-12-08 RX ORDER — BACITRACIN, NEOMYCIN, POLYMYXIN B 400; 3.5; 5 [USP'U]/G; MG/G; [USP'U]/G
OINTMENT TOPICAL ONCE
OUTPATIENT
Start: 2022-12-08 | End: 2022-12-08

## 2022-12-08 RX ORDER — CLOBETASOL PROPIONATE 0.5 MG/G
OINTMENT TOPICAL ONCE
OUTPATIENT
Start: 2022-12-08 | End: 2022-12-08

## 2022-12-08 RX ORDER — LIDOCAINE 40 MG/G
CREAM TOPICAL ONCE
OUTPATIENT
Start: 2022-12-08 | End: 2022-12-08

## 2022-12-08 RX ORDER — GINSENG 100 MG
CAPSULE ORAL ONCE
OUTPATIENT
Start: 2022-12-08 | End: 2022-12-08

## 2022-12-08 RX ORDER — LIDOCAINE HYDROCHLORIDE 20 MG/ML
JELLY TOPICAL ONCE
Status: COMPLETED | OUTPATIENT
Start: 2022-12-08 | End: 2022-12-08

## 2022-12-08 RX ORDER — BETAMETHASONE DIPROPIONATE 0.05 %
OINTMENT (GRAM) TOPICAL ONCE
OUTPATIENT
Start: 2022-12-08 | End: 2022-12-08

## 2022-12-08 RX ADMIN — LIDOCAINE HYDROCHLORIDE 6 ML: 20 JELLY TOPICAL at 14:52

## 2022-12-08 ASSESSMENT — PAIN SCALES - GENERAL: PAINLEVEL_OUTOF10: 7

## 2022-12-08 NOTE — PROGRESS NOTES
[address]   Progress Note and Procedure Note      Shelbi Hoffmann  MEDICAL RECORD NUMBER:  [id number]  AGE: 71 y.o. GENDER: female  : 1950  EPISODE DATE:  2022    Subjective:     Chief Complaint   Patient presents with    Wound Check     Left thigh         HISTORY of PRESENT ILLNESS HPI     Shelbi Hoffmann is a 71 y.o. female who presents today for wound/ulcer evaluation. History of Wound Context: initial visit to wound clinic for evaluation of left thigh wound that has been present for about 2 months. It originally presented as a smaller wound just prior to hospital discharge to [facility] and per the patient was never addressed and later became larger and infected. The patient has never had surgery on this area, but she does have chronic left hip pain/osteoarthritis. She is being seen by RAFFI Martinez and currently on PO Keflex. She is hoping for wound healing so she can have future hip arthoplasty. 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 Wound/Ulcer Pain Timing/Severity: none    Wound/Ulcer Identification:  Ulcer Type:  unclear etiology, likely infectious process- the wound is not in a typical area of pressure injury.   Contributing Factors: chronic pressure, decreased mobility, shear force, and obesity          PAST MEDICAL HISTORY        Diagnosis Date    Acute respiratory failure with hypoxia ([facility]) 2022    Aspiration pneumonia ([facility]) 2022    Chronic back pain     Chronic hip pain     COPD (chronic obstructive pulmonary disease) (HCC)     Major depression     Osteoarthritis     Osteoporosis        PAST SURGICAL HISTORY    Past Surgical History:   Procedure Laterality Date    BREAST BIOPSY      two    CHOLECYSTECTOMY      HYSTERECTOMY, VAGINAL         FAMILY HISTORY    Family History   Problem Relation Age of Onset    Stroke Mother     Alzheimer's Disease Father        SOCIAL HISTORY    Social History     Tobacco Use    Smoking status: Former     Packs/day: 0.75     Years: 34.00     Pack years: 25.50     Types: Cigarettes     Quit date: 3/20/2017     Years since quittin.7    Smokeless tobacco: Never   Vaping Use    Vaping Use: Never used   Substance Use Topics    Alcohol use: Not Currently     Comment: occasionally     Drug use: Never       ALLERGIES    Allergies   Allergen Reactions    Lyrica [Pregabalin] Hives    Demerol Hcl [Meperidine]     Fluoxetine     Paxil [Paroxetine Hcl]        MEDICATIONS    Current Outpatient Medications on File Prior to Encounter   Medication Sig Dispense Refill    apixaban (ELIQUIS) 5 MG TABS tablet Take 1 tablet by mouth 2 times daily 42 tablet 0    ferrous sulfate (IRON 325) 325 (65 Fe) MG tablet Take 1 tablet by mouth daily (with breakfast) 90 tablet 1    cephALEXin (KEFLEX) 500 MG capsule Take 1 capsule by mouth 4 times daily 120 capsule 2    potassium chloride (KLOR-CON M) 20 MEQ extended release tablet TAKE 1 TABLET BY MOUTH THREE TIMES A DAY (Patient not taking: Reported on 2022)      desvenlafaxine succinate (PRISTIQ) 100 MG TB24 extended release tablet Take 1 tablet by mouth daily 90 tablet 1    SYMBICORT 160-4.5 MCG/ACT AERO Inhale 2 puffs into the lungs 2 times daily 10.2 g 5    tiotropium (SPIRIVA HANDIHALER) 18 MCG inhalation capsule Inhale 1 capsule into the lungs daily 90 capsule 1    montelukast (SINGULAIR) 10 MG tablet Take 1 tablet by mouth nightly 90 tablet 1    promethazine (PHENERGAN) 25 MG tablet Take 1 tablet by mouth every 6 hours as needed (as needed for nausea) 60 tablet 1    metoprolol tartrate (LOPRESSOR) 25 MG tablet Take 1 tablet by mouth 2 times daily 60 tablet 3    albuterol sulfate HFA (PROVENTIL;VENTOLIN;PROAIR) 108 (90 Base) MCG/ACT inhaler Inhale 2 puffs into the lungs every 6 hours as needed for Wheezing 3 each 1    tiZANidine (ZANAFLEX) 4 MG tablet Take 1 tablet by mouth every 8 hours as needed (muscle spasm) 270 tablet 1    thiamine 100 MG tablet Take 1 tablet by mouth daily 30 tablet 3    folic acid (FOLVITE) 1 MG tablet Take 1 tablet by mouth daily 30 tablet 3    ondansetron (ZOFRAN ODT) 4 MG disintegrating tablet Take 1 tablet by mouth every 8 hours as needed for Nausea or Vomiting (Patient not taking: Reported on 12/5/2022) 20 tablet 0    vitamin D (ERGOCALCIFEROL) 1.25 MG (21684 UT) CAPS capsule Take 1 capsule by mouth once a week 12 capsule 1    alendronate (FOSAMAX) 70 MG tablet Take 1 tablet by mouth once a week 12 tablet 1    Handicap Placard MISC by Does not apply route Expires 11/2025 1 each 0    morphine (MS CONTIN) 15 MG extended release tablet 3 times daily. No current facility-administered medications on file prior to encounter.        REVIEW OF SYSTEMS    Constitutional: negative for chills and fevers  Respiratory: negative for cough and shortness of breath  Cardiovascular: positive for lower extremity edema, negative for chest pain and palpitations  Genitourinary:negative for urinary incontinence  Integument: positive for left posterior thigh wound  Musculoskeletal:negative for arthralgias  Behavioral/Psych: negative  Endocrine: negative  Hematologic/Immunologic: negative    Objective:      /82   Pulse (!) 102   Temp 98.8 °F (37.1 °C) (Tympanic)   Resp 18   Ht 5' 6\" (1.676 m)   Wt 210 lb (95.3 kg)   LMP  (LMP Unknown)   BMI 33.89 kg/m²     Wt Readings from Last 3 Encounters:   12/08/22 210 lb (95.3 kg)   12/06/22 210 lb (95.3 kg)   12/05/22 210 lb (95.3 kg)       PHYSICAL EXAM    General Appearance: alert and oriented to person, place and time, well-developed and well-nourished, in no acute distress  Skin: Left posterior thigh wound-see below  Head: normocephalic and atraumatic  Pulmonary/Chest: normal air movement, no respiratory distress  Extremities: no cyanosis and no clubbing or edema   Musculoskeletal: no joint swelling, deformity or tenderness  Neurologic: gait slow and steady with walker, coordination normal and speech normal      Assessment:     Problem List Items Addressed This Visit          Other    MSSA (methicillin susceptible Staphylococcus aureus) septicemia ([address]    Chronic ulcer of left thigh with fat layer exposed ([address] - Primary        Procedure Note  Indications:  Based on my examination of this patient's wound(s)/ulcer(s) today, debridement is required to promote healing and evaluate the wound base. Performed by: MITESH Carr - CNP    Consent obtained:  Yes    Time out taken:  Yes    Pain Control: Anesthetic  Anesthetic: 2% Lidocaine Gel Topical       Debridement:Excisional Debridement    Using curette the wound(s)/ulcer(s) was/were sharply debrided down through and including the removal of muscle/fascia. Devitalized Tissue Debrided:  fibrin, biofilm, and slough    Pre Debridement Measurements:  Are located in the [place]  Documentation Flow Sheet    Wound/Ulcer #: 1    Post Debridement Measurements:  Wound/Ulcer Descriptions are Pre Debridement except measurements:    Wound 12/08/22 Thigh Left;Posterior #1 (Active)   Wound Image   12/08/22 1342   Wound Etiology Other 12/08/22 1342   Dressing Status New drainage noted; Old drainage noted 12/08/22 1342   Wound Cleansed Cleansed with saline 12/08/22 1342   Dressing/Treatment Other (comment) 12/08/22 1448   Wound Length (cm) 3.6 cm 12/08/22 1342   Wound Width (cm) 3.5 cm 12/08/22 1342   Wound Depth (cm) 4 cm 12/08/22 1342   Wound Surface Area (cm^2) 12.6 cm^2 12/08/22 1342   Wound Volume (cm^3) 50.4 cm^3 12/08/22 1342   Post-Procedure Length (cm) 3.6 cm 12/08/22 1342   Post-Procedure Width (cm) 3.5 cm 12/08/22 1342   Post-Procedure Depth (cm) 4 cm 12/08/22 1342   Post-Procedure Surface Area (cm^2) 12.6 cm^2 12/08/22 1342   Post-Procedure Volume (cm^3) 50.4 cm^3 12/08/22 1342   Undermining Starts ___ O'Clock 0700 12/08/22 1342   Undermining Ends___ O'Clock 1200 12/08/22 1342   Undermining Maxium Distance (cm) [email or web address] 12/08/22 1342   Wound Assessment Pink/red;Slough; Exposed structure fascia 12/08/22 1342   Drainage Amount Moderate 12/08/22 1342   Drainage Description Serosanguinous; Yellow 12/08/22 1342   Odor None 12/08/22 1342   Jess-wound Assessment Blanchable erythema 12/08/22 1342   Margins Defined edges 12/08/22 1342   Number of days: 0          Percent of Wound(s)/Ulcer(s) Debrided: 100%    Total Surface Area Debrided:  50.4 sq cm     Diabetic/Pressure/Non Pressure Ulcers only:  Ulcer: Non-Pressure ulcer, muscle necrosis      Estimated Blood Loss:  Minimal    Hemostasis Achieved:  by pressure    Procedural Pain:  4  / 10     Post Procedural Pain:  0 / 10     Response to treatment:  Well tolerated by patient., With complaints of pain. Addressed wound healing factors:    [x] Diabetes: n/a       [x] CHF: n/a        [x] Infection: currently on Keflex, managed by Dr. John Savage, ID       [x] Debridement: yes- will require serial debridement  [x] Compliance; compression/offloading: discussed need for offloading and gave ideas   [x] Diagnostics: recent 11/8/22 hip x-ray done by ortho- last seen by them 12/6/22  [x] Smoking: n/a             Plan:     -Will begin NPWT    -Stop Dakins, use silver alginate until vac approved/delivered    -RTC one week for serial debridement    -Encouraged to offload    -Encouraged to increase oral diet/protein intake    -I have reviewed instructions with the patient, answering all questions to satisfaction.    -Patient to call or return to clinic as needed if wound symptoms worsen or fail to improve as anticipated.    -The patient was instructed about the importance of offloading the area of pressure. She is physically able to turn herself and does not anticipate pressure being a concern for the area. -See Discharge Instructions for wound management orders. Written patient dismissal instructions given to patient and signed by patient or POA.            Electronically signed by Felix Hancock, MITESH - CNP on 12/8/2022 at 2:52 PM

## 2022-12-13 NOTE — DISCHARGE INSTRUCTIONS
[address] [facility],[address] -Phone: [phone] Fax: [phone]    Visit  Discharge Instructions / Physician Orders     DATE: 12/15/2022     Home Care: [address] ORDERED THRU: [employer]      Wound Location: Left Posterior Thigh     Cleanse with: Liquid antibacterial soap and water, rinse well      Dressing Orders: [place] [facility] Vac: Skin prep and drape around wound, black foam into wound, continous suction at 120mmHg     Frequency: Change on Tuesday, Thursday, Saturday ([facility] will do on appointment days) (Bring new sponge and canister to appointments at wound care center)              Additional Orders: Increase protein to diet (meat, cheese, eggs, fish, peanut butter, nuts and beans)  Multivitamin daily     Your next appointment with [facility] is in 1 week     (Please note your next appointment above and if you are unable to keep, kindly give a 24 hour notice. Thank you.)  If more than 15 min late we cannot guarantee you will be seen due to clinician schedule  Per Policy, Excessive cancellation will call for dismissal from program.     If you experience any of the following, please call the [facility] during business hours:  [phone]  Your Phone call may be forwarded to [phone] during business hours that [facility] is closed. * Increase in Pain  * Temperature over 101  * Increase in drainage from your wound  * Drainage with a foul odor  * Bleeding  * Increase in swelling  * Need for compression bandage changes due to slippage, breakthrough drainage. If you need medical attention outside of the business hours of the [facility] please contact your PCP or go to the nearest emergency room. The information contained in the After Visit Summary has been reviewed with me, the patient and/or responsible adult, by my health care provider(s). I had the opportunity to ask questions regarding this information.  I have elected to receive;      []After Visit Summary  [x]Comprehensive Discharge Instruction        Patient signature______________________________________Date:________  Electronically signed by Lurdes Hylton RN on 12/15/2022 at 1:26 PM  Electronically signed by MITESH Moreno CNP on 12/15/2022 at 1:33 PM

## 2022-12-14 ENCOUNTER — TELEPHONE (OUTPATIENT)
Dept: FAMILY MEDICINE CLINIC | Age: 72
End: 2022-12-14

## 2022-12-14 NOTE — TELEPHONE ENCOUNTER
Lorenza Landaverde called to let Dr Lilian Nur know that Ds doing well with PT.  He states had reassessment today and will be doing PT 2 times a week for 4 weeks

## 2022-12-15 ENCOUNTER — HOSPITAL ENCOUNTER (OUTPATIENT)
Dept: WOUND CARE | Age: 72
Discharge: HOME | End: 2022-12-15
Payer: COMMERCIAL

## 2022-12-15 VITALS
SYSTOLIC BLOOD PRESSURE: 132 MMHG | TEMPERATURE: 96.8 F | DIASTOLIC BLOOD PRESSURE: 64 MMHG | HEART RATE: 92 BPM | RESPIRATION RATE: 16 BRPM

## 2022-12-15 DIAGNOSIS — A41.01 MSSA (METHICILLIN SUSCEPTIBLE STAPHYLOCOCCUS AUREUS) SEPTICEMIA (HCC): ICD-10-CM

## 2022-12-15 DIAGNOSIS — L97.122 CHRONIC ULCER OF LEFT THIGH WITH FAT LAYER EXPOSED (HCC): Primary | ICD-10-CM

## 2022-12-15 PROCEDURE — 11042 DBRDMT SUBQ TIS 1ST 20SQCM/<: CPT

## 2022-12-15 PROCEDURE — 97605 NEG PRS WND THER DME<=50SQCM: CPT

## 2022-12-15 RX ORDER — BACITRACIN, NEOMYCIN, POLYMYXIN B 400; 3.5; 5 [USP'U]/G; MG/G; [USP'U]/G
OINTMENT TOPICAL ONCE
OUTPATIENT
Start: 2022-12-15 | End: 2022-12-15

## 2022-12-15 RX ORDER — LIDOCAINE HYDROCHLORIDE 20 MG/ML
JELLY TOPICAL ONCE
Status: COMPLETED | OUTPATIENT
Start: 2022-12-15 | End: 2022-12-15

## 2022-12-15 RX ORDER — LIDOCAINE HYDROCHLORIDE 20 MG/ML
JELLY TOPICAL ONCE
OUTPATIENT
Start: 2022-12-15 | End: 2022-12-15

## 2022-12-15 RX ORDER — LIDOCAINE 40 MG/G
CREAM TOPICAL ONCE
OUTPATIENT
Start: 2022-12-15 | End: 2022-12-15

## 2022-12-15 RX ORDER — GINSENG 100 MG
CAPSULE ORAL ONCE
OUTPATIENT
Start: 2022-12-15 | End: 2022-12-15

## 2022-12-15 RX ORDER — BACITRACIN ZINC AND POLYMYXIN B SULFATE 500; 1000 [USP'U]/G; [USP'U]/G
OINTMENT TOPICAL ONCE
OUTPATIENT
Start: 2022-12-15 | End: 2022-12-15

## 2022-12-15 RX ORDER — LIDOCAINE HYDROCHLORIDE 40 MG/ML
SOLUTION TOPICAL ONCE
OUTPATIENT
Start: 2022-12-15 | End: 2022-12-15

## 2022-12-15 RX ORDER — GENTAMICIN SULFATE 1 MG/G
OINTMENT TOPICAL ONCE
OUTPATIENT
Start: 2022-12-15 | End: 2022-12-15

## 2022-12-15 RX ORDER — LIDOCAINE 50 MG/G
OINTMENT TOPICAL ONCE
OUTPATIENT
Start: 2022-12-15 | End: 2022-12-15

## 2022-12-15 RX ORDER — CLOBETASOL PROPIONATE 0.5 MG/G
OINTMENT TOPICAL ONCE
OUTPATIENT
Start: 2022-12-15 | End: 2022-12-15

## 2022-12-15 RX ORDER — BETAMETHASONE DIPROPIONATE 0.05 %
OINTMENT (GRAM) TOPICAL ONCE
OUTPATIENT
Start: 2022-12-15 | End: 2022-12-15

## 2022-12-15 RX ADMIN — LIDOCAINE HYDROCHLORIDE 6 ML: 20 JELLY TOPICAL at 13:21

## 2022-12-15 NOTE — PROGRESS NOTES
[address]   Progress Note and Procedure Note      Ana Goodman  MEDICAL RECORD NUMBER:  [id number]  AGE: 71 y.o. GENDER: female  : 1950  EPISODE DATE:  12/15/2022    Subjective:     Chief Complaint   Patient presents with    Wound Check     Left thight         HISTORY of PRESENT ILLNESS HPI     Ana Goodman is a 71 y.o. female who presents today for wound/ulcer evaluation. History of Wound Context: follow up for left thigh wound that had been present for about 2 months. It originally presented as a smaller wound just prior to hospital discharge to [facility] and per the patient was never addressed and later became larger and infected. The patient has never had surgery on this area, but she does have chronic left hip pain/osteoarthritis. She is being seen by Dr. Delfina Casey, ID and currently on PO Keflex. She is hoping for wound healing so she can have future hip arthoplasty. 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 Interval history: wound vac machine obtained and the patient brought with her today for initial application. Home care nursing has been using silver alginate dressing.      Wound/Ulcer Pain Timing/Severity: none    Wound/Ulcer Identification:  Ulcer Type: undetermined  Contributing Factors: edema, decreased mobility, and obesity          PAST MEDICAL HISTORY        Diagnosis Date    Acute respiratory failure with hypoxia ([facility]) 2022    Aspiration pneumonia ([facility]) 2022    Chronic back pain     Chronic hip pain     COPD (chronic obstructive pulmonary disease) (HCC)     Major depression     Osteoarthritis     Osteoporosis        PAST SURGICAL HISTORY    Past Surgical History:   Procedure Laterality Date    BREAST BIOPSY      two    CHOLECYSTECTOMY      HYSTERECTOMY, VAGINAL         FAMILY HISTORY    Family History   Problem Relation Age of Onset    Stroke Mother     Alzheimer's Disease Father        SOCIAL HISTORY    Social History     Tobacco Use    Smoking status: Former     Packs/day: 0.75     Years: 34.00     Pack years: 25.50     Types: Cigarettes     Quit date: 3/20/2017     Years since quittin.7    Smokeless tobacco: Never   Vaping Use    Vaping Use: Never used   Substance Use Topics    Alcohol use: Not Currently     Comment: occasionally     Drug use: Never       ALLERGIES    Allergies   Allergen Reactions    Lyrica [Pregabalin] Hives    Demerol Hcl [Meperidine]     Fluoxetine     Paxil [Paroxetine Hcl]        MEDICATIONS    Current Outpatient Medications on File Prior to Encounter   Medication Sig Dispense Refill    apixaban (ELIQUIS) 5 MG TABS tablet Take 1 tablet by mouth 2 times daily 42 tablet 0    ferrous sulfate (IRON 325) 325 (65 Fe) MG tablet Take 1 tablet by mouth daily (with breakfast) 90 tablet 1    cephALEXin (KEFLEX) 500 MG capsule Take 1 capsule by mouth 4 times daily 120 capsule 2    potassium chloride (KLOR-CON M) 20 MEQ extended release tablet TAKE 1 TABLET BY MOUTH THREE TIMES A DAY (Patient not taking: Reported on 2022)      desvenlafaxine succinate (PRISTIQ) 100 MG TB24 extended release tablet Take 1 tablet by mouth daily 90 tablet 1    SYMBICORT 160-4.5 MCG/ACT AERO Inhale 2 puffs into the lungs 2 times daily 10.2 g 5    tiotropium (SPIRIVA HANDIHALER) 18 MCG inhalation capsule Inhale 1 capsule into the lungs daily 90 capsule 1    montelukast (SINGULAIR) 10 MG tablet Take 1 tablet by mouth nightly 90 tablet 1    promethazine (PHENERGAN) 25 MG tablet Take 1 tablet by mouth every 6 hours as needed (as needed for nausea) 60 tablet 1    metoprolol tartrate (LOPRESSOR) 25 MG tablet Take 1 tablet by mouth 2 times daily 60 tablet 3    albuterol sulfate HFA (PROVENTIL;VENTOLIN;PROAIR) 108 (90 Base) MCG/ACT inhaler Inhale 2 puffs into the lungs every 6 hours as needed for Wheezing 3 each 1    tiZANidine (ZANAFLEX) 4 MG tablet Take 1 tablet by mouth every 8 hours as needed (muscle spasm) 270 tablet 1    thiamine 100 MG tablet Take 1 tablet by mouth daily 30 tablet 3    folic acid (FOLVITE) 1 MG tablet Take 1 tablet by mouth daily 30 tablet 3    ondansetron (ZOFRAN ODT) 4 MG disintegrating tablet Take 1 tablet by mouth every 8 hours as needed for Nausea or Vomiting (Patient not taking: Reported on 12/5/2022) 20 tablet 0    vitamin D (ERGOCALCIFEROL) 1.25 MG (64041 UT) CAPS capsule Take 1 capsule by mouth once a week 12 capsule 1    alendronate (FOSAMAX) 70 MG tablet Take 1 tablet by mouth once a week 12 tablet 1    Handicap Placard MISC by Does not apply route Expires 11/2025 1 each 0    morphine (MS CONTIN) 15 MG extended release tablet 3 times daily. No current facility-administered medications on file prior to encounter.        REVIEW OF SYSTEMS    Constitutional: negative for chills and fevers  Respiratory: negative for cough and shortness of breath  Cardiovascular: positive for lower extremity edema, negative for chest pain and palpitations  Genitourinary:negative for urinary incontinence  Integument: positive for left posterior thigh wound  Musculoskeletal:negative for arthralgias  Behavioral/Psych: negative  Endocrine: negative  Hematologic/Immunologic: negative    Objective:      /64   Pulse 92   Temp 96.8 °F (36 °C) (Tympanic)   Resp 16   LMP  (LMP Unknown)     Wt Readings from Last 3 Encounters:   12/08/22 210 lb (95.3 kg)   12/06/22 210 lb (95.3 kg)   12/05/22 210 lb (95.3 kg)       PHYSICAL EXAM    General Appearance: alert and oriented to person, place and time, well-developed and well-nourished, in no acute distress  Skin: Left posterior thigh wound-see below  Head: normocephalic and atraumatic  Pulmonary/Chest: normal air movement, no respiratory distress  Extremities: no cyanosis and no clubbing or edema   Musculoskeletal: no joint swelling, deformity or tenderness  Neurologic: gait slow and steady with walker, coordination normal and speech normal      Assessment:     Problem List Items Addressed This Visit          Other    MSSA (methicillin susceptible Staphylococcus aureus) septicemia ([facility]    Relevant Orders    Initiate Outpatient Wound Care Protocol    Chronic ulcer of left thigh with fat layer exposed ([facility] - Primary    Relevant Orders    Initiate Outpatient Wound Care Protocol        Procedure Note  Indications:  Based on my examination of this patient's wound(s)/ulcer(s) today, debridement is required to promote healing and evaluate the wound base. Performed by: MITESH Hwang CNP    Consent obtained:  Yes    Time out taken:  Yes    Pain Control: Anesthetic  Anesthetic: 2% Lidocaine Gel Topical       Debridement:Excisional Debridement    Using curette the wound(s)/ulcer(s) was/were sharply debrided down through and including the removal of subcutaneous tissue. Devitalized Tissue Debrided:  fibrin, biofilm, and slough    Pre Debridement Measurements:  Are located in the [place]  Documentation Flow Sheet    Wound/Ulcer #: 1    Post Debridement Measurements:  Wound/Ulcer Descriptions are Pre Debridement except measurements:    Wound 12/08/22 Thigh Left;Posterior #1 (Active)   Wound Image   12/08/22 1342   Wound Etiology Other 12/15/22 1318   Dressing Status New drainage noted; Old drainage noted 12/15/22 1318   Wound Cleansed Cleansed with saline 12/15/22 1318   Dressing/Treatment Other (comment) 12/08/22 1448   Wound Length (cm) 3.2 cm 12/15/22 1318   Wound Width (cm) 4.5 cm 12/15/22 1318   Wound Depth (cm) 5.5 cm 12/15/22 1318   Wound Surface Area (cm^2) 14.4 cm^2 12/15/22 1318   Change in Wound Size % (l*w) -14.29 12/15/22 1318   Wound Volume (cm^3) 79.2 cm^3 12/15/22 1318   Wound Healing % -57 12/15/22 1318   Post-Procedure Length (cm) 3.2 cm 12/15/22 1318   Post-Procedure Width (cm) 4.5 cm 12/15/22 1318   Post-Procedure Depth (cm) 4 cm 12/15/22 1318   Post-Procedure Surface Area (cm^2) 14.4 cm^2 12/15/22 1318 signed by MITESH Rodriguez - CNP on 12/15/2022 at 1:34 PM

## 2022-12-15 NOTE — PROGRESS NOTES
Negative Pressure Wound Therapy    NAME:  Anabela Mares  YOB: 1950  MEDICAL RECORD NUMBER:  [id number]  DATE:  12/15/2022    Applied Negative Pressure to L Thigh wound(s)/ulcer(s). [x] Applied skin barrier prep to ann-wound. [x] Cut strips of plastic drape to picture frame wound so that ann-wound is     covered with the drape. [x] If bridging dressing to less prominent site, cover any intact skin that will come in contact with the Negative Pressure Therapy sponge, gauze or channel drain with plastic drape. The sponge should never touch intact skin. [x] Cut sponge, gauze or channel drain to size which will fit into the wound/ulcer bed without being forced. [x] Be sure the sponge is large enough to hold the entire round plastic flange which is attached to the tubing. Never allow flange to be larger than the sponge or it will produce suction damaging intact skin. Total number of individual pieces of foam used within the wound bed: 1    [x] If bridging the dressing away from the primary site, be sure the bridge leads to a piece of sponge large enough to hold the entire flange without allowing any of the flange to overlap onto intact skin. [x] Covered sponge, gauze or channel drain with plastic drape. [x] Cut a hole in this plastic drape directly over the sponge the same size as the plastic drain tubing. [x] Removed plastic liner from flange and apply it directly over the hole you cut. [x] Removed the plastic cover from the flange. [x] Attached the tubing to the wound/ulcer Negative Pressure Therapy and turn it on to be sure a vacuum is created and that there are no leaks. [x] If air leaks occur, use plastic drape to patch them. [x] Secured Negative Pressure Therapy dressing with ace wrap loosely if located on an extremity. Maintain tubing outside of ace wrap. Tubing must not exert pressure on intact skin.     Applied per  Guidelines      Electronically signed by [employer] Randell Koehler RN on 12/15/2022 at 2:15 PM

## 2022-12-15 NOTE — PLAN OF CARE
Problem: Discharge Planning  Goal: Discharge to home or other facility with appropriate resources  Outcome: Progressing     Problem: Safety - Adult  Goal: Free from fall injury  Outcome: Progressing     Problem: Wound:  Goal: Will show signs of wound healing; wound closure and no evidence of infection  Description: Will show signs of wound healing; wound closure and no evidence of infection  Outcome: Progressing     Problem: Falls - Risk of:  Goal: Will remain free from falls  Description: Will remain free from falls  Outcome: Progressing

## 2022-12-19 NOTE — DISCHARGE INSTRUCTIONS
[address] [facility],[address] -Phone: [phone] Fax: [phone]    Visit  Discharge Instructions / Physician Orders     DATE: 12/22/2022     Home Care: [address] ORDERED THRU: [employer]      Wound Location: Left Posterior Thigh     Cleanse with: Liquid antibacterial soap and water, rinse well      Dressing Orders: Nohemy Rahman and Nephew Vac: (Pack enough sponge into wound so sponge does not suck down into wound, should be bolstered out ) Skin prep and drape around wound, black foam into wound, bridge up thigh, continous suction at 120mmHg     Frequency: Change on Tuesday, Thursday, Saturday ([facility] will do on appointment days) (Bring new sponge and canister to appointments at wound care center)              Additional Orders: Increase protein to diet (meat, cheese, eggs, fish, peanut butter, nuts and beans)  Multivitamin daily     Your next appointment with [facility] is in 1 week     (Please note your next appointment above and if you are unable to keep, kindly give a 24 hour notice. Thank you.)  If more than 15 min late we cannot guarantee you will be seen due to clinician schedule  Per Policy, Excessive cancellation will call for dismissal from program.     If you experience any of the following, please call the [facility] during business hours:  [phone]  Your Phone call may be forwarded to [employer] during business hours that [facility] is closed. * Increase in Pain  * Temperature over 101  * Increase in drainage from your wound  * Drainage with a foul odor  * Bleeding  * Increase in swelling  * Need for compression bandage changes due to slippage, breakthrough drainage. If you need medical attention outside of the business hours of the [facility] please contact your PCP or go to the nearest emergency room.      The information contained in the After Visit Summary has been reviewed with me, the patient and/or responsible adult, by my health care provider(s). I had the opportunity to ask questions regarding this information.  I have elected to receive;      []After Visit Summary  [x]Comprehensive Discharge Instruction        Patient signature______________________________________Date:________  Electronically signed by Jose Olson RN on 12/22/2022 at 1:23 PM  Electronically signed by MITESH Allen CNP on 12/22/2022 at 1:32 PM

## 2022-12-22 ENCOUNTER — HOSPITAL ENCOUNTER (OUTPATIENT)
Dept: WOUND CARE | Age: 72
Discharge: HOME | End: 2022-12-22
Payer: COMMERCIAL

## 2022-12-22 DIAGNOSIS — A41.01 MSSA (METHICILLIN SUSCEPTIBLE STAPHYLOCOCCUS AUREUS) SEPTICEMIA (HCC): ICD-10-CM

## 2022-12-22 DIAGNOSIS — L97.122 CHRONIC ULCER OF LEFT THIGH WITH FAT LAYER EXPOSED (HCC): Primary | ICD-10-CM

## 2022-12-22 PROCEDURE — 97605 NEG PRS WND THER DME<=50SQCM: CPT

## 2022-12-22 PROCEDURE — 11042 DBRDMT SUBQ TIS 1ST 20SQCM/<: CPT

## 2022-12-22 RX ORDER — BACITRACIN, NEOMYCIN, POLYMYXIN B 400; 3.5; 5 [USP'U]/G; MG/G; [USP'U]/G
OINTMENT TOPICAL ONCE
OUTPATIENT
Start: 2022-12-22 | End: 2022-12-22

## 2022-12-22 RX ORDER — GINSENG 100 MG
CAPSULE ORAL ONCE
OUTPATIENT
Start: 2022-12-22 | End: 2022-12-22

## 2022-12-22 RX ORDER — LIDOCAINE HYDROCHLORIDE 20 MG/ML
JELLY TOPICAL ONCE
OUTPATIENT
Start: 2022-12-22 | End: 2022-12-22

## 2022-12-22 RX ORDER — GENTAMICIN SULFATE 1 MG/G
OINTMENT TOPICAL ONCE
OUTPATIENT
Start: 2022-12-22 | End: 2022-12-22

## 2022-12-22 RX ORDER — LIDOCAINE HYDROCHLORIDE 20 MG/ML
JELLY TOPICAL ONCE
Status: COMPLETED | OUTPATIENT
Start: 2022-12-22 | End: 2022-12-22

## 2022-12-22 RX ORDER — LIDOCAINE 50 MG/G
OINTMENT TOPICAL ONCE
OUTPATIENT
Start: 2022-12-22 | End: 2022-12-22

## 2022-12-22 RX ORDER — LIDOCAINE HYDROCHLORIDE 40 MG/ML
SOLUTION TOPICAL ONCE
OUTPATIENT
Start: 2022-12-22 | End: 2022-12-22

## 2022-12-22 RX ORDER — BETAMETHASONE DIPROPIONATE 0.05 %
OINTMENT (GRAM) TOPICAL ONCE
OUTPATIENT
Start: 2022-12-22 | End: 2022-12-22

## 2022-12-22 RX ORDER — BACITRACIN ZINC AND POLYMYXIN B SULFATE 500; 1000 [USP'U]/G; [USP'U]/G
OINTMENT TOPICAL ONCE
OUTPATIENT
Start: 2022-12-22 | End: 2022-12-22

## 2022-12-22 RX ORDER — CLOBETASOL PROPIONATE 0.5 MG/G
OINTMENT TOPICAL ONCE
OUTPATIENT
Start: 2022-12-22 | End: 2022-12-22

## 2022-12-22 RX ORDER — LIDOCAINE 40 MG/G
CREAM TOPICAL ONCE
OUTPATIENT
Start: 2022-12-22 | End: 2022-12-22

## 2022-12-22 RX ADMIN — LIDOCAINE HYDROCHLORIDE 6 ML: 20 JELLY TOPICAL at 13:06

## 2022-12-22 NOTE — PROGRESS NOTES
[address]   Progress Note and Procedure Note      dE Pineda  MEDICAL RECORD NUMBER:  [id number]  AGE: 71 y.o. GENDER: female  : 1950  EPISODE DATE:  2022    Subjective:     Chief Complaint   Patient presents with    Wound Check         HISTORY of PRESENT ILLNESS HPI     Ed Pineda is a 71 y.o. female who presents today for wound/ulcer evaluation. History of Wound Context: follow up for left thigh wound that had been present for about 2 months. It originally presented as a smaller wound just prior to hospital discharge to [facility] and per the patient was never addressed and later became larger and infected. The patient has never had surgery on this area, but she does have chronic left hip pain/osteoarthritis. She is being seen by RAFFI Gilliland and currently on PO Keflex. She is hoping for wound healing so she can have future hip arthoplasty.  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 [employer] history: using wound vac with no concerns    Wound/Ulcer Pain Timing/Severity: intermittent  Quality of pain: aching  Severity:  2 / 10   Modifying Factors: None, Pain worsens with walking, and Pain is relieved/improved with rest  Associated Signs/Symptoms: erythema and drainage    Wound/Ulcer Identification:  Ulcer Type: undetermined  Contributing Factors: edema, decreased mobility, and obesity          PAST MEDICAL HISTORY        Diagnosis Date    Acute respiratory failure with hypoxia ([address] 2022    Aspiration pneumonia ([address] 2022    Chronic back pain     Chronic hip pain     COPD (chronic obstructive pulmonary disease) ([address]     Major depression     Osteoarthritis     Osteoporosis        PAST SURGICAL HISTORY    Past Surgical History:   Procedure Laterality Date    BREAST BIOPSY      two    CHOLECYSTECTOMY      HYSTERECTOMY, VAGINAL         FAMILY HISTORY    Family History   Problem Relation Age of Onset    Stroke Mother     Alzheimer's Disease Father        SOCIAL HISTORY    Social History     Tobacco Use    Smoking status: Former     Packs/day: 0.75     Years: 34.00     Pack years: 25.50     Types: Cigarettes     Quit date: 3/20/2017     Years since quittin.7    Smokeless tobacco: Never   Vaping Use    Vaping Use: Never used   Substance Use Topics    Alcohol use: Not Currently     Comment: occasionally     Drug use: Never       ALLERGIES    Allergies   Allergen Reactions    Lyrica [Pregabalin] Hives    Demerol Hcl [Meperidine]     Fluoxetine     Paxil [Paroxetine Hcl]        MEDICATIONS    Current Outpatient Medications on File Prior to Encounter   Medication Sig Dispense Refill    apixaban (ELIQUIS) 5 MG TABS tablet Take 1 tablet by mouth 2 times daily 42 tablet 0    ferrous sulfate (IRON 325) 325 (65 Fe) MG tablet Take 1 tablet by mouth daily (with breakfast) 90 tablet 1    cephALEXin (KEFLEX) 500 MG capsule Take 1 capsule by mouth 4 times daily 120 capsule 2    potassium chloride (KLOR-CON M) 20 MEQ extended release tablet TAKE 1 TABLET BY MOUTH THREE TIMES A DAY (Patient not taking: Reported on 2022)      desvenlafaxine succinate (PRISTIQ) 100 MG TB24 extended release tablet Take 1 tablet by mouth daily 90 tablet 1    SYMBICORT 160-4.5 MCG/ACT AERO Inhale 2 puffs into the lungs 2 times daily 10.2 g 5    tiotropium (SPIRIVA HANDIHALER) 18 MCG inhalation capsule Inhale 1 capsule into the lungs daily 90 capsule 1    montelukast (SINGULAIR) 10 MG tablet Take 1 tablet by mouth nightly 90 tablet 1    promethazine (PHENERGAN) 25 MG tablet Take 1 tablet by mouth every 6 hours as needed (as needed for nausea) 60 tablet 1    metoprolol tartrate (LOPRESSOR) 25 MG tablet Take 1 tablet by mouth 2 times daily 60 tablet 3    albuterol sulfate HFA (PROVENTIL;VENTOLIN;PROAIR) 108 (90 Base) MCG/ACT inhaler Inhale 2 puffs into the lungs every 6 hours as needed for Wheezing 3 each 1    tiZANidine (ZANAFLEX) 4 MG tablet Take 1 tablet by mouth every 8 hours as needed (muscle spasm) 270 tablet 1    thiamine 100 MG tablet Take 1 tablet by mouth daily 30 tablet 3    folic acid (FOLVITE) 1 MG tablet Take 1 tablet by mouth daily 30 tablet 3    ondansetron (ZOFRAN ODT) 4 MG disintegrating tablet Take 1 tablet by mouth every 8 hours as needed for Nausea or Vomiting (Patient not taking: Reported on 12/5/2022) 20 tablet 0    vitamin D (ERGOCALCIFEROL) 1.25 MG (55586 UT) CAPS capsule Take 1 capsule by mouth once a week 12 capsule 1    alendronate (FOSAMAX) 70 MG tablet Take 1 tablet by mouth once a week 12 tablet 1    Handicap Placard MISC by Does not apply route Expires 11/2025 1 each 0    morphine (MS CONTIN) 15 MG extended release tablet 3 times daily. No current facility-administered medications on file prior to encounter.        REVIEW OF SYSTEMS    Constitutional: negative for chills and fevers  Respiratory: negative for cough and shortness of breath  Cardiovascular: positive for lower extremity edema, negative for chest pain and palpitations  Genitourinary:negative for urinary incontinence  Integument: positive for left posterior thigh wound  Musculoskeletal:negative for arthralgias  Behavioral/Psych: negative  Endocrine: negative  Hematologic/Immunologic: negative    Objective:      LMP  (LMP Unknown)     Wt Readings from Last 3 Encounters:   12/08/22 210 lb (95.3 kg)   12/06/22 210 lb (95.3 kg)   12/05/22 210 lb (95.3 kg)       PHYSICAL EXAM    General Appearance: alert and oriented to person, place and time, well-developed and well-nourished, in no acute distress  Skin: Left posterior thigh wound-see below  Head: normocephalic and atraumatic  Pulmonary/Chest: normal air movement, no respiratory distress  Extremities: no cyanosis and no clubbing or edema   Musculoskeletal: no joint swelling, deformity or tenderness  Neurologic: gait slow and steady with walker, coordination normal and speech normal      Assessment:     Problem List Items Addressed This Visit          Other    MSSA (methicillin susceptible Staphylococcus aureus) septicemia ([facility]    Relevant Orders    Initiate Outpatient Wound Care Protocol    Chronic ulcer of left thigh with fat layer exposed ([facility] - Primary    Relevant Orders    Initiate Outpatient Wound Care Protocol        Procedure Note  Indications:  Based on my examination of this patient's wound(s)/ulcer(s) today, debridement is required to promote healing and evaluate the wound base. Performed by: MITESH Melchor CNP    Consent obtained:  Yes    Time out taken:  Yes    Pain Control:         Debridement:Excisional Debridement    Using curette the wound(s)/ulcer(s) was/were sharply debrided down through and including the removal of subcutaneous tissue. Devitalized Tissue Debrided:  fibrin, biofilm, and slough    Pre Debridement Measurements:  Are located in the [facility]  Documentation Flow Sheet    Wound/Ulcer #: 1    Post Debridement Measurements:  Wound/Ulcer Descriptions are Pre Debridement except measurements:    Wound 12/08/22 Thigh Left;Posterior #1 (Active)   Wound Image   12/08/22 1342   Wound Etiology Other 12/22/22 1305   Dressing Status New drainage noted; Old drainage noted 12/22/22 1305   Wound Cleansed Cleansed with saline 12/22/22 1305   Dressing/Treatment Other (comment) 12/08/22 1448   Wound Length (cm) 3 cm 12/22/22 1305   Wound Width (cm) 3.5 cm 12/22/22 1305   Wound Depth (cm) 5.5 cm 12/22/22 1305   Wound Surface Area (cm^2) 10.5 cm^2 12/22/22 1305   Change in Wound Size % (l*w) 16.67 12/22/22 1305   Wound Volume (cm^3) 57.75 cm^3 12/22/22 1305   Wound Healing % -15 12/22/22 1305   Post-Procedure Length (cm) 3.2 cm 12/15/22 1318   Post-Procedure Width (cm) 4.5 cm 12/15/22 1318   Post-Procedure Depth (cm) 4 cm 12/15/22 1318   Post-Procedure Surface Area (cm^2) 14.4 cm^2 12/15/22 1318   Post-Procedure Volume (cm^3) 57.6 cm^3 12/15/22 1318 Undermining Starts ___ O'Clock 10 12/22/22 1305   Undermining Ends___ O'Clock 12 12/22/22 1305   Undermining Maxium Distance (cm) [email or web address] 12/22/22 1305   Wound Assessment Pink/red;Slough; Exposed structure fascia 12/22/22 1305   Drainage Amount Moderate 12/22/22 1305   Drainage Description Serosanguinous; Yellow 12/22/22 1305   Odor None 12/22/22 1305   Jess-wound Assessment Blanchable erythema 12/22/22 1305   Margins Defined edges 12/22/22 1305   Wound Thickness Description not for Pressure Injury Full thickness 12/22/22 1305   Number of days: 13          Percent of Wound(s)/Ulcer(s) Debrided: 100%    Total Surface Area Debrided:  10.5 sq cm     Diabetic/Pressure/Non Pressure Ulcers only:  Ulcer: Non-Pressure ulcer, fat layer exposed      Estimated Blood Loss:  Minimal    Hemostasis Achieved:  by pressure    Procedural Pain:  8  / 10     Post Procedural Pain:  3 / 10     Response to treatment:  Well tolerated by patient. Addressed wound healing factors:     [x] Diabetes: n/a           [x] CHF: n/a                               [x] Infection: currently on Keflex, managed by Dr. James Chairez, ID             [x] Debridement: yes- will require serial debridement  [x] Compliance; compression/offloading: discussed need for offloading and gave ideas      [x] Diagnostics: recent 11/8/22 hip x-ray done by ortho- last seen by them 12/6/22  [x] Smoking: n/a             Plan:     -cont NPWT    -RTC one week    -I have reviewed instructions with the patient, answering all questions to satisfaction.    -Patient to call or return to clinic as needed if wound symptoms worsen or fail to improve as anticipated.    -See Discharge Instructions for wound management orders. Written patient dismissal instructions given to patient and signed by patient or POA.            Electronically signed by MITESH Roe CNP on 12/22/2022 at 1:32 PM

## 2022-12-27 ENCOUNTER — TELEPHONE (OUTPATIENT)
Dept: FAMILY MEDICINE CLINIC | Age: 72
End: 2022-12-27

## 2022-12-28 NOTE — DISCHARGE INSTRUCTIONS
[address] [facility],[address] -Phone: [phone] Fax: [phone]    Visit  Discharge Instructions / Physician Orders     DATE: 12/29/2022     Home Care: [address] ORDERED THRU: [employer]      Wound Location: Left Posterior Thigh     Cleanse with: Liquid antibacterial soap and water, rinse well      Dressing Orders: Tushar Tran and Polo Vac: (Pack enough sponge into wound so sponge does not suck down into wound, should be bolstered out ) Skin prep and drape around wound, black foam into wound, bridge up thigh, continous suction at 120mmHg     Frequency: Change on Tuesday, Thursday, Saturday ([facility] will do on appointment days) (Bring new sponge and canister to appointments at wound care center)              Additional Orders: Increase protein to diet (meat, cheese, eggs, fish, peanut butter, nuts and beans)  Multivitamin daily     Your next appointment with [facility] is in 1 week     (Please note your next appointment above and if you are unable to keep, kindly give a 24 hour notice. Thank you.)  If more than 15 min late we cannot guarantee you will be seen due to clinician schedule  Per Policy, Excessive cancellation will call for dismissal from program.     If you experience any of the following, please call the [facility] during business hours:  [phone]  Your Phone call may be forwarded to [employer] during business hours that [facility] is closed. * Increase in Pain  * Temperature over 101  * Increase in drainage from your wound  * Drainage with a foul odor  * Bleeding  * Increase in swelling  * Need for compression bandage changes due to slippage, breakthrough drainage. If you need medical attention outside of the business hours of the [facility] please contact your PCP or go to the nearest emergency room.      The information contained in the After Visit Summary has been reviewed with me, the patient and/or responsible adult, by my health care provider(s). I had the opportunity to ask questions regarding this information.  I have elected to receive;      []After Visit Summary  [x]Comprehensive Discharge Instruction        Patient signature______________________________________Date:________  Electronically signed by Mickey Astorga RN on 12/29/2022 at 2:15 PM  Electronically signed by MITESH Levy CNP on 12/29/2022 at 2:05 PM

## 2022-12-29 ENCOUNTER — HOSPITAL ENCOUNTER (OUTPATIENT)
Dept: WOUND CARE | Age: 72
Discharge: HOME | End: 2022-12-29
Payer: COMMERCIAL

## 2022-12-29 VITALS
BODY MASS INDEX: 33.75 KG/M2 | TEMPERATURE: 97.9 F | SYSTOLIC BLOOD PRESSURE: 122 MMHG | DIASTOLIC BLOOD PRESSURE: 71 MMHG | HEART RATE: 81 BPM | RESPIRATION RATE: 18 BRPM | HEIGHT: 66 IN | WEIGHT: 210 LBS

## 2022-12-29 DIAGNOSIS — A41.01 MSSA (METHICILLIN SUSCEPTIBLE STAPHYLOCOCCUS AUREUS) SEPTICEMIA (HCC): ICD-10-CM

## 2022-12-29 DIAGNOSIS — L97.122 CHRONIC ULCER OF LEFT THIGH WITH FAT LAYER EXPOSED (HCC): Primary | ICD-10-CM

## 2022-12-29 PROCEDURE — 11042 DBRDMT SUBQ TIS 1ST 20SQCM/<: CPT

## 2022-12-29 PROCEDURE — 97605 NEG PRS WND THER DME<=50SQCM: CPT

## 2022-12-29 RX ORDER — CLOBETASOL PROPIONATE 0.5 MG/G
OINTMENT TOPICAL ONCE
OUTPATIENT
Start: 2022-12-29 | End: 2022-12-29

## 2022-12-29 RX ORDER — LIDOCAINE 50 MG/G
OINTMENT TOPICAL ONCE
OUTPATIENT
Start: 2022-12-29 | End: 2022-12-29

## 2022-12-29 RX ORDER — LIDOCAINE HYDROCHLORIDE 20 MG/ML
JELLY TOPICAL ONCE
OUTPATIENT
Start: 2022-12-29 | End: 2022-12-29

## 2022-12-29 RX ORDER — LIDOCAINE HYDROCHLORIDE 40 MG/ML
SOLUTION TOPICAL ONCE
OUTPATIENT
Start: 2022-12-29 | End: 2022-12-29

## 2022-12-29 RX ORDER — BACITRACIN ZINC AND POLYMYXIN B SULFATE 500; 1000 [USP'U]/G; [USP'U]/G
OINTMENT TOPICAL ONCE
OUTPATIENT
Start: 2022-12-29 | End: 2022-12-29

## 2022-12-29 RX ORDER — BACITRACIN, NEOMYCIN, POLYMYXIN B 400; 3.5; 5 [USP'U]/G; MG/G; [USP'U]/G
OINTMENT TOPICAL ONCE
OUTPATIENT
Start: 2022-12-29 | End: 2022-12-29

## 2022-12-29 RX ORDER — LIDOCAINE HYDROCHLORIDE 20 MG/ML
JELLY TOPICAL ONCE
Status: COMPLETED | OUTPATIENT
Start: 2022-12-29 | End: 2022-12-29

## 2022-12-29 RX ORDER — BETAMETHASONE DIPROPIONATE 0.05 %
OINTMENT (GRAM) TOPICAL ONCE
OUTPATIENT
Start: 2022-12-29 | End: 2022-12-29

## 2022-12-29 RX ORDER — GINSENG 100 MG
CAPSULE ORAL ONCE
OUTPATIENT
Start: 2022-12-29 | End: 2022-12-29

## 2022-12-29 RX ORDER — GENTAMICIN SULFATE 1 MG/G
OINTMENT TOPICAL ONCE
OUTPATIENT
Start: 2022-12-29 | End: 2022-12-29

## 2022-12-29 RX ORDER — LIDOCAINE 40 MG/G
CREAM TOPICAL ONCE
OUTPATIENT
Start: 2022-12-29 | End: 2022-12-29

## 2022-12-29 RX ADMIN — LIDOCAINE HYDROCHLORIDE 6 ML: 20 JELLY TOPICAL at 13:32

## 2022-12-29 ASSESSMENT — PAIN SCALES - GENERAL: PAINLEVEL_OUTOF10: 0

## 2022-12-29 NOTE — PROGRESS NOTES
[address]   Progress Note and Procedure Note      Shelbi Hoffmann  MEDICAL RECORD NUMBER:  [id number]  AGE: 71 y.o. GENDER: female  : 1950  EPISODE DATE:  2022    Subjective:     Chief Complaint   Patient presents with    Wound Check     Left thigh         HISTORY of PRESENT ILLNESS HPI     Shelbi Hoffmann is a 71 y.o. female who presents today for wound/ulcer evaluation. History of Wound Context: follow up for left thigh wound that had been present for about 2 months. It originally presented as a smaller wound just prior to hospital discharge to [facility] and per the patient was never addressed and later became larger and infected. The patient has never had surgery on this area, but she does have chronic left hip pain/osteoarthritis. She is being seen by RAFFI Martinez and currently on PO Keflex. She is hoping for wound healing so she can have future hip arthoplasty.  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[place] [address] Drive history: using wound vac, no concerns    Wound/Ulcer Pain Timing/Severity: intermittent  Quality of pain: aching  Severity:  2 / 10   Modifying Factors: None, Pain worsens with walking, and Pain is relieved/improved with rest  Associated Signs/Symptoms: erythema and drainage     Wound/Ulcer Identification:  Ulcer Type: undetermined  Contributing Factors: edema, decreased mobility, and obesity          PAST MEDICAL HISTORY        Diagnosis Date    Acute respiratory failure with hypoxia ([address] 2022    Aspiration pneumonia ([address] 2022    Chronic back pain     Chronic hip pain     COPD (chronic obstructive pulmonary disease) ([address]     Major depression     Osteoarthritis     Osteoporosis        PAST SURGICAL HISTORY    Past Surgical History:   Procedure Laterality Date    BREAST BIOPSY      two    CHOLECYSTECTOMY      HYSTERECTOMY, VAGINAL         FAMILY HISTORY    Family History   Problem Relation Age of Onset    Stroke Mother     Alzheimer's Disease Father        SOCIAL HISTORY    Social History     Tobacco Use    Smoking status: Former     Packs/day: 0.75     Years: 34.00     Pack years: 25.50     Types: Cigarettes     Quit date: 3/20/2017     Years since quittin.7    Smokeless tobacco: Never   Vaping Use    Vaping Use: Never used   Substance Use Topics    Alcohol use: Not Currently     Comment: occasionally     Drug use: Never       ALLERGIES    Allergies   Allergen Reactions    Lyrica [Pregabalin] Hives    Demerol Hcl [Meperidine]     Fluoxetine     Paxil [Paroxetine Hcl]        MEDICATIONS    Current Outpatient Medications on File Prior to Encounter   Medication Sig Dispense Refill    apixaban (ELIQUIS) 5 MG TABS tablet Take 1 tablet by mouth 2 times daily 42 tablet 0    ferrous sulfate (IRON 325) 325 (65 Fe) MG tablet Take 1 tablet by mouth daily (with breakfast) 90 tablet 1    cephALEXin (KEFLEX) 500 MG capsule Take 1 capsule by mouth 4 times daily 120 capsule 2    potassium chloride (KLOR-CON M) 20 MEQ extended release tablet TAKE 1 TABLET BY MOUTH THREE TIMES A DAY (Patient not taking: Reported on 2022)      desvenlafaxine succinate (PRISTIQ) 100 MG TB24 extended release tablet Take 1 tablet by mouth daily 90 tablet 1    SYMBICORT 160-4.5 MCG/ACT AERO Inhale 2 puffs into the lungs 2 times daily 10.2 g 5    tiotropium (SPIRIVA HANDIHALER) 18 MCG inhalation capsule Inhale 1 capsule into the lungs daily 90 capsule 1    montelukast (SINGULAIR) 10 MG tablet Take 1 tablet by mouth nightly 90 tablet 1    promethazine (PHENERGAN) 25 MG tablet Take 1 tablet by mouth every 6 hours as needed (as needed for nausea) 60 tablet 1    metoprolol tartrate (LOPRESSOR) 25 MG tablet Take 1 tablet by mouth 2 times daily 60 tablet 3    albuterol sulfate HFA (PROVENTIL;VENTOLIN;PROAIR) 108 (90 Base) MCG/ACT inhaler Inhale 2 puffs into the lungs every 6 hours as needed for Wheezing 3 each 1    tiZANidine (ZANAFLEX) 4 MG tablet Take 1 tablet by mouth every 8 hours as needed (muscle spasm) 270 tablet 1    thiamine 100 MG tablet Take 1 tablet by mouth daily 30 tablet 3    folic acid (FOLVITE) 1 MG tablet Take 1 tablet by mouth daily 30 tablet 3    ondansetron (ZOFRAN ODT) 4 MG disintegrating tablet Take 1 tablet by mouth every 8 hours as needed for Nausea or Vomiting (Patient not taking: Reported on 12/5/2022) 20 tablet 0    vitamin D (ERGOCALCIFEROL) 1.25 MG (85943 UT) CAPS capsule Take 1 capsule by mouth once a week 12 capsule 1    alendronate (FOSAMAX) 70 MG tablet Take 1 tablet by mouth once a week 12 tablet 1    Handicap Placard MISC by Does not apply route Expires 11/2025 1 each 0    morphine (MS CONTIN) 15 MG extended release tablet 3 times daily. No current facility-administered medications on file prior to encounter.        REVIEW OF SYSTEMS    Constitutional: negative for chills and fevers  Respiratory: negative for cough and shortness of breath  Cardiovascular: positive for lower extremity edema, negative for chest pain and palpitations  Genitourinary:negative for urinary incontinence  Integument: positive for left posterior thigh wound  Musculoskeletal:negative for arthralgias  Behavioral/Psych: negative  Endocrine: negative  Hematologic/Immunologic: negative    Objective:      /71   Pulse 81   Temp 97.9 °F (36.6 °C) (Tympanic)   Resp 18   Ht 5' 6\" (1.676 m)   Wt 210 lb (95.3 kg)   LMP  (LMP Unknown)   BMI 33.89 kg/m²     Wt Readings from Last 3 Encounters:   12/29/22 210 lb (95.3 kg)   12/08/22 210 lb (95.3 kg)   12/06/22 210 lb (95.3 kg)       PHYSICAL EXAM    General Appearance: alert and oriented to person, place and time, well-developed and well-nourished, in no acute distress  Skin: Left posterior thigh wound-see below  Head: normocephalic and atraumatic  Pulmonary/Chest: normal air movement, no respiratory distress  Extremities: no cyanosis and no clubbing or edema Musculoskeletal: no joint swelling, deformity or tenderness  Neurologic: gait slow and steady with walker, coordination normal and speech normal      Assessment:     Problem List Items Addressed This Visit          Other    MSSA (methicillin susceptible Staphylococcus aureus) septicemia ([facility]    Relevant Orders    Initiate Outpatient Wound Care Protocol    Neg. Pressure Wound Therapy    Chronic ulcer of left thigh with fat layer exposed ([facility] - Primary    Relevant Orders    Initiate Outpatient Wound Care Protocol    Neg. Pressure Wound Therapy        Procedure Note  Indications:  Based on my examination of this patient's wound(s)/ulcer(s) today, debridement is required to promote healing and evaluate the wound base. Performed by: MITESH Moreno CNP    Consent obtained:  Yes    Time out taken:  Yes    Pain Control: Anesthetic  Anesthetic: 2% Lidocaine Gel Topical       Debridement:Excisional Debridement    Using curette the wound(s)/ulcer(s) was/were sharply debrided down through and including the removal of subcutaneous tissue. Devitalized Tissue Debrided:  fibrin, biofilm, and slough    Pre Debridement Measurements:  Are located in the [place]  Documentation Flow Sheet    Wound/Ulcer #: 1    Post Debridement Measurements:  Wound/Ulcer Descriptions are Pre Debridement except measurements:    Wound 12/08/22 Thigh Left;Posterior #1 (Active)   Wound Image   12/08/22 1342   Wound Etiology Other 12/29/22 1332   Dressing Status New drainage noted; Old drainage noted 12/29/22 1332   Wound Cleansed Soap and water 12/29/22 1332   Dressing/Treatment Other (comment) 12/08/22 1448   Wound Length (cm) 4.5 cm 12/29/22 1332   Wound Width (cm) 1.9 cm 12/29/22 1332   Wound Depth (cm) 5 cm 12/29/22 1332   Wound Surface Area (cm^2) 8.55 cm^2 12/29/22 1332   Change in Wound Size % (l*w) 32.14 12/29/22 1332   Wound Volume (cm^3) 42.75 cm^3 12/29/22 1332   Wound Healing % 15 12/29/22 1332   Post-Procedure Length (cm) 4.5 cm 12/29/22 1332   Post-Procedure Width (cm) 1.9 cm 12/29/22 1332   Post-Procedure Depth (cm) 5 cm 12/29/22 1332   Post-Procedure Surface Area (cm^2) 8.55 cm^2 12/29/22 1332   Post-Procedure Volume (cm^3) 42.75 cm^3 12/29/22 1332   Undermining Starts ___ O'Clock 10 12/22/22 1305   Undermining Ends___ O'Clock 12 12/22/22 1305   Undermining Maxium Distance (cm) [email or web address] 12/22/22 1305   Wound Assessment Slough;Pink/red 12/29/22 1332   Drainage Amount Moderate 12/29/22 1332   Drainage Description Serosanguinous; Yellow 12/29/22 1332   Odor None 12/29/22 1332   Jess-wound Assessment Blanchable erythema 12/29/22 1332   Margins Defined edges 12/29/22 1332   Wound Thickness Description not for Pressure Injury Full thickness 12/29/22 1332   Number of days: 21          Percent of Wound(s)/Ulcer(s) Debrided: 100%    Total Surface Area Debrided:  8.55 sq cm     Diabetic/Pressure/Non Pressure Ulcers only:  Ulcer: Non-Pressure ulcer, fat layer exposed      Estimated Blood Loss:  Minimal    Hemostasis Achieved:  by pressure    Procedural Pain:  5  / 10     Post Procedural Pain:  0 / 10     Response to treatment:  Well tolerated by patient.          Addressed wound healing factors:     [x] Diabetes: n/a           [x] CHF: n/a                               [x] Infection: currently on Keflex, managed by Dr. Gentry Queen, [place]             [x] Debridement: yes- will require serial debridement  [x] Compliance; compression/offloading: discussed need for offloading and gave ideas      [x] Diagnostics: recent 11/8/22 hip x-ray done by ortho- last seen by them 12/6/22  [x] Smoking: n/a             Plan:     -cont NPWT    -RTC one week, still managing slough tissue and working on cleaning wound bed    -Patient instructed to address cont offloading as a wound healing concern.    -I have reviewed instructions with the patient, answering all questions to satisfaction.    -Patient to call or return to clinic as needed if wound symptoms worsen or fail to improve as anticipated.    -See Discharge Instructions for wound management orders. Written patient dismissal instructions given to patient and signed by patient or POA.            Electronically signed by MITESH Lucero CNP on 12/29/2022 at 2:17 PM

## 2022-12-29 NOTE — PROGRESS NOTES
Negative Pressure Wound Therapy    NAME:  Serge Jordan  YOB: 1950  MEDICAL RECORD NUMBER:  [id number]  DATE:  12/29/2022    Applied Negative Pressure to left posterior thigh wound(s)/ulcer(s). [x] Applied skin barrier prep to ann-wound. [x] Cut strips of plastic drape to picture frame wound so that ann-wound is     covered with the drape. [x] If bridging dressing to less prominent site, cover any intact skin that will come in contact with the Negative Pressure Therapy sponge, gauze or channel drain with plastic drape. The sponge should never touch intact skin. [x] Cut sponge, gauze or channel drain to size which will fit into the wound/ulcer bed without being forced. [x] Be sure the sponge is large enough to hold the entire round plastic flange which is attached to the tubing. Never allow flange to be larger than the sponge or it will produce suction damaging intact skin. Total number of individual pieces of foam used within the wound bed: 1    [x] If bridging the dressing away from the primary site, be sure the bridge leads to a piece of sponge large enough to hold the entire flange without allowing any of the flange to overlap onto intact skin. [x] Covered sponge, gauze or channel drain with plastic drape. [x] Cut a hole in this plastic drape directly over the sponge the same size as the plastic drain tubing. [x] Removed plastic liner from flange and apply it directly over the hole you cut. [x] Removed the plastic cover from the flange. [x] Attached the tubing to the wound/ulcer Negative Pressure Therapy and turn it on to be sure a vacuum is created and that there are no leaks. [x] If air leaks occur, use plastic drape to patch them. [x] Secured Negative Pressure Therapy dressing with ace wrap loosely if located on an extremity. Maintain tubing outside of ace wrap. Tubing must not exert pressure on intact skin.     Applied per  Guidelines      Electronically signed by John Davey RN on 12/29/2022 at 2:26 PM

## 2023-01-03 NOTE — DISCHARGE INSTRUCTIONS
[address] [facility] -Phone: [phone] Fax: [phone]    Visit  Discharge Instructions / Physician Orders     DATE: 1/5/2023     Home Care: [address] ORDERED THRU: [employer]      Wound Location: Left Posterior Thigh     Cleanse with: Liquid antibacterial soap and water, rinse well      Dressing Orders: Regi Curry and Polo Vac: (Pack enough sponge into wound so sponge does not suck down into wound, should be bolstered out ) Skin prep and drape around wound, black foam into wound, bridge up thigh, continous suction at 120mmHg     Frequency: Change on Tuesday, Thursday, Saturday ([facility] will do on appointment days) (Bring new sponge and canister to appointments at wound care center)              Additional Orders: Increase protein to diet (meat, cheese, eggs, fish, peanut butter, nuts and beans)  Multivitamin daily     Your next appointment with [facility] is in 1 week     (Please note your next appointment above and if you are unable to keep, kindly give a 24 hour notice. Thank you.)  If more than 15 min late we cannot guarantee you will be seen due to clinician schedule  Per Policy, Excessive cancellation will call for dismissal from program.     If you experience any of the following, please call the [facility] during business hours:  [phone]  Your Phone call may be forwarded to [facility] [employer] during business hours that [address] [place] [address] is closed. * Increase in Pain  * Temperature over 101  * Increase in drainage from your wound  * Drainage with a foul odor  * Bleeding  * Increase in swelling  * Need for compression bandage changes due to slippage, breakthrough drainage. If you need medical attention outside of the business hours of the [facility] please contact your PCP or go to the nearest emergency room.      The information contained in the After Visit Summary has been reviewed with me, the patient and/or responsible adult, by my health care provider(s). I had the opportunity to ask questions regarding this information.  I have elected to receive;      []After Visit Summary  [x]Comprehensive Discharge Instruction        Patient signature______________________________________Date:________  Electronically signed by Malaika Matos RN on 1/5/2023 at 1:41 PM  Electronically signed by MITESH Red CNP on 1/5/2023 at 1:45 PM

## 2023-01-04 ENCOUNTER — OFFICE VISIT (OUTPATIENT)
Dept: INFECTIOUS DISEASES | Age: 73
End: 2023-01-04
Payer: COMMERCIAL

## 2023-01-04 VITALS
HEIGHT: 66 IN | HEART RATE: 83 BPM | SYSTOLIC BLOOD PRESSURE: 128 MMHG | DIASTOLIC BLOOD PRESSURE: 69 MMHG | WEIGHT: 200 LBS | BODY MASS INDEX: 32.14 KG/M2

## 2023-01-04 DIAGNOSIS — M86.9 HIP OSTEOMYELITIS, LEFT (HCC): Primary | ICD-10-CM

## 2023-01-04 DIAGNOSIS — A49.01 MSSA (METHICILLIN SUSCEPTIBLE STAPHYLOCOCCUS AUREUS) INFECTION: ICD-10-CM

## 2023-01-04 PROCEDURE — 1123F ACP DISCUSS/DSCN MKR DOCD: CPT | Performed by: INTERNAL MEDICINE

## 2023-01-04 PROCEDURE — 99214 OFFICE O/P EST MOD 30 MIN: CPT | Performed by: INTERNAL MEDICINE

## 2023-01-04 ASSESSMENT — ENCOUNTER SYMPTOMS
EYE DISCHARGE: 0
EYE PAIN: 0
ABDOMINAL DISTENTION: 0
BACK PAIN: 1
COLOR CHANGE: 0
APNEA: 0
EYE ITCHING: 0

## 2023-01-04 NOTE — PROGRESS NOTES
[facility] - Initial Consult Note  Today's Date: 1/4/2023    Impression :   Yee Patel [id number] visit from October 2022 followed by discharge to [facility]  MSSA septicemia 9/24/22 with a left severe hip pain  Kleb UTI 9/24 - treated  Altered mentation, acute metabolic encephalopathy resolved  septic shock resolved   SIRS from infection  has low back pain for the sciatica that are old, 3to 11years old, since 2017-they have not changed and hence most likely not infected  Chronic left hip pain - Chronic lower back pain with bilateral sciatica  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  - -and new Left post thigh laceration that develppoed an abscess / left trochanteric abscess   12/5/22 abscess resolved, and still has a deep wound that is addressed at the wound care - still deep and w some slough   keep wound care  12/5/22-Dr. Lucero Santos planning to operate on the left infected hip only after the left posterior thigh wound completely heals to avoid cross infections  AB unasyn then amcef then zosyn them meropenem till 11/21/22 stopped and left NH - crp was 14, WBc NL  12/5/2022, left very painful -start the patient on a long chronic suppressive therapy with keflex 500 mg po 3  x per day and until the left hip surgery occurred  FU CRP and CBC every 4 weeks  12/5 lower back pain and sciatica are stable still and hence likely not infected area    Rp    Prediabetes  COPD  Obstructive sleep apnea  A. fib paroxysmal  9/29 increasing oxygen requirement and increasing leukocytosis-possible aspiration event - treated      Recommendations   Plan:  I suspect at this point we have a infected left hip, that has been long treated, persistent pain, but I cannot rule out 100% of the infection has been eradicated from it since it has not been yet addressed surgically. For this reason I would rather give her Keflex 3 times a day, ongoing for at least a month, and onto the surgery to the left hip of course, to make sure by then it is fully cleaned  I am assuming that the left hip was infected by the same MSSA that grew in her blood in September 2022-at that time her pain was too much to be able to tolerate any imaging procedure and hence nothing was done to that hip. Actually we retrospectively treated her with antibiotic until she could tolerate the procedure and the CTwas hence done in November 2022    We will get also a CRP monthly, CBC monthly, I will see her in about 4 weeks    1/4/23  Still on keflex tid till wound closes and till surg done on the hip - will decide then if needed long term or not, depending on the OR findings  OR/ left hip replacement not planned till the fall    Last CRP 12/6/ was 36, cbc was ok  Will need a repeat     Diagnosis Orders   1. Hip osteomyelitis, left (HCC)  C-Reactive Protein    CBC with Auto Differential    C-Reactive Protein    CBC with Auto Differential      2. MSSA (methicillin susceptible Staphylococcus aureus) infection            Return in about 3 months (around 4/4/2023).       History of Present Illness:   Calixto Reynoso is a 71y.o.-year-old  female who presents with   Chief Complaint   Patient presents with    Frequent Infections     Follow up    Yee Patel [id number] visit from October 2022 followed by discharge to [facility]  MSSA septicemia 9/24/22  Kleb UTI 9/24  Left lower lobe infiltrate likely atelectasis   Bandemia  Altered mentation, acute metabolic encephalopathy   septic shock resolved   SIRS from infection  Chronic left hip pain  Chronic lower back pain with bilateral sciatica  Prediabetes  COPD  Obstructive sleep apnea  A. fib paroxysmal  9/29 increasing oxygen requirement and increasing leukocytosis-possible aspiration event    Kleb UTI from 9/24 - treated w ancef   On Ancef for MSSA septicemia coming from home, and kleb UTI   There is a concern of aspiration and the patient has increased WBC and increased oxygen requirement  Switch to unasyn  9/30  till 10/7 then back to ancef till 10/24  CXR shows possibly new infiltrates bilat LL  suggesting aspiration pneumonia   DANE to look for possible vegetation to explain the MSSA bacteremia from home - could not tolerate laying down  could not tolerate bone scan or MR Is due to back pain-  Looking at her spine or hips evaluating for any infection hence and due to her altered mentation     Plan:  Patient to be treated with abx for 6 weeks to allow pain to resolve before MRI    [address] 11/17/22  Left trochanter wound with induration noted to the superior edge. tan slough, foul odor. WBC  6.1-12.4 in 1-week. CRP increased from 12.4-133.5. Patient continues to c/o left hip pain. Denies any lumbar, sacral pain. MRI imaging recommended to look for possible psoas abscess, hip abscess. Patient states she can not tolerate MRI. Will escalate antibiotics to Zosyn IV, until induration resolves. Will need at least 6-months suppression with Keflex po , imaging at a later date. CRP continues to escalate, 183.5 today:133 .5 Tuesday. Blood cultures 1/2 showing GPC in clusters, Wound cx showing GPC in pairs, GNR. Continues to c/o L hip pain. Concern for Psoas abscess, abscess to left hip. Patient has agreed to CT of the left hip with IV contrast.    Induration to wound has decreased since starting Zosyn. Odor to wound has decreased . Wound bed continues with 95% tan slough, 5% necrotic tissue. Blood cultures 1/2 showing GPC in clusters. Possible contamination. BC repeated today. Pull PICC place midline. Cefazolin stopped 11/1/22 switched to Zosyn. Zosyn switched to Meropenem 1 gm IV every 8 hours 11/7/22.   Wound cx growing Proteus Mirabilis. Klebsiella Pneumoniae resistant to Zosyn. Klebsiella likely ESBL per sensitivity pattern although , tested negative per lab. CT hip 11/4/22 -posterior lateral proximal thigh soft tissue wound with multifocal soft tissue gas consistent with abscess, severe erosive arthropathy left hip, septic arthritis suspected  Urgent appointment made with Dr. Susana Dietrich Surgeon 11/2 CT results from L hip.  saw Dr. Marye Gitelman in the office -  Options were given to aggressively surgically debride the left trochanter wound or continue antibiotics and wound care, return in 4-weeks. Patient would like to continue antibiotics and wound care. [facility] wound care onboard. Meropenem 1 gm IV IV every 8 hours until 11/21/22.(14 days treatment)  - CRP and WBC responded well - CRP 11/18/22 15 -WBC  8.7-  Left orchard villae off AB      12/5/22 comes for a FU   Left hip very painful she is not able to stand up without supporting herself with her hands, standing and sitting seem to be problematic. The left thigh posterior wound used to be a laceration then became an abscess, currently is a deep hole that has been packed and it looks with slough but no signs of persistent abscess. She sees wound care for that wound. And friends have been packing her wound at home otherwise    Otherwise exam is benign, she seems to be having left lower extremity small wound with infected scab, that I debrided on the bedside cleaning it and peeling it, the Guillaume Lillian is very clean and red beefy, bleeding slightly, and I dressed it. She sees Dr. Marye Gitelman in about 4 weeks, and he had wanted to wait until the posterior wound heals completely before he could consider addressing the left infected hip. Plan:  I suspect at this point we have a infected left hip, that has been long treated, persistent pain, but I cannot rule out 100% of the infection has been eradicated from it since it has not been yet addressed surgically.   For this reason I would rather give her Keflex 3 times a day, ongoing for at least a month, and onto the surgery to the left hip of course, to make sure by then it is fully cleaned    We will get also a CRP monthly, CBC monthly, I will see her in about 4 weeks    Visit 1/4/23  Left hip wound is clean and deep - VAC removed and area looks ok - still weak and w pain - no fever  Is home and doing ok w some challenges -  On a wheel chair in my office    Still on keflex tid till wound closes and till surg done on the hip - will decide then if needed long term or not, depending on the OR findings  OR/ left hip replacement not planned till the fall    Last CRP 12/6/ was 36, cbc was ok  Will need a repeat          I have personally reviewed the past medical history, past surgical history, medications, social history, and family history, and I haveupdated the database accordingly.   Past Medical History:     Past Medical History:   Diagnosis Date    Acute respiratory failure with hypoxia ([facility] [address] 9/30/2022    Aspiration pneumonia ([facility] [address] 9/30/2022    Chronic back pain     Chronic hip pain     COPD (chronic obstructive pulmonary disease) (HCC)     Major depression     Osteoarthritis     Osteoporosis        Past Surgical  History:     Past Surgical History:   Procedure Laterality Date    BREAST BIOPSY      two    CHOLECYSTECTOMY      HYSTERECTOMY, VAGINAL         Medications:     Current Outpatient Medications:     apixaban (ELIQUIS) 5 MG TABS tablet, Take 1 tablet by mouth 2 times daily, Disp: 42 tablet, Rfl: 0    ferrous sulfate (IRON 325) 325 (65 Fe) MG tablet, Take 1 tablet by mouth daily (with breakfast), Disp: 90 tablet, Rfl: 1    cephALEXin (KEFLEX) 500 MG capsule, Take 1 capsule by mouth 4 times daily, Disp: 120 capsule, Rfl: 2    potassium chloride (KLOR-CON M) 20 MEQ extended release tablet, , Disp: , Rfl:     desvenlafaxine succinate (PRISTIQ) 100 MG TB24 extended release tablet, Take 1 tablet by mouth daily, Disp: 90 tablet, Rfl: 1    SYMBICORT 160-4.5 MCG/ACT AERO, Inhale 2 puffs into the lungs 2 times daily, Disp: 10.2 g, Rfl: 5    tiotropium (SPIRIVA HANDIHALER) 18 MCG inhalation capsule, Inhale 1 capsule into the lungs daily, Disp: 90 capsule, Rfl: 1    montelukast (SINGULAIR) 10 MG tablet, Take 1 tablet by mouth nightly, Disp: 90 tablet, Rfl: 1    promethazine (PHENERGAN) 25 MG tablet, Take 1 tablet by mouth every 6 hours as needed (as needed for nausea), Disp: 60 tablet, Rfl: 1    metoprolol tartrate (LOPRESSOR) 25 MG tablet, Take 1 tablet by mouth 2 times daily, Disp: 60 tablet, Rfl: 3    albuterol sulfate HFA (PROVENTIL;VENTOLIN;PROAIR) 108 (90 Base) MCG/ACT inhaler, Inhale 2 puffs into the lungs every 6 hours as needed for Wheezing, Disp: 3 each, Rfl: 1    tiZANidine (ZANAFLEX) 4 MG tablet, Take 1 tablet by mouth every 8 hours as needed (muscle spasm), Disp: 270 tablet, Rfl: 1    thiamine 100 MG tablet, Take 1 tablet by mouth daily, Disp: 30 tablet, Rfl: 3    folic acid (FOLVITE) 1 MG tablet, Take 1 tablet by mouth daily, Disp: 30 tablet, Rfl: 3    ondansetron (ZOFRAN ODT) 4 MG disintegrating tablet, Take 1 tablet by mouth every 8 hours as needed for Nausea or Vomiting, Disp: 20 tablet, Rfl: 0    vitamin D (ERGOCALCIFEROL) 1.25 MG (82618 UT) CAPS capsule, Take 1 capsule by mouth once a week, Disp: 12 capsule, Rfl: 1    alendronate (FOSAMAX) 70 MG tablet, Take 1 tablet by mouth once a week, Disp: 12 tablet, Rfl: 1    Handicap Placard MISC, by Does not apply route Expires 11/2025, Disp: 1 each, Rfl: 0    morphine (MS CONTIN) 15 MG extended release tablet, 3 times daily. , Disp: , Rfl:       Social History:     Social History     Socioeconomic History    Marital status: Single     Spouse name: Not on file    Number of children: Not on file    Years of education: Not on file    Highest education level: Not on file   Occupational History    Not on file   Tobacco Use    Smoking status: Former     Packs/day: 0.75     Years: 34.00     Pack years: 25.50     Types: Cigarettes     Quit date: 3/20/2017     Years since quittin.7    Smokeless tobacco: Never   Vaping Use    Vaping Use: Never used   Substance and Sexual Activity    Alcohol use: Not Currently     Comment: occasionally     Drug use: Never    Sexual activity: Not on file   Other Topics Concern    Not on file   Social History Narrative    Not on file     Social Determinants of Health     Financial Resource Strain: Low Risk     Difficulty of Paying Living Expenses: Not hard at all   Food Insecurity: No Food Insecurity    Worried About Running Out of Food in the Last Year: Never true    Ran Out of Food in the Last Year: Never true   Transportation Needs: Not on file   Physical Activity: Not on file   Stress: Not on file   Social Connections: Not on file   Intimate Partner Violence: Not on file   Housing Stability: Not on file       Family History:     Family History   Problem Relation Age of Onset    Stroke Mother     Alzheimer's Disease Father         Allergies:   Lyrica [pregabalin], Demerol hcl [meperidine], Fluoxetine, and Paxil [paroxetine hcl]     Review of Systems:   Review of Systems   Constitutional:  Positive for activity change. HENT:  Negative for congestion. Eyes:  Negative for pain, discharge and itching. Respiratory:  Negative for apnea. Cardiovascular:  Negative for chest pain. Gastrointestinal:  Negative for abdominal distention. Endocrine: Negative for cold intolerance. Genitourinary:  Negative for dysuria. Musculoskeletal:  Positive for arthralgias and back pain. Skin:  Positive for wound. Negative for color change. Allergic/Immunologic: Negative for immunocompromised state. Neurological:  Negative for dizziness. Hematological:  Negative for adenopathy. Psychiatric/Behavioral:  Negative for agitation. Physical Examination :   Blood pressure 128/69, pulse 83, height 5' 6\" (1.676 m), weight 200 lb (90.7 kg).     Physical Exam  Constitutional: Appearance: Normal appearance. She is not ill-appearing or diaphoretic. HENT:      Head: Normocephalic and atraumatic. Nose: Nose normal.      Mouth/Throat:      Mouth: Mucous membranes are moist.   Eyes:      General: No scleral icterus. Pupils: Pupils are equal, round, and reactive to light. Cardiovascular:      Rate and Rhythm: Normal rate and regular rhythm. Heart sounds: Normal heart sounds. No murmur heard. Pulmonary:      Effort: No respiratory distress. Breath sounds: Normal breath sounds. Abdominal:      General: There is no distension. Tenderness: There is no abdominal tenderness. Genitourinary:     Comments: No cobb  Musculoskeletal:         General: No swelling or deformity. Cervical back: Neck supple. No rigidity or tenderness. Skin:     Coloration: Skin is not jaundiced. Findings: No bruising. Neurological:      Mental Status: She is alert and oriented to person, place, and time. Cranial Nerves: No cranial nerve deficit. Psychiatric:         Mood and Affect: Mood normal.         Thought Content:  Thought content normal.         Medical Decision Making:   I have independently reviewed/ordered the following labs:    CBCwith Differential:   Lab Results   Component Value Date/Time    WBC 9.5 12/06/2022 01:05 PM    WBC 8.7 11/18/2022 07:26 AM    HGB 10.0 12/06/2022 01:05 PM    HGB 10.0 11/18/2022 07:26 AM    HCT 32.3 12/06/2022 01:05 PM    HCT 33.0 11/18/2022 07:26 AM     12/06/2022 01:05 PM     11/18/2022 07:26 AM    LYMPHOPCT 21 12/06/2022 01:05 PM    LYMPHOPCT 24 11/15/2022 06:24 AM    MONOPCT 6 12/06/2022 01:05 PM    MONOPCT 5 11/15/2022 06:24 AM     BMP:  Lab Results   Component Value Date/Time     12/01/2022 09:40 AM     11/15/2022 06:24 AM    K 3.9 12/01/2022 09:40 AM    K 4.5 11/15/2022 06:24 AM     12/01/2022 09:40 AM     11/15/2022 06:24 AM    CO2 22 12/01/2022 09:40 AM    CO2 24 11/15/2022 06:24 AM BUN 15 12/01/2022 09:40 AM    BUN 12 11/15/2022 06:24 AM    CREATININE 0.76 12/01/2022 09:40 AM    CREATININE 0.36 11/15/2022 06:24 AM    MG 2.1 10/05/2022 06:52 AM    MG 2.0 10/04/2022 04:19 AM     Hepatic Function Panel:   Lab Results   Component Value Date/Time    PROT 7.7 12/01/2022 09:40 AM    PROT 7.5 11/15/2022 06:24 AM    LABALBU 3.5 12/01/2022 09:40 AM    LABALBU 3.0 11/15/2022 06:24 AM    BILITOT 0.4 12/01/2022 09:40 AM    BILITOT 0.2 11/15/2022 06:24 AM    ALKPHOS 93 12/01/2022 09:40 AM    ALKPHOS 100 11/15/2022 06:24 AM    ALT 12 12/01/2022 09:40 AM    ALT 13 11/15/2022 06:24 AM    AST 19 12/01/2022 09:40 AM    AST 23 11/15/2022 06:24 AM     No results found for: RPR  No results found for: HIV  No results found for: [facility]  Lab Results   Component Value Date/Time    RBC 3.85 12/06/2022 01:05 PM    WBC 9.5 12/06/2022 01:05 PM    TURBIDITY Cloudy 09/24/2022 03:39 PM     Lab Results   Component Value Date/Time    CREATININE 0.76 12/01/2022 09:40 AM    GLUCOSE 60 12/01/2022 09:40 AM   you for allowing us to participate in the care of this patient. Please call with questions. Kamini Rios MD  - Office: [phone]    Please note that this chart was generated using voice recognition Dragon dictation software. Although every effort was made to ensure the accuracy of this automated transcription, some errors in transcription mayhave occurred.

## 2023-01-05 ENCOUNTER — HOSPITAL ENCOUNTER (OUTPATIENT)
Dept: WOUND CARE | Age: 73
Discharge: HOME | End: 2023-01-05
Payer: COMMERCIAL

## 2023-01-05 DIAGNOSIS — L97.122 CHRONIC ULCER OF LEFT THIGH WITH FAT LAYER EXPOSED (HCC): Primary | ICD-10-CM

## 2023-01-05 DIAGNOSIS — A41.01 MSSA (METHICILLIN SUSCEPTIBLE STAPHYLOCOCCUS AUREUS) SEPTICEMIA (HCC): ICD-10-CM

## 2023-01-05 PROCEDURE — 97605 NEG PRS WND THER DME<=50SQCM: CPT

## 2023-01-05 PROCEDURE — 11042 DBRDMT SUBQ TIS 1ST 20SQCM/<: CPT

## 2023-01-05 RX ORDER — LIDOCAINE HYDROCHLORIDE 20 MG/ML
JELLY TOPICAL ONCE
Status: COMPLETED | OUTPATIENT
Start: 2023-01-05 | End: 2023-01-05

## 2023-01-05 RX ORDER — BACITRACIN, NEOMYCIN, POLYMYXIN B 400; 3.5; 5 [USP'U]/G; MG/G; [USP'U]/G
OINTMENT TOPICAL ONCE
OUTPATIENT
Start: 2023-01-05 | End: 2023-01-05

## 2023-01-05 RX ORDER — LIDOCAINE 40 MG/G
CREAM TOPICAL ONCE
OUTPATIENT
Start: 2023-01-05 | End: 2023-01-05

## 2023-01-05 RX ORDER — LIDOCAINE HYDROCHLORIDE 20 MG/ML
JELLY TOPICAL ONCE
OUTPATIENT
Start: 2023-01-05 | End: 2023-01-05

## 2023-01-05 RX ORDER — LIDOCAINE 50 MG/G
OINTMENT TOPICAL ONCE
OUTPATIENT
Start: 2023-01-05 | End: 2023-01-05

## 2023-01-05 RX ORDER — GENTAMICIN SULFATE 1 MG/G
OINTMENT TOPICAL ONCE
OUTPATIENT
Start: 2023-01-05 | End: 2023-01-05

## 2023-01-05 RX ORDER — GINSENG 100 MG
CAPSULE ORAL ONCE
OUTPATIENT
Start: 2023-01-05 | End: 2023-01-05

## 2023-01-05 RX ORDER — LIDOCAINE HYDROCHLORIDE 40 MG/ML
SOLUTION TOPICAL ONCE
OUTPATIENT
Start: 2023-01-05 | End: 2023-01-05

## 2023-01-05 RX ORDER — CLOBETASOL PROPIONATE 0.5 MG/G
OINTMENT TOPICAL ONCE
OUTPATIENT
Start: 2023-01-05 | End: 2023-01-05

## 2023-01-05 RX ORDER — BACITRACIN ZINC AND POLYMYXIN B SULFATE 500; 1000 [USP'U]/G; [USP'U]/G
OINTMENT TOPICAL ONCE
OUTPATIENT
Start: 2023-01-05 | End: 2023-01-05

## 2023-01-05 RX ORDER — BETAMETHASONE DIPROPIONATE 0.05 %
OINTMENT (GRAM) TOPICAL ONCE
OUTPATIENT
Start: 2023-01-05 | End: 2023-01-05

## 2023-01-05 RX ADMIN — LIDOCAINE HYDROCHLORIDE 6 ML: 20 JELLY TOPICAL at 13:24

## 2023-01-05 NOTE — PROGRESS NOTES
[address]   Progress Note and Procedure Note      Meseret Kaiser  MEDICAL RECORD NUMBER:  [id number]  AGE: 71 y.o. GENDER: female  : 1950  EPISODE DATE:  2023    Subjective:     Chief Complaint   Patient presents with    Wound Check     Left thigh posterior         HISTORY of PRESENT ILLNESS HPI     Meseret Kaiser is a 71 y.o. female who presents today for wound/ulcer evaluation. History of Wound Context: follow up for left thigh wound that had been present for about 2 months. It originally presented as a smaller wound just prior to hospital discharge to [facility] and per the patient was never addressed and later became larger and infected. The patient has never had surgery on this area, but she does have chronic left hip pain/osteoarthritis. She is being seen by Dr. Lucero Friend, ID and currently on PO Keflex. She is hoping for wound healing so she can have future hip arthoplasty. 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 She is having some financial concerns with [address] [place] [address] as they are wanting her deductible payment up front and she has not had income from her short term disability yet.       Wound/Ulcer Pain Timing/Severity: intermittent  Quality of pain: aching  Severity:  2 / 10   Modifying Factors: None, Pain worsens with walking, and Pain is relieved/improved with rest  Associated Signs/Symptoms: erythema and drainage     Wound/Ulcer Identification:  Ulcer Type: undetermined  Contributing Factors: edema, decreased mobility, and obesity           PAST MEDICAL HISTORY        Diagnosis Date    Acute respiratory failure with hypoxia ([facility]) 2022    Aspiration pneumonia ([facility]) 2022    Chronic back pain     Chronic hip pain     COPD (chronic obstructive pulmonary disease) ([facility])     Major depression     Osteoarthritis Osteoporosis        PAST SURGICAL HISTORY    Past Surgical History:   Procedure Laterality Date    BREAST BIOPSY      two    CHOLECYSTECTOMY      HYSTERECTOMY, VAGINAL         FAMILY HISTORY    Family History   Problem Relation Age of Onset    Stroke Mother     Alzheimer's Disease Father        SOCIAL HISTORY    Social History     Tobacco Use    Smoking status: Former     Packs/day: 0.75     Years: 34.00     Pack years: 25.50     Types: Cigarettes     Quit date: 3/20/2017     Years since quittin.8    Smokeless tobacco: Never   Vaping Use    Vaping Use: Never used   Substance Use Topics    Alcohol use: Not Currently     Comment: occasionally     Drug use: Never       ALLERGIES    Allergies   Allergen Reactions    Lyrica [Pregabalin] Hives    Demerol Hcl [Meperidine]     Fluoxetine     Paxil [Paroxetine Hcl]     Diclofenac-Misoprostol Rash    Lyrica Cr [Pregabalin Er] Rash       MEDICATIONS    Current Outpatient Medications on File Prior to Encounter   Medication Sig Dispense Refill    apixaban (ELIQUIS) 5 MG TABS tablet Take 1 tablet by mouth 2 times daily 42 tablet 0    ferrous sulfate (IRON 325) 325 (65 Fe) MG tablet Take 1 tablet by mouth daily (with breakfast) 90 tablet 1    potassium chloride (KLOR-CON M) 20 MEQ extended release tablet       desvenlafaxine succinate (PRISTIQ) 100 MG TB24 extended release tablet Take 1 tablet by mouth daily 90 tablet 1    SYMBICORT 160-4.5 MCG/ACT AERO Inhale 2 puffs into the lungs 2 times daily 10.2 g 5    tiotropium (SPIRIVA HANDIHALER) 18 MCG inhalation capsule Inhale 1 capsule into the lungs daily 90 capsule 1    montelukast (SINGULAIR) 10 MG tablet Take 1 tablet by mouth nightly 90 tablet 1    promethazine (PHENERGAN) 25 MG tablet Take 1 tablet by mouth every 6 hours as needed (as needed for nausea) 60 tablet 1    metoprolol tartrate (LOPRESSOR) 25 MG tablet Take 1 tablet by mouth 2 times daily 60 tablet 3    albuterol sulfate HFA (PROVENTIL;VENTOLIN;PROAIR) 108 (90 Base) MCG/ACT inhaler Inhale 2 puffs into the lungs every 6 hours as needed for Wheezing 3 each 1    tiZANidine (ZANAFLEX) 4 MG tablet Take 1 tablet by mouth every 8 hours as needed (muscle spasm) 270 tablet 1    thiamine 100 MG tablet Take 1 tablet by mouth daily 30 tablet 3    folic acid (FOLVITE) 1 MG tablet Take 1 tablet by mouth daily 30 tablet 3    ondansetron (ZOFRAN ODT) 4 MG disintegrating tablet Take 1 tablet by mouth every 8 hours as needed for Nausea or Vomiting 20 tablet 0    vitamin D (ERGOCALCIFEROL) 1.25 MG (36669 UT) CAPS capsule Take 1 capsule by mouth once a week 12 capsule 1    alendronate (FOSAMAX) 70 MG tablet Take 1 tablet by mouth once a week 12 tablet 1    Handicap Placard MISC by Does not apply route Expires 11/2025 1 each 0    morphine (MS CONTIN) 15 MG extended release tablet 3 times daily. No current facility-administered medications on file prior to encounter.        REVIEW OF SYSTEMS    Constitutional: negative for chills and fevers  Respiratory: negative for cough and shortness of breath  Cardiovascular: positive for lower extremity edema, negative for chest pain and palpitations  Genitourinary:negative for urinary incontinence  Integument: positive for left posterior thigh wound  Musculoskeletal:negative for arthralgias  Behavioral/Psych: negative  Endocrine: negative  Hematologic/Immunologic: negative    Objective:      LMP  (LMP Unknown)     Wt Readings from Last 3 Encounters:   01/04/23 200 lb (90.7 kg)   12/29/22 210 lb (95.3 kg)   12/08/22 210 lb (95.3 kg)       PHYSICAL EXAM    General Appearance: alert and oriented to person, place and time, well-developed and well-nourished, in no acute distress  Skin: Left posterior thigh wound-see below  Head: normocephalic and atraumatic  Pulmonary/Chest: normal air movement, no respiratory distress  Extremities: no cyanosis and no clubbing or edema   Musculoskeletal: no joint swelling, deformity or tenderness  Neurologic: gait slow and steady with walker, coordination normal and speech normal      Assessment:     Problem List Items Addressed This Visit          Other    MSSA (methicillin susceptible Staphylococcus aureus) septicemia ([facility] [address]    Relevant Orders    Initiate Outpatient Wound Care Protocol    Chronic ulcer of left thigh with fat layer exposed ([facility] [address] - Primary    Relevant Orders    Initiate Outpatient Wound Care Protocol        Procedure Note  Indications:  Based on my examination of this patient's wound(s)/ulcer(s) today, debridement is required to promote healing and evaluate the wound base. Performed by: Fleta Lennox, APRN - CNP    Consent obtained:  Yes    Time out taken:  Yes    Pain Control: Anesthetic  Anesthetic: 2% Lidocaine Gel Topical       Debridement:Excisional Debridement    Using curette the wound(s)/ulcer(s) was/were sharply debrided down through and including the removal of subcutaneous tissue. Devitalized Tissue Debrided:  fibrin, biofilm, and slough    Pre Debridement Measurements:  Are located in the [place]  Documentation Flow Sheet    Wound/Ulcer #: 1    Post Debridement Measurements:  Wound/Ulcer Descriptions are Pre Debridement except measurements:    Wound 12/08/22 Thigh Left;Posterior #1 (Active)   Wound Image   12/08/22 1342   Wound Etiology Other 01/05/23 1317   Dressing Status New drainage noted; Old drainage noted 01/05/23 1317   Wound Cleansed Soap and water 01/05/23 1317   Dressing/Treatment Other (comment) 12/08/22 1448   Wound Length (cm) 4.6 cm 01/05/23 1317   Wound Width (cm) 0.6 cm 01/05/23 1317   Wound Depth (cm) 5.6 cm 01/05/23 1317   Wound Surface Area (cm^2) 2.76 cm^2 01/05/23 1317   Change in Wound Size % (l*w) 78.1 01/05/23 1317   Wound Volume (cm^3) 15.456 cm^3 01/05/23 1317   Wound Healing % 69 01/05/23 1317   Post-Procedure Length (cm) 3.1 cm 01/05/23 1317   Post-Procedure Width (cm) 5.5 cm 01/05/23 1317   Post-Procedure Depth (cm) 6.3 cm 01/05/23 1317   Post-Procedure Surface Area (cm^2) 17.05 cm^2 01/05/23 1317   Post-Procedure Volume (cm^3) 107.415 cm^3 01/05/23 1317   Undermining Starts ___ O'Clock 7 01/05/23 1317   Undermining Ends___ O'Clock 11 01/05/23 1317   Undermining Maxium Distance (cm) 5.3 @7 01/05/23 1317   Wound Assessment Slough;Pink/red 01/05/23 1317   Drainage Amount Moderate 01/05/23 1317   Drainage Description Serosanguinous; Yellow 01/05/23 1317   Odor None 01/05/23 1317   Jess-wound Assessment Blanchable erythema 01/05/23 1317   Margins Defined edges 01/05/23 1317   Wound Thickness Description not for Pressure Injury Full thickness 01/05/23 1317   Number of days: 27          Percent of Wound(s)/Ulcer(s) Debrided: 100%    Total Surface Area Debrided:  2.76 sq cm     Diabetic/Pressure/Non Pressure Ulcers only:  Ulcer: Non-Pressure ulcer, fat layer exposed      Estimated Blood Loss:  Minimal    Hemostasis Achieved:  by pressure    Procedural Pain:  5  / 10     Post Procedural Pain:  0 / 10     Response to treatment:  Well tolerated by patient. Addressed wound healing factors:     [x] Diabetes: n/a           [x] CHF: n/a                               [x] Infection: currently on Keflex, managed by Dr. Christian Johns, ID             [x] Debridement: yes- will require serial debridement  [x] Compliance; compression/offloading: discussed need for offloading and gave ideas      [x] Diagnostics: recent 11/8/22 hip x-ray done by ortho- last seen by them 12/6/22  [x] Smoking: n/a             Plan:     --cont NPWT     -RTC one week, still managing slough tissue and working on cleaning wound bed     -Patient instructed to address cont offloading as a wound healing concern.    -I have reviewed instructions with the patient, answering all questions to satisfaction.    -Patient to call or return to clinic as needed if wound symptoms worsen or fail to improve as anticipated.    -See Discharge Instructions for wound management orders.         Written patient dismissal instructions given to patient and signed by patient or POA.            Electronically signed by MITESH Mckeon CNP on 1/5/2023 at 1:46 PM

## 2023-01-05 NOTE — PROGRESS NOTES
Negative Pressure Wound Therapy    NAME:  Adalid Rivera  YOB: 1950  MEDICAL RECORD NUMBER:  [id number]  DATE:  1/5/2023    Applied Negative Pressure to left thigh wound(s)/ulcer(s). [x] Applied skin barrier prep to ann-wound. [x] Cut strips of plastic drape to picture frame wound so that ann-wound is     covered with the drape. [x] If bridging dressing to less prominent site, cover any intact skin that will come in contact with the Negative Pressure Therapy sponge, gauze or channel drain with plastic drape. The sponge should never touch intact skin. [x] Cut sponge, gauze or channel drain to size which will fit into the wound/ulcer bed without being forced. [x] Be sure the sponge is large enough to hold the entire round plastic flange which is attached to the tubing. Never allow flange to be larger than the sponge or it will produce suction damaging intact skin. Total number of individual pieces of foam used within the wound bed: 1    [x] If bridging the dressing away from the primary site, be sure the bridge leads to a piece of sponge large enough to hold the entire flange without allowing any of the flange to overlap onto intact skin. [x] Covered sponge, gauze or channel drain with plastic drape. [x] Cut a hole in this plastic drape directly over the sponge the same size as the plastic drain tubing. [x] Removed plastic liner from flange and apply it directly over the hole you cut. [x] Removed the plastic cover from the flange. [x] Attached the tubing to the wound/ulcer Negative Pressure Therapy and turn it on to be sure a vacuum is created and that there are no leaks. [x] If air leaks occur, use plastic drape to patch them. [x] Secured Negative Pressure Therapy dressing with ace wrap loosely if located on an extremity. Maintain tubing outside of ace wrap. Tubing must not exert pressure on intact skin.     Applied per  Guidelines      Electronically signed by Travis Armstrong RN on 1/5/2023 at 1:58 PM

## 2023-01-09 NOTE — DISCHARGE INSTRUCTIONS
[address] [facility] [facility] -Phone: [phone] Fax: [phone]    Visit  Discharge Instructions / Physician Orders     DATE: 1/12/2023     Home Care: [address] ORDERED THRU: [facility]      Wound Location: Left Posterior Thigh     Cleanse with: Liquid antibacterial soap and water, rinse well      Dressing Orders: Reagan Charle[employer] and Nephew Vac: (Pack enough sponge into wound so sponge does not suck down into wound, should be bolstered out ) Skin prep and drape around wound, black foam into wound, bridge up thigh-no foam should come into contact with intact skin, continous suction at 120mmHg     Frequency: Change on Tuesday, Thursday, Saturday ([facility] will do on appointment days) (Bring new sponge and canister to appointments at wound care center)              Additional Orders: Increase protein to diet (meat, cheese, eggs, fish, peanut butter, nuts and beans)  Multivitamin daily     Your next appointment with [facility] is in 1 week     (Please note your next appointment above and if you are unable to keep, kindly give a 24 hour notice. Thank you.)  If more than 15 min late we cannot guarantee you will be seen due to clinician schedule  Per Policy, Excessive cancellation will call for dismissal from program.     If you experience any of the following, please call the [facility] during business hours:  [phone]  Your Phone call may be forwarded to [email or web address] during business hours that [facility] is closed. * Increase in Pain  * Temperature over 101  * Increase in drainage from your wound  * Drainage with a foul odor  * Bleeding  * Increase in swelling  * Need for compression bandage changes due to slippage, breakthrough drainage. If you need medical attention outside of the business hours of the [facility] please contact your PCP or go to the nearest emergency room.      The information contained in the After Visit Summary has been reviewed with me, the patient and/or responsible adult, by my health care provider(s). I had the opportunity to ask questions regarding this information.  I have elected to receive;      []After Visit Summary  [x]Comprehensive Discharge Instruction        Patient signature______________________________________Date:________  Electronically signed by Renea Schmid RN on 1/12/2023 at 2:49 PM  Electronically signed by MITESH Lucero - CNP on 1/12/2023 at 2:57 PM

## 2023-01-10 ENCOUNTER — TELEPHONE (OUTPATIENT)
Dept: FAMILY MEDICINE CLINIC | Age: 73
End: 2023-01-10

## 2023-01-12 ENCOUNTER — HOSPITAL ENCOUNTER (OUTPATIENT)
Dept: WOUND CARE | Age: 73
Discharge: HOME | End: 2023-01-12
Payer: COMMERCIAL

## 2023-01-12 VITALS
HEIGHT: 66 IN | BODY MASS INDEX: 32.14 KG/M2 | HEART RATE: 83 BPM | SYSTOLIC BLOOD PRESSURE: 130 MMHG | RESPIRATION RATE: 19 BRPM | WEIGHT: 200 LBS | TEMPERATURE: 97.7 F | DIASTOLIC BLOOD PRESSURE: 65 MMHG

## 2023-01-12 DIAGNOSIS — A41.01 MSSA (METHICILLIN SUSCEPTIBLE STAPHYLOCOCCUS AUREUS) SEPTICEMIA (HCC): ICD-10-CM

## 2023-01-12 DIAGNOSIS — L97.122 CHRONIC ULCER OF LEFT THIGH WITH FAT LAYER EXPOSED (HCC): Primary | ICD-10-CM

## 2023-01-12 PROCEDURE — 97605 NEG PRS WND THER DME<=50SQCM: CPT

## 2023-01-12 PROCEDURE — 11042 DBRDMT SUBQ TIS 1ST 20SQCM/<: CPT

## 2023-01-12 RX ORDER — BACITRACIN ZINC AND POLYMYXIN B SULFATE 500; 1000 [USP'U]/G; [USP'U]/G
OINTMENT TOPICAL ONCE
OUTPATIENT
Start: 2023-01-12 | End: 2023-01-12

## 2023-01-12 RX ORDER — GINSENG 100 MG
CAPSULE ORAL ONCE
OUTPATIENT
Start: 2023-01-12 | End: 2023-01-12

## 2023-01-12 RX ORDER — LIDOCAINE 40 MG/G
CREAM TOPICAL ONCE
OUTPATIENT
Start: 2023-01-12 | End: 2023-01-12

## 2023-01-12 RX ORDER — LIDOCAINE 50 MG/G
OINTMENT TOPICAL ONCE
OUTPATIENT
Start: 2023-01-12 | End: 2023-01-12

## 2023-01-12 RX ORDER — LIDOCAINE HYDROCHLORIDE 20 MG/ML
JELLY TOPICAL ONCE
OUTPATIENT
Start: 2023-01-12 | End: 2023-01-12

## 2023-01-12 RX ORDER — BETAMETHASONE DIPROPIONATE 0.05 %
OINTMENT (GRAM) TOPICAL ONCE
OUTPATIENT
Start: 2023-01-12 | End: 2023-01-12

## 2023-01-12 RX ORDER — GENTAMICIN SULFATE 1 MG/G
OINTMENT TOPICAL ONCE
OUTPATIENT
Start: 2023-01-12 | End: 2023-01-12

## 2023-01-12 RX ORDER — CLOBETASOL PROPIONATE 0.5 MG/G
OINTMENT TOPICAL ONCE
OUTPATIENT
Start: 2023-01-12 | End: 2023-01-12

## 2023-01-12 RX ORDER — BACITRACIN, NEOMYCIN, POLYMYXIN B 400; 3.5; 5 [USP'U]/G; MG/G; [USP'U]/G
OINTMENT TOPICAL ONCE
OUTPATIENT
Start: 2023-01-12 | End: 2023-01-12

## 2023-01-12 RX ORDER — LIDOCAINE HYDROCHLORIDE 40 MG/ML
SOLUTION TOPICAL ONCE
OUTPATIENT
Start: 2023-01-12 | End: 2023-01-12

## 2023-01-12 RX ORDER — LIDOCAINE HYDROCHLORIDE 20 MG/ML
JELLY TOPICAL ONCE
Status: COMPLETED | OUTPATIENT
Start: 2023-01-12 | End: 2023-01-12

## 2023-01-12 RX ADMIN — LIDOCAINE HYDROCHLORIDE 6 ML: 20 JELLY TOPICAL at 14:23

## 2023-01-12 NOTE — PLAN OF CARE
Problem: Discharge Planning  Goal: Discharge to home or other facility with appropriate resources  Outcome: Progressing     Problem: Safety - Adult  Goal: Free from fall injury  Outcome: Progressing     Problem: ABCDS Injury Assessment  Goal: Absence of physical injury  Outcome: Progressing  Flowsheets (Taken 1/12/2023 6419 by Nevin Boggs RN)  Absence of Physical Injury: Implement safety measures based on patient assessment     Problem: Wound:  Goal: Will show signs of wound healing; wound closure and no evidence of infection  Description: Will show signs of wound healing; wound closure and no evidence of infection  Outcome: Progressing     Problem: Falls - Risk of:  Goal: Will remain free from falls  Description: Will remain free from falls  Outcome: Progressing

## 2023-01-12 NOTE — PROGRESS NOTES
Negative Pressure    NAME:  Ana Goodman  YOB: 1950  MEDICAL RECORD NUMBER:  [id number]  DATE:  1/12/2023    Applied Negative Pressure to left posterior thigh wound(s)/ulcer(s). [x] Applied skin barrier prep to ann-wound. [x] Cut strips of plastic drape to picture frame wound so that ann-wound is     covered with the drape. [x] If bridging dressing to less prominent site, cover any intact skin that will come in contact with the Negative Pressure Therapy sponge, gauze or channel drain with plastic drape. The sponge should never touch intact skin. [x] Cut sponge, gauze or channel drain to size which will fit into the wound/ulcer bed without being forced. [x] Be sure the sponge is large enough to hold the entire round plastic flange which is attached to the tubing. Never allow flange to be larger than the sponge or it will produce suction damaging intact skin. Total number of individual pieces of foam used within the wound bed: 1    [x] If bridging the dressing away from the primary site, be sure the bridge leads to a piece of sponge large enough to hold the entire flange without allowing any of the flange to overlap onto intact skin. [x] Covered sponge, gauze or channel drain with plastic drape. [x] Cut a hole in this plastic drape directly over the sponge the same size as the plastic drain tubing. [x] Removed plastic liner from flange and apply it directly over the hole you cut. [x] Removed the plastic cover from the flange. [x] Attached the tubing to the wound/ulcer Negative Pressure Therapy and turn it on to be sure a vacuum is created and that there are no leaks. [x] If air leaks occur, use plastic drape to patch them. [x] Secured Negative Pressure Therapy dressing with ace wrap loosely if located on an extremity. Maintain tubing outside of ace wrap. Tubing must not exert pressure on intact skin.     Applied per  Guidelines      Electronically signed by Kartik Rogers RN on 1/12/2023 at 3:11 PM

## 2023-01-12 NOTE — PROGRESS NOTES
[address]   Progress Note and Procedure Note      Grant Givens  MEDICAL RECORD NUMBER:  [id number]  AGE: 71 y.o. GENDER: female  : 1950  EPISODE DATE:  2023    Subjective:     Chief Complaint   Patient presents with    Wound Check     Left thigh         HISTORY of PRESENT ILLNESS HPI     Grant Givens is a 71 y.o. female who presents today for wound/ulcer evaluation. History of Wound Context: left posterior thigh ulcer of unknown etiology that had become infected requiring continued Keflex. Will be on Keflex until wound heals per Dr. Sage Chen. Using NPWT. Interval history: Had concerns for minor bleeding in NPWT tubing this weekend. Wound appears healthy.     Wound/Ulcer Pain Timing/Severity: intermittent  Quality of pain: aching  Severity:  2 / 10   Modifying Factors: None, Pain worsens with walking, and Pain is relieved/improved with rest  Associated Signs/Symptoms: erythema and drainage     Wound/Ulcer Identification:  Ulcer Type: undetermined  Contributing Factors: edema, decreased mobility, and obesity         PAST MEDICAL HISTORY        Diagnosis Date    Acute respiratory failure with hypoxia ([facility]) 2022    Aspiration pneumonia ([facility]) 2022    Chronic back pain     Chronic hip pain     COPD (chronic obstructive pulmonary disease) (HCC)     Major depression     Osteoarthritis     Osteoporosis        PAST SURGICAL HISTORY    Past Surgical History:   Procedure Laterality Date    BREAST BIOPSY      two    CHOLECYSTECTOMY      HYSTERECTOMY, VAGINAL         FAMILY HISTORY    Family History   Problem Relation Age of Onset    Stroke Mother     Alzheimer's Disease Father        SOCIAL HISTORY    Social History     Tobacco Use    Smoking status: Former     Packs/day: 0.75     Years: 34.00     Pack years: 25.50     Types: Cigarettes     Quit date: 3/20/2017     Years since quittin.8    Smokeless tobacco: Never   Vaping Use    Vaping Use: Never used Substance Use Topics    Alcohol use: Not Currently     Comment: occasionally     Drug use: Never       ALLERGIES    Allergies   Allergen Reactions    Lyrica [Pregabalin] Hives    Demerol Hcl [Meperidine]     Fluoxetine     Paxil [Paroxetine Hcl]     Diclofenac-Misoprostol Rash    Lyrica Cr [Pregabalin Er] Rash       MEDICATIONS    Current Outpatient Medications on File Prior to Encounter   Medication Sig Dispense Refill    apixaban (ELIQUIS) 5 MG TABS tablet Take 1 tablet by mouth 2 times daily 42 tablet 0    ferrous sulfate (IRON 325) 325 (65 Fe) MG tablet Take 1 tablet by mouth daily (with breakfast) 90 tablet 1    potassium chloride (KLOR-CON M) 20 MEQ extended release tablet       desvenlafaxine succinate (PRISTIQ) 100 MG TB24 extended release tablet Take 1 tablet by mouth daily 90 tablet 1    SYMBICORT 160-4.5 MCG/ACT AERO Inhale 2 puffs into the lungs 2 times daily 10.2 g 5    tiotropium (SPIRIVA HANDIHALER) 18 MCG inhalation capsule Inhale 1 capsule into the lungs daily 90 capsule 1    montelukast (SINGULAIR) 10 MG tablet Take 1 tablet by mouth nightly 90 tablet 1    promethazine (PHENERGAN) 25 MG tablet Take 1 tablet by mouth every 6 hours as needed (as needed for nausea) 60 tablet 1    metoprolol tartrate (LOPRESSOR) 25 MG tablet Take 1 tablet by mouth 2 times daily 60 tablet 3    albuterol sulfate HFA (PROVENTIL;VENTOLIN;PROAIR) 108 (90 Base) MCG/ACT inhaler Inhale 2 puffs into the lungs every 6 hours as needed for Wheezing 3 each 1    tiZANidine (ZANAFLEX) 4 MG tablet Take 1 tablet by mouth every 8 hours as needed (muscle spasm) 270 tablet 1    thiamine 100 MG tablet Take 1 tablet by mouth daily 30 tablet 3    folic acid (FOLVITE) 1 MG tablet Take 1 tablet by mouth daily 30 tablet 3    ondansetron (ZOFRAN ODT) 4 MG disintegrating tablet Take 1 tablet by mouth every 8 hours as needed for Nausea or Vomiting 20 tablet 0    vitamin D (ERGOCALCIFEROL) 1.25 MG (07522 UT) CAPS capsule Take 1 capsule by mouth once a week 12 capsule 1    alendronate (FOSAMAX) 70 MG tablet Take 1 tablet by mouth once a week 12 tablet 1    Handicap Placard MISC by Does not apply route Expires 11/2025 1 each 0    morphine (MS CONTIN) 15 MG extended release tablet 3 times daily. No current facility-administered medications on file prior to encounter. REVIEW OF SYSTEMS    Constitutional: negative for chills and fevers  Respiratory: negative for cough and shortness of breath  Cardiovascular: positive for lower extremity edema, negative for chest pain and palpitations  Genitourinary: negative for urinary incontinence  Integument: positive for left posterior thigh wound  Musculoskeletal:negative for arthralgias  Behavioral/Psych: negative  Endocrine: negative  Hematologic/Immunologic: negative    Objective:      /65   Pulse 83   Temp 97.7 °F (36.5 °C) (Tympanic)   Resp 19   Ht 5' 6\" (1.676 m)   Wt 200 lb (90.7 kg)   LMP  (LMP Unknown)   BMI 32.28 kg/m²     Wt Readings from Last 3 Encounters:   01/12/23 200 lb (90.7 kg)   01/04/23 200 lb (90.7 kg)   12/29/22 210 lb (95.3 kg)       PHYSICAL EXAM    General Appearance: alert and oriented to person, place and time, well-developed and well-nourished, in no acute distress  Skin: Left posterior thigh wound bed appears healthy, no slough or eschar. Head: normocephalic and atraumatic  Pulmonary/Chest: normal air movement, no respiratory distress  Extremities: no cyanosis and no clubbing or edema   Musculoskeletal: no joint swelling, deformity or tenderness  Neurologic: gait slow and steady with walker, coordination normal and speech normal      Assessment:     Problem List Items Addressed This Visit          Other    MSSA (methicillin susceptible Staphylococcus aureus) septicemia ([address]    Relevant Orders    Initiate Outpatient Wound Care Protocol    Neg.  Pressure Wound Therapy    Chronic ulcer of left thigh with fat layer exposed ([address] - Primary    Relevant Orders    Initiate Outpatient Wound Care Protocol    Neg. Pressure Wound Therapy        Procedure Note  Indications:  Based on my examination of this patient's wound(s)/ulcer(s) today, debridement is required to promote healing and evaluate the wound base. Performed by: MITESH Perry CNP    Consent obtained:  Yes    Time out taken:  Yes    Pain Control: Anesthetic  Anesthetic: 2% Lidocaine Gel Topical       Debridement:Excisional Debridement    Using curette the wound(s)/ulcer(s) was/were sharply debrided down through and including the removal of subcutaneous tissue. Devitalized Tissue Debrided:  fibrin and biofilm    Pre Debridement Measurements:  Are located in the [place]  Documentation Flow Sheet    Wound/Ulcer #: 1    Post Debridement Measurements:  Wound/Ulcer Descriptions are Pre Debridement except measurements:    Wound 12/08/22 Thigh Left;Posterior #1 (Active)   Wound Image   01/12/23 1423   Wound Etiology Other 01/12/23 1423   Dressing Status New drainage noted; Old drainage noted 01/12/23 1423   Wound Cleansed Cleansed with saline 01/12/23 1423   Dressing/Treatment Other (comment) 12/08/22 1448   Wound Length (cm) 4.3 cm 01/12/23 1423   Wound Width (cm) 1.9 cm 01/12/23 1423   Wound Depth (cm) 5 cm 01/12/23 1423   Wound Surface Area (cm^2) 8.17 cm^2 01/12/23 1423   Change in Wound Size % (l*w) 35.16 01/12/23 1423   Wound Volume (cm^3) 40.85 cm^3 01/12/23 1423   Wound Healing % 19 01/12/23 1423   Post-Procedure Length (cm) 4.3 cm 01/12/23 1423   Post-Procedure Width (cm) 1.9 cm 01/12/23 1423   Post-Procedure Depth (cm) 5 cm 01/12/23 1423   Post-Procedure Surface Area (cm^2) 8.17 cm^2 01/12/23 1423   Post-Procedure Volume (cm^3) 40.85 cm^3 01/12/23 1423   Distance Tunneling (cm) 5.5 cm 01/12/23 1423   Tunneling Position ___ O'Clock 9 01/12/23 1423   Undermining Starts ___ O'Clock 7 01/05/23 1317   Undermining Ends___ O'Clock 11 01/05/23 1317   Undermining Maxium Distance (cm) 5.3 @7 01/05/23 1317   Wound Assessment Pink/red;Slough 01/12/23 1423   Drainage Amount Moderate 01/12/23 1423   Drainage Description Serosanguinous; Yellow 01/12/23 1423   Odor None 01/12/23 1423   Jess-wound Assessment Blanchable erythema 01/12/23 1423   Margins Defined edges 01/12/23 1423   Wound Thickness Description not for Pressure Injury Full thickness 01/12/23 1423   Number of days: 35          Percent of Wound(s)/Ulcer(s) Debrided: 100%    Total Surface Area Debrided:  8.17 sq cm     Diabetic/Pressure/Non Pressure Ulcers only:  Ulcer: Non-Pressure ulcer, fat layer exposed      Estimated Blood Loss:  Minimal    Hemostasis Achieved:  by pressure    Procedural Pain:  0  / 10     Post Procedural Pain:  0 / 10     Response to treatment:  Well tolerated by patient. Addressed wound healing factors:     [x] Diabetes: n/a           [x] CHF: n/a                               [x] Infection: currently on Keflex, managed by Dr. Delfina Casey, ID. Will remain on until wound closes per Dr. Delfina Casey. [x] Debridement: yes- will require serial debridement  [x] Compliance; compression/offloading: discussed need for offloading and gave ideas. Doing well with offloading. [x] Diagnostics: recent 11/8/22 hip x-ray done by ortho- last seen by them 12/6/22  [x] Smoking: n/a             Plan:     -Cont NPWT    -RTC one week    -Discussed return to work    -I have reviewed instructions with the patient, answering all questions to satisfaction.    -Patient to call or return to clinic as needed if wound symptoms worsen or fail to improve as anticipated.    -See Discharge Instructions for wound management orders. Written patient dismissal instructions given to patient and signed by patient or POA.            Electronically signed by MITESH Gruber CNP on 1/12/2023 at 2:57 PM

## 2023-01-16 NOTE — DISCHARGE INSTRUCTIONS
[address] [facility],[address] -Phone: [phone] Fax: [phone]    Visit  Discharge Instructions / Physician Orders     DATE: 1/19/2023     Home Care: Ori Nagel ORDERED THRU: [employer]      Wound Location: Left Posterior Thigh     Cleanse with: Liquid antibacterial soap and water, rinse well      Dressing Orders: Stephen Barahona Vac: (Pack enough sponge into wound so sponge does not suck down into wound, should be bolstered out ) Skin prep and drape around wound, black foam into wound, bridge up thigh-no foam should come into contact with intact skin, continous suction at 120mmHg     Frequency: Change on Tuesday, Thursday, Saturday ([facility] will do on appointment days) (Bring new sponge and canister to appointments at wound care center)              Additional Orders: Increase protein to diet (meat, cheese, eggs, fish, peanut butter, nuts and beans)  Multivitamin daily     Your next appointment with [facility] is in 1 week     (Please note your next appointment above and if you are unable to keep, kindly give a 24 hour notice. Thank you.)  If more than 15 min late we cannot guarantee you will be seen due to clinician schedule  Per Policy, Excessive cancellation will call for dismissal from program.     If you experience any of the following, please call the [facility] during business hours:  [phone]  Your Phone call may be forwarded to [facility] during business hours that [facility] is closed. * Increase in Pain  * Temperature over 101  * Increase in drainage from your wound  * Drainage with a foul odor  * Bleeding  * Increase in swelling  * Need for compression bandage changes due to slippage, breakthrough drainage. If you need medical attention outside of the business hours of the [facility] please contact your PCP or go to the nearest emergency room.      The information contained in the After Visit Summary has been reviewed with me, the patient and/or responsible adult, by my health care provider(s). I had the opportunity to ask questions regarding this information.  I have elected to receive;      []After Visit Summary  [x]Comprehensive Discharge Instruction        Patient signature______________________________________Date:________  Electronically signed by Gurjit Ba RN on 1/19/2023 at 1:15 PM  Electronically signed by MITESH Yang - CNP on 1/19/2023 at 1:10 PM

## 2023-01-19 ENCOUNTER — HOSPITAL ENCOUNTER (OUTPATIENT)
Dept: WOUND CARE | Age: 73
Discharge: HOME | End: 2023-01-19
Payer: COMMERCIAL

## 2023-01-19 VITALS
WEIGHT: 200 LBS | RESPIRATION RATE: 20 BRPM | SYSTOLIC BLOOD PRESSURE: 98 MMHG | BODY MASS INDEX: 32.14 KG/M2 | HEIGHT: 66 IN | HEART RATE: 96 BPM | DIASTOLIC BLOOD PRESSURE: 66 MMHG | TEMPERATURE: 98.3 F

## 2023-01-19 DIAGNOSIS — A41.01 MSSA (METHICILLIN SUSCEPTIBLE STAPHYLOCOCCUS AUREUS) SEPTICEMIA (HCC): ICD-10-CM

## 2023-01-19 DIAGNOSIS — L97.122 CHRONIC ULCER OF LEFT THIGH WITH FAT LAYER EXPOSED (HCC): Primary | ICD-10-CM

## 2023-01-19 PROCEDURE — 97605 NEG PRS WND THER DME<=50SQCM: CPT

## 2023-01-19 PROCEDURE — 11042 DBRDMT SUBQ TIS 1ST 20SQCM/<: CPT

## 2023-01-19 RX ORDER — BETAMETHASONE DIPROPIONATE 0.05 %
OINTMENT (GRAM) TOPICAL ONCE
OUTPATIENT
Start: 2023-01-19 | End: 2023-01-19

## 2023-01-19 RX ORDER — LIDOCAINE HYDROCHLORIDE 20 MG/ML
JELLY TOPICAL ONCE
Status: COMPLETED | OUTPATIENT
Start: 2023-01-19 | End: 2023-01-19

## 2023-01-19 RX ORDER — BACITRACIN, NEOMYCIN, POLYMYXIN B 400; 3.5; 5 [USP'U]/G; MG/G; [USP'U]/G
OINTMENT TOPICAL ONCE
OUTPATIENT
Start: 2023-01-19 | End: 2023-01-19

## 2023-01-19 RX ORDER — LIDOCAINE 50 MG/G
OINTMENT TOPICAL ONCE
OUTPATIENT
Start: 2023-01-19 | End: 2023-01-19

## 2023-01-19 RX ORDER — LIDOCAINE 40 MG/G
CREAM TOPICAL ONCE
OUTPATIENT
Start: 2023-01-19 | End: 2023-01-19

## 2023-01-19 RX ORDER — GINSENG 100 MG
CAPSULE ORAL ONCE
OUTPATIENT
Start: 2023-01-19 | End: 2023-01-19

## 2023-01-19 RX ORDER — BACITRACIN ZINC AND POLYMYXIN B SULFATE 500; 1000 [USP'U]/G; [USP'U]/G
OINTMENT TOPICAL ONCE
OUTPATIENT
Start: 2023-01-19 | End: 2023-01-19

## 2023-01-19 RX ORDER — LIDOCAINE HYDROCHLORIDE 40 MG/ML
SOLUTION TOPICAL ONCE
OUTPATIENT
Start: 2023-01-19 | End: 2023-01-19

## 2023-01-19 RX ORDER — LIDOCAINE HYDROCHLORIDE 20 MG/ML
JELLY TOPICAL ONCE
OUTPATIENT
Start: 2023-01-19 | End: 2023-01-19

## 2023-01-19 RX ORDER — GENTAMICIN SULFATE 1 MG/G
OINTMENT TOPICAL ONCE
OUTPATIENT
Start: 2023-01-19 | End: 2023-01-19

## 2023-01-19 RX ORDER — CLOBETASOL PROPIONATE 0.5 MG/G
OINTMENT TOPICAL ONCE
OUTPATIENT
Start: 2023-01-19 | End: 2023-01-19

## 2023-01-19 RX ADMIN — LIDOCAINE HYDROCHLORIDE 6 ML: 20 JELLY TOPICAL at 12:59

## 2023-01-19 NOTE — PROGRESS NOTES
Negative Pressure    NAME:  Carolee Porter  YOB: 1950  MEDICAL RECORD NUMBER:  [id number]  DATE:  1/19/2023    Applied Negative Pressure to left posterior thigh wound(s)/ulcer(s).  [x] Applied skin barrier prep to ann-wound.   [x] Cut strips of plastic drape to picture frame wound so that ann-wound is     covered with the drape.   [x] If bridging dressing to less prominent site, cover any intact skin that will come in contact with the Negative Pressure Therapy sponge, gauze or channel drain with plastic drape.  The sponge should never touch intact skin.   [x] Cut sponge, gauze or channel drain to size which will fit into the wound/ulcer bed without being forced.   [x] Be sure the sponge is large enough to hold the entire round plastic flange which is attached to the tubing.  Never allow flange to be larger than the sponge or it will produce suction damaging intact skin.  Total number of individual pieces of foam used within the wound bed: 1    [x] If bridging the dressing away from the primary site, be sure the bridge leads to a piece of sponge large enough to hold the entire flange without allowing any of the flange to overlap onto intact skin.   [x] Covered sponge, gauze or channel drain with plastic drape.   [x] Cut a hole in this plastic drape directly over the sponge the same size as the plastic drain tubing.   [x] Removed plastic liner from flange and apply it directly over the hole you cut.   [x] Removed the plastic cover from the flange.   [x] Attached the tubing to the wound/ulcer Negative Pressure Therapy and turn it on to be sure a vacuum is created and that there are no leaks.   [x] If air leaks occur, use plastic drape to patch them.   [x] Secured Negative Pressure Therapy dressing with ace wrap loosely if located on an extremity. Maintain tubing outside of ace wrap. Tubing must not exert pressure on intact skin.    Applied per  Guidelines      Electronically signed by  Apryl Leon RN on 1/19/2023 at 1:36 PM

## 2023-01-19 NOTE — PROGRESS NOTES
[address]   Progress Note and Procedure Note      Olya Ibarra  MEDICAL RECORD NUMBER:  [id number]  AGE: 71 y.o. GENDER: female  : 1950  EPISODE DATE:  2023    Subjective:     Chief Complaint   Patient presents with    Wound Check     Left posterior thigh         HISTORY of PRESENT ILLNESS HPI     Olya Ibarra is a 71 y.o. female who presents today for wound/ulcer evaluation. History of Wound Context: left posterior thigh ulcer of unknown etiology that had become infected requiring continued Keflex. Will be on Keflex until wound heals per Dr. Gilda Enrique. Using NPWT. Interval history: no new concerns, but admits she is only eating about one meal per day and has lost about 40 pounds since September. She states she does not have an appetite and may be due to the Keflex.     Wound/Ulcer Pain Timing/Severity: intermittent  Quality of pain: aching  Severity:  1 / 10   Modifying Factors: None, Pain worsens with walking, and Pain is relieved/improved with rest  Associated Signs/Symptoms: erythema and drainage          PAST MEDICAL HISTORY        Diagnosis Date    Acute respiratory failure with hypoxia ([facility]) 2022    Aspiration pneumonia ([facility]) 2022    Chronic back pain     Chronic hip pain     COPD (chronic obstructive pulmonary disease) ([facility])     Major depression     Osteoarthritis     Osteoporosis        PAST SURGICAL HISTORY    Past Surgical History:   Procedure Laterality Date    BREAST BIOPSY      two    CHOLECYSTECTOMY      HYSTERECTOMY, VAGINAL         FAMILY HISTORY    Family History   Problem Relation Age of Onset    Stroke Mother     Alzheimer's Disease Father        SOCIAL HISTORY    Social History     Tobacco Use    Smoking status: Former     Packs/day: 0.75     Years: 34.00     Pack years: 25.50     Types: Cigarettes     Quit date: 3/20/2017     Years since quittin.8    Smokeless tobacco: Never   Vaping Use    Vaping Use: Never used Substance Use Topics    Alcohol use: Not Currently     Comment: occasionally     Drug use: Never       ALLERGIES    Allergies   Allergen Reactions    Lyrica [Pregabalin] Hives    Demerol Hcl [Meperidine]     Fluoxetine     Paxil [Paroxetine Hcl]     Diclofenac-Misoprostol Rash    Lyrica Cr [Pregabalin Er] Rash       MEDICATIONS    Current Outpatient Medications on File Prior to Encounter   Medication Sig Dispense Refill    apixaban (ELIQUIS) 5 MG TABS tablet Take 1 tablet by mouth 2 times daily 42 tablet 0    ferrous sulfate (IRON 325) 325 (65 Fe) MG tablet Take 1 tablet by mouth daily (with breakfast) 90 tablet 1    potassium chloride (KLOR-CON M) 20 MEQ extended release tablet       desvenlafaxine succinate (PRISTIQ) 100 MG TB24 extended release tablet Take 1 tablet by mouth daily 90 tablet 1    SYMBICORT 160-4.5 MCG/ACT AERO Inhale 2 puffs into the lungs 2 times daily 10.2 g 5    tiotropium (SPIRIVA HANDIHALER) 18 MCG inhalation capsule Inhale 1 capsule into the lungs daily 90 capsule 1    montelukast (SINGULAIR) 10 MG tablet Take 1 tablet by mouth nightly 90 tablet 1    promethazine (PHENERGAN) 25 MG tablet Take 1 tablet by mouth every 6 hours as needed (as needed for nausea) 60 tablet 1    metoprolol tartrate (LOPRESSOR) 25 MG tablet Take 1 tablet by mouth 2 times daily 60 tablet 3    albuterol sulfate HFA (PROVENTIL;VENTOLIN;PROAIR) 108 (90 Base) MCG/ACT inhaler Inhale 2 puffs into the lungs every 6 hours as needed for Wheezing 3 each 1    tiZANidine (ZANAFLEX) 4 MG tablet Take 1 tablet by mouth every 8 hours as needed (muscle spasm) 270 tablet 1    thiamine 100 MG tablet Take 1 tablet by mouth daily 30 tablet 3    folic acid (FOLVITE) 1 MG tablet Take 1 tablet by mouth daily 30 tablet 3    ondansetron (ZOFRAN ODT) 4 MG disintegrating tablet Take 1 tablet by mouth every 8 hours as needed for Nausea or Vomiting 20 tablet 0    vitamin D (ERGOCALCIFEROL) 1.25 MG (34661 UT) CAPS capsule Take 1 capsule by mouth once a week 12 capsule 1    alendronate (FOSAMAX) 70 MG tablet Take 1 tablet by mouth once a week 12 tablet 1    Handicap Placard MISC by Does not apply route Expires 11/2025 1 each 0    morphine (MS CONTIN) 15 MG extended release tablet 3 times daily. No current facility-administered medications on file prior to encounter. REVIEW OF SYSTEMS    Constitutional: negative for chills and fevers  Respiratory: negative for cough and shortness of breath  Cardiovascular: positive for lower extremity edema, negative for chest pain and palpitations  Genitourinary: negative for urinary incontinence  Integument: positive for left posterior thigh wound  Musculoskeletal:negative for arthralgias  Behavioral/Psych: negative  Endocrine: negative  Hematologic/Immunologic: negative    Objective:      BP 98/66   Pulse 96   Temp 98.3 °F (36.8 °C) (Tympanic)   Resp 20   Ht 5' 6\" (1.676 m)   Wt 200 lb (90.7 kg)   LMP  (LMP Unknown)   BMI 32.28 kg/m²     Wt Readings from Last 3 Encounters:   01/19/23 200 lb (90.7 kg)   01/12/23 200 lb (90.7 kg)   01/04/23 200 lb (90.7 kg)       PHYSICAL EXAM    General Appearance: alert and oriented to person, place and time, well-developed and well-nourished, in no acute distress  Skin: Left posterior thigh wound bed appears healthy, no slough or eschar.   Noted granular tissue in wound bed  Head: normocephalic and atraumatic  Pulmonary/Chest: normal air movement, no respiratory distress  Extremities: no cyanosis and no clubbing or edema   Musculoskeletal: no joint swelling, deformity or tenderness  Neurologic: gait slow and steady with walker, coordination normal and speech normal      Assessment:     Problem List Items Addressed This Visit          Other    MSSA (methicillin susceptible Staphylococcus aureus) septicemia ([address]    Relevant Orders    Initiate Outpatient Wound Care Protocol    Chronic ulcer of left thigh with fat layer exposed ([address] - Primary    Relevant Orders    Initiate Outpatient Wound Care Protocol        Procedure Note  Indications:  Based on my examination of this patient's wound(s)/ulcer(s) today, debridement is required to promote healing and evaluate the wound base. Performed by: MITESH Frederick CNP    Consent obtained:  Yes    Time out taken:  Yes    Pain Control: Anesthetic  Anesthetic: 2% Lidocaine Gel Topical       Debridement:Excisional Debridement    Using curette the wound(s)/ulcer(s) was/were sharply debrided down through and including the removal of subcutaneous tissue. Devitalized Tissue Debrided:  fibrin and biofilm    Pre Debridement Measurements:  Are located in the [place]  Documentation Flow Sheet    Wound/Ulcer #: 1    Post Debridement Measurements:  Wound/Ulcer Descriptions are Pre Debridement except measurements:    Wound 12/08/22 Thigh Left;Posterior #1 (Active)   Wound Image   01/12/23 1423   Wound Etiology Other 01/19/23 1259   Dressing Status New drainage noted; Old drainage noted 01/19/23 1259   Wound Cleansed Cleansed with saline 01/19/23 1259   Dressing/Treatment Other (comment) 12/08/22 1448   Wound Length (cm) 4.7 cm 01/19/23 1259   Wound Width (cm) 0.7 cm 01/19/23 1259   Wound Depth (cm) 5 cm 01/19/23 1259   Wound Surface Area (cm^2) 3.29 cm^2 01/19/23 1259   Change in Wound Size % (l*w) 73.89 01/19/23 1259   Wound Volume (cm^3) 16.45 cm^3 01/19/23 1259   Wound Healing % 67 01/19/23 1259   Post-Procedure Length (cm) 4.7 cm 01/19/23 1259   Post-Procedure Width (cm) 1.7 cm 01/19/23 1259   Post-Procedure Depth (cm) 5 cm 01/19/23 1259   Post-Procedure Surface Area (cm^2) 7.99 cm^2 01/19/23 1259   Post-Procedure Volume (cm^3) 39.95 cm^3 01/19/23 1259   Distance Tunneling (cm) 5 cm 01/19/23 1259   Tunneling Position ___ O'Clock 9 01/19/23 1259   Undermining Starts ___ O'Clock 7 01/05/23 1317   Undermining Ends___ O'Clock 11 01/05/23 1317   Undermining Maxium Distance (cm) 5.3 @7 01/05/23 1317   Wound Assessment Pink/red;Slough 01/19/23 1259   Drainage Amount Moderate 01/19/23 1259   Drainage Description Serosanguinous; Yellow 01/19/23 1259   Odor None 01/19/23 1259   Jess-wound Assessment Blanchable erythema 01/19/23 1259   Margins Defined edges 01/19/23 1259   Wound Thickness Description not for Pressure Injury Full thickness 01/19/23 1259   Number of days: 41          Percent of Wound(s)/Ulcer(s) Debrided: 100%    Total Surface Area Debrided: 7.99 sq cm     Diabetic/Pressure/Non Pressure Ulcers only:  Ulcer: Non-Pressure ulcer, fat layer exposed      Estimated Blood Loss:  Minimal    Hemostasis Achieved:  by pressure    Procedural Pain:  2  / 10     Post Procedural Pain:  1 / 10     Response to treatment:  Well tolerated by patient. Addressed wound healing factors:       [x] Diabetes: n/a           [x] CHF: n/a                               [x] Infection: currently on Keflex, managed by Dr. Betsy Schneider, ID. Will remain on until wound closes per Dr. Betsy Schneider. [x] Debridement: yes- will require serial debridement  [x] Compliance; compression/offloading: discussed need for offloading and gave ideas. Doing well with offloading. [x] Diagnostics: recent 11/8/22 hip x-ray done by ortho- last seen by them 12/6/22  [x] Smoking: n/a             Plan:     --Cont NPWT     -RTC one week    -Patient instructed to address increasing oral intake as a wound healing concern.    -I have reviewed instructions with the patient, answering all questions to satisfaction.    -Patient to call or return to clinic as needed if wound symptoms worsen or fail to improve as anticipated.    -See Discharge Instructions for wound management orders. Written patient dismissal instructions given to patient and signed by patient or POA.            Electronically signed by MITESH Mendes CNP on 1/19/2023 at 1:18 PM

## 2023-01-19 NOTE — PLAN OF CARE
Problem: Discharge Planning  Goal: Discharge to home or other facility with appropriate resources  Outcome: Progressing     Problem: Safety - Adult  Goal: Free from fall injury  Outcome: Progressing     Problem: ABCDS Injury Assessment  Goal: Absence of physical injury  Outcome: Progressing     Problem: Wound:  Goal: Will show signs of wound healing; wound closure and no evidence of infection  Description: Will show signs of wound healing; wound closure and no evidence of infection  Outcome: Progressing     Problem: Falls - Risk of:  Goal: Will remain free from falls  Description: Will remain free from falls  Outcome: Progressing

## 2023-01-24 NOTE — DISCHARGE INSTRUCTIONS
[address] [facility],5[address] -Phone: [phone] Fax: [phone]    Visit  Discharge Instructions / Physician Orders     DATE: 1/26/2023     Home Care: [address] [facility] ORDERED THRU: [employer]      Wound Location: Left Posterior Thigh     Cleanse with: Liquid antibacterial soap and water, rinse well      Dressing Orders: Jeana Duarte and Nephew Vac: (Pack enough sponge into wound so sponge does not suck down into wound, should be bolstered out ) Skin prep and drape around wound, black foam into wound, bridge up thigh-no foam should come into contact with intact skin, continous suction at 120mmHg     Frequency: Change on Tuesday, Thursday, Saturday ([address] [place] [address] [facility] will do on appointment days) (Bring new sponge and canister to appointments at wound care center)              Additional Orders: Increase protein to diet (meat, cheese, eggs, fish, peanut butter, nuts and beans)  Multivitamin daily     Your next appointment with [facility] [place] [facility] is in 1 week     (Please note your next appointment above and if you are unable to keep, kindly give a 24 hour notice. Thank you.)  If more than 15 min late we cannot guarantee you will be seen due to clinician schedule  Per Policy, Excessive cancellation will call for dismissal from program.     If you experience any of the following, please call the [address] [place] [address] during business hours:  [phone]  Your Phone call may be forwarded to [employer] during business hours that [facility] is closed. * Increase in Pain  * Temperature over 101  * Increase in drainage from your wound  * Drainage with a foul odor  * Bleeding  * Increase in swelling  * Need for compression bandage changes due to slippage, breakthrough drainage. If you need medical attention outside of the business hours of the [address] [place] [address] [facility] please contact your PCP or go to the nearest emergency room.      The information contained in the After Visit Summary has been reviewed with me, the patient and/or responsible adult, by my health care provider(s). I had the opportunity to ask questions regarding this information.  I have elected to receive;      []After Visit Summary  [x]Comprehensive Discharge Instruction        Patient signature______________________________________Date:________  Electronically signed by Ti Gonzalez RN on 1/26/2023 at 2:12 PM  Electronically signed by MITESH Cummings - CNP on 1/26/2023 at 2:15 PM

## 2023-01-26 ENCOUNTER — HOSPITAL ENCOUNTER (OUTPATIENT)
Dept: WOUND CARE | Age: 73
Discharge: HOME | End: 2023-01-26
Payer: COMMERCIAL

## 2023-01-26 VITALS
BODY MASS INDEX: 32.14 KG/M2 | TEMPERATURE: 97.6 F | WEIGHT: 200 LBS | HEART RATE: 95 BPM | DIASTOLIC BLOOD PRESSURE: 62 MMHG | RESPIRATION RATE: 19 BRPM | HEIGHT: 66 IN | SYSTOLIC BLOOD PRESSURE: 133 MMHG

## 2023-01-26 DIAGNOSIS — A41.01 MSSA (METHICILLIN SUSCEPTIBLE STAPHYLOCOCCUS AUREUS) SEPTICEMIA (HCC): ICD-10-CM

## 2023-01-26 DIAGNOSIS — L97.122 CHRONIC ULCER OF LEFT THIGH WITH FAT LAYER EXPOSED (HCC): Primary | ICD-10-CM

## 2023-01-26 PROBLEM — N39.0 UTI DUE TO KLEBSIELLA SPECIES: Status: RESOLVED | Noted: 2022-09-29 | Resolved: 2023-01-26

## 2023-01-26 PROBLEM — R78.81 MSSA BACTEREMIA: Status: RESOLVED | Noted: 2022-09-25 | Resolved: 2023-01-26

## 2023-01-26 PROBLEM — B95.61 MSSA BACTEREMIA: Status: RESOLVED | Noted: 2022-09-25 | Resolved: 2023-01-26

## 2023-01-26 PROBLEM — E87.3 RESPIRATORY ALKALOSIS: Status: RESOLVED | Noted: 2022-09-30 | Resolved: 2023-01-26

## 2023-01-26 PROBLEM — L97.129: Status: RESOLVED | Noted: 2022-12-01 | Resolved: 2023-01-26

## 2023-01-26 PROBLEM — D72.829 LEUKOCYTOSIS: Status: RESOLVED | Noted: 2022-10-01 | Resolved: 2023-01-26

## 2023-01-26 PROBLEM — A41.9 SEPTICEMIA (HCC): Status: RESOLVED | Noted: 2022-09-24 | Resolved: 2023-01-26

## 2023-01-26 PROBLEM — B96.89 UTI DUE TO KLEBSIELLA SPECIES: Status: RESOLVED | Noted: 2022-09-29 | Resolved: 2023-01-26

## 2023-01-26 PROCEDURE — 11042 DBRDMT SUBQ TIS 1ST 20SQCM/<: CPT

## 2023-01-26 PROCEDURE — 97605 NEG PRS WND THER DME<=50SQCM: CPT

## 2023-01-26 RX ORDER — LIDOCAINE HYDROCHLORIDE 20 MG/ML
JELLY TOPICAL ONCE
OUTPATIENT
Start: 2023-01-26 | End: 2023-01-26

## 2023-01-26 RX ORDER — GENTAMICIN SULFATE 1 MG/G
OINTMENT TOPICAL ONCE
OUTPATIENT
Start: 2023-01-26 | End: 2023-01-26

## 2023-01-26 RX ORDER — LIDOCAINE 50 MG/G
OINTMENT TOPICAL ONCE
OUTPATIENT
Start: 2023-01-26 | End: 2023-01-26

## 2023-01-26 RX ORDER — LIDOCAINE 40 MG/G
CREAM TOPICAL ONCE
OUTPATIENT
Start: 2023-01-26 | End: 2023-01-26

## 2023-01-26 RX ORDER — LIDOCAINE HYDROCHLORIDE 20 MG/ML
JELLY TOPICAL ONCE
Status: COMPLETED | OUTPATIENT
Start: 2023-01-26 | End: 2023-01-26

## 2023-01-26 RX ORDER — BACITRACIN ZINC AND POLYMYXIN B SULFATE 500; 1000 [USP'U]/G; [USP'U]/G
OINTMENT TOPICAL ONCE
OUTPATIENT
Start: 2023-01-26 | End: 2023-01-26

## 2023-01-26 RX ORDER — BETAMETHASONE DIPROPIONATE 0.05 %
OINTMENT (GRAM) TOPICAL ONCE
OUTPATIENT
Start: 2023-01-26 | End: 2023-01-26

## 2023-01-26 RX ORDER — CLOBETASOL PROPIONATE 0.5 MG/G
OINTMENT TOPICAL ONCE
OUTPATIENT
Start: 2023-01-26 | End: 2023-01-26

## 2023-01-26 RX ORDER — LIDOCAINE HYDROCHLORIDE 40 MG/ML
SOLUTION TOPICAL ONCE
OUTPATIENT
Start: 2023-01-26 | End: 2023-01-26

## 2023-01-26 RX ORDER — GINSENG 100 MG
CAPSULE ORAL ONCE
OUTPATIENT
Start: 2023-01-26 | End: 2023-01-26

## 2023-01-26 RX ORDER — BACITRACIN, NEOMYCIN, POLYMYXIN B 400; 3.5; 5 [USP'U]/G; MG/G; [USP'U]/G
OINTMENT TOPICAL ONCE
OUTPATIENT
Start: 2023-01-26 | End: 2023-01-26

## 2023-01-26 RX ADMIN — LIDOCAINE HYDROCHLORIDE 6 ML: 20 JELLY TOPICAL at 13:57

## 2023-01-26 NOTE — PROGRESS NOTES
[address]   Progress Note and Procedure Note      Edward Estrada  MEDICAL RECORD NUMBER:  [id number]  AGE: 71 y.o. GENDER: female  : 1950  EPISODE DATE:  2023    Subjective:     Chief Complaint   Patient presents with    Wound Check     Left posterior thigh         HISTORY of PRESENT ILLNESS HPI     Edward Estrada is a 71 y.o. female who presents today for wound/ulcer evaluation. History of Wound Context: left posterior thigh ulcer of unknown etiology that had become infected requiring continued Keflex. Will be on Keflex until wound heals per Dr. Carmen oHoks. Using NPWT. Interval history: no new concerns, but admits she is only eating about one meal per day and has lost about 40 pounds since September. She states she does not have an appetite and may be due to the Keflex. States she is trying to increase her protein and calorie intake this week.     Wound/Ulcer Pain Timing/Severity: intermittent  Quality of pain: aching  Severity:  1 / 10   Modifying Factors: None, Pain worsens with walking, and Pain is relieved/improved with rest  Associated Signs/Symptoms: erythema and drainage             PAST MEDICAL HISTORY        Diagnosis Date    Acute respiratory failure with hypoxia (HCC) 2022    Aspiration pneumonia (HCC) 2022    Chronic back pain     Chronic hip pain     Chronic ulcer of left thigh ([address] 2022    COPD (chronic obstructive pulmonary disease) ([address]     Major depression     MSSA (methicillin susceptible Staphylococcus aureus) septicemia ([address] 2022    MSSA bacteremia 2022    Osteoarthritis     Osteoporosis     Respiratory alkalosis 2022    Septicemia ([address] 2022    UTI due to Klebsiella species 2022       PAST SURGICAL HISTORY    Past Surgical History:   Procedure Laterality Date    BREAST BIOPSY      two    CHOLECYSTECTOMY      HYSTERECTOMY, VAGINAL         FAMILY HISTORY    Family History   Problem Relation Age of Onset    Stroke Mother     Alzheimer's Disease Father        SOCIAL HISTORY    Social History     Tobacco Use    Smoking status: Former     Packs/day: 0.75     Years: 34.00     Pack years: 25.50     Types: Cigarettes     Quit date: 3/20/2017     Years since quittin.8    Smokeless tobacco: Never   Vaping Use    Vaping Use: Never used   Substance Use Topics    Alcohol use: Not Currently     Comment: occasionally     Drug use: Never       ALLERGIES    Allergies   Allergen Reactions    Lyrica [Pregabalin] Hives    Demerol Hcl [Meperidine]     Fluoxetine     Paxil [Paroxetine Hcl]     Diclofenac-Misoprostol Rash    Lyrica Cr [Pregabalin Er] Rash       MEDICATIONS    Current Outpatient Medications on File Prior to Encounter   Medication Sig Dispense Refill    apixaban (ELIQUIS) 5 MG TABS tablet Take 1 tablet by mouth 2 times daily 42 tablet 0    ferrous sulfate (IRON 325) 325 (65 Fe) MG tablet Take 1 tablet by mouth daily (with breakfast) 90 tablet 1    potassium chloride (KLOR-CON M) 20 MEQ extended release tablet       desvenlafaxine succinate (PRISTIQ) 100 MG TB24 extended release tablet Take 1 tablet by mouth daily 90 tablet 1    SYMBICORT 160-4.5 MCG/ACT AERO Inhale 2 puffs into the lungs 2 times daily 10.2 g 5    tiotropium (SPIRIVA HANDIHALER) 18 MCG inhalation capsule Inhale 1 capsule into the lungs daily 90 capsule 1    montelukast (SINGULAIR) 10 MG tablet Take 1 tablet by mouth nightly 90 tablet 1    promethazine (PHENERGAN) 25 MG tablet Take 1 tablet by mouth every 6 hours as needed (as needed for nausea) 60 tablet 1    metoprolol tartrate (LOPRESSOR) 25 MG tablet Take 1 tablet by mouth 2 times daily 60 tablet 3    albuterol sulfate HFA (PROVENTIL;VENTOLIN;PROAIR) 108 (90 Base) MCG/ACT inhaler Inhale 2 puffs into the lungs every 6 hours as needed for Wheezing 3 each 1    tiZANidine (ZANAFLEX) 4 MG tablet Take 1 tablet by mouth every 8 hours as needed (muscle spasm) 270 tablet 1    thiamine 100 MG tablet Take 1 tablet by mouth daily 30 tablet 3    folic acid (FOLVITE) 1 MG tablet Take 1 tablet by mouth daily 30 tablet 3    ondansetron (ZOFRAN ODT) 4 MG disintegrating tablet Take 1 tablet by mouth every 8 hours as needed for Nausea or Vomiting 20 tablet 0    vitamin D (ERGOCALCIFEROL) 1.25 MG (55067 UT) CAPS capsule Take 1 capsule by mouth once a week 12 capsule 1    alendronate (FOSAMAX) 70 MG tablet Take 1 tablet by mouth once a week 12 tablet 1    Handicap Placard MISC by Does not apply route Expires 11/2025 1 each 0    morphine (MS CONTIN) 15 MG extended release tablet 3 times daily. No current facility-administered medications on file prior to encounter. REVIEW OF SYSTEMS    Constitutional: negative for chills and fevers  Respiratory: negative for cough and shortness of breath  Cardiovascular: positive for lower extremity edema, negative for chest pain and palpitations  Genitourinary: negative for urinary incontinence  Integument: positive for left posterior thigh wound  Musculoskeletal:negative for arthralgias  Behavioral/Psych: negative  Endocrine: negative  Hematologic/Immunologic: negative    Objective:      /62   Pulse 95   Temp 97.6 °F (36.4 °C) (Tympanic)   Resp 19   Ht 5' 6\" (1.676 m)   Wt 200 lb (90.7 kg)   LMP  (LMP Unknown)   BMI 32.28 kg/m²     Wt Readings from Last 3 Encounters:   01/26/23 200 lb (90.7 kg)   01/19/23 200 lb (90.7 kg)   01/12/23 200 lb (90.7 kg)       PHYSICAL EXAM    General Appearance: alert and oriented to person, place and time, well-developed and well-nourished, in no acute distress  Skin: Left posterior thigh wound bed appears healthy, no slough or eschar. Noted granular tissue in wound bed. Thick layer of film debrided.   Head: normocephalic and atraumatic  Pulmonary/Chest: normal air movement, no respiratory distress  Extremities: no cyanosis and no clubbing or edema   Musculoskeletal: no joint swelling, deformity or tenderness  Neurologic: gait slow and steady with walker, coordination normal and speech normal      Assessment:     Problem List Items Addressed This Visit          Other    MSSA (methicillin susceptible Staphylococcus aureus) septicemia ([facility]    Relevant Orders    Initiate Outpatient Wound Care Protocol    Chronic ulcer of left thigh with fat layer exposed ([facility] - Primary    Relevant Orders    Initiate Outpatient Wound Care Protocol        Procedure Note  Indications:  Based on my examination of this patient's wound(s)/ulcer(s) today, debridement is required to promote healing and evaluate the wound base. Performed by: MITESH Carson - CNP    Consent obtained:  Yes    Time out taken:  Yes    Pain Control: Anesthetic  Anesthetic: 2% Lidocaine Gel Topical       Debridement:Excisional Debridement    Using curette the wound(s)/ulcer(s) was/were sharply debrided down through and including the removal of subcutaneous tissue. Devitalized Tissue Debrided:  fibrin and biofilm    Pre Debridement Measurements:  Are located in the [facility]  Documentation Flow Sheet    Wound/Ulcer #: 1    Post Debridement Measurements:  Wound/Ulcer Descriptions are Pre Debridement except measurements:    Wound 12/08/22 Thigh Left;Posterior #1 (Active)   Wound Image   01/12/23 1423   Wound Etiology Other 01/26/23 1358   Dressing Status New drainage noted; Old drainage noted 01/26/23 1358   Wound Cleansed Cleansed with saline 01/26/23 1358   Dressing/Treatment Other (comment) 12/08/22 1448   Wound Length (cm) 4.5 cm 01/26/23 1358   Wound Width (cm) 4 cm 01/26/23 1358   Wound Depth (cm) 4.5 cm 01/26/23 1358   Wound Surface Area (cm^2) 18 cm^2 01/26/23 1358   Change in Wound Size % (l*w) -42.86 01/26/23 1358   Wound Volume (cm^3) 81 cm^3 01/26/23 1358   Wound Healing % -61 01/26/23 1358   Post-Procedure Length (cm) 4.5 cm 01/26/23 1358   Post-Procedure Width (cm) 4 cm 01/26/23 1358   Post-Procedure Depth (cm) 4.5 cm 01/26/23 1358   Post-Procedure Surface Area (cm^2) 18 cm^2 01/26/23 1358   Post-Procedure Volume (cm^3) 81 cm^3 01/26/23 1358   Distance Tunneling (cm) 5 cm 01/26/23 1358   Tunneling Position ___ O'Clock 9 01/26/23 1358   Undermining Starts ___ O'Clock 7 01/05/23 1317   Undermining Ends___ O'Clock 11 01/05/23 1317   Undermining Maxium Distance (cm) 5.3 @7 01/05/23 1317   Wound Assessment Pink/red;Slough 01/26/23 1358   Drainage Amount Moderate 01/26/23 1358   Drainage Description Serosanguinous; Yellow 01/26/23 1358   Odor None 01/26/23 1358   Jess-wound Assessment Blanchable erythema 01/26/23 1358   Margins Defined edges 01/26/23 1358   Wound Thickness Description not for Pressure Injury Full thickness 01/26/23 1358   Number of days: 49          Percent of Wound(s)/Ulcer(s) Debrided: 100%    Total Surface Area Debrided:  18 sq cm     Diabetic/Pressure/Non Pressure Ulcers only:  Ulcer: Non-Pressure ulcer, fat layer exposed      Estimated Blood Loss:  Minimal    Hemostasis Achieved:  by pressure    Procedural Pain:  2  / 10     Post Procedural Pain:  0 / 10     Response to treatment:  Well tolerated by patient. Addressed wound healing factors:       [x] Diabetes: n/a           [x] CHF: n/a                               [x] Infection: currently on Keflex, managed by Dr. Monico Garcia, ID. Will remain on until wound closes per Dr. Monico Garcia. [x] Debridement: yes- will require serial debridement  [x] Compliance; compression/offloading: discussed need for offloading and gave ideas. Doing well with offloading.       [x] Diagnostics: recent 11/8/22 hip x-ray done by ortho- last seen by them 12/6/22  [x] Smoking: n/a             Plan:     ---Cont NPWT     -RTC one week    -Patient instructed to address continued oral intake as a wound healing concern.    -I have reviewed instructions with the patient, answering all questions to satisfaction.    -Patient to call or return to clinic as needed if wound symptoms worsen or fail to improve as anticipated.    -See Discharge Instructions for wound management orders. Written patient dismissal instructions given to patient and signed by patient or POA.            Electronically signed by MITESH Arce CNP on 1/26/2023 at 2:16 PM

## 2023-01-26 NOTE — PLAN OF CARE
Problem: Discharge Planning  Goal: Discharge to home or other facility with appropriate resources  1/26/2023 1410 by Cliff Johnson RN  Outcome: Progressing  1/26/2023 1410 by Cliff Johnson RN  Outcome: Progressing     Problem: Safety - Adult  Goal: Free from fall injury  1/26/2023 1410 by Cliff Johnson RN  Outcome: Progressing  1/26/2023 1410 by Cliff Johnson RN  Outcome: Progressing     Problem: ABCDS Injury Assessment  Goal: Absence of physical injury  1/26/2023 1410 by Cliff Johnson RN  Outcome: Progressing  1/26/2023 1410 by Cliff Johnson RN  Outcome: Progressing     Problem: Wound:  Goal: Will show signs of wound healing; wound closure and no evidence of infection  Description: Will show signs of wound healing; wound closure and no evidence of infection  Outcome: Progressing     Problem: Falls - Risk of:  Goal: Will remain free from falls  Description: Will remain free from falls  Outcome: Progressing

## 2023-01-26 NOTE — PROGRESS NOTES
Negative Pressure    NAME:  Lila Perez  YOB: 1950  MEDICAL RECORD NUMBER:  [id number]  DATE:  1/26/2023    Applied Negative Pressure to Left thigh wound(s)/ulcer(s). [x] Applied skin barrier prep to ann-wound. [x] Cut strips of plastic drape to picture frame wound so that ann-wound is     covered with the drape. [x] If bridging dressing to less prominent site, cover any intact skin that will come in contact with the Negative Pressure Therapy sponge, gauze or channel drain with plastic drape. The sponge should never touch intact skin. [x] Cut sponge, gauze or channel drain to size which will fit into the wound/ulcer bed without being forced. [x] Be sure the sponge is large enough to hold the entire round plastic flange which is attached to the tubing. Never allow flange to be larger than the sponge or it will produce suction damaging intact skin. Total number of individual pieces of foam used within the wound bed: 1    [x] If bridging the dressing away from the primary site, be sure the bridge leads to a piece of sponge large enough to hold the entire flange without allowing any of the flange to overlap onto intact skin. [x] Covered sponge, gauze or channel drain with plastic drape. [x] Cut a hole in this plastic drape directly over the sponge the same size as the plastic drain tubing. [x] Removed plastic liner from flange and apply it directly over the hole you cut. [x] Removed the plastic cover from the flange. [x] Attached the tubing to the wound/ulcer Negative Pressure Therapy and turn it on to be sure a vacuum is created and that there are no leaks. [x] If air leaks occur, use plastic drape to patch them. [x] Secured Negative Pressure Therapy dressing with ace wrap loosely if located on an extremity. Maintain tubing outside of ace wrap. Tubing must not exert pressure on intact skin.     Applied per  Guidelines      Electronically signed by Susannah Dickson Liza Caicedoer on 1/26/2023 at 2:20 PM

## 2023-01-31 NOTE — DISCHARGE INSTRUCTIONS
[address] [facility],[address] -Phone: [phone] Fax: [phone]    Visit  Discharge Instructions / Physician Orders     DATE: 2/2/2023     Home Care: [address] [facility] ORDERED THRU: [employer]      Wound Location: Left Posterior Thigh     Cleanse with: Liquid antibacterial soap and water, rinse well      Dressing Orders: Curly Augustin Vac: (Pack enough sponge into wound so sponge does not suck down into wound, should be bolstered out ) Skin prep and drape around wound, black foam into wound, bridge up thigh-no foam should come into contact with intact skin, continous suction at 120mmHg     Frequency: Change on Tuesday, Thursday, Saturday ([facility] will do on appointment days) (Bring new sponge and canister to appointments at wound care center)              Additional Orders: Increase protein to diet (meat, cheese, eggs, fish, peanut butter, nuts and beans)  Multivitamin daily     Your next appointment with [facility] is in 1 week     (Please note your next appointment above and if you are unable to keep, kindly give a 24 hour notice. Thank you.)  If more than 15 min late we cannot guarantee you will be seen due to clinician schedule  Per Policy, Excessive cancellation will call for dismissal from program.     If you experience any of the following, please call the [facility] during business hours:  [phone]  Your Phone call may be forwarded to [employer] during business hours that [facility] is closed. * Increase in Pain  * Temperature over 101  * Increase in drainage from your wound  * Drainage with a foul odor  * Bleeding  * Increase in swelling  * Need for compression bandage changes due to slippage, breakthrough drainage. If you need medical attention outside of the business hours of the [facility] [employer] please contact your PCP or go to the nearest emergency room.      The information contained in the After Visit Summary has been reviewed with me, the patient and/or responsible adult, by my health care provider(s). I had the opportunity to ask questions regarding this information.  I have elected to receive;      []After Visit Summary  [x]Comprehensive Discharge Instruction        Patient signature______________________________________Date:________  Electronically signed by Purnima Hightower RN on 2/2/2023 at 1:15 PM  Electronically signed by MITESH Wells CNP on 2/2/2023 at 1:14 PM

## 2023-02-02 ENCOUNTER — HOSPITAL ENCOUNTER (OUTPATIENT)
Dept: WOUND CARE | Age: 73
Discharge: HOME | End: 2023-02-02
Payer: COMMERCIAL

## 2023-02-02 VITALS
SYSTOLIC BLOOD PRESSURE: 124 MMHG | TEMPERATURE: 98.2 F | RESPIRATION RATE: 18 BRPM | HEART RATE: 73 BPM | DIASTOLIC BLOOD PRESSURE: 56 MMHG

## 2023-02-02 DIAGNOSIS — A41.01 MSSA (METHICILLIN SUSCEPTIBLE STAPHYLOCOCCUS AUREUS) SEPTICEMIA (HCC): ICD-10-CM

## 2023-02-02 DIAGNOSIS — L97.122 CHRONIC ULCER OF LEFT THIGH WITH FAT LAYER EXPOSED (HCC): Primary | ICD-10-CM

## 2023-02-02 PROCEDURE — 11042 DBRDMT SUBQ TIS 1ST 20SQCM/<: CPT

## 2023-02-02 PROCEDURE — 97605 NEG PRS WND THER DME<=50SQCM: CPT

## 2023-02-02 RX ORDER — CLOBETASOL PROPIONATE 0.5 MG/G
OINTMENT TOPICAL ONCE
OUTPATIENT
Start: 2023-02-02 | End: 2023-02-02

## 2023-02-02 RX ORDER — BACITRACIN ZINC AND POLYMYXIN B SULFATE 500; 1000 [USP'U]/G; [USP'U]/G
OINTMENT TOPICAL ONCE
OUTPATIENT
Start: 2023-02-02 | End: 2023-02-02

## 2023-02-02 RX ORDER — BETAMETHASONE DIPROPIONATE 0.05 %
OINTMENT (GRAM) TOPICAL ONCE
OUTPATIENT
Start: 2023-02-02 | End: 2023-02-02

## 2023-02-02 RX ORDER — BACITRACIN, NEOMYCIN, POLYMYXIN B 400; 3.5; 5 [USP'U]/G; MG/G; [USP'U]/G
OINTMENT TOPICAL ONCE
OUTPATIENT
Start: 2023-02-02 | End: 2023-02-02

## 2023-02-02 RX ORDER — LIDOCAINE HYDROCHLORIDE 20 MG/ML
JELLY TOPICAL ONCE
Status: COMPLETED | OUTPATIENT
Start: 2023-02-02 | End: 2023-02-02

## 2023-02-02 RX ORDER — LIDOCAINE 40 MG/G
CREAM TOPICAL ONCE
OUTPATIENT
Start: 2023-02-02 | End: 2023-02-02

## 2023-02-02 RX ORDER — LIDOCAINE 50 MG/G
OINTMENT TOPICAL ONCE
OUTPATIENT
Start: 2023-02-02 | End: 2023-02-02

## 2023-02-02 RX ORDER — LIDOCAINE HYDROCHLORIDE 20 MG/ML
JELLY TOPICAL ONCE
OUTPATIENT
Start: 2023-02-02 | End: 2023-02-02

## 2023-02-02 RX ORDER — GINSENG 100 MG
CAPSULE ORAL ONCE
OUTPATIENT
Start: 2023-02-02 | End: 2023-02-02

## 2023-02-02 RX ORDER — LIDOCAINE HYDROCHLORIDE 40 MG/ML
SOLUTION TOPICAL ONCE
OUTPATIENT
Start: 2023-02-02 | End: 2023-02-02

## 2023-02-02 RX ORDER — GENTAMICIN SULFATE 1 MG/G
OINTMENT TOPICAL ONCE
OUTPATIENT
Start: 2023-02-02 | End: 2023-02-02

## 2023-02-02 RX ADMIN — LIDOCAINE HYDROCHLORIDE 6 ML: 20 JELLY TOPICAL at 12:55

## 2023-02-02 ASSESSMENT — PAIN SCALES - GENERAL: PAINLEVEL_OUTOF10: 0

## 2023-02-02 NOTE — PROGRESS NOTES
[address]   Progress Note and Procedure Note      Korin Wen  MEDICAL RECORD NUMBER:  [id number]  AGE: 71 y.o. GENDER: female  : 1950  EPISODE DATE:  2023    Subjective:     Chief Complaint   Patient presents with    Wound Check     buttocks         HISTORY of PRESENT ILLNESS HPI     Korin Wen is a 71 y.o. female who presents today for wound/ulcer evaluation. History of Wound Context: left posterior thigh ulcer of unknown etiology that had become infected requiring continued Keflex. Will be on Keflex until wound heals per Dr. John Savage. Interval history: no new concerns, diet intake has improved including increasing protein. Still on Keflex. Using NPWT with no concerns. States lots of itching this week in wound area.        Wound/Ulcer Pain Timing/Severity: intermittent  Quality of pain: aching  Severity:  1 / 10   Modifying Factors: None, Pain worsens with walking, and Pain is relieved/improved with rest  Associated Signs/Symptoms: erythema and drainage          PAST MEDICAL HISTORY        Diagnosis Date    Acute respiratory failure with hypoxia (HCC) 2022    Aspiration pneumonia (HCC) 2022    Chronic back pain     Chronic hip pain     Chronic ulcer of left thigh ([address] 2022    COPD (chronic obstructive pulmonary disease) ([address]     Major depression     MSSA (methicillin susceptible Staphylococcus aureus) septicemia ([address] 2022    MSSA bacteremia 2022    Osteoarthritis     Osteoporosis     Respiratory alkalosis 2022    Septicemia ([address] 2022    UTI due to Klebsiella species 2022       PAST SURGICAL HISTORY    Past Surgical History:   Procedure Laterality Date    BREAST BIOPSY      two    CHOLECYSTECTOMY      HYSTERECTOMY, VAGINAL         FAMILY HISTORY    Family History   Problem Relation Age of Onset    Stroke Mother     Alzheimer's Disease Father        SOCIAL HISTORY    Social History     Tobacco Use    Smoking status: Former     Packs/day: 0.75     Years: 34.00     Pack years: 25.50     Types: Cigarettes     Quit date: 3/20/2017     Years since quittin.8    Smokeless tobacco: Never   Vaping Use    Vaping Use: Never used   Substance Use Topics    Alcohol use: Not Currently     Comment: occasionally     Drug use: Never       ALLERGIES    Allergies   Allergen Reactions    Lyrica [Pregabalin] Hives    Demerol Hcl [Meperidine]     Fluoxetine     Paxil [Paroxetine Hcl]     Diclofenac-Misoprostol Rash    Lyrica Cr [Pregabalin Er] Rash       MEDICATIONS    Current Outpatient Medications on File Prior to Encounter   Medication Sig Dispense Refill    apixaban (ELIQUIS) 5 MG TABS tablet Take 1 tablet by mouth 2 times daily 42 tablet 0    ferrous sulfate (IRON 325) 325 (65 Fe) MG tablet Take 1 tablet by mouth daily (with breakfast) 90 tablet 1    potassium chloride (KLOR-CON M) 20 MEQ extended release tablet       desvenlafaxine succinate (PRISTIQ) 100 MG TB24 extended release tablet Take 1 tablet by mouth daily 90 tablet 1    SYMBICORT 160-4.5 MCG/ACT AERO Inhale 2 puffs into the lungs 2 times daily 10.2 g 5    tiotropium (SPIRIVA HANDIHALER) 18 MCG inhalation capsule Inhale 1 capsule into the lungs daily 90 capsule 1    montelukast (SINGULAIR) 10 MG tablet Take 1 tablet by mouth nightly 90 tablet 1    promethazine (PHENERGAN) 25 MG tablet Take 1 tablet by mouth every 6 hours as needed (as needed for nausea) 60 tablet 1    metoprolol tartrate (LOPRESSOR) 25 MG tablet Take 1 tablet by mouth 2 times daily 60 tablet 3    albuterol sulfate HFA (PROVENTIL;VENTOLIN;PROAIR) 108 (90 Base) MCG/ACT inhaler Inhale 2 puffs into the lungs every 6 hours as needed for Wheezing 3 each 1    tiZANidine (ZANAFLEX) 4 MG tablet Take 1 tablet by mouth every 8 hours as needed (muscle spasm) 270 tablet 1    thiamine 100 MG tablet Take 1 tablet by mouth daily 30 tablet 3    folic acid (FOLVITE) 1 MG tablet Take 1 tablet by mouth daily 30 tablet 3 ondansetron (ZOFRAN ODT) 4 MG disintegrating tablet Take 1 tablet by mouth every 8 hours as needed for Nausea or Vomiting 20 tablet 0    vitamin D (ERGOCALCIFEROL) 1.25 MG (75204 UT) CAPS capsule Take 1 capsule by mouth once a week 12 capsule 1    alendronate (FOSAMAX) 70 MG tablet Take 1 tablet by mouth once a week 12 tablet 1    Handicap Placard MISC by Does not apply route Expires 11/2025 1 each 0    morphine (MS CONTIN) 15 MG extended release tablet 3 times daily. No current facility-administered medications on file prior to encounter. REVIEW OF SYSTEMS    Constitutional: negative for chills and fevers  Respiratory: negative for cough and shortness of breath  Cardiovascular: positive for minimal lower extremity edema, negative for chest pain and palpitations  Genitourinary: negative for urinary incontinence  Integument: positive for left posterior thigh wound  Musculoskeletal:negative for arthralgias  Behavioral/Psych: negative  Endocrine: negative  Hematologic/Immunologic: negative    Objective:      BP (!) 124/56   Pulse 73   Temp 98.2 °F (36.8 °C) (Tympanic)   Resp 18   LMP  (LMP Unknown)     Wt Readings from Last 3 Encounters:   01/26/23 200 lb (90.7 kg)   01/19/23 200 lb (90.7 kg)   01/12/23 200 lb (90.7 kg)       PHYSICAL EXAM       General Appearance: alert and oriented to person, place and time, well-developed and well-nourished, in no acute distress  Skin: Left posterior thigh wound bed appears healthy, no slough or eschar. Noted fibrin and granular tissue in wound bed. Thick layer of biofilm debrided.   Head: normocephalic and atraumatic  Pulmonary/Chest: normal air movement, no respiratory distress  Extremities: no cyanosis and no clubbing or edema   Musculoskeletal: no joint swelling, deformity or tenderness  Neurologic: gait slow and steady with walker, coordination normal and speech normal      Assessment:     Problem List Items Addressed This Visit          Other    MSSA (methicillin susceptible Staphylococcus aureus) septicemia ([facility]    Relevant Orders    Initiate Outpatient Wound Care Protocol    Neg. Pressure Wound Therapy    Chronic ulcer of left thigh with fat layer exposed ([address] - Primary    Relevant Orders    Initiate Outpatient Wound Care Protocol    Neg. Pressure Wound Therapy        Procedure Note  Indications:  Based on my examination of this patient's wound(s)/ulcer(s) today, debridement is required to promote healing and evaluate the wound base. Performed by: MITESH Levy CNP    Consent obtained:  Yes    Time out taken:  Yes    Pain Control:         Debridement:Excisional Debridement    Using curette the wound(s)/ulcer(s) was/were sharply debrided down through and including the removal of subcutaneous tissue. Devitalized Tissue Debrided:  fibrin and biofilm    Pre Debridement Measurements:  Are located in the [facility]  Documentation Flow Sheet    Wound/Ulcer #: 1    Post Debridement Measurements:  Wound/Ulcer Descriptions are Pre Debridement except measurements:    Wound 12/08/22 Thigh Left;Posterior #1 (Active)   Wound Image   01/12/23 1423   Wound Etiology Other 02/02/23 1257   Dressing Status New drainage noted; Old drainage noted 02/02/23 1257   Wound Cleansed Cleansed with saline 02/02/23 1257   Dressing/Treatment Negative pressure wound therapy 01/26/23 1416   Wound Length (cm) 3.5 cm 02/02/23 1257   Wound Width (cm) 2 cm 02/02/23 1257   Wound Depth (cm) 4.4 cm 02/02/23 1257   Wound Surface Area (cm^2) 7 cm^2 02/02/23 1257   Change in Wound Size % (l*w) 44.44 02/02/23 1257   Wound Volume (cm^3) 30.8 cm^3 02/02/23 1257   Wound Healing % 39 02/02/23 1257   Post-Procedure Length (cm) 3.5 cm 02/02/23 1257   Post-Procedure Width (cm) 2 cm 02/02/23 1257   Post-Procedure Depth (cm) 4.4 cm 02/02/23 1257   Post-Procedure Surface Area (cm^2) 7 cm^2 02/02/23 1257   Post-Procedure Volume (cm^3) 30.8 cm^3 02/02/23 1257   Distance Tunneling (cm) 4.4 cm 02/02/23 1257   Tunneling Position ___ O'Clock 9 02/02/23 1257   Undermining Starts ___ O'Clock 7 01/05/23 1317   Undermining Ends___ O'Clock 11 01/05/23 1317   Undermining Maxium Distance (cm) 5.3 @7 01/05/23 1317   Wound Assessment Pink/red;Slough 02/02/23 1257   Drainage Amount Moderate 02/02/23 1257   Drainage Description Serosanguinous; Yellow 02/02/23 1257   Odor None 02/02/23 1257   Jess-wound Assessment Blanchable erythema 02/02/23 1257   Margins Defined edges 02/02/23 1257   Wound Thickness Description not for Pressure Injury Full thickness 02/02/23 1257   Number of days: 55          Percent of Wound(s)/Ulcer(s) Debrided: 100%    Total Surface Area Debrided:  7 sq cm     Diabetic/Pressure/Non Pressure Ulcers only:  Ulcer: Non-Pressure ulcer, fat layer exposed      Estimated Blood Loss:  Minimal    Hemostasis Achieved:  by pressure    Procedural Pain:  2  / 10     Post Procedural Pain:  0 / 10     Response to treatment:  Well tolerated by patient. Addressed wound healing factors:       [x] Diabetes: n/a           [x] CHF: n/a                               [x] Infection: currently on Keflex, managed by Dr. Sincere Woodard, [place]. Will remain on until wound closes per Dr. Sincere Woodard. [x] Debridement: yes- will require serial debridement  [x] Compliance; compression/offloading: discussed need for offloading and gave ideas. Doing well with offloading. [x] Diagnostics: recent 11/8/22 hip x-ray done by ortho- last seen by them 12/6/22  [x] Smoking: n/a             Plan:     - Cont NPWT     -RTC one week     -Patient instructed to address continued oral intake as a wound healing concern.      -I have reviewed instructions with the patient, answering all questions to satisfaction.    -Patient to call or return to clinic as needed if wound symptoms worsen or fail to improve as anticipated.    -See Discharge Instructions for wound management orders.         Written patient dismissal instructions given to patient and signed by patient or POA.            Electronically signed by MITESH Moreno CNP on 2/2/2023 at 1:15 PM

## 2023-02-02 NOTE — PROGRESS NOTES
Negative Pressure    NAME:  Rebeka Patiño  YOB: 1950  MEDICAL RECORD NUMBER:  [id number]  DATE:  2/2/2023    Applied Negative Pressure to left posterior thigh wound(s)/ulcer(s). [x] Applied skin barrier prep to ann-wound. [x] Cut strips of plastic drape to picture frame wound so that ann-wound is     covered with the drape. [x] If bridging dressing to less prominent site, cover any intact skin that will come in contact with the Negative Pressure Therapy sponge, gauze or channel drain with plastic drape. The sponge should never touch intact skin. [x] Cut sponge, gauze or channel drain to size which will fit into the wound/ulcer bed without being forced. [x] Be sure the sponge is large enough to hold the entire round plastic flange which is attached to the tubing. Never allow flange to be larger than the sponge or it will produce suction damaging intact skin. Total number of individual pieces of foam used within the wound bed: 1    [x] If bridging the dressing away from the primary site, be sure the bridge leads to a piece of sponge large enough to hold the entire flange without allowing any of the flange to overlap onto intact skin. [x] Covered sponge, gauze or channel drain with plastic drape. [x] Cut a hole in this plastic drape directly over the sponge the same size as the plastic drain tubing. [x] Removed plastic liner from flange and apply it directly over the hole you cut. [x] Removed the plastic cover from the flange. [x] Attached the tubing to the wound/ulcer Negative Pressure Therapy and turn it on to be sure a vacuum is created and that there are no leaks. [x] If air leaks occur, use plastic drape to patch them. [x] Secured Negative Pressure Therapy dressing with ace wrap loosely if located on an extremity. Maintain tubing outside of ace wrap. Tubing must not exert pressure on intact skin.     Applied per  Guidelines      Electronically signed by Ade Phillips Harleen Perry on 2/2/2023 at 1:26 PM

## 2023-02-07 NOTE — DISCHARGE INSTRUCTIONS
[address] [facility] -Phone: [phone] Fax: [phone]    Visit  Discharge Instructions / Physician Orders     DATE: 2/9/2023     Home Care: [address] [facility] ORDERED THRU: [employer]      Wound Location: Left Posterior Thigh     Cleanse with: Liquid antibacterial soap and water, rinse well      Dressing Orders: Tamy and Nephew Vac: (Pack enough sponge into wound so sponge does not suck down into wound, should be bolstered out ) Skin prep and drape around wound, black foam into wound, bridge up thigh-no foam should come into contact with intact skin, continous suction at 120mmHg     Frequency: Change on Tuesday, Thursday, Saturday ([address] [place] [address] [facility] will do on appointment days) (Bring new sponge and canister to appointments at wound care center)              Additional Orders: Increase protein to diet (meat, cheese, eggs, fish, peanut butter, nuts and beans)  Multivitamin daily     Your next appointment with [facility] is in 1 week     (Please note your next appointment above and if you are unable to keep, kindly give a 24 hour notice. Thank you.)  If more than 15 min late we cannot guarantee you will be seen due to clinician schedule  Per Policy, Excessive cancellation will call for dismissal from program.     If you experience any of the following, please call the [address] [place] [address] during business hours:  [phone]  Your Phone call may be forwarded to [facility] during business hours that [facility]. * Increase in Pain  * Temperature over 101  * Increase in drainage from your wound  * Drainage with a foul odor  * Bleeding  * Increase in swelling  * Need for compression bandage changes due to slippage, breakthrough drainage. If you need medical attention outside of the business hours of the [address] [place] [address] [facility] please contact your PCP or go to the nearest emergency room.      The information contained in the After Visit Summary has been reviewed with me, the patient and/or responsible adult, by my health care provider(s). I had the opportunity to ask questions regarding this information.  I have elected to receive;      []After Visit Summary  [x]Comprehensive Discharge Instruction        Patient signature______________________________________Date:________  Electronically signed by Ting Henderson RN on 2/9/2023 at 1:19 PM  Electronically signed by MITESH Vidal CNP on 2/9/2023 at 1:22 PM

## 2023-02-09 ENCOUNTER — HOSPITAL ENCOUNTER (OUTPATIENT)
Dept: WOUND CARE | Age: 73
Discharge: HOME | End: 2023-02-09
Payer: COMMERCIAL

## 2023-02-09 VITALS
DIASTOLIC BLOOD PRESSURE: 83 MMHG | TEMPERATURE: 98.3 F | HEART RATE: 93 BPM | SYSTOLIC BLOOD PRESSURE: 135 MMHG | RESPIRATION RATE: 16 BRPM

## 2023-02-09 DIAGNOSIS — L97.122 CHRONIC ULCER OF LEFT THIGH WITH FAT LAYER EXPOSED (HCC): Primary | ICD-10-CM

## 2023-02-09 DIAGNOSIS — A41.01 MSSA (METHICILLIN SUSCEPTIBLE STAPHYLOCOCCUS AUREUS) SEPTICEMIA (HCC): ICD-10-CM

## 2023-02-09 PROCEDURE — 97605 NEG PRS WND THER DME<=50SQCM: CPT

## 2023-02-09 PROCEDURE — 11042 DBRDMT SUBQ TIS 1ST 20SQCM/<: CPT

## 2023-02-09 RX ORDER — CLOBETASOL PROPIONATE 0.5 MG/G
OINTMENT TOPICAL ONCE
OUTPATIENT
Start: 2023-02-09 | End: 2023-02-09

## 2023-02-09 RX ORDER — LIDOCAINE 40 MG/G
CREAM TOPICAL ONCE
OUTPATIENT
Start: 2023-02-09 | End: 2023-02-09

## 2023-02-09 RX ORDER — BACITRACIN, NEOMYCIN, POLYMYXIN B 400; 3.5; 5 [USP'U]/G; MG/G; [USP'U]/G
OINTMENT TOPICAL ONCE
OUTPATIENT
Start: 2023-02-09 | End: 2023-02-09

## 2023-02-09 RX ORDER — LIDOCAINE HYDROCHLORIDE 20 MG/ML
JELLY TOPICAL ONCE
OUTPATIENT
Start: 2023-02-09 | End: 2023-02-09

## 2023-02-09 RX ORDER — GINSENG 100 MG
CAPSULE ORAL ONCE
OUTPATIENT
Start: 2023-02-09 | End: 2023-02-09

## 2023-02-09 RX ORDER — BACITRACIN ZINC AND POLYMYXIN B SULFATE 500; 1000 [USP'U]/G; [USP'U]/G
OINTMENT TOPICAL ONCE
OUTPATIENT
Start: 2023-02-09 | End: 2023-02-09

## 2023-02-09 RX ORDER — BETAMETHASONE DIPROPIONATE 0.05 %
OINTMENT (GRAM) TOPICAL ONCE
OUTPATIENT
Start: 2023-02-09 | End: 2023-02-09

## 2023-02-09 RX ORDER — GENTAMICIN SULFATE 1 MG/G
OINTMENT TOPICAL ONCE
OUTPATIENT
Start: 2023-02-09 | End: 2023-02-09

## 2023-02-09 RX ORDER — LIDOCAINE 50 MG/G
OINTMENT TOPICAL ONCE
OUTPATIENT
Start: 2023-02-09 | End: 2023-02-09

## 2023-02-09 RX ORDER — LIDOCAINE HYDROCHLORIDE 40 MG/ML
SOLUTION TOPICAL ONCE
OUTPATIENT
Start: 2023-02-09 | End: 2023-02-09

## 2023-02-09 RX ORDER — LIDOCAINE HYDROCHLORIDE 20 MG/ML
JELLY TOPICAL ONCE
Status: DISCONTINUED | OUTPATIENT
Start: 2023-02-09 | End: 2023-02-10 | Stop reason: HOSPADM

## 2023-02-09 NOTE — PROGRESS NOTES
[address]   Progress Note and Procedure Note      Jose F Hebert  MEDICAL RECORD NUMBER:  [id number]  AGE: 71 y.o. GENDER: female  : 1950  EPISODE DATE:  2023    Subjective:     Chief Complaint   Patient presents with    Wound Check     Left thigh         HISTORY of PRESENT ILLNESS HPI     Jose F Hebert is a 71 y.o. female who presents today for wound/ulcer evaluation. History of Wound Context: left posterior thigh ulcer of unknown etiology that had become infected requiring continued Keflex. Will be on Keflex until wound heals per Dr. Gautam Zelaya. Interval history: no new concerns, diet intake has improved including increasing protein. Still on Keflex.  Using NPWT with no concerns      Wound/Ulcer Pain Timing/Severity: intermittent  Quality of pain: aching  Severity:  1 / 10   Modifying Factors: None, Pain worsens with walking, and Pain is relieved/improved with rest  Associated Signs/Symptoms: erythema and drainage             PAST MEDICAL HISTORY        Diagnosis Date    Acute respiratory failure with hypoxia (HCC) 2022    Aspiration pneumonia ([address] 2022    Chronic back pain     Chronic hip pain     Chronic ulcer of left thigh ([address] 2022    COPD (chronic obstructive pulmonary disease) ([address]     Major depression     MSSA (methicillin susceptible Staphylococcus aureus) septicemia ([address] 2022    MSSA bacteremia 2022    Osteoarthritis     Osteoporosis     Respiratory alkalosis 2022    Septicemia ([address] 2022    UTI due to Klebsiella species 2022       PAST SURGICAL HISTORY    Past Surgical History:   Procedure Laterality Date    BREAST BIOPSY      two    CHOLECYSTECTOMY      HYSTERECTOMY, VAGINAL         FAMILY HISTORY    Family History   Problem Relation Age of Onset    Stroke Mother     Alzheimer's Disease Father        SOCIAL HISTORY    Social History     Tobacco Use    Smoking status: Former     Packs/day: 0.75     Years: 34.00     Pack years: 25.50     Types: Cigarettes     Quit date: 3/20/2017     Years since quittin.8    Smokeless tobacco: Never   Vaping Use    Vaping Use: Never used   Substance Use Topics    Alcohol use: Not Currently     Comment: occasionally     Drug use: Never       ALLERGIES    Allergies   Allergen Reactions    Lyrica [Pregabalin] Hives    Demerol Hcl [Meperidine]     Fluoxetine     Paxil [Paroxetine Hcl]     Diclofenac-Misoprostol Rash    Lyrica Cr [Pregabalin Er] Rash       MEDICATIONS    Current Outpatient Medications on File Prior to Encounter   Medication Sig Dispense Refill    apixaban (ELIQUIS) 5 MG TABS tablet Take 1 tablet by mouth 2 times daily 42 tablet 0    ferrous sulfate (IRON 325) 325 (65 Fe) MG tablet Take 1 tablet by mouth daily (with breakfast) 90 tablet 1    potassium chloride (KLOR-CON M) 20 MEQ extended release tablet       desvenlafaxine succinate (PRISTIQ) 100 MG TB24 extended release tablet Take 1 tablet by mouth daily 90 tablet 1    SYMBICORT 160-4.5 MCG/ACT AERO Inhale 2 puffs into the lungs 2 times daily 10.2 g 5    tiotropium (SPIRIVA HANDIHALER) 18 MCG inhalation capsule Inhale 1 capsule into the lungs daily 90 capsule 1    montelukast (SINGULAIR) 10 MG tablet Take 1 tablet by mouth nightly 90 tablet 1    promethazine (PHENERGAN) 25 MG tablet Take 1 tablet by mouth every 6 hours as needed (as needed for nausea) 60 tablet 1    metoprolol tartrate (LOPRESSOR) 25 MG tablet Take 1 tablet by mouth 2 times daily 60 tablet 3    albuterol sulfate HFA (PROVENTIL;VENTOLIN;PROAIR) 108 (90 Base) MCG/ACT inhaler Inhale 2 puffs into the lungs every 6 hours as needed for Wheezing 3 each 1    tiZANidine (ZANAFLEX) 4 MG tablet Take 1 tablet by mouth every 8 hours as needed (muscle spasm) 270 tablet 1    thiamine 100 MG tablet Take 1 tablet by mouth daily 30 tablet 3    folic acid (FOLVITE) 1 MG tablet Take 1 tablet by mouth daily 30 tablet 3    ondansetron (ZOFRAN ODT) 4 MG disintegrating tablet Take 1 tablet by mouth every 8 hours as needed for Nausea or Vomiting 20 tablet 0    vitamin D (ERGOCALCIFEROL) 1.25 MG (00981 UT) CAPS capsule Take 1 capsule by mouth once a week 12 capsule 1    alendronate (FOSAMAX) 70 MG tablet Take 1 tablet by mouth once a week 12 tablet 1    Handicap Placard MISC by Does not apply route Expires 11/2025 1 each 0    morphine (MS CONTIN) 15 MG extended release tablet 3 times daily. No current facility-administered medications on file prior to encounter. REVIEW OF SYSTEMS    Constitutional: negative for chills and fevers  Respiratory: negative for cough and shortness of breath  Cardiovascular: positive for minimal lower extremity edema, negative for chest pain and palpitations  Genitourinary: negative for urinary incontinence  Integument: positive for left posterior thigh wound  Musculoskeletal:negative for arthralgias  Behavioral/Psych: negative  Endocrine: negative  Hematologic/Immunologic: negative    Objective:      /83   Pulse 93   Temp 98.3 °F (36.8 °C) (Tympanic)   Resp 16   LMP  (LMP Unknown)     Wt Readings from Last 3 Encounters:   01/26/23 200 lb (90.7 kg)   01/19/23 200 lb (90.7 kg)   01/12/23 200 lb (90.7 kg)       PHYSICAL EXAM    General Appearance: alert and oriented to person, place and time, well-developed and well-nourished, in no acute distress  Skin: Left posterior thigh wound bed appears healthy, no slough or eschar. Noted fibrin and granular tissue in wound bed. Thick layer of biofilm debrided.   Head: normocephalic and atraumatic  Pulmonary/Chest: normal air movement, no respiratory distress  Extremities: no cyanosis and no clubbing or edema   Musculoskeletal: no joint swelling, deformity or tenderness  Neurologic: gait slow and steady with walker, coordination normal and speech normal      Assessment:     Problem List Items Addressed This Visit          Other    MSSA (methicillin susceptible Staphylococcus aureus) septicemia ([facility]    Relevant Medications    lidocaine (XYLOCAINE) 2 % uro-jet (Start on 2/9/2023  1:30 PM)    Other Relevant Orders    Initiate Outpatient Wound Care Protocol    Chronic ulcer of left thigh with fat layer exposed ([facility] - Primary    Relevant Medications    lidocaine (XYLOCAINE) 2 % uro-jet (Start on 2/9/2023  1:30 PM)    Other Relevant Orders    Initiate Outpatient Wound Care Protocol        Procedure Note  Indications:  Based on my examination of this patient's wound(s)/ulcer(s) today, debridement is required to promote healing and evaluate the wound base. Performed by: MITESH Arce CNP    Consent obtained:  Yes    Time out taken:  Yes    Pain Control: Anesthetic  Anesthetic: 2% Lidocaine Gel Topical       Debridement:Excisional Debridement    Using curette the wound(s)/ulcer(s) was/were sharply debrided down through and including the removal of subcutaneous tissue. Devitalized Tissue Debrided:  fibrin and biofilm    Pre Debridement Measurements:  Are located in the [facility]  Documentation Flow Sheet    Wound/Ulcer #: 1    Post Debridement Measurements:  Wound/Ulcer Descriptions are Pre Debridement except measurements:    Wound 12/08/22 Thigh Left;Posterior #1 (Active)   Wound Image   01/12/23 1423   Wound Etiology Other 02/09/23 1310   Dressing Status New drainage noted; Old drainage noted 02/09/23 1310   Wound Cleansed Cleansed with saline 02/09/23 1310   Dressing/Treatment Negative pressure wound therapy 01/26/23 1416   Wound Length (cm) 2.7 cm 02/09/23 1310   Wound Width (cm) 0.6 cm 02/09/23 1310   Wound Depth (cm) 5 cm 02/09/23 1310   Wound Surface Area (cm^2) 1.62 cm^2 02/09/23 1310   Change in Wound Size % (l*w) 87.14 02/09/23 1310   Wound Volume (cm^3) 8.1 cm^3 02/09/23 1310   Wound Healing % 84 02/09/23 1310   Post-Procedure Length (cm) 2.7 cm 02/09/23 1310   Post-Procedure Width (cm) 1 cm 02/09/23 1310   Post-Procedure Depth (cm) 4.8 cm 02/09/23 1310   Post-Procedure Surface Area (cm^2) 2.7 cm^2 02/09/23 1310   Post-Procedure Volume (cm^3) 12.96 cm^3 02/09/23 1310   Distance Tunneling (cm) 4.4 cm 02/02/23 1257   Tunneling Position ___ O'Clock 9 02/02/23 1257   Undermining Starts ___ O'Clock 8 02/09/23 1310   Undermining Ends___ O'Clock 9 02/09/23 1310   Undermining Maxium Distance (cm) 5.3 @9 02/09/23 1310   Wound Assessment Pink/red;Slough 02/09/23 1310   Drainage Amount Moderate 02/09/23 1310   Drainage Description Serosanguinous 02/09/23 1310   Odor None 02/09/23 1310   Jess-wound Assessment Blanchable erythema 02/09/23 1310   Margins Defined edges 02/09/23 1310   Wound Thickness Description not for Pressure Injury Full thickness 02/09/23 1310   Number of days: 62          Percent of Wound(s)/Ulcer(s) Debrided: 100%    Total Surface Area Debrided:  2.7 sq cm     Diabetic/Pressure/Non Pressure Ulcers only:  Ulcer: Non-Pressure ulcer, fat layer exposed      Estimated Blood Loss:  Minimal    Hemostasis Achieved:  by pressure    Procedural Pain:  3  / 10     Post Procedural Pain:  0 / 10     Response to treatment:  Well tolerated by patient. Addressed wound healing factors:     [x] Diabetes: n/a           [x] CHF: n/a                               [x] Infection: currently on Keflex, managed by Dr. Dirk Marshall, ID. Will remain on until wound closes per Dr. Dirk Marshall. [x] Debridement: yes- will require serial debridement  [x] Compliance; compression/offloading: discussed need for offloading and gave ideas. Doing well with offloading. [x] Diagnostics: recent 11/8/22 hip x-ray done by ortho- last seen by them 12/6/22  [x] Smoking: n/a             Plan:     -- Cont NPWT     -RTC one week      -I have reviewed instructions with the patient, answering all questions to satisfaction.    -Patient to call or return to clinic as needed if wound symptoms worsen or fail to improve as anticipated.    -See Discharge Instructions for wound management orders.         Written patient dismissal instructions given to patient and signed by patient or POA.            Electronically signed by MITESH Carrizales CNP on 2/9/2023 at 1:22 PM

## 2023-02-09 NOTE — PROGRESS NOTES
Negative Pressure    NAME:  Jose F Hebert  YOB: 1950  MEDICAL RECORD NUMBER:  [id number]  DATE:  2/9/2023    Applied Negative Pressure to Left thigh wound(s)/ulcer(s). [x] Applied skin barrier prep to ann-wound. [x] Cut strips of plastic drape to picture frame wound so that ann-wound is     covered with the drape. [x] If bridging dressing to less prominent site, cover any intact skin that will come in contact with the Negative Pressure Therapy sponge, gauze or channel drain with plastic drape. The sponge should never touch intact skin. [x] Cut sponge, gauze or channel drain to size which will fit into the wound/ulcer bed without being forced. [x] Be sure the sponge is large enough to hold the entire round plastic flange which is attached to the tubing. Never allow flange to be larger than the sponge or it will produce suction damaging intact skin. Total number of individual pieces of foam used within the wound bed: 1    [x] If bridging the dressing away from the primary site, be sure the bridge leads to a piece of sponge large enough to hold the entire flange without allowing any of the flange to overlap onto intact skin. [x] Covered sponge, gauze or channel drain with plastic drape. [x] Cut a hole in this plastic drape directly over the sponge the same size as the plastic drain tubing. [x] Removed plastic liner from flange and apply it directly over the hole you cut. [x] Removed the plastic cover from the flange. [x] Attached the tubing to the wound/ulcer Negative Pressure Therapy and turn it on to be sure a vacuum is created and that there are no leaks. [x] If air leaks occur, use plastic drape to patch them. [x] Secured Negative Pressure Therapy dressing with ace wrap loosely if located on an extremity. Maintain tubing outside of ace wrap. Tubing must not exert pressure on intact skin.     Applied per  Guidelines      Electronically signed by Toya Montemayor Talia Gunn RN on 2/9/2023 at 1:38 PM

## 2023-02-15 NOTE — DISCHARGE INSTRUCTIONS
[address] [facility],[address] -Phone: [phone] Fax: [phone]    Visit  Discharge Instructions / Physician Orders     DATE: 2/16/2023     Home Care: [address] [facility] ORDERED THRU: [employer]      Wound Location: Left Posterior Thigh     Cleanse with: Liquid antibacterial soap and water, rinse well      Dressing Orders: [facility] AT [place] [address] Vac: (Pack enough sponge into wound so sponge does not suck down into wound, should be bolstered out ) Skin prep and drape around wound, Collagen into wound, black foam into wound, bridge up thigh-no foam should come into contact with intact skin, continous suction at 120mmHg     Frequency: Change on Tuesday, Thursday, Saturday ([address] [place] [address] will do on appointment days) (Bring new sponge and canister to appointments at wound care center)              Additional Orders: Increase protein to diet (meat, cheese, eggs, fish, peanut butter, nuts and beans)  Multivitamin daily  [facility]- : Gideon Litten [id number]- Case #[id number]     Your next appointment with [address] [place] [address] is in 1 week     (Please note your next appointment above and if you are unable to keep, kindly give a 24 hour notice. Thank you.)  If more than 15 min late we cannot guarantee you will be seen due to clinician schedule  Per Policy, Excessive cancellation will call for dismissal from program.     If you experience any of the following, please call the [address] [place] [address] during business hours:  [phone]  Your Phone call may be forwarded to [employer] during business hours that [address] [place] [address] is closed. * Increase in Pain  * Temperature over 101  * Increase in drainage from your wound  * Drainage with a foul odor  * Bleeding  * Increase in swelling  * Need for compression bandage changes due to slippage, breakthrough drainage.      If you need medical attention outside of the business hours of the [address] [place] [address] please contact your PCP or go to the nearest emergency room. The information contained in the After Visit Summary has been reviewed with me, the patient and/or responsible adult, by my health care provider(s). I had the opportunity to ask questions regarding this information.  I have elected to receive;      []After Visit Summary  [x]Comprehensive Discharge Instruction        Patient signature______________________________________Date:________  Electronically signed by Rosa Isela Friedman RN on 2/16/2023 at 2:33 PM  Electronically signed by MITESH Arce - CNP on 2/16/2023 at 2:28 PM

## 2023-02-16 ENCOUNTER — HOSPITAL ENCOUNTER (OUTPATIENT)
Dept: WOUND CARE | Age: 73
Discharge: HOME | End: 2023-02-16
Payer: COMMERCIAL

## 2023-02-16 VITALS
SYSTOLIC BLOOD PRESSURE: 116 MMHG | TEMPERATURE: 97.9 F | WEIGHT: 200 LBS | HEIGHT: 66 IN | HEART RATE: 89 BPM | RESPIRATION RATE: 17 BRPM | BODY MASS INDEX: 32.14 KG/M2 | DIASTOLIC BLOOD PRESSURE: 68 MMHG

## 2023-02-16 DIAGNOSIS — L97.122 CHRONIC ULCER OF LEFT THIGH WITH FAT LAYER EXPOSED (HCC): Primary | ICD-10-CM

## 2023-02-16 DIAGNOSIS — A41.01 MSSA (METHICILLIN SUSCEPTIBLE STAPHYLOCOCCUS AUREUS) SEPTICEMIA (HCC): ICD-10-CM

## 2023-02-16 PROCEDURE — 11042 DBRDMT SUBQ TIS 1ST 20SQCM/<: CPT

## 2023-02-16 PROCEDURE — 97605 NEG PRS WND THER DME<=50SQCM: CPT

## 2023-02-16 RX ORDER — BACITRACIN, NEOMYCIN, POLYMYXIN B 400; 3.5; 5 [USP'U]/G; MG/G; [USP'U]/G
OINTMENT TOPICAL ONCE
OUTPATIENT
Start: 2023-02-16 | End: 2023-02-16

## 2023-02-16 RX ORDER — LIDOCAINE 50 MG/G
OINTMENT TOPICAL ONCE
OUTPATIENT
Start: 2023-02-16 | End: 2023-02-16

## 2023-02-16 RX ORDER — LIDOCAINE HYDROCHLORIDE 20 MG/ML
JELLY TOPICAL ONCE
OUTPATIENT
Start: 2023-02-16 | End: 2023-02-16

## 2023-02-16 RX ORDER — CLOBETASOL PROPIONATE 0.5 MG/G
OINTMENT TOPICAL ONCE
OUTPATIENT
Start: 2023-02-16 | End: 2023-02-16

## 2023-02-16 RX ORDER — BETAMETHASONE DIPROPIONATE 0.05 %
OINTMENT (GRAM) TOPICAL ONCE
OUTPATIENT
Start: 2023-02-16 | End: 2023-02-16

## 2023-02-16 RX ORDER — BACITRACIN ZINC AND POLYMYXIN B SULFATE 500; 1000 [USP'U]/G; [USP'U]/G
OINTMENT TOPICAL ONCE
OUTPATIENT
Start: 2023-02-16 | End: 2023-02-16

## 2023-02-16 RX ORDER — GINSENG 100 MG
CAPSULE ORAL ONCE
OUTPATIENT
Start: 2023-02-16 | End: 2023-02-16

## 2023-02-16 RX ORDER — LIDOCAINE HYDROCHLORIDE 40 MG/ML
SOLUTION TOPICAL ONCE
OUTPATIENT
Start: 2023-02-16 | End: 2023-02-16

## 2023-02-16 RX ORDER — GENTAMICIN SULFATE 1 MG/G
OINTMENT TOPICAL ONCE
OUTPATIENT
Start: 2023-02-16 | End: 2023-02-16

## 2023-02-16 RX ORDER — LIDOCAINE 40 MG/G
CREAM TOPICAL ONCE
OUTPATIENT
Start: 2023-02-16 | End: 2023-02-16

## 2023-02-16 RX ORDER — LIDOCAINE HYDROCHLORIDE 20 MG/ML
JELLY TOPICAL ONCE
Status: COMPLETED | OUTPATIENT
Start: 2023-02-16 | End: 2023-02-16

## 2023-02-16 RX ADMIN — LIDOCAINE HYDROCHLORIDE 6 ML: 20 JELLY TOPICAL at 13:50

## 2023-02-16 NOTE — PROGRESS NOTES
Negative Pressure    NAME:  Cleveland Randhawa  YOB: 1950  MEDICAL RECORD NUMBER:  [id number]  DATE:  2/16/2023    Applied Negative Pressure to Left hip wound(s)/ulcer(s). [x] Applied skin barrier prep to ann-wound. [x] Cut strips of plastic drape to picture frame wound so that ann-wound is     covered with the drape. [x] If bridging dressing to less prominent site, cover any intact skin that will come in contact with the Negative Pressure Therapy sponge, gauze or channel drain with plastic drape. The sponge should never touch intact skin. [x] Cut sponge, gauze or channel drain to size which will fit into the wound/ulcer bed without being forced. [x] Be sure the sponge is large enough to hold the entire round plastic flange which is attached to the tubing. Never allow flange to be larger than the sponge or it will produce suction damaging intact skin. Total number of individual pieces of foam used within the wound bed: 1    [x] If bridging the dressing away from the primary site, be sure the bridge leads to a piece of sponge large enough to hold the entire flange without allowing any of the flange to overlap onto intact skin. [x] Covered sponge, gauze or channel drain with plastic drape. [x] Cut a hole in this plastic drape directly over the sponge the same size as the plastic drain tubing. [x] Removed plastic liner from flange and apply it directly over the hole you cut. [x] Removed the plastic cover from the flange. [x] Attached the tubing to the wound/ulcer Negative Pressure Therapy and turn it on to be sure a vacuum is created and that there are no leaks. [x] If air leaks occur, use plastic drape to patch them. [x] Secured Negative Pressure Therapy dressing with ace wrap loosely if located on an extremity. Maintain tubing outside of ace wrap. Tubing must not exert pressure on intact skin.     Applied per  Guidelines      Electronically signed by Shayne Mata [facility], RN on 2/16/2023 at 2:43 PM

## 2023-02-16 NOTE — PROGRESS NOTES
[address]   Progress Note and Procedure Note      Maxine Dean  MEDICAL RECORD NUMBER:  [id number]  AGE: 71 y.o. GENDER: female  : 1950  EPISODE DATE:  2023    Subjective:     Chief Complaint   Patient presents with    Wound Check     Left thigh         HISTORY of PRESENT ILLNESS HPI     Maxine Dean is a 71 y.o. female who presents today for wound/ulcer evaluation. History of Wound Context: left posterior thigh ulcer of unknown etiology that had become infected requiring continued Keflex. Will be on Keflex until wound heals per Dr. Adria Hernandez. Interval history: no new concerns, diet intake has improved including increasing protein. Still on Keflex.  Using NPWT with no concerns       Wound/Ulcer Pain Timing/Severity: intermittent  Quality of pain: aching  Severity:  1 / 10   Modifying Factors: None, Pain worsens with walking, and Pain is relieved/improved with rest  Associated Signs/Symptoms: erythema and drainage             PAST MEDICAL HISTORY        Diagnosis Date    Acute respiratory failure with hypoxia (HCC) 2022    Aspiration pneumonia ([address] 2022    Chronic back pain     Chronic hip pain     Chronic ulcer of left thigh ([address] 2022    COPD (chronic obstructive pulmonary disease) ([address]     Major depression     MSSA (methicillin susceptible Staphylococcus aureus) septicemia ([address] 2022    MSSA bacteremia 2022    Osteoarthritis     Osteoporosis     Respiratory alkalosis 2022    Septicemia ([address] 2022    UTI due to Klebsiella species 2022       PAST SURGICAL HISTORY    Past Surgical History:   Procedure Laterality Date    BREAST BIOPSY      two    CHOLECYSTECTOMY      HYSTERECTOMY, VAGINAL         FAMILY HISTORY    Family History   Problem Relation Age of Onset    Stroke Mother     Alzheimer's Disease Father        SOCIAL HISTORY    Social History     Tobacco Use    Smoking status: Former     Packs/day: 0.75     Years: 34.00     Pack years: 25.50     Types: Cigarettes     Quit date: 3/20/2017     Years since quittin.9    Smokeless tobacco: Never   Vaping Use    Vaping Use: Never used   Substance Use Topics    Alcohol use: Not Currently     Comment: occasionally     Drug use: Never       ALLERGIES    Allergies   Allergen Reactions    Lyrica [Pregabalin] Hives    Demerol Hcl [Meperidine]     Fluoxetine     Paxil [Paroxetine Hcl]     Diclofenac-Misoprostol Rash    Lyrica Cr [Pregabalin Er] Rash       MEDICATIONS    Current Outpatient Medications on File Prior to Encounter   Medication Sig Dispense Refill    apixaban (ELIQUIS) 5 MG TABS tablet Take 1 tablet by mouth 2 times daily 42 tablet 0    ferrous sulfate (IRON 325) 325 (65 Fe) MG tablet Take 1 tablet by mouth daily (with breakfast) 90 tablet 1    potassium chloride (KLOR-CON M) 20 MEQ extended release tablet       desvenlafaxine succinate (PRISTIQ) 100 MG TB24 extended release tablet Take 1 tablet by mouth daily 90 tablet 1    SYMBICORT 160-4.5 MCG/ACT AERO Inhale 2 puffs into the lungs 2 times daily 10.2 g 5    tiotropium (SPIRIVA HANDIHALER) 18 MCG inhalation capsule Inhale 1 capsule into the lungs daily 90 capsule 1    montelukast (SINGULAIR) 10 MG tablet Take 1 tablet by mouth nightly 90 tablet 1    promethazine (PHENERGAN) 25 MG tablet Take 1 tablet by mouth every 6 hours as needed (as needed for nausea) 60 tablet 1    metoprolol tartrate (LOPRESSOR) 25 MG tablet Take 1 tablet by mouth 2 times daily 60 tablet 3    albuterol sulfate HFA (PROVENTIL;VENTOLIN;PROAIR) 108 (90 Base) MCG/ACT inhaler Inhale 2 puffs into the lungs every 6 hours as needed for Wheezing 3 each 1    tiZANidine (ZANAFLEX) 4 MG tablet Take 1 tablet by mouth every 8 hours as needed (muscle spasm) 270 tablet 1    thiamine 100 MG tablet Take 1 tablet by mouth daily 30 tablet 3    folic acid (FOLVITE) 1 MG tablet Take 1 tablet by mouth daily 30 tablet 3    ondansetron (ZOFRAN ODT) 4 MG disintegrating tablet Take 1 tablet by mouth every 8 hours as needed for Nausea or Vomiting 20 tablet 0    vitamin D (ERGOCALCIFEROL) 1.25 MG (47210 UT) CAPS capsule Take 1 capsule by mouth once a week 12 capsule 1    alendronate (FOSAMAX) 70 MG tablet Take 1 tablet by mouth once a week 12 tablet 1    Handicap Placard MISC by Does not apply route Expires 11/2025 1 each 0    morphine (MS CONTIN) 15 MG extended release tablet 3 times daily. No current facility-administered medications on file prior to encounter. REVIEW OF SYSTEMS    Constitutional: negative for chills and fevers  Respiratory: negative for cough and shortness of breath  Cardiovascular: positive for minimal lower extremity edema, negative for chest pain and palpitations  Genitourinary: negative for urinary incontinence  Integument: positive for left posterior thigh wound  Musculoskeletal:negative for arthralgias  Behavioral/Psych: negative  Endocrine: negative  Hematologic/Immunologic: negative    Objective:      /68   Pulse 89   Temp 97.9 °F (36.6 °C) (Tympanic)   Resp 17   Ht 5' 6\" (1.676 m)   Wt 200 lb (90.7 kg)   LMP  (LMP Unknown)   BMI 32.28 kg/m²     Wt Readings from Last 3 Encounters:   02/16/23 200 lb (90.7 kg)   01/26/23 200 lb (90.7 kg)   01/19/23 200 lb (90.7 kg)       PHYSICAL EXAM    General Appearance: alert and oriented to person, place and time, well-developed and well-nourished, in no acute distress  Skin: Left posterior thigh wound bed appears healthy, no slough or eschar. Noted fibrin and granular tissue in wound bed. Thick layer of biofilm debrided.   Head: normocephalic and atraumatic  Pulmonary/Chest: normal air movement, no respiratory distress  Extremities: no cyanosis and no clubbing or edema   Musculoskeletal: no joint swelling, deformity or tenderness  Neurologic: gait slow and steady with walker, coordination normal and speech normal      Assessment:     Problem List Items Addressed This Visit          Other MSSA (methicillin susceptible Staphylococcus aureus) septicemia ([facility]    Relevant Orders    Initiate Outpatient Wound Care Protocol    Chronic ulcer of left thigh with fat layer exposed ([facility] - Primary    Relevant Orders    Initiate Outpatient Wound Care Protocol        Procedure Note  Indications:  Based on my examination of this patient's wound(s)/ulcer(s) today, debridement is required to promote healing and evaluate the wound base. Performed by: MITESH Cheema CNP    Consent obtained:  Yes    Time out taken:  Yes    Pain Control: Anesthetic  Anesthetic: 2% Lidocaine Gel Topical       Debridement:Excisional Debridement    Using curette the wound(s)/ulcer(s) was/were sharply debrided down through and including the removal of subcutaneous tissue. Devitalized Tissue Debrided:  fibrin and biofilm    Pre Debridement Measurements:  Are located in the [facility]  Documentation Flow Sheet    Wound/Ulcer #: 1    Post Debridement Measurements:  Wound/Ulcer Descriptions are Pre Debridement except measurements:    Wound 12/08/22 Thigh Left;Posterior #1 (Active)   Wound Image   01/12/23 1423   Wound Etiology Other 02/16/23 1348   Dressing Status New drainage noted; Old drainage noted 02/16/23 1348   Wound Cleansed Cleansed with saline 02/16/23 1348   Dressing/Treatment Negative pressure wound therapy 02/09/23 1336   Wound Length (cm) 3 cm 02/16/23 1348   Wound Width (cm) 0.5 cm 02/16/23 1348   Wound Depth (cm) 4.4 cm 02/16/23 1348   Wound Surface Area (cm^2) 1.5 cm^2 02/16/23 1348   Change in Wound Size % (l*w) 88.1 02/16/23 1348   Wound Volume (cm^3) 6.6 cm^3 02/16/23 1348   Wound Healing % 87 02/16/23 1348   Post-Procedure Length (cm) 3 cm 02/16/23 1348   Post-Procedure Width (cm) 0.5 cm 02/16/23 1348   Post-Procedure Depth (cm) 4.4 cm 02/16/23 1348   Post-Procedure Surface Area (cm^2) 1.5 cm^2 02/16/23 1348   Post-Procedure Volume (cm^3) 6.6 cm^3 02/16/23 1348   Distance Tunneling (cm) 4.4 cm 02/02/23 1257 Tunneling Position ___ O'Clock 9 02/02/23 1257   Undermining Starts ___ O'Clock 7 02/16/23 1348   Undermining Ends___ O'Clock 9 02/16/23 1348   Undermining Maxium Distance (cm) [email or web address] 02/16/23 1348   Wound Assessment Pink/red;Slough 02/16/23 1348   Drainage Amount Moderate 02/16/23 1348   Drainage Description Serosanguinous 02/16/23 1348   Odor Mild 02/16/23 1348   Jess-wound Assessment Blanchable erythema; Maceration 02/16/23 1348   Margins Defined edges 02/16/23 1348   Wound Thickness Description not for Pressure Injury Full thickness 02/16/23 1348   Number of days: 70          Percent of Wound(s)/Ulcer(s) Debrided: 100%    Total Surface Area Debrided:  1.5 sq cm     Diabetic/Pressure/Non Pressure Ulcers only:  Ulcer: Non-Pressure ulcer, fat layer exposed      Estimated Blood Loss:  Minimal    Hemostasis Achieved:  by pressure    Procedural Pain:  0  / 10     Post Procedural Pain:  0 / 10     Response to treatment:  Well tolerated by patient. Addressed wound healing factors:   Diabetes: n/a           [x] CHF: n/a                               [x] Infection: currently on Keflex, managed by Dr. Ho Lockett, ID. Will remain on until wound closes per Dr. Ho Lockett. [x] Debridement: yes- will require serial debridement  [x] Compliance; compression/offloading: discussed need for offloading and gave ideas. Doing well with offloading. [x] Diagnostics: recent 11/8/22 hip x-ray done by ortho- last seen by them 12/6/22  [x] Smoking: n/a             Plan:     -Cont wound vac, add collagen to wound bed    -Back to work in 2 weeks    -Patient instructed to address offloading as a wound healing concern.    -I have reviewed instructions with the patient, answering all questions to satisfaction.    -Patient to call or return to clinic as needed if wound symptoms worsen or fail to improve as anticipated.    -See Discharge Instructions for wound management orders.         Written patient dismissal instructions given to patient and signed by patient or POA.            Electronically signed by MITESH Villagomez CNP on 2/16/2023 at 2:36 PM

## 2023-02-16 NOTE — PLAN OF CARE
Problem: Discharge Planning  Goal: Discharge to home or other facility with appropriate resources  Outcome: Progressing     Problem: Safety - Adult  Goal: Free from fall injury  Outcome: Progressing     Problem: ABCDS Injury Assessment  Goal: Absence of physical injury  Outcome: Progressing  Flowsheets (Taken 2/16/2023 5386 by Lore Estes RN)  Absence of Physical Injury: Implement safety measures based on patient assessment     Problem: Wound:  Goal: Will show signs of wound healing; wound closure and no evidence of infection  Description: Will show signs of wound healing; wound closure and no evidence of infection  Outcome: Progressing     Problem: Falls - Risk of:  Goal: Will remain free from falls  Description: Will remain free from falls  Outcome: Progressing

## 2023-02-21 NOTE — DISCHARGE INSTRUCTIONS
[address] [facility],[address] -Phone: [phone] Fax: [phone]    Visit  Discharge Instructions / Physician Orders     DATE: 2/23/2023     Home Care: [address] [facility] ORDERED THRU: [employer]      Wound Location: Left Posterior Thigh     Cleanse with: Liquid antibacterial soap and water, rinse well      Dressing Orders: [place] and Nephew Vac: (Pack enough sponge into wound so sponge does not suck down into wound, should be bolstered out ) Skin prep and drape around wound, Collagen into wound, black foam into wound, bridge up thigh-no foam should come into contact with intact skin, continous suction at 120mmHg     Frequency: Change on Tuesday, Thursday, Saturday ([address] [place] [address] will do on appointment days) (Bring new sponge and canister to appointments at wound care center)              Additional Orders: Increase protein to diet (meat, cheese, eggs, fish, peanut butter, nuts and beans)  Multivitamin daily  [facility]- : Diannah Burkitt [id number]- Case #[id number]     Your next appointment with [address] [place] [address] is in 1 week     (Please note your next appointment above and if you are unable to keep, kindly give a 24 hour notice. Thank you.)  If more than 15 min late we cannot guarantee you will be seen due to clinician schedule  Per Policy, Excessive cancellation will call for dismissal from program.     If you experience any of the following, please call the [address] [place] [address] during business hours:  [phone]  Your Phone call may be forwarded to [facility] during business hours that [facility] is closed. * Increase in Pain  * Temperature over 101  * Increase in drainage from your wound  * Drainage with a foul odor  * Bleeding  * Increase in swelling  * Need for compression bandage changes due to slippage, breakthrough drainage.      If you need medical attention outside of the business hours of the [address] [place] [address] please contact your PCP or go to the nearest emergency room. The information contained in the After Visit Summary has been reviewed with me, the patient and/or responsible adult, by my health care provider(s). I had the opportunity to ask questions regarding this information.  I have elected to receive;      []After Visit Summary  [x]Comprehensive Discharge Instruction        Patient signature______________________________________Date:________  Electronically signed by Thompson Bustos RN on 2/23/2023 at 1:29 PM  Electronically signed by MITESH Santiago CNP on 2/23/2023 at 1:22 PM

## 2023-02-22 ENCOUNTER — OFFICE VISIT (OUTPATIENT)
Dept: FAMILY MEDICINE CLINIC | Age: 73
End: 2023-02-22
Payer: COMMERCIAL

## 2023-02-22 VITALS
WEIGHT: 200.6 LBS | BODY MASS INDEX: 32.38 KG/M2 | OXYGEN SATURATION: 95 % | TEMPERATURE: 100 F | DIASTOLIC BLOOD PRESSURE: 60 MMHG | SYSTOLIC BLOOD PRESSURE: 106 MMHG | HEART RATE: 87 BPM

## 2023-02-22 DIAGNOSIS — G89.29 CHRONIC BILATERAL LOW BACK PAIN WITH BILATERAL SCIATICA: ICD-10-CM

## 2023-02-22 DIAGNOSIS — M54.42 CHRONIC BILATERAL LOW BACK PAIN WITH BILATERAL SCIATICA: ICD-10-CM

## 2023-02-22 DIAGNOSIS — L97.122 CHRONIC ULCER OF LEFT THIGH WITH FAT LAYER EXPOSED (HCC): ICD-10-CM

## 2023-02-22 DIAGNOSIS — A41.01 MSSA (METHICILLIN SUSCEPTIBLE STAPHYLOCOCCUS AUREUS) SEPTICEMIA (HCC): ICD-10-CM

## 2023-02-22 DIAGNOSIS — M54.41 CHRONIC BILATERAL LOW BACK PAIN WITH BILATERAL SCIATICA: ICD-10-CM

## 2023-02-22 DIAGNOSIS — M48.062 SPINAL STENOSIS OF LUMBAR REGION WITH NEUROGENIC CLAUDICATION: ICD-10-CM

## 2023-02-22 DIAGNOSIS — E66.09 CLASS 1 OBESITY DUE TO EXCESS CALORIES WITH SERIOUS COMORBIDITY AND BODY MASS INDEX (BMI) OF 32.0 TO 32.9 IN ADULT: ICD-10-CM

## 2023-02-22 DIAGNOSIS — Z12.31 ENCOUNTER FOR SCREENING MAMMOGRAM FOR BREAST CANCER: Primary | ICD-10-CM

## 2023-02-22 DIAGNOSIS — Z91.81 AT HIGH RISK FOR FALLS: ICD-10-CM

## 2023-02-22 DIAGNOSIS — I48.0 PAROXYSMAL ATRIAL FIBRILLATION (HCC): ICD-10-CM

## 2023-02-22 DIAGNOSIS — R73.03 PREDIABETES: ICD-10-CM

## 2023-02-22 PROBLEM — J18.9 PNEUMONIA OF LEFT LOWER LOBE DUE TO INFECTIOUS ORGANISM: Status: RESOLVED | Noted: 2022-10-01 | Resolved: 2023-02-22

## 2023-02-22 PROBLEM — R65.10 SIRS (SYSTEMIC INFLAMMATORY RESPONSE SYNDROME) (HCC): Status: RESOLVED | Noted: 2022-09-30 | Resolved: 2023-02-22

## 2023-02-22 PROCEDURE — 99214 OFFICE O/P EST MOD 30 MIN: CPT | Performed by: INTERNAL MEDICINE

## 2023-02-22 PROCEDURE — 1123F ACP DISCUSS/DSCN MKR DOCD: CPT | Performed by: INTERNAL MEDICINE

## 2023-02-22 RX ORDER — CEPHALEXIN 500 MG/1
CAPSULE ORAL
COMMUNITY
Start: 2023-01-24

## 2023-02-22 SDOH — ECONOMIC STABILITY: FOOD INSECURITY: WITHIN THE PAST 12 MONTHS, THE FOOD YOU BOUGHT JUST DIDN'T LAST AND YOU DIDN'T HAVE MONEY TO GET MORE.: SOMETIMES TRUE

## 2023-02-22 SDOH — ECONOMIC STABILITY: INCOME INSECURITY: HOW HARD IS IT FOR YOU TO PAY FOR THE VERY BASICS LIKE FOOD, HOUSING, MEDICAL CARE, AND HEATING?: SOMEWHAT HARD

## 2023-02-22 SDOH — ECONOMIC STABILITY: FOOD INSECURITY: WITHIN THE PAST 12 MONTHS, YOU WORRIED THAT YOUR FOOD WOULD RUN OUT BEFORE YOU GOT MONEY TO BUY MORE.: SOMETIMES TRUE

## 2023-02-22 SDOH — ECONOMIC STABILITY: HOUSING INSECURITY
IN THE LAST 12 MONTHS, WAS THERE A TIME WHEN YOU DID NOT HAVE A STEADY PLACE TO SLEEP OR SLEPT IN A SHELTER (INCLUDING NOW)?: NO

## 2023-02-22 ASSESSMENT — PATIENT HEALTH QUESTIONNAIRE - PHQ9
5. POOR APPETITE OR OVEREATING: 0
SUM OF ALL RESPONSES TO PHQ9 QUESTIONS 1 & 2: 0
2. FEELING DOWN, DEPRESSED OR HOPELESS: 0
10. IF YOU CHECKED OFF ANY PROBLEMS, HOW DIFFICULT HAVE THESE PROBLEMS MADE IT FOR YOU TO DO YOUR WORK, TAKE CARE OF THINGS AT HOME, OR GET ALONG WITH OTHER PEOPLE: 0
SUM OF ALL RESPONSES TO PHQ QUESTIONS 1-9: 0
SUM OF ALL RESPONSES TO PHQ QUESTIONS 1-9: 0
4. FEELING TIRED OR HAVING LITTLE ENERGY: 0
9. THOUGHTS THAT YOU WOULD BE BETTER OFF DEAD, OR OF HURTING YOURSELF: 0
3. TROUBLE FALLING OR STAYING ASLEEP: 0
SUM OF ALL RESPONSES TO PHQ QUESTIONS 1-9: 0
8. MOVING OR SPEAKING SO SLOWLY THAT OTHER PEOPLE COULD HAVE NOTICED. OR THE OPPOSITE, BEING SO FIGETY OR RESTLESS THAT YOU HAVE BEEN MOVING AROUND A LOT MORE THAN USUAL: 0
7. TROUBLE CONCENTRATING ON THINGS, SUCH AS READING THE NEWSPAPER OR WATCHING TELEVISION: 0
6. FEELING BAD ABOUT YOURSELF - OR THAT YOU ARE A FAILURE OR HAVE LET YOURSELF OR YOUR FAMILY DOWN: 0
SUM OF ALL RESPONSES TO PHQ QUESTIONS 1-9: 0
1. LITTLE INTEREST OR PLEASURE IN DOING THINGS: 0

## 2023-02-22 ASSESSMENT — ENCOUNTER SYMPTOMS
SHORTNESS OF BREATH: 0
CHOKING: 0
ANAL BLEEDING: 0
BLOOD IN STOOL: 0
VOMITING: 0
DIARRHEA: 0
CONSTIPATION: 0
COUGH: 0
NAUSEA: 0
CHEST TIGHTNESS: 0
WHEEZING: 0
ABDOMINAL PAIN: 0

## 2023-02-22 ASSESSMENT — VISUAL ACUITY: OU: 1

## 2023-02-22 ASSESSMENT — COPD QUESTIONNAIRES: COPD: 1

## 2023-02-22 NOTE — PROGRESS NOTES
Pt is here today for a regular     Pt would like a work, she is suppose to go back on 03/01/2023, pt would like t be off until 03/20/2023 if she can, home care comes out twice a week, and goes to wound care once a week, wound is still draining, but much better, was started on collagen last Thursday to fill the spot up     Pt is asking for a handicap Placard for 5 yrs, she had one but she lost it     Disability notes need to be sent out to continue disability     Meds to new pharm for 90 days, will call with pharm info, mail order     Need to request notes from [facility], EKG     Pt states she had labs done

## 2023-02-22 NOTE — LETTER
[address]  [address]  [facility] [place] [address]  Phone: [phone]  Fax: [phone]    Dipika Dupree MD        February 22, 2023     Patient: Daylin Baker   YOB: 1950   Date of Visit: 2/22/2023       To Whom It May Concern: It is my medical opinion that Mai Kayser may return to work on 3/20/23 with the following restrictions: none. She continues to require ongoing intensive medical therapy with sustained improvement and will start to wean down on the level of care required by the expected date of return to work. If you have any questions or concerns, please don't hesitate to call.     Sincerely,        Dipika Dupree MD

## 2023-02-22 NOTE — PROGRESS NOTES
[facility]  [facility] [address]  [facility] [address]  Dept: [phone]  Dept Fax: [phone]      Cleveland Randhawa is a 71 y.o. female who presents today for hermedical conditions/complaints as noted below. Cleveland Randhawa is c/o of COPD (F/u) and Other (MSSA f/u)        Assessment/Plan:     1. Encounter for screening mammogram for breast cancer  -     HEDY Digital Screen Bilateral; Future  2. Paroxysmal atrial fibrillation (HCC)  -     Handicap Placard MISC; Starting Wed 2/22/2023, Disp-1 each, R-0, PrintExpires 2/2028  3. At high risk for falls  4. Class 1 obesity due to excess calories with serious comorbidity and body mass index (BMI) of 32.0 to 32.9 in adult  5. Chronic ulcer of left thigh with fat layer exposed ([facility]  -     Handicap Placard MISC; Starting Wed 2/22/2023, Disp-1 each, R-0, PrintExpires 2/2028  6. Chronic bilateral low back pain with bilateral sciatica  -     Handicap Placard MISC; Starting Wed 2/22/2023, Disp-1 each, R-0, PrintExpires 2/2028  7. MSSA (methicillin susceptible Staphylococcus aureus) septicemia ([facility]  8. Spinal stenosis of lumbar region with neurogenic claudication  -     Handicap Placard MISC; Starting Wed 2/22/2023, Disp-1 each, R-0, PrintExpires 2/2028  9. Prediabetes      On the basis of positive falls risk screening, assessment and plan is as follows: doing home PT with home health    No follow-ups on file. HPI     COPD  There is no cough, shortness of breath or wheezing. Pertinent negatives include no chest pain, fever or myalgias. Other  Pertinent negatives include no abdominal pain, chest pain, coughing, fatigue, fever, joint swelling, myalgias, nausea or vomiting. Needs work note to extend time off till 3/20 -we will need to return to work thereafter due to financial constraints  Following with infectious disease and orthopedics, will likely remain on the Keflex long-term  Doing well with higher dose of Pristiq.   Denies anhedonia, sleep disturbance, suicidal or homicidal ideation, auditory or visual hallucination. Following with pain management. COPD:  Current treatment includes short-acting beta agonist inhaler, combined beta agonist/steroid inhaler, anticholinergic inhaler. Residual symptoms: chronic dyspnea. Denies any other symptoms. Requires rescue inhaler 1 time(s) per week. BP Readings from Last 3 Encounters:   23 106/60   23 116/68   23 135/83              Past Medical History:   Diagnosis Date    Acute respiratory failure with hypoxia ([facility]) 2022    Aspiration pneumonia ([facility]) 2022    Chronic back pain     Chronic hip pain     Chronic ulcer of left thigh ([facility] 2022    COPD (chronic obstructive pulmonary disease) ([facility])     Major depression     MSSA (methicillin susceptible Staphylococcus aureus) septicemia ([facility] 2022    MSSA bacteremia 2022    Osteoarthritis     Osteoporosis     Respiratory alkalosis 2022    Septicemia ([facility] 2022    UTI due to Klebsiella species 2022      Past Surgical History:   Procedure Laterality Date    BREAST BIOPSY      two    CHOLECYSTECTOMY      HYSTERECTOMY, VAGINAL         Family History   Problem Relation Age of Onset    Stroke Mother     Alzheimer's Disease Father        Social History     Tobacco Use    Smoking status: Former     Packs/day: 0.75     Years: 34.00     Pack years: 25.50     Types: Cigarettes     Quit date: 3/20/2017     Years since quittin.9    Smokeless tobacco: Never   Substance Use Topics    Alcohol use: Not Currently     Comment: occasionally         Allergies   Allergen Reactions    Lyrica [Pregabalin] Hives    Demerol Hcl [Meperidine]     Fluoxetine     Paxil [Paroxetine Hcl]     Diclofenac-Misoprostol Rash    Lyrica Cr [Pregabalin Er] Rash     Prior to Visit Medications    Medication Sig Taking?  Authorizing Provider   cephALEXin (KEFLEX) 500 MG capsule TAKE 1 CAPSULE BY MOUTH FOUR TIMES DAILY Yes Historical Provider, MD   apixaban (ELIQUIS) 5 MG TABS tablet Take 1 tablet by mouth 2 times daily Yes Ric Ordaz MD   ferrous sulfate (IRON 325) 325 (65 Fe) MG tablet Take 1 tablet by mouth daily (with breakfast) Yes Norma Dutta MD   desvenlafaxine succinate (PRISTIQ) 100 MG TB24 extended release tablet Take 1 tablet by mouth daily Yes Ric Ordaz MD   [facility] 160-4.5 MCG/ACT AERO Inhale 2 puffs into the lungs 2 times daily Yes Norma Dutta MD   tiotropium ([place] Blood) 18 MCG inhalation capsule Inhale 1 capsule into the lungs daily Yes Norma Dutta MD   montelukast (SINGULAIR) 10 MG tablet Take 1 tablet by mouth nightly Yes Norma Dutta MD   promethazine (PHENERGAN) 25 MG tablet Take 1 tablet by mouth every 6 hours as needed (as needed for nausea) Yes Norma Dutta MD   albuterol sulfate HFA (PROVENTIL;VENTOLIN;PROAIR) 108 (90 Base) MCG/ACT inhaler Inhale 2 puffs into the lungs every 6 hours as needed for Wheezing Yes Norma Dutta MD   tiZANidine (ZANAFLEX) 4 MG tablet Take 1 tablet by mouth every 8 hours as needed (muscle spasm) Yes Norma Dutta MD   folic acid (FOLVITE) 1 MG tablet Take 1 tablet by mouth daily Yes Norma Dutta MD   vitamin D (ERGOCALCIFEROL) 1.25 MG (87776 UT) CAPS capsule Take 1 capsule by mouth once a week Yes Norma Dutta MD   alendronate (FOSAMAX) 70 MG tablet Take 1 tablet by mouth once a week Yes Ric Ordaz MD   Handicap Placard MISC by Does not apply route Expires 11/2025 Yes Norma Dutta MD   morphine (MS CONTIN) 15 MG extended release tablet 3 times daily.  Yes Historical Provider, MD   potassium chloride (KLOR-CON M) 20 MEQ extended release tablet   MITESH Rowe   metoprolol tartrate (LOPRESSOR) 25 MG tablet Take 1 tablet by mouth 2 times daily  Patient not taking: Reported on 2/22/2023  Norma Dutta MD   thiamine 100 MG tablet Take 1 tablet by mouth daily  Patient not taking: Reported on 2/22/2023  Norma Cross MD   ondansetron (ZOFRAN ODT) 4 MG disintegrating tablet Take 1 tablet by mouth every 8 hours as needed for Nausea or Vomiting  Patient not taking: Reported on 2/22/2023  Norma Cross MD       Review of Systems     Review of Systems   Constitutional:  Negative for fatigue, fever and unexpected weight change. Respiratory:  Negative for cough, choking, chest tightness, shortness of breath and wheezing. Cardiovascular:  Negative for chest pain, palpitations and leg swelling. Gastrointestinal:  Negative for abdominal pain, anal bleeding, blood in stool, constipation, diarrhea, nausea and vomiting. Endocrine: Negative. Musculoskeletal:  Negative for joint swelling and myalgias. Skin: Negative. Neurological:  Negative for dizziness. Psychiatric/Behavioral:  Negative for sleep disturbance. All other systems reviewed and are negative. Objective     /60 (Site: Left Upper Arm, Position: Sitting, Cuff Size: Large Adult)   Pulse 87   Temp 100 °F (37.8 °C)   Wt 200 lb 9.6 oz (91 kg)   LMP  (LMP Unknown)   SpO2 95%   BMI 32.38 kg/m²   Physical Exam  Vitals and nursing note reviewed. Constitutional:       General: She is not in acute distress. Appearance: She is well-developed. She is not ill-appearing. Eyes:      General: Lids are normal. Vision grossly intact. Cardiovascular:      Rate and Rhythm: Normal rate and regular rhythm. Heart sounds: Normal heart sounds, S1 normal and S2 normal. No murmur heard. No friction rub. No gallop. Pulmonary:      Effort: Pulmonary effort is normal. No respiratory distress. Breath sounds: Normal breath sounds. No wheezing. Abdominal:      General: Bowel sounds are normal.      Palpations: Abdomen is soft. There is no mass. Tenderness: There is no abdominal tenderness. There is no guarding. Musculoskeletal:         General: Normal range of motion. Skin:     General: Skin is warm and dry. Capillary Refill: Capillary refill takes less than 2 seconds. Neurological:      General: No focal deficit present. Mental Status: She is alert and oriented to person, place, and time. Data Review     Wound care note reviewed   Labs in Care Everywhere reviewed     Health Maintenance Due   Topic Date Due    DTaP/Tdap/Td vaccine (1 - Tdap) Never done    Breast cancer screen  01/05/2023           Patient given educational materials- see patient instructions. Discussed use, benefit, and side effects of prescribedmedications. All patient questions answered. Pt voiced understanding. Reviewedhealth maintenance. Instructed to continue current medications, diet and exercise. Patient agreed with treatment plan. Follow up as directed. Electronically signedby Ricardo Gonzales MD on 2/22/2023 .

## 2023-02-23 ENCOUNTER — HOSPITAL ENCOUNTER (OUTPATIENT)
Dept: WOUND CARE | Age: 73
Discharge: HOME | End: 2023-02-23
Payer: COMMERCIAL

## 2023-02-23 VITALS
RESPIRATION RATE: 16 BRPM | TEMPERATURE: 97.6 F | DIASTOLIC BLOOD PRESSURE: 67 MMHG | SYSTOLIC BLOOD PRESSURE: 148 MMHG | HEART RATE: 91 BPM

## 2023-02-23 DIAGNOSIS — L97.122 CHRONIC ULCER OF LEFT THIGH WITH FAT LAYER EXPOSED (HCC): Primary | ICD-10-CM

## 2023-02-23 DIAGNOSIS — A41.01 MSSA (METHICILLIN SUSCEPTIBLE STAPHYLOCOCCUS AUREUS) SEPTICEMIA (HCC): ICD-10-CM

## 2023-02-23 PROCEDURE — 11042 DBRDMT SUBQ TIS 1ST 20SQCM/<: CPT

## 2023-02-23 PROCEDURE — 97605 NEG PRS WND THER DME<=50SQCM: CPT

## 2023-02-23 RX ORDER — LIDOCAINE HYDROCHLORIDE 40 MG/ML
SOLUTION TOPICAL ONCE
OUTPATIENT
Start: 2023-02-23 | End: 2023-02-23

## 2023-02-23 RX ORDER — CLOBETASOL PROPIONATE 0.5 MG/G
OINTMENT TOPICAL ONCE
OUTPATIENT
Start: 2023-02-23 | End: 2023-02-23

## 2023-02-23 RX ORDER — BETAMETHASONE DIPROPIONATE 0.05 %
OINTMENT (GRAM) TOPICAL ONCE
OUTPATIENT
Start: 2023-02-23 | End: 2023-02-23

## 2023-02-23 RX ORDER — LIDOCAINE HYDROCHLORIDE 20 MG/ML
JELLY TOPICAL ONCE
OUTPATIENT
Start: 2023-02-23 | End: 2023-02-23

## 2023-02-23 RX ORDER — BACITRACIN, NEOMYCIN, POLYMYXIN B 400; 3.5; 5 [USP'U]/G; MG/G; [USP'U]/G
OINTMENT TOPICAL ONCE
OUTPATIENT
Start: 2023-02-23 | End: 2023-02-23

## 2023-02-23 RX ORDER — GINSENG 100 MG
CAPSULE ORAL ONCE
OUTPATIENT
Start: 2023-02-23 | End: 2023-02-23

## 2023-02-23 RX ORDER — LIDOCAINE 50 MG/G
OINTMENT TOPICAL ONCE
OUTPATIENT
Start: 2023-02-23 | End: 2023-02-23

## 2023-02-23 RX ORDER — BACITRACIN ZINC AND POLYMYXIN B SULFATE 500; 1000 [USP'U]/G; [USP'U]/G
OINTMENT TOPICAL ONCE
OUTPATIENT
Start: 2023-02-23 | End: 2023-02-23

## 2023-02-23 RX ORDER — GENTAMICIN SULFATE 1 MG/G
OINTMENT TOPICAL ONCE
OUTPATIENT
Start: 2023-02-23 | End: 2023-02-23

## 2023-02-23 RX ORDER — LIDOCAINE HYDROCHLORIDE 20 MG/ML
JELLY TOPICAL ONCE
Status: COMPLETED | OUTPATIENT
Start: 2023-02-23 | End: 2023-02-23

## 2023-02-23 RX ORDER — LIDOCAINE 40 MG/G
CREAM TOPICAL ONCE
OUTPATIENT
Start: 2023-02-23 | End: 2023-02-23

## 2023-02-23 RX ADMIN — LIDOCAINE HYDROCHLORIDE 6 ML: 20 JELLY TOPICAL at 13:22

## 2023-02-23 NOTE — PROGRESS NOTES
[address]   Progress Note and Procedure Note      Donnell Schneider  MEDICAL RECORD NUMBER:  [id number]  AGE: 71 y.o. GENDER: female  : 1950  EPISODE DATE:  2023    Subjective:     Chief Complaint   Patient presents with    Wound Check     buttocks         HISTORY of PRESENT ILLNESS HPI     Donnell Schneider is a 71 y.o. female who presents today for wound/ulcer evaluation. History of Wound Context: left posterior thigh ulcer of unknown etiology that had become infected requiring continued Keflex. Will be on Keflex until wound heals per Dr. Rissa Jones. Interval history: minor concerns this past week- states temp of 100.4 one day when home care visited. Denies fevers or chills. Complains of fatigue, but is believed to be related to mental health. She has been having some depression which is being managed by PCP.     Wound/Ulcer Pain Timing/Severity: intermittent  Quality of pain: aching  Severity:  1 / 10   Modifying Factors: None, Pain worsens with walking, and Pain is relieved/improved with rest  Associated Signs/Symptoms: erythema and drainage          PAST MEDICAL HISTORY        Diagnosis Date    Acute respiratory failure with hypoxia (HCC) 2022    Aspiration pneumonia (HCC) 2022    Chronic back pain     Chronic hip pain     Chronic ulcer of left thigh ([address] 2022    COPD (chronic obstructive pulmonary disease) ([address]     Major depression     MSSA (methicillin susceptible Staphylococcus aureus) septicemia ([address] 2022    MSSA bacteremia 2022    Osteoarthritis     Osteoporosis     Pneumonia of left lower lobe due to infectious organism 10/1/2022    Respiratory alkalosis 2022    Septicemia ([address] 2022    SIRS (systemic inflammatory response syndrome) ([address] 2022    UTI due to Klebsiella species 2022       PAST SURGICAL HISTORY    Past Surgical History:   Procedure Laterality Date    BREAST BIOPSY      two    CHOLECYSTECTOMY HYSTERECTOMY, VAGINAL         FAMILY HISTORY    Family History   Problem Relation Age of Onset    Stroke Mother     Alzheimer's Disease Father        SOCIAL HISTORY    Social History     Tobacco Use    Smoking status: Former     Packs/day: 0.75     Years: 34.00     Pack years: 25.50     Types: Cigarettes     Quit date: 3/20/2017     Years since quittin.9    Smokeless tobacco: Never   Vaping Use    Vaping Use: Never used   Substance Use Topics    Alcohol use: Not Currently     Comment: occasionally     Drug use: Never       ALLERGIES    Allergies   Allergen Reactions    Lyrica [Pregabalin] Hives    Demerol Hcl [Meperidine]     Fluoxetine     Paxil [Paroxetine Hcl]     Diclofenac-Misoprostol Rash    Lyrica Cr [Pregabalin Er] Rash       MEDICATIONS    Current Outpatient Medications on File Prior to Encounter   Medication Sig Dispense Refill    cephALEXin (KEFLEX) 500 MG capsule TAKE 1 CAPSULE BY MOUTH FOUR TIMES DAILY      Handicap Placard MISC by Does not apply route Expires 2028 1 each 0    apixaban (ELIQUIS) 5 MG TABS tablet Take 1 tablet by mouth 2 times daily 42 tablet 0    ferrous sulfate (IRON 325) 325 (65 Fe) MG tablet Take 1 tablet by mouth daily (with breakfast) 90 tablet 1    desvenlafaxine succinate (PRISTIQ) 100 MG TB24 extended release tablet Take 1 tablet by mouth daily 90 tablet 1    SYMBICORT 160-4.5 MCG/ACT AERO Inhale 2 puffs into the lungs 2 times daily 10.2 g 5    tiotropium (SPIRIVA HANDIHALER) 18 MCG inhalation capsule Inhale 1 capsule into the lungs daily 90 capsule 1    montelukast (SINGULAIR) 10 MG tablet Take 1 tablet by mouth nightly 90 tablet 1    promethazine (PHENERGAN) 25 MG tablet Take 1 tablet by mouth every 6 hours as needed (as needed for nausea) 60 tablet 1    albuterol sulfate HFA (PROVENTIL;VENTOLIN;PROAIR) 108 (90 Base) MCG/ACT inhaler Inhale 2 puffs into the lungs every 6 hours as needed for Wheezing 3 each 1    tiZANidine (ZANAFLEX) 4 MG tablet Take 1 tablet by mouth every 8 hours as needed (muscle spasm) 239 tablet 1    folic acid (FOLVITE) 1 MG tablet Take 1 tablet by mouth daily 30 tablet 3    vitamin D (ERGOCALCIFEROL) 1.25 MG (83931 UT) CAPS capsule Take 1 capsule by mouth once a week 12 capsule 1    alendronate (FOSAMAX) 70 MG tablet Take 1 tablet by mouth once a week 12 tablet 1    morphine (MS CONTIN) 15 MG extended release tablet 3 times daily. No current facility-administered medications on file prior to encounter. REVIEW OF SYSTEMS    Constitutional: negative for chills and fevers  Respiratory: negative for cough and shortness of breath  Cardiovascular: positive for minimal lower extremity edema, negative for chest pain and palpitations  Genitourinary: negative for urinary incontinence  Integument: positive for left posterior thigh wound  Musculoskeletal:negative for arthralgias  Behavioral/Psych: negative  Endocrine: negative  Hematologic/Immunologic: negative    Objective:      BP (!) 148/67   Pulse 91   Temp 97.6 °F (36.4 °C) (Tympanic)   Resp 16   LMP  (LMP Unknown)     Wt Readings from Last 3 Encounters:   02/22/23 200 lb 9.6 oz (91 kg)   02/16/23 200 lb (90.7 kg)   01/26/23 200 lb (90.7 kg)       PHYSICAL EXAM    General Appearance: alert and oriented to person, place and time, well-developed and well-nourished, in no acute distress  Skin: Left posterior thigh wound bed appears healthy, no slough or eschar. Noted fibrin and granular tissue in wound bed. No concerns for signs of infection noted.   Head: normocephalic and atraumatic  Pulmonary/Chest: normal air movement, no respiratory distress  Extremities: no cyanosis and no clubbing or edema   Musculoskeletal: no joint swelling, deformity or tenderness  Neurologic: gait slow and steady with walker, coordination normal and speech normal      Assessment:     Problem List Items Addressed This Visit          Other    MSSA (methicillin susceptible Staphylococcus aureus) septicemia ([facility]    Relevant Orders    Initiate Outpatient Wound Care Protocol    Chronic ulcer of left thigh with fat layer exposed ([address] - Primary    Relevant Orders    Initiate Outpatient Wound Care Protocol        Procedure Note  Indications:  Based on my examination of this patient's wound(s)/ulcer(s) today, debridement is required to promote healing and evaluate the wound base. Performed by: MITESH Bobo CNP    Consent obtained:  Yes    Time out taken:  Yes    Pain Control: Anesthetic  Anesthetic: 2% Lidocaine Gel Topical       Debridement:Excisional Debridement    Using curette the wound(s)/ulcer(s) was/were sharply debrided down through and including the removal of subcutaneous tissue. Devitalized Tissue Debrided:  fibrin and biofilm    Pre Debridement Measurements:  Are located in the [facility]  Documentation Flow Sheet    Wound/Ulcer #: 1    Post Debridement Measurements:  Wound/Ulcer Descriptions are Pre Debridement except measurements:    Wound 12/08/22 Thigh Left;Posterior #1 (Active)   Wound Image   01/12/23 1423   Wound Etiology Other 02/23/23 1319   Dressing Status New drainage noted; Old drainage noted 02/23/23 1319   Wound Cleansed Cleansed with saline 02/23/23 1319   Dressing/Treatment Negative pressure wound therapy 02/23/23 1340   Wound Length (cm) 2.7 cm 02/23/23 1319   Wound Width (cm) 0.8 cm 02/23/23 1319   Wound Depth (cm) 4.3 cm 02/23/23 1319   Wound Surface Area (cm^2) 2.16 cm^2 02/23/23 1319   Change in Wound Size % (l*w) 82.86 02/23/23 1319   Wound Volume (cm^3) 9.288 cm^3 02/23/23 1319   Wound Healing % 82 02/23/23 1319   Post-Procedure Length (cm) 2.7 cm 02/23/23 1319   Post-Procedure Width (cm) 0.8 cm 02/23/23 1319   Post-Procedure Depth (cm) 4.3 cm 02/23/23 1319   Post-Procedure Surface Area (cm^2) 2.16 cm^2 02/23/23 1319   Post-Procedure Volume (cm^3) 9.288 cm^3 02/23/23 1319   Distance Tunneling (cm) 4.4 cm 02/02/23 1257   Tunneling Position ___ O'Clock 9 02/02/23 [address] ___ O'Clock 7 02/16/23 1348   Undermining Ends___ O'Clock 9 02/16/23 1348   Undermining Maxium Distance (cm) [email or web address] 02/16/23 1348   Wound Assessment [place]/red;Slough 02/23/23 1319   Drainage Amount Moderate 02/23/23 1319   Drainage Description Serosanguinous 02/23/23 1319   Odor Mild 02/23/23 1319   Jess-wound Assessment Blanchable erythema; Maceration 02/23/23 1319   Margins Defined edges 02/23/23 1319   Wound Thickness Description not for Pressure Injury Full thickness 02/23/23 1319   Number of days: 76          Percent of Wound(s)/Ulcer(s) Debrided: 100%    Total Surface Area Debrided:  2.16 sq cm     Diabetic/Pressure/Non Pressure Ulcers only:  Ulcer: Non-Pressure ulcer, fat layer exposed      Estimated Blood Loss:  Minimal    Hemostasis Achieved:  by pressure    Procedural Pain:  2  / 10     Post Procedural Pain:  0 / 10     Response to treatment:  Well tolerated by patient. Addressed wound healing factors:    [x]  Diabetes: n/a           [x] CHF: n/a                               [x] Infection: currently on Keflex, managed by Dr. Sincere Woodard, ID. Will remain on until wound closes per Dr. Sincere Woodard. [x] Debridement: yes- will require serial debridement  [x] Compliance; compression/offloading: discussed need for offloading and gave ideas. Doing well with offloading. [x] Diagnostics: recent 11/8/22 hip x-ray done by ortho- last seen by them 12/6/22  [x] Smoking: n/a             Plan:     -cont with collagen and wound vac    -RTC one week    -Return to work pushed back by PCP, agree with this for now    -Encouraged to work on factors that will improve mood    -Patient instructed to address mental health as a wound healing concern.    -I have reviewed instructions with the patient, answering all questions to satisfaction.    -Patient to call or return to clinic as needed if wound symptoms worsen or fail to improve as anticipated.    -See Discharge Instructions for wound management orders.         Written patient dismissal instructions given to patient and signed by patient or POA.            Electronically signed by MITESH Chavez CNP on 2/23/2023 at 1:49 PM

## 2023-02-23 NOTE — PROGRESS NOTES
Negative Pressure    NAME:  Radha Up  YOB: 1950  MEDICAL RECORD NUMBER:  [id number]  DATE:  2/23/2023    Applied Negative Pressure to Left thigh wound(s)/ulcer(s). [x] Applied skin barrier prep to ann-wound. [x] Cut strips of plastic drape to picture frame wound so that ann-wound is     covered with the drape. [x] If bridging dressing to less prominent site, cover any intact skin that will come in contact with the Negative Pressure Therapy sponge, gauze or channel drain with plastic drape. The sponge should never touch intact skin. [x] Cut sponge, gauze or channel drain to size which will fit into the wound/ulcer bed without being forced. [x] Be sure the sponge is large enough to hold the entire round plastic flange which is attached to the tubing. Never allow flange to be larger than the sponge or it will produce suction damaging intact skin. Total number of individual pieces of foam used within the wound bed: 1    [x] If bridging the dressing away from the primary site, be sure the bridge leads to a piece of sponge large enough to hold the entire flange without allowing any of the flange to overlap onto intact skin. [x] Covered sponge, gauze or channel drain with plastic drape. [x] Cut a hole in this plastic drape directly over the sponge the same size as the plastic drain tubing. [x] Removed plastic liner from flange and apply it directly over the hole you cut. [x] Removed the plastic cover from the flange. [x] Attached the tubing to the wound/ulcer Negative Pressure Therapy and turn it on to be sure a vacuum is created and that there are no leaks. [x] If air leaks occur, use plastic drape to patch them. [x] Secured Negative Pressure Therapy dressing with ace wrap loosely if located on an extremity. Maintain tubing outside of ace wrap. Tubing must not exert pressure on intact skin.     Applied per  Guidelines      Electronically signed by Amy Lovelace Em Vasquez on 2/23/2023 at 1:41 PM

## 2023-02-27 NOTE — DISCHARGE INSTRUCTIONS
[address] [facility] -Phone: [phone] Fax: [phone]    Visit  Discharge Instructions / Physician Orders     DATE: 3/2/2023     Home Care: [address] [facility] ORDERED THRU: [employer]      Wound Location: Left Posterior Thigh     Cleanse with: Liquid antibacterial soap and water, rinse well      Dressing Orders: Alisha Herrera and Polo Vac: (Pack enough sponge into wound so sponge does not suck down into wound, should be bolstered out ) Skin prep and drape around wound, Collagen into wound, black foam into wound, bridge up thigh-no foam should come into contact with intact skin, continous suction at 120mmHg     Frequency: Change on Tuesday, Thursday, Saturday ([address] [place] [address] will do on appointment days) (Bring new sponge and canister to appointments at wound care center)              Additional Orders: Increase protein to diet (meat, cheese, eggs, fish, peanut butter, nuts and beans)  Multivitamin daily  [facility]- : Celestina Vaughan [id number]- Case #[id number]     Your next appointment with [address] [place] [address] is in 1 week     (Please note your next appointment above and if you are unable to keep, kindly give a 24 hour notice. Thank you.)  If more than 15 min late we cannot guarantee you will be seen due to clinician schedule  Per Policy, Excessive cancellation will call for dismissal from program.     If you experience any of the following, please call the [address] [place] [address] during business hours:  [phone]  Your Phone call may be forwarded to [facility] during business hours that [facility] is closed. * Increase in Pain  * Temperature over 101  * Increase in drainage from your wound  * Drainage with a foul odor  * Bleeding  * Increase in swelling  * Need for compression bandage changes due to slippage, breakthrough drainage.      If you need medical attention outside of the business hours of the [address] [place] [address] please contact your PCP or go to the nearest emergency room. The information contained in the After Visit Summary has been reviewed with me, the patient and/or responsible adult, by my health care provider(s). I had the opportunity to ask questions regarding this information.  I have elected to receive;      []After Visit Summary  [x]Comprehensive Discharge Instruction        Patient signature______________________________________Date:________  Electronically signed by Mirella Arana RN on 3/2/2023 at 2:16 PM  Electronically signed by Kenard Krabbe, APRN - CNP on 3/2/2023 at 2:18 PM

## 2023-03-02 ENCOUNTER — HOSPITAL ENCOUNTER (OUTPATIENT)
Dept: WOUND CARE | Age: 73
Discharge: HOME | End: 2023-03-02
Payer: COMMERCIAL

## 2023-03-02 VITALS
TEMPERATURE: 97.3 F | SYSTOLIC BLOOD PRESSURE: 123 MMHG | HEART RATE: 100 BPM | RESPIRATION RATE: 18 BRPM | DIASTOLIC BLOOD PRESSURE: 68 MMHG

## 2023-03-02 DIAGNOSIS — L97.122 CHRONIC ULCER OF LEFT THIGH WITH FAT LAYER EXPOSED (HCC): Primary | ICD-10-CM

## 2023-03-02 DIAGNOSIS — A41.01 MSSA (METHICILLIN SUSCEPTIBLE STAPHYLOCOCCUS AUREUS) SEPTICEMIA (HCC): ICD-10-CM

## 2023-03-02 PROCEDURE — 97605 NEG PRS WND THER DME<=50SQCM: CPT

## 2023-03-02 PROCEDURE — 11042 DBRDMT SUBQ TIS 1ST 20SQCM/<: CPT

## 2023-03-02 RX ORDER — LIDOCAINE HYDROCHLORIDE 20 MG/ML
JELLY TOPICAL ONCE
Status: COMPLETED | OUTPATIENT
Start: 2023-03-02 | End: 2023-03-02

## 2023-03-02 RX ORDER — LIDOCAINE HYDROCHLORIDE 20 MG/ML
JELLY TOPICAL ONCE
OUTPATIENT
Start: 2023-03-02 | End: 2023-03-02

## 2023-03-02 RX ORDER — GENTAMICIN SULFATE 1 MG/G
OINTMENT TOPICAL ONCE
OUTPATIENT
Start: 2023-03-02 | End: 2023-03-02

## 2023-03-02 RX ORDER — BACITRACIN ZINC AND POLYMYXIN B SULFATE 500; 1000 [USP'U]/G; [USP'U]/G
OINTMENT TOPICAL ONCE
OUTPATIENT
Start: 2023-03-02 | End: 2023-03-02

## 2023-03-02 RX ORDER — GINSENG 100 MG
CAPSULE ORAL ONCE
OUTPATIENT
Start: 2023-03-02 | End: 2023-03-02

## 2023-03-02 RX ORDER — LIDOCAINE HYDROCHLORIDE 40 MG/ML
SOLUTION TOPICAL ONCE
OUTPATIENT
Start: 2023-03-02 | End: 2023-03-02

## 2023-03-02 RX ORDER — BETAMETHASONE DIPROPIONATE 0.05 %
OINTMENT (GRAM) TOPICAL ONCE
OUTPATIENT
Start: 2023-03-02 | End: 2023-03-02

## 2023-03-02 RX ORDER — BACITRACIN, NEOMYCIN, POLYMYXIN B 400; 3.5; 5 [USP'U]/G; MG/G; [USP'U]/G
OINTMENT TOPICAL ONCE
OUTPATIENT
Start: 2023-03-02 | End: 2023-03-02

## 2023-03-02 RX ORDER — LIDOCAINE 50 MG/G
OINTMENT TOPICAL ONCE
OUTPATIENT
Start: 2023-03-02 | End: 2023-03-02

## 2023-03-02 RX ORDER — CLOBETASOL PROPIONATE 0.5 MG/G
OINTMENT TOPICAL ONCE
OUTPATIENT
Start: 2023-03-02 | End: 2023-03-02

## 2023-03-02 RX ORDER — LIDOCAINE 40 MG/G
CREAM TOPICAL ONCE
OUTPATIENT
Start: 2023-03-02 | End: 2023-03-02

## 2023-03-02 RX ADMIN — LIDOCAINE HYDROCHLORIDE 6 ML: 20 JELLY TOPICAL at 13:34

## 2023-03-02 NOTE — PROGRESS NOTES
[address]   Progress Note and Procedure Note      Jose F Hebert  MEDICAL RECORD NUMBER:  [id number]  AGE: 71 y.o. GENDER: female  : 1950  EPISODE DATE:  3/2/2023    Subjective:     Chief Complaint   Patient presents with    Wound Check     Left thigh         HISTORY of PRESENT ILLNESS HPI     Jose F Hebert is a 71 y.o. female who presents today for wound/ulcer evaluation. History of Wound Context: left posterior thigh ulcer of unknown etiology that had become infected requiring continued Keflex. Will be on Keflex until wound heals per Dr. Gautam Zelaya. Interval history: doing much better mentally this week. She is in good spirits. Wound improved.     Wound/Ulcer Pain Timing/Severity: intermittent  Quality of pain: aching  Severity:  1 / 10   Modifying Factors: None, Pain worsens with walking, and Pain is relieved/improved with rest  Associated Signs/Symptoms: erythema and drainage          PAST MEDICAL HISTORY        Diagnosis Date    Acute respiratory failure with hypoxia ([facility]) 2022    Aspiration pneumonia ([facility]) 2022    Chronic back pain     Chronic hip pain     Chronic ulcer of left thigh ([address] 2022    COPD (chronic obstructive pulmonary disease) (HCC)     Major depression     MSSA (methicillin susceptible Staphylococcus aureus) septicemia ([address] 2022    MSSA bacteremia 2022    Osteoarthritis     Osteoporosis     Pneumonia of left lower lobe due to infectious organism 10/1/2022    Respiratory alkalosis 2022    Septicemia ([address] 2022    SIRS (systemic inflammatory response syndrome) ([address] 2022    UTI due to Klebsiella species 2022       PAST SURGICAL HISTORY    Past Surgical History:   Procedure Laterality Date    BREAST BIOPSY      two    CHOLECYSTECTOMY      HYSTERECTOMY, VAGINAL         FAMILY HISTORY    Family History   Problem Relation Age of Onset    Stroke Mother     Alzheimer's Disease Father        SOCIAL HISTORY    Social History     Tobacco Use    Smoking status: Former     Packs/day: 0.75     Years: 34.00     Pack years: 25.50     Types: Cigarettes     Quit date: 3/20/2017     Years since quittin.9    Smokeless tobacco: Never   Vaping Use    Vaping Use: Never used   Substance Use Topics    Alcohol use: Not Currently     Comment: occasionally     Drug use: Never       ALLERGIES    Allergies   Allergen Reactions    Lyrica [Pregabalin] Hives    Demerol Hcl [Meperidine]     Fluoxetine     Paxil [Paroxetine Hcl]     Diclofenac-Misoprostol Rash    Lyrica Cr [Pregabalin Er] Rash       MEDICATIONS    Current Outpatient Medications on File Prior to Encounter   Medication Sig Dispense Refill    cephALEXin (KEFLEX) 500 MG capsule TAKE 1 CAPSULE BY MOUTH FOUR TIMES DAILY      Handicap Placard MISC by Does not apply route Expires 2028 1 each 0    apixaban (ELIQUIS) 5 MG TABS tablet Take 1 tablet by mouth 2 times daily 42 tablet 0    ferrous sulfate (IRON 325) 325 (65 Fe) MG tablet Take 1 tablet by mouth daily (with breakfast) 90 tablet 1    desvenlafaxine succinate (PRISTIQ) 100 MG TB24 extended release tablet Take 1 tablet by mouth daily 90 tablet 1    SYMBICORT 160-4.5 MCG/ACT AERO Inhale 2 puffs into the lungs 2 times daily 10.2 g 5    tiotropium (SPIRIVA HANDIHALER) 18 MCG inhalation capsule Inhale 1 capsule into the lungs daily 90 capsule 1    montelukast (SINGULAIR) 10 MG tablet Take 1 tablet by mouth nightly 90 tablet 1    promethazine (PHENERGAN) 25 MG tablet Take 1 tablet by mouth every 6 hours as needed (as needed for nausea) 60 tablet 1    albuterol sulfate HFA (PROVENTIL;VENTOLIN;PROAIR) 108 (90 Base) MCG/ACT inhaler Inhale 2 puffs into the lungs every 6 hours as needed for Wheezing 3 each 1    tiZANidine (ZANAFLEX) 4 MG tablet Take 1 tablet by mouth every 8 hours as needed (muscle spasm) 469 tablet 1    folic acid (FOLVITE) 1 MG tablet Take 1 tablet by mouth daily 30 tablet 3    vitamin D (ERGOCALCIFEROL) 1.25 MG (39649 UT) CAPS capsule Take 1 capsule by mouth once a week 12 capsule 1    alendronate (FOSAMAX) 70 MG tablet Take 1 tablet by mouth once a week 12 tablet 1    morphine (MS CONTIN) 15 MG extended release tablet 3 times daily.       No current facility-administered medications on file prior to encounter.       REVIEW OF SYSTEMS    Constitutional: negative for chills and fevers  Respiratory: negative for cough and shortness of breath  Cardiovascular: positive for minimal lower extremity edema, negative for chest pain and palpitations  Genitourinary: negative for urinary incontinence  Integument: positive for left posterior thigh wound  Musculoskeletal:negative for arthralgias  Behavioral/Psych: negative  Endocrine: negative  Hematologic/Immunologic: negative    Objective:      /68   Pulse 100   Temp 97.3 °F (36.3 °C) (Tympanic)   Resp 18   LMP  (LMP Unknown)     Wt Readings from Last 3 Encounters:   02/22/23 200 lb 9.6 oz (91 kg)   02/16/23 200 lb (90.7 kg)   01/26/23 200 lb (90.7 kg)       PHYSICAL EXAM    General Appearance: alert and oriented to person, place and time, well-developed and well-nourished, in no acute distress  Skin: Left posterior thigh wound bed appears healthy, no slough or eschar.  100% pink granular tissue in wound bed.   Head: normocephalic and atraumatic  Pulmonary/Chest: normal air movement, no respiratory distress  Extremities: no cyanosis and no clubbing or edema   Musculoskeletal: no joint swelling, deformity or tenderness  Neurologic: gait slow and steady with walker, coordination normal and speech normal      Assessment:     Problem List Items Addressed This Visit          Other    MSSA (methicillin susceptible Staphylococcus aureus) septicemia (HCC)    Relevant Orders    Initiate Outpatient Wound Care Protocol    Chronic ulcer of left thigh with fat layer exposed (HCC) - Primary    Relevant Orders    Initiate Outpatient Wound Care Protocol        Procedure  Note  Indications:  Based on my examination of this patient's wound(s)/ulcer(s) today, debridement is required to promote healing and evaluate the wound base. Performed by: MITESH Becerra CNP    Consent obtained:  Yes    Time out taken:  Yes    Pain Control: Anesthetic  Anesthetic: 2% Lidocaine Gel Topical       Debridement:Excisional Debridement    Using curette the wound(s)/ulcer(s) was/were sharply debrided down through and including the removal of subcutaneous tissue. Devitalized Tissue Debrided:  fibrin and biofilm    Pre Debridement Measurements:  Are located in the [place]  Documentation Flow Sheet    Wound/Ulcer #: 1    Post Debridement Measurements:  Wound/Ulcer Descriptions are Pre Debridement except measurements:    Wound 12/08/22 Thigh Left;Posterior #1 (Active)   Wound Image   03/02/23 1326   Wound Etiology Other 03/02/23 1326   Dressing Status New drainage noted; Old drainage noted 03/02/23 1326   Wound Cleansed Cleansed with saline 03/02/23 1326   Dressing/Treatment Negative pressure wound therapy 03/02/23 1326   Wound Length (cm) 2.5 cm 03/02/23 1326   Wound Width (cm) 0.8 cm 03/02/23 1326   Wound Depth (cm) 3.1 cm 03/02/23 1326   Wound Surface Area (cm^2) 2 cm^2 03/02/23 1326   Change in Wound Size % (l*w) 84.13 03/02/23 1326   Wound Volume (cm^3) 6.2 cm^3 03/02/23 1326   Wound Healing % 88 03/02/23 1326   Post-Procedure Length (cm) 2.5 cm 03/02/23 1326   Post-Procedure Width (cm) 0.8 cm 03/02/23 1326   Post-Procedure Depth (cm) 3.1 cm 03/02/23 1326   Post-Procedure Surface Area (cm^2) 2 cm^2 03/02/23 1326   Post-Procedure Volume (cm^3) 6.2 cm^3 03/02/23 1326   Distance Tunneling (cm) 4.7 cm 03/02/23 1326   Tunneling Position ___ O'Clock 9 03/02/23 1326   Undermining Starts ___ O'Clock 7 02/16/23 1348   Undermining Ends___ O'Clock 9 02/16/23 1348   Undermining Maxium Distance (cm) [email or web address] 02/16/23 1348   Wound Assessment [place]/red;Slough 03/02/23 1326   Drainage Amount Moderate 03/02/23 1326   Drainage Description Serosanguinous 03/02/23 1326   Odor Mild 03/02/23 1326   Jess-wound Assessment Blanchable erythema; Maceration 03/02/23 1326   Margins Defined edges 03/02/23 1326   Wound Thickness Description not for Pressure Injury Full thickness 03/02/23 1326   Number of days: 84          Percent of Wound(s)/Ulcer(s) Debrided: 100%    Total Surface Area Debrided:  2 sq cm     Diabetic/Pressure/Non Pressure Ulcers only:  Ulcer: Non-Pressure ulcer, fat layer exposed      Estimated Blood Loss:  Minimal    Hemostasis Achieved:  by pressure    Procedural Pain:  0  / 10     Post Procedural Pain:  0 / 10     Response to treatment:  Well tolerated by patient. Addressed wound healing factors:       [x]  Diabetes: n/a           [x] CHF: n/a                               [x] Infection: currently on Keflex, managed by Dr. Berny Ch, ID. Will remain on until wound closes per Dr. Berny Ch. [x] Debridement: yes- will require serial debridement  [x] Compliance; compression/offloading: discussed need for offloading and gave ideas. Doing well with offloading. [x] Diagnostics: recent 11/8/22 hip x-ray done by ortho- last seen by them 12/6/22  [x] Smoking: n/a  Plan:     --cont with collagen and wound vac for now     -RTC one week    -Planning to return to work 3/20 as of now    -I have reviewed instructions with the patient, answering all questions to satisfaction.    -Patient to call or return to clinic as needed if wound symptoms worsen or fail to improve as anticipated.    -See Discharge Instructions for wound management orders. Written patient dismissal instructions given to patient and signed by patient or POA.            Electronically signed by MITESH Taylor CNP on 3/2/2023 at 2:18 PM

## 2023-03-02 NOTE — PROGRESS NOTES
Negative Pressure Wound Therapy    NAME:  Amina Dumont  YOB: 1950  MEDICAL RECORD NUMBER:  [id number]  DATE:  3/2/2023    Applied Negative Pressure to left thigh wound(s)/ulcer(s). [x] Applied skin barrier prep to ann-wound. [x] Cut strips of plastic drape to picture frame wound so that ann-wound is     covered with the drape. [x] If bridging dressing to less prominent site, cover any intact skin that will come in contact with the Negative Pressure Therapy sponge, gauze or channel drain with plastic drape. The sponge should never touch intact skin. [x] Cut sponge, gauze or channel drain to size which will fit into the wound/ulcer bed without being forced. [x] Be sure the sponge is large enough to hold the entire round plastic flange which is attached to the tubing. Never allow flange to be larger than the sponge or it will produce suction damaging intact skin. Total number of individual pieces of foam used within the wound bed: 1    [x] If bridging the dressing away from the primary site, be sure the bridge leads to a piece of sponge large enough to hold the entire flange without allowing any of the flange to overlap onto intact skin. [x] Covered sponge, gauze or channel drain with plastic drape. [x] Cut a hole in this plastic drape directly over the sponge the same size as the plastic drain tubing. [x] Removed plastic liner from flange and apply it directly over the hole you cut. [x] Removed the plastic cover from the flange. [x] Attached the tubing to the wound/ulcer Negative Pressure Therapy and turn it on to be sure a vacuum is created and that there are no leaks. [x] If air leaks occur, use plastic drape to patch them. [x] Secured Negative Pressure Therapy dressing with ace wrap loosely if located on an extremity. Maintain tubing outside of ace wrap. Tubing must not exert pressure on intact skin.     Applied per  Guidelines      Electronically signed by Michaelle Ball RN on 3/2/2023 at 2:19 PM

## 2023-03-06 NOTE — DISCHARGE INSTRUCTIONS
[address] [facility] -Phone: [phone] Fax: [phone]    Visit  Discharge Instructions / Physician Orders     DATE: 3/9/2023     Home Care: [address] [facility] [facility] ORDERED THRU: [employer]      Wound Location: Left Posterior Thigh     Cleanse with: Liquid antibacterial soap and water, rinse well      Dressing Orders: Edelmira Marvin and Nephew Vac: (Pack enough sponge into wound so sponge does not suck down into wound, should be bolstered out ) Skin prep and drape around wound, Collagen into wound, black foam into wound, bridge up thigh-no foam should come into contact with intact skin, continous suction at 120mmHg     Frequency: Change on Tuesday, Thursday, Saturday ([facility] [place] [facility] do on appointment days) (Bring new sponge and canister to appointments at wound care center)              Additional Orders: Increase protein to diet (meat, cheese, eggs, fish, peanut butter, nuts and beans)  Multivitamin daily  [facility]- : Grover Staley [phone]- Case #[id number]     Your next appointment with [facility] is in 1 week     (Please note your next appointment above and if you are unable to keep, kindly give a 24 hour notice. Thank you.)  If more than 15 min late we cannot guarantee you will be seen due to clinician schedule  Per Policy, Excessive cancellation will call for dismissal from program.     If you experience any of the following, please call the [facility] during business hours:  [phone]  Your Phone call may be forwarded to [facility] during business hours that [facility] is closed. * Increase in Pain  * Temperature over 101  * Increase in drainage from your wound  * Drainage with a foul odor  * Bleeding  * Increase in swelling  * Need for compression bandage changes due to slippage, breakthrough drainage.      If you need medical attention outside of the business hours of the [facility] please contact your PCP or go to the nearest emergency room. The information contained in the After Visit Summary has been reviewed with me, the patient and/or responsible adult, by my health care provider(s). I had the opportunity to ask questions regarding this information.  I have elected to receive;      []After Visit Summary  [x]Comprehensive Discharge Instruction        Patient signature______________________________________Date:________  Electronically signed by Jamey Castro RN on 3/9/2023 at 1:52 PM  Electronically signed by MITESH Christianson - CNP on 3/9/2023 at 1:56 PM

## 2023-03-09 ENCOUNTER — HOSPITAL ENCOUNTER (OUTPATIENT)
Dept: WOUND CARE | Age: 73
Discharge: HOME | End: 2023-03-09
Payer: COMMERCIAL

## 2023-03-09 VITALS
SYSTOLIC BLOOD PRESSURE: 133 MMHG | RESPIRATION RATE: 18 BRPM | DIASTOLIC BLOOD PRESSURE: 77 MMHG | TEMPERATURE: 98.6 F | HEART RATE: 78 BPM

## 2023-03-09 DIAGNOSIS — A41.01 MSSA (METHICILLIN SUSCEPTIBLE STAPHYLOCOCCUS AUREUS) SEPTICEMIA (HCC): ICD-10-CM

## 2023-03-09 DIAGNOSIS — L97.122 CHRONIC ULCER OF LEFT THIGH WITH FAT LAYER EXPOSED (HCC): Primary | ICD-10-CM

## 2023-03-09 PROCEDURE — 11042 DBRDMT SUBQ TIS 1ST 20SQCM/<: CPT

## 2023-03-09 PROCEDURE — 97605 NEG PRS WND THER DME<=50SQCM: CPT

## 2023-03-09 RX ORDER — LIDOCAINE HYDROCHLORIDE 20 MG/ML
JELLY TOPICAL ONCE
OUTPATIENT
Start: 2023-03-09 | End: 2023-03-09

## 2023-03-09 RX ORDER — LIDOCAINE 50 MG/G
OINTMENT TOPICAL ONCE
OUTPATIENT
Start: 2023-03-09 | End: 2023-03-09

## 2023-03-09 RX ORDER — GINSENG 100 MG
CAPSULE ORAL ONCE
OUTPATIENT
Start: 2023-03-09 | End: 2023-03-09

## 2023-03-09 RX ORDER — LIDOCAINE 40 MG/G
CREAM TOPICAL ONCE
OUTPATIENT
Start: 2023-03-09 | End: 2023-03-09

## 2023-03-09 RX ORDER — BETAMETHASONE DIPROPIONATE 0.05 %
OINTMENT (GRAM) TOPICAL ONCE
OUTPATIENT
Start: 2023-03-09 | End: 2023-03-09

## 2023-03-09 RX ORDER — CLOBETASOL PROPIONATE 0.5 MG/G
OINTMENT TOPICAL ONCE
OUTPATIENT
Start: 2023-03-09 | End: 2023-03-09

## 2023-03-09 RX ORDER — LIDOCAINE HYDROCHLORIDE 40 MG/ML
SOLUTION TOPICAL ONCE
OUTPATIENT
Start: 2023-03-09 | End: 2023-03-09

## 2023-03-09 RX ORDER — BACITRACIN ZINC AND POLYMYXIN B SULFATE 500; 1000 [USP'U]/G; [USP'U]/G
OINTMENT TOPICAL ONCE
OUTPATIENT
Start: 2023-03-09 | End: 2023-03-09

## 2023-03-09 RX ORDER — GENTAMICIN SULFATE 1 MG/G
OINTMENT TOPICAL ONCE
OUTPATIENT
Start: 2023-03-09 | End: 2023-03-09

## 2023-03-09 RX ORDER — BACITRACIN, NEOMYCIN, POLYMYXIN B 400; 3.5; 5 [USP'U]/G; MG/G; [USP'U]/G
OINTMENT TOPICAL ONCE
OUTPATIENT
Start: 2023-03-09 | End: 2023-03-09

## 2023-03-09 RX ORDER — LIDOCAINE HYDROCHLORIDE 20 MG/ML
JELLY TOPICAL ONCE
Status: DISCONTINUED | OUTPATIENT
Start: 2023-03-09 | End: 2023-03-10 | Stop reason: HOSPADM

## 2023-03-09 ASSESSMENT — PAIN SCALES - GENERAL: PAINLEVEL_OUTOF10: 0

## 2023-03-09 NOTE — PROGRESS NOTES
Negative Pressure    NAME:  Cleveland Randhawa  YOB: 1950  MEDICAL RECORD NUMBER:  [id number]  DATE:  3/9/2023    Applied Negative Pressure to left thigh wound(s)/ulcer(s). [x] Applied skin barrier prep to ann-wound. [x] Cut strips of plastic drape to picture frame wound so that ann-wound is     covered with the drape. [x] If bridging dressing to less prominent site, cover any intact skin that will come in contact with the Negative Pressure Therapy sponge, gauze or channel drain with plastic drape. The sponge should never touch intact skin. [x] Cut sponge, gauze or channel drain to size which will fit into the wound/ulcer bed without being forced. [x] Be sure the sponge is large enough to hold the entire round plastic flange which is attached to the tubing. Never allow flange to be larger than the sponge or it will produce suction damaging intact skin. Total number of individual pieces of foam used within the wound bed: 1    [x] If bridging the dressing away from the primary site, be sure the bridge leads to a piece of sponge large enough to hold the entire flange without allowing any of the flange to overlap onto intact skin. [x] Covered sponge, gauze or channel drain with plastic drape. [x] Cut a hole in this plastic drape directly over the sponge the same size as the plastic drain tubing. [x] Removed plastic liner from flange and apply it directly over the hole you cut. [x] Removed the plastic cover from the flange. [x] Attached the tubing to the wound/ulcer Negative Pressure Therapy and turn it on to be sure a vacuum is created and that there are no leaks. [x] If air leaks occur, use plastic drape to patch them. [x] Secured Negative Pressure Therapy dressing with ace wrap loosely if located on an extremity. Maintain tubing outside of ace wrap. Tubing must not exert pressure on intact skin.     Applied per  Guidelines      Electronically signed by Shayne Mata Salma Kee RN on 3/9/2023 at 2:26 PM

## 2023-03-09 NOTE — PROGRESS NOTES
[address]   Progress Note and Procedure Note      Yony Cam  MEDICAL RECORD NUMBER:  [id number]  AGE: 71 y.o. GENDER: female  : 1950  EPISODE DATE:  3/9/2023    Subjective:     Chief Complaint   Patient presents with    Wound Check     Lle           HISTORY of PRESENT ILLNESS HPI     Yony Cam is a 71 y.o. female who presents today for wound/ulcer evaluation. History of Wound Context: left posterior thigh ulcer of unknown etiology that had become infected requiring continued Keflex. Will be on Keflex until wound heals per Dr. Sharan Garg. Interval history: doing much better mentally this week. She is in good spirits. Wound improved.      Wound/Ulcer Pain Timing/Severity: intermittent  Quality of pain: aching  Severity:  1 / 10   Modifying Factors: None, Pain worsens with walking, and Pain is relieved/improved with rest  Associated Signs/Symptoms: erythema and drainage          PAST MEDICAL HISTORY        Diagnosis Date    Acute respiratory failure with hypoxia (HCC) 2022    Aspiration pneumonia ([facility]) 2022    Chronic back pain     Chronic hip pain     Chronic ulcer of left thigh ([address] 2022    COPD (chronic obstructive pulmonary disease) (HCC)     Major depression     MSSA (methicillin susceptible Staphylococcus aureus) septicemia ([address] 2022    MSSA bacteremia 2022    Osteoarthritis     Osteoporosis     Pneumonia of left lower lobe due to infectious organism 10/1/2022    Respiratory alkalosis 2022    Septicemia ([address] 2022    SIRS (systemic inflammatory response syndrome) ([address] 2022    UTI due to Klebsiella species 2022       PAST SURGICAL HISTORY    Past Surgical History:   Procedure Laterality Date    BREAST BIOPSY      two    CHOLECYSTECTOMY      HYSTERECTOMY, VAGINAL         FAMILY HISTORY    Family History   Problem Relation Age of Onset    Stroke Mother     Alzheimer's Disease Father        SOCIAL HISTORY    Social History     Tobacco Use    Smoking status: Former     Packs/day: 0.75     Years: 34.00     Pack years: 25.50     Types: Cigarettes     Quit date: 3/20/2017     Years since quittin.9    Smokeless tobacco: Never   Vaping Use    Vaping Use: Never used   Substance Use Topics    Alcohol use: Not Currently     Comment: occasionally     Drug use: Never       ALLERGIES    Allergies   Allergen Reactions    Lyrica [Pregabalin] Hives    Demerol Hcl [Meperidine]     Fluoxetine     Paxil [Paroxetine Hcl]     Diclofenac-Misoprostol Rash    Lyrica Cr [Pregabalin Er] Rash       MEDICATIONS    Current Outpatient Medications on File Prior to Encounter   Medication Sig Dispense Refill    cephALEXin (KEFLEX) 500 MG capsule TAKE 1 CAPSULE BY MOUTH FOUR TIMES DAILY      Handicap Placard MISC by Does not apply route Expires 2028 1 each 0    apixaban (ELIQUIS) 5 MG TABS tablet Take 1 tablet by mouth 2 times daily 42 tablet 0    ferrous sulfate (IRON 325) 325 (65 Fe) MG tablet Take 1 tablet by mouth daily (with breakfast) 90 tablet 1    desvenlafaxine succinate (PRISTIQ) 100 MG TB24 extended release tablet Take 1 tablet by mouth daily 90 tablet 1    SYMBICORT 160-4.5 MCG/ACT AERO Inhale 2 puffs into the lungs 2 times daily 10.2 g 5    tiotropium (SPIRIVA HANDIHALER) 18 MCG inhalation capsule Inhale 1 capsule into the lungs daily 90 capsule 1    montelukast (SINGULAIR) 10 MG tablet Take 1 tablet by mouth nightly 90 tablet 1    promethazine (PHENERGAN) 25 MG tablet Take 1 tablet by mouth every 6 hours as needed (as needed for nausea) 60 tablet 1    albuterol sulfate HFA (PROVENTIL;VENTOLIN;PROAIR) 108 (90 Base) MCG/ACT inhaler Inhale 2 puffs into the lungs every 6 hours as needed for Wheezing 3 each 1    tiZANidine (ZANAFLEX) 4 MG tablet Take 1 tablet by mouth every 8 hours as needed (muscle spasm) 002 tablet 1    folic acid (FOLVITE) 1 MG tablet Take 1 tablet by mouth daily 30 tablet 3    vitamin D (ERGOCALCIFEROL) 1.25 MG (70127 UT) CAPS capsule Take 1 capsule by mouth once a week 12 capsule 1    alendronate (FOSAMAX) 70 MG tablet Take 1 tablet by mouth once a week 12 tablet 1    morphine (MS CONTIN) 15 MG extended release tablet 3 times daily. No current facility-administered medications on file prior to encounter. REVIEW OF SYSTEMS     Constitutional: negative for chills and fevers  Respiratory: negative for cough and shortness of breath  Cardiovascular: positive for minimal lower extremity edema, negative for chest pain and palpitations  Genitourinary: negative for urinary incontinence  Integument: positive for left posterior thigh wound  Musculoskeletal:negative for arthralgias  Behavioral/Psych: negative  Endocrine: negative  Hematologic/Immunologic: negative    Objective:      LMP  (LMP Unknown)     Wt Readings from Last 3 Encounters:   02/22/23 200 lb 9.6 oz (91 kg)   02/16/23 200 lb (90.7 kg)   01/26/23 200 lb (90.7 kg)       PHYSICAL EXAM     General Appearance: alert and oriented to person, place and time, well-developed and well-nourished, in no acute distress  Skin: Left posterior thigh wound bed appears healthy, no slough or eschar. 100% pink granular tissue in wound bed. Wound is filling in nicely.    Head: normocephalic and atraumatic  Pulmonary/Chest: normal air movement, no respiratory distress  Extremities: no cyanosis and no clubbing or edema   Musculoskeletal: no joint swelling, deformity or tenderness  Neurologic: gait slow and steady with walker, coordination normal and speech normal      Assessment:     Problem List Items Addressed This Visit          Other    MSSA (methicillin susceptible Staphylococcus aureus) septicemia ([facility])    Relevant Medications    lidocaine (XYLOCAINE) 2 % uro-jet (Start on 3/9/2023  2:15 PM)    Other Relevant Orders    Initiate Outpatient Wound Care Protocol    Chronic ulcer of left thigh with fat layer exposed ([facility] - Primary    Relevant Medications    lidocaine (XYLOCAINE) 2 % uro-jet (Start on 3/9/2023  2:15 PM)    Other Relevant Orders    Initiate Outpatient Wound Care Protocol        Procedure Note  Indications:  Based on my examination of this patient's wound(s)/ulcer(s) today, debridement is required to promote healing and evaluate the wound base. Performed by: MITESH Villagomez CNP    Consent obtained:  Yes    Time out taken:  Yes    Pain Control: Anesthetic  Anesthetic: 2% Lidocaine Gel Topical       Debridement:Excisional Debridement    Using curette the wound(s)/ulcer(s) was/were sharply debrided down through and including the removal of subcutaneous tissue. Devitalized Tissue Debrided:  fibrin and biofilm    Pre Debridement Measurements:  Are located in the [place]  Documentation Flow Sheet    Wound/Ulcer #: 1    Post Debridement Measurements:  Wound/Ulcer Descriptions are Pre Debridement except measurements:    Wound 12/08/22 Thigh Left;Posterior #1 (Active)   Wound Image   03/02/23 1326   Wound Etiology Other 03/09/23 1320   Dressing Status New drainage noted; Old drainage noted 03/09/23 1320   Wound Cleansed Cleansed with saline 03/09/23 1320   Dressing/Treatment Negative pressure wound therapy 03/02/23 1326   Wound Length (cm) 1.5 cm 03/09/23 1320   Wound Width (cm) 0.5 cm 03/09/23 1320   Wound Depth (cm) 4 cm 03/09/23 1320   Wound Surface Area (cm^2) 0.75 cm^2 03/09/23 1320   Change in Wound Size % (l*w) 94.05 03/09/23 1320   Wound Volume (cm^3) 3 cm^3 03/09/23 1320   Wound Healing % 94 03/09/23 1320   Post-Procedure Length (cm) 1.5 cm 03/09/23 1320   Post-Procedure Width (cm) 0.5 cm 03/09/23 1320   Post-Procedure Depth (cm) 4 cm 03/09/23 1320   Post-Procedure Surface Area (cm^2) 0.75 cm^2 03/09/23 1320   Post-Procedure Volume (cm^3) 3 cm^3 03/09/23 1320   Distance Tunneling (cm) 4 cm 03/09/23 1320   Tunneling Position ___ O'Clock 9:00 03/09/23 1320   Undermining Starts ___ O'Clock 7 02/16/23 1348   Undermining Ends___ O'Clock 9 02/16/23 1340 Undermining Maxium Distance (cm) [email or web address] 02/16/23 1348   Wound Assessment Rodey/red;Slough 03/09/23 1320   Drainage Amount Moderate 03/09/23 1320   Drainage Description Serosanguinous 03/09/23 1320   Odor None 03/09/23 1320   Jess-wound Assessment Blanchable erythema; Maceration 03/09/23 1320   Margins Defined edges 03/09/23 1320   Wound Thickness Description not for Pressure Injury Full thickness 03/09/23 1320   Number of days: 91          Percent of Wound(s)/Ulcer(s) Debrided: 100%    Total Surface Area Debrided:  0.75 sq cm     Diabetic/Pressure/Non Pressure Ulcers only:  Ulcer: Non-Pressure ulcer, fat layer exposed      Estimated Blood Loss:  Minimal    Hemostasis Achieved:  by pressure    Procedural Pain:  2 / 10     Post Procedural Pain:  0 / 10     Response to treatment:  Well tolerated by patient. Addressed wound healing factors:       [x]  Diabetes: n/a           [x] CHF: n/a                               [x] Infection: currently on Keflex, managed by Dr. Christian Johns, ID. Will remain on until wound closes per Dr. Christian Johns. [x] Debridement: yes- done today  [x] Compliance; compression/offloading: discussed need for offloading and gave ideas. Doing well with offloading. [x] Diagnostics: recent 11/8/22 hip x-ray done by ortho- last seen by them 12/6/22  [x] Smoking: n/a             Plan:     -Returning to work soon. Will likely be able to stop wound vac therapy at next visit prior to return to work    -Patient instructed to address continued offloading as a wound healing concern.    -I have reviewed instructions with the patient, answering all questions to satisfaction.    -Patient to call or return to clinic as needed if wound symptoms worsen or fail to improve as anticipated.    -See Discharge Instructions for wound management orders. Written patient dismissal instructions given to patient and signed by patient or POA.            Electronically signed by MITESH Cheema - JESSICA on 3/9/2023 at 1:56 PM

## 2023-03-14 NOTE — DISCHARGE INSTRUCTIONS
[address] [facility] -Phone: [phone] Fax: [phone]    Visit  Discharge Instructions / Physician Orders     DATE: 3/16/2023     Home Care: [address] [facility] ORDERED THRU: [employer]      Wound Location: Left Posterior Thigh     Cleanse with: Liquid antibacterial soap and water, rinse well      Dressing Orders: Diana Favors into wound, Silicone Border Bandage ([employer] preferred)     Frequency: Change on Monday, Wednesday and Fridays              Additional Orders: Increase protein to diet (meat, cheese, eggs, fish, peanut butter, nuts and beans)  Multivitamin daily  \[facility]\""- : Diannah Burkitt [id number]- Case #[id number]     Your next appointment with [address] [place] [address] is in 2 weeks     (Please note your next appointment above and if you are unable to keep, kindly give a 24 hour notice. Thank you.)  If more than 15 min late we cannot guarantee you will be seen due to clinician schedule  Per Policy, Excessive cancellation will call for dismissal from program.     If you experience any of the following, please call the [address] [place] [address] during business hours:  [phone]  Your Phone call may be forwarded to [email or web address] during business hours that [facility] is closed. * Increase in Pain  * Temperature over 101  * Increase in drainage from your wound  * Drainage with a foul odor  * Bleeding  * Increase in swelling  * Need for compression bandage changes due to slippage, breakthrough drainage. If you need medical attention outside of the business hours of the [address] [place] [address] please contact your PCP or go to the nearest emergency room. The information contained in the After Visit Summary has been reviewed with me, the patient and/or responsible adult, by my health care provider(s). I had the opportunity to ask questions regarding this information.  I have elected to receive;      []After Visit Summary  [x]Comprehensive Discharge Instruction        Patient signature______________________________________Date:________  Electronically signed by Aysha Burger RN on 3/16/2023 at 3:19 PM  Electronically signed by MITESH Mendoza CNP on 3/16/2023 at 3:15 PM

## 2023-03-16 ENCOUNTER — HOSPITAL ENCOUNTER (OUTPATIENT)
Dept: WOUND CARE | Age: 73
Discharge: HOME | End: 2023-03-16
Payer: COMMERCIAL

## 2023-03-16 VITALS
TEMPERATURE: 98.6 F | HEIGHT: 66 IN | DIASTOLIC BLOOD PRESSURE: 72 MMHG | WEIGHT: 200 LBS | SYSTOLIC BLOOD PRESSURE: 127 MMHG | RESPIRATION RATE: 19 BRPM | BODY MASS INDEX: 32.14 KG/M2

## 2023-03-16 DIAGNOSIS — A41.01 MSSA (METHICILLIN SUSCEPTIBLE STAPHYLOCOCCUS AUREUS) SEPTICEMIA (HCC): ICD-10-CM

## 2023-03-16 DIAGNOSIS — L97.122 CHRONIC ULCER OF LEFT THIGH WITH FAT LAYER EXPOSED (HCC): Primary | ICD-10-CM

## 2023-03-16 PROCEDURE — 11042 DBRDMT SUBQ TIS 1ST 20SQCM/<: CPT

## 2023-03-16 RX ORDER — LIDOCAINE HYDROCHLORIDE 20 MG/ML
JELLY TOPICAL ONCE
OUTPATIENT
Start: 2023-03-16 | End: 2023-03-16

## 2023-03-16 RX ORDER — BACITRACIN, NEOMYCIN, POLYMYXIN B 400; 3.5; 5 [USP'U]/G; MG/G; [USP'U]/G
OINTMENT TOPICAL ONCE
OUTPATIENT
Start: 2023-03-16 | End: 2023-03-16

## 2023-03-16 RX ORDER — BACITRACIN ZINC AND POLYMYXIN B SULFATE 500; 1000 [USP'U]/G; [USP'U]/G
OINTMENT TOPICAL ONCE
OUTPATIENT
Start: 2023-03-16 | End: 2023-03-16

## 2023-03-16 RX ORDER — GINSENG 100 MG
CAPSULE ORAL ONCE
OUTPATIENT
Start: 2023-03-16 | End: 2023-03-16

## 2023-03-16 RX ORDER — LIDOCAINE 40 MG/G
CREAM TOPICAL ONCE
OUTPATIENT
Start: 2023-03-16 | End: 2023-03-16

## 2023-03-16 RX ORDER — LIDOCAINE 50 MG/G
OINTMENT TOPICAL ONCE
OUTPATIENT
Start: 2023-03-16 | End: 2023-03-16

## 2023-03-16 RX ORDER — CLOBETASOL PROPIONATE 0.5 MG/G
OINTMENT TOPICAL ONCE
OUTPATIENT
Start: 2023-03-16 | End: 2023-03-16

## 2023-03-16 RX ORDER — LIDOCAINE HYDROCHLORIDE 20 MG/ML
JELLY TOPICAL ONCE
Status: COMPLETED | OUTPATIENT
Start: 2023-03-16 | End: 2023-03-16

## 2023-03-16 RX ORDER — LIDOCAINE HYDROCHLORIDE 40 MG/ML
SOLUTION TOPICAL ONCE
OUTPATIENT
Start: 2023-03-16 | End: 2023-03-16

## 2023-03-16 RX ORDER — GENTAMICIN SULFATE 1 MG/G
OINTMENT TOPICAL ONCE
OUTPATIENT
Start: 2023-03-16 | End: 2023-03-16

## 2023-03-16 RX ORDER — BETAMETHASONE DIPROPIONATE 0.05 %
OINTMENT (GRAM) TOPICAL ONCE
OUTPATIENT
Start: 2023-03-16 | End: 2023-03-16

## 2023-03-16 RX ADMIN — LIDOCAINE HYDROCHLORIDE 6 ML: 20 JELLY TOPICAL at 14:26

## 2023-03-16 NOTE — PLAN OF CARE
Problem: Discharge Planning  Goal: Discharge to home or other facility with appropriate resources  Outcome: Progressing     Problem: Safety - Adult  Goal: Free from fall injury  Outcome: Progressing     Problem: ABCDS Injury Assessment  Goal: Absence of physical injury  Outcome: Progressing  Flowsheets (Taken 3/16/2023 0614 by Disha Osullivan RN)  Absence of Physical Injury: Implement safety measures based on patient assessment     Problem: Wound:  Goal: Will show signs of wound healing; wound closure and no evidence of infection  Description: Will show signs of wound healing; wound closure and no evidence of infection  Outcome: Progressing     Problem: Falls - Risk of:  Goal: Will remain free from falls  Description: Will remain free from falls  Outcome: Progressing
No

## 2023-03-16 NOTE — PROGRESS NOTES
[address]   Progress Note and Procedure Note      Shelbi Hoffmann  MEDICAL RECORD NUMBER:  [id number]  AGE: 71 y.o. GENDER: female  : 1950  EPISODE DATE:  3/16/2023    Subjective:     Chief Complaint   Patient presents with    Wound Check     Left posterior thigh         HISTORY of PRESENT ILLNESS HPI     Shelbi Hoffmann is a 71 y.o. female who presents today for wound/ulcer evaluation. History of Wound Context: left posterior thigh ulcer of unknown etiology that had become infected requiring continued Keflex. Will be on Keflex until wound heals per Dr. Luis Vigil. Interval history: wound continuing to slowly improve. Will be returning to work on Monday 3/20/23.     Wound/Ulcer Pain Timing/Severity: intermittent  Quality of pain: aching  Severity:  1 / 10   Modifying Factors: None, Pain worsens with walking, and Pain is relieved/improved with rest  Associated Signs/Symptoms: erythema and drainage    Wound/Ulcer Identification:  Ulcer Type: traumatic  Contributing Factors: edema, decreased mobility, and obesity          PAST MEDICAL HISTORY        Diagnosis Date    Acute respiratory failure with hypoxia (HCC) 2022    Aspiration pneumonia ([address] 2022    Chronic back pain     Chronic hip pain     Chronic ulcer of left thigh ([address] 2022    COPD (chronic obstructive pulmonary disease) ([address]     Major depression     MSSA (methicillin susceptible Staphylococcus aureus) septicemia ([address] 2022    MSSA bacteremia 2022    Osteoarthritis     Osteoporosis     Pneumonia of left lower lobe due to infectious organism 10/1/2022    Respiratory alkalosis 2022    Septicemia ([address] 2022    SIRS (systemic inflammatory response syndrome) ([address] 2022    UTI due to Klebsiella species 2022       PAST SURGICAL HISTORY    Past Surgical History:   Procedure Laterality Date    BREAST BIOPSY      two    CHOLECYSTECTOMY      HYSTERECTOMY, VAGINAL         FAMILY HISTORY    Family History   Problem Relation Age of Onset    Stroke Mother     Alzheimer's Disease Father        SOCIAL HISTORY    Social History     Tobacco Use    Smoking status: Former     Packs/day: 0.75     Years: 34.00     Pack years: 25.50     Types: Cigarettes     Quit date: 3/20/2017     Years since quittin.9    Smokeless tobacco: Never   Vaping Use    Vaping Use: Never used   Substance Use Topics    Alcohol use: Not Currently     Comment: occasionally     Drug use: Never       ALLERGIES    Allergies   Allergen Reactions    Lyrica [Pregabalin] Hives    Demerol Hcl [Meperidine]     Fluoxetine     Paxil [Paroxetine Hcl]     Diclofenac-Misoprostol Rash    Lyrica Cr [Pregabalin Er] Rash       MEDICATIONS    Current Outpatient Medications on File Prior to Encounter   Medication Sig Dispense Refill    cephALEXin (KEFLEX) 500 MG capsule TAKE 1 CAPSULE BY MOUTH FOUR TIMES DAILY      Handicap Placard MISC by Does not apply route Expires 2028 1 each 0    apixaban (ELIQUIS) 5 MG TABS tablet Take 1 tablet by mouth 2 times daily 42 tablet 0    ferrous sulfate (IRON 325) 325 (65 Fe) MG tablet Take 1 tablet by mouth daily (with breakfast) 90 tablet 1    desvenlafaxine succinate (PRISTIQ) 100 MG TB24 extended release tablet Take 1 tablet by mouth daily 90 tablet 1    SYMBICORT 160-4.5 MCG/ACT AERO Inhale 2 puffs into the lungs 2 times daily 10.2 g 5    tiotropium (SPIRIVA HANDIHALER) 18 MCG inhalation capsule Inhale 1 capsule into the lungs daily 90 capsule 1    montelukast (SINGULAIR) 10 MG tablet Take 1 tablet by mouth nightly 90 tablet 1    promethazine (PHENERGAN) 25 MG tablet Take 1 tablet by mouth every 6 hours as needed (as needed for nausea) 60 tablet 1    albuterol sulfate HFA (PROVENTIL;VENTOLIN;PROAIR) 108 (90 Base) MCG/ACT inhaler Inhale 2 puffs into the lungs every 6 hours as needed for Wheezing 3 each 1    tiZANidine (ZANAFLEX) 4 MG tablet Take 1 tablet by mouth every 8 hours as needed (muscle spasm) 166 tablet 1    folic acid (FOLVITE) 1 MG tablet Take 1 tablet by mouth daily 30 tablet 3    vitamin D (ERGOCALCIFEROL) 1.25 MG (05494 UT) CAPS capsule Take 1 capsule by mouth once a week 12 capsule 1    alendronate (FOSAMAX) 70 MG tablet Take 1 tablet by mouth once a week 12 tablet 1    morphine (MS CONTIN) 15 MG extended release tablet 3 times daily. No current facility-administered medications on file prior to encounter. REVIEW OF SYSTEMS    Constitutional: negative for chills and fevers  Respiratory: negative for cough and shortness of breath  Cardiovascular: positive for minimal lower extremity edema, negative for chest pain and palpitations  Genitourinary: negative for urinary incontinence  Integument: positive for left posterior thigh wound  Musculoskeletal:negative for arthralgias  Behavioral/Psych: negative  Endocrine: negative  Hematologic/Immunologic: negative    Objective:      /72   Temp 98.6 °F (37 °C) (Tympanic)   Resp 19   Ht 5' 6\" (1.676 m)   Wt 200 lb (90.7 kg)   LMP  (LMP Unknown)   BMI 32.28 kg/m²     Wt Readings from Last 3 Encounters:   03/16/23 200 lb (90.7 kg)   02/22/23 200 lb 9.6 oz (91 kg)   02/16/23 200 lb (90.7 kg)       PHYSICAL EXAM    General Appearance: alert and oriented to person, place and time, well-developed and well-nourished, in no acute distress  Skin: Left posterior thigh wound bed appears healthy, no slough or eschar. 100% pink granular tissue in wound bed. Wound is filling in nicely.    Head: normocephalic and atraumatic  Pulmonary/Chest: normal air movement, no respiratory distress  Extremities: no cyanosis and no clubbing or edema   Musculoskeletal: no joint swelling, deformity or tenderness  Neurologic: gait slow and steady with walker, coordination normal and speech normal      Assessment:     Problem List Items Addressed This Visit          Other    MSSA (methicillin susceptible Staphylococcus aureus) septicemia ([facility]    Relevant Orders Initiate Outpatient Wound Care Protocol    Chronic ulcer of left thigh with fat layer exposed ([address] - Primary    Relevant Orders    Initiate Outpatient Wound Care Protocol        Procedure Note  Indications:  Based on my examination of this patient's wound(s)/ulcer(s) today, debridement is required to promote healing and evaluate the wound base. Performed by: MITESH Koo CNP    Consent obtained:  Yes    Time out taken:  Yes    Pain Control: Anesthetic  Anesthetic: 2% Lidocaine Gel Topical       Debridement:Excisional Debridement    Using curette the wound(s)/ulcer(s) was/were sharply debrided down through and including the removal of subcutaneous tissue. Devitalized Tissue Debrided:  fibrin and biofilm    Pre Debridement Measurements:  Are located in the [place]  Documentation Flow Sheet    Wound/Ulcer #: 1    Post Debridement Measurements:  Wound/Ulcer Descriptions are Pre Debridement except measurements:    Wound 12/08/22 Thigh Left;Posterior #1 (Active)   Wound Image   03/02/23 1326   Wound Etiology Other 03/16/23 1428   Dressing Status New drainage noted; Old drainage noted 03/16/23 1428   Wound Cleansed Cleansed with saline 03/16/23 1428   Dressing/Treatment Negative pressure wound therapy 03/02/23 1326   Wound Length (cm) 1.8 cm 03/16/23 1428   Wound Width (cm) 0.5 cm 03/16/23 1428   Wound Depth (cm) 2.5 cm 03/16/23 1428   Wound Surface Area (cm^2) 0.9 cm^2 03/16/23 1428   Change in Wound Size % (l*w) 92.86 03/16/23 1428   Wound Volume (cm^3) 2.25 cm^3 03/16/23 1428   Wound Healing % 96 03/16/23 1428   Post-Procedure Length (cm) 1.8 cm 03/16/23 1428   Post-Procedure Width (cm) 0.5 cm 03/16/23 1428   Post-Procedure Depth (cm) 2.5 cm 03/16/23 1428   Post-Procedure Surface Area (cm^2) 0.9 cm^2 03/16/23 1428   Post-Procedure Volume (cm^3) 2.25 cm^3 03/16/23 1428   Distance Tunneling (cm) 4.3 cm 03/16/23 1428   Tunneling Position ___ O'Clock 9 03/16/23 1428   Undermining Starts ___ O'Clock 7 02/16/23 1348   Undermining Ends___ O'Clock 9 02/16/23 1348   Undermining Maxium Distance (cm) [email or web address] 02/16/23 1348   Wound Assessment Pink/red 03/16/23 1428   Drainage Amount Moderate 03/16/23 1428   Drainage Description Serosanguinous 03/16/23 1428   Odor Moderate 03/16/23 1428   Jess-wound Assessment Blanchable erythema; Maceration 03/16/23 1428   Margins Defined edges 03/16/23 1428   Wound Thickness Description not for Pressure Injury Full thickness 03/16/23 1428   Number of days: 98          Percent of Wound(s)/Ulcer(s) Debrided: 100%    Total Surface Area Debrided:  0.9 sq cm     Diabetic/Pressure/Non Pressure Ulcers only:  Ulcer: Non-Pressure ulcer, fat layer exposed      Estimated Blood Loss:  Minimal    Hemostasis Achieved:  by pressure    Procedural Pain:  2  / 10     Post Procedural Pain:  0 / 10     Response to treatment:  Well tolerated by patient. Addressed wound healing factors:       [x]  Diabetes: n/a           [x] CHF: n/a                               [x] Infection: currently on Keflex, managed by Dr. Jorge Russell, ID. Will remain on until wound closes per Dr. Jorge Russell. [x] Debridement: yes- done today  [x] Compliance; compression/offloading: discussed need for offloading and gave ideas. Doing well with offloading. [x] Diagnostics: recent 11/8/22 hip x-ray done by ortho- last seen by them 12/6/22  [x] Smoking: n/a  Plan:     -Stop NPWT    -Start silver alginate and gentac three times weekly    -RTC 2 weeks      -I have reviewed instructions with the patient, answering all questions to satisfaction.    -Patient to call or return to clinic as needed if wound symptoms worsen or fail to improve as anticipated.    -See Discharge Instructions for wound management orders. Written patient dismissal instructions given to patient and signed by patient or POA.            Electronically signed by MITESH Bobo CNP on 3/16/2023 at 3:24 PM

## 2023-03-24 NOTE — DISCHARGE INSTRUCTIONS
1000 [facility],5Th Floor -Phone: [phone] Fax: [phone]    Visit  Discharge Instructions / Physician Orders     DATE: 3/30/2023     Home Care: [address] ORDERED THRU: [employer]      Wound Location: Left Posterior Thigh     Cleanse with: Liquid antibacterial soap and water, rinse well      Dressing Orders: [place]sea [place] into wound, Silicone Border Bandage (Excel SAP preferred)     Frequency: Change on Monday, Wednesday and Fridays              Additional Orders: Increase protein to diet (meat, cheese, eggs, fish, peanut butter, nuts and beans)  Multivitamin daily  [facility]- : Darlingtonus Zambrano [id number]- Case #[id number]     Your next appointment with [facility] is in 2 weeks with Wily Corona NP     (Please note your next appointment above and if you are unable to keep, kindly give a 24 hour notice. Thank you.)  If more than 15 min late we cannot guarantee you will be seen due to clinician schedule  Per Policy, Excessive cancellation will call for dismissal from program.     If you experience any of the following, please call the [facility] during business hours:  [phone]  Your Phone call may be forwarded to [phone] [employer] during business hours that [facility] is closed. * Increase in Pain  * Temperature over 101  * Increase in drainage from your wound  * Drainage with a foul odor  * Bleeding  * Increase in swelling  * Need for compression bandage changes due to slippage, breakthrough drainage. If you need medical attention outside of the business hours of the [address] [place] [address] [facility] please contact your PCP or go to the nearest emergency room. The information contained in the After Visit Summary has been reviewed with me, the patient and/or responsible adult, by my health care provider(s). I had the opportunity to ask questions regarding this information.  I have elected to receive;      []After Visit Summary  [x]Comprehensive

## 2023-03-30 ENCOUNTER — HOSPITAL ENCOUNTER (OUTPATIENT)
Dept: WOUND CARE | Age: 73
Discharge: HOME | End: 2023-03-30
Payer: COMMERCIAL

## 2023-03-30 VITALS
SYSTOLIC BLOOD PRESSURE: 126 MMHG | HEART RATE: 86 BPM | RESPIRATION RATE: 16 BRPM | WEIGHT: 200 LBS | TEMPERATURE: 98.3 F | BODY MASS INDEX: 32.28 KG/M2 | DIASTOLIC BLOOD PRESSURE: 71 MMHG

## 2023-03-30 DIAGNOSIS — A41.01 MSSA (METHICILLIN SUSCEPTIBLE STAPHYLOCOCCUS AUREUS) SEPTICEMIA (HCC): ICD-10-CM

## 2023-03-30 DIAGNOSIS — L97.122 CHRONIC ULCER OF LEFT THIGH WITH FAT LAYER EXPOSED (HCC): Primary | ICD-10-CM

## 2023-03-30 PROCEDURE — 11042 DBRDMT SUBQ TIS 1ST 20SQCM/<: CPT

## 2023-03-30 RX ORDER — BETAMETHASONE DIPROPIONATE 0.05 %
OINTMENT (GRAM) TOPICAL ONCE
OUTPATIENT
Start: 2023-03-30 | End: 2023-03-30

## 2023-03-30 RX ORDER — LIDOCAINE HYDROCHLORIDE 40 MG/ML
SOLUTION TOPICAL ONCE
OUTPATIENT
Start: 2023-03-30 | End: 2023-03-30

## 2023-03-30 RX ORDER — GENTAMICIN SULFATE 1 MG/G
OINTMENT TOPICAL ONCE
OUTPATIENT
Start: 2023-03-30 | End: 2023-03-30

## 2023-03-30 RX ORDER — GINSENG 100 MG
CAPSULE ORAL ONCE
OUTPATIENT
Start: 2023-03-30 | End: 2023-03-30

## 2023-03-30 RX ORDER — LIDOCAINE HYDROCHLORIDE 20 MG/ML
JELLY TOPICAL ONCE
OUTPATIENT
Start: 2023-03-30 | End: 2023-03-30

## 2023-03-30 RX ORDER — BACITRACIN, NEOMYCIN, POLYMYXIN B 400; 3.5; 5 [USP'U]/G; MG/G; [USP'U]/G
OINTMENT TOPICAL ONCE
OUTPATIENT
Start: 2023-03-30 | End: 2023-03-30

## 2023-03-30 RX ORDER — LIDOCAINE 40 MG/G
CREAM TOPICAL ONCE
OUTPATIENT
Start: 2023-03-30 | End: 2023-03-30

## 2023-03-30 RX ORDER — BACITRACIN ZINC AND POLYMYXIN B SULFATE 500; 1000 [USP'U]/G; [USP'U]/G
OINTMENT TOPICAL ONCE
OUTPATIENT
Start: 2023-03-30 | End: 2023-03-30

## 2023-03-30 RX ORDER — CLOBETASOL PROPIONATE 0.5 MG/G
OINTMENT TOPICAL ONCE
OUTPATIENT
Start: 2023-03-30 | End: 2023-03-30

## 2023-03-30 RX ORDER — LIDOCAINE 50 MG/G
OINTMENT TOPICAL ONCE
OUTPATIENT
Start: 2023-03-30 | End: 2023-03-30

## 2023-03-30 RX ORDER — LIDOCAINE HYDROCHLORIDE 20 MG/ML
JELLY TOPICAL ONCE
Status: COMPLETED | OUTPATIENT
Start: 2023-03-30 | End: 2023-03-30

## 2023-03-30 RX ADMIN — LIDOCAINE HYDROCHLORIDE 6 ML: 20 JELLY TOPICAL at 13:17

## 2023-03-30 ASSESSMENT — PAIN DESCRIPTION - DESCRIPTORS: DESCRIPTORS: NAGGING

## 2023-03-30 ASSESSMENT — PAIN DESCRIPTION - LOCATION: LOCATION: LEG

## 2023-03-30 ASSESSMENT — PAIN SCALES - GENERAL: PAINLEVEL_OUTOF10: 7

## 2023-03-30 ASSESSMENT — PAIN DESCRIPTION - ORIENTATION: ORIENTATION: LEFT

## 2023-03-30 NOTE — PLAN OF CARE
Problem: Discharge Planning  Goal: Discharge to home or other facility with appropriate resources  Outcome: Progressing     Problem: Pain  Goal: Verbalizes/displays adequate comfort level or baseline comfort level  Outcome: Progressing     Problem: Safety - Adult  Goal: Free from fall injury  Outcome: Progressing     Problem: Wound:  Goal: Will show signs of wound healing; wound closure and no evidence of infection  Description: Will show signs of wound healing; wound closure and no evidence of infection  Outcome: Progressing     Problem: Falls - Risk of:  Goal: Will remain free from falls  Description: Will remain free from falls  Outcome: Progressing

## 2023-03-30 NOTE — PROGRESS NOTES
Protocol    Chronic ulcer of left thigh with fat layer exposed ([facility] - Primary    Relevant Orders    Initiate Outpatient Wound Care Protocol        Procedure Note  Indications:  Based on my examination of this patient's wound(s)/ulcer(s) today, debridement is required to promote healing and evaluate the wound base. Performed by: MITESH Arce CNP    Consent obtained:  Yes    Time out taken:  Yes    Pain Control: Anesthetic  Anesthetic: 2% Lidocaine Gel Topical       Debridement:Excisional Debridement    Using curette the wound(s)/ulcer(s) was/were sharply debrided down through and including the removal of subcutaneous tissue. Devitalized Tissue Debrided:  fibrin and biofilm    Pre Debridement Measurements:  Are located in the [place]  Documentation Flow Sheet    Wound/Ulcer #: 1    Post Debridement Measurements:  Wound/Ulcer Descriptions are Pre Debridement except measurements:    Wound 12/08/22 Thigh Left;Posterior #1 (Active)   Wound Image   03/02/23 1326   Wound Etiology Other 03/30/23 1313   Dressing Status New drainage noted; Old drainage noted 03/30/23 1313   Wound Cleansed Cleansed with saline 03/30/23 1313   Dressing/Treatment Negative pressure wound therapy 03/02/23 1326   Wound Length (cm) 2 cm 03/30/23 1313   Wound Width (cm) 0.8 cm 03/30/23 1313   Wound Depth (cm) 4.4 cm 03/30/23 1313   Wound Surface Area (cm^2) 1.6 cm^2 03/30/23 1313   Change in Wound Size % (l*w) 87.3 03/30/23 1313   Wound Volume (cm^3) 7.04 cm^3 03/30/23 1313   Wound Healing % 86 03/30/23 1313   Post-Procedure Length (cm) 2 cm 03/30/23 1313   Post-Procedure Width (cm) 0.8 cm 03/30/23 1313   Post-Procedure Depth (cm) 4.4 cm 03/30/23 1313   Post-Procedure Surface Area (cm^2) 1.6 cm^2 03/30/23 1313   Post-Procedure Volume (cm^3) 7.04 cm^3 03/30/23 1313   Distance Tunneling (cm) 4.3 cm 03/16/23 1428   Tunneling Position ___ O'Clock 9 03/16/23 1428   Undermining Starts ___ O'Clock 7 02/16/23 1348   Undermining Ends___

## 2023-03-31 ENCOUNTER — HOSPITAL ENCOUNTER (OUTPATIENT)
Age: 73
Setting detail: SPECIMEN
Discharge: HOME | End: 2023-03-31

## 2023-03-31 DIAGNOSIS — M86.9 HIP OSTEOMYELITIS, LEFT (HCC): ICD-10-CM

## 2023-03-31 LAB
ABSOLUTE EOS #: 0.71 K/UL (ref 0–0.44)
ABSOLUTE IMMATURE GRANULOCYTE: <0.03 K/UL (ref 0–0.3)
ABSOLUTE LYMPH #: 2.61 K/UL (ref 1.1–3.7)
ABSOLUTE MONO #: 0.53 K/UL (ref 0.1–1.2)
BASOPHILS # BLD: 1 % (ref 0–2)
BASOPHILS ABSOLUTE: 0.09 K/UL (ref 0–0.2)
CRP SERPL HS-MCNC: 19.9 MG/L (ref 0–5)
EOSINOPHILS RELATIVE PERCENT: 9 % (ref 1–4)
HCT VFR BLD AUTO: 38.6 % (ref 36.3–47.1)
HGB BLD-MCNC: 11.7 G/DL (ref 11.9–15.1)
IMMATURE GRANULOCYTES: 0 %
LYMPHOCYTES # BLD: 33 % (ref 24–43)
MCH RBC QN AUTO: 26.5 PG (ref 25.2–33.5)
MCHC RBC AUTO-ENTMCNC: 30.3 G/DL (ref 28.4–34.8)
MCV RBC AUTO: 87.5 FL (ref 82.6–102.9)
MONOCYTES # BLD: 7 % (ref 3–12)
NRBC AUTOMATED: 0 PER 100 WBC
PDW BLD-RTO: 16.2 % (ref 11.8–14.4)
PLATELET # BLD AUTO: 305 K/UL (ref 138–453)
PMV BLD AUTO: 11.5 FL (ref 8.1–13.5)
RBC # BLD: 4.41 M/UL (ref 3.95–5.11)
RBC # BLD: ABNORMAL 10*6/UL
SEG NEUTROPHILS: 50 % (ref 36–65)
SEGMENTED NEUTROPHILS ABSOLUTE COUNT: 3.89 K/UL (ref 1.5–8.1)
WBC # BLD AUTO: 7.9 K/UL (ref 3.5–11.3)

## 2023-04-27 ENCOUNTER — HOSPITAL ENCOUNTER (OUTPATIENT)
Dept: WOUND CARE | Age: 73
Discharge: HOME | End: 2023-04-27
Payer: COMMERCIAL

## 2023-04-27 VITALS
WEIGHT: 200 LBS | HEIGHT: 66 IN | BODY MASS INDEX: 32.14 KG/M2 | DIASTOLIC BLOOD PRESSURE: 60 MMHG | TEMPERATURE: 97 F | RESPIRATION RATE: 18 BRPM | SYSTOLIC BLOOD PRESSURE: 123 MMHG | HEART RATE: 81 BPM

## 2023-04-27 DIAGNOSIS — A41.01 MSSA (METHICILLIN SUSCEPTIBLE STAPHYLOCOCCUS AUREUS) SEPTICEMIA (HCC): ICD-10-CM

## 2023-04-27 DIAGNOSIS — L97.122 CHRONIC ULCER OF LEFT THIGH WITH FAT LAYER EXPOSED (HCC): Primary | ICD-10-CM

## 2023-04-27 PROCEDURE — 11042 DBRDMT SUBQ TIS 1ST 20SQCM/<: CPT

## 2023-04-27 RX ORDER — GINSENG 100 MG
CAPSULE ORAL ONCE
OUTPATIENT
Start: 2023-04-27 | End: 2023-04-27

## 2023-04-27 RX ORDER — BACITRACIN, NEOMYCIN, POLYMYXIN B 400; 3.5; 5 [USP'U]/G; MG/G; [USP'U]/G
OINTMENT TOPICAL ONCE
OUTPATIENT
Start: 2023-04-27 | End: 2023-04-27

## 2023-04-27 RX ORDER — LIDOCAINE HYDROCHLORIDE 20 MG/ML
JELLY TOPICAL ONCE
OUTPATIENT
Start: 2023-04-27 | End: 2023-04-27

## 2023-04-27 RX ORDER — BACITRACIN ZINC AND POLYMYXIN B SULFATE 500; 1000 [USP'U]/G; [USP'U]/G
OINTMENT TOPICAL ONCE
OUTPATIENT
Start: 2023-04-27 | End: 2023-04-27

## 2023-04-27 RX ORDER — LIDOCAINE HYDROCHLORIDE 20 MG/ML
JELLY TOPICAL ONCE
Status: COMPLETED | OUTPATIENT
Start: 2023-04-27 | End: 2023-04-27

## 2023-04-27 RX ORDER — LIDOCAINE 50 MG/G
OINTMENT TOPICAL ONCE
OUTPATIENT
Start: 2023-04-27 | End: 2023-04-27

## 2023-04-27 RX ORDER — LIDOCAINE HYDROCHLORIDE 40 MG/ML
SOLUTION TOPICAL ONCE
OUTPATIENT
Start: 2023-04-27 | End: 2023-04-27

## 2023-04-27 RX ORDER — CLOBETASOL PROPIONATE 0.5 MG/G
OINTMENT TOPICAL ONCE
OUTPATIENT
Start: 2023-04-27 | End: 2023-04-27

## 2023-04-27 RX ORDER — LIDOCAINE 40 MG/G
CREAM TOPICAL ONCE
OUTPATIENT
Start: 2023-04-27 | End: 2023-04-27

## 2023-04-27 RX ORDER — BETAMETHASONE DIPROPIONATE 0.05 %
OINTMENT (GRAM) TOPICAL ONCE
OUTPATIENT
Start: 2023-04-27 | End: 2023-04-27

## 2023-04-27 RX ORDER — GENTAMICIN SULFATE 1 MG/G
OINTMENT TOPICAL ONCE
OUTPATIENT
Start: 2023-04-27 | End: 2023-04-27

## 2023-04-27 RX ADMIN — LIDOCAINE HYDROCHLORIDE 6 ML: 20 JELLY TOPICAL at 14:37

## 2023-04-27 ASSESSMENT — PAIN SCALES - GENERAL: PAINLEVEL_OUTOF10: 0

## 2023-04-27 NOTE — PROGRESS NOTES
[address]   Progress Note and Procedure Note      Ana Goodman  MEDICAL RECORD NUMBER:  [id number]  AGE: 71 y.o. GENDER: female  : 1950  EPISODE DATE:  2023    Subjective:     Chief Complaint   Patient presents with    Wound Check     Left posterior thigh         HISTORY of PRESENT ILLNESS HPI     Ana Goodman is a 71 y.o. female who presents today for wound/ulcer evaluation. left posterior thigh ulcer of unknown etiology that had become infected requiring continued Keflex. Will be on Keflex until wound heals per Dr. Delfina Casey. Interval history: continues to be back to work and doing well with that. Wound continuing to slowly improve.     Wound/Ulcer Pain Timing/Severity: intermittent  Quality of pain: aching  Severity:  1 / 10   Modifying Factors: None, Pain worsens with walking, and Pain is relieved/improved with rest  Associated Signs/Symptoms: erythema and drainage          PAST MEDICAL HISTORY        Diagnosis Date    Acute respiratory failure with hypoxia ([facility]) 2022    Aspiration pneumonia ([facility]) 2022    Chronic back pain     Chronic hip pain     Chronic ulcer of left thigh ([address] 2022    COPD (chronic obstructive pulmonary disease) ([facility])     Major depression     MSSA (methicillin susceptible Staphylococcus aureus) septicemia ([address] 2022    MSSA bacteremia 2022    Osteoarthritis     Osteoporosis     Pneumonia of left lower lobe due to infectious organism 10/1/2022    Respiratory alkalosis 2022    Septicemia ([address] 2022    SIRS (systemic inflammatory response syndrome) ([address] 2022    UTI due to Klebsiella species 2022       PAST SURGICAL HISTORY    Past Surgical History:   Procedure Laterality Date    BREAST BIOPSY      two    CHOLECYSTECTOMY      HYSTERECTOMY, VAGINAL         FAMILY HISTORY    Family History   Problem Relation Age of Onset    Stroke Mother     Alzheimer's Disease Father        SOCIAL

## 2023-05-01 NOTE — DISCHARGE INSTRUCTIONS
1000 [facility],5Th Floor -Phone: [phone] Fax: [phone]    Visit  Discharge Instructions / Physician Orders     DATE: 5/4/2023     Home Care: [address] ORDERED THRU: [employer]      Wound Location: Left Posterior Thigh     Cleanse with: Keep Dry and Intact     Dressing Orders: Epifix #1 (18mm Disc), Silvercel, Versatel, Steri Strips (do not remove steri strips or anything below), dry gauze bolster below Silicone Border Bandage (Excel SAP preferred)     Frequency: Change outer dressing on Mondays              Additional Orders: Increase protein to diet (meat, cheese, eggs, fish, peanut butter, nuts and beans)  Multivitamin daily     [facility] with Mague Wu NP     (Please note your next appointment above and if you are unable to keep, kindly give a 24 hour notice. Thank you.)  If more than 15 min late we cannot guarantee you will be seen due to clinician schedule  Per Policy, Excessive cancellation will call for dismissal from program.     If you experience any of the following, please call the [address] [place] [address] [facility] during business hours:  [phone]  Your Phone call may be forwarded to [employer] during business hours that [facility] is closed. * Increase in Pain  * Temperature over 101  * Increase in drainage from your wound  * Drainage with a foul odor  * Bleeding  * Increase in swelling  * Need for compression bandage changes due to slippage, breakthrough drainage. If you need medical attention outside of the business hours of the [address] [place] [address] please contact your PCP or go to the nearest emergency room. The information contained in the After Visit Summary has been reviewed with me, the patient and/or responsible adult, by my health care provider(s). I had the opportunity to ask questions regarding this information.  I have elected to receive;      []After Visit Summary  [x]Comprehensive Discharge

## 2023-05-04 ENCOUNTER — HOSPITAL ENCOUNTER (OUTPATIENT)
Dept: WOUND CARE | Age: 73
Discharge: HOME | End: 2023-05-04
Payer: COMMERCIAL

## 2023-05-04 VITALS
DIASTOLIC BLOOD PRESSURE: 84 MMHG | HEIGHT: 66 IN | TEMPERATURE: 98 F | SYSTOLIC BLOOD PRESSURE: 146 MMHG | RESPIRATION RATE: 19 BRPM | BODY MASS INDEX: 32.14 KG/M2 | WEIGHT: 200 LBS | HEART RATE: 103 BPM

## 2023-05-04 DIAGNOSIS — L97.122 CHRONIC ULCER OF LEFT THIGH WITH FAT LAYER EXPOSED (HCC): Primary | ICD-10-CM

## 2023-05-04 DIAGNOSIS — A41.01 MSSA (METHICILLIN SUSCEPTIBLE STAPHYLOCOCCUS AUREUS) SEPTICEMIA (HCC): ICD-10-CM

## 2023-05-04 PROCEDURE — 15271 SKIN SUB GRAFT TRNK/ARM/LEG: CPT

## 2023-05-04 RX ORDER — GINSENG 100 MG
CAPSULE ORAL ONCE
OUTPATIENT
Start: 2023-05-04 | End: 2023-05-04

## 2023-05-04 RX ORDER — GENTAMICIN SULFATE 1 MG/G
OINTMENT TOPICAL ONCE
OUTPATIENT
Start: 2023-05-04 | End: 2023-05-04

## 2023-05-04 RX ORDER — LIDOCAINE 40 MG/G
CREAM TOPICAL ONCE
OUTPATIENT
Start: 2023-05-04 | End: 2023-05-04

## 2023-05-04 RX ORDER — CLOBETASOL PROPIONATE 0.5 MG/G
OINTMENT TOPICAL ONCE
OUTPATIENT
Start: 2023-05-04 | End: 2023-05-04

## 2023-05-04 RX ORDER — BACITRACIN, NEOMYCIN, POLYMYXIN B 400; 3.5; 5 [USP'U]/G; MG/G; [USP'U]/G
OINTMENT TOPICAL ONCE
OUTPATIENT
Start: 2023-05-04 | End: 2023-05-04

## 2023-05-04 RX ORDER — BETAMETHASONE DIPROPIONATE 0.05 %
OINTMENT (GRAM) TOPICAL ONCE
OUTPATIENT
Start: 2023-05-04 | End: 2023-05-04

## 2023-05-04 RX ORDER — LIDOCAINE HYDROCHLORIDE 20 MG/ML
JELLY TOPICAL ONCE
Status: COMPLETED | OUTPATIENT
Start: 2023-05-04 | End: 2023-05-04

## 2023-05-04 RX ORDER — LIDOCAINE HYDROCHLORIDE 20 MG/ML
JELLY TOPICAL ONCE
OUTPATIENT
Start: 2023-05-04 | End: 2023-05-04

## 2023-05-04 RX ORDER — LIDOCAINE 50 MG/G
OINTMENT TOPICAL ONCE
OUTPATIENT
Start: 2023-05-04 | End: 2023-05-04

## 2023-05-04 RX ORDER — BACITRACIN ZINC AND POLYMYXIN B SULFATE 500; 1000 [USP'U]/G; [USP'U]/G
OINTMENT TOPICAL ONCE
OUTPATIENT
Start: 2023-05-04 | End: 2023-05-04

## 2023-05-04 RX ORDER — LIDOCAINE HYDROCHLORIDE 40 MG/ML
SOLUTION TOPICAL ONCE
OUTPATIENT
Start: 2023-05-04 | End: 2023-05-04

## 2023-05-04 RX ADMIN — LIDOCAINE HYDROCHLORIDE 6 ML: 20 JELLY TOPICAL at 13:57

## 2023-05-04 NOTE — PLAN OF CARE
Problem: Discharge Planning  Goal: Discharge to home or other facility with appropriate resources  Outcome: Progressing     Problem: Safety - Adult  Goal: Free from fall injury  Outcome: Progressing     Problem: ABCDS Injury Assessment  Goal: Absence of physical injury  Outcome: Progressing  Flowsheets (Taken 5/4/2023 0227 by Jai Johnson RN)  Absence of Physical Injury: Implement safety measures based on patient assessment     Problem: Wound:  Goal: Will show signs of wound healing; wound closure and no evidence of infection  Description: Will show signs of wound healing; wound closure and no evidence of infection  Outcome: Progressing     Problem: Falls - Risk of:  Goal: Will remain free from falls  Description: Will remain free from falls  Outcome: Progressing

## 2023-05-05 ENCOUNTER — TELEPHONE (OUTPATIENT)
Dept: FAMILY MEDICINE CLINIC | Age: 73
End: 2023-05-05

## 2023-05-05 DIAGNOSIS — M85.851 OSTEOPENIA OF RIGHT HIP: Primary | ICD-10-CM

## 2023-05-05 NOTE — PROGRESS NOTES
EpiFix Treatment Note    NAME:  Yoandy Bridges  YOB: 1950  MEDICAL RECORD NUMBER:  [id number]  DATE:  5/4/2023    Goal:  Patient will receive safe and proper application of skin substitute. Patient will comply with caring for dressing, offloading and reporting complications. [x]Expiration date checked immediately prior to use  [x] Package intact prior to use and no damage noted  [x] Transport temperature controlled and acceptable(ROOM AIR)    [x]  EpiFix was removed from protective sterile packaging by physician and applied to prepared ulcer bed. [x] EpiFix was placed using embossment letting as a guide. (UP)    [x] Epifix was hydrated with sterile normal saline per physician(IF NEEDED)        [x] EpiFix was applied to location Left Posterior Thigh and affixed with steri-strips by the physician. [x] EpiFix was covered with non-adherent ulcer dressing per physician  [x] Applied Silvercel over non-adherent. [x] Applied dry gauze and/or roll gauze. [x] Patient/caregiver was instructed not to remove dressing and to keep it clean and dry. [x] Pt/family/caregiver was instructed on signs and symptoms of complications to report such as draining through dressing, dressing falling down/slipping, getting wet,or  severe   pain and tingling in toes  [x] Pt/family/caregiver was instructed on need for offloading and elevation of affected extremity and on use of prescribed offloading device. [x] Record info in tissue log( sticker with patient label). Picture epifix sticker with patient label for that specific date in . Please add epifix application number to both stickers. [x]  Guidelines followed  [x] EpiFix may be applied a total of 10 times per wound over a 12 week period. Additionally EpiFix may only be used every 12 months per wound. Date of first application of EpiFix for this current wound is May 4, 2023.         Electronically signed by Edward Kwon
Addressed This Visit          Other    MSSA (methicillin susceptible Staphylococcus aureus) septicemia ([facility] [address]    Relevant Orders    Initiate Outpatient Wound Care Protocol    Chronic ulcer of left thigh with fat layer exposed ([facility] [address] - Primary    Relevant Orders    Initiate Outpatient Wound Care Protocol        Procedure Note  Indications:  Based on my examination of this patient's wound(s)/ulcer(s) today, debridement is required to promote healing and evaluate the wound base. Performed by: MITESH Duran CNP    Consent obtained:  Yes    Time out taken:  Yes    Pain Control: Anesthetic  Anesthetic: 2% Lidocaine Gel Topical       Debridement:Excisional Debridement    Using curette the wound(s)/ulcer(s) was/were sharply debrided down through and including the removal of subcutaneous tissue. Devitalized Tissue Debrided:  fibrin, biofilm, and exudate    Pre Debridement Measurements:  Are located in the [place]  Documentation Flow Sheet    Wound/Ulcer #: 1    Post Debridement Measurements:  Wound/Ulcer Descriptions are Pre Debridement except measurements:    Wound 12/08/22 Thigh Left;Posterior #1 (Active)   Wound Image   03/02/23 1326   Wound Etiology Other 05/04/23 1357   Dressing Status New drainage noted; Old drainage noted 05/04/23 1357   Wound Cleansed Irrigated with saline 05/04/23 1357   Dressing/Treatment Negative pressure wound therapy 03/02/23 1326   Wound Length (cm) 2 cm 05/04/23 1357   Wound Width (cm) 1 cm 05/04/23 1357   Wound Depth (cm) 3 cm 05/04/23 1357   Wound Surface Area (cm^2) 2 cm^2 05/04/23 1357   Change in Wound Size % (l*w) 84.13 05/04/23 1357   Wound Volume (cm^3) 6 cm^3 05/04/23 1357   Wound Healing % 88 05/04/23 1357   Post-Procedure Length (cm) 2.2 cm 05/04/23 1357   Post-Procedure Width (cm) 0.7 cm 05/04/23 1357   Post-Procedure Depth (cm) 3 cm 05/04/23 1357   Post-Procedure Surface Area (cm^2) 1.54 cm^2 05/04/23 1357   Post-Procedure Volume (cm^3) 4.62 cm^3 05/04/23 1357

## 2023-05-08 ENCOUNTER — TELEPHONE (OUTPATIENT)
Dept: FAMILY MEDICINE CLINIC | Age: 73
End: 2023-05-08

## 2023-05-08 NOTE — TELEPHONE ENCOUNTER
Called number provided, which is for a [place], and left a message letting her know Dr. Castro Mckoy will follow for home care.

## 2023-05-08 NOTE — TELEPHONE ENCOUNTER
----- Message from Taniyawm Ignacio sent at 5/8/2023  4:01 PM EDT -----  Subject: Message to Provider    QUESTIONS  Information for Provider? Hao Fernandez from [address] would   like to know if Rene Barbour will be following up with the patient   she is being recertified for home healthcare next week. Hao Fernandez can be   reached at [phone] ok to leave a message  ---------------------------------------------------------------------------  --------------  [address] [employer]  [phone]; OK to leave message on voicemail  ---------------------------------------------------------------------------  --------------  SCRIPT ANSWERS  Relationship to Patient? Covered Entity  Covered Entity Type? Home Health Care? Representative Name?  Hao Fernandez

## 2023-05-11 ENCOUNTER — HOSPITAL ENCOUNTER (OUTPATIENT)
Dept: WOUND CARE | Age: 73
Discharge: HOME | End: 2023-05-11
Payer: COMMERCIAL

## 2023-05-11 VITALS
BODY MASS INDEX: 32.28 KG/M2 | SYSTOLIC BLOOD PRESSURE: 104 MMHG | TEMPERATURE: 99.1 F | DIASTOLIC BLOOD PRESSURE: 58 MMHG | WEIGHT: 200 LBS | HEART RATE: 83 BPM | RESPIRATION RATE: 16 BRPM

## 2023-05-11 DIAGNOSIS — A41.01 MSSA (METHICILLIN SUSCEPTIBLE STAPHYLOCOCCUS AUREUS) SEPTICEMIA (HCC): ICD-10-CM

## 2023-05-11 DIAGNOSIS — L97.122 CHRONIC ULCER OF LEFT THIGH WITH FAT LAYER EXPOSED (HCC): Primary | ICD-10-CM

## 2023-05-11 PROCEDURE — 15271 SKIN SUB GRAFT TRNK/ARM/LEG: CPT

## 2023-05-11 RX ORDER — GENTAMICIN SULFATE 1 MG/G
OINTMENT TOPICAL ONCE
OUTPATIENT
Start: 2023-05-11 | End: 2023-05-11

## 2023-05-11 RX ORDER — BACITRACIN ZINC AND POLYMYXIN B SULFATE 500; 1000 [USP'U]/G; [USP'U]/G
OINTMENT TOPICAL ONCE
OUTPATIENT
Start: 2023-05-11 | End: 2023-05-11

## 2023-05-11 RX ORDER — GINSENG 100 MG
CAPSULE ORAL ONCE
OUTPATIENT
Start: 2023-05-11 | End: 2023-05-11

## 2023-05-11 RX ORDER — LIDOCAINE HYDROCHLORIDE 20 MG/ML
JELLY TOPICAL ONCE
OUTPATIENT
Start: 2023-05-11 | End: 2023-05-11

## 2023-05-11 RX ORDER — LIDOCAINE 50 MG/G
OINTMENT TOPICAL ONCE
OUTPATIENT
Start: 2023-05-11 | End: 2023-05-11

## 2023-05-11 RX ORDER — LIDOCAINE HYDROCHLORIDE 40 MG/ML
SOLUTION TOPICAL ONCE
OUTPATIENT
Start: 2023-05-11 | End: 2023-05-11

## 2023-05-11 RX ORDER — BETAMETHASONE DIPROPIONATE 0.05 %
OINTMENT (GRAM) TOPICAL ONCE
OUTPATIENT
Start: 2023-05-11 | End: 2023-05-11

## 2023-05-11 RX ORDER — LIDOCAINE HYDROCHLORIDE 20 MG/ML
JELLY TOPICAL ONCE
Status: COMPLETED | OUTPATIENT
Start: 2023-05-11 | End: 2023-05-11

## 2023-05-11 RX ORDER — BACITRACIN, NEOMYCIN, POLYMYXIN B 400; 3.5; 5 [USP'U]/G; MG/G; [USP'U]/G
OINTMENT TOPICAL ONCE
OUTPATIENT
Start: 2023-05-11 | End: 2023-05-11

## 2023-05-11 RX ORDER — LIDOCAINE 40 MG/G
CREAM TOPICAL ONCE
OUTPATIENT
Start: 2023-05-11 | End: 2023-05-11

## 2023-05-11 RX ORDER — CLOBETASOL PROPIONATE 0.5 MG/G
OINTMENT TOPICAL ONCE
OUTPATIENT
Start: 2023-05-11 | End: 2023-05-11

## 2023-05-11 RX ADMIN — LIDOCAINE HYDROCHLORIDE 6 ML: 20 JELLY TOPICAL at 13:48

## 2023-05-11 ASSESSMENT — PAIN DESCRIPTION - DESCRIPTORS: DESCRIPTORS: GNAWING

## 2023-05-11 ASSESSMENT — PAIN DESCRIPTION - ORIENTATION: ORIENTATION: LEFT

## 2023-05-11 ASSESSMENT — PAIN DESCRIPTION - LOCATION: LOCATION: OTHER (COMMENT)

## 2023-05-11 ASSESSMENT — PAIN DESCRIPTION - FREQUENCY: FREQUENCY: CONTINUOUS

## 2023-05-11 ASSESSMENT — PAIN SCALES - GENERAL: PAINLEVEL_OUTOF10: 7

## 2023-05-11 NOTE — PROGRESS NOTES
EpiFix Treatment Note    NAME:  Seamus Foot OF BIRTH:  1950  MEDICAL RECORD NUMBER:  [id number]  DATE:  5/11/2023    Goal:  Patient will receive safe and proper application of skin substitute. Patient will comply with caring for dressing, offloading and reporting complications. [x]Expiration date checked immediately prior to use  [x] Package intact prior to use and no damage noted  [x] Transport temperature controlled and acceptable(ROOM AIR)     [x]  EpiFix was removed from protective sterile packaging by physician and applied to prepared ulcer bed. [x] EpiFix was placed using embossment letting as a guide. (UP)    [x] Epifix was hydrated with sterile normal saline per physician(IF NEEDED)        [x] EpiFix was applied to location Left Posterior Thigh and affixed with steri-strips by the physician. [x] EpiFix was covered with non-adherent ulcer dressing per physician  [x] Applied Silvercel over non-adherent. [x] Applied dry gauze and/or roll gauze. [x] Patient/caregiver was instructed not to remove dressing and to keep it clean and dry. [x] Pt/family/caregiver was instructed on signs and symptoms of complications to report such as draining through dressing, dressing falling down/slipping, getting wet,or  severe   pain and tingling in toes  [x] Pt/family/caregiver was instructed on need for offloading and elevation of affected extremity and on use of prescribed offloading device. [x] Record info in tissue log( sticker with patient label). Picture epifix sticker with patient label for that specific date in . Please add epifix application number to both stickers. [x]  Guidelines followed  [x] EpiFix may be applied a total of 10 times per wound over a 12 week period. Additionally EpiFix may only be used every 12 months per wound. Date of first application of EpiFix for this current wound is May 4, 2023.         Electronically signed by Dominique Neal

## 2023-05-11 NOTE — DISCHARGE INSTRUCTIONS
1000 [facility],5Th Floor -Phone: [phone] Fax: [phone]    Visit  Discharge Instructions / Physician Orders     DATE: 5/11/2023     Home Care: [address] ORDERED THRU: [employer]      Wound Location: Left Posterior Thigh     Cleanse with: Keep Dry and Intact     Dressing Orders: Epifix #2 (18mm Disc), Calcium Alginate, Steri Strips (do not remove steri strips or anything below), Silicone Border Bandage (Excel SAP preferred)     Frequency: Change Daily or Every Other Day              Additional Orders: Increase protein to diet (meat, cheese, eggs, fish, peanut butter, nuts and beans)  Multivitamin daily     Your next appointment with [facility] is in 1 week with Penny Osborne NP     (Please note your next appointment above and if you are unable to keep, kindly give a 24 hour notice. Thank you.)  If more than 15 min late we cannot guarantee you will be seen due to clinician schedule  Per Policy, Excessive cancellation will call for dismissal from program.     If you experience any of the following, please call the [facility] during business hours:  [phone]  Your Phone call may be forwarded to [employer] during business hours that [facility] is closed. * Increase in Pain  * Temperature over 101  * Increase in drainage from your wound  * Drainage with a foul odor  * Bleeding  * Increase in swelling  * Need for compression bandage changes due to slippage, breakthrough drainage. If you need medical attention outside of the business hours of the [facility] please contact your PCP or go to the nearest emergency room. The information contained in the After Visit Summary has been reviewed with me, the patient and/or responsible adult, by my health care provider(s). I had the opportunity to ask questions regarding this information.  I have elected to receive;      []After Visit Summary  [x]Comprehensive Discharge Instruction        Patient

## 2023-05-11 NOTE — PROGRESS NOTES
[address]   Progress Note and Procedure Note      Jose Woods  MEDICAL RECORD NUMBER:  [id number]  AGE: 71 y.o. GENDER: female  : 1950  EPISODE DATE:  2023    Subjective:     No chief complaint on file. HISTORY of PRESENT ILLNESS HPI     Jose Woods is a 71 y.o. female who presents today for wound/ulcer evaluation. History of Wound Context: left posterior thigh ulcer of unknown etiology that had become infected requiring continued Keflex. Will be on Keflex until wound heals per Dr. Linda Dorman. Interval history: the patient was uncomfortable all week with the dressing, felt that there was an odor all week, and also had some burning discomfort around the wound beginning on Monday.     Wound/Ulcer Pain Timing/Severity: intermittent  Quality of pain: aching  Severity:  3/ 10   Modifying Factors: None, Pain worsens with walking, and Pain is relieved/improved with rest  Associated Signs/Symptoms: erythema and drainage     Wound/Ulcer Identification:  Ulcer Type: traumatic  Contributing Factors: edema, chronic pressure, decreased mobility, shear force, and obesity          PAST MEDICAL HISTORY        Diagnosis Date    Acute respiratory failure with hypoxia (HCC) 2022    Aspiration pneumonia ([address] 2022    Chronic back pain     Chronic hip pain     Chronic ulcer of left thigh ([address] 2022    COPD (chronic obstructive pulmonary disease) ([address]     Major depression     MSSA (methicillin susceptible Staphylococcus aureus) septicemia ([address] 2022    MSSA bacteremia 2022    Osteoarthritis     Osteoporosis     Pneumonia of left lower lobe due to infectious organism 10/1/2022    Respiratory alkalosis 2022    Septicemia ([address] 2022    SIRS (systemic inflammatory response syndrome) ([address] 2022    UTI due to Klebsiella species 2022       PAST SURGICAL HISTORY    Past Surgical History:   Procedure Laterality Date    BREAST BIOPSY

## 2023-05-15 ENCOUNTER — OFFICE VISIT (OUTPATIENT)
Dept: INFECTIOUS DISEASES | Age: 73
End: 2023-05-15
Payer: COMMERCIAL

## 2023-05-15 ENCOUNTER — HOSPITAL ENCOUNTER (OUTPATIENT)
Age: 73
Setting detail: SPECIMEN
Discharge: HOME | End: 2023-05-15

## 2023-05-15 VITALS
HEIGHT: 66 IN | BODY MASS INDEX: 32.28 KG/M2 | TEMPERATURE: 98.2 F | SYSTOLIC BLOOD PRESSURE: 133 MMHG | DIASTOLIC BLOOD PRESSURE: 72 MMHG | HEART RATE: 74 BPM | OXYGEN SATURATION: 97 %

## 2023-05-15 DIAGNOSIS — M86.9 HIP OSTEOMYELITIS, LEFT (HCC): Primary | ICD-10-CM

## 2023-05-15 DIAGNOSIS — A49.01 MSSA (METHICILLIN SUSCEPTIBLE STAPHYLOCOCCUS AUREUS) INFECTION: ICD-10-CM

## 2023-05-15 PROCEDURE — 99214 OFFICE O/P EST MOD 30 MIN: CPT | Performed by: INTERNAL MEDICINE

## 2023-05-15 PROCEDURE — 1123F ACP DISCUSS/DSCN MKR DOCD: CPT | Performed by: INTERNAL MEDICINE

## 2023-05-15 RX ORDER — CEPHALEXIN 500 MG/1
500 CAPSULE ORAL 3 TIMES DAILY
Qty: 90 CAPSULE | Refills: 11 | Status: SHIPPED | OUTPATIENT
Start: 2023-05-15 | End: 2023-06-14

## 2023-05-15 ASSESSMENT — ENCOUNTER SYMPTOMS
EYE ITCHING: 0
EYE PAIN: 0
APNEA: 0
ABDOMINAL DISTENTION: 0
EYE DISCHARGE: 0
BACK PAIN: 1
COLOR CHANGE: 0

## 2023-05-15 NOTE — PROGRESS NOTES
[facility] - Initial Consult Note  Today's Date: 5/15/2023    Impression :   Y[address]el [address] visit from October 2022 followed by discharge to [facility]  MSSA septicemia 9/24/22 with a left severe hip pain  Kle[place] UTI 9/24 - treated  Altered mentation, acute metabolic encephalopathy resolved  septic shock resolved   SIRS from infection  has low back pain for the sciatica that are old, 3to 11years old, since 2017-they have not changed and hence most likely not infected  Chronic left hip pain - Chronic lower back pain with bilateral sciatica  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 - -and new Left post thigh laceration that develppoed an abscess / left trochanteric abscess   12/5/22 abscess resolved, and still has a deep wound that is addressed at the wound care - still deep and w some slough   keep wound care  12/5/22-Dr. Mimi Landers planning to operate on the left infected hip only after the left posterior thigh wound completely heals to avoid cross infections  AB unasyn then amcef then zosyn them meropenem till 11/21/22 stopped and left NH - crp was 14, WBc NL  12/5/2022, left very painful -start the patient on a long chronic suppressive therapy with keflex 500 mg po 3  x per day and until the left hip surgery occurred  FU CRP and CBC every 4 weeks  12/5 lower back pain and sciatica are stable still and hence likely not infected area  5/15/23 -back to work, uses a walker, left posterior thigh wound continues to be open sees wound care [facility], CRP improved but not resolved, last was March 2023, was 19.      Rp    Prediabetes  COPD  Obstructive sleep apnea  A. fib paroxysmal  9/29 increasing oxygen requirement and

## 2023-05-17 LAB
MICROORGANISM SPEC CULT: ABNORMAL
MICROORGANISM SPEC CULT: ABNORMAL
MICROORGANISM/AGENT SPEC: ABNORMAL
SPECIMEN DESCRIPTION: ABNORMAL

## 2023-05-18 ENCOUNTER — HOSPITAL ENCOUNTER (OUTPATIENT)
Dept: WOUND CARE | Age: 73
Discharge: HOME | End: 2023-05-18
Payer: COMMERCIAL

## 2023-05-18 VITALS
HEIGHT: 66 IN | DIASTOLIC BLOOD PRESSURE: 75 MMHG | SYSTOLIC BLOOD PRESSURE: 125 MMHG | TEMPERATURE: 97.2 F | WEIGHT: 214 LBS | HEART RATE: 71 BPM | RESPIRATION RATE: 18 BRPM | BODY MASS INDEX: 34.39 KG/M2

## 2023-05-18 DIAGNOSIS — L97.122 CHRONIC ULCER OF LEFT THIGH WITH FAT LAYER EXPOSED (HCC): Primary | ICD-10-CM

## 2023-05-18 DIAGNOSIS — A41.01 MSSA (METHICILLIN SUSCEPTIBLE STAPHYLOCOCCUS AUREUS) SEPTICEMIA (HCC): ICD-10-CM

## 2023-05-18 PROCEDURE — 15271 SKIN SUB GRAFT TRNK/ARM/LEG: CPT

## 2023-05-18 RX ORDER — LIDOCAINE HYDROCHLORIDE 20 MG/ML
JELLY TOPICAL ONCE
OUTPATIENT
Start: 2023-05-18 | End: 2023-05-18

## 2023-05-18 RX ORDER — LIDOCAINE HYDROCHLORIDE 40 MG/ML
SOLUTION TOPICAL ONCE
OUTPATIENT
Start: 2023-05-18 | End: 2023-05-18

## 2023-05-18 RX ORDER — CLOBETASOL PROPIONATE 0.5 MG/G
OINTMENT TOPICAL ONCE
OUTPATIENT
Start: 2023-05-18 | End: 2023-05-18

## 2023-05-18 RX ORDER — BETAMETHASONE DIPROPIONATE 0.05 %
OINTMENT (GRAM) TOPICAL ONCE
OUTPATIENT
Start: 2023-05-18 | End: 2023-05-18

## 2023-05-18 RX ORDER — GINSENG 100 MG
CAPSULE ORAL ONCE
OUTPATIENT
Start: 2023-05-18 | End: 2023-05-18

## 2023-05-18 RX ORDER — LIDOCAINE 40 MG/G
CREAM TOPICAL ONCE
OUTPATIENT
Start: 2023-05-18 | End: 2023-05-18

## 2023-05-18 RX ORDER — BACITRACIN, NEOMYCIN, POLYMYXIN B 400; 3.5; 5 [USP'U]/G; MG/G; [USP'U]/G
OINTMENT TOPICAL ONCE
OUTPATIENT
Start: 2023-05-18 | End: 2023-05-18

## 2023-05-18 RX ORDER — GENTAMICIN SULFATE 1 MG/G
OINTMENT TOPICAL ONCE
OUTPATIENT
Start: 2023-05-18 | End: 2023-05-18

## 2023-05-18 RX ORDER — LIDOCAINE 50 MG/G
OINTMENT TOPICAL ONCE
OUTPATIENT
Start: 2023-05-18 | End: 2023-05-18

## 2023-05-18 RX ORDER — BACITRACIN ZINC AND POLYMYXIN B SULFATE 500; 1000 [USP'U]/G; [USP'U]/G
OINTMENT TOPICAL ONCE
OUTPATIENT
Start: 2023-05-18 | End: 2023-05-18

## 2023-05-18 RX ORDER — LIDOCAINE HYDROCHLORIDE 20 MG/ML
JELLY TOPICAL ONCE
Status: COMPLETED | OUTPATIENT
Start: 2023-05-18 | End: 2023-05-18

## 2023-05-18 RX ADMIN — LIDOCAINE HYDROCHLORIDE 6 ML: 20 JELLY TOPICAL at 08:14

## 2023-05-18 ASSESSMENT — PAIN DESCRIPTION - ORIENTATION: ORIENTATION: LEFT

## 2023-05-18 ASSESSMENT — PAIN SCALES - GENERAL: PAINLEVEL_OUTOF10: 5

## 2023-05-18 ASSESSMENT — PAIN DESCRIPTION - DESCRIPTORS: DESCRIPTORS: GNAWING

## 2023-05-18 ASSESSMENT — PAIN DESCRIPTION - LOCATION: LOCATION: LEG

## 2023-05-18 NOTE — PROGRESS NOTES
EpiFix Treatment Note    NAME:  Peyton Joyner OF BIRTH:  1950  MEDICAL RECORD NUMBER:  [id number]  DATE:  5/18/2023    Goal:  Patient will receive safe and proper application of skin substitute. Patient will comply with caring for dressing, offloading and reporting complications. [x]Expiration date checked immediately prior to use  [x] Package intact prior to use and no damage noted  [x] Transport temperature controlled and acceptable(ROOM AIR)     [x]  EpiFix was removed from protective sterile packaging by physician and applied to prepared ulcer bed. [x] EpiFix was placed using embossment letting as a guide. (UP)    [x] Epifix was hydrated with sterile normal saline per physician(IF NEEDED)        [x] EpiFix was applied to location Left Posterior Thigh and affixed with steri-strips by the physician. [x] EpiFix was covered with non-adherent ulcer dressing per physician  [x] Applied Silvercel over non-adherent. [x] Applied dry gauze and/or roll gauze. [x] Patient/caregiver was instructed not to remove dressing and to keep it clean and dry. [x] Pt/family/caregiver was instructed on signs and symptoms of complications to report such as draining through dressing, dressing falling down/slipping, getting wet,or  severe   pain and tingling in toes  [x] Pt/family/caregiver was instructed on need for offloading and elevation of affected extremity and on use of prescribed offloading device. [x] Record info in tissue log( sticker with patient label). Picture epifix sticker with patient label for that specific date in . Please add epifix application number to both stickers. [x]  Guidelines followed  [x] EpiFix may be applied a total of 10 times per wound over a 12 week period. Additionally EpiFix may only be used every 12 months per wound. Date of first application of EpiFix for this current wound is May 4, 2023.         Electronically signed by Chip Vazquez
Yes    Pain Control: Anesthetic  Anesthetic: 2% Lidocaine Gel Topical       Debridement:Excisional Debridement    Using curette the wound(s)/ulcer(s) was/were sharply debrided down through and including the removal of subcutaneous tissue. Devitalized Tissue Debrided:  fibrin, biofilm, and slough    Pre Debridement Measurements:  Are located in the [place]  Documentation Flow Sheet    Wound/Ulcer #: 1    Post Debridement Measurements:  Wound/Ulcer Descriptions are Pre Debridement except measurements:        Wound 12/08/22 Thigh Left;Posterior #1 (Active)   Wound Image   03/02/23 1326   Wound Etiology Other 05/18/23 0808   Dressing Status New drainage noted; Old drainage noted 05/18/23 0808   Wound Cleansed Irrigated with saline 05/18/23 0808   Dressing/Treatment Negative pressure wound therapy 03/02/23 1326   Wound Length (cm) 1 cm 05/18/23 0808   Wound Width (cm) 1.2 cm 05/18/23 0808   Wound Depth (cm) 1.6 cm 05/18/23 0808   Wound Surface Area (cm^2) 1.2 cm^2 05/18/23 0808   Change in Wound Size % (l*w) 90.48 05/18/23 0808   Wound Volume (cm^3) 1.92 cm^3 05/18/23 0808   Wound Healing % 96 05/18/23 0808   Post-Procedure Length (cm) 1 cm 05/18/23 0808   Post-Procedure Width (cm) 1.2 cm 05/18/23 0808   Post-Procedure Depth (cm) 1.6 cm 05/18/23 0808   Post-Procedure Surface Area (cm^2) 1.2 cm^2 05/18/23 0808   Post-Procedure Volume (cm^3) 1.92 cm^3 05/18/23 0808   Distance Tunneling (cm) 4.3 cm 03/16/23 1428   Tunneling Position ___ O'Clock 9 03/16/23 1428   Undermining Starts ___ O'Clock 7 02/16/23 1348   Undermining Ends___ O'Clock 9 02/16/23 1348   Undermining Maxium Distance (cm) [email or web address] 02/16/23 1348   Wound Assessment Pink/red;Slough 05/18/23 0808   Drainage Amount Moderate 05/18/23 0808   Drainage Description Serosanguinous 05/18/23 0808   Odor Moderate 05/18/23 0808   Jess-wound Assessment Excoriated 05/18/23 0808   Margins Defined edges 05/18/23 0808   Wound Thickness Description not for Pressure Injury

## 2023-05-18 NOTE — DISCHARGE INSTRUCTIONS
[address] [facility],[address] -Phone: [phone] Fax: [phone]    Visit  Discharge Instructions / Physician Orders     DATE: 5/18/2023     Home Care: [address] ORDERED THRU: [employer]      Wound Location: Left Posterior Thigh     Cleanse with: Keep Dry and Intact     Dressing Orders: Epifix #3 (14mm Disc), Calcium Alginate, Steri Strips (do not remove steri strips or anything below), Silicone Border Bandage (Excel SAP preferred)     Frequency: Change Daily or Every Other Day              Additional Orders: Increase protein to diet (meat, cheese, eggs, fish, peanut butter, nuts and beans)  Multivitamin daily     Your next appointment with [facility] is in 1 week with Nieves Stevens NP     (Please note your next appointment above and if you are unable to keep, kindly give a 24 hour notice. Thank you.)  If more than 15 min late we cannot guarantee you will be seen due to clinician schedule  Per Policy, Excessive cancellation will call for dismissal from program.     If you experience any of the following, please call the [facility] [employer] during business hours:  [phone]  Your Phone call may be forwarded to [employer] during business hours that [facility] is closed. * Increase in Pain  * Temperature over 101  * Increase in drainage from your wound  * Drainage with a foul odor  * Bleeding  * Increase in swelling  * Need for compression bandage changes due to slippage, breakthrough drainage. If you need medical attention outside of the business hours of the [facility] [employer] please contact your PCP or go to the nearest emergency room. The information contained in the After Visit Summary has been reviewed with me, the patient and/or responsible adult, by my health care provider(s). I had the opportunity to ask questions regarding this information.  I have elected to receive;      []After Visit Summary  [x]Comprehensive Discharge Instruction        Patient

## 2023-05-18 NOTE — PLAN OF CARE
Problem: Discharge Planning  Goal: Discharge to home or other facility with appropriate resources  Outcome: Progressing     Problem: Pain  Goal: Verbalizes/displays adequate comfort level or baseline comfort level  Outcome: Progressing     Problem: ABCDS Injury Assessment  Goal: Absence of physical injury  Outcome: Progressing     Problem: Wound:  Goal: Will show signs of wound healing; wound closure and no evidence of infection  Description: Will show signs of wound healing; wound closure and no evidence of infection  Outcome: Progressing     Problem: Falls - Risk of:  Goal: Will remain free from falls  Description: Will remain free from falls  Outcome: Progressing

## 2023-05-24 ENCOUNTER — HOSPITAL ENCOUNTER (OUTPATIENT)
Dept: WOUND CARE | Age: 73
Discharge: HOME | End: 2023-05-24
Payer: COMMERCIAL

## 2023-05-24 VITALS
WEIGHT: 214 LBS | HEART RATE: 60 BPM | BODY MASS INDEX: 34.54 KG/M2 | SYSTOLIC BLOOD PRESSURE: 110 MMHG | TEMPERATURE: 97.4 F | RESPIRATION RATE: 16 BRPM | DIASTOLIC BLOOD PRESSURE: 59 MMHG

## 2023-05-24 DIAGNOSIS — A41.01 MSSA (METHICILLIN SUSCEPTIBLE STAPHYLOCOCCUS AUREUS) SEPTICEMIA (HCC): ICD-10-CM

## 2023-05-24 DIAGNOSIS — L97.122 CHRONIC ULCER OF LEFT THIGH WITH FAT LAYER EXPOSED (HCC): Primary | ICD-10-CM

## 2023-05-24 PROCEDURE — 15271 SKIN SUB GRAFT TRNK/ARM/LEG: CPT

## 2023-05-24 PROCEDURE — 15271 SKIN SUB GRAFT TRNK/ARM/LEG: CPT | Performed by: STUDENT IN AN ORGANIZED HEALTH CARE EDUCATION/TRAINING PROGRAM

## 2023-05-24 RX ORDER — LIDOCAINE HYDROCHLORIDE 20 MG/ML
JELLY TOPICAL ONCE
OUTPATIENT
Start: 2023-05-24 | End: 2023-05-24

## 2023-05-24 RX ORDER — GINSENG 100 MG
CAPSULE ORAL ONCE
OUTPATIENT
Start: 2023-05-24 | End: 2023-05-24

## 2023-05-24 RX ORDER — LIDOCAINE HYDROCHLORIDE 20 MG/ML
JELLY TOPICAL ONCE
Status: COMPLETED | OUTPATIENT
Start: 2023-05-24 | End: 2023-05-24

## 2023-05-24 RX ORDER — LIDOCAINE HYDROCHLORIDE 40 MG/ML
SOLUTION TOPICAL ONCE
OUTPATIENT
Start: 2023-05-24 | End: 2023-05-24

## 2023-05-24 RX ORDER — LIDOCAINE 40 MG/G
CREAM TOPICAL ONCE
OUTPATIENT
Start: 2023-05-24 | End: 2023-05-24

## 2023-05-24 RX ORDER — LIDOCAINE 50 MG/G
OINTMENT TOPICAL ONCE
OUTPATIENT
Start: 2023-05-24 | End: 2023-05-24

## 2023-05-24 RX ORDER — BACITRACIN ZINC AND POLYMYXIN B SULFATE 500; 1000 [USP'U]/G; [USP'U]/G
OINTMENT TOPICAL ONCE
OUTPATIENT
Start: 2023-05-24 | End: 2023-05-24

## 2023-05-24 RX ORDER — CLOBETASOL PROPIONATE 0.5 MG/G
OINTMENT TOPICAL ONCE
OUTPATIENT
Start: 2023-05-24 | End: 2023-05-24

## 2023-05-24 RX ORDER — GENTAMICIN SULFATE 1 MG/G
OINTMENT TOPICAL ONCE
OUTPATIENT
Start: 2023-05-24 | End: 2023-05-24

## 2023-05-24 RX ORDER — BACITRACIN, NEOMYCIN, POLYMYXIN B 400; 3.5; 5 [USP'U]/G; MG/G; [USP'U]/G
OINTMENT TOPICAL ONCE
OUTPATIENT
Start: 2023-05-24 | End: 2023-05-24

## 2023-05-24 RX ORDER — BETAMETHASONE DIPROPIONATE 0.05 %
OINTMENT (GRAM) TOPICAL ONCE
OUTPATIENT
Start: 2023-05-24 | End: 2023-05-24

## 2023-05-24 RX ADMIN — LIDOCAINE HYDROCHLORIDE 6 ML: 20 JELLY TOPICAL at 13:03

## 2023-05-24 ASSESSMENT — PAIN DESCRIPTION - ORIENTATION: ORIENTATION: LEFT

## 2023-05-24 ASSESSMENT — ENCOUNTER SYMPTOMS
ABDOMINAL DISTENTION: 0
ABDOMINAL PAIN: 0
TROUBLE SWALLOWING: 0
COLOR CHANGE: 0
CHEST TIGHTNESS: 0
VOICE CHANGE: 0
EYE PAIN: 0
COUGH: 0
VOMITING: 0

## 2023-05-24 ASSESSMENT — PAIN DESCRIPTION - LOCATION: LOCATION: LEG

## 2023-05-24 ASSESSMENT — PAIN SCALES - GENERAL: PAINLEVEL_OUTOF10: 7

## 2023-05-24 ASSESSMENT — PAIN DESCRIPTION - DESCRIPTORS: DESCRIPTORS: BURNING

## 2023-05-24 NOTE — PROGRESS NOTES
[address]   Progress Note and Procedure Note      Sagrario Barfield  MEDICAL RECORD NUMBER:  [id number]  AGE: 71 y.o. GENDER: female  : 1950  EPISODE DATE:  2023    Subjective:     Chief Complaint   Patient presents with    Wound Check     buttocks         HISTORY of PRESENT ILLNESS HPI    Sagrario Barfield is a 71 y.o. female who presents today for wound/ulcer evaluation. History of Wound Context: The patient is here for follow-up on left posterior thigh wound with no purulent drainage or surrounding redness. The patient has been on oral Keflex for suspected infected left hip, followed by Dr. Lu Manley. Wound culture on 5/15/2023 grew Proteus mirabilis that was pansensitive. She is complaining of mild to moderate pain, denied fever or chills, no nausea or vomiting, no other complaints.         PAST MEDICAL HISTORY        Diagnosis Date    A-fib [facility])     Acute respiratory failure with hypoxia (HCC) 2022    Aspiration pneumonia (HCC) 2022    Chronic back pain     Chronic hip pain     Chronic ulcer of left thigh ([facility] 2022    COPD (chronic obstructive pulmonary disease) ([facility]     Major depression     MSSA (methicillin susceptible Staphylococcus aureus) septicemia ([facility] 2022    MSSA bacteremia 2022    Osteoarthritis     Osteoporosis     Pneumonia of left lower lobe due to infectious organism 10/01/2022    Respiratory alkalosis 2022    Septicemia ([address] 2022    SIRS (systemic inflammatory response syndrome) ([address] 2022    UTI due to Klebsiella species 2022       PAST SURGICAL HISTORY    Past Surgical History:   Procedure Laterality Date    BREAST BIOPSY      two    CHOLECYSTECTOMY      HYSTERECTOMY, VAGINAL         FAMILY HISTORY    Family History   Problem Relation Age of Onset    Stroke Mother     Alzheimer's Disease Father        SOCIAL HISTORY    Social History     Tobacco Use    Smoking status: Former     Packs/day:

## 2023-05-24 NOTE — DISCHARGE INSTRUCTIONS
[address] [facility],[address] -Phone: [phone] Fax: [phone]    Visit  Discharge Instructions / Physician Orders     DATE: 5/24/2023     Home Care: [address] ORDERED THRU: [employer]      Wound Location: Left Posterior Thigh     Cleanse with: Keep Dry and Intact     Dressing Orders: Epifix #4 (14mm Disc), Silvercel, Steri Strips (do not remove steri strips or anything below), Silicone Border Bandage (Excel SAP preferred)     Frequency: Change Daily or Every Other Day              Additional Orders: Increase protein to diet (meat, cheese, eggs, fish, peanut butter, nuts and beans)  Multivitamin daily     [facility] week with Anita Jackson NP     (Please note your next appointment above and if you are unable to keep, kindly give a 24 hour notice. Thank you.)  If more than 15 min late we cannot guarantee you will be seen due to clinician schedule  Per Policy, Excessive cancellation will call for dismissal from program.     If you experience any of the following, please call the [facility] during business hours:  [phone]  Your Phone call may be forwarded to [employer] during business hours that [facility] is closed. * Increase in Pain  * Temperature over 101  * Increase in drainage from your wound  * Drainage with a foul odor  * Bleeding  * Increase in swelling  * Need for compression bandage changes due to slippage, breakthrough drainage. If you need medical attention outside of the business hours of the [address] [place] [address] please contact your PCP or go to the nearest emergency room. The information contained in the After Visit Summary has been reviewed with me, the patient and/or responsible adult, by my health care provider(s). I had the opportunity to ask questions regarding this information.  I have elected to receive;      []After Visit Summary  [x]Comprehensive Discharge Instruction        Patient

## 2023-06-01 ENCOUNTER — HOSPITAL ENCOUNTER (OUTPATIENT)
Dept: WOUND CARE | Age: 73
Discharge: HOME | End: 2023-06-01
Payer: COMMERCIAL

## 2023-06-01 VITALS
TEMPERATURE: 97.6 F | WEIGHT: 214 LBS | HEART RATE: 71 BPM | DIASTOLIC BLOOD PRESSURE: 57 MMHG | HEIGHT: 66 IN | SYSTOLIC BLOOD PRESSURE: 127 MMHG | BODY MASS INDEX: 34.39 KG/M2

## 2023-06-01 DIAGNOSIS — A41.01 MSSA (METHICILLIN SUSCEPTIBLE STAPHYLOCOCCUS AUREUS) SEPTICEMIA (HCC): ICD-10-CM

## 2023-06-01 DIAGNOSIS — L97.122 CHRONIC ULCER OF LEFT THIGH WITH FAT LAYER EXPOSED (HCC): Primary | ICD-10-CM

## 2023-06-01 PROCEDURE — 15271 SKIN SUB GRAFT TRNK/ARM/LEG: CPT

## 2023-06-01 RX ORDER — LIDOCAINE 40 MG/G
CREAM TOPICAL ONCE
OUTPATIENT
Start: 2023-06-01 | End: 2023-06-01

## 2023-06-01 RX ORDER — LIDOCAINE HYDROCHLORIDE 20 MG/ML
JELLY TOPICAL ONCE
OUTPATIENT
Start: 2023-06-01 | End: 2023-06-01

## 2023-06-01 RX ORDER — GENTAMICIN SULFATE 1 MG/G
OINTMENT TOPICAL ONCE
OUTPATIENT
Start: 2023-06-01 | End: 2023-06-01

## 2023-06-01 RX ORDER — LIDOCAINE HYDROCHLORIDE 20 MG/ML
JELLY TOPICAL ONCE
Status: COMPLETED | OUTPATIENT
Start: 2023-06-01 | End: 2023-06-01

## 2023-06-01 RX ORDER — BACITRACIN ZINC AND POLYMYXIN B SULFATE 500; 1000 [USP'U]/G; [USP'U]/G
OINTMENT TOPICAL ONCE
OUTPATIENT
Start: 2023-06-01 | End: 2023-06-01

## 2023-06-01 RX ORDER — BACITRACIN, NEOMYCIN, POLYMYXIN B 400; 3.5; 5 [USP'U]/G; MG/G; [USP'U]/G
OINTMENT TOPICAL ONCE
OUTPATIENT
Start: 2023-06-01 | End: 2023-06-01

## 2023-06-01 RX ORDER — BETAMETHASONE DIPROPIONATE 0.05 %
OINTMENT (GRAM) TOPICAL ONCE
OUTPATIENT
Start: 2023-06-01 | End: 2023-06-01

## 2023-06-01 RX ORDER — LIDOCAINE HYDROCHLORIDE 40 MG/ML
SOLUTION TOPICAL ONCE
OUTPATIENT
Start: 2023-06-01 | End: 2023-06-01

## 2023-06-01 RX ORDER — GINSENG 100 MG
CAPSULE ORAL ONCE
OUTPATIENT
Start: 2023-06-01 | End: 2023-06-01

## 2023-06-01 RX ORDER — CLOBETASOL PROPIONATE 0.5 MG/G
OINTMENT TOPICAL ONCE
OUTPATIENT
Start: 2023-06-01 | End: 2023-06-01

## 2023-06-01 RX ORDER — LIDOCAINE 50 MG/G
OINTMENT TOPICAL ONCE
OUTPATIENT
Start: 2023-06-01 | End: 2023-06-01

## 2023-06-01 RX ADMIN — LIDOCAINE HYDROCHLORIDE 6 ML: 20 JELLY TOPICAL at 13:40

## 2023-06-01 NOTE — PROGRESS NOTES
23 1334   Wound Healing % 99 23 1334   Post-Procedure Length (cm) 0.5 cm 23 1334   Post-Procedure Width (cm) 1 cm 23 1334   Post-Procedure Depth (cm) 1 cm 23 1334   Post-Procedure Surface Area (cm^2) 0.5 cm^2 23 1334   Post-Procedure Volume (cm^3) 0.5 cm^3 23 1334   Distance Tunneling (cm) 4.3 cm 23 1428   Tunneling Position ___ O'Clock 9 23 1428   Undermining Starts ___ O'Clock 7 23 1348   Undermining Ends___ O'Clock 9 23 1348   Undermining Maxium Distance (cm) [email or web address] 23 1348   Wound Assessment Pink/red;Slough 23 1334   Drainage Amount Moderate 23 1334   Drainage Description Serosanguinous 23 1334   Odor Moderate 23 1334   Jess-wound Assessment Excoriated 23 1334   Margins Defined edges 23 1334   Wound Thickness Description not for Pressure Injury Full thickness 23 1334   Number of days: 174          Percent of Wound(s)/Ulcer(s) Debrided: 100%    Total Surface Area Debrided:  0.5 sq cm     Diabetic/Pressure/Non Pressure Ulcers only:  Ulcer: Non-Pressure ulcer, fat layer exposed      Estimated Blood Loss:  Minimal    Hemostasis Achieved:  by pressure    Procedural Pain:  3  / 10     Post Procedural Pain:  0 / 10     Response to treatment:  Well tolerated by patient. Skin Substitute Graft Procedure Note      Flavio Cortes  AGE: 71 y.o. GENDER: female  : 1950  TODAY'S DATE:  2023     Pre-procedural Diagnosis:   1. Chronic ulcer of left thigh with fat layer exposed ([address]    2. MSSA (methicillin susceptible Staphylococcus aureus) septicemia ([facility])      Post-procedural Diagnosis: same  Wound Type:undetermined  Pre-procedure measurements:    Wound 22 Thigh Left;Posterior #1 (Active)   Wound Image   23 1326   Wound Etiology Other 23 1334   Dressing Status New drainage noted; Old drainage noted 23 1338   Wound Cleansed Irrigated with saline 23 9529

## 2023-06-01 NOTE — DISCHARGE INSTRUCTIONS
[address] [facility],[address] -Phone: [phone] Fax: [phone]    Visit  Discharge Instructions / Physician Orders     DATE: 6/1/2023     Home Care: [address] ORDERED THRU: [employer]      Wound Location: Left Posterior Thigh     Cleanse with: Keep Dry and Intact     Dressing Orders: Epifix #5 (14mm Disc), Calcium Alginate, Steri Strips (do not remove steri strips or anything below), Silicone Border Bandage (Excel SAP preferred)     Frequency: Change Daily or Every Other Day              Additional Orders: Increase protein to diet (meat, cheese, eggs, fish, peanut butter, nuts and beans)  Multivitamin daily     Your next appointment with [facility] is in 1 week with Colin Capone NP     (Please note your next appointment above and if you are unable to keep, kindly give a 24 hour notice. Thank you.)  If more than 15 min late we cannot guarantee you will be seen due to clinician schedule  Per Policy, Excessive cancellation will call for dismissal from program.     If you experience any of the following, please call the [facility] [employer] during business hours:  [phone]  Your Phone call may be forwarded to [employer] during business hours that [facility] is closed. * Increase in Pain  * Temperature over 101  * Increase in drainage from your wound  * Drainage with a foul odor  * Bleeding  * Increase in swelling  * Need for compression bandage changes due to slippage, breakthrough drainage. If you need medical attention outside of the business hours of the [facility] please contact your PCP or go to the nearest emergency room. The information contained in the After Visit Summary has been reviewed with me, the patient and/or responsible adult, by my health care provider(s). I had the opportunity to ask questions regarding this information.  I have elected to receive;      []After Visit Summary  [x]Comprehensive Discharge Instruction        Patient

## 2023-06-07 ENCOUNTER — HOSPITAL ENCOUNTER (OUTPATIENT)
Dept: WOUND CARE | Age: 73
Discharge: HOME | End: 2023-06-07
Payer: COMMERCIAL

## 2023-06-07 VITALS
BODY MASS INDEX: 34.39 KG/M2 | SYSTOLIC BLOOD PRESSURE: 119 MMHG | DIASTOLIC BLOOD PRESSURE: 63 MMHG | HEART RATE: 66 BPM | TEMPERATURE: 97.7 F | RESPIRATION RATE: 18 BRPM | HEIGHT: 66 IN | WEIGHT: 214 LBS

## 2023-06-07 DIAGNOSIS — A41.01 MSSA (METHICILLIN SUSCEPTIBLE STAPHYLOCOCCUS AUREUS) SEPTICEMIA (HCC): ICD-10-CM

## 2023-06-07 DIAGNOSIS — L97.122 CHRONIC ULCER OF LEFT THIGH WITH FAT LAYER EXPOSED (HCC): Primary | ICD-10-CM

## 2023-06-07 PROCEDURE — 15271 SKIN SUB GRAFT TRNK/ARM/LEG: CPT

## 2023-06-07 RX ORDER — LIDOCAINE 50 MG/G
OINTMENT TOPICAL ONCE
OUTPATIENT
Start: 2023-06-07 | End: 2023-06-07

## 2023-06-07 RX ORDER — BACITRACIN ZINC AND POLYMYXIN B SULFATE 500; 1000 [USP'U]/G; [USP'U]/G
OINTMENT TOPICAL ONCE
OUTPATIENT
Start: 2023-06-07 | End: 2023-06-07

## 2023-06-07 RX ORDER — GENTAMICIN SULFATE 1 MG/G
OINTMENT TOPICAL ONCE
OUTPATIENT
Start: 2023-06-07 | End: 2023-06-07

## 2023-06-07 RX ORDER — LIDOCAINE HYDROCHLORIDE 20 MG/ML
JELLY TOPICAL ONCE
OUTPATIENT
Start: 2023-06-07 | End: 2023-06-07

## 2023-06-07 RX ORDER — IBUPROFEN 200 MG
TABLET ORAL ONCE
OUTPATIENT
Start: 2023-06-07 | End: 2023-06-07

## 2023-06-07 RX ORDER — LIDOCAINE HYDROCHLORIDE 40 MG/ML
SOLUTION TOPICAL ONCE
OUTPATIENT
Start: 2023-06-07 | End: 2023-06-07

## 2023-06-07 RX ORDER — LIDOCAINE 40 MG/G
CREAM TOPICAL ONCE
OUTPATIENT
Start: 2023-06-07 | End: 2023-06-07

## 2023-06-07 RX ORDER — GINSENG 100 MG
CAPSULE ORAL ONCE
OUTPATIENT
Start: 2023-06-07 | End: 2023-06-07

## 2023-06-07 RX ORDER — CLOBETASOL PROPIONATE 0.5 MG/G
OINTMENT TOPICAL ONCE
OUTPATIENT
Start: 2023-06-07 | End: 2023-06-07

## 2023-06-07 RX ORDER — LIDOCAINE HYDROCHLORIDE 20 MG/ML
JELLY TOPICAL ONCE
Status: COMPLETED | OUTPATIENT
Start: 2023-06-07 | End: 2023-06-07

## 2023-06-07 RX ORDER — BETAMETHASONE DIPROPIONATE 0.05 %
OINTMENT (GRAM) TOPICAL ONCE
OUTPATIENT
Start: 2023-06-07 | End: 2023-06-07

## 2023-06-07 RX ADMIN — LIDOCAINE HYDROCHLORIDE 6 ML: 20 JELLY TOPICAL at 13:33

## 2023-06-07 ASSESSMENT — ENCOUNTER SYMPTOMS
VOMITING: 0
SHORTNESS OF BREATH: 0
NAUSEA: 0
RHINORRHEA: 0
DIARRHEA: 0
COUGH: 0

## 2023-06-07 NOTE — PROGRESS NOTES
Lilia Mon RN on 6/7/2023 at 2:02 PM
applied to the wound, none was discarded. The graft was fixated into place with calcium alginat, steri-strips, gentac. Anesthesia: Topical     Bleeding: Minimal    Hemostasis:   by pressure    Response to treatment:  Well tolerated by patient. Patient was given post-procedural instruction not to remove the dressings until returning to the clinic. Instructed to call if any questions. Follow up: One week for evaluation of skin grafts and dressing change    Electronically signed by MITESH Delgado CNP on 6/7/2023 at 1:58 PM    Plan:     Treatment Note please see Discharge Instructions    Written patient dismissal instructions given to patient and signed by patient or POA.            Electronically signed by MITESH Delgado CNP on 6/7/2023 at 1:58 PM

## 2023-06-22 ENCOUNTER — HOSPITAL ENCOUNTER (OUTPATIENT)
Dept: WOUND CARE | Age: 73
Discharge: HOME | End: 2023-06-22
Payer: COMMERCIAL

## 2023-06-22 VITALS
BODY MASS INDEX: 34.39 KG/M2 | HEIGHT: 66 IN | DIASTOLIC BLOOD PRESSURE: 76 MMHG | TEMPERATURE: 97.7 F | RESPIRATION RATE: 18 BRPM | WEIGHT: 214 LBS | HEART RATE: 66 BPM | SYSTOLIC BLOOD PRESSURE: 136 MMHG

## 2023-06-22 DIAGNOSIS — L97.122 CHRONIC ULCER OF LEFT THIGH WITH FAT LAYER EXPOSED (HCC): Primary | ICD-10-CM

## 2023-06-22 DIAGNOSIS — A41.01 MSSA (METHICILLIN SUSCEPTIBLE STAPHYLOCOCCUS AUREUS) SEPTICEMIA (HCC): ICD-10-CM

## 2023-06-22 PROCEDURE — C5271 LOW COST SKIN SUBSTITUTE APP: HCPCS

## 2023-06-22 RX ORDER — IBUPROFEN 200 MG
TABLET ORAL ONCE
OUTPATIENT
Start: 2023-06-22 | End: 2023-06-22

## 2023-06-22 RX ORDER — BACITRACIN ZINC AND POLYMYXIN B SULFATE 500; 1000 [USP'U]/G; [USP'U]/G
OINTMENT TOPICAL ONCE
OUTPATIENT
Start: 2023-06-22 | End: 2023-06-22

## 2023-06-22 RX ORDER — BETAMETHASONE DIPROPIONATE 0.05 %
OINTMENT (GRAM) TOPICAL ONCE
OUTPATIENT
Start: 2023-06-22 | End: 2023-06-22

## 2023-06-22 RX ORDER — GINSENG 100 MG
CAPSULE ORAL ONCE
OUTPATIENT
Start: 2023-06-22 | End: 2023-06-22

## 2023-06-22 RX ORDER — CLOBETASOL PROPIONATE 0.5 MG/G
OINTMENT TOPICAL ONCE
OUTPATIENT
Start: 2023-06-22 | End: 2023-06-22

## 2023-06-22 RX ORDER — LIDOCAINE 40 MG/G
CREAM TOPICAL ONCE
OUTPATIENT
Start: 2023-06-22 | End: 2023-06-22

## 2023-06-22 RX ORDER — LIDOCAINE HYDROCHLORIDE 20 MG/ML
JELLY TOPICAL ONCE
OUTPATIENT
Start: 2023-06-22 | End: 2023-06-22

## 2023-06-22 RX ORDER — GENTAMICIN SULFATE 1 MG/G
OINTMENT TOPICAL ONCE
OUTPATIENT
Start: 2023-06-22 | End: 2023-06-22

## 2023-06-22 RX ORDER — LIDOCAINE HYDROCHLORIDE 40 MG/ML
SOLUTION TOPICAL ONCE
OUTPATIENT
Start: 2023-06-22 | End: 2023-06-22

## 2023-06-22 RX ORDER — LIDOCAINE 50 MG/G
OINTMENT TOPICAL ONCE
OUTPATIENT
Start: 2023-06-22 | End: 2023-06-22

## 2023-06-22 RX ORDER — LIDOCAINE HYDROCHLORIDE 20 MG/ML
JELLY TOPICAL ONCE
Status: COMPLETED | OUTPATIENT
Start: 2023-06-22 | End: 2023-06-22

## 2023-06-22 RX ADMIN — LIDOCAINE HYDROCHLORIDE 6 ML: 20 JELLY TOPICAL at 13:32

## 2023-06-22 ASSESSMENT — PAIN SCALES - GENERAL: PAINLEVEL_OUTOF10: 8

## 2023-06-22 NOTE — PROGRESS NOTES
EpiFix Treatment Note     NAME:  Dolan Bence OF BIRTH:  1950  MEDICAL RECORD NUMBER:  [id number]  DATE:  6/15/2023     Goal:  Patient will receive safe and proper application of skin substitute. Patient will comply with caring for dressing, offloading and reporting complications. [x]Expiration date checked immediately prior to use  [x] Package intact prior to use and no damage noted  [x] Transport temperature controlled and acceptable(ROOM AIR)     [x]  EpiFix was removed from protective sterile packaging by physician and applied to prepared ulcer bed. [x] EpiFix was placed using embossment letting as a guide. (UP)    [x] Epifix was hydrated with sterile normal saline per physician(IF NEEDED)        [x] EpiFix was applied to location Left Posterior Thigh and affixed with steri-strips by the physician. [x] EpiFix was covered with non-adherent ulcer dressing per physician  [x] Applied Calcium Alginate over non-adherent. [x] Applied dry gauze and/or roll gauze. [x] Patient/caregiver was instructed not to remove dressing and to keep it clean and dry. [x] Pt/family/caregiver was instructed on signs and symptoms of complications to report such as draining through dressing, dressing falling down/slipping, getting wet,or  severe   pain and tingling in toes  [x] Pt/family/caregiver was instructed on need for offloading and elevation of affected extremity and on use of prescribed offloading device. [x] Record info in tissue log( sticker with patient label). Picture epifix sticker with patient label for that specific date in . Please add epifix application number to both stickers. [x]  Guidelines followed  [x] EpiFix may be applied a total of 10 times per wound over a 12 week period. Additionally EpiFix may only be used every 12 months per wound. Date of first application of EpiFix for this current wound is May 4, 2023.       Electronically signed by
saline. Next, the entire 100% skin substitute - Epifix was applied to the wound, none was discarded. The graft was fixated into place with nonadherent layer, steri-strips, alginate, gentac. Anesthesia: Topical     Bleeding: Minimal    Hemostasis:   by pressure    Response to treatment:  Well tolerated by patient. Patient was given post-procedural instruction not to remove the dressings until returning to the clinic. Instructed to call if any questions. Follow up: One week for evaluation of skin grafts and dressing change        Electronically signed by MITESH Ceron CNP on 6/22/2023 at 2:01 PM                   Plan:     -Epfix #8 applied today     -RTC one week to continue serial skin sub application/wound debridement.    -I have reviewed instructions with the patient, answering all questions to satisfaction.    -Patient to call or return to clinic as needed if wound symptoms worsen or fail to improve as anticipated.    -See Discharge Instructions for wound management orders. Written patient dismissal instructions given to patient and signed by patient or POA.            Electronically signed by MITESH Ceron CNP on 6/22/2023 at 1:57 PM

## 2023-06-26 ENCOUNTER — TELEPHONE (OUTPATIENT)
Dept: INFECTIOUS DISEASES | Age: 73
End: 2023-06-26

## 2023-06-26 ENCOUNTER — HOSPITAL ENCOUNTER (OUTPATIENT)
Age: 73
Setting detail: SPECIMEN
Discharge: HOME | End: 2023-06-26

## 2023-06-26 DIAGNOSIS — M00.051 STAPHYLOCOCCAL ARTHRITIS OF RIGHT HIP (HCC): ICD-10-CM

## 2023-06-26 DIAGNOSIS — L02.416 ABSCESS OF LEFT THIGH: ICD-10-CM

## 2023-06-26 LAB
BASOPHILS # BLD: 0.07 K/UL (ref 0–0.2)
BASOPHILS NFR BLD: 1 % (ref 0–2)
CRP SERPL HS-MCNC: 8.1 MG/L (ref 0–5)
EOSINOPHIL # BLD: 0.39 K/UL (ref 0–0.44)
EOSINOPHILS RELATIVE PERCENT: 5 % (ref 1–4)
ERYTHROCYTE [DISTWIDTH] IN BLOOD BY AUTOMATED COUNT: 15.1 % (ref 11.8–14.4)
HCT VFR BLD AUTO: 38.8 % (ref 36.3–47.1)
HGB BLD-MCNC: 11.5 G/DL (ref 11.9–15.1)
IMM GRANULOCYTES # BLD AUTO: 0.04 K/UL (ref 0–0.3)
IMM GRANULOCYTES NFR BLD: 1 %
LYMPHOCYTES # BLD: 28 % (ref 24–43)
LYMPHOCYTES NFR BLD: 2.43 K/UL (ref 1.1–3.7)
MCH RBC QN AUTO: 27 PG (ref 25.2–33.5)
MCHC RBC AUTO-ENTMCNC: 29.6 G/DL (ref 28.4–34.8)
MCV RBC AUTO: 91.1 FL (ref 82.6–102.9)
MONOCYTES NFR BLD: 0.54 K/UL (ref 0.1–1.2)
MONOCYTES NFR BLD: 6 % (ref 3–12)
NEUTROPHILS NFR BLD: 59 % (ref 36–65)
NEUTS SEG NFR BLD: 5.11 K/UL (ref 1.5–8.1)
NRBC BLD-RTO: 0 PER 100 WBC
PLATELET # BLD AUTO: 274 K/UL (ref 138–453)
PMV BLD AUTO: 11 FL (ref 8.1–13.5)
RBC # BLD AUTO: 4.26 M/UL (ref 3.95–5.11)
RBC # BLD: ABNORMAL 10*6/UL
WBC OTHER # BLD: 8.6 K/UL (ref 3.5–11.3)

## 2023-06-29 ENCOUNTER — HOSPITAL ENCOUNTER (OUTPATIENT)
Dept: WOUND CARE | Age: 73
Discharge: HOME | End: 2023-06-29
Payer: COMMERCIAL

## 2023-06-29 VITALS
HEART RATE: 68 BPM | DIASTOLIC BLOOD PRESSURE: 65 MMHG | HEIGHT: 66 IN | RESPIRATION RATE: 18 BRPM | SYSTOLIC BLOOD PRESSURE: 136 MMHG | TEMPERATURE: 97.8 F | WEIGHT: 214 LBS | BODY MASS INDEX: 34.39 KG/M2

## 2023-06-29 DIAGNOSIS — A41.01 MSSA (METHICILLIN SUSCEPTIBLE STAPHYLOCOCCUS AUREUS) SEPTICEMIA (HCC): ICD-10-CM

## 2023-06-29 DIAGNOSIS — L97.122 CHRONIC ULCER OF LEFT THIGH WITH FAT LAYER EXPOSED (HCC): Primary | ICD-10-CM

## 2023-06-29 PROCEDURE — 15271 SKIN SUB GRAFT TRNK/ARM/LEG: CPT

## 2023-06-29 RX ORDER — IBUPROFEN 200 MG
TABLET ORAL ONCE
OUTPATIENT
Start: 2023-06-29 | End: 2023-06-29

## 2023-06-29 RX ORDER — LIDOCAINE HYDROCHLORIDE 20 MG/ML
JELLY TOPICAL ONCE
OUTPATIENT
Start: 2023-06-29 | End: 2023-06-29

## 2023-06-29 RX ORDER — BACITRACIN ZINC AND POLYMYXIN B SULFATE 500; 1000 [USP'U]/G; [USP'U]/G
OINTMENT TOPICAL ONCE
OUTPATIENT
Start: 2023-06-29 | End: 2023-06-29

## 2023-06-29 RX ORDER — LIDOCAINE HYDROCHLORIDE 40 MG/ML
SOLUTION TOPICAL ONCE
OUTPATIENT
Start: 2023-06-29 | End: 2023-06-29

## 2023-06-29 RX ORDER — LIDOCAINE 50 MG/G
OINTMENT TOPICAL ONCE
OUTPATIENT
Start: 2023-06-29 | End: 2023-06-29

## 2023-06-29 RX ORDER — GINSENG 100 MG
CAPSULE ORAL ONCE
OUTPATIENT
Start: 2023-06-29 | End: 2023-06-29

## 2023-06-29 RX ORDER — LIDOCAINE HYDROCHLORIDE 20 MG/ML
JELLY TOPICAL ONCE
Status: COMPLETED | OUTPATIENT
Start: 2023-06-29 | End: 2023-06-29

## 2023-06-29 RX ORDER — LIDOCAINE 40 MG/G
CREAM TOPICAL ONCE
OUTPATIENT
Start: 2023-06-29 | End: 2023-06-29

## 2023-06-29 RX ORDER — CLOBETASOL PROPIONATE 0.5 MG/G
OINTMENT TOPICAL ONCE
OUTPATIENT
Start: 2023-06-29 | End: 2023-06-29

## 2023-06-29 RX ORDER — GENTAMICIN SULFATE 1 MG/G
OINTMENT TOPICAL ONCE
OUTPATIENT
Start: 2023-06-29 | End: 2023-06-29

## 2023-06-29 RX ORDER — BETAMETHASONE DIPROPIONATE 0.05 %
OINTMENT (GRAM) TOPICAL ONCE
OUTPATIENT
Start: 2023-06-29 | End: 2023-06-29

## 2023-06-29 RX ADMIN — LIDOCAINE HYDROCHLORIDE 6 ML: 20 JELLY TOPICAL at 13:35

## 2023-06-29 ASSESSMENT — PAIN SCALES - GENERAL: PAINLEVEL_OUTOF10: 5

## 2023-06-29 ASSESSMENT — PAIN DESCRIPTION - LOCATION: LOCATION: HIP

## 2023-07-06 ENCOUNTER — HOSPITAL ENCOUNTER (OUTPATIENT)
Dept: WOUND CARE | Age: 73
Discharge: HOME | End: 2023-07-06
Payer: COMMERCIAL

## 2023-07-06 VITALS
DIASTOLIC BLOOD PRESSURE: 61 MMHG | RESPIRATION RATE: 18 BRPM | SYSTOLIC BLOOD PRESSURE: 119 MMHG | TEMPERATURE: 98.3 F | BODY MASS INDEX: 34.39 KG/M2 | WEIGHT: 214 LBS | HEART RATE: 69 BPM | HEIGHT: 66 IN

## 2023-07-06 DIAGNOSIS — L97.122 CHRONIC ULCER OF LEFT THIGH WITH FAT LAYER EXPOSED (HCC): Primary | ICD-10-CM

## 2023-07-06 DIAGNOSIS — A41.01 MSSA (METHICILLIN SUSCEPTIBLE STAPHYLOCOCCUS AUREUS) SEPTICEMIA (HCC): ICD-10-CM

## 2023-07-06 PROCEDURE — 99213 OFFICE O/P EST LOW 20 MIN: CPT

## 2023-07-06 PROCEDURE — 99212 OFFICE O/P EST SF 10 MIN: CPT

## 2023-07-06 RX ORDER — LIDOCAINE HYDROCHLORIDE 20 MG/ML
JELLY TOPICAL ONCE
OUTPATIENT
Start: 2023-07-06 | End: 2023-07-06

## 2023-07-06 RX ORDER — GINSENG 100 MG
CAPSULE ORAL ONCE
OUTPATIENT
Start: 2023-07-06 | End: 2023-07-06

## 2023-07-06 RX ORDER — LIDOCAINE 50 MG/G
OINTMENT TOPICAL ONCE
OUTPATIENT
Start: 2023-07-06 | End: 2023-07-06

## 2023-07-06 RX ORDER — LIDOCAINE HYDROCHLORIDE 20 MG/ML
JELLY TOPICAL ONCE
Status: COMPLETED | OUTPATIENT
Start: 2023-07-06 | End: 2023-07-06

## 2023-07-06 RX ORDER — BACITRACIN ZINC AND POLYMYXIN B SULFATE 500; 1000 [USP'U]/G; [USP'U]/G
OINTMENT TOPICAL ONCE
OUTPATIENT
Start: 2023-07-06 | End: 2023-07-06

## 2023-07-06 RX ORDER — CLOBETASOL PROPIONATE 0.5 MG/G
OINTMENT TOPICAL ONCE
OUTPATIENT
Start: 2023-07-06 | End: 2023-07-06

## 2023-07-06 RX ORDER — BETAMETHASONE DIPROPIONATE 0.05 %
OINTMENT (GRAM) TOPICAL ONCE
OUTPATIENT
Start: 2023-07-06 | End: 2023-07-06

## 2023-07-06 RX ORDER — LIDOCAINE 40 MG/G
CREAM TOPICAL ONCE
OUTPATIENT
Start: 2023-07-06 | End: 2023-07-06

## 2023-07-06 RX ORDER — GENTAMICIN SULFATE 1 MG/G
OINTMENT TOPICAL ONCE
OUTPATIENT
Start: 2023-07-06 | End: 2023-07-06

## 2023-07-06 RX ORDER — IBUPROFEN 200 MG
TABLET ORAL ONCE
OUTPATIENT
Start: 2023-07-06 | End: 2023-07-06

## 2023-07-06 RX ORDER — LIDOCAINE HYDROCHLORIDE 40 MG/ML
SOLUTION TOPICAL ONCE
OUTPATIENT
Start: 2023-07-06 | End: 2023-07-06

## 2023-07-06 RX ADMIN — LIDOCAINE HYDROCHLORIDE 6 ML: 20 JELLY TOPICAL at 13:29

## 2023-07-06 ASSESSMENT — PAIN SCALES - GENERAL: PAINLEVEL_OUTOF10: 0

## 2023-07-06 NOTE — PROGRESS NOTES
Mother     Alzheimer's Disease Father        SOCIAL HISTORY    Social History     Tobacco Use    Smoking status: Former     Packs/day: 0.75     Years: 34.00     Pack years: 25.50     Types: Cigarettes     Quit date: 3/20/2017     Years since quittin.2     Passive exposure: Past    Smokeless tobacco: Never   Vaping Use    Vaping Use: Never used   Substance Use Topics    Alcohol use: Not Currently     Comment: occasionally     Drug use: Never       ALLERGIES    Allergies   Allergen Reactions    Lyrica [Pregabalin] Hives    Demerol Hcl [Meperidine]     Fluoxetine     Paxil [Paroxetine Hcl]     Diclofenac-Misoprostol Rash    Lyrica Cr [Pregabalin Er] Rash       MEDICATIONS    Current Outpatient Medications on File Prior to Encounter   Medication Sig Dispense Refill    traZODone (DESYREL) 50 MG tablet Take 1 tablet by mouth nightly as needed      metoprolol tartrate (LOPRESSOR) 25 MG tablet Take 1 tablet by mouth 2 times daily      cephALEXin (KEFLEX) 500 MG capsule TAKE 1 CAPSULE BY MOUTH FOUR TIMES DAILY (Patient not taking: Reported on 2023)      Handicap Placard MISC by Does not apply route Expires 2028 1 each 0    apixaban (ELIQUIS) 5 MG TABS tablet Take 1 tablet by mouth 2 times daily 42 tablet 0    ferrous sulfate (IRON 325) 325 (65 Fe) MG tablet Take 1 tablet by mouth daily (with breakfast) (Patient not taking: Reported on 6/15/2023) 90 tablet 1    desvenlafaxine succinate (PRISTIQ) 100 MG TB24 extended release tablet Take 1 tablet by mouth daily 90 tablet 1    SYMBICORT 160-4.5 MCG/ACT AERO Inhale 2 puffs into the lungs 2 times daily 10.2 g 5    tiotropium (SPIRIVA HANDIHALER) 18 MCG inhalation capsule Inhale 1 capsule into the lungs daily 90 capsule 1    montelukast (SINGULAIR) 10 MG tablet Take 1 tablet by mouth nightly 90 tablet 1    promethazine (PHENERGAN) 25 MG tablet Take 1 tablet by mouth every 6 hours as needed (as needed for nausea) 60 tablet 1    albuterol sulfate HFA

## 2023-07-10 ENCOUNTER — TELEPHONE (OUTPATIENT)
Dept: FAMILY MEDICINE CLINIC | Age: 73
End: 2023-07-10

## 2023-07-10 NOTE — TELEPHONE ENCOUNTER
Alison Reynolds from [facility] home health called to see if Dr. Bradley West will continue to follow pt for wound care. Alison Reynolds states that this will be a renewal. I verbalized Dr. Saul Amador will continue to follow.

## 2023-07-10 NOTE — DISCHARGE INSTRUCTIONS
[address] -Phone: [phone] Fax: [phone]    Visit  Discharge Instructions / Physician Orders     DATE: 7/13/2023     Home Care: Riccardo Gifford ORDERED THRU: [employer]      Wound Location: Left Posterior Thigh     Cleanse with: Keep Dry and Intact     Dressing Orders:  EpiFix #10, Versatel, Calcium Alginate, Steri-Strips,Silicone Border Bandage (Excel SAP preferred)     Frequency: Change Daily or Every Other Day              Additional Orders: Increase protein to diet (meat, cheese, eggs, fish, peanut butter, nuts and beans)  Multivitamin daily     Your next appointment with [facility] is 2 weeks     (Please note your next appointment above and if you are unable to keep, kindly give a 24 hour notice. Thank you.)  If more than 15 min late we cannot guarantee you will be seen due to clinician schedule  Per Policy, Excessive cancellation will call for dismissal from program.     If you experience any of the following, please call the [facility] during business hours:  [phone]  Your Phone call may be forwarded to [facility] during business hours that [facility] is closed. * Increase in Pain  * Temperature over 101  * Increase in drainage from your wound  * Drainage with a foul odor  * Bleeding  * Increase in swelling  * Need for compression bandage changes due to slippage, breakthrough drainage. If you need medical attention outside of the business hours of the [facility] please contact your PCP or go to the nearest emergency room. The information contained in the After Visit Summary has been reviewed with me, the patient and/or responsible adult, by my health care provider(s). I had the opportunity to ask questions regarding this information.  I have elected to receive;      []After Visit Summary  [x]Comprehensive Discharge Instruction        Patient

## 2023-07-13 ENCOUNTER — HOSPITAL ENCOUNTER (OUTPATIENT)
Dept: WOUND CARE | Age: 73
Discharge: HOME | End: 2023-07-13
Payer: COMMERCIAL

## 2023-07-13 VITALS
HEART RATE: 62 BPM | WEIGHT: 214 LBS | DIASTOLIC BLOOD PRESSURE: 51 MMHG | TEMPERATURE: 98.1 F | BODY MASS INDEX: 34.39 KG/M2 | HEIGHT: 66 IN | RESPIRATION RATE: 18 BRPM | SYSTOLIC BLOOD PRESSURE: 124 MMHG

## 2023-07-13 DIAGNOSIS — A41.01 MSSA (METHICILLIN SUSCEPTIBLE STAPHYLOCOCCUS AUREUS) SEPTICEMIA (HCC): ICD-10-CM

## 2023-07-13 DIAGNOSIS — L97.122 CHRONIC ULCER OF LEFT THIGH WITH FAT LAYER EXPOSED (HCC): Primary | ICD-10-CM

## 2023-07-13 PROCEDURE — C5271 LOW COST SKIN SUBSTITUTE APP: HCPCS

## 2023-07-13 RX ORDER — LIDOCAINE HYDROCHLORIDE 20 MG/ML
JELLY TOPICAL ONCE
OUTPATIENT
Start: 2023-07-13 | End: 2023-07-13

## 2023-07-13 RX ORDER — GINSENG 100 MG
CAPSULE ORAL ONCE
OUTPATIENT
Start: 2023-07-13 | End: 2023-07-13

## 2023-07-13 RX ORDER — LIDOCAINE 40 MG/G
CREAM TOPICAL ONCE
OUTPATIENT
Start: 2023-07-13 | End: 2023-07-13

## 2023-07-13 RX ORDER — IBUPROFEN 200 MG
TABLET ORAL ONCE
OUTPATIENT
Start: 2023-07-13 | End: 2023-07-13

## 2023-07-13 RX ORDER — GENTAMICIN SULFATE 1 MG/G
OINTMENT TOPICAL ONCE
OUTPATIENT
Start: 2023-07-13 | End: 2023-07-13

## 2023-07-13 RX ORDER — BACITRACIN ZINC AND POLYMYXIN B SULFATE 500; 1000 [USP'U]/G; [USP'U]/G
OINTMENT TOPICAL ONCE
OUTPATIENT
Start: 2023-07-13 | End: 2023-07-13

## 2023-07-13 RX ORDER — BETAMETHASONE DIPROPIONATE 0.05 %
OINTMENT (GRAM) TOPICAL ONCE
OUTPATIENT
Start: 2023-07-13 | End: 2023-07-13

## 2023-07-13 RX ORDER — LIDOCAINE 50 MG/G
OINTMENT TOPICAL ONCE
OUTPATIENT
Start: 2023-07-13 | End: 2023-07-13

## 2023-07-13 RX ORDER — CLOBETASOL PROPIONATE 0.5 MG/G
OINTMENT TOPICAL ONCE
OUTPATIENT
Start: 2023-07-13 | End: 2023-07-13

## 2023-07-13 RX ORDER — LIDOCAINE HYDROCHLORIDE 20 MG/ML
JELLY TOPICAL ONCE
Status: COMPLETED | OUTPATIENT
Start: 2023-07-13 | End: 2023-07-13

## 2023-07-13 RX ORDER — LIDOCAINE HYDROCHLORIDE 40 MG/ML
SOLUTION TOPICAL ONCE
OUTPATIENT
Start: 2023-07-13 | End: 2023-07-13

## 2023-07-13 RX ADMIN — LIDOCAINE HYDROCHLORIDE 6 ML: 20 JELLY TOPICAL at 13:25

## 2023-07-13 ASSESSMENT — PAIN SCALES - GENERAL: PAINLEVEL_OUTOF10: 0

## 2023-07-13 NOTE — PROGRESS NOTES
EpiFix Treatment Note     NAME:  Bing Plan OF BIRTH:  1950  MEDICAL RECORD NUMBER:  [id number]  DATE:  7/13/2023     Goal:  Patient will receive safe and proper application of skin substitute. Patient will comply with caring for dressing, offloading and reporting complications. [x]Expiration date checked immediately prior to use  [x] Package intact prior to use and no damage noted  [x] Transport temperature controlled and acceptable(ROOM AIR)     [x]  EpiFix was removed from protective sterile packaging by physician and applied to prepared ulcer bed. [x] EpiFix was placed using embossment letting as a guide. (UP)    [x] Epifix was hydrated with sterile normal saline per physician(IF NEEDED)        [x] EpiFix was applied to location Left Posterior Thigh and affixed with steri-strips by the physician. [x] EpiFix was covered with non-adherent ulcer dressing per physician  [x] Applied Calcium Alginate over non-adherent. [x] Applied dry gauze and/or roll gauze. [x] Patient/caregiver was instructed not to remove dressing and to keep it clean and dry. [x] Pt/family/caregiver was instructed on signs and symptoms of complications to report such as draining through dressing, dressing falling down/slipping, getting wet,or  severe   pain and tingling in toes  [x] Pt/family/caregiver was instructed on need for offloading and elevation of affected extremity and on use of prescribed offloading device. [x] Record info in tissue log( sticker with patient label). Picture epifix sticker with patient label for that specific date in . Please add epifix application number to both stickers. [x]  Guidelines followed  [x] EpiFix may be applied a total of 10 times per wound over a 12 week period. Additionally EpiFix may only be used every 12 months per wound. Date of first application of EpiFix for this current wound is May 4, 2023.

## 2023-07-13 NOTE — PROGRESS NOTES
[facility]   Progress Note and Procedure Note      Staci Sanchez  MEDICAL RECORD NUMBER:  [id number]  AGE: 71 y.o. GENDER: female  : 1950  EPISODE DATE:  2023    Subjective:     Chief Complaint   Patient presents with    Wound Check     Left thigh         HISTORY of PRESENT ILLNESS HPI     Staci Sanchez is a 71 y.o. female who presents today for wound/ulcer evaluation. History of Wound Context: left posterior thigh ulcer of unknown etiology, chronic     Interval history: Continuing to do well with the Epifix and calcium alginate. The patient states that he homecare nurse pulled out a piece of black wound vac foam from the wound bed this week Wound vac therapy has been discontinued for months.     Wound/Ulcer Pain Timing/Severity: intermittent  Quality of pain: aching  Severity:  1/ 10   Modifying Factors: None, Pain worsens with walking, and Pain is relieved/improved with rest  Associated Signs/Symptoms: erythema and drainage     Wound/Ulcer Identification:  Ulcer Type: traumatic  Contributing Factors: edema, chronic pressure, decreased mobility, shear force, and obesity          PAST MEDICAL HISTORY        Diagnosis Date    A-fib (HCC)     Acute respiratory failure with hypoxia (HCC) 2022    Aspiration pneumonia ([address]) 2022    Chronic back pain     Chronic hip pain     Chronic ulcer of left thigh ([address]) 2022    COPD (chronic obstructive pulmonary disease) ([address])     Major depression     MSSA (methicillin susceptible Staphylococcus aureus) septicemia ([address]) 2022    MSSA bacteremia 2022    Osteoarthritis     Osteoporosis     Pneumonia of left lower lobe due to infectious organism 10/01/2022    Respiratory alkalosis 2022    Septicemia ([address]) 2022    SIRS (systemic inflammatory response syndrome) ([address]) 2022    UTI due to Klebsiella species 2022       PAST SURGICAL HISTORY    Past Surgical History:   Procedure

## 2023-07-27 ENCOUNTER — HOSPITAL ENCOUNTER (OUTPATIENT)
Dept: WOUND CARE | Age: 73
Discharge: HOME | End: 2023-07-27
Payer: COMMERCIAL

## 2023-07-27 VITALS
HEART RATE: 64 BPM | WEIGHT: 214 LBS | TEMPERATURE: 97.8 F | SYSTOLIC BLOOD PRESSURE: 133 MMHG | BODY MASS INDEX: 34.54 KG/M2 | DIASTOLIC BLOOD PRESSURE: 71 MMHG | RESPIRATION RATE: 16 BRPM

## 2023-07-27 DIAGNOSIS — L97.122 CHRONIC ULCER OF LEFT THIGH WITH FAT LAYER EXPOSED (HCC): Primary | ICD-10-CM

## 2023-07-27 DIAGNOSIS — A41.01 MSSA (METHICILLIN SUSCEPTIBLE STAPHYLOCOCCUS AUREUS) SEPTICEMIA (HCC): ICD-10-CM

## 2023-07-27 PROCEDURE — 11042 DBRDMT SUBQ TIS 1ST 20SQCM/<: CPT

## 2023-07-27 RX ORDER — LIDOCAINE 40 MG/G
CREAM TOPICAL ONCE
OUTPATIENT
Start: 2023-07-27 | End: 2023-07-27

## 2023-07-27 RX ORDER — GENTAMICIN SULFATE 1 MG/G
OINTMENT TOPICAL ONCE
OUTPATIENT
Start: 2023-07-27 | End: 2023-07-27

## 2023-07-27 RX ORDER — LIDOCAINE 50 MG/G
OINTMENT TOPICAL ONCE
OUTPATIENT
Start: 2023-07-27 | End: 2023-07-27

## 2023-07-27 RX ORDER — BETAMETHASONE DIPROPIONATE 0.05 %
OINTMENT (GRAM) TOPICAL ONCE
OUTPATIENT
Start: 2023-07-27 | End: 2023-07-27

## 2023-07-27 RX ORDER — BACITRACIN ZINC AND POLYMYXIN B SULFATE 500; 1000 [USP'U]/G; [USP'U]/G
OINTMENT TOPICAL ONCE
OUTPATIENT
Start: 2023-07-27 | End: 2023-07-27

## 2023-07-27 RX ORDER — CLOBETASOL PROPIONATE 0.5 MG/G
OINTMENT TOPICAL ONCE
OUTPATIENT
Start: 2023-07-27 | End: 2023-07-27

## 2023-07-27 RX ORDER — LIDOCAINE HYDROCHLORIDE 20 MG/ML
JELLY TOPICAL ONCE
Status: COMPLETED | OUTPATIENT
Start: 2023-07-27 | End: 2023-07-27

## 2023-07-27 RX ORDER — LIDOCAINE HYDROCHLORIDE 20 MG/ML
JELLY TOPICAL ONCE
OUTPATIENT
Start: 2023-07-27 | End: 2023-07-27

## 2023-07-27 RX ORDER — GINSENG 100 MG
CAPSULE ORAL ONCE
OUTPATIENT
Start: 2023-07-27 | End: 2023-07-27

## 2023-07-27 RX ORDER — IBUPROFEN 200 MG
TABLET ORAL ONCE
OUTPATIENT
Start: 2023-07-27 | End: 2023-07-27

## 2023-07-27 RX ORDER — LIDOCAINE HYDROCHLORIDE 40 MG/ML
SOLUTION TOPICAL ONCE
OUTPATIENT
Start: 2023-07-27 | End: 2023-07-27

## 2023-07-27 RX ADMIN — LIDOCAINE HYDROCHLORIDE 6 ML: 20 JELLY TOPICAL at 13:31

## 2023-07-27 ASSESSMENT — PAIN DESCRIPTION - LOCATION: LOCATION: HIP

## 2023-07-27 ASSESSMENT — PAIN DESCRIPTION - ORIENTATION: ORIENTATION: LEFT

## 2023-07-27 NOTE — DISCHARGE INSTRUCTIONS
[address] -Phone: [phone] Fax: [phone]    Visit  Discharge Instructions / Physician Orders     DATE: 7/27/2023     Home Care: Riccardo Gifford ORDERED THRU: [employer]      Wound Location: Left Posterior Thigh     Cleanse with: Soap and Water- Allow Skin to dry around wound     Dressing Orders: Skin Prep around wound, Collagen (Fibracol) into wound, Silicone Border Bandage (Excel SAP preferred)     Frequency: Change Every Other Day              Additional Orders: Increase protein to diet (meat, cheese, eggs, fish, peanut butter, nuts and beans)  Multivitamin daily     Your next appointment with [address] [place] [address] is 2 weeks with Deborah Branch NP     (Please note your next appointment above and if you are unable to keep, kindly give a 24 hour notice. Thank you.)  If more than 15 min late we cannot guarantee you will be seen due to clinician schedule  Per Policy, Excessive cancellation will call for dismissal from program.     If you experience any of the following, please call the [address] [place] [address] during business hours:  [phone]  Your Phone call may be forwarded to Jung Montemayor during business hours that [address] [place] [address] is closed. * Increase in Pain  * Temperature over 101  * Increase in drainage from your wound  * Drainage with a foul odor  * Bleeding  * Increase in swelling  * Need for compression bandage changes due to slippage, breakthrough drainage. If you need medical attention outside of the business hours of the [address] [place] [address] please contact your PCP or go to the nearest emergency room. The information contained in the After Visit Summary has been reviewed with me, the patient and/or responsible adult, by my health care provider(s). I had the opportunity to ask questions regarding this information.  I have elected to receive;      []After Visit Summary  [x]Comprehensive Discharge Instruction        Patient

## 2023-07-27 NOTE — PROGRESS NOTES
Post-Procedure Length (cm) 1 cm 07/27/23 1320   Post-Procedure Width (cm) 0.7 cm 07/27/23 1320   Post-Procedure Depth (cm) 1.3 cm 07/27/23 1320   Post-Procedure Surface Area (cm^2) 0.7 cm^2 07/27/23 1320   Post-Procedure Volume (cm^3) 0.91 cm^3 07/27/23 1320   Distance Tunneling (cm) 4.3 cm 03/16/23 1428   Tunneling Position ___ O'Clock 9 03/16/23 1428   Undermining Starts ___ O'Clock 7 02/16/23 1348   Undermining Ends___ O'Clock 9 02/16/23 1348   Undermining Maxium Distance (cm) [email or web address]) 02/16/23 1348   Wound Assessment Pink/red 07/27/23 1320   Drainage Amount Moderate 07/27/23 1320   Drainage Description Serosanguinous 07/27/23 1320   Odor Moderate 07/27/23 1320   Jess-wound Assessment Blanchable erythema 07/27/23 1320   Margins Defined edges 07/27/23 1320   Wound Thickness Description not for Pressure Injury Full thickness 07/27/23 1320   Number of days: 230          Percent of Wound(s)/Ulcer(s) Debrided: 100%    Total Surface Area Debrided:  0.7 sq cm     Diabetic/Pressure/Non Pressure Ulcers only:  Ulcer: Non-Pressure ulcer, fat layer exposed      Estimated Blood Loss:  Minimal    Hemostasis Achieved:  by pressure    Procedural Pain:  0  / 10     Post Procedural Pain:  0 / 10     Response to treatment:  Well tolerated by patient. Plan:     -Begin using collagen, skin prep for surrounding skin    -No signs of infection    -RTC 2 weeks    -I have reviewed instructions with the patient, answering all questions to satisfaction.    -Patient to call or return to clinic as needed if wound symptoms worsen or fail to improve as anticipated.    -See Discharge Instructions for wound management orders. Written patient dismissal instructions given to patient and signed by patient or POA.            Electronically signed by MITESH Kapadia CNP on 7/27/2023 at 2:02 PM

## 2023-07-28 DIAGNOSIS — J44.9 CHRONIC OBSTRUCTIVE PULMONARY DISEASE, UNSPECIFIED COPD TYPE (HCC): ICD-10-CM

## 2023-07-28 DIAGNOSIS — F43.21 SITUATIONAL DEPRESSION: ICD-10-CM

## 2023-07-31 ENCOUNTER — OFFICE VISIT (OUTPATIENT)
Dept: FAMILY MEDICINE CLINIC | Age: 73
End: 2023-07-31
Payer: COMMERCIAL

## 2023-07-31 VITALS
DIASTOLIC BLOOD PRESSURE: 72 MMHG | SYSTOLIC BLOOD PRESSURE: 120 MMHG | OXYGEN SATURATION: 93 % | HEART RATE: 64 BPM | BODY MASS INDEX: 35.38 KG/M2 | WEIGHT: 219.2 LBS | TEMPERATURE: 98.1 F

## 2023-07-31 DIAGNOSIS — E66.01 CLASS 2 SEVERE OBESITY DUE TO EXCESS CALORIES WITH SERIOUS COMORBIDITY AND BODY MASS INDEX (BMI) OF 35.0 TO 35.9 IN ADULT (HCC): Primary | ICD-10-CM

## 2023-07-31 DIAGNOSIS — I48.0 PAROXYSMAL ATRIAL FIBRILLATION (HCC): ICD-10-CM

## 2023-07-31 DIAGNOSIS — J30.2 SEASONAL ALLERGIC RHINITIS, UNSPECIFIED TRIGGER: ICD-10-CM

## 2023-07-31 DIAGNOSIS — R73.03 PREDIABETES: ICD-10-CM

## 2023-07-31 DIAGNOSIS — R11.0 NAUSEA: ICD-10-CM

## 2023-07-31 DIAGNOSIS — J44.9 CHRONIC OBSTRUCTIVE PULMONARY DISEASE, UNSPECIFIED COPD TYPE (HCC): ICD-10-CM

## 2023-07-31 DIAGNOSIS — M15.9 PRIMARY OSTEOARTHRITIS INVOLVING MULTIPLE JOINTS: ICD-10-CM

## 2023-07-31 DIAGNOSIS — L97.122 CHRONIC ULCER OF LEFT THIGH WITH FAT LAYER EXPOSED (HCC): ICD-10-CM

## 2023-07-31 PROCEDURE — 99214 OFFICE O/P EST MOD 30 MIN: CPT | Performed by: INTERNAL MEDICINE

## 2023-07-31 PROCEDURE — 1123F ACP DISCUSS/DSCN MKR DOCD: CPT | Performed by: INTERNAL MEDICINE

## 2023-07-31 RX ORDER — TIOTROPIUM BROMIDE 18 UG/1
18 CAPSULE ORAL; RESPIRATORY (INHALATION) DAILY
Qty: 90 CAPSULE | Refills: 1 | Status: SHIPPED | OUTPATIENT
Start: 2023-07-31

## 2023-07-31 RX ORDER — TIZANIDINE 4 MG/1
4 TABLET ORAL EVERY 8 HOURS PRN
Qty: 270 TABLET | Refills: 1 | Status: SHIPPED | OUTPATIENT
Start: 2023-07-31

## 2023-07-31 RX ORDER — BUDESONIDE AND FORMOTEROL FUMARATE DIHYDRATE 160; 4.5 UG/1; UG/1
AEROSOL RESPIRATORY (INHALATION)
Qty: 3 EACH | Refills: 1 | Status: SHIPPED | OUTPATIENT
Start: 2023-07-31

## 2023-07-31 RX ORDER — ALBUTEROL SULFATE 90 UG/1
AEROSOL, METERED RESPIRATORY (INHALATION)
Qty: 3 EACH | Refills: 1 | Status: SHIPPED | OUTPATIENT
Start: 2023-07-31

## 2023-07-31 RX ORDER — PROMETHAZINE HYDROCHLORIDE 25 MG/1
25 TABLET ORAL EVERY 6 HOURS PRN
Qty: 60 TABLET | Refills: 1 | Status: SHIPPED | OUTPATIENT
Start: 2023-07-31

## 2023-07-31 RX ORDER — DESVENLAFAXINE 100 MG/1
TABLET, EXTENDED RELEASE ORAL
Qty: 90 TABLET | Refills: 1 | Status: SHIPPED | OUTPATIENT
Start: 2023-07-31

## 2023-07-31 RX ORDER — MONTELUKAST SODIUM 10 MG/1
10 TABLET ORAL NIGHTLY
Qty: 90 TABLET | Refills: 1 | Status: SHIPPED | OUTPATIENT
Start: 2023-07-31

## 2023-07-31 ASSESSMENT — ENCOUNTER SYMPTOMS
ABDOMINAL PAIN: 0
CHOKING: 0
CHEST TIGHTNESS: 0
WHEEZING: 0
ANAL BLEEDING: 0
BLOOD IN STOOL: 0
NAUSEA: 0
CONSTIPATION: 0
DIARRHEA: 0
VOMITING: 0
SHORTNESS OF BREATH: 0
COUGH: 0

## 2023-07-31 ASSESSMENT — VISUAL ACUITY: OU: 1

## 2023-07-31 NOTE — TELEPHONE ENCOUNTER
Last visit: 02/22/23  Last Med refill: 06/28/23  Does patient have enough medication for 72 hours: Yes    Next Visit Date:  Future Appointments   Date Time Provider [address]   7/31/2023  3:15 PM Norma Sanchez MD [place] [facility]   8/7/2023  3:00 PM Romilda Nissen, MD AFL [facility] TOLE AFL [facility] C   8/10/2023  1:45 PM Daniele Castillo, APRN - CNP [facility] [place] [address]   8/23/2023  2:45 PM Trang Salinas MD INFT [address] Maintenance   Topic Date Due    DTaP/Tdap/Td vaccine (1 - Tdap) Never done    Breast cancer screen  01/05/2023    Flu vaccine (1) 08/01/2023    A1C test (Diabetic or Prediabetic)  12/01/2023    Depression Monitoring  02/22/2024    Colorectal Cancer Screen  02/22/2024    Lipids  12/01/2027    DEXA (modify frequency per FRAX score)  Completed    Shingles vaccine  Completed    Pneumococcal 65+ years Vaccine  Completed    COVID-19 Vaccine  Completed    Hepatitis C screen  Completed    Hepatitis A vaccine  Aged Out    Hib vaccine  Aged Out    Meningococcal (ACWY) vaccine  Aged Out    Low dose CT lung screening &/or counseling  Discontinued       Hemoglobin A1C (%)   Date Value   12/01/2022 5.4   06/02/2022 5.7   05/17/2021 5.7             ( goal A1C is < 7)   No components found for: LABMICR  LDL Cholesterol (mg/dL)   Date Value   12/01/2022 66   05/17/2021 81       (goal LDL is <100)   AST (U/L)   Date Value   12/01/2022 19     ALT (U/L)   Date Value   12/01/2022 12     BUN (mg/dL)   Date Value   12/01/2022 15     BP Readings from Last 3 Encounters:   07/27/23 133/71   07/13/23 (!) 124/51   07/06/23 119/61          (goal 120/80)    All Future Testing planned in [facility]  Lab Frequency Next Occurrence   C-Reactive Protein Once 12/05/2022   CBC with Auto Differential Once 12/05/2022   C-Reactive Protein Once 01/04/2023   CBC with Auto Differential Once 01/04/2023   HEDY Digital Screen Bilateral Once 02/22/2023   DEXA BONE DENSITY 2 SITES Once 05/08/2023   C-Reactive Protein

## 2023-07-31 NOTE — PROGRESS NOTES
[facility]  [facility] Kirt Segovia [address]  [address] [address]  Dept: [phone]  Dept Fax: [phone]      Taisha Paige is a 71 y.o. female who presents today for hermedical conditions/complaints as noted below. Taisha Paige is c/o of COPD (F/u)        Assessment/Plan:     1. Class 2 severe obesity due to excess calories with serious comorbidity and body mass index (BMI) of 35.0 to 35.9 in adult [facility])  2. Paroxysmal atrial fibrillation (HCC)  -     apixaban (ELIQUIS) 5 MG TABS tablet; Take 1 tablet by mouth 2 times daily, Disp-42 tablet, R-0Lot:  [id number] Exp:  09/2024Normal  3. Chronic obstructive pulmonary disease, unspecified COPD type ([address])  -     tiotropium (SPIRIVA HANDIHALER) 18 MCG inhalation capsule; Inhale 1 capsule into the lungs daily, Disp-90 capsule, R-1Normal  4. Seasonal allergic rhinitis, unspecified trigger  -     montelukast (SINGULAIR) 10 MG tablet; Take 1 tablet by mouth nightly, Disp-90 tablet, R-1Normal  5. Nausea  -     promethazine (PHENERGAN) 25 MG tablet; Take 1 tablet by mouth every 6 hours as needed (as needed for nausea), Disp-60 tablet, R-1Normal  6. Primary osteoarthritis involving multiple joints  -     tiZANidine (ZANAFLEX) 4 MG tablet; Take 1 tablet by mouth every 8 hours as needed (muscle spasm), Disp-270 tablet, R-1Normal  7. Chronic ulcer of left thigh with fat layer exposed ([address])  8. Prediabetes          No follow-ups on file. HPI     Back to work full time which makes her very happy   She is still following with wound care and has home health coming out, getting placenta therapy. Remains on keflex per ID. Once it is healed up for a while she will be able to get hip surgery. COPD:  Having more trouble breathing because of the heat and air quality. Current treatment includes short-acting beta agonist inhaler, combined beta agonist/steroid inhaler, anticholinergic inhaler. Residual symptoms: chronic dyspnea.   Denies any other

## 2023-07-31 NOTE — PROGRESS NOTES
Pt is here today for a regular f/u    Pt states because of the heat and the air quality she has been having to use her inhaler more than usual     pT still going to the wound clinic every Thursday     Pt has an apt with cardiology on 08/07/2023

## 2023-08-04 NOTE — DISCHARGE INSTRUCTIONS
[address] -Phone: [phone] Fax: [phone]    Visit  Discharge Instructions / Physician Orders     DATE: 8/10/2023     Home Care: Riccardo Gifford ORDERED THRU: [employer]      Wound Location: Left Posterior Thigh- HEALED     Cleanse with: soap and water- dry very well     Dressing Orders:      Frequency:               Additional Orders: Increase protein to diet (meat, cheese, eggs, fish, peanut butter, nuts and beans)  Multivitamin daily     Your next appointment with [address] [place] [address] is- call if needed     (Please note your next appointment above and if you are unable to keep, kindly give a 24 hour notice. Thank you.)  If more than 15 min late we cannot guarantee you will be seen due to clinician schedule  Per Policy, Excessive cancellation will call for dismissal from program.     If you experience any of the following, please call the [address] [place] [address] during business hours:  [phone]  Your Phone call may be forwarded to Jung Montemayor during business hours that [facility] is closed. * Increase in Pain  * Temperature over 101  * Increase in drainage from your wound  * Drainage with a foul odor  * Bleeding  * Increase in swelling  * Need for compression bandage changes due to slippage, breakthrough drainage. If you need medical attention outside of the business hours of the [address] [place] [address] please contact your PCP or go to the nearest emergency room. The information contained in the After Visit Summary has been reviewed with me, the patient and/or responsible adult, by my health care provider(s). I had the opportunity to ask questions regarding this information.  I have elected to receive;      []After Visit Summary  [x]Comprehensive Discharge Instruction        Patient signature______________________________________Date:________  Electronically signed by Renato Habermann, RN on 8/10/2023 at 1:59 PM  Electronically signed by MITESH Banegas -

## 2023-08-10 ENCOUNTER — HOSPITAL ENCOUNTER (OUTPATIENT)
Dept: WOUND CARE | Age: 73
Discharge: HOME | End: 2023-08-10
Payer: COMMERCIAL

## 2023-08-10 VITALS
HEIGHT: 65 IN | RESPIRATION RATE: 18 BRPM | WEIGHT: 221 LBS | TEMPERATURE: 98 F | BODY MASS INDEX: 36.82 KG/M2 | DIASTOLIC BLOOD PRESSURE: 72 MMHG | SYSTOLIC BLOOD PRESSURE: 124 MMHG | HEART RATE: 59 BPM

## 2023-08-10 DIAGNOSIS — L97.122 CHRONIC ULCER OF LEFT THIGH WITH FAT LAYER EXPOSED (HCC): Primary | ICD-10-CM

## 2023-08-10 DIAGNOSIS — A41.01 MSSA (METHICILLIN SUSCEPTIBLE STAPHYLOCOCCUS AUREUS) SEPTICEMIA (HCC): ICD-10-CM

## 2023-08-10 PROCEDURE — 99212 OFFICE O/P EST SF 10 MIN: CPT

## 2023-08-10 RX ORDER — LIDOCAINE HYDROCHLORIDE 20 MG/ML
JELLY TOPICAL ONCE
OUTPATIENT
Start: 2023-08-10 | End: 2023-08-10

## 2023-08-10 RX ORDER — BACITRACIN ZINC AND POLYMYXIN B SULFATE 500; 1000 [USP'U]/G; [USP'U]/G
OINTMENT TOPICAL ONCE
OUTPATIENT
Start: 2023-08-10 | End: 2023-08-10

## 2023-08-10 RX ORDER — GENTAMICIN SULFATE 1 MG/G
OINTMENT TOPICAL ONCE
OUTPATIENT
Start: 2023-08-10 | End: 2023-08-10

## 2023-08-10 RX ORDER — IBUPROFEN 200 MG
TABLET ORAL ONCE
OUTPATIENT
Start: 2023-08-10 | End: 2023-08-10

## 2023-08-10 RX ORDER — LIDOCAINE 40 MG/G
CREAM TOPICAL ONCE
OUTPATIENT
Start: 2023-08-10 | End: 2023-08-10

## 2023-08-10 RX ORDER — LIDOCAINE HYDROCHLORIDE 20 MG/ML
JELLY TOPICAL ONCE
Status: COMPLETED | OUTPATIENT
Start: 2023-08-10 | End: 2023-08-10

## 2023-08-10 RX ORDER — LIDOCAINE HYDROCHLORIDE 40 MG/ML
SOLUTION TOPICAL ONCE
OUTPATIENT
Start: 2023-08-10 | End: 2023-08-10

## 2023-08-10 RX ORDER — BETAMETHASONE DIPROPIONATE 0.05 %
OINTMENT (GRAM) TOPICAL ONCE
OUTPATIENT
Start: 2023-08-10 | End: 2023-08-10

## 2023-08-10 RX ORDER — LIDOCAINE 50 MG/G
OINTMENT TOPICAL ONCE
OUTPATIENT
Start: 2023-08-10 | End: 2023-08-10

## 2023-08-10 RX ORDER — GINSENG 100 MG
CAPSULE ORAL ONCE
OUTPATIENT
Start: 2023-08-10 | End: 2023-08-10

## 2023-08-10 RX ORDER — CLOBETASOL PROPIONATE 0.5 MG/G
OINTMENT TOPICAL ONCE
OUTPATIENT
Start: 2023-08-10 | End: 2023-08-10

## 2023-08-10 RX ADMIN — LIDOCAINE HYDROCHLORIDE 6 ML: 20 JELLY TOPICAL at 13:44

## 2023-08-10 NOTE — PROGRESS NOTES
[address] [place] [address]   Progress Note and Procedure Note      Tina Morales  MEDICAL RECORD NUMBER:  [id number]  AGE: 71 y.o. GENDER: female  : 1950  EPISODE DATE:  8/10/2023    Subjective:     Chief Complaint   Patient presents with    Wound Check     Left thigh         HISTORY of PRESENT ILLNESS HPI     Tina Morales is a 71 y.o. female who presents today for wound/ulcer evaluation. History of Wound Context: left posterior thigh ulcer of unknown etiology, chronic     Interval history: wound is now closed.  No drainage per pt in 4 days    Wound/Ulcer Pain Timing/Severity: intermittent  Quality of pain: aching  Severity:  1/ 10   Modifying Factors: None, Pain worsens with walking, and Pain is relieved/improved with rest  Associated Signs/Symptoms: erythema and drainage     Wound/Ulcer Identification:  Ulcer Type: traumatic  Contributing Factors: edema, chronic pressure, decreased mobility, shear force, and obesity   PAST MEDICAL HISTORY        Diagnosis Date    A-fib (HCC)     Acute respiratory failure with hypoxia ([facility]) 2022    Aspiration pneumonia ([address]) 2022    Chronic back pain     Chronic hip pain     Chronic ulcer of left thigh ([address]) 2022    COPD (chronic obstructive pulmonary disease) ([facility])     Major depression     MSSA (methicillin susceptible Staphylococcus aureus) septicemia ([address]) 2022    MSSA bacteremia 2022    Osteoarthritis     Osteoporosis     Pneumonia of left lower lobe due to infectious organism 10/01/2022    Respiratory alkalosis 2022    Septicemia ([address]) 2022    SIRS (systemic inflammatory response syndrome) ([address]) 2022    UTI due to Klebsiella species 2022       PAST SURGICAL HISTORY    Past Surgical History:   Procedure Laterality Date    BREAST BIOPSY      two    CHOLECYSTECTOMY      HYSTERECTOMY, VAGINAL         FAMILY HISTORY    Family History   Problem Relation Age of Onset    Stroke Mother

## 2023-08-23 ENCOUNTER — OFFICE VISIT (OUTPATIENT)
Dept: INFECTIOUS DISEASES | Age: 73
End: 2023-08-23
Payer: COMMERCIAL

## 2023-08-23 ENCOUNTER — TELEPHONE (OUTPATIENT)
Dept: ORTHOPEDIC SURGERY | Age: 73
End: 2023-08-23

## 2023-08-23 VITALS
DIASTOLIC BLOOD PRESSURE: 78 MMHG | HEIGHT: 66 IN | BODY MASS INDEX: 35.52 KG/M2 | WEIGHT: 221 LBS | HEART RATE: 76 BPM | TEMPERATURE: 98 F | SYSTOLIC BLOOD PRESSURE: 134 MMHG

## 2023-08-23 DIAGNOSIS — M00.9 CHRONIC INFECTION OF LEFT HIP, CURRENTLY ON ANTIBIOTICS (HCC): Primary | ICD-10-CM

## 2023-08-23 DIAGNOSIS — A49.01 MSSA (METHICILLIN SUSCEPTIBLE STAPHYLOCOCCUS AUREUS) INFECTION: ICD-10-CM

## 2023-08-23 PROCEDURE — 1123F ACP DISCUSS/DSCN MKR DOCD: CPT | Performed by: INTERNAL MEDICINE

## 2023-08-23 PROCEDURE — 99214 OFFICE O/P EST MOD 30 MIN: CPT | Performed by: INTERNAL MEDICINE

## 2023-08-23 RX ORDER — CEPHALEXIN 500 MG/1
500 CAPSULE ORAL 3 TIMES DAILY
Qty: 90 CAPSULE | Refills: 11 | Status: SHIPPED | OUTPATIENT
Start: 2023-08-23 | End: 2023-09-22

## 2023-08-23 ASSESSMENT — ENCOUNTER SYMPTOMS
COLOR CHANGE: 0
EYE REDNESS: 0
EYE DISCHARGE: 0
CHOKING: 0
PHOTOPHOBIA: 0
EYE ITCHING: 0
ABDOMINAL DISTENTION: 0
APNEA: 0
EYE PAIN: 0
BACK PAIN: 1

## 2023-08-23 NOTE — PROGRESS NOTES
signs of injury. Cervical back: Neck supple. No rigidity or tenderness. Skin:     Coloration: Skin is not jaundiced or pale. Findings: No bruising or erythema. Neurological:      Mental Status: She is alert and oriented to person, place, and time. Cranial Nerves: No cranial nerve deficit. Psychiatric:         Mood and Affect: Mood normal.         Thought Content:  Thought content normal.         Medical Decision Making:   I have independently reviewed/ordered the following labs:    CBCwith Differential:   Lab Results   Component Value Date/Time    WBC 8.6 06/26/2023 01:10 PM    WBC 7.9 03/31/2023 01:10 PM    HGB 11.5 06/26/2023 01:10 PM    HGB 11.7 03/31/2023 01:10 PM    HCT 38.8 06/26/2023 01:10 PM    HCT 38.6 03/31/2023 01:10 PM     06/26/2023 01:10 PM     03/31/2023 01:10 PM    LYMPHOPCT 28 06/26/2023 01:10 PM    LYMPHOPCT 33 03/31/2023 01:10 PM    MONOPCT 6 06/26/2023 01:10 PM    MONOPCT 7 03/31/2023 01:10 PM     BMP:  Lab Results   Component Value Date/Time     12/01/2022 09:40 AM     11/15/2022 06:24 AM    K 3.9 12/01/2022 09:40 AM    K 4.5 11/15/2022 06:24 AM     12/01/2022 09:40 AM     11/15/2022 06:24 AM    CO2 22 12/01/2022 09:40 AM    CO2 24 11/15/2022 06:24 AM    BUN 15 12/01/2022 09:40 AM    BUN 12 11/15/2022 06:24 AM    CREATININE 0.76 12/01/2022 09:40 AM    CREATININE 0.36 11/15/2022 06:24 AM    MG 2.1 10/05/2022 06:52 AM    MG 2.0 10/04/2022 04:19 AM     Hepatic Function Panel:   Lab Results   Component Value Date/Time    PROT 7.7 12/01/2022 09:40 AM    PROT 7.5 11/15/2022 06:24 AM    LABALBU 3.5 12/01/2022 09:40 AM    LABALBU 3.0 11/15/2022 06:24 AM    BILITOT 0.4 12/01/2022 09:40 AM    BILITOT 0.2 11/15/2022 06:24 AM    ALKPHOS 93 12/01/2022 09:40 AM    ALKPHOS 100 11/15/2022 06:24 AM    ALT 12 12/01/2022 09:40 AM    ALT 13 11/15/2022 06:24 AM    AST 19 12/01/2022 09:40 AM    AST 23 11/15/2022 06:24 AM     No results found for: RPR  No

## 2023-08-23 NOTE — TELEPHONE ENCOUNTER
Pt called wanting to update Dr. Regina Sage that she has been seeing wound care and pain management. She wanted Dr. Regina Sage to be aware that she another xray done of L hip 8/23/23 at [facility] per Dr. Britney Laureano. She states the wound is not healed but is improving. Pt just wanted to give you an update.

## 2023-09-26 RX ORDER — ENOXAPARIN SODIUM 100 MG/ML
1 INJECTION SUBCUTANEOUS DAILY
Qty: 5 ML | Refills: 0 | Status: SHIPPED | OUTPATIENT
Start: 2023-09-26

## 2023-10-26 ENCOUNTER — TELEPHONE (OUTPATIENT)
Dept: FAMILY MEDICINE CLINIC | Age: 73
End: 2023-10-26

## 2023-10-31 DIAGNOSIS — J44.9 CHRONIC OBSTRUCTIVE PULMONARY DISEASE, UNSPECIFIED COPD TYPE (HCC): ICD-10-CM

## 2023-10-31 RX ORDER — ALBUTEROL SULFATE 90 UG/1
2 AEROSOL, METERED RESPIRATORY (INHALATION) EVERY 6 HOURS PRN
Qty: 54 G | Refills: 0 | OUTPATIENT
Start: 2023-10-31

## 2023-11-02 DIAGNOSIS — J44.9 CHRONIC OBSTRUCTIVE PULMONARY DISEASE, UNSPECIFIED COPD TYPE (HCC): ICD-10-CM

## 2023-11-02 RX ORDER — ALBUTEROL SULFATE 90 UG/1
2 AEROSOL, METERED RESPIRATORY (INHALATION) EVERY 6 HOURS PRN
Qty: 54 G | Refills: 0 | OUTPATIENT
Start: 2023-11-02

## 2023-11-03 DIAGNOSIS — J44.9 CHRONIC OBSTRUCTIVE PULMONARY DISEASE, UNSPECIFIED COPD TYPE (HCC): ICD-10-CM

## 2023-11-03 RX ORDER — BUDESONIDE AND FORMOTEROL FUMARATE DIHYDRATE 160; 4.5 UG/1; UG/1
AEROSOL RESPIRATORY (INHALATION)
Qty: 3 EACH | Refills: 1 | Status: SHIPPED | OUTPATIENT
Start: 2023-11-03

## 2023-11-03 NOTE — TELEPHONE ENCOUNTER
Last visit: 07/31/2023  Last Med refill: 07/31/2023  Does patient have enough medication for 72 hours: Yes    Needs new script allowing for generic     Next Visit Date:  Future Appointments   Date Time Provider [address]   11/13/2023  7:30 AM STC DIGITAL RM [facility] MAMMO [facility]iolog   11/13/2023  8:00 AM [facility] DEXA RM [facility] MAMMO [facility]iolog   12/5/2023  3:15 PM Linda Jack MD AFL [facility] TOLE [facility] C   2/7/2024  2:45 PM Lara Givens MD INFT DISEASE [facility]   2/8/2024  3:00 PM Kailyn James MD [address] Maintenance   Topic Date Due    DTaP/Tdap/Td vaccine (1 - Tdap) Never done    Pneumococcal 65+ years Vaccine (2 - PCV) 11/12/2019    Breast cancer screen  01/05/2023    COVID-19 Vaccine (4 - Moderna series) 03/08/2023    Flu vaccine (1) 08/01/2023    A1C test (Diabetic or Prediabetic)  12/01/2023    Depression Monitoring  02/22/2024    Colorectal Cancer Screen  02/22/2024    Lipids  12/01/2027    DEXA (modify frequency per FRAX score)  Completed    Shingles vaccine  Completed    Hepatitis C screen  Completed    Hepatitis A vaccine  Aged Out    Hepatitis B vaccine  Aged Out    Hib vaccine  Aged Out    Meningococcal (ACWY) vaccine  Aged Out    Low dose CT lung screening &/or counseling  Discontinued       Hemoglobin A1C (%)   Date Value   12/01/2022 5.4   06/02/2022 5.7   05/17/2021 5.7             ( goal A1C is < 7)   No components found for: \"LABMICR\"  LDL Cholesterol (mg/dL)   Date Value   12/01/2022 66   05/17/2021 81       (goal LDL is <100)   AST (U/L)   Date Value   12/01/2022 19     ALT (U/L)   Date Value   12/01/2022 12     BUN (mg/dL)   Date Value   12/01/2022 15     BP Readings from Last 3 Encounters:   08/23/23 134/78   08/10/23 124/72   08/07/23 130/82          (goal 120/80)    All Future Testing planned in [facility]  Lab Frequency Next Occurrence   C-Reactive Protein Once 12/05/2022   CBC with Auto Differential Once 12/05/2022   C-Reactive Protein Once 01/04/2023

## 2023-11-13 ENCOUNTER — HOSPITAL ENCOUNTER (OUTPATIENT)
Dept: WOMENS IMAGING | Age: 73
Discharge: HOME | End: 2023-11-15
Payer: COMMERCIAL

## 2023-11-13 VITALS — BODY MASS INDEX: 35.52 KG/M2 | WEIGHT: 221 LBS | HEIGHT: 66 IN

## 2023-11-13 DIAGNOSIS — M85.851 OSTEOPENIA OF RIGHT HIP: ICD-10-CM

## 2023-11-13 DIAGNOSIS — Z12.31 ENCOUNTER FOR SCREENING MAMMOGRAM FOR BREAST CANCER: ICD-10-CM

## 2023-11-13 PROCEDURE — 77063 BREAST TOMOSYNTHESIS BI: CPT

## 2023-11-13 PROCEDURE — 77081 DXA BONE DENSITY APPENDICULR: CPT

## 2023-11-21 DIAGNOSIS — I48.0 PAROXYSMAL ATRIAL FIBRILLATION (HCC): ICD-10-CM

## 2023-11-21 DIAGNOSIS — J30.2 SEASONAL ALLERGIC RHINITIS, UNSPECIFIED TRIGGER: ICD-10-CM

## 2023-11-21 RX ORDER — MONTELUKAST SODIUM 10 MG/1
10 TABLET ORAL NIGHTLY
Qty: 90 TABLET | Refills: 1 | Status: SHIPPED | OUTPATIENT
Start: 2023-11-21

## 2023-11-21 NOTE — TELEPHONE ENCOUNTER
Last visit: 07/31/23  Last Med refill:   can't get filled and almost out  Does patient have enough medication for 72 hours: No:     Next Visit Date:  Future Appointments   Date Time Provider [address]   12/5/2023  3:15 PM Didi Rojas MD [facility] TOLE [facility] C   2/7/2024  2:45 PM Loretta Gorman MD INFT DISEASE [facility]   2/8/2024  3:00 PM Maria Isabel Cutler MD [address] Maintenance   Topic Date Due    DTaP/Tdap/Td vaccine (1 - Tdap) Never done    Pneumococcal 65+ years Vaccine (2 - PCV) 11/12/2019    A1C test (Diabetic or Prediabetic)  12/01/2023    COVID-19 Vaccine (5 - 2023-24 season) 12/11/2023    Depression Monitoring  02/22/2024    Colorectal Cancer Screen  02/22/2024    Breast cancer screen  11/13/2025    Lipids  12/01/2027    DEXA (modify frequency per FRAX score)  Completed    Flu vaccine  Completed    Shingles vaccine  Completed    Hepatitis C screen  Completed    Hepatitis A vaccine  Aged Out    Hepatitis B vaccine  Aged Out    Hib vaccine  Aged Out    Meningococcal (ACWY) vaccine  Aged Out    Low dose CT lung screening &/or counseling  Discontinued       Hemoglobin A1C (%)   Date Value   12/01/2022 5.4   06/02/2022 5.7   05/17/2021 5.7             ( goal A1C is < 7)   No components found for: \"LABMICR\"  LDL Cholesterol (mg/dL)   Date Value   12/01/2022 66   05/17/2021 81       (goal LDL is <100)   AST (U/L)   Date Value   12/01/2022 19     ALT (U/L)   Date Value   12/01/2022 12     BUN (mg/dL)   Date Value   12/01/2022 15     BP Readings from Last 3 Encounters:   08/23/23 134/78   08/10/23 124/72   08/07/23 130/82          (goal 120/80)    All Future Testing planned in [facility]  Lab Frequency Next Occurrence   C-Reactive Protein Once 12/05/2022   CBC with Auto Differential Once 12/05/2022   C-Reactive Protein Once 01/04/2023   CBC with Auto Differential Once 01/04/2023   C-Reactive Protein monthly    CBC with Auto Differential monthly    C-Reactive Protein monthly

## 2023-12-26 DIAGNOSIS — J44.9 CHRONIC OBSTRUCTIVE PULMONARY DISEASE, UNSPECIFIED COPD TYPE (HCC): ICD-10-CM

## 2023-12-26 NOTE — TELEPHONE ENCOUNTER
Angelina Samaniego is calling to request a refill on the following medication(s):    Medication Request:  Requested Prescriptions     Pending Prescriptions Disp Refills    albuterol sulfate HFA (PROVENTIL;VENTOLIN;PROAIR) 108 (90 Base) MCG/ACT inhaler [Pharmacy Med Name: Albuterol Sulfate HFA Inhalation Aerosol Solution 108 (90 Base) MCG/ACT] 54 g 0     Sig: INHALE 2 PUFFS BY MOUTH EVERY 6 HOURS AS NEEDED FOR WHEEZING       Last Visit Date (If Applicable):  0/56/6253    Next Visit Date:    2/8/2024

## 2023-12-28 RX ORDER — ALBUTEROL SULFATE 90 UG/1
2 AEROSOL, METERED RESPIRATORY (INHALATION) EVERY 6 HOURS PRN
Qty: 54 G | Refills: 0 | Status: SHIPPED | OUTPATIENT
Start: 2023-12-28

## 2024-02-07 ENCOUNTER — OFFICE VISIT (OUTPATIENT)
Dept: INFECTIOUS DISEASES | Age: 74
End: 2024-02-07
Payer: COMMERCIAL

## 2024-02-07 VITALS
DIASTOLIC BLOOD PRESSURE: 73 MMHG | BODY MASS INDEX: 39.53 KG/M2 | TEMPERATURE: 97 F | HEART RATE: 59 BPM | HEIGHT: 66 IN | SYSTOLIC BLOOD PRESSURE: 127 MMHG | WEIGHT: 246 LBS

## 2024-02-07 DIAGNOSIS — M00.9 CHRONIC INFECTION OF LEFT HIP, CURRENTLY ON ANTIBIOTICS (HCC): Primary | ICD-10-CM

## 2024-02-07 PROCEDURE — 99214 OFFICE O/P EST MOD 30 MIN: CPT | Performed by: INTERNAL MEDICINE

## 2024-02-07 PROCEDURE — 1123F ACP DISCUSS/DSCN MKR DOCD: CPT | Performed by: INTERNAL MEDICINE

## 2024-02-07 RX ORDER — CEPHALEXIN 500 MG/1
500 CAPSULE ORAL 3 TIMES DAILY
Qty: 270 CAPSULE | Refills: 3 | Status: SHIPPED | OUTPATIENT
Start: 2024-02-07 | End: 2024-05-07

## 2024-02-07 ASSESSMENT — ENCOUNTER SYMPTOMS
APNEA: 0
EYE PAIN: 0
CHOKING: 0
COLOR CHANGE: 0
EYE DISCHARGE: 0
PHOTOPHOBIA: 0
BACK PAIN: 1
ABDOMINAL DISTENTION: 0
EYE REDNESS: 0
EYE ITCHING: 0

## 2024-03-10 DIAGNOSIS — J44.9 CHRONIC OBSTRUCTIVE PULMONARY DISEASE, UNSPECIFIED COPD TYPE (HCC): ICD-10-CM

## 2024-03-11 RX ORDER — ALBUTEROL SULFATE 90 UG/1
2 AEROSOL, METERED RESPIRATORY (INHALATION) EVERY 6 HOURS PRN
Qty: 54 G | Refills: 0 | Status: SHIPPED | OUTPATIENT
Start: 2024-03-11

## 2024-04-21 NOTE — TELEPHONE ENCOUNTER
Is there any swelling? Is she able to stand and move around despite the pain? Or is she needing more assistance since her fall?
LM for patient to call the office.
Noted.
PT with [address] called to report patient had a fall yesterday. States she fell getting up from her chair. States she is having coccyx pain and right knee pain.
Patient said she is fine. She had a slight fall, she is not hurting, no swelling and no bruising. She was able to get up by herself and is fine.  She was appreciative that we called to check on her
0 (no pain/absence of nonverbal indicators of pain)

## 2024-04-24 ENCOUNTER — OFFICE VISIT (OUTPATIENT)
Dept: FAMILY MEDICINE CLINIC | Age: 74
End: 2024-04-24
Payer: COMMERCIAL

## 2024-04-24 VITALS
TEMPERATURE: 97.8 F | WEIGHT: 240.3 LBS | SYSTOLIC BLOOD PRESSURE: 118 MMHG | BODY MASS INDEX: 38.8 KG/M2 | HEART RATE: 64 BPM | OXYGEN SATURATION: 94 % | DIASTOLIC BLOOD PRESSURE: 70 MMHG

## 2024-04-24 DIAGNOSIS — F43.21 SITUATIONAL DEPRESSION: ICD-10-CM

## 2024-04-24 DIAGNOSIS — J30.2 SEASONAL ALLERGIC RHINITIS, UNSPECIFIED TRIGGER: ICD-10-CM

## 2024-04-24 DIAGNOSIS — G89.29 CHRONIC BILATERAL LOW BACK PAIN WITH BILATERAL SCIATICA: ICD-10-CM

## 2024-04-24 DIAGNOSIS — R11.0 NAUSEA: ICD-10-CM

## 2024-04-24 DIAGNOSIS — Z13.0 SCREENING, ANEMIA, DEFICIENCY, IRON: ICD-10-CM

## 2024-04-24 DIAGNOSIS — I48.0 PAROXYSMAL ATRIAL FIBRILLATION (HCC): ICD-10-CM

## 2024-04-24 DIAGNOSIS — M54.41 CHRONIC BILATERAL LOW BACK PAIN WITH BILATERAL SCIATICA: ICD-10-CM

## 2024-04-24 DIAGNOSIS — E55.9 VITAMIN D DEFICIENCY: ICD-10-CM

## 2024-04-24 DIAGNOSIS — R73.03 PREDIABETES: ICD-10-CM

## 2024-04-24 DIAGNOSIS — J44.9 CHRONIC OBSTRUCTIVE PULMONARY DISEASE, UNSPECIFIED COPD TYPE (HCC): ICD-10-CM

## 2024-04-24 DIAGNOSIS — Z13.29 SCREENING FOR THYROID DISORDER: ICD-10-CM

## 2024-04-24 DIAGNOSIS — M54.42 CHRONIC BILATERAL LOW BACK PAIN WITH BILATERAL SCIATICA: ICD-10-CM

## 2024-04-24 DIAGNOSIS — Z13.220 SCREENING, LIPID: ICD-10-CM

## 2024-04-24 DIAGNOSIS — M15.9 PRIMARY OSTEOARTHRITIS INVOLVING MULTIPLE JOINTS: ICD-10-CM

## 2024-04-24 DIAGNOSIS — F41.1 GENERALIZED ANXIETY DISORDER: Primary | ICD-10-CM

## 2024-04-24 PROCEDURE — 1123F ACP DISCUSS/DSCN MKR DOCD: CPT | Performed by: INTERNAL MEDICINE

## 2024-04-24 PROCEDURE — 99214 OFFICE O/P EST MOD 30 MIN: CPT | Performed by: INTERNAL MEDICINE

## 2024-04-24 RX ORDER — TIZANIDINE 4 MG/1
4 TABLET ORAL EVERY 8 HOURS PRN
Qty: 270 TABLET | Refills: 1 | Status: SHIPPED | OUTPATIENT
Start: 2024-04-24

## 2024-04-24 RX ORDER — PROMETHAZINE HYDROCHLORIDE 25 MG/1
25 TABLET ORAL EVERY 6 HOURS PRN
Qty: 60 TABLET | Refills: 1 | Status: SHIPPED | OUTPATIENT
Start: 2024-04-24

## 2024-04-24 RX ORDER — DESVENLAFAXINE 100 MG/1
100 TABLET, EXTENDED RELEASE ORAL DAILY
Qty: 90 TABLET | Refills: 1 | Status: SHIPPED | OUTPATIENT
Start: 2024-04-24

## 2024-04-24 RX ORDER — TIOTROPIUM BROMIDE 18 UG/1
18 CAPSULE ORAL; RESPIRATORY (INHALATION) DAILY
Qty: 90 CAPSULE | Refills: 1 | Status: SHIPPED | OUTPATIENT
Start: 2024-04-24

## 2024-04-24 RX ORDER — DESVENLAFAXINE 25 MG/1
25 TABLET, EXTENDED RELEASE ORAL DAILY
Qty: 90 TABLET | Refills: 1 | Status: SHIPPED | OUTPATIENT
Start: 2024-04-24

## 2024-04-24 RX ORDER — MONTELUKAST SODIUM 10 MG/1
10 TABLET ORAL NIGHTLY
Qty: 90 TABLET | Refills: 1 | Status: SHIPPED | OUTPATIENT
Start: 2024-04-24

## 2024-04-24 RX ORDER — BUDESONIDE AND FORMOTEROL FUMARATE DIHYDRATE 160; 4.5 UG/1; UG/1
AEROSOL RESPIRATORY (INHALATION)
Qty: 3 EACH | Refills: 1 | Status: SHIPPED | OUTPATIENT
Start: 2024-04-24

## 2024-04-24 SDOH — ECONOMIC STABILITY: FOOD INSECURITY: WITHIN THE PAST 12 MONTHS, YOU WORRIED THAT YOUR FOOD WOULD RUN OUT BEFORE YOU GOT MONEY TO BUY MORE.: NEVER TRUE

## 2024-04-24 SDOH — ECONOMIC STABILITY: INCOME INSECURITY: HOW HARD IS IT FOR YOU TO PAY FOR THE VERY BASICS LIKE FOOD, HOUSING, MEDICAL CARE, AND HEATING?: SOMEWHAT HARD

## 2024-04-24 SDOH — ECONOMIC STABILITY: FOOD INSECURITY: WITHIN THE PAST 12 MONTHS, THE FOOD YOU BOUGHT JUST DIDN'T LAST AND YOU DIDN'T HAVE MONEY TO GET MORE.: NEVER TRUE

## 2024-04-24 ASSESSMENT — PATIENT HEALTH QUESTIONNAIRE - PHQ9
9. THOUGHTS THAT YOU WOULD BE BETTER OFF DEAD, OR OF HURTING YOURSELF: NOT AT ALL
8. MOVING OR SPEAKING SO SLOWLY THAT OTHER PEOPLE COULD HAVE NOTICED. OR THE OPPOSITE, BEING SO FIGETY OR RESTLESS THAT YOU HAVE BEEN MOVING AROUND A LOT MORE THAN USUAL: NOT AT ALL
2. FEELING DOWN, DEPRESSED OR HOPELESS: MORE THAN HALF THE DAYS
6. FEELING BAD ABOUT YOURSELF - OR THAT YOU ARE A FAILURE OR HAVE LET YOURSELF OR YOUR FAMILY DOWN: NOT AT ALL
SUM OF ALL RESPONSES TO PHQ9 QUESTIONS 1 & 2: 2
SUM OF ALL RESPONSES TO PHQ QUESTIONS 1-9: 3
10. IF YOU CHECKED OFF ANY PROBLEMS, HOW DIFFICULT HAVE THESE PROBLEMS MADE IT FOR YOU TO DO YOUR WORK, TAKE CARE OF THINGS AT HOME, OR GET ALONG WITH OTHER PEOPLE: NOT DIFFICULT AT ALL
SUM OF ALL RESPONSES TO PHQ QUESTIONS 1-9: 3
SUM OF ALL RESPONSES TO PHQ QUESTIONS 1-9: 3
5. POOR APPETITE OR OVEREATING: NOT AT ALL
4. FEELING TIRED OR HAVING LITTLE ENERGY: SEVERAL DAYS
7. TROUBLE CONCENTRATING ON THINGS, SUCH AS READING THE NEWSPAPER OR WATCHING TELEVISION: NOT AT ALL
SUM OF ALL RESPONSES TO PHQ QUESTIONS 1-9: 3
3. TROUBLE FALLING OR STAYING ASLEEP: NOT AT ALL
1. LITTLE INTEREST OR PLEASURE IN DOING THINGS: NOT AT ALL

## 2024-04-24 NOTE — PROGRESS NOTES
Disp-270 tablet, R-1Normal  7. Situational depression  -     desvenlafaxine succinate (PRISTIQ) 100 MG TB24 extended release tablet; Take 1 tablet by mouth daily Take along with 25 mg for total dose of 125 mg daily, Disp-90 tablet, R-1Normal  -     desvenlafaxine succinate (PRISTIQ) 25 MG TB24 extended release tablet; Take 1 tablet by mouth daily Take along with 100mg dose for total daily dose of 125 mg, Disp-90 tablet, R-1Normal  8. Screening, anemia, deficiency, iron  -     CBC with Auto Differential; Future  9. Screening for thyroid disorder  -     TSH With Reflex Ft4; Future  10. Prediabetes  -     Hemoglobin A1C; Future  11. Chronic bilateral low back pain with bilateral sciatica  12. Screening, lipid  -     Lipid, Fasting; Future  13. Vitamin D deficiency  -     Vitamin D 25 Hydroxy; Future          No follow-ups on file.      HPI     Anxiety - not doing well on current dose of pristiq - has a new boss at work and is very stressed, very anxious, unable to relax. Denies mood swings, anhedonia, nervousness, sleep difficulty, racing thoughts, auditory or visual hallucination, thoughts of self harm, suicidal or homicidal ideation.  COPD:  Current treatment includes short-acting beta agonist inhaler, combined beta agonist/steroid inhaler, anticholinergic inhaler.  Residual symptoms: chronic dyspnea.  Denies any other symptoms. Requires rescue inhaler 3 time(s) per week      Fatigue  This is a chronic problem. The problem has been gradually worsening. Associated symptoms include fatigue. Pertinent negatives include no abdominal pain, chest pain, coughing, fever, joint swelling, myalgias, nausea or vomiting.       BP Readings from Last 3 Encounters:   04/24/24 118/70   02/07/24 127/73   08/23/23 134/78              Past Medical History:   Diagnosis Date    A-fib (HCC)     Acute respiratory failure with hypoxia (HCC) 09/30/2022    Aspiration pneumonia (HCC) 09/30/2022    Chronic back pain     Chronic hip pain

## 2024-05-01 ASSESSMENT — ENCOUNTER SYMPTOMS
CONSTIPATION: 0
SHORTNESS OF BREATH: 0
BLOOD IN STOOL: 0
ABDOMINAL PAIN: 0
ANAL BLEEDING: 0
WHEEZING: 0
NAUSEA: 0
CHOKING: 0
DIARRHEA: 0
CHEST TIGHTNESS: 0
COUGH: 0
VOMITING: 0

## 2024-05-01 ASSESSMENT — VISUAL ACUITY: OU: 1

## 2024-05-10 ENCOUNTER — HOSPITAL ENCOUNTER (OUTPATIENT)
Age: 74
Setting detail: SPECIMEN
Discharge: HOME | End: 2024-05-10

## 2024-05-10 DIAGNOSIS — R73.03 PREDIABETES: ICD-10-CM

## 2024-05-10 DIAGNOSIS — E55.9 VITAMIN D DEFICIENCY: ICD-10-CM

## 2024-05-10 DIAGNOSIS — I48.0 PAROXYSMAL ATRIAL FIBRILLATION (HCC): ICD-10-CM

## 2024-05-10 DIAGNOSIS — Z13.220 SCREENING, LIPID: ICD-10-CM

## 2024-05-10 DIAGNOSIS — Z13.29 SCREENING FOR THYROID DISORDER: ICD-10-CM

## 2024-05-10 DIAGNOSIS — Z13.0 SCREENING, ANEMIA, DEFICIENCY, IRON: ICD-10-CM

## 2024-05-10 LAB
25(OH)D3 SERPL-MCNC: 9.3 NG/ML (ref 30–100)
ALBUMIN SERPL-MCNC: 3.8 G/DL (ref 3.5–5.2)
ALBUMIN/GLOB SERPL: 1 {RATIO} (ref 1–2.5)
ALP SERPL-CCNC: 103 U/L (ref 35–104)
ALT SERPL-CCNC: <5 U/L (ref 10–35)
ANION GAP SERPL CALCULATED.3IONS-SCNC: 11 MMOL/L (ref 9–16)
AST SERPL-CCNC: 21 U/L (ref 10–35)
BASOPHILS # BLD: 0.03 K/UL (ref 0–0.2)
BASOPHILS NFR BLD: 0 % (ref 0–2)
BILIRUB SERPL-MCNC: 0.4 MG/DL (ref 0–1.2)
BUN SERPL-MCNC: 15 MG/DL (ref 8–23)
CALCIUM SERPL-MCNC: 8.8 MG/DL (ref 8.6–10.4)
CHLORIDE SERPL-SCNC: 105 MMOL/L (ref 98–107)
CHOLEST SERPL-MCNC: 117 MG/DL (ref 0–199)
CHOLESTEROL/HDL RATIO: 3
CO2 SERPL-SCNC: 23 MMOL/L (ref 20–31)
CREAT SERPL-MCNC: 0.7 MG/DL (ref 0.5–0.9)
EOSINOPHIL # BLD: 0.22 K/UL (ref 0–0.44)
EOSINOPHILS RELATIVE PERCENT: 3 % (ref 1–4)
ERYTHROCYTE [DISTWIDTH] IN BLOOD BY AUTOMATED COUNT: 15.2 % (ref 11.8–14.4)
EST. AVERAGE GLUCOSE BLD GHB EST-MCNC: 117 MG/DL
GFR, ESTIMATED: >90 ML/MIN/1.73M2
GLUCOSE SERPL-MCNC: 66 MG/DL (ref 74–99)
HBA1C MFR BLD: 5.7 % (ref 4–6)
HCT VFR BLD AUTO: 38.6 % (ref 36.3–47.1)
HDLC SERPL-MCNC: 41 MG/DL
HGB BLD-MCNC: 11.5 G/DL (ref 11.9–15.1)
IMM GRANULOCYTES # BLD AUTO: <0.03 K/UL (ref 0–0.3)
IMM GRANULOCYTES NFR BLD: 0 %
LDLC SERPL CALC-MCNC: 65 MG/DL (ref 0–100)
LYMPHOCYTES NFR BLD: 1.83 K/UL (ref 1.1–3.7)
LYMPHOCYTES RELATIVE PERCENT: 24 % (ref 24–43)
MCH RBC QN AUTO: 26.5 PG (ref 25.2–33.5)
MCHC RBC AUTO-ENTMCNC: 29.8 G/DL (ref 28.4–34.8)
MCV RBC AUTO: 88.9 FL (ref 82.6–102.9)
MONOCYTES NFR BLD: 0.41 K/UL (ref 0.1–1.2)
MONOCYTES NFR BLD: 6 % (ref 3–12)
NEUTROPHILS NFR BLD: 67 % (ref 36–65)
NEUTS SEG NFR BLD: 4.99 K/UL (ref 1.5–8.1)
NRBC BLD-RTO: 0 PER 100 WBC
PLATELET # BLD AUTO: 243 K/UL (ref 138–453)
PMV BLD AUTO: 11.5 FL (ref 8.1–13.5)
POTASSIUM SERPL-SCNC: 4.5 MMOL/L (ref 3.7–5.3)
PROT SERPL-MCNC: 7.2 G/DL (ref 6.6–8.7)
RBC # BLD AUTO: 4.34 M/UL (ref 3.95–5.11)
RBC # BLD: ABNORMAL 10*6/UL
SODIUM SERPL-SCNC: 139 MMOL/L (ref 136–145)
TRIGL SERPL-MCNC: 59 MG/DL (ref 0–149)
TSH SERPL DL<=0.05 MIU/L-ACNC: 1.2 UIU/ML (ref 0.27–4.2)
VLDLC SERPL CALC-MCNC: 12 MG/DL
WBC OTHER # BLD: 7.5 K/UL (ref 3.5–11.3)

## 2024-05-29 DIAGNOSIS — E55.9 VITAMIN D DEFICIENCY: Primary | ICD-10-CM

## 2024-05-29 RX ORDER — ERGOCALCIFEROL 1.25 MG/1
50000 CAPSULE ORAL WEEKLY
Qty: 12 CAPSULE | Refills: 0 | Status: SHIPPED | OUTPATIENT
Start: 2024-05-29

## 2024-06-27 NOTE — TELEPHONE ENCOUNTER
[address] PT called to report they did eval and will be seeing patient 2x a week for 4 weeks. They also wanted the provider to be aware that patient has been having nausea and vomiting since yesterday. Patient was also on the phone stating she is not able to keep any food or liquid down. States she tried to take promethazine but could not keep that down. Advised patient to go to the ER, that way she does not get dehydrated. Patient stated \"well that's not going to happen. \"  States she just wanted the provider to be aware. Order for IR liver biopsy discontinued.

## 2024-07-11 DIAGNOSIS — J44.9 CHRONIC OBSTRUCTIVE PULMONARY DISEASE, UNSPECIFIED COPD TYPE (HCC): ICD-10-CM

## 2024-07-11 RX ORDER — ALBUTEROL SULFATE 90 UG/1
2 AEROSOL, METERED RESPIRATORY (INHALATION) EVERY 6 HOURS PRN
Qty: 54 G | Refills: 1 | Status: SHIPPED | OUTPATIENT
Start: 2024-07-11

## 2024-07-11 NOTE — TELEPHONE ENCOUNTER
Last visit: 4/24/24  Last Med refill: 3/11/24  Does patient have enough medication for 72 hours: No:     Next Visit Date:  Future Appointments   Date Time Provider Department Center   10/24/2024  3:15 PM Norma Marrero MD [facility]   2/5/2025  2:30 PM Raine Espinoza MD [facility]       Health Maintenance   Topic Date Due    DTaP/Tdap/Td vaccine (1 - Tdap) Never done    Pneumococcal 65+ years Vaccine (2 of 2 - PCV) 11/12/2019    Colorectal Cancer Screen  Never done    Flu vaccine (1) 08/01/2024    Depression Monitoring  04/24/2025    A1C test (Diabetic or Prediabetic)  05/10/2025    Breast cancer screen  11/13/2025    Lipids  05/10/2029    DEXA (modify frequency per FRAX score)  Completed    Shingles vaccine  Completed    COVID-19 Vaccine  Completed    Respiratory Syncytial Virus (RSV) Pregnant or age 60 yrs+  Completed    Hepatitis C screen  Completed    Hepatitis A vaccine  Aged Out    Hepatitis B vaccine  Aged Out    Hib vaccine  Aged Out    Polio vaccine  Aged Out    Meningococcal (ACWY) vaccine  Aged Out    Lung Cancer Screening &/or Counseling  Discontinued       Hemoglobin A1C (%)   Date Value   05/10/2024 5.7   12/01/2022 5.4   06/02/2022 5.7             ( goal A1C is < 7)   No components found for: \"LABMICR\"  No components found for: \"LDLCHOLESTEROL\", \"LDLCALC\"    (goal LDL is <100)   AST (U/L)   Date Value   05/10/2024 21     ALT (U/L)   Date Value   05/10/2024 <5 (L)     BUN (mg/dL)   Date Value   05/10/2024 15     BP Readings from Last 3 Encounters:   04/24/24 118/70   02/07/24 127/73   08/23/23 134/78          (goal 120/80)    All Future Testing planned in [facility]  Lab Frequency Next Occurrence   C-Reactive Protein every 3 months    Creatinine every 3 months    CBC with Auto Differential every 3 months                Patient Active Problem List:     Chronic left hip pain     Chronic bilateral low back pain with bilateral sciatica     Prediabetes     Primary osteoarthritis

## 2024-10-20 DIAGNOSIS — F43.21 SITUATIONAL DEPRESSION: ICD-10-CM

## 2024-10-20 DIAGNOSIS — F41.1 GENERALIZED ANXIETY DISORDER: ICD-10-CM

## 2024-10-21 RX ORDER — DESVENLAFAXINE 100 MG/1
TABLET, EXTENDED RELEASE ORAL
Qty: 90 TABLET | Refills: 0 | Status: SHIPPED | OUTPATIENT
Start: 2024-10-21

## 2024-10-21 NOTE — TELEPHONE ENCOUNTER
Last visit: 04/24/2024  Last Med refill: 07/25/2024  Does patient have enough medication for 72 hours: No: \    Next Visit Date:  Future Appointments   Date Time Provider Department Center   10/24/2024  3:15 PM Norma Marrero MD [facility]   12/2/2024 10:15 AM Les Salinas MD [facility]   2/5/2025  2:30 PM Raine Espinoza MD [facility]       Health Maintenance   Topic Date Due    DTaP/Tdap/Td vaccine (1 - Tdap) Never done    Pneumococcal 65+ years Vaccine (2 of 2 - PCV) 11/12/2019    Colorectal Cancer Screen  Never done    Flu vaccine (1) 08/01/2024    COVID-19 Vaccine (5 - 2023-24 season) 09/01/2024    Depression Monitoring  04/24/2025    A1C test (Diabetic or Prediabetic)  05/10/2025    Breast cancer screen  11/13/2025    Lipids  05/10/2029    DEXA (modify frequency per FRAX score)  Completed    Shingles vaccine  Completed    Respiratory Syncytial Virus (RSV) Pregnant or age 60 yrs+  Completed    Hepatitis C screen  Completed    Hepatitis A vaccine  Aged Out    Hepatitis B vaccine  Aged Out    Hib vaccine  Aged Out    Polio vaccine  Aged Out    Meningococcal (ACWY) vaccine  Aged Out    Lung Cancer Screening &/or Counseling  Discontinued       Hemoglobin A1C (%)   Date Value   05/10/2024 5.7   12/01/2022 5.4   06/02/2022 5.7             ( goal A1C is < 7)   No components found for: \"LABMICR\"  No components found for: \"LDLCHOLESTEROL\", \"LDLCALC\"    (goal LDL is <100)   AST (U/L)   Date Value   05/10/2024 21     ALT (U/L)   Date Value   05/10/2024 <5 (L)     BUN (mg/dL)   Date Value   05/10/2024 15     BP Readings from Last 3 Encounters:   04/24/24 118/70   02/07/24 127/73   08/23/23 134/78          (goal 120/80)    All Future Testing planned in [facility]  Lab Frequency Next Occurrence               Patient Active Problem List:     Chronic left hip pain     Chronic bilateral low back pain with bilateral sciatica     Prediabetes     Primary osteoarthritis involving

## 2024-10-24 ENCOUNTER — OFFICE VISIT (OUTPATIENT)
Dept: FAMILY MEDICINE CLINIC | Age: 74
End: 2024-10-24

## 2024-10-24 VITALS
TEMPERATURE: 97.9 F | BODY MASS INDEX: 40.42 KG/M2 | HEART RATE: 65 BPM | WEIGHT: 250.3 LBS | SYSTOLIC BLOOD PRESSURE: 124 MMHG | DIASTOLIC BLOOD PRESSURE: 76 MMHG | OXYGEN SATURATION: 94 %

## 2024-10-24 DIAGNOSIS — F41.1 GENERALIZED ANXIETY DISORDER: ICD-10-CM

## 2024-10-24 DIAGNOSIS — E66.01 CLASS 3 SEVERE OBESITY DUE TO EXCESS CALORIES WITH SERIOUS COMORBIDITY AND BODY MASS INDEX (BMI) OF 40.0 TO 44.9 IN ADULT: ICD-10-CM

## 2024-10-24 DIAGNOSIS — G89.29 CHRONIC BILATERAL LOW BACK PAIN WITH BILATERAL SCIATICA: ICD-10-CM

## 2024-10-24 DIAGNOSIS — I48.0 PAROXYSMAL ATRIAL FIBRILLATION (HCC): ICD-10-CM

## 2024-10-24 DIAGNOSIS — E55.9 VITAMIN D DEFICIENCY: ICD-10-CM

## 2024-10-24 DIAGNOSIS — F43.21 SITUATIONAL DEPRESSION: ICD-10-CM

## 2024-10-24 DIAGNOSIS — R73.03 PREDIABETES: Primary | ICD-10-CM

## 2024-10-24 DIAGNOSIS — R11.0 NAUSEA: ICD-10-CM

## 2024-10-24 DIAGNOSIS — M54.41 CHRONIC BILATERAL LOW BACK PAIN WITH BILATERAL SCIATICA: ICD-10-CM

## 2024-10-24 DIAGNOSIS — M25.552 CHRONIC LEFT HIP PAIN: ICD-10-CM

## 2024-10-24 DIAGNOSIS — M54.42 CHRONIC BILATERAL LOW BACK PAIN WITH BILATERAL SCIATICA: ICD-10-CM

## 2024-10-24 DIAGNOSIS — J44.9 CHRONIC OBSTRUCTIVE PULMONARY DISEASE, UNSPECIFIED COPD TYPE (HCC): ICD-10-CM

## 2024-10-24 DIAGNOSIS — E66.813 CLASS 3 SEVERE OBESITY DUE TO EXCESS CALORIES WITH SERIOUS COMORBIDITY AND BODY MASS INDEX (BMI) OF 40.0 TO 44.9 IN ADULT: ICD-10-CM

## 2024-10-24 DIAGNOSIS — G89.29 CHRONIC LEFT HIP PAIN: ICD-10-CM

## 2024-10-24 DIAGNOSIS — J30.2 SEASONAL ALLERGIC RHINITIS, UNSPECIFIED TRIGGER: ICD-10-CM

## 2024-10-24 DIAGNOSIS — M81.0 OSTEOPOROSIS WITHOUT CURRENT PATHOLOGICAL FRACTURE, UNSPECIFIED OSTEOPOROSIS TYPE: ICD-10-CM

## 2024-10-24 DIAGNOSIS — M15.0 PRIMARY OSTEOARTHRITIS INVOLVING MULTIPLE JOINTS: ICD-10-CM

## 2024-10-24 LAB — HBA1C MFR BLD: 5.2 %

## 2024-10-24 RX ORDER — TIOTROPIUM BROMIDE 18 UG/1
18 CAPSULE ORAL; RESPIRATORY (INHALATION) DAILY
Qty: 90 CAPSULE | Refills: 1 | Status: SHIPPED | OUTPATIENT
Start: 2024-10-24

## 2024-10-24 RX ORDER — BUDESONIDE AND FORMOTEROL FUMARATE DIHYDRATE 160; 4.5 UG/1; UG/1
AEROSOL RESPIRATORY (INHALATION)
Qty: 3 EACH | Refills: 1 | Status: SHIPPED | OUTPATIENT
Start: 2024-10-24

## 2024-10-24 RX ORDER — MONTELUKAST SODIUM 10 MG/1
10 TABLET ORAL NIGHTLY
Qty: 90 TABLET | Refills: 1 | Status: SHIPPED | OUTPATIENT
Start: 2024-10-24

## 2024-10-24 RX ORDER — PROMETHAZINE HYDROCHLORIDE 25 MG/1
25 TABLET ORAL EVERY 6 HOURS PRN
Qty: 60 TABLET | Refills: 1 | Status: SHIPPED | OUTPATIENT
Start: 2024-10-24

## 2024-10-24 RX ORDER — DESVENLAFAXINE 25 MG/1
25 TABLET, EXTENDED RELEASE ORAL DAILY
Qty: 90 TABLET | Refills: 1 | Status: CANCELLED | OUTPATIENT
Start: 2024-10-24

## 2024-10-24 RX ORDER — METOPROLOL TARTRATE 25 MG/1
25 TABLET, FILM COATED ORAL 2 TIMES DAILY
Qty: 180 TABLET | Refills: 1 | Status: SHIPPED | OUTPATIENT
Start: 2024-10-24

## 2024-10-24 RX ORDER — CEPHALEXIN 500 MG/1
500 CAPSULE ORAL 3 TIMES DAILY
COMMUNITY
Start: 2024-07-25

## 2024-10-24 RX ORDER — BUPROPION HYDROCHLORIDE 150 MG/1
150 TABLET ORAL EVERY MORNING
Qty: 90 TABLET | Refills: 1 | Status: SHIPPED | OUTPATIENT
Start: 2024-10-24

## 2024-10-24 RX ORDER — ERGOCALCIFEROL 1.25 MG/1
50000 CAPSULE, LIQUID FILLED ORAL WEEKLY
Qty: 12 CAPSULE | Refills: 0 | Status: SHIPPED | OUTPATIENT
Start: 2024-10-24

## 2024-10-24 NOTE — PATIENT INSTRUCTIONS
[facility] - [phone]  [facility] -  [phone]  [facility] -  [phone]

## 2024-10-24 NOTE — PROGRESS NOTES
2023-24 season) 09/01/2024           Patient given educational materials- see patient instructions.  Discussed use, benefit, and side effects of prescribedmedications.  All patient questions answered.  Pt voiced understanding. Reviewedhealth maintenance.  Instructed to continue current medications, diet and exercise.Patient agreed with treatment plan. Follow up as directed.     Electronically signedby DARWIN LINO MD on 10/24/2024

## 2024-10-29 ENCOUNTER — TELEPHONE (OUTPATIENT)
Dept: FAMILY MEDICINE CLINIC | Age: 74
End: 2024-10-29

## 2024-10-31 ASSESSMENT — ENCOUNTER SYMPTOMS
CHOKING: 0
COUGH: 0
BLOOD IN STOOL: 0
CHEST TIGHTNESS: 0
SHORTNESS OF BREATH: 0
CONSTIPATION: 0
WHEEZING: 0
DIARRHEA: 0
ANAL BLEEDING: 0

## 2024-10-31 ASSESSMENT — VISUAL ACUITY: OU: 1

## 2024-11-04 ENCOUNTER — OFFICE VISIT (OUTPATIENT)
Dept: FAMILY MEDICINE CLINIC | Age: 74
End: 2024-11-04
Payer: COMMERCIAL

## 2024-11-04 VITALS
HEART RATE: 71 BPM | WEIGHT: 250.4 LBS | TEMPERATURE: 97.8 F | OXYGEN SATURATION: 96 % | DIASTOLIC BLOOD PRESSURE: 78 MMHG | SYSTOLIC BLOOD PRESSURE: 120 MMHG | BODY MASS INDEX: 40.44 KG/M2

## 2024-11-04 DIAGNOSIS — G89.29 CHRONIC LEFT HIP PAIN: Primary | ICD-10-CM

## 2024-11-04 DIAGNOSIS — M54.42 CHRONIC BILATERAL LOW BACK PAIN WITH BILATERAL SCIATICA: ICD-10-CM

## 2024-11-04 DIAGNOSIS — F33.2 SEVERE EPISODE OF RECURRENT MAJOR DEPRESSIVE DISORDER, WITHOUT PSYCHOTIC FEATURES (HCC): ICD-10-CM

## 2024-11-04 DIAGNOSIS — G89.29 CHRONIC BILATERAL LOW BACK PAIN WITH BILATERAL SCIATICA: ICD-10-CM

## 2024-11-04 DIAGNOSIS — M25.552 CHRONIC LEFT HIP PAIN: Primary | ICD-10-CM

## 2024-11-04 DIAGNOSIS — M54.41 CHRONIC BILATERAL LOW BACK PAIN WITH BILATERAL SCIATICA: ICD-10-CM

## 2024-11-04 DIAGNOSIS — I48.0 PAROXYSMAL ATRIAL FIBRILLATION (HCC): ICD-10-CM

## 2024-11-04 PROCEDURE — 1123F ACP DISCUSS/DSCN MKR DOCD: CPT | Performed by: INTERNAL MEDICINE

## 2024-11-04 PROCEDURE — 99214 OFFICE O/P EST MOD 30 MIN: CPT | Performed by: INTERNAL MEDICINE

## 2024-11-04 NOTE — PATIENT INSTRUCTIONS
Dr. Chen - [phone]     [facility] patient assistance - [phone]

## 2024-11-04 NOTE — PROGRESS NOTES
[facility]  [place] [id number]  Dept: [phone]  Dept Fax: [phone]      Carolee Porter is a 73 y.o. female who presents today for hermedical conditions/complaints as noted below.  Carolee Porter is c/o of Hip Pain (LT hip has been hurting for a long time ), Medication Check (Discuss Eliquis, pt had to pay $529. Yesterday, she can not afford ), and Other (Discuss referral for home health aid, to help with cleaning, bathing )        Assessment/Plan:     1. Chronic left hip pain  Comments:  pt to call ortho to plan hip replacement surgery - number to Dr. Byrd's office provided  Orders:  -     External Referral To Home Health  2. Chronic bilateral low back pain with bilateral sciatica  -     External Referral To Home Health  3. Severe episode of recurrent major depressive disorder, without psychotic features (HCC)  Comments:  just started wellbutrin two days ago - if episodes persist then will increase wellbutrin dose. declines psychiatry referral at this time.  4. Paroxysmal atrial fibrillation (HCC)  Comments:  eliquis  coupon and patient assistance information provided - declines switching to coumadin.  >30 minutes spent with patient reviewing depression management as well as anticoagulation management - reviewed that she is at high risk for blood clots due to CHADS-VASC2 score of 2 + decreased mobility from hip pain - if she chooses to stop all anticoagluation then she may not be able to get her surgery done since her risk would be high given her previous health issues and her comorbidities          FMLA - 2 days a week     No follow-ups on file.      HPI     Gets nauseous and tired, has been having episodes on and off where she can't get out of bed for 40-48 hours, has been going on for about 3 months now. Just wants to sleep, missing work. Occurring once a month, sometimes twice. Had to take two days off on Wednesday and

## 2024-11-06 ENCOUNTER — TELEPHONE (OUTPATIENT)
Dept: ORTHOPEDIC SURGERY | Age: 74
End: 2024-11-06

## 2024-11-06 NOTE — TELEPHONE ENCOUNTER
Called patient regarding a referral that was sent to our office. I left a message with the phone number for the patient to call and schedule. Patient can schedule with MILI Fraire.

## 2024-11-09 ASSESSMENT — ENCOUNTER SYMPTOMS
COUGH: 0
VOMITING: 0
WHEEZING: 0
NAUSEA: 0
CHOKING: 0
BLOOD IN STOOL: 0
CHEST TIGHTNESS: 0
ABDOMINAL PAIN: 0
SHORTNESS OF BREATH: 0
BACK PAIN: 1
ANAL BLEEDING: 0
DIARRHEA: 0
CONSTIPATION: 0

## 2024-11-09 ASSESSMENT — VISUAL ACUITY: OU: 1

## 2024-11-13 DIAGNOSIS — F41.1 GENERALIZED ANXIETY DISORDER: ICD-10-CM

## 2024-11-13 DIAGNOSIS — F43.21 SITUATIONAL DEPRESSION: ICD-10-CM

## 2024-11-13 DIAGNOSIS — M15.0 PRIMARY OSTEOARTHRITIS INVOLVING MULTIPLE JOINTS: ICD-10-CM

## 2024-11-13 DIAGNOSIS — J44.9 CHRONIC OBSTRUCTIVE PULMONARY DISEASE, UNSPECIFIED COPD TYPE (HCC): ICD-10-CM

## 2024-11-13 DIAGNOSIS — I48.0 PAROXYSMAL ATRIAL FIBRILLATION (HCC): ICD-10-CM

## 2024-11-13 DIAGNOSIS — J30.2 SEASONAL ALLERGIC RHINITIS, UNSPECIFIED TRIGGER: ICD-10-CM

## 2024-11-13 DIAGNOSIS — R11.0 NAUSEA: ICD-10-CM

## 2024-11-13 DIAGNOSIS — E55.9 VITAMIN D DEFICIENCY: ICD-10-CM

## 2024-11-13 RX ORDER — ALBUTEROL SULFATE 90 UG/1
INHALANT RESPIRATORY (INHALATION)
Qty: 54 G | Refills: 1 | Status: SHIPPED | OUTPATIENT
Start: 2024-11-13

## 2024-11-13 RX ORDER — METOPROLOL TARTRATE 25 MG/1
25 TABLET, FILM COATED ORAL 2 TIMES DAILY
Qty: 180 TABLET | Refills: 1 | Status: SHIPPED | OUTPATIENT
Start: 2024-11-13

## 2024-11-13 RX ORDER — BUPROPION HYDROCHLORIDE 150 MG/1
150 TABLET ORAL EVERY MORNING
Qty: 90 TABLET | Refills: 1 | Status: SHIPPED | OUTPATIENT
Start: 2024-11-13

## 2024-11-13 RX ORDER — BUDESONIDE AND FORMOTEROL FUMARATE DIHYDRATE 160; 4.5 UG/1; UG/1
AEROSOL RESPIRATORY (INHALATION)
Qty: 3 EACH | Refills: 1 | Status: SHIPPED | OUTPATIENT
Start: 2024-11-13

## 2024-11-13 RX ORDER — DESVENLAFAXINE 100 MG/1
TABLET, EXTENDED RELEASE ORAL
Qty: 90 TABLET | Refills: 0 | Status: SHIPPED | OUTPATIENT
Start: 2024-11-13

## 2024-11-13 RX ORDER — MONTELUKAST SODIUM 10 MG/1
10 TABLET ORAL NIGHTLY
Qty: 90 TABLET | Refills: 1 | Status: SHIPPED | OUTPATIENT
Start: 2024-11-13

## 2024-11-13 RX ORDER — PROMETHAZINE HYDROCHLORIDE 25 MG/1
25 TABLET ORAL EVERY 6 HOURS PRN
Qty: 60 TABLET | Refills: 1 | Status: SHIPPED | OUTPATIENT
Start: 2024-11-13

## 2024-11-13 RX ORDER — ERGOCALCIFEROL 1.25 MG/1
50000 CAPSULE, LIQUID FILLED ORAL WEEKLY
Qty: 12 CAPSULE | Refills: 0 | Status: SHIPPED | OUTPATIENT
Start: 2024-11-13

## 2024-11-13 RX ORDER — TIOTROPIUM BROMIDE 18 UG/1
18 CAPSULE ORAL; RESPIRATORY (INHALATION) DAILY
Qty: 90 CAPSULE | Refills: 1 | Status: SHIPPED | OUTPATIENT
Start: 2024-11-13

## 2024-11-13 NOTE — TELEPHONE ENCOUNTER
Patient contacted office to inform that she is switching pharmacy. She is now going to [facility] on [address]. Asking for all meds to be sent

## 2024-12-19 DIAGNOSIS — F41.1 GENERALIZED ANXIETY DISORDER: ICD-10-CM

## 2024-12-19 DIAGNOSIS — J44.9 CHRONIC OBSTRUCTIVE PULMONARY DISEASE, UNSPECIFIED COPD TYPE (HCC): ICD-10-CM

## 2024-12-19 DIAGNOSIS — F43.21 SITUATIONAL DEPRESSION: ICD-10-CM

## 2024-12-19 DIAGNOSIS — J30.2 SEASONAL ALLERGIC RHINITIS, UNSPECIFIED TRIGGER: ICD-10-CM

## 2024-12-19 RX ORDER — ALBUTEROL SULFATE 90 UG/1
INHALANT RESPIRATORY (INHALATION)
Qty: 54 G | Refills: 0 | OUTPATIENT
Start: 2024-12-19

## 2024-12-19 RX ORDER — ALBUTEROL SULFATE 90 UG/1
INHALANT RESPIRATORY (INHALATION)
Qty: 54 G | Refills: 1 | Status: SHIPPED | OUTPATIENT
Start: 2024-12-19

## 2024-12-19 RX ORDER — MONTELUKAST SODIUM 10 MG/1
10 TABLET ORAL NIGHTLY
Qty: 90 TABLET | Refills: 1 | Status: SHIPPED | OUTPATIENT
Start: 2024-12-19

## 2024-12-19 RX ORDER — DESVENLAFAXINE 100 MG/1
TABLET, EXTENDED RELEASE ORAL
Qty: 90 TABLET | Refills: 1 | Status: SHIPPED | OUTPATIENT
Start: 2024-12-19

## 2024-12-19 RX ORDER — BUDESONIDE AND FORMOTEROL FUMARATE DIHYDRATE 160; 4.5 UG/1; UG/1
AEROSOL RESPIRATORY (INHALATION)
Qty: 30.6 G | Refills: 1 | Status: SHIPPED | OUTPATIENT
Start: 2024-12-19

## 2024-12-19 NOTE — TELEPHONE ENCOUNTER
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  For this reason I would rather give her Keflex 3 times a day, ongoing for at least a month, and onto the surgery to the left hip of course, to make sure by then it is fully cleaned.    Request for refill received. Writer pended refill. Please sign if agreeable. Thank you.

## 2024-12-20 RX ORDER — CEPHALEXIN 500 MG/1
500 CAPSULE ORAL 3 TIMES DAILY
Qty: 90 CAPSULE | Refills: 0 | Status: SHIPPED | OUTPATIENT
Start: 2024-12-20

## 2025-02-11 ENCOUNTER — TELEPHONE (OUTPATIENT)
Dept: FAMILY MEDICINE CLINIC | Age: 75
End: 2025-02-11

## 2025-02-11 NOTE — TELEPHONE ENCOUNTER
Patient contacted office informing that she has flu symptoms. She said she has been vomiting since Saturday but no noted fever. States she is dizzy as well. I informed patient that flu and norovirus is going around and she would have to treat the symptoms over the counter.     While advising patient to treat with over the counter and fluids, patient then started vomiting while on the phone.    Advised patient [facility]

## 2025-02-13 NOTE — TELEPHONE ENCOUNTER
Pt called stating she is going back to work Monday 02/17/2025     Asking for note to return to work

## 2025-02-28 DIAGNOSIS — E55.9 VITAMIN D DEFICIENCY: ICD-10-CM

## 2025-02-28 DIAGNOSIS — M15.0 PRIMARY OSTEOARTHRITIS INVOLVING MULTIPLE JOINTS: ICD-10-CM

## 2025-02-28 DIAGNOSIS — R11.0 NAUSEA: ICD-10-CM

## 2025-02-28 DIAGNOSIS — I48.0 PAROXYSMAL ATRIAL FIBRILLATION (HCC): ICD-10-CM

## 2025-02-28 DIAGNOSIS — F43.21 SITUATIONAL DEPRESSION: ICD-10-CM

## 2025-02-28 DIAGNOSIS — J44.9 CHRONIC OBSTRUCTIVE PULMONARY DISEASE, UNSPECIFIED COPD TYPE (HCC): ICD-10-CM

## 2025-02-28 NOTE — TELEPHONE ENCOUNTER
Patient called asking for med refills on   -Symbicort  -Pristiq  -Singular  -Proventil  Per [facility] they have refills on file for these.     Left message for patient to confirm if she needs her meds from 11/13/24 that were sent to [facility] to go to [facility].

## 2025-02-28 NOTE — TELEPHONE ENCOUNTER
Carolee Porter is calling to request a refill on the following medication(s):    Medication Request:  Requested Prescriptions     Pending Prescriptions Disp Refills    ELIQUIS 5 MG TABS tablet [Pharmacy Med Name: Eliquis Oral Tablet 5 MG] 180 tablet 0     Sig: TAKE 1 TABLET BY MOUTH 2 TIMES A DAY       Last Visit Date (If Applicable):  11/4/2024    Next Visit Date:    4/24/2025

## 2025-03-03 RX ORDER — APIXABAN 5 MG/1
5 TABLET, FILM COATED ORAL 2 TIMES DAILY
Qty: 180 TABLET | Refills: 0 | Status: SHIPPED | OUTPATIENT
Start: 2025-03-03

## 2025-03-06 ENCOUNTER — TELEPHONE (OUTPATIENT)
Dept: FAMILY MEDICINE CLINIC | Age: 75
End: 2025-03-06

## 2025-03-06 RX ORDER — TIOTROPIUM BROMIDE 18 UG/1
18 CAPSULE ORAL; RESPIRATORY (INHALATION) DAILY
Qty: 90 CAPSULE | Refills: 1 | Status: SHIPPED | OUTPATIENT
Start: 2025-03-06

## 2025-03-06 RX ORDER — BUPROPION HYDROCHLORIDE 150 MG/1
150 TABLET ORAL EVERY MORNING
Qty: 90 TABLET | Refills: 1 | Status: SHIPPED | OUTPATIENT
Start: 2025-03-06

## 2025-03-06 RX ORDER — ERGOCALCIFEROL 1.25 MG/1
50000 CAPSULE, LIQUID FILLED ORAL WEEKLY
Qty: 12 CAPSULE | Refills: 0 | Status: SHIPPED | OUTPATIENT
Start: 2025-03-06

## 2025-03-06 RX ORDER — METOPROLOL TARTRATE 25 MG/1
25 TABLET, FILM COATED ORAL 2 TIMES DAILY
Qty: 180 TABLET | Refills: 1 | Status: SHIPPED | OUTPATIENT
Start: 2025-03-06

## 2025-03-06 RX ORDER — PROMETHAZINE HYDROCHLORIDE 25 MG/1
25 TABLET ORAL EVERY 6 HOURS PRN
Qty: 60 TABLET | Refills: 1 | Status: SHIPPED | OUTPATIENT
Start: 2025-03-06

## 2025-03-06 NOTE — TELEPHONE ENCOUNTER
Patient contacted office, she wanted [facility] for when she had her hip surgery, which is not happening right now. She stated we can cancel referral

## 2025-03-06 NOTE — TELEPHONE ENCOUNTER
Patient called back. She stated she needs the meds at [place] to be sent to [facility]. Meds pended

## 2025-03-19 DIAGNOSIS — I48.0 PAROXYSMAL ATRIAL FIBRILLATION (HCC): ICD-10-CM

## 2025-03-19 RX ORDER — APIXABAN 5 MG/1
5 TABLET, FILM COATED ORAL 2 TIMES DAILY
Qty: 180 TABLET | Refills: 1 | Status: SHIPPED | OUTPATIENT
Start: 2025-03-19

## 2025-03-19 NOTE — TELEPHONE ENCOUNTER
Osteoporosis without current pathological fracture     COPD (chronic obstructive pulmonary disease) ([facility])     Class 2 severe obesity due to excess calories with serious comorbidity and body mass index (BMI) of 35.0 to 35.9 in adult     MSSA (methicillin susceptible Staphylococcus aureus) septicemia ([facility])     Bandemia     Spinal stenosis of lumbar region with neurogenic claudication     Bilateral leg weakness     Paroxysmal atrial fibrillation ([facility])     Folic acid deficiency     Prolonged Q-T interval on ECG     Chronic ulcer of left thigh with fat layer exposed ([facility])

## 2025-04-24 ENCOUNTER — OFFICE VISIT (OUTPATIENT)
Dept: FAMILY MEDICINE CLINIC | Age: 75
End: 2025-04-24
Payer: COMMERCIAL

## 2025-04-24 VITALS
OXYGEN SATURATION: 95 % | BODY MASS INDEX: 37.24 KG/M2 | TEMPERATURE: 98.1 F | DIASTOLIC BLOOD PRESSURE: 74 MMHG | WEIGHT: 223.8 LBS | HEART RATE: 65 BPM | SYSTOLIC BLOOD PRESSURE: 118 MMHG

## 2025-04-24 DIAGNOSIS — R73.03 PREDIABETES: ICD-10-CM

## 2025-04-24 DIAGNOSIS — E66.01 CLASS 2 SEVERE OBESITY DUE TO EXCESS CALORIES WITH SERIOUS COMORBIDITY AND BODY MASS INDEX (BMI) OF 35.0 TO 35.9 IN ADULT (HCC): ICD-10-CM

## 2025-04-24 DIAGNOSIS — F33.1 MODERATE RECURRENT MAJOR DEPRESSION (HCC): ICD-10-CM

## 2025-04-24 DIAGNOSIS — Z13.0 SCREENING, ANEMIA, DEFICIENCY, IRON: ICD-10-CM

## 2025-04-24 DIAGNOSIS — Z23 NEED FOR PNEUMOCOCCAL VACCINATION: ICD-10-CM

## 2025-04-24 DIAGNOSIS — M54.41 CHRONIC BILATERAL LOW BACK PAIN WITH BILATERAL SCIATICA: ICD-10-CM

## 2025-04-24 DIAGNOSIS — I48.0 PAROXYSMAL ATRIAL FIBRILLATION (HCC): ICD-10-CM

## 2025-04-24 DIAGNOSIS — Z13.220 SCREENING, LIPID: ICD-10-CM

## 2025-04-24 DIAGNOSIS — E55.9 VITAMIN D DEFICIENCY: ICD-10-CM

## 2025-04-24 DIAGNOSIS — M48.062 SPINAL STENOSIS OF LUMBAR REGION WITH NEUROGENIC CLAUDICATION: ICD-10-CM

## 2025-04-24 DIAGNOSIS — J30.2 SEASONAL ALLERGIC RHINITIS, UNSPECIFIED TRIGGER: ICD-10-CM

## 2025-04-24 DIAGNOSIS — G89.29 CHRONIC LEFT HIP PAIN: ICD-10-CM

## 2025-04-24 DIAGNOSIS — Z87.891 PERSONAL HISTORY OF TOBACCO USE: ICD-10-CM

## 2025-04-24 DIAGNOSIS — M54.42 CHRONIC BILATERAL LOW BACK PAIN WITH BILATERAL SCIATICA: ICD-10-CM

## 2025-04-24 DIAGNOSIS — E66.812 CLASS 2 SEVERE OBESITY DUE TO EXCESS CALORIES WITH SERIOUS COMORBIDITY AND BODY MASS INDEX (BMI) OF 35.0 TO 35.9 IN ADULT (HCC): ICD-10-CM

## 2025-04-24 DIAGNOSIS — M81.0 OSTEOPOROSIS WITHOUT CURRENT PATHOLOGICAL FRACTURE, UNSPECIFIED OSTEOPOROSIS TYPE: Primary | ICD-10-CM

## 2025-04-24 DIAGNOSIS — M25.552 CHRONIC LEFT HIP PAIN: ICD-10-CM

## 2025-04-24 DIAGNOSIS — F41.1 GENERALIZED ANXIETY DISORDER: ICD-10-CM

## 2025-04-24 DIAGNOSIS — J44.9 CHRONIC OBSTRUCTIVE PULMONARY DISEASE, UNSPECIFIED COPD TYPE (HCC): ICD-10-CM

## 2025-04-24 DIAGNOSIS — G89.29 CHRONIC BILATERAL LOW BACK PAIN WITH BILATERAL SCIATICA: ICD-10-CM

## 2025-04-24 DIAGNOSIS — Z13.29 SCREENING FOR THYROID DISORDER: ICD-10-CM

## 2025-04-24 PROCEDURE — 99214 OFFICE O/P EST MOD 30 MIN: CPT | Performed by: INTERNAL MEDICINE

## 2025-04-24 PROCEDURE — 1123F ACP DISCUSS/DSCN MKR DOCD: CPT | Performed by: INTERNAL MEDICINE

## 2025-04-24 PROCEDURE — G0296 VISIT TO DETERM LDCT ELIG: HCPCS | Performed by: INTERNAL MEDICINE

## 2025-04-24 PROCEDURE — 90471 IMMUNIZATION ADMIN: CPT | Performed by: INTERNAL MEDICINE

## 2025-04-24 PROCEDURE — 90677 PCV20 VACCINE IM: CPT | Performed by: INTERNAL MEDICINE

## 2025-04-24 RX ORDER — ALBUTEROL SULFATE 90 UG/1
INHALANT RESPIRATORY (INHALATION)
Qty: 54 G | Refills: 1 | Status: SHIPPED | OUTPATIENT
Start: 2025-04-24

## 2025-04-24 RX ORDER — MONTELUKAST SODIUM 10 MG/1
10 TABLET ORAL NIGHTLY
Qty: 90 TABLET | Refills: 1 | Status: SHIPPED | OUTPATIENT
Start: 2025-04-24

## 2025-04-24 RX ORDER — DESVENLAFAXINE 100 MG/1
TABLET, EXTENDED RELEASE ORAL
Qty: 90 TABLET | Refills: 1 | Status: SHIPPED | OUTPATIENT
Start: 2025-04-24

## 2025-04-24 RX ORDER — DIAZEPAM 5 MG/1
5 TABLET ORAL PRN
COMMUNITY
Start: 2025-04-17

## 2025-04-24 RX ORDER — BUDESONIDE AND FORMOTEROL FUMARATE DIHYDRATE 160; 4.5 UG/1; UG/1
AEROSOL RESPIRATORY (INHALATION)
Qty: 30.6 G | Refills: 1 | Status: SHIPPED | OUTPATIENT
Start: 2025-04-24

## 2025-04-24 SDOH — ECONOMIC STABILITY: FOOD INSECURITY: WITHIN THE PAST 12 MONTHS, THE FOOD YOU BOUGHT JUST DIDN'T LAST AND YOU DIDN'T HAVE MONEY TO GET MORE.: NEVER TRUE

## 2025-04-24 SDOH — ECONOMIC STABILITY: FOOD INSECURITY: WITHIN THE PAST 12 MONTHS, YOU WORRIED THAT YOUR FOOD WOULD RUN OUT BEFORE YOU GOT MONEY TO BUY MORE.: NEVER TRUE

## 2025-04-24 ASSESSMENT — PATIENT HEALTH QUESTIONNAIRE - PHQ9
3. TROUBLE FALLING OR STAYING ASLEEP: NOT AT ALL
4. FEELING TIRED OR HAVING LITTLE ENERGY: NOT AT ALL
SUM OF ALL RESPONSES TO PHQ QUESTIONS 1-9: 0
6. FEELING BAD ABOUT YOURSELF - OR THAT YOU ARE A FAILURE OR HAVE LET YOURSELF OR YOUR FAMILY DOWN: NOT AT ALL
9. THOUGHTS THAT YOU WOULD BE BETTER OFF DEAD, OR OF HURTING YOURSELF: NOT AT ALL
SUM OF ALL RESPONSES TO PHQ QUESTIONS 1-9: 0
2. FEELING DOWN, DEPRESSED OR HOPELESS: NOT AT ALL
7. TROUBLE CONCENTRATING ON THINGS, SUCH AS READING THE NEWSPAPER OR WATCHING TELEVISION: NOT AT ALL
1. LITTLE INTEREST OR PLEASURE IN DOING THINGS: NOT AT ALL
SUM OF ALL RESPONSES TO PHQ QUESTIONS 1-9: 0
5. POOR APPETITE OR OVEREATING: NOT AT ALL
8. MOVING OR SPEAKING SO SLOWLY THAT OTHER PEOPLE COULD HAVE NOTICED. OR THE OPPOSITE, BEING SO FIGETY OR RESTLESS THAT YOU HAVE BEEN MOVING AROUND A LOT MORE THAN USUAL: NOT AT ALL
10. IF YOU CHECKED OFF ANY PROBLEMS, HOW DIFFICULT HAVE THESE PROBLEMS MADE IT FOR YOU TO DO YOUR WORK, TAKE CARE OF THINGS AT HOME, OR GET ALONG WITH OTHER PEOPLE: NOT DIFFICULT AT ALL
SUM OF ALL RESPONSES TO PHQ QUESTIONS 1-9: 0

## 2025-04-24 NOTE — PATIENT INSTRUCTIONS
Www.ssa.gov - to figure out medicare and social security     Learn about reverse mortgages - https://www.My[employer].ComfinEvolveMol.gov/consumer-tools/reverse-mortgages/  https://consumer.Patience.gov/articles/reverse-mortgages       Learning About Lung Cancer Screening  What is screening for lung cancer?     Lung cancer screening is a way to find some lung cancers early, before a person has any symptoms of the cancer.  Lung cancer screening may help those who have the highest risk for lung cancer--people age 50 and older who are or were heavy smokers. For most people, who aren't at increased risk, screening for lung cancer probably isn't helpful.  Screening won't prevent cancer. And it may not find all lung cancers. Lung cancer screening may lower the risk of dying from lung cancer in a small number of people.  How is it done?  Lung cancer screening is done with a low-dose CT (computed tomography) scan. A CT scan uses X-rays, or radiation, to make detailed pictures of your body. Experts recommend that screening be done in medical centers that focus on finding and treating lung cancer.  Who is screening recommended for?  Lung cancer screening is recommended for people age 50 and older who are or were heavy smokers. That means people with a smoking history of at least 20 pack years. A pack year is a way to measure how heavy a smoker you are or were.  To figure out your pack years, multiply how many packs a day on average (assuming 20 cigarettes per pack) you have smoked by how many years you have smoked. For example:  If you smoked 1 pack a day for 20 years, that's 1 times 20. So you have a smoking history of 20 pack years.  If you smoked 2 packs a day for 10 years, that's 2 times 10. So you have a smoking history of 20 pack years.  Experts agree that screening is for people who have a high risk of lung cancer. But experts don't agree on what high risk means. Some say people age 50 or older with at least a 20-pack-year smoking

## 2025-04-24 NOTE — PROGRESS NOTES
Adult)   Pulse 65   Temp 98.1 °F (36.7 °C)   Wt 101.5 kg (223 lb 12.8 oz)   LMP  (LMP Unknown)   SpO2 95%   BMI 37.24 kg/m²   Physical Exam  Vitals and nursing note reviewed.   Constitutional:       General: She is not in acute distress.     Appearance: She is well-developed. She is obese. She is not ill-appearing.      Comments: Ambulating with walker   Eyes:      General: Lids are normal. Vision grossly intact.   Cardiovascular:      Rate and Rhythm: Normal rate and regular rhythm.      Heart sounds: Normal heart sounds, S1 normal and S2 normal. No murmur heard.     No friction rub. No gallop.   Pulmonary:      Effort: Pulmonary effort is normal. No respiratory distress.      Breath sounds: Normal breath sounds. No wheezing.   Abdominal:      General: Bowel sounds are normal.      Palpations: Abdomen is soft. There is no mass.      Tenderness: There is no abdominal tenderness. There is no guarding.   Musculoskeletal:         General: Normal range of motion.   Skin:     General: Skin is warm and dry.      Capillary Refill: Capillary refill takes less than 2 seconds.   Neurological:      General: No focal deficit present.      Mental Status: She is alert and oriented to person, place, and time.           Data Review       Health Maintenance Due   Topic Date Due    DTaP/Tdap/Td vaccine (1 - Tdap) Never done    Pneumococcal 50+ years Vaccine (2 of 2 - PCV) 11/12/2019    Lung Cancer Screening &/or Counseling  09/24/2023    Colorectal Cancer Screen  Never done    COVID-19 Vaccine (5 - 2024-25 season) 09/01/2024           Patient given educational materials- see patient instructions.  Discussed use, benefit, and side effects of prescribedmedications.  All patient questions answered.  Pt voiced understanding. Reviewedhealth maintenance.  Instructed to continue current medications, diet and exercise.Patient agreed with treatment plan. Follow up as directed.     Electronically signedby DARWIN LINO MD on

## 2025-05-01 ASSESSMENT — ENCOUNTER SYMPTOMS
WHEEZING: 0
CHOKING: 0
CONSTIPATION: 0
BLOOD IN STOOL: 0
DIARRHEA: 0
COUGH: 0
ABDOMINAL PAIN: 0
VOMITING: 0
SHORTNESS OF BREATH: 1
NAUSEA: 0
ANAL BLEEDING: 0
CHEST TIGHTNESS: 0

## 2025-05-01 ASSESSMENT — VISUAL ACUITY: OU: 1

## 2025-08-16 DIAGNOSIS — I48.0 PAROXYSMAL ATRIAL FIBRILLATION (HCC): ICD-10-CM

## 2025-08-20 RX ORDER — METOPROLOL TARTRATE 25 MG/1
25 TABLET, FILM COATED ORAL 2 TIMES DAILY
Qty: 180 TABLET | Refills: 1 | Status: SHIPPED | OUTPATIENT
Start: 2025-08-20